# Patient Record
Sex: MALE | Race: WHITE | NOT HISPANIC OR LATINO | Employment: OTHER | URBAN - NONMETROPOLITAN AREA
[De-identification: names, ages, dates, MRNs, and addresses within clinical notes are randomized per-mention and may not be internally consistent; named-entity substitution may affect disease eponyms.]

---

## 2018-03-01 ENCOUNTER — TRANSCRIBE ORDERS (OUTPATIENT)
Dept: ADMINISTRATIVE | Facility: HOSPITAL | Age: 68
End: 2018-03-01

## 2018-03-01 DIAGNOSIS — M25.512 LEFT SHOULDER PAIN, UNSPECIFIED CHRONICITY: Primary | ICD-10-CM

## 2018-03-07 ENCOUNTER — HOSPITAL ENCOUNTER (OUTPATIENT)
Dept: MRI IMAGING | Facility: HOSPITAL | Age: 68
Discharge: HOME | End: 2018-03-07
Attending: ORTHOPAEDIC SURGERY | Admitting: ORTHOPAEDIC SURGERY

## 2018-03-07 DIAGNOSIS — M25.512 LEFT SHOULDER PAIN, UNSPECIFIED CHRONICITY: ICD-10-CM

## 2018-03-07 PROCEDURE — 73221 MRI JOINT UPR EXTREM W/O DYE: CPT

## 2018-03-21 ENCOUNTER — HOSPITAL ENCOUNTER (OUTPATIENT)
Dept: PREADMISSION TESTING | Age: 68
Discharge: HOME | End: 2018-03-25
Payer: MEDICARE

## 2018-03-21 VITALS — HEIGHT: 73 IN | WEIGHT: 236 LBS | BODY MASS INDEX: 31.28 KG/M2

## 2018-03-21 PROBLEM — S46.012A TRAUMATIC COMPLETE TEAR OF LEFT ROTATOR CUFF: Status: ACTIVE | Noted: 2018-03-21

## 2018-03-21 LAB
ANION GAP SERPL CALCULATED.3IONS-SCNC: 12 MMOL/L (ref 7–19)
BASOPHILS ABSOLUTE: 0 K/UL (ref 0–0.2)
BASOPHILS RELATIVE PERCENT: 0.4 % (ref 0–1)
BUN BLDV-MCNC: 19 MG/DL (ref 8–23)
CALCIUM SERPL-MCNC: 9.2 MG/DL (ref 8.8–10.2)
CHLORIDE BLD-SCNC: 100 MMOL/L (ref 98–111)
CO2: 24 MMOL/L (ref 22–29)
CREAT SERPL-MCNC: 1 MG/DL (ref 0.5–1.2)
EOSINOPHILS ABSOLUTE: 0.1 K/UL (ref 0–0.6)
EOSINOPHILS RELATIVE PERCENT: 1.5 % (ref 0–5)
GFR NON-AFRICAN AMERICAN: >60
GLUCOSE BLD-MCNC: 123 MG/DL (ref 74–109)
HCT VFR BLD CALC: 43.5 % (ref 42–52)
HEMOGLOBIN: 14.4 G/DL (ref 14–18)
LYMPHOCYTES ABSOLUTE: 0.8 K/UL (ref 1.1–4.5)
LYMPHOCYTES RELATIVE PERCENT: 10.7 % (ref 20–40)
MCH RBC QN AUTO: 29.8 PG (ref 27–31)
MCHC RBC AUTO-ENTMCNC: 33.1 G/DL (ref 33–37)
MCV RBC AUTO: 90.1 FL (ref 80–94)
MONOCYTES ABSOLUTE: 0.6 K/UL (ref 0–0.9)
MONOCYTES RELATIVE PERCENT: 8.2 % (ref 0–10)
NEUTROPHILS ABSOLUTE: 6.2 K/UL (ref 1.5–7.5)
NEUTROPHILS RELATIVE PERCENT: 78.8 % (ref 50–65)
PDW BLD-RTO: 13.3 % (ref 11.5–14.5)
PLATELET # BLD: 198 K/UL (ref 130–400)
PMV BLD AUTO: 11 FL (ref 9.4–12.4)
POTASSIUM SERPL-SCNC: 4.2 MMOL/L (ref 3.5–5)
RBC # BLD: 4.83 M/UL (ref 4.7–6.1)
SODIUM BLD-SCNC: 136 MMOL/L (ref 136–145)
WBC # BLD: 7.8 K/UL (ref 4.8–10.8)

## 2018-03-21 PROCEDURE — 85025 COMPLETE CBC W/AUTO DIFF WBC: CPT

## 2018-03-21 PROCEDURE — 80048 BASIC METABOLIC PNL TOTAL CA: CPT

## 2018-03-21 PROCEDURE — 93005 ELECTROCARDIOGRAM TRACING: CPT

## 2018-03-21 RX ORDER — COVID-19 ANTIGEN TEST
1 KIT MISCELLANEOUS 2 TIMES DAILY PRN
Status: ON HOLD | COMMUNITY
End: 2018-03-22 | Stop reason: HOSPADM

## 2018-03-21 RX ORDER — TAMSULOSIN HYDROCHLORIDE 0.4 MG/1
0.4 CAPSULE ORAL DAILY
COMMUNITY
End: 2020-07-30

## 2018-03-21 RX ORDER — ACETAMINOPHEN, ASPIRIN AND CAFFEINE 250; 250; 65 MG/1; MG/1; MG/1
1 TABLET, FILM COATED ORAL EVERY 6 HOURS PRN
COMMUNITY
End: 2018-03-21 | Stop reason: ALTCHOICE

## 2018-03-21 RX ORDER — M-VIT,TX,IRON,MINS/CALC/FOLIC 27MG-0.4MG
1 TABLET ORAL DAILY
COMMUNITY

## 2018-03-21 RX ORDER — ACETAMINOPHEN, ASPIRIN AND CAFFEINE 250; 250; 65 MG/1; MG/1; MG/1
1 TABLET, FILM COATED ORAL EVERY 6 HOURS PRN
COMMUNITY
End: 2019-11-18

## 2018-03-22 ENCOUNTER — ANESTHESIA (OUTPATIENT)
Dept: OPERATING ROOM | Age: 68
End: 2018-03-22
Payer: MEDICARE

## 2018-03-22 ENCOUNTER — ANESTHESIA EVENT (OUTPATIENT)
Dept: OPERATING ROOM | Age: 68
End: 2018-03-22
Payer: MEDICARE

## 2018-03-22 ENCOUNTER — HOSPITAL ENCOUNTER (OUTPATIENT)
Age: 68
Setting detail: OUTPATIENT SURGERY
Discharge: HOME | End: 2018-03-22
Attending: ORTHOPAEDIC SURGERY | Admitting: ORTHOPAEDIC SURGERY
Payer: MEDICARE

## 2018-03-22 VITALS
RESPIRATION RATE: 14 BRPM | TEMPERATURE: 97.1 F | SYSTOLIC BLOOD PRESSURE: 84 MMHG | DIASTOLIC BLOOD PRESSURE: 47 MMHG | OXYGEN SATURATION: 99 %

## 2018-03-22 VITALS
TEMPERATURE: 97.9 F | OXYGEN SATURATION: 92 % | SYSTOLIC BLOOD PRESSURE: 120 MMHG | DIASTOLIC BLOOD PRESSURE: 52 MMHG | BODY MASS INDEX: 31.28 KG/M2 | HEART RATE: 72 BPM | HEIGHT: 73 IN | WEIGHT: 236 LBS | RESPIRATION RATE: 16 BRPM

## 2018-03-22 LAB
EKG P AXIS: 74 DEGREES
EKG P-R INTERVAL: 142 MS
EKG Q-T INTERVAL: 368 MS
EKG QRS DURATION: 84 MS
EKG QTC CALCULATION (BAZETT): 399 MS
EKG T AXIS: 72 DEGREES

## 2018-03-22 PROCEDURE — 2720000010 HC SURG SUPPLY STERILE: Performed by: ORTHOPAEDIC SURGERY

## 2018-03-22 PROCEDURE — 6360000002 HC RX W HCPCS: Performed by: ORTHOPAEDIC SURGERY

## 2018-03-22 PROCEDURE — 6360000002 HC RX W HCPCS: Performed by: NURSE ANESTHETIST, CERTIFIED REGISTERED

## 2018-03-22 PROCEDURE — 2720000001 HC MISC SURG SUPPLY STERILE $51-500: Performed by: ORTHOPAEDIC SURGERY

## 2018-03-22 PROCEDURE — 3700000000 HC ANESTHESIA ATTENDED CARE: Performed by: ORTHOPAEDIC SURGERY

## 2018-03-22 PROCEDURE — 2500000003 HC RX 250 WO HCPCS: Performed by: ORTHOPAEDIC SURGERY

## 2018-03-22 PROCEDURE — 3600000004 HC SURGERY LEVEL 4 BASE: Performed by: ORTHOPAEDIC SURGERY

## 2018-03-22 PROCEDURE — 2580000003 HC RX 258: Performed by: ORTHOPAEDIC SURGERY

## 2018-03-22 PROCEDURE — C1713 ANCHOR/SCREW BN/BN,TIS/BN: HCPCS | Performed by: ORTHOPAEDIC SURGERY

## 2018-03-22 PROCEDURE — 2500000003 HC RX 250 WO HCPCS: Performed by: NURSE ANESTHETIST, CERTIFIED REGISTERED

## 2018-03-22 PROCEDURE — 7100000001 HC PACU RECOVERY - ADDTL 15 MIN: Performed by: ORTHOPAEDIC SURGERY

## 2018-03-22 PROCEDURE — 3600000014 HC SURGERY LEVEL 4 ADDTL 15MIN: Performed by: ORTHOPAEDIC SURGERY

## 2018-03-22 PROCEDURE — 2720000000 HC MISC SURG SUPPLY STERILE $0-50: Performed by: ORTHOPAEDIC SURGERY

## 2018-03-22 PROCEDURE — 2580000003 HC RX 258: Performed by: ANESTHESIOLOGY

## 2018-03-22 PROCEDURE — C1776 JOINT DEVICE (IMPLANTABLE): HCPCS | Performed by: ORTHOPAEDIC SURGERY

## 2018-03-22 PROCEDURE — 6360000002 HC RX W HCPCS: Performed by: ANESTHESIOLOGY

## 2018-03-22 PROCEDURE — 3700000001 HC ADD 15 MINUTES (ANESTHESIA): Performed by: ORTHOPAEDIC SURGERY

## 2018-03-22 PROCEDURE — 7100000000 HC PACU RECOVERY - FIRST 15 MIN: Performed by: ORTHOPAEDIC SURGERY

## 2018-03-22 PROCEDURE — 2720000003 HC MISC SUTURE/STAPLES/RELOADS/ETC: Performed by: ORTHOPAEDIC SURGERY

## 2018-03-22 PROCEDURE — 64415 NJX AA&/STRD BRCH PLXS IMG: CPT | Performed by: NURSE ANESTHETIST, CERTIFIED REGISTERED

## 2018-03-22 PROCEDURE — 7100000010 HC PHASE II RECOVERY - FIRST 15 MIN: Performed by: ORTHOPAEDIC SURGERY

## 2018-03-22 DEVICE — ANCHOR SUTURE BIOCOMP 4.75X19.1 MM SWIVELOCK C: Type: IMPLANTABLE DEVICE | Site: SHOULDER | Status: FUNCTIONAL

## 2018-03-22 RX ORDER — PROMETHAZINE HYDROCHLORIDE 25 MG/ML
6.25 INJECTION, SOLUTION INTRAMUSCULAR; INTRAVENOUS
Status: DISCONTINUED | OUTPATIENT
Start: 2018-03-22 | End: 2018-03-22 | Stop reason: HOSPADM

## 2018-03-22 RX ORDER — PROPOFOL 10 MG/ML
INJECTION, EMULSION INTRAVENOUS PRN
Status: DISCONTINUED | OUTPATIENT
Start: 2018-03-22 | End: 2018-03-22 | Stop reason: SDUPTHER

## 2018-03-22 RX ORDER — SODIUM CHLORIDE 0.9 % (FLUSH) 0.9 %
10 SYRINGE (ML) INJECTION PRN
Status: DISCONTINUED | OUTPATIENT
Start: 2018-03-22 | End: 2018-03-22 | Stop reason: HOSPADM

## 2018-03-22 RX ORDER — DEXAMETHASONE SODIUM PHOSPHATE 10 MG/ML
INJECTION INTRAMUSCULAR; INTRAVENOUS PRN
Status: DISCONTINUED | OUTPATIENT
Start: 2018-03-22 | End: 2018-03-22 | Stop reason: SDUPTHER

## 2018-03-22 RX ORDER — FENTANYL CITRATE 50 UG/ML
INJECTION, SOLUTION INTRAMUSCULAR; INTRAVENOUS PRN
Status: DISCONTINUED | OUTPATIENT
Start: 2018-03-22 | End: 2018-03-22 | Stop reason: SDUPTHER

## 2018-03-22 RX ORDER — OXYCODONE HYDROCHLORIDE AND ACETAMINOPHEN 5; 325 MG/1; MG/1
1 TABLET ORAL PRN
Status: DISCONTINUED | OUTPATIENT
Start: 2018-03-22 | End: 2018-03-22 | Stop reason: HOSPADM

## 2018-03-22 RX ORDER — SODIUM CHLORIDE, SODIUM LACTATE, POTASSIUM CHLORIDE, CALCIUM CHLORIDE 600; 310; 30; 20 MG/100ML; MG/100ML; MG/100ML; MG/100ML
INJECTION, SOLUTION INTRAVENOUS CONTINUOUS
Status: DISCONTINUED | OUTPATIENT
Start: 2018-03-22 | End: 2018-03-22 | Stop reason: HOSPADM

## 2018-03-22 RX ORDER — ONDANSETRON 4 MG/1
4 TABLET, FILM COATED ORAL EVERY 8 HOURS PRN
Qty: 10 TABLET | Refills: 0 | Status: SHIPPED | OUTPATIENT
Start: 2018-03-22 | End: 2019-11-18 | Stop reason: ALTCHOICE

## 2018-03-22 RX ORDER — LABETALOL HYDROCHLORIDE 5 MG/ML
5 INJECTION, SOLUTION INTRAVENOUS EVERY 10 MIN PRN
Status: DISCONTINUED | OUTPATIENT
Start: 2018-03-22 | End: 2018-03-22 | Stop reason: HOSPADM

## 2018-03-22 RX ORDER — MEPERIDINE HYDROCHLORIDE 50 MG/ML
12.5 INJECTION INTRAMUSCULAR; INTRAVENOUS; SUBCUTANEOUS EVERY 5 MIN PRN
Status: DISCONTINUED | OUTPATIENT
Start: 2018-03-22 | End: 2018-03-22 | Stop reason: HOSPADM

## 2018-03-22 RX ORDER — LIDOCAINE HYDROCHLORIDE 10 MG/ML
INJECTION, SOLUTION EPIDURAL; INFILTRATION; INTRACAUDAL; PERINEURAL PRN
Status: DISCONTINUED | OUTPATIENT
Start: 2018-03-22 | End: 2018-03-22 | Stop reason: SDUPTHER

## 2018-03-22 RX ORDER — FENTANYL CITRATE 50 UG/ML
50 INJECTION, SOLUTION INTRAMUSCULAR; INTRAVENOUS
Status: DISCONTINUED | OUTPATIENT
Start: 2018-03-22 | End: 2018-03-22 | Stop reason: HOSPADM

## 2018-03-22 RX ORDER — SODIUM CHLORIDE 0.9 % (FLUSH) 0.9 %
10 SYRINGE (ML) INJECTION EVERY 12 HOURS SCHEDULED
Status: DISCONTINUED | OUTPATIENT
Start: 2018-03-22 | End: 2018-03-22 | Stop reason: HOSPADM

## 2018-03-22 RX ORDER — LIDOCAINE HYDROCHLORIDE 10 MG/ML
1 INJECTION, SOLUTION EPIDURAL; INFILTRATION; INTRACAUDAL; PERINEURAL ONCE
Status: COMPLETED | OUTPATIENT
Start: 2018-03-22 | End: 2018-03-22

## 2018-03-22 RX ORDER — MORPHINE SULFATE 4 MG/ML
1 INJECTION, SOLUTION INTRAMUSCULAR; INTRAVENOUS EVERY 5 MIN PRN
Status: DISCONTINUED | OUTPATIENT
Start: 2018-03-22 | End: 2018-03-22 | Stop reason: HOSPADM

## 2018-03-22 RX ORDER — MIDAZOLAM HYDROCHLORIDE 1 MG/ML
2 INJECTION INTRAMUSCULAR; INTRAVENOUS
Status: DISCONTINUED | OUTPATIENT
Start: 2018-03-22 | End: 2018-03-22 | Stop reason: HOSPADM

## 2018-03-22 RX ORDER — OXYCODONE AND ACETAMINOPHEN 10; 325 MG/1; MG/1
1-2 TABLET ORAL
Qty: 60 TABLET | Refills: 0 | Status: SHIPPED | OUTPATIENT
Start: 2018-03-22 | End: 2018-03-26

## 2018-03-22 RX ORDER — HYDRALAZINE HYDROCHLORIDE 20 MG/ML
5 INJECTION INTRAMUSCULAR; INTRAVENOUS EVERY 10 MIN PRN
Status: DISCONTINUED | OUTPATIENT
Start: 2018-03-22 | End: 2018-03-22 | Stop reason: HOSPADM

## 2018-03-22 RX ORDER — LIDOCAINE HYDROCHLORIDE 10 MG/ML
1 INJECTION, SOLUTION EPIDURAL; INFILTRATION; INTRACAUDAL; PERINEURAL
Status: DISCONTINUED | OUTPATIENT
Start: 2018-03-22 | End: 2018-03-22 | Stop reason: HOSPADM

## 2018-03-22 RX ORDER — ROCURONIUM BROMIDE 10 MG/ML
INJECTION, SOLUTION INTRAVENOUS PRN
Status: DISCONTINUED | OUTPATIENT
Start: 2018-03-22 | End: 2018-03-22 | Stop reason: SDUPTHER

## 2018-03-22 RX ORDER — ONDANSETRON 2 MG/ML
4 INJECTION INTRAMUSCULAR; INTRAVENOUS
Status: DISCONTINUED | OUTPATIENT
Start: 2018-03-22 | End: 2018-03-22 | Stop reason: HOSPADM

## 2018-03-22 RX ORDER — EPHEDRINE SULFATE 50 MG/ML
INJECTION, SOLUTION INTRAVENOUS PRN
Status: DISCONTINUED | OUTPATIENT
Start: 2018-03-22 | End: 2018-03-22 | Stop reason: SDUPTHER

## 2018-03-22 RX ORDER — ENALAPRILAT 2.5 MG/2ML
1.25 INJECTION INTRAVENOUS
Status: DISCONTINUED | OUTPATIENT
Start: 2018-03-22 | End: 2018-03-22 | Stop reason: HOSPADM

## 2018-03-22 RX ORDER — DIPHENHYDRAMINE HYDROCHLORIDE 50 MG/ML
12.5 INJECTION INTRAMUSCULAR; INTRAVENOUS
Status: DISCONTINUED | OUTPATIENT
Start: 2018-03-22 | End: 2018-03-22 | Stop reason: HOSPADM

## 2018-03-22 RX ORDER — OXYCODONE HYDROCHLORIDE AND ACETAMINOPHEN 5; 325 MG/1; MG/1
2 TABLET ORAL PRN
Status: DISCONTINUED | OUTPATIENT
Start: 2018-03-22 | End: 2018-03-22 | Stop reason: HOSPADM

## 2018-03-22 RX ORDER — MORPHINE SULFATE 4 MG/ML
2 INJECTION, SOLUTION INTRAMUSCULAR; INTRAVENOUS EVERY 5 MIN PRN
Status: DISCONTINUED | OUTPATIENT
Start: 2018-03-22 | End: 2018-03-22 | Stop reason: HOSPADM

## 2018-03-22 RX ORDER — FENTANYL CITRATE 50 UG/ML
25 INJECTION, SOLUTION INTRAMUSCULAR; INTRAVENOUS
Status: DISCONTINUED | OUTPATIENT
Start: 2018-03-22 | End: 2018-03-22 | Stop reason: HOSPADM

## 2018-03-22 RX ORDER — ONDANSETRON 2 MG/ML
INJECTION INTRAMUSCULAR; INTRAVENOUS PRN
Status: DISCONTINUED | OUTPATIENT
Start: 2018-03-22 | End: 2018-03-22 | Stop reason: SDUPTHER

## 2018-03-22 RX ORDER — ROPIVACAINE HYDROCHLORIDE 5 MG/ML
INJECTION, SOLUTION EPIDURAL; INFILTRATION; PERINEURAL PRN
Status: DISCONTINUED | OUTPATIENT
Start: 2018-03-22 | End: 2018-03-22 | Stop reason: SDUPTHER

## 2018-03-22 RX ADMIN — FENTANYL CITRATE 75 MCG: 50 INJECTION, SOLUTION INTRAMUSCULAR; INTRAVENOUS at 12:25

## 2018-03-22 RX ADMIN — PHENYLEPHRINE HYDROCHLORIDE 100 MCG: 10 INJECTION INTRAVENOUS at 12:40

## 2018-03-22 RX ADMIN — PHENYLEPHRINE HYDROCHLORIDE 100 MCG: 10 INJECTION INTRAVENOUS at 12:35

## 2018-03-22 RX ADMIN — PHENYLEPHRINE HYDROCHLORIDE 100 MCG: 10 INJECTION INTRAVENOUS at 12:30

## 2018-03-22 RX ADMIN — ONDANSETRON HYDROCHLORIDE 4 MG: 2 SOLUTION INTRAMUSCULAR; INTRAVENOUS at 13:10

## 2018-03-22 RX ADMIN — FENTANYL CITRATE 50 MCG: 50 INJECTION, SOLUTION INTRAMUSCULAR; INTRAVENOUS at 12:20

## 2018-03-22 RX ADMIN — DEXAMETHASONE SODIUM PHOSPHATE 10 MG: 10 INJECTION INTRAMUSCULAR; INTRAVENOUS at 12:20

## 2018-03-22 RX ADMIN — SODIUM CHLORIDE, SODIUM LACTATE, POTASSIUM CHLORIDE, AND CALCIUM CHLORIDE: 600; 310; 30; 20 INJECTION, SOLUTION INTRAVENOUS at 12:15

## 2018-03-22 RX ADMIN — ROCURONIUM BROMIDE 50 MG: 10 INJECTION INTRAVENOUS at 12:20

## 2018-03-22 RX ADMIN — PHENYLEPHRINE HYDROCHLORIDE 100 MCG: 10 INJECTION INTRAVENOUS at 12:25

## 2018-03-22 RX ADMIN — SODIUM CHLORIDE, POTASSIUM CHLORIDE, SODIUM LACTATE AND CALCIUM CHLORIDE: 600; 310; 30; 20 INJECTION, SOLUTION INTRAVENOUS at 10:10

## 2018-03-22 RX ADMIN — EPHEDRINE SULFATE 10 MG: 50 INJECTION, SOLUTION INTRAMUSCULAR; INTRAVENOUS; SUBCUTANEOUS at 12:45

## 2018-03-22 RX ADMIN — SODIUM CHLORIDE, SODIUM LACTATE, POTASSIUM CHLORIDE, AND CALCIUM CHLORIDE: 600; 310; 30; 20 INJECTION, SOLUTION INTRAVENOUS at 13:20

## 2018-03-22 RX ADMIN — PROPOFOL 200 MG: 10 INJECTION, EMULSION INTRAVENOUS at 12:20

## 2018-03-22 RX ADMIN — Medication 2 MG: at 14:00

## 2018-03-22 RX ADMIN — LIDOCAINE HYDROCHLORIDE 1 ML: 10 INJECTION, SOLUTION EPIDURAL; INFILTRATION; INTRACAUDAL; PERINEURAL at 10:10

## 2018-03-22 RX ADMIN — Medication 2 MG: at 13:55

## 2018-03-22 RX ADMIN — LIDOCAINE HYDROCHLORIDE 5 ML: 10 INJECTION, SOLUTION EPIDURAL; INFILTRATION; INTRACAUDAL; PERINEURAL at 12:20

## 2018-03-22 RX ADMIN — SUGAMMADEX 250 MG: 100 INJECTION, SOLUTION INTRAVENOUS at 13:10

## 2018-03-22 RX ADMIN — ROPIVACAINE HYDROCHLORIDE 20 ML: 5 INJECTION, SOLUTION EPIDURAL; INFILTRATION; PERINEURAL at 11:56

## 2018-03-22 ASSESSMENT — PAIN DESCRIPTION - PAIN TYPE: TYPE: SURGICAL PAIN

## 2018-03-22 ASSESSMENT — PAIN DESCRIPTION - LOCATION: LOCATION: SHOULDER

## 2018-03-22 ASSESSMENT — PAIN - FUNCTIONAL ASSESSMENT: PAIN_FUNCTIONAL_ASSESSMENT: 0-10

## 2018-03-22 ASSESSMENT — PAIN SCALES - GENERAL
PAINLEVEL_OUTOF10: 4
PAINLEVEL_OUTOF10: 5
PAINLEVEL_OUTOF10: 7

## 2018-03-22 ASSESSMENT — PAIN DESCRIPTION - ORIENTATION: ORIENTATION: LEFT

## 2018-03-22 ASSESSMENT — PAIN DESCRIPTION - DESCRIPTORS: DESCRIPTORS: DISCOMFORT

## 2018-03-22 NOTE — H&P
Pt Name: Lamonte Lopez  MRN: [id number]  YOB: 1950  Date of evaluation: 3/22/2018    H&P including current review of systems was updated in the paper chart and/or the document previously scanned into the record. There have been no significant changes or new problems since the original evaluation. The patient's problems continue and indications for contemplated procedure have not changed.     Electronically signed by Annabel Gresham MD on 3/22/2018 at 1:19 PM

## 2019-11-09 ENCOUNTER — APPOINTMENT (OUTPATIENT)
Dept: CT IMAGING | Age: 69
End: 2019-11-09
Payer: MEDICARE

## 2019-11-09 ENCOUNTER — HOSPITAL ENCOUNTER (EMERGENCY)
Age: 69
Discharge: HOME | End: 2019-11-09
Attending: EMERGENCY MEDICINE
Payer: MEDICARE

## 2019-11-09 VITALS
SYSTOLIC BLOOD PRESSURE: 163 MMHG | HEIGHT: 72 IN | DIASTOLIC BLOOD PRESSURE: 67 MMHG | TEMPERATURE: 97.7 F | BODY MASS INDEX: 35.21 KG/M2 | RESPIRATION RATE: 16 BRPM | HEART RATE: 94 BPM | OXYGEN SATURATION: 94 % | WEIGHT: 260 LBS

## 2019-11-09 DIAGNOSIS — S09.90XA CLOSED HEAD INJURY, INITIAL ENCOUNTER: ICD-10-CM
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
## 2019-11-09 DIAGNOSIS — S06.0X1A CONCUSSION WITH LOSS OF CONSCIOUSNESS OF 30 MINUTES OR LESS, INITIAL ENCOUNTER: Primary | ICD-10-CM

## 2019-11-09 DIAGNOSIS — S09.90XA TRAUMATIC HEAD INJURY WITH MULTIPLE LACERATIONS, INITIAL ENCOUNTER: ICD-10-CM

## 2019-11-09 DIAGNOSIS — S01.91XA TRAUMATIC HEAD INJURY WITH MULTIPLE LACERATIONS, INITIAL ENCOUNTER: ICD-10-CM

## 2019-11-09 DIAGNOSIS — S32.89XA CLOSED FRACTURE OF OTHER PARTS OF PELVIS, INITIAL ENCOUNTER (HCC): ICD-10-CM

## 2019-11-09 LAB
ABO/RH: NORMAL
ALBUMIN SERPL-MCNC: 4.2 G/DL (ref 3.5–5.2)
ALP BLD-CCNC: 93 U/L (ref 40–130)
ALT SERPL-CCNC: 49 U/L (ref 5–41)
ANION GAP SERPL CALCULATED.3IONS-SCNC: 13 MMOL/L (ref 7–19)
ANTIBODY SCREEN: NORMAL
AST SERPL-CCNC: 36 U/L (ref 5–40)
BASOPHILS ABSOLUTE: 0 K/UL (ref 0–0.2)
BASOPHILS RELATIVE PERCENT: 0.3 % (ref 0–1)
BILIRUB SERPL-MCNC: 1.3 MG/DL (ref 0.2–1.2)
BILIRUBIN URINE: NEGATIVE
BLOOD, URINE: NEGATIVE
BUN BLDV-MCNC: 22 MG/DL (ref 8–23)
CALCIUM SERPL-MCNC: 8.8 MG/DL (ref 8.8–10.2)
CHLORIDE BLD-SCNC: 101 MMOL/L (ref 98–111)
CLARITY: CLEAR
CO2: 23 MMOL/L (ref 22–29)
COLOR: YELLOW
CREAT SERPL-MCNC: 1 MG/DL (ref 0.5–1.2)
EOSINOPHILS ABSOLUTE: 0 K/UL (ref 0–0.6)
EOSINOPHILS RELATIVE PERCENT: 0.4 % (ref 0–5)
GFR NON-AFRICAN AMERICAN: >60
GLUCOSE BLD-MCNC: 138 MG/DL (ref 74–109)
GLUCOSE URINE: NEGATIVE MG/DL
HCT VFR BLD CALC: 43.7 % (ref 42–52)
HEMOGLOBIN: 14.2 G/DL (ref 14–18)
IMMATURE GRANULOCYTES #: 0.1 K/UL
INR BLD: 1.19 (ref 0.88–1.18)
KETONES, URINE: NEGATIVE MG/DL
LEUKOCYTE ESTERASE, URINE: NEGATIVE
LIPASE: 20 U/L (ref 13–60)
LYMPHOCYTES ABSOLUTE: 0.7 K/UL (ref 1.1–4.5)
LYMPHOCYTES RELATIVE PERCENT: 7 % (ref 20–40)
MCH RBC QN AUTO: 29.5 PG (ref 27–31)
MCHC RBC AUTO-ENTMCNC: 32.5 G/DL (ref 33–37)
MCV RBC AUTO: 90.7 FL (ref 80–94)
MONOCYTES ABSOLUTE: 0.4 K/UL (ref 0–0.9)
MONOCYTES RELATIVE PERCENT: 4.4 % (ref 0–10)
NEUTROPHILS ABSOLUTE: 8.2 K/UL (ref 1.5–7.5)
NEUTROPHILS RELATIVE PERCENT: 86.9 % (ref 50–65)
NITRITE, URINE: NEGATIVE
PDW BLD-RTO: 13.1 % (ref 11.5–14.5)
PH UA: 6 (ref 5–8)
PLATELET # BLD: 161 K/UL (ref 130–400)
PMV BLD AUTO: 10.9 FL (ref 9.4–12.4)
POTASSIUM SERPL-SCNC: 4.4 MMOL/L (ref 3.5–5)
PROTEIN UA: NEGATIVE MG/DL
PROTHROMBIN TIME: 14.5 SEC (ref 12–14.6)
RBC # BLD: 4.82 M/UL (ref 4.7–6.1)
SODIUM BLD-SCNC: 137 MMOL/L (ref 136–145)
SPECIFIC GRAVITY UA: 1.03 (ref 1–1.03)
TOTAL PROTEIN: 6.8 G/DL (ref 6.6–8.7)
URINE REFLEX TO CULTURE: NORMAL
UROBILINOGEN, URINE: 1 E.U./DL
WBC # BLD: 9.4 K/UL (ref 4.8–10.8)

## 2019-11-09 PROCEDURE — 2580000003 HC RX 258: Performed by: EMERGENCY MEDICINE

## 2019-11-09 PROCEDURE — 12011 RPR F/E/E/N/L/M 2.5 CM/<: CPT

## 2019-11-09 PROCEDURE — 6360000002 HC RX W HCPCS

## 2019-11-09 PROCEDURE — 86900 BLOOD TYPING SEROLOGIC ABO: CPT

## 2019-11-09 PROCEDURE — 93005 ELECTROCARDIOGRAM TRACING: CPT | Performed by: EMERGENCY MEDICINE

## 2019-11-09 PROCEDURE — 96375 TX/PRO/DX INJ NEW DRUG ADDON: CPT

## 2019-11-09 PROCEDURE — 6360000004 HC RX CONTRAST MEDICATION: Performed by: EMERGENCY MEDICINE

## 2019-11-09 PROCEDURE — 80053 COMPREHEN METABOLIC PANEL: CPT

## 2019-11-09 PROCEDURE — 96374 THER/PROPH/DIAG INJ IV PUSH: CPT

## 2019-11-09 PROCEDURE — 72125 CT NECK SPINE W/O DYE: CPT

## 2019-11-09 PROCEDURE — 81003 URINALYSIS AUTO W/O SCOPE: CPT

## 2019-11-09 PROCEDURE — 6370000000 HC RX 637 (ALT 250 FOR IP): Performed by: EMERGENCY MEDICINE

## 2019-11-09 PROCEDURE — 85610 PROTHROMBIN TIME: CPT

## 2019-11-09 PROCEDURE — 71260 CT THORAX DX C+: CPT

## 2019-11-09 PROCEDURE — 6360000002 HC RX W HCPCS: Performed by: EMERGENCY MEDICINE

## 2019-11-09 PROCEDURE — 83690 ASSAY OF LIPASE: CPT

## 2019-11-09 PROCEDURE — 99284 EMERGENCY DEPT VISIT MOD MDM: CPT

## 2019-11-09 PROCEDURE — 86850 RBC ANTIBODY SCREEN: CPT

## 2019-11-09 PROCEDURE — 72131 CT LUMBAR SPINE W/O DYE: CPT

## 2019-11-09 PROCEDURE — 85025 COMPLETE CBC W/AUTO DIFF WBC: CPT

## 2019-11-09 PROCEDURE — 36415 COLL VENOUS BLD VENIPUNCTURE: CPT

## 2019-11-09 PROCEDURE — 86901 BLOOD TYPING SEROLOGIC RH(D): CPT

## 2019-11-09 PROCEDURE — 74177 CT ABD & PELVIS W/CONTRAST: CPT

## 2019-11-09 PROCEDURE — 70450 CT HEAD/BRAIN W/O DYE: CPT

## 2019-11-09 RX ORDER — MORPHINE SULFATE 4 MG/ML
4 INJECTION, SOLUTION INTRAMUSCULAR; INTRAVENOUS ONCE
Status: COMPLETED | OUTPATIENT
Start: 2019-11-09 | End: 2019-11-09

## 2019-11-09 RX ORDER — LIDOCAINE 4 G/G
1 PATCH TOPICAL ONCE
Status: DISCONTINUED | OUTPATIENT
Start: 2019-11-09 | End: 2019-11-10 | Stop reason: HOSPADM

## 2019-11-09 RX ORDER — NAPROXEN 500 MG/1
500 TABLET ORAL 2 TIMES DAILY WITH MEALS
Qty: 60 TABLET | Refills: 0 | Status: SHIPPED | OUTPATIENT
Start: 2019-11-09 | End: 2020-08-21 | Stop reason: ALTCHOICE

## 2019-11-09 RX ORDER — ONDANSETRON 2 MG/ML
4 INJECTION INTRAMUSCULAR; INTRAVENOUS ONCE
Status: COMPLETED | OUTPATIENT
Start: 2019-11-09 | End: 2019-11-09

## 2019-11-09 RX ORDER — 0.9 % SODIUM CHLORIDE 0.9 %
1000 INTRAVENOUS SOLUTION INTRAVENOUS ONCE
Status: COMPLETED | OUTPATIENT
Start: 2019-11-09 | End: 2019-11-09

## 2019-11-09 RX ORDER — KETOROLAC TROMETHAMINE 30 MG/ML
15 INJECTION, SOLUTION INTRAMUSCULAR; INTRAVENOUS ONCE
Status: COMPLETED | OUTPATIENT
Start: 2019-11-09 | End: 2019-11-09

## 2019-11-09 RX ORDER — ONDANSETRON 4 MG/1
4 TABLET, ORALLY DISINTEGRATING ORAL EVERY 8 HOURS PRN
Qty: 30 TABLET | Refills: 0 | Status: SHIPPED | OUTPATIENT
Start: 2019-11-09 | End: 2019-11-18 | Stop reason: ALTCHOICE

## 2019-11-09 RX ORDER — OXYCODONE HYDROCHLORIDE AND ACETAMINOPHEN 5; 325 MG/1; MG/1
1 TABLET ORAL EVERY 6 HOURS PRN
Qty: 12 TABLET | Refills: 0 | Status: SHIPPED | OUTPATIENT
Start: 2019-11-09 | End: 2019-11-12

## 2019-11-09 RX ORDER — ONDANSETRON 2 MG/ML
INJECTION INTRAMUSCULAR; INTRAVENOUS
Status: COMPLETED
Start: 2019-11-09 | End: 2019-11-09

## 2019-11-09 RX ADMIN — ONDANSETRON: 2 INJECTION INTRAMUSCULAR; INTRAVENOUS at 19:08

## 2019-11-09 RX ADMIN — SODIUM CHLORIDE 1000 ML: 9 INJECTION, SOLUTION INTRAVENOUS at 20:41

## 2019-11-09 RX ADMIN — ONDANSETRON HYDROCHLORIDE: 2 INJECTION, SOLUTION INTRAVENOUS at 19:08

## 2019-11-09 RX ADMIN — IOPAMIDOL 90 ML: 755 INJECTION, SOLUTION INTRAVENOUS at 17:27

## 2019-11-09 RX ADMIN — Medication 4 MG: at 19:05

## 2019-11-09 RX ADMIN — KETOROLAC TROMETHAMINE 15 MG: 30 INJECTION, SOLUTION INTRAMUSCULAR; INTRAVENOUS at 20:39

## 2019-11-09 ASSESSMENT — ENCOUNTER SYMPTOMS
SHORTNESS OF BREATH: 0
ABDOMINAL PAIN: 0
BACK PAIN: 1
COUGH: 0

## 2019-11-09 ASSESSMENT — PAIN SCALES - GENERAL: PAINLEVEL_OUTOF10: 6

## 2019-11-11 ENCOUNTER — TELEPHONE (OUTPATIENT)
Dept: NEUROLOGY | Age: 69
End: 2019-11-11

## 2019-11-12 ENCOUNTER — TELEPHONE (OUTPATIENT)
Dept: NEUROLOGY | Age: 69
End: 2019-11-12

## 2019-11-12 LAB
EKG P AXIS: 76 DEGREES
EKG P-R INTERVAL: 150 MS
EKG Q-T INTERVAL: 362 MS
EKG QRS DURATION: 82 MS
EKG QTC CALCULATION (BAZETT): 405 MS
EKG T AXIS: 67 DEGREES

## 2019-11-12 PROCEDURE — 93010 ELECTROCARDIOGRAM REPORT: CPT | Performed by: INTERNAL MEDICINE

## 2019-11-15 ENCOUNTER — TELEPHONE (OUTPATIENT)
Dept: NEUROLOGY | Age: 69
End: 2019-11-15

## 2019-11-18 ENCOUNTER — OFFICE VISIT (OUTPATIENT)
Dept: NEUROLOGY | Age: 69
End: 2019-11-18
Payer: MEDICARE

## 2019-11-18 VITALS
HEART RATE: 50 BPM | HEIGHT: 73 IN | BODY MASS INDEX: 33.27 KG/M2 | SYSTOLIC BLOOD PRESSURE: 195 MMHG | DIASTOLIC BLOOD PRESSURE: 68 MMHG | WEIGHT: 251 LBS

## 2019-11-18 DIAGNOSIS — R42 DIZZINESS: ICD-10-CM

## 2019-11-18 DIAGNOSIS — R41.3 AMNESIA: ICD-10-CM

## 2019-11-18 DIAGNOSIS — R51.9 HEADACHE DISORDER: ICD-10-CM

## 2019-11-18 DIAGNOSIS — S09.90XS HEAD INJURY, SEQUELA: Primary | ICD-10-CM

## 2019-11-18 DIAGNOSIS — M54.50 LOW BACK PAIN, UNSPECIFIED BACK PAIN LATERALITY, UNSPECIFIED CHRONICITY, UNSPECIFIED WHETHER SCIATICA PRESENT: ICD-10-CM

## 2019-11-18 PROCEDURE — 3017F COLORECTAL CA SCREEN DOC REV: CPT | Performed by: PSYCHIATRY & NEUROLOGY

## 2019-11-18 PROCEDURE — 4040F PNEUMOC VAC/ADMIN/RCVD: CPT | Performed by: PSYCHIATRY & NEUROLOGY

## 2019-11-18 PROCEDURE — 1123F ACP DISCUSS/DSCN MKR DOCD: CPT | Performed by: PSYCHIATRY & NEUROLOGY

## 2019-11-18 PROCEDURE — 99204 OFFICE O/P NEW MOD 45 MIN: CPT | Performed by: PSYCHIATRY & NEUROLOGY

## 2019-11-18 PROCEDURE — G8484 FLU IMMUNIZE NO ADMIN: HCPCS | Performed by: PSYCHIATRY & NEUROLOGY

## 2019-11-18 PROCEDURE — G8417 CALC BMI ABV UP PARAM F/U: HCPCS | Performed by: PSYCHIATRY & NEUROLOGY

## 2019-11-18 PROCEDURE — G8427 DOCREV CUR MEDS BY ELIG CLIN: HCPCS | Performed by: PSYCHIATRY & NEUROLOGY

## 2019-11-18 PROCEDURE — 1036F TOBACCO NON-USER: CPT | Performed by: PSYCHIATRY & NEUROLOGY

## 2019-11-21 ENCOUNTER — HOSPITAL ENCOUNTER (OUTPATIENT)
Dept: ULTRASOUND IMAGING | Age: 69
Discharge: HOME | End: 2019-11-21
Payer: MEDICARE

## 2019-11-21 DIAGNOSIS — E04.1 CYST OF THYROID: ICD-10-CM

## 2019-11-21 PROCEDURE — 76536 US EXAM OF HEAD AND NECK: CPT

## 2019-11-27 ENCOUNTER — TELEPHONE (OUTPATIENT)
Dept: NEUROLOGY | Age: 69
End: 2019-11-27

## 2019-11-27 ENCOUNTER — HOSPITAL ENCOUNTER (OUTPATIENT)
Dept: MRI IMAGING | Age: 69
Discharge: HOME | End: 2019-11-27
Payer: MEDICARE

## 2019-11-27 DIAGNOSIS — S09.90XS HEAD INJURY, SEQUELA: ICD-10-CM

## 2019-11-27 DIAGNOSIS — R42 DIZZINESS: ICD-10-CM

## 2019-11-27 DIAGNOSIS — R51.9 HEADACHE DISORDER: ICD-10-CM

## 2019-11-27 DIAGNOSIS — R41.3 AMNESIA: ICD-10-CM

## 2019-12-02 ENCOUNTER — TELEPHONE (OUTPATIENT)
Dept: NEUROLOGY | Age: 69
End: 2019-12-02

## 2019-12-02 DIAGNOSIS — F40.240 CLAUSTROPHOBIA: Primary | ICD-10-CM

## 2019-12-02 RX ORDER — LORAZEPAM 2 MG/1
2 TABLET ORAL ONCE
Qty: 2 TABLET | Refills: 0 | Status: SHIPPED | OUTPATIENT
Start: 2019-12-02 | End: 2019-12-02

## 2019-12-18 ENCOUNTER — HOSPITAL ENCOUNTER (OUTPATIENT)
Dept: MRI IMAGING | Age: 69
Discharge: HOME | End: 2019-12-18
Payer: MEDICARE

## 2019-12-18 DIAGNOSIS — S09.90XS HEADACHES DUE TO OLD HEAD INJURY: ICD-10-CM

## 2019-12-18 DIAGNOSIS — G44.309 HEADACHES DUE TO OLD HEAD INJURY: ICD-10-CM

## 2019-12-18 DIAGNOSIS — R41.3 AMNESIA: ICD-10-CM

## 2019-12-18 DIAGNOSIS — R42 DIZZINESS: ICD-10-CM

## 2019-12-18 PROCEDURE — 70551 MRI BRAIN STEM W/O DYE: CPT

## 2019-12-20 ENCOUNTER — TELEPHONE (OUTPATIENT)
Dept: NEUROLOGY | Age: 69
End: 2019-12-20

## 2020-06-10 ENCOUNTER — TRANSCRIBE ORDERS (OUTPATIENT)
Dept: ADMINISTRATIVE | Facility: HOSPITAL | Age: 70
End: 2020-06-10

## 2020-06-10 DIAGNOSIS — R55 SYNCOPE AND COLLAPSE: ICD-10-CM

## 2020-06-10 DIAGNOSIS — R42 DIZZINESS: Primary | ICD-10-CM

## 2020-06-25 ENCOUNTER — HOSPITAL ENCOUNTER (OUTPATIENT)
Dept: ULTRASOUND IMAGING | Facility: HOSPITAL | Age: 70
Discharge: HOME | End: 2020-06-25

## 2020-06-25 ENCOUNTER — HOSPITAL ENCOUNTER (OUTPATIENT)
Dept: CARDIOLOGY | Facility: HOSPITAL | Age: 70
Discharge: HOME | End: 2020-06-25

## 2020-06-25 ENCOUNTER — HOSPITAL ENCOUNTER (OUTPATIENT)
Dept: CARDIOLOGY | Facility: HOSPITAL | Age: 70
Discharge: HOME | End: 2020-06-25
Admitting: FAMILY MEDICINE

## 2020-06-25 VITALS
BODY MASS INDEX: 35.12 KG/M2 | WEIGHT: 265 LBS | HEIGHT: 73 IN | SYSTOLIC BLOOD PRESSURE: 128 MMHG | DIASTOLIC BLOOD PRESSURE: 54 MMHG

## 2020-06-25 DIAGNOSIS — R55 SYNCOPE AND COLLAPSE: ICD-10-CM

## 2020-06-25 DIAGNOSIS — R42 DIZZINESS: ICD-10-CM

## 2020-06-25 PROCEDURE — 93226 XTRNL ECG REC<48 HR SCAN A/R: CPT

## 2020-06-25 PROCEDURE — 93225 XTRNL ECG REC<48 HRS REC: CPT

## 2020-06-25 PROCEDURE — 93880 EXTRACRANIAL BILAT STUDY: CPT

## 2020-06-25 PROCEDURE — 93356 MYOCRD STRAIN IMG SPCKL TRCK: CPT | Performed by: INTERNAL MEDICINE

## 2020-06-25 PROCEDURE — 93880 EXTRACRANIAL BILAT STUDY: CPT | Performed by: SURGERY

## 2020-06-25 PROCEDURE — 25010000002 PERFLUTREN 6.52 MG/ML SUSPENSION: Performed by: FAMILY MEDICINE

## 2020-06-25 PROCEDURE — 93306 TTE W/DOPPLER COMPLETE: CPT

## 2020-06-25 PROCEDURE — 93356 MYOCRD STRAIN IMG SPCKL TRCK: CPT

## 2020-06-25 PROCEDURE — 93306 TTE W/DOPPLER COMPLETE: CPT | Performed by: INTERNAL MEDICINE

## 2020-06-25 RX ADMIN — PERFLUTREN 1.17 MG: 6.52 INJECTION, SUSPENSION INTRAVENOUS at 09:05

## 2020-06-26 LAB
BH CV ECHO MEAS - AO MAX PG (FULL): 2.6 MMHG
BH CV ECHO MEAS - AO MAX PG: 7.3 MMHG
BH CV ECHO MEAS - AO MEAN PG (FULL): 0.5 MMHG
BH CV ECHO MEAS - AO MEAN PG: 2.5 MMHG
BH CV ECHO MEAS - AO ROOT AREA (BSA CORRECTED): 1.5
BH CV ECHO MEAS - AO ROOT AREA: 11 CM^2
BH CV ECHO MEAS - AO ROOT DIAM: 3.8 CM
BH CV ECHO MEAS - AO V2 MAX: 135 CM/SEC
BH CV ECHO MEAS - AO V2 MEAN: 75 CM/SEC
BH CV ECHO MEAS - AO V2 VTI: 24.3 CM
BH CV ECHO MEAS - AVA(I,A): 2.9 CM^2
BH CV ECHO MEAS - AVA(I,D): 2.9 CM^2
BH CV ECHO MEAS - AVA(V,A): 3 CM^2
BH CV ECHO MEAS - AVA(V,D): 3 CM^2
BH CV ECHO MEAS - BSA(HAYCOCK): 2.5 M^2
BH CV ECHO MEAS - BSA: 2.4 M^2
BH CV ECHO MEAS - BZI_BMI: 35 KILOGRAMS/M^2
BH CV ECHO MEAS - BZI_METRIC_HEIGHT: 185.4 CM
BH CV ECHO MEAS - BZI_METRIC_WEIGHT: 120.2 KG
BH CV ECHO MEAS - EDV(CUBED): 85.2 ML
BH CV ECHO MEAS - EDV(MOD-SP4): 133 ML
BH CV ECHO MEAS - EDV(TEICH): 87.7 ML
BH CV ECHO MEAS - EF(CUBED): 76.6 %
BH CV ECHO MEAS - EF(MOD-SP4): 73.5 %
BH CV ECHO MEAS - EF(TEICH): 68.9 %
BH CV ECHO MEAS - ESV(CUBED): 19.9 ML
BH CV ECHO MEAS - ESV(MOD-SP4): 35.3 ML
BH CV ECHO MEAS - ESV(TEICH): 27.3 ML
BH CV ECHO MEAS - FS: 38.4 %
BH CV ECHO MEAS - IVS/LVPW: 0.95
BH CV ECHO MEAS - IVSD: 1.2 CM
BH CV ECHO MEAS - LA DIMENSION: 3.8 CM
BH CV ECHO MEAS - LA/AO: 1
BH CV ECHO MEAS - LAT PEAK E' VEL: 5.8 CM/SEC
BH CV ECHO MEAS - LV DIASTOLIC VOL/BSA (35-75): 54.8 ML/M^2
BH CV ECHO MEAS - LV MASS(C)D: 196 GRAMS
BH CV ECHO MEAS - LV MASS(C)DI: 80.8 GRAMS/M^2
BH CV ECHO MEAS - LV MAX PG: 4.7 MMHG
BH CV ECHO MEAS - LV MEAN PG: 2 MMHG
BH CV ECHO MEAS - LV SYSTOLIC VOL/BSA (12-30): 14.6 ML/M^2
BH CV ECHO MEAS - LV V1 MAX: 108 CM/SEC
BH CV ECHO MEAS - LV V1 MEAN: 55.2 CM/SEC
BH CV ECHO MEAS - LV V1 VTI: 18.4 CM
BH CV ECHO MEAS - LVIDD: 4.4 CM
BH CV ECHO MEAS - LVIDS: 2.7 CM
BH CV ECHO MEAS - LVLD AP4: 7 CM
BH CV ECHO MEAS - LVLS AP4: 5.4 CM
BH CV ECHO MEAS - LVOT AREA (M): 3.8 CM^2
BH CV ECHO MEAS - LVOT AREA: 3.8 CM^2
BH CV ECHO MEAS - LVOT DIAM: 2.2 CM
BH CV ECHO MEAS - LVPWD: 1.3 CM
BH CV ECHO MEAS - MED PEAK E' VEL: 6.97 CM/SEC
BH CV ECHO MEAS - MV A MAX VEL: 87.8 CM/SEC
BH CV ECHO MEAS - MV DEC TIME: 0.34 SEC
BH CV ECHO MEAS - MV E MAX VEL: 59.2 CM/SEC
BH CV ECHO MEAS - MV E/A: 0.67
BH CV ECHO MEAS - RAP SYSTOLE: 5 MMHG
BH CV ECHO MEAS - RVSP: 40 MMHG
BH CV ECHO MEAS - SI(AO): 110.7 ML/M^2
BH CV ECHO MEAS - SI(CUBED): 26.9 ML/M^2
BH CV ECHO MEAS - SI(LVOT): 28.8 ML/M^2
BH CV ECHO MEAS - SI(MOD-SP4): 40.3 ML/M^2
BH CV ECHO MEAS - SI(TEICH): 24.9 ML/M^2
BH CV ECHO MEAS - SV(AO): 268.4 ML
BH CV ECHO MEAS - SV(CUBED): 65.3 ML
BH CV ECHO MEAS - SV(LVOT): 69.9 ML
BH CV ECHO MEAS - SV(MOD-SP4): 97.7 ML
BH CV ECHO MEAS - SV(TEICH): 60.4 ML
BH CV ECHO MEAS - TR MAX VEL: 296 CM/SEC
BH CV ECHO MEASUREMENTS AVERAGE E/E' RATIO: 9.27
LEFT ATRIUM VOLUME INDEX: 27.7 ML/M2
LEFT ATRIUM VOLUME: 67.2 CM3
LV EF 2D ECHO EST: 70 %
MAXIMAL PREDICTED HEART RATE: 151 BPM
STRESS TARGET HR: 128 BPM

## 2020-06-30 PROCEDURE — 93227 XTRNL ECG REC<48 HR R&I: CPT | Performed by: INTERNAL MEDICINE

## 2020-07-15 ENCOUNTER — OFFICE VISIT (OUTPATIENT)
Dept: UROLOGY | Age: 70
End: 2020-07-15
Payer: MEDICARE

## 2020-07-15 VITALS — WEIGHT: 258 LBS | TEMPERATURE: 97.1 F | HEIGHT: 73 IN | BODY MASS INDEX: 34.19 KG/M2

## 2020-07-15 LAB
BILIRUBIN, POC: NORMAL
BLOOD URINE, POC: NORMAL
CLARITY, POC: CLEAR
COLOR, POC: YELLOW
GLUCOSE URINE, POC: NORMAL
KETONES, POC: NORMAL
LEUKOCYTE EST, POC: NORMAL
NITRITE, POC: NORMAL
PH, POC: 6.5
PROTEIN, POC: NORMAL
SPECIFIC GRAVITY, POC: 1.02
UROBILINOGEN, POC: 1

## 2020-07-15 PROCEDURE — 99203 OFFICE O/P NEW LOW 30 MIN: CPT | Performed by: NURSE PRACTITIONER

## 2020-07-15 PROCEDURE — 51798 US URINE CAPACITY MEASURE: CPT | Performed by: NURSE PRACTITIONER

## 2020-07-15 PROCEDURE — 81003 URINALYSIS AUTO W/O SCOPE: CPT | Performed by: NURSE PRACTITIONER

## 2020-07-15 RX ORDER — CIPROFLOXACIN 500 MG/1
500 TABLET, FILM COATED ORAL 2 TIMES DAILY
Qty: 8 TABLET | Refills: 0 | Status: SHIPPED | OUTPATIENT
Start: 2020-07-15 | End: 2020-07-19

## 2020-07-15 RX ORDER — ALFUZOSIN HYDROCHLORIDE 10 MG/1
TABLET, EXTENDED RELEASE ORAL
COMMUNITY
Start: 2020-05-01 | End: 2021-08-16 | Stop reason: SDUPTHER

## 2020-07-15 RX ORDER — SILODOSIN 8 MG/1
8 CAPSULE ORAL EVERY EVENING
Qty: 30 CAPSULE | Refills: 3 | Status: SHIPPED | OUTPATIENT
Start: 2020-07-15 | End: 2020-08-21 | Stop reason: ALTCHOICE

## 2020-07-15 RX ORDER — LISINOPRIL AND HYDROCHLOROTHIAZIDE 12.5; 1 MG/1; MG/1
TABLET ORAL
COMMUNITY
Start: 2020-05-01 | End: 2022-03-14

## 2020-07-15 NOTE — PROGRESS NOTES
Laura Levin is a 72 y.o. male who presents today   Chief Complaint   Patient presents with    New Patient     I am here through referral for elevated PSA     Elevated PSA           HPI:       Laura Levin presents for question of elevated PSA. Patient has been seen by his primary care doctor, Dr. David Irizarry, previously had a PSA of 18, his repeat PSA on 7/6/2020 was 21. He is currently taking Uroxatrol which has helped with symptoms of frequency and urgency, but he does not feel this has helped enough. No family history of prostate cancer. He denies any gross hematuria. He does still have some weak stream at times. Past Medical History:   Diagnosis Date    Benign prostatic disease     Elevated PSA     Osteoarthritis     Shoulder pain     fall off porch    Upper respiratory virus     c/o recent tx with otc meds. ; denies fever/chills (about a week ago)      Past Surgical History:   Procedure Laterality Date    HERNIA REPAIR      JOINT REPLACEMENT      KNEE ARTHROPLASTY  12--10    [facility] SHLDR ARTHROSCOP,SURG,W/ROTAT CUFF REPR Left 3/22/2018    SHOULDER ARTHROSCOPY ROTATOR CUFF REPAIR  SUBACROMIAL DECOMPRESSION performed by Kristopher Keyes MD at [place] [address] ARTHROSCOPY  2008       History reviewed. No pertinent family history. Social History     Tobacco Use    Smoking status: Former Smoker    Smokeless tobacco: Former User     Quit date: 2007   Substance Use Topics    Alcohol use:  Yes     Alcohol/week: 2.0 standard drinks     Types: 2 Shots of liquor per week     Comment: 2 per week      Current Outpatient Medications   Medication Sig Dispense Refill    alfuzosin (UROXATRAL) 10 MG extended release tablet       lisinopril-hydroCHLOROthiazide (PRINZIDE;ZESTORETIC) 10-12.5 MG per tablet       silodosin (RAPAFLO) 8 MG CAPS Take 1 capsule by mouth every evening 30 capsule 3    ciprofloxacin (CIPRO) 500 MG tablet Take 1 tablet by mouth 2 times daily for 4 days To begin 1 day prior to Value Ref Range    Color, UA yellow     Clarity, UA clear     Glucose, UA POC neg     Bilirubin, UA neg     Ketones, UA neg     Spec Grav, UA 1.020     Blood, UA POC neg     pH, UA 6.5     Protein, UA POC neg     Urobilinogen, UA 1.0     Leukocytes, UA neg     Nitrite, UA neg          Assessment/ Plan       Diagnosis Orders   1. Elevated PSA  POCT Urinalysis No Micro (Auto)   2. BPH with urinary obstruction  NV MEASUREMENT,POST-VOID RESIDUAL VOLUME BY US,NON-IMAGING   Patient is not satisfied with results with Uroxatrol, I will place him on Rapaflo. It has been explained to patient that with the extremely high PSA I would highly suggest prostate biopsy. Patient is agreeable to this. The risk of infection, bleeding, nausea, vomiting, or even syncope or presyncope was discussed with patient. He has been instructed to begin antibiotic today before procedure and instill enema the day of procedure. Ciprofloxacin has been sent to his pharmacy. Discussed use, benefit, and side effects of prescribed medications. All patient questions answered. Pt voiced understanding. Patient agreed with treatment plan. Electronically signed by ALIYA Gray on 7/15/2020 at 1:06 PM     EMR dragon/transcription disclaimer: Much of this encounter note is electronic transcription/translation of spoken language to printed tach. Electronic translation of spoken language may be erroneous, or at times, nonsensical words or phrases may be inadvertently transcribed.  Although, I have reviewed the note for such errors, some may still exist.

## 2020-07-24 ENCOUNTER — OFFICE VISIT (OUTPATIENT)
Age: 70
End: 2020-07-24

## 2020-07-24 VITALS — HEART RATE: 81 BPM | TEMPERATURE: 97.8 F | OXYGEN SATURATION: 95 %

## 2020-07-27 LAB
REPORT: NORMAL
SARS-COV-2: NOT DETECTED
THIS TEST SENT TO: NORMAL

## 2020-07-30 ENCOUNTER — PROCEDURE VISIT (OUTPATIENT)
Dept: UROLOGY | Age: 70
End: 2020-07-30
Payer: MEDICARE

## 2020-07-30 PROCEDURE — 96372 THER/PROPH/DIAG INJ SC/IM: CPT | Performed by: UROLOGY

## 2020-07-30 PROCEDURE — 76872 US TRANSRECTAL: CPT | Performed by: UROLOGY

## 2020-07-30 PROCEDURE — 55700 PR BIOPSY OF PROSTATE,NEEDLE/PUNCH: CPT | Performed by: UROLOGY

## 2020-07-30 RX ORDER — GENTAMICIN SULFATE 40 MG/ML
80 INJECTION, SOLUTION INTRAMUSCULAR; INTRAVENOUS ONCE
Status: COMPLETED | OUTPATIENT
Start: 2020-07-30 | End: 2020-07-30

## 2020-07-30 RX ADMIN — GENTAMICIN SULFATE 80 MG: 40 INJECTION, SOLUTION INTRAMUSCULAR; INTRAVENOUS at 08:06

## 2020-08-05 ENCOUNTER — TELEPHONE (OUTPATIENT)
Dept: UROLOGY | Age: 70
End: 2020-08-05

## 2020-08-05 NOTE — TELEPHONE ENCOUNTER
Patient returned my call regarding his prostate biopsy results. I explained to the patient that all the cores from A-F involving the right side of the prostate revealed prostatic adenocarcinoma Flushing 7 (4+3). Cores G-L revealed benign prostatic glands and stroma. I explained to the patient that we will get him scheduled for a prostate cancer consult with Dr. Naida Nobles. Prior to this consult he needs to get a CMP, bone scan, CT of the abdomen pelvis with and without contrast, and a chest x-ray. We will schedule her order this in approximately 1 week you will be contacted to schedule these and schedule the prostate cancer consult. Patient had no further questions regarding his biopsy results or any treatment questions. I did tell the patient it would be useful for him to look online to look at the various treatment options for prostate cancer and have some questions written down that he may ask during the prostate cancer consult.

## 2020-08-06 ENCOUNTER — TELEPHONE (OUTPATIENT)
Dept: UROLOGY | Age: 70
End: 2020-08-06

## 2020-08-14 ENCOUNTER — HOSPITAL ENCOUNTER (OUTPATIENT)
Dept: CT IMAGING | Age: 70
Discharge: HOME | End: 2020-08-14
Payer: MEDICARE

## 2020-08-14 ENCOUNTER — HOSPITAL ENCOUNTER (OUTPATIENT)
Dept: NUCLEAR MEDICINE | Age: 70
Discharge: HOME | End: 2020-08-16
Payer: MEDICARE

## 2020-08-14 ENCOUNTER — HOSPITAL ENCOUNTER (OUTPATIENT)
Dept: GENERAL RADIOLOGY | Age: 70
Discharge: HOME | End: 2020-08-14
Payer: MEDICARE

## 2020-08-14 DIAGNOSIS — C61 PROSTATE CANCER (HCC): ICD-10-CM

## 2020-08-14 LAB
ALBUMIN SERPL-MCNC: 4.1 G/DL (ref 3.5–5.2)
ALP BLD-CCNC: 94 U/L (ref 40–130)
ALT SERPL-CCNC: 39 U/L (ref 5–41)
ANION GAP SERPL CALCULATED.3IONS-SCNC: 12 MMOL/L (ref 7–19)
AST SERPL-CCNC: 27 U/L (ref 5–40)
BILIRUB SERPL-MCNC: 1.2 MG/DL (ref 0.2–1.2)
BUN BLDV-MCNC: 16 MG/DL (ref 8–23)
CALCIUM SERPL-MCNC: 9.2 MG/DL (ref 8.8–10.2)
CHLORIDE BLD-SCNC: 104 MMOL/L (ref 98–111)
CO2: 24 MMOL/L (ref 22–29)
CREAT SERPL-MCNC: 1 MG/DL (ref 0.5–1.2)
GFR AFRICAN AMERICAN: >59
GFR NON-AFRICAN AMERICAN: >60
GLUCOSE BLD-MCNC: 103 MG/DL (ref 74–109)
POTASSIUM SERPL-SCNC: 4.4 MMOL/L (ref 3.5–5)
SODIUM BLD-SCNC: 140 MMOL/L (ref 136–145)
TOTAL PROTEIN: 6.7 G/DL (ref 6.6–8.7)

## 2020-08-14 PROCEDURE — A9561 TC99M OXIDRONATE: HCPCS | Performed by: PHYSICIAN ASSISTANT

## 2020-08-14 PROCEDURE — 6360000004 HC RX CONTRAST MEDICATION: Performed by: PHYSICIAN ASSISTANT

## 2020-08-14 PROCEDURE — 74178 CT ABD&PLV WO CNTR FLWD CNTR: CPT

## 2020-08-14 PROCEDURE — 78306 BONE IMAGING WHOLE BODY: CPT

## 2020-08-14 PROCEDURE — 71046 X-RAY EXAM CHEST 2 VIEWS: CPT

## 2020-08-14 PROCEDURE — 3430000000 HC RX DIAGNOSTIC RADIOPHARMACEUTICAL: Performed by: PHYSICIAN ASSISTANT

## 2020-08-14 RX ADMIN — IOPAMIDOL 75 ML: 755 INJECTION, SOLUTION INTRAVENOUS at 12:50

## 2020-08-14 RX ADMIN — TECHNETIUM TC 99M OXIDRONATE 20 MILLICURIE: 3.15 INJECTION, POWDER, LYOPHILIZED, FOR SOLUTION INTRAVENOUS at 16:14

## 2020-08-21 ENCOUNTER — INITIAL CONSULT (OUTPATIENT)
Dept: UROLOGY | Age: 70
End: 2020-08-21
Payer: MEDICARE

## 2020-08-21 VITALS
WEIGHT: 252 LBS | BODY MASS INDEX: 33.4 KG/M2 | DIASTOLIC BLOOD PRESSURE: 60 MMHG | TEMPERATURE: 97.1 F | HEIGHT: 73 IN | SYSTOLIC BLOOD PRESSURE: 113 MMHG | HEART RATE: 81 BPM

## 2020-08-21 PROBLEM — C61 PROSTATE CANCER (HCC): Status: ACTIVE | Noted: 2020-08-21

## 2020-08-21 PROCEDURE — 99215 OFFICE O/P EST HI 40 MIN: CPT | Performed by: UROLOGY

## 2020-08-21 ASSESSMENT — ENCOUNTER SYMPTOMS
SORE THROAT: 0
EYE PAIN: 0
BACK PAIN: 0
ABDOMINAL DISTENTION: 0
SINUS PRESSURE: 0
COLOR CHANGE: 0
WHEEZING: 0
RHINORRHEA: 0
NAUSEA: 0
CONSTIPATION: 0
EYE DISCHARGE: 0
DIARRHEA: 0
COUGH: 0
ABDOMINAL PAIN: 0
VOMITING: 0
BLOOD IN STOOL: 0
EYE REDNESS: 0
FACIAL SWELLING: 0
SHORTNESS OF BREATH: 0

## 2020-08-21 NOTE — PROGRESS NOTES
this is primarily used in patients with raymundo or metastatic disease or in conjunction with radiation therapy. The side effects include hot flashes, fatigue, breast enlargement, diminished libido, ED and long term development of osteoporosis, and cardiovascular disease risk. The patient was told he will encouraged to take supplemental Calcium and Vitamin D to prevent bone loss and may need additional medications for his bones. 3.  Radiation Therapy in the form of external beam radiation (IMRT) or prostate brachytherapy. Patient told that IMRT is given 5 days a week for 7-8 weeks and the side effects include rectal and bladder injury (OAB), erectile dysfunction (ED) and incontinence, urethral stricture disease. Long term the patient may experience hematuria and radiation cystitis or proctitis. Brachytherapy is done as an outpatient procedure under general anesthesia and postop the patient may experience dysuria, urgency, frequency, urge incontinence, increasing obstructive voiding symptoms and to a lesser degree than IMRT, rectal radiation injury and ED. Patients may require concomitant hormonal therapy with IMRT depending on the grade and extent of cancer. The patient may require hormonal therapy to downsize the prostate gland prior to brachytherapy. 4.  Open or Robotic assisted laparoscopic radical prostatectomy. Patient told about the options of open vs. Robotic assisted laparoscopic prostatectomy with or without pelvic lymphadenectomy. I discussed the risks including infection, bleeding, the risks of anesthesia, DVT, PE, MI, stroke, death, rectal injury and urethral stricture/bladder neck contracture. Urine leak, and lymphocele. I discussed the side effect of stress urinary incontinence which is temporary for most patients. I discussed the side effect of ED and the fact that it may take 6-18 months to recover this function. 5.  Cryotherapy.   I discussed that I do not perform this as a primary therapy, and in most cases this is done after failure of radiation therapy and would require referral to an outside facility who does Cryrotherapy. Past Medical History:   Diagnosis Date    Benign prostatic disease     Elevated PSA     Osteoarthritis     Shoulder pain     fall off porch    Upper respiratory virus     c/o recent tx with otc meds. ; denies fever/chills (about a week ago)       Past Surgical History:   Procedure Laterality Date    HERNIA REPAIR      JOINT REPLACEMENT      KNEE ARTHROPLASTY  12--10    [facility] SHLDR ARTHROSCOP,SURG,W/ROTAT CUFF REPR Left 3/22/2018    SHOULDER ARTHROSCOPY ROTATOR CUFF REPAIR  SUBACROMIAL DECOMPRESSION performed by Andrew Mckinney MD at [place] [address] ARTHROSCOPY  2008       Current Outpatient Medications   Medication Sig Dispense Refill    alfuzosin (UROXATRAL) 10 MG extended release tablet       lisinopril-hydroCHLOROthiazide (PRINZIDE;ZESTORETIC) 10-12.5 MG per tablet       Multiple Vitamins-Minerals (THERAPEUTIC MULTIVITAMIN-MINERALS) tablet Take 1 tablet by mouth daily       No current facility-administered medications for this visit. No Known Allergies    Social History     Socioeconomic History    Marital status:      Spouse name: None    Number of children: None    Years of education: None    Highest education level: None   Occupational History    None   Social Needs    Financial resource strain: None    Food insecurity     Worry: None     Inability: None    Transportation needs     Medical: None     Non-medical: None   Tobacco Use    Smoking status: Former Smoker    Smokeless tobacco: Former User     Quit date: 2007   Substance and Sexual Activity    Alcohol use:  Yes     Alcohol/week: 2.0 standard drinks     Types: 2 Shots of liquor per week     Comment: 2 per week    Drug use: No    Sexual activity: None   Lifestyle    Physical activity     Days per week: None     Minutes per session: None    Stress: None 252 lb (114.3 kg)   BMI 33.25 kg/m²   Physical Exam  Constitutional:       General: He is not in acute distress. Appearance: Normal appearance. He is well-developed. HENT:      Head: Normocephalic and atraumatic. Nose: Nose normal.   Eyes:      General: No scleral icterus. Conjunctiva/sclera: Conjunctivae normal.      Pupils: Pupils are equal, round, and reactive to light. Neck:      Musculoskeletal: Normal range of motion and neck supple. Trachea: No tracheal deviation. Cardiovascular:      Rate and Rhythm: Normal rate and regular rhythm. Pulmonary:      Effort: Pulmonary effort is normal. No respiratory distress. Breath sounds: No stridor. Abdominal:      General: There is no distension. Palpations: Abdomen is soft. There is no mass. Tenderness: There is no abdominal tenderness. Musculoskeletal: Normal range of motion. General: No tenderness. Lymphadenopathy:      Cervical: No cervical adenopathy. Skin:     General: Skin is warm and dry. Findings: No erythema. Neurological:      Mental Status: He is alert and oriented to person, place, and time.    Psychiatric:         Behavior: Behavior normal.         Judgment: Judgment normal.             DATA:  CMP:    Lab Results   Component Value Date     08/14/2020    K 4.4 08/14/2020     08/14/2020    CO2 24 08/14/2020    BUN 16 08/14/2020    CREATININE 1.0 08/14/2020    GFRAA >59 08/14/2020    LABGLOM >60 08/14/2020    GLUCOSE 103 08/14/2020    PROT 6.7 08/14/2020    LABALBU 4.1 08/14/2020    CALCIUM 9.2 08/14/2020    BILITOT 1.2 08/14/2020    ALKPHOS 94 08/14/2020    AST 27 08/14/2020    ALT 39 08/14/2020         Performed by: [facility]                                        [address] [facility]                                   [facility], [facility]   Department of Pathology   FINAL SURGICAL PATHOLOGY REPORT   Patient Name: The  electronic translation of spoken language may be erroneous, or at times,  nonsensical words or phrases may be inadvertently transcribed.  Although I  have reviewed the document for such errors, some may still exist.

## 2020-08-21 NOTE — LETTER
[facility]  [place] [address]  [address] [facility] [address]  Phone: [phone]  Fax: [phone]    Josh Munoz MD        August 21, 2020     [facility] [id number]      Patient: Rafita Barber   MR Number: [id number]   YOB: 1950   Date of Visit: 8/21/2020       Dear Provider: Thank you for referring Sukhdev Paredes to me for evaluation. Below are the relevant portions of my assessment and plan of care. If you have questions, please do not hesitate to call me. I look forward to following Juan Walker along with you.     Sincerely,        Josh Munoz MD

## 2020-08-26 ENCOUNTER — TELEPHONE (OUTPATIENT)
Dept: RADIATION ONCOLOGY | Facility: HOSPITAL | Age: 70
End: 2020-08-26

## 2020-08-28 PROBLEM — C61 PROSTATE CANCER: Status: ACTIVE | Noted: 2020-08-21

## 2020-09-01 ENCOUNTER — HOSPITAL ENCOUNTER (OUTPATIENT)
Dept: RADIATION ONCOLOGY | Facility: HOSPITAL | Age: 70
Setting detail: RADIATION/ONCOLOGY SERIES
End: 2020-09-01

## 2020-09-01 NOTE — PROGRESS NOTES
[facility]  MD Lia Vale BSN, PA-C  ___________________________________________________________  [facility]  Department of Radiation Oncology  [address] [place] [id number]  Office:  [phone]  Fax: [phone]                DATE:  09/02/2020  PATIENT: Delgado Desir  1950                         MEDICAL RECORD #:  [id number]                                                       REASON FOR CONSULTATION: Adenocarcinoma of the Prostate, [place] 7    Delgado Desir is a very pleasant male that has been referred to our office by Jake Phan MD to discuss radiotherapy recommendations. Denies activity change, appetite change, unexpected weight change, fatigue, nausea/vomiting, diarrhea, dysuria, hematuria, frequency/urgency with urination, decreased urine volume, light-headedness, weakness, and headaches. He follows .     History of Present Illness:  Diagnosed in July 2020 with Prostatic Adenocarcinoma, Preston score 7 (4+3), Grade group 3.  5/12 cores positive.  5/6 cores on the right, 6th core was the right apex and has had high-grade PIN.  Pre-PSA 21. TNM stage was: IIIA ([id number]). Staging CT and bone scan showed a RLL lung nodule. Follows Dr Phan who suggested 2 years of neoadjuvant hormonal therapy with XRT vs surgery due to the enlarged prostate and obstructive symptoms. Pt will decided after radiation consultation.    07/06/2020 - PSA: 21    07/15/2020 - Appointment with :  • Patient is not satisfied with results with Uroxatrol, I will place him on Rapaflo.   • It has been explained to patient that with the extremely high PSA I would highly suggest prostate biopsy. Patient is agreeable to this.   • The risk of infection, bleeding, nausea, vomiting, or even syncope or presyncope was discussed with patient.   • He has been instructed to begin antibiotic today before procedure and instill enema the day  of procedure.   • Ciprofloxacin has been sent to his pharmacy.    07/30/2020 - Prostate Biopsy per :  • Prostate, right lateral base, needle biopsy:  o Prostatic adenocarcinoma, Preston score 7 (4+3)  o Tumor encompasses 20% of the needle core biopsy  o Grade group 3  • Prostate, right lateral mid, needle biopsy:  o Prostatic adenocarcinoma, [place]'s 7 (4+3)  o Tumor encompasses 20% of the needle core biopsy  o Grade group 3  • Prostate, right lateral apex, needle biopsy:  o Prostatic adenocarcinoma, Preston score 7 (3+4)  o Tumor encompasses 30% of the needle core biopsy  o Grade group 2  o Pattern 4 is approximately 20% of the tumor  • Prostate, right base, needle biopsy:  o Prostatic adenocarcinoma, [place]'s 7 (4+3)  o Tumor encompasses 10% of the needle core biopsy  o Grade group 3  • Prostate, right mid, needle biopsy:  o Prostatic adenocarcinoma, Preston's 7 (4+3)  o Tumor encompasses 55% of the needle core biopsy  o Grade group 3  • Prostate, right apex, needle biopsy:  o High-grade prostatic intraepithelial neoplasia  • Prostate, left lateral base, needle biopsy:  Benign prostatic glands and stroma  • Prostate, left lateral mid, needle biopsy:  Benign prostatic glands and stroma  • Prostate, left lateral apex, needle biopsy:  Benign prostatic glands and stroma  • Prostate, left base, needle biopsy:  Benign prostatic glands and stroma  • Prostate, left mid, needle biopsy:  Benign prostatic glands and stroma  • Prostate, left apex, needle biopsy:  Benign prostatic glands and stroma    08/14/2020 - CT Abdomen/Pelvis with and without contrast:  • No acute abnormality of the abdomen or pelvis.  • A moderate splenomegaly.  • An enlarged prostate.  • A stable bilateral renal cysts.  • The lobulated noncalcified nodule in the right lower lobe represent a neoplastic process. Further evaluation with positron emission tomography scan may be obtained.  • The previously seen fractures of the left sacral ala  is not visualized in this study. If clinically significant, further evaluation with radionuclide bone scan may be obtained    08/14/2020 - Bone Scan:  • No evidence of metastatic bone disease.  • Degenerative uptake in the spine, right knee and bilateral ankles/feet. Suspect prior left knee arthroplasty. Correlate clinically.    08/21/2020 - Appointment with :  PSA on 7/6/2020 was 21 ng/ML  His prostate volume was 107 grams. PSAD 0.20  He had a prostate biopsy consisting of a total of 12 cores with 5 positive cores.  TRUS Findings consisted of no abnormal ultrasound findings  He has right sided disease with a [place] score of 4+3 equal sign 7 grade group 2 intermediate unfavorable.  Location of the the Cancer: 5 out of 6 cores on the right the only one that was negative was the right apex and has had high-grade PIN. 0 out of 6 cores on the left   His clinical TNM stage was: [id number]  His pathological TNM stage was: [id number]  The patient has a staging CT and bone scan.  • All the time was spent face-to-face with the patient and his wife for a total time of 65 minutes. Time was spent discussing his pathology, risk ratification. Management options.   • He asked many questions and these were answered to the best my ability to his satisfaction.  • After a long discussion I told patient that I felt he was not a candidate active surveillance.   • That he should consider surgery as a most aggressive approach particular given the high PSA is Preston score the extent of disease and the fact that he has a really large prostate and some obstructive symptoms but this might be the best choice however his major concern is his quality of life and not cancer control and therefore he is leaning more toward external beam radiation therapy given the large size of his prostate I suggested that he consider 2 years of neoadjuvant hormonal therapy with XRT.   • He wants to see the radiation oncologist for consultation.   • He is  not completely made up his mind.   • We will wait to hear back from him after he sees radiation oncology    Patient reports at least moderate lower urinary tract symptoms of daytime and nighttime frequency, feeling of incomplete emptying, and intermittent slow stream.      History obtained from  PATIENT and CHART    PAST MEDICAL HISTORY  Past Medical History:   Diagnosis Date   • Hypertension    • Prostate cancer (CMS/HCC)       PAST SURGICAL HISTORY  Past Surgical History:   Procedure Laterality Date   • PROSTATE BIOPSY     • REPLACEMENT TOTAL KNEE     • SHOULDER SURGERY        ONCOLOGIC HISTORY: Patient denies previous history of chemotherapy, radiation therapy or malignancies.      FAMILY HISTORY  family history is not on file.     SOCIAL HISTORY  Social History     Tobacco Use   • Smoking status: Former Smoker     Packs/day: 1.00     Years: 33.00     Pack years: 33.00     Types: Cigarettes     Last attempt to quit: 2000     Years since quittin.6   • Smokeless tobacco: Never Used   Substance Use Topics   • Alcohol use: Yes   • Drug use: Never     ALLERGIES  Patient has no known allergies.     MEDICATIONS    Current Outpatient Medications:   •  lisinopril-hydrochlorothiazide (PRINZIDE,ZESTORETIC) 10-12.5 MG per tablet, Take 1 tablet by mouth Daily., Disp: , Rfl:   •  therapeutic multivitamin-minerals (THERAGRAN-M) tablet, Take 1 tablet by mouth Daily., Disp: , Rfl:     Current outpatient and discharge medications have been reconciled for the patient.  Reviewed by: Spencer Kaur MD    The following portions of the patient's history were reviewed and updated as appropriate: allergies, current medications, past family history, past medical history, past social history, past surgical history and problem list.    REVIEW OF SYSTEMS  Review of Systems   Constitutional: Negative.    HENT: Negative.         Hard of hearing   Eyes: Negative.    Respiratory: Negative.    Cardiovascular: Negative.   "  Gastrointestinal: Negative.    Endocrine: Negative.    Genitourinary: Positive for difficulty urinating (feeling of incomplete emtying and intermitent decreased flow) and frequency.   Musculoskeletal: Negative.    Skin: Negative.    Allergic/Immunologic: Negative.    Neurological: Negative.    Hematological: Negative.    Psychiatric/Behavioral: Negative for confusion.     PHYSICAL EXAM  VITAL SIGNS:   Vitals:    09/02/20 1330   BP: 152/53   Pulse: 70   Weight: 117 kg (257 lb)   Height: 185.4 cm (72.99\")   PainSc: 0-No pain      General:  Alert and oriented, no acute distress, well developed, Vitals reviewed.  Head:  Normocephalic, without obvious abnormality    Nose/Sinuses:  Nares normal externally, no sinus tenderness.  Mouth/Throat:  Mucosa moist, pharynx without erythema  Neck:  supple, No evidence of adenopathy in the cervical or supraclavicular areas.  Eyes: No gross abnormalities   Ears: Ears intact with no external abnormalities noted  Chest:  Respiratory efforts are normal and unlabored, chest is clear to auscultation.  Cardiovascular: Regular rate and rhythm without murmurs, rubs, or gallops.   Abdomen:  Soft, non-tender, normal bowel sounds; no CVA tenderness   Rectal exam: deferred  Extremities:  PEDRO well, warm to touch, no evidence of cyanosis or edema.  Skin: No suspicious lesions or rashes of concern  Neurologic:  Alert and oriented, non focal exam, strength and sensation grossly normal     Psych: Mood and affect are appropriate    Performance Status: ECOG (0) Fully active, able to carry on all predisease performance without restriction    Clinical Quality Measures  -Pain Documented by Standardized Tool, FPS Delgado Desir reports a pain score of 0.  Given his pain assessment as noted, treatment options were discussed and the following options were decided upon as a follow-up plan to address the patient's pain: No pain, no plan given .    -Advanced Care Planning Advance Care Planning   ACP " discussion was held with the patient during this visit. Patient does not have an advance directive, information provided.    -Body Mass Index Screening and Follow-Up Plan Patient's Body mass index is 33.92 kg/m². BMI is above normal parameters. Recommendations include: educational material.  -Tobacco Use: Screening and Cessation Intervention Social History    Tobacco Use      Smoking status: Former Smoker        Packs/day: 1.00        Years: 33.00        Pack years: 33        Types: Cigarettes        Quit date:         Years since quittin.6      Smokeless tobacco: Never Used    PROSTATE PQRS #102   Bone Scan Use: Bone Scane done Pre-treatment or Post Diagnosis  Risk of Recurrence: Intermediate risk PSA > 10-20/GL 7/T2b  Measure #104  Adjuvant Hormonal TX: Hormonal Therapy recommended   Recurrence Risk: Intermediate risk PSA 10-20/GL 7/O9w-[id number]    ASSESSMENT AND PLAN  1. Prostate cancer (CMS/HCC)    2. Lung nodule < 6cm on CT    3. Former smoker    4. Elevated PSA      RECOMMENDATIONS:  Delgado Desir is a very pleasant 70 y.o. male that has been referred to our clinic to discuss radiotherapy recommendations.  Diagnosed in 2020 with Prostatic adenocarcinoma, [place] score 7 (4+3), Grade group 3.  5/12 cores positive; 5/6 cores on the right, 6th core was the right apex and has had high-grade PIN.  Pre-PSA 21. This is high risk prostate cancer with at least IIIA ([id number]) disease but pending further w/u of enlarging pulmonary nodule. Follows Dr Phan who suggested 2 years of neoadjuvant hormonal therapy with XRT vs surgery due to the enlarged prostate and obstructive symptoms. Pt will decided after radiation consultation.    Indications and rationale as well as risks, benefits and alternatives of therapy were discussed. Risks of radiation therapy includes but is not limited to radiation induced proctitis, cystitis, diarrhea, dysuria, frequency and bleeding from the bladder or rectum as well as a  significant risk of permanent erectile dysfunction and progression of disease in spite of therapy with either local or systemic failure.  The option of definitive surgery has also been reviewed by the urologist as well as surveillance.    We discussed the goals and plans of care with him and his family, answered all questions and he verbalizes understanding.  Separately however, I discussed with him and his wife that after reviewing his imaging studies and discussing the pulmonary findings with the reading radiologist, Dr. Charlene Grider, it was determined that the right lower lobe pulmonary nodule has had an interval enlargement currently measures 2.8 x 1.7 cm on his CT scan of the abdomen and pelvis performed on 08/14/2020 in comparison to CT scan of the chest in November 2019 when it measured 1.7 x 1.2 cm (an addendum to the report has been made).  He understands that this finding requires further work-up for the possibility of metastatic prostate cancer versus a primary lung cancer (more likely).  He understands that work-up will likely lead to possibility of invasive procedures such as thoracic biopsies.  This new information will necessitate holding off on a definitive determination of prostate cancer treatment until work-up is completed.  Possible clinical scenarios include benign pulmonary etiologies, synchronous prostate and pulmonary malignancies, and less likely prostate cancer with pulmonary metastasis.  In the meantime, we agreed that starting androgen deprivation therapy would be a reasonable decision as we move forward with additional work-up and he will be seeing Dr. Phan for that.  Following this discussion and in consideration of the diagnostic data/evaluation of the patient, I am recommending that he undergo a PET scan with anticipated additional referrals for possible biopsying depending on PET scan results.    Patient will return for follow-up regarding PET scan results or additional  coordination of care will be arranged through telephone conversations as needed.    Thank you for allowing me to assist in his care.    Todays appointment time was spent in counseling and coordination of care as follows: diagnosis, intent of treatment discussing radiation therapy specifics: logistics, possible and probable side effects and after effects, staging of cancer, standard of care in for this stage of this cancer and treatment options.  I saw this patient in consultation with Lia Koenig PA-C while covering for Dr. Ephraim Lujan, radiation oncologist.  Spencer Kaur MD  09/02/2020

## 2020-09-02 ENCOUNTER — DOCUMENTATION (OUTPATIENT)
Dept: RADIATION ONCOLOGY | Facility: HOSPITAL | Age: 70
End: 2020-09-02

## 2020-09-02 ENCOUNTER — CONSULT (OUTPATIENT)
Dept: RADIATION ONCOLOGY | Facility: HOSPITAL | Age: 70
End: 2020-09-02

## 2020-09-02 VITALS
WEIGHT: 257 LBS | HEART RATE: 70 BPM | HEIGHT: 73 IN | SYSTOLIC BLOOD PRESSURE: 152 MMHG | DIASTOLIC BLOOD PRESSURE: 53 MMHG | BODY MASS INDEX: 34.06 KG/M2

## 2020-09-02 DIAGNOSIS — Z87.891 FORMER SMOKER: ICD-10-CM

## 2020-09-02 DIAGNOSIS — IMO0001 LUNG NODULE < 6CM ON CT: ICD-10-CM

## 2020-09-02 DIAGNOSIS — C61 PROSTATE CANCER (HCC): Primary | ICD-10-CM

## 2020-09-02 DIAGNOSIS — R97.20 ELEVATED PSA: ICD-10-CM

## 2020-09-02 PROCEDURE — G0463 HOSPITAL OUTPT CLINIC VISIT: HCPCS | Performed by: RADIOLOGY

## 2020-09-02 RX ORDER — LISINOPRIL AND HYDROCHLOROTHIAZIDE 12.5; 1 MG/1; MG/1
TABLET ORAL DAILY
COMMUNITY
Start: 2020-05-01 | End: 2020-10-27

## 2020-09-02 RX ORDER — M-VIT,TX,IRON,MINS/CALC/FOLIC 27MG-0.4MG
2 TABLET ORAL DAILY
COMMUNITY

## 2020-09-02 NOTE — PATIENT INSTRUCTIONS
Prostate Cancer    The prostate is a male gland that helps make semen. Prostate cancer is when abnormal cells grow in this gland.  Follow these instructions at home:  · Take over-the-counter and prescription medicines only as told by your doctor.  · Eat a healthy diet.  · Get plenty of sleep.  · Ask your doctor for help to find a support group for men with prostate cancer.  · Keep all follow-up visits as told by your doctor. This is important.  · If you have to go to the hospital, let your cancer doctor (oncologist) know.  · Touch, hold, hug, and caress your partner to continue to show sexual feelings.  Contact a doctor if:  · You have trouble peeing (urinating).  · You have blood in your pee (urine).  · You have pain in your hips, back, or chest.  Get help right away if:  · You have weakness in your legs.  · You lose feeling (have numbness) in your legs.  · You cannot control your pee or your poop (stool).  · You have trouble breathing.  · You have sudden pain in your chest.  · You have chills or a fever.  Summary  · The prostate is a male gland that helps make semen. Prostate cancer is when abnormal cells grow in this gland.  · Ask your doctor for help to find a support group for men with prostate cancer.  · Contact a doctor if you have problems peeing or have any new pain that you did not have before.  This information is not intended to replace advice given to you by your health care provider. Make sure you discuss any questions you have with your health care provider.  Document Released: 12/06/2010 Document Revised: 11/30/2018 Document Reviewed: 08/28/2017  [employer] Patient Education © 2020 [employer]    External Beam Radiation Therapy  External beam radiation therapy is a type of radiation treatment. This type of radiation therapy can deliver radiation to a fairly large area. This is the most common type of radiation therapy for cancer. This therapy may be done to:  · Treat cancer by:  ? Destroying cancer  cells. Radiation delivered during the treatment damages cancer cells. It may also damage normal cells, but normal cells have the DNA to repair themselves while cancer cells do not.  ? Helping with symptoms of your cancer.  ? Stopping the growth of any remaining cancer cells after surgery.  ? Preventing cancer cells from growing in areas that do not have cancer (prophylactic radiation therapy).  · Treat or shrink a tumor.  · Reduce pain (palliative therapy).  The amount of radiation you will receive and the length of therapy depend on your medical condition. You should not feel the radiation being delivered or any pain during your therapy.  Let your health care provider know about:  · Any allergies you have.  · All medicines you are taking, including vitamins, herbs, eye drops, creams, and over-the-counter medicines.  · Any problems you or family members have had with anesthetic medicines.  · Any blood disorders you have.  · Any surgeries you have had.  · Any medical conditions you have.  · Whether you are pregnant or may be pregnant.  What are the risks?  Generally, this is a safe procedure. However, radiation therapy can place a person at a higher risk for a second cancer later in life.  Most people experience side effects from the therapy. Side effects depend on the amount of radiation and the part of the body that was exposed to radiation. The most common side effects include:  · Skin changes.  · Hair loss.  · Fatigue.  · Nausea and vomiting.  What happens before the procedure?  · There will be a planning session (simulation). During the session:  ? Your health care provider will plan exactly where the radiation will be delivered (treatment field).  ? You will be positioned for your therapy. The goal is to have a position that can be reproduced for each therapy session.  ? Temporary marks may be drawn on your body. Permanent marks may also be drawn on your body in order for you to be positioned the same way for  each therapy session.  ? A tool that holds a body part in place (immobilization device) may be used to keep the area of treatment in the correct position.  · Follow instructions from your health care provider about eating or drinking restrictions.  · Ask your health care provider about changing or stopping your regular medicines. This is especially important if you are taking diabetes medicines or blood thinners.  What happens during the procedure?    · You will either lie on a table or sit in a chair in the position determined for your therapy.  · You may have a heavy shield placed on you to protect tissues and organs that are not being treated.  · The radiation machine (linear accelerator) will move around you to deliver the radiation in exact doses from different angles. The machine will not touch you.  The procedure may vary among health care providers and hospitals.  What happens after the procedure?  · You may return to your normal routine including diet, activities, and medicines as told by your health care provider.  · You may want to have someone take you home from the hospital or clinic.  Summary  · External beam radiation therapy is a type of radiation treatment for cancer.  · The amount of radiation you will receive and the length of therapy depend on your medical condition.  · Most people experience side effects from the therapy. Side effects depend on the amount of radiation and the part of the body that was exposed to radiation.  This information is not intended to replace advice given to you by your health care provider. Make sure you discuss any questions you have with your health care provider.  Document Released: 05/06/2010 Document Revised: 12/19/2018 Document Reviewed: 12/18/2017  [employer] Patient Education © 2020 [employer]

## 2020-09-02 NOTE — PROGRESS NOTES
MORELIA met with Mr. Desir on this day due to his distress score. He is a 70 year old male who has a radiation consultation today. He lives with his spouse. His support system includes his best friend, Nitza, and his daughter who lives in [place]. He said his main stressor is treatment decisions. He is looking forward to hearing what the doctor is recommending. He said he does not want to purcell into any big decisions without thinking about it. At this time he does not have any financial or transportation concerns. He said his friend would be driving him to treatment. He would like MORELIA to call him this Friday after he has been about to think about everything. MORELIA will follow up.

## 2020-09-04 ENCOUNTER — TELEPHONE (OUTPATIENT)
Dept: RADIATION ONCOLOGY | Facility: HOSPITAL | Age: 70
End: 2020-09-04

## 2020-09-04 NOTE — TELEPHONE ENCOUNTER
Left message for Alfonso Karlee to follow up with his distress score. SW will wait for return call.

## 2020-09-18 ENCOUNTER — HOSPITAL ENCOUNTER (OUTPATIENT)
Dept: CT IMAGING | Facility: HOSPITAL | Age: 70
Discharge: HOME | End: 2020-09-18

## 2020-09-18 DIAGNOSIS — Z87.891 FORMER SMOKER: ICD-10-CM

## 2020-09-18 DIAGNOSIS — C61 PROSTATE CANCER (HCC): ICD-10-CM

## 2020-09-18 DIAGNOSIS — IMO0001 LUNG NODULE < 6CM ON CT: ICD-10-CM

## 2020-09-18 DIAGNOSIS — R97.20 ELEVATED PSA: ICD-10-CM

## 2020-09-18 PROCEDURE — 78815 PET IMAGE W/CT SKULL-THIGH: CPT

## 2020-09-18 PROCEDURE — 0 FLUDEOXYGLUCOSE F18 SOLUTION: Performed by: SPECIALIST

## 2020-09-18 PROCEDURE — A9552 F18 FDG: HCPCS | Performed by: SPECIALIST

## 2020-09-18 RX ADMIN — FLUDEOXYGLUCOSE F18 1 DOSE: 300 INJECTION INTRAVENOUS at 11:24

## 2020-09-24 ENCOUNTER — DOCUMENTATION (OUTPATIENT)
Dept: RADIATION ONCOLOGY | Facility: HOSPITAL | Age: 70
End: 2020-09-24

## 2020-09-24 ENCOUNTER — TELEPHONE (OUTPATIENT)
Dept: RADIATION ONCOLOGY | Facility: HOSPITAL | Age: 70
End: 2020-09-24

## 2020-09-24 NOTE — TELEPHONE ENCOUNTER
I spoke with this patient this morning regarding the results of his PET scan.  The right lower lobe nodule is suspicious for malignancy.    I will discuss this case with Dr. Nick Flores and he may need this lesion removed for diagnosis and definitive treatment.

## 2020-09-24 NOTE — PROGRESS NOTES
I spoke with Dr. Nick Flores and he is reviewed the films.    At this time we will schedule a CT-guided biopsy as well as PFTs.  It would be very unusual to have a pulmonary metastases from prostate cancer in the absence of disseminated bone metastases but not outside the realm of possibility.    Therefore we will proceed with core needle biopsies of the lung for tissue diagnosis, and he may require definitive surgical resection.    We will also request PFTs to be completed.

## 2020-09-24 NOTE — PROGRESS NOTES
I spoke with Dr. Steven Nunez this morning as well as placed a call for Dr. Phan regarding the management of this patient.    At this point I believe he needs to start ADT for his prostate carcinoma and perhaps some intervention for his urinary obstructive symptoms.  Radiation will make these symptoms worse with the swelling from the radiation and he may need some proactive intervention to relieve his obstructive symptoms.    He also needs the right lower lobe lung nodule addressed and I have sent a message to Dr. Nick Flores.  We could consider a CT-guided biopsy, however if negative I think he would still need this removed that is clinically quite suspicious.

## 2020-09-30 ENCOUNTER — TELEPHONE (OUTPATIENT)
Dept: RADIATION ONCOLOGY | Facility: HOSPITAL | Age: 70
End: 2020-09-30

## 2020-09-30 DIAGNOSIS — C61 PROSTATE CANCER (HCC): ICD-10-CM

## 2020-09-30 DIAGNOSIS — C61 PROSTATE CANCER (HCC): Primary | ICD-10-CM

## 2020-09-30 DIAGNOSIS — IMO0001 LUNG NODULE < 6CM ON CT: ICD-10-CM

## 2020-09-30 DIAGNOSIS — R94.2 ABNORMAL PET OF RIGHT LUNG: Primary | ICD-10-CM

## 2020-09-30 NOTE — TELEPHONE ENCOUNTER
I spoke with this patient today to update him on plans for treatment.  I have discussed his case with Dr. Phan, Dr. Steven Nunez, and Dr. Nick Flores,    With Dr. Phan he will start him on ADT therapy and discuss surgery versus some type of intervention procedure to relieve his urinary outlet obstruction from enlarged prostate.  We are cart quite concerned that with radiation he will have total obstruction due to his prostate being over 100 g.    Also, with his lung nodule that is hypermetabolic on PET scan he needs a CT-guided biopsy and pulmonary function test.  He will also see Dr. Nick Flores and referral has been made.    Patient verbalized understanding and will await contact from the office of Dr. Flores, Dr. Phan, pulmonary function test lab and CT scanning guided biopsy of the lung nodule.

## 2020-09-30 NOTE — PROGRESS NOTES
This gentleman came to us for prostate cancer with an incidental lung nodule seen on CT. Tissue biopsy is of importance for treatment planning. I have placed an order for urgent CT guided biopsy, Dr Flores will be calling to set up an appt and he will need the PFTs to see if he is a surgical candidate if it is not a metastatic lesion. Dr. Lujan has spoke to Dr. Phan regarding the prostate cancer and near obstruction.   Further recommendations pending biopsy, PFTs and Dr. Flores evaluation.

## 2020-10-01 ENCOUNTER — HOSPITAL ENCOUNTER (OUTPATIENT)
Dept: RADIATION ONCOLOGY | Facility: HOSPITAL | Age: 70
Setting detail: RADIATION/ONCOLOGY SERIES
End: 2020-10-01

## 2020-10-07 ENCOUNTER — TRANSCRIBE ORDERS (OUTPATIENT)
Dept: ADMINISTRATIVE | Facility: HOSPITAL | Age: 70
End: 2020-10-07

## 2020-10-07 DIAGNOSIS — Z01.818 PREOPERATIVE TESTING: Primary | ICD-10-CM

## 2020-10-08 ENCOUNTER — OFFICE VISIT (OUTPATIENT)
Dept: CARDIAC SURGERY | Facility: CLINIC | Age: 70
End: 2020-10-08

## 2020-10-08 VITALS
DIASTOLIC BLOOD PRESSURE: 56 MMHG | BODY MASS INDEX: 33.93 KG/M2 | WEIGHT: 256 LBS | HEART RATE: 84 BPM | OXYGEN SATURATION: 99 % | SYSTOLIC BLOOD PRESSURE: 132 MMHG | HEIGHT: 73 IN

## 2020-10-08 DIAGNOSIS — IMO0001 LUNG NODULE < 6CM ON CT: Primary | ICD-10-CM

## 2020-10-08 PROCEDURE — 99205 OFFICE O/P NEW HI 60 MIN: CPT | Performed by: SURGERY

## 2020-10-08 RX ORDER — ALFUZOSIN HYDROCHLORIDE 10 MG/1
10 TABLET, EXTENDED RELEASE ORAL DAILY
COMMUNITY
Start: 2020-10-05

## 2020-10-08 RX ORDER — LOSARTAN POTASSIUM AND HYDROCHLOROTHIAZIDE 12.5; 5 MG/1; MG/1
TABLET ORAL
COMMUNITY
Start: 2020-09-09 | End: 2020-10-16

## 2020-10-08 NOTE — PROGRESS NOTES
Thoracic Surgery Consultation    Referring Physician: Dr Ephraim Lujan    Primary Care Physician: Dr. Michael Nunez    Chief Complaint   Patient presents with   • Lung Nodule     New patient from [facility].          Subjective     Mr. Desir is a 70-year-old male with a history of prostate cancer who presents with a lung nodule.  He was referred to me by Dr. Ephraim Lujan.  Patient was recently diagnosed with prostate cancer.  During work-up of this he had a CT scan of the chest which showed a right lower lobe lung nodule.  He is a previous smoker who quit 25 years ago with a 20-pack-year smoking history.  He denies any hemoptysis, productive cough, fevers chills, night sweats, unwanted weight loss.  He has no history of cancer personally other than the prostate cancer.  He has no known family history of cancer.  He does report he is more short of breath with activity recently but attributes this to weight gain and getting older.        Review of Systems   Constitutional: Positive for fatigue.   Respiratory: Positive for shortness of breath. Negative for cough and chest tightness.    Cardiovascular: Positive for leg swelling. Negative for chest pain.   Genitourinary: Positive for difficulty urinating.        A complete 10 system review of systems was performed, is negative except stated above.    Past Medical History:   Diagnosis Date   • Hypertension    • Prostate cancer (CMS/HCC)      Past Surgical History:   Procedure Laterality Date   • JOINT REPLACEMENT     • PROSTATE BIOPSY     • REPLACEMENT TOTAL KNEE     • SHOULDER SURGERY       History reviewed. No pertinent family history.  Social History     Tobacco Use   • Smoking status: Former Smoker     Packs/day: 1.00     Years: 33.00     Pack years: 33.00     Types: Cigarettes     Quit date:      Years since quittin.7   • Smokeless tobacco: Never Used   Substance Use Topics   • Alcohol use: Yes   • Drug use: Never     Current Outpatient Medications   Medication  "Sig Dispense Refill   • alfuzosin (UROXATRAL) 10 MG 24 hr tablet      • lisinopril-hydrochlorothiazide (PRINZIDE,ZESTORETIC) 10-12.5 MG per tablet Take 1 tablet by mouth Daily.     • losartan-hydrochlorothiazide (HYZAAR) 50-12.5 MG per tablet      • therapeutic multivitamin-minerals (THERAGRAN-M) tablet Take 1 tablet by mouth Daily.       No current facility-administered medications for this visit.      Allergies:  Patient has no known allergies.    Objective      Vital Signs  Visit Vitals  /56 (BP Location: Left arm, Patient Position: Sitting, Cuff Size: Large Adult)   Pulse 84   Ht 185.4 cm (72.99\")   Wt 116 kg (256 lb)   SpO2 99%   BMI 33.78 kg/m²         Physical Exam  Constitutional:       General: He is not in acute distress.     Appearance: He is well-developed. He is obese. He is not diaphoretic.   HENT:      Head: Normocephalic and atraumatic.      Right Ear: External ear normal.      Left Ear: External ear normal.   Eyes:      General:         Right eye: No discharge.         Left eye: No discharge.      Pupils: Pupils are equal, round, and reactive to light.   Neck:      Musculoskeletal: Normal range of motion and neck supple.      Vascular: No JVD.      Trachea: No tracheal deviation.   Cardiovascular:      Rate and Rhythm: Normal rate and regular rhythm.      Heart sounds: Normal heart sounds. No murmur.   Pulmonary:      Effort: Pulmonary effort is normal. No respiratory distress.      Breath sounds: Normal breath sounds. No stridor. No wheezing.   Abdominal:      General: There is no distension.      Palpations: Abdomen is soft.      Tenderness: There is no abdominal tenderness. There is no guarding.   Musculoskeletal: Normal range of motion.         General: No tenderness or deformity.      Right lower leg: Edema present.      Left lower leg: Edema present.      Comments: Mild bilateral lower extremity edema   Skin:     General: Skin is warm and dry.      Capillary Refill: Capillary refill " takes less than 2 seconds.      Coloration: Skin is not pale.      Findings: No erythema or rash.   Neurological:      Mental Status: He is alert and oriented to person, place, and time.      Motor: No abnormal muscle tone.      Coordination: Coordination normal.   Psychiatric:         Behavior: Behavior normal.         Thought Content: Thought content normal.         Judgment: Judgment normal.         Results Review:     WBC   Date Value Ref Range Status   11/09/2019 9.4 4.8 - 10.8 K/uL Final     RBC   Date Value Ref Range Status   11/09/2019 4.82 4.70 - 6.10 M/uL Final     Hemoglobin   Date Value Ref Range Status   11/09/2019 14.2 14.0 - 18.0 g/dL Final     Hematocrit   Date Value Ref Range Status   11/09/2019 43.7 42.0 - 52.0 % Final     MCV   Date Value Ref Range Status   11/09/2019 90.7 80.0 - 94.0 fL Final     MCH   Date Value Ref Range Status   11/09/2019 29.5 27.0 - 31.0 pg Final     MCHC   Date Value Ref Range Status   11/09/2019 32.5 (L) 33.0 - 37.0 g/dL Final     RDW   Date Value Ref Range Status   11/09/2019 13.1 11.5 - 14.5 % Final     MPV   Date Value Ref Range Status   11/09/2019 10.9 9.4 - 12.4 fL Final     Platelets   Date Value Ref Range Status   11/09/2019 161 130 - 400 K/uL Final     Neutrophil Rel %   Date Value Ref Range Status   11/09/2019 86.9 (H) 50.0 - 65.0 % Final     Lymphocyte Rel %   Date Value Ref Range Status   11/09/2019 7.0 (L) 20.0 - 40.0 % Final     Monocyte Rel %   Date Value Ref Range Status   11/09/2019 4.4 0.0 - 10.0 % Final     Eosinophil Rel %   Date Value Ref Range Status   11/09/2019 0.4 0.0 - 5.0 % Final     Basophil Rel %   Date Value Ref Range Status   11/09/2019 0.3 0.0 - 1.0 % Final     Neutrophils Absolute   Date Value Ref Range Status   11/09/2019 8.2 (H) 1.5 - 7.5 K/uL Final     Lymphocytes Absolute   Date Value Ref Range Status   11/09/2019 0.7 (L) 1.1 - 4.5 K/uL Final     Monocytes Absolute   Date Value Ref Range Status   11/09/2019 0.40 0.00 - 0.90 K/uL Final      Eosinophils Absolute   Date Value Ref Range Status   11/09/2019 0.00 0.00 - 0.60 K/uL Final     Basophils Absolute   Date Value Ref Range Status   11/09/2019 0.00 0.00 - 0.20 K/uL Final     Immature Grans, Absolute   Date Value Ref Range Status   11/09/2019 0.1 K/uL Final     Glucose   Date Value Ref Range Status   08/14/2020 103 74 - 109 mg/dL Final     Sodium   Date Value Ref Range Status   08/14/2020 140 136 - 145 mmol/L Final     Potassium   Date Value Ref Range Status   08/14/2020 4.4 3.5 - 5.0 mmol/L Final     CO2   Date Value Ref Range Status   08/14/2020 24 22 - 29 mmol/L Final     Chloride   Date Value Ref Range Status   08/14/2020 104 98 - 111 mmol/L Final     Anion Gap   Date Value Ref Range Status   08/14/2020 12 7 - 19 mmol/L Final     Creatinine   Date Value Ref Range Status   08/14/2020 1 0.5 - 1.2 mg/dL Final     BUN   Date Value Ref Range Status   08/14/2020 16 8 - 23 mg/dL Final     Calcium   Date Value Ref Range Status   08/14/2020 9.2 8.8 - 10.2 mg/dL Final     eGFR Non  Am   Date Value Ref Range Status   08/14/2020 >60 >60 Final     Comment:     This calculation may be inaccurate for patients under the age of 18 years.  For ages 18 and older, a GFR >60 mL/min/1.73m2 (not corrected for weight) is  valid for stable renal function.     Alkaline Phosphatase   Date Value Ref Range Status   08/14/2020 94 40 - 130 U/L Final     Total Protein   Date Value Ref Range Status   08/14/2020 6.7 6.6 - 8.7 g/dL Final     ALT (SGPT)   Date Value Ref Range Status   08/14/2020 39 5 - 41 U/L Final     AST (SGOT)   Date Value Ref Range Status   08/14/2020 27 5 - 40 U/L Final     Total Bilirubin   Date Value Ref Range Status   08/14/2020 1.2 0.2 - 1.2 mg/dL Final     Albumin   Date Value Ref Range Status   08/14/2020 4.1 3.5 - 5.2 g/dL Final        I reviewed the patient's clinical results and discussed with patient.    CT Chest::  Result Addenda   Addendum by Charlene Grider MD on 09/02/2020  2:51  PM  Addendum: Please make the following change in the body of the report  about the size of the right lower lobe nodule. It measures 2.8 x 1.7  cm. It previously measured 1.7 x 1.2 cm.  Signed by Dr Charlene Grider on 9/2/2020 2:49 PM   Result Impression   No acute abnormality of the abdomen or pelvis.  A moderate splenomegaly.  An enlarged prostate.  A stable bilateral renal cysts.  The lobulated noncalcified nodule in the right lower lobe represent a  neoplastic process. Further evaluation with positron emission  tomography scan may be obtained.  The previously seen fractures of the left sacral ala is not visualized  in this study. If clinically significant, further evaluation with  radionuclide bone scan may be obtained  Signed by Dr Charlene Grider on 8/14/2020 1:18 PM         PET Scan:  IMPRESSION:  1. Hypermetabolic right lower lobe pulmonary nodule compatible with  malignant neoplasm.  2. No scintigraphic evidence of distal metastases.  This report was finalized on 09/18/2020 13:27 by Dr. Raul Mitchell MD.    I personally reviewed images of following exams, the following is my interpretation:    CT Chest: Lung nodule at base of right lower lobe along costophrenic angle, multilobulated, near visceral pleura, no spiculation    PET Scan:  Avid activity at right lower lobe nodule no other areas of concern        Assessment/Plan     Mr. Ceballos is a 70-year-old male with prostate cancer, lung nodule concerning for malignancy.  I believe this right lower lobe lung nodule likely represents a primary lung malignancy.  There is still a possibility that this is a metastatic prostate cancer, probability is low though.  Given that the management of these 2 possibilities would be different, I agree with obtaining a preoperative tissue biopsy.  If that biopsy is consistent with primary lung malignancy then we will proceed with right lower lobectomy and lymph node sampling.  If biopsy is consistent with metastatic prostate  cancer, we will proceed with wedge resection of metastatic disease.  In order to ensure Mr. Ceballos has adequate lung reserve to tolerate an anatomic resection, we will proceed with getting pulmonary function test.    Clinical Stage if Primary Lung Malignancy: [id number]  [place] Nodule Risk of Primary Lung Malignancy: 94%    We discussed the risk and benefits of robotic wedge resection and lobectomy with lymph node sampling.  We discussed treatment options including SBRT, watchful waiting and continued surveillance.  After discussion with patient the best option was determined to be surgical resection.  The risk of surgery were discussed including bleeding, infection, injury to large vessels, need for transfusion, need to convert to an open procedure.  There is also a risk of pneumonia, prolonged airleak, anesthesia risk, chronic pain and/or death.    This patient is at particular risk of pneumonia due to his obesity and previous smoking risk.  These were all discussed with the patient and they agree to proceed.  I will call the patient after results of biopsy are finalized and plan for the next steps.    Thank you for trusting me with the care of Mr. Desir.  Please do not hesitate to call with any questions or concerns.    Nick Flores M.D.  Cardiothoracic Surgeon

## 2020-10-09 ENCOUNTER — OFFICE VISIT (OUTPATIENT)
Dept: UROLOGY | Age: 70
End: 2020-10-09
Payer: MEDICARE

## 2020-10-09 VITALS
BODY MASS INDEX: 33.53 KG/M2 | HEART RATE: 98 BPM | DIASTOLIC BLOOD PRESSURE: 70 MMHG | WEIGHT: 253 LBS | OXYGEN SATURATION: 97 % | TEMPERATURE: 97.2 F | SYSTOLIC BLOOD PRESSURE: 154 MMHG | HEIGHT: 73 IN

## 2020-10-09 LAB
APPEARANCE FLUID: CLEAR
BILIRUBIN, POC: NORMAL
BLOOD URINE, POC: NORMAL
CLARITY, POC: CLEAR
COLOR, POC: YELLOW
GLUCOSE URINE, POC: NORMAL
KETONES, POC: NORMAL
LEUKOCYTE EST, POC: NORMAL
NITRITE, POC: NORMAL
PH, POC: 6
PROTEIN, POC: NORMAL
SPECIFIC GRAVITY, POC: 1.02
UROBILINOGEN, POC: 1

## 2020-10-09 PROCEDURE — 51798 US URINE CAPACITY MEASURE: CPT | Performed by: UROLOGY

## 2020-10-09 PROCEDURE — 99214 OFFICE O/P EST MOD 30 MIN: CPT | Performed by: UROLOGY

## 2020-10-09 PROCEDURE — 81002 URINALYSIS NONAUTO W/O SCOPE: CPT | Performed by: UROLOGY

## 2020-10-09 ASSESSMENT — ENCOUNTER SYMPTOMS
WHEEZING: 0
EYE PAIN: 0
COUGH: 0
VOMITING: 0
SORE THROAT: 0
BACK PAIN: 0
NAUSEA: 0

## 2020-10-09 NOTE — PROGRESS NOTES
Naveen Morfin is a 72 y.o. male who presents today   Chief Complaint   Patient presents with    Follow-up     I am here today for voiding issues. I have seen Dr. Gauri Sen, he wanted me to see Dr. Comfort Mejias for this prior to seed placement. Prostate Cancer:  Patient is here today for prostate cancer which was first diagnosed on 7/30/2020. His last several PSA values are as follows:  PSA on 7/6/2020 was 21 ng/ML  His prostate volume was 107 grams. PSAD 0.20  He had a prostate biopsy consisting of a total of 12 cores with 5 positive cores. TRUS Findings consisted of no abnormal ultrasound findings  He has right sided disease with a Belmont score of 4+3 equal sign 7 grade group 2 intermediate unfavorable. Location of the the Cancer: 5 out of 6 cores on the right the only one that was negative was the right apex and has had high-grade PIN.  0 out of 6 cores on the left   His clinical TNM stage was: [id number]  His pathological TNM stage was: [id number]  The patient has a staging CT and bone scan. Previous treatment of prostate cancer: none  Patient has Normal erectile function yes    Patient history is documented as above. He was seen for cancer consultation on August 21, 2020. At that time we discussed options for management. He has a very large prostate and pretty significant obstructive voiding symptoms he is already on alpha-blocker with alfuzosin. I recommended that he strongly consider radical prostatectomy he was concerned primarily of erectile dysfunction and opted for radiation with hormonal therapy. He saw Dr. Sheron Dandy at radiation oncology on 9/2/2020. I then was called by Dr. Gauri Sen and he was very concerned about how he would tolerate radiation primarily given his large prostate and his obstructive voiding symptoms and risk for retention needing catheterization.   We had plan to give him neoadjuvant ADT which may be beneficial in terms of strength in the prostate but Dr. Gauri Sen wanted me to see him to discuss other alternatives. In his staging evaluation he also was found to have a lung nodule and he is seeing the cardiothoracic surgeon Dr. Sharon Dela Cruz and the plan is for him to get a CT-guided needle biopsy of this next week. It is unlikely that this represents metastatic prostate cancer and more likely primary lung cancer or a benign nodule. Further management will be dependent on the biopsy. If this is positive for primary lung cancer I think the plan is further surgery with a wedge resection which would delay his prostate cancer treatment. Patient is here to see me in follow-up today to put all this in consideration and to discuss management regarding his prostate cancer              Past Medical History:   Diagnosis Date    Benign prostatic disease     Elevated PSA     Osteoarthritis     Shoulder pain     fall off porch    Upper respiratory virus     c/o recent tx with otc meds. ; denies fever/chills (about a week ago)       Past Surgical History:   Procedure Laterality Date    HERNIA REPAIR      JOINT REPLACEMENT      KNEE ARTHROPLASTY  12--10    NE SHLDR ARTHROSCOP,SURG,W/ROTAT CUFF REPR Left 3/22/2018    SHOULDER ARTHROSCOPY ROTATOR CUFF REPAIR  SUBACROMIAL DECOMPRESSION performed by Alayna Castillo MD at [place] [address] ARTHROSCOPY  2008       Current Outpatient Medications   Medication Sig Dispense Refill    alfuzosin (UROXATRAL) 10 MG extended release tablet       lisinopril-hydroCHLOROthiazide (PRINZIDE;ZESTORETIC) 10-12.5 MG per tablet       Multiple Vitamins-Minerals (THERAPEUTIC MULTIVITAMIN-MINERALS) tablet Take 1 tablet by mouth daily       No current facility-administered medications for this visit.         No Known Allergies    Social History     Socioeconomic History    Marital status:      Spouse name: None    Number of children: None    Years of education: None    Highest education level: None   Occupational History    None   Social Needs    Financial resource strain: None    Food insecurity     Worry: None     Inability: None    Transportation needs     Medical: None     Non-medical: None   Tobacco Use    Smoking status: Former Smoker    Smokeless tobacco: Former User     Quit date: 2007   Substance and Sexual Activity    Alcohol use: Not Currently     Alcohol/week: 2.0 standard drinks     Types: 2 Shots of liquor per week     Comment: 2 per week    Drug use: No    Sexual activity: None   Lifestyle    Physical activity     Days per week: None     Minutes per session: None    Stress: None   Relationships    Social connections     Talks on phone: None     Gets together: None     Attends [employer] service: None     Active member of club or organization: None     Attends meetings of clubs or organizations: None     Relationship status: None    Intimate partner violence     Fear of current or ex partner: None     Emotionally abused: None     Physically abused: None     Forced sexual activity: None   Other Topics Concern    None   Social History Narrative    None       History reviewed. No pertinent family history. REVIEW OF SYSTEMS:  Review of Systems   Constitutional: Negative for chills and fever. HENT: Negative for congestion and sore throat. Eyes: Negative for pain and visual disturbance. Respiratory: Negative for cough and wheezing. Cardiovascular: Negative for chest pain and palpitations. Gastrointestinal: Negative for nausea and vomiting. Endocrine: Negative for polyphagia and polyuria. Genitourinary: Positive for difficulty urinating (weak stream; incomplete emptying), frequency and urgency. Negative for decreased urine volume, discharge, dysuria, enuresis, flank pain, genital sores, hematuria, penile pain, penile swelling, scrotal swelling and testicular pain. Musculoskeletal: Negative for back pain and neck pain. Skin: Negative for rash and wound.    Allergic/Immunologic: Negative for environmental allergies and food allergies. Neurological: Negative for dizziness and headaches. Hematological: Negative for adenopathy. Does not bruise/bleed easily. Psychiatric/Behavioral: Negative for confusion and hallucinations. All other systems reviewed and are negative. PHYSICAL EXAM:  BP (!) 154/70 (Site: Right Upper Arm, Position: Sitting, Cuff Size: Medium Adult)   Pulse 98   Temp 97.2 °F (36.2 °C)   Ht 6' 1\" (1.854 m)   Wt 253 lb (114.8 kg)   SpO2 97%   BMI 33.38 kg/m²   Physical Exam  Constitutional:       General: He is not in acute distress. Appearance: Normal appearance. He is well-developed. HENT:      Head: Normocephalic and atraumatic. Nose: Nose normal.   Eyes:      General: No scleral icterus. Conjunctiva/sclera: Conjunctivae normal.      Pupils: Pupils are equal, round, and reactive to light. Neck:      Musculoskeletal: Normal range of motion and neck supple. Trachea: No tracheal deviation. Cardiovascular:      Rate and Rhythm: Normal rate and regular rhythm. Pulmonary:      Effort: Pulmonary effort is normal. No respiratory distress. Breath sounds: No stridor. Abdominal:      General: There is no distension. Palpations: Abdomen is soft. There is no mass. Tenderness: There is no abdominal tenderness. Musculoskeletal: Normal range of motion. General: No tenderness. Lymphadenopathy:      Cervical: No cervical adenopathy. Skin:     General: Skin is warm and dry. Findings: No erythema. Neurological:      Mental Status: He is alert and oriented to person, place, and time.    Psychiatric:         Behavior: Behavior normal.         Judgment: Judgment normal.             DATA:  CMP:    Lab Results   Component Value Date     08/14/2020    K 4.4 08/14/2020     08/14/2020    CO2 24 08/14/2020    BUN 16 08/14/2020    CREATININE 1.0 08/14/2020    GFRAA >59 08/14/2020    LABGLOM >60 08/14/2020    GLUCOSE 103 08/14/2020    PROT 6.7 08/14/2020 LABALBU 4.1 08/14/2020    CALCIUM 9.2 08/14/2020    BILITOT 1.2 08/14/2020    ALKPHOS 94 08/14/2020    AST 27 08/14/2020    ALT 39 08/14/2020     Results for orders placed or performed in visit on 10/09/20   POCT Urinalysis no Micro   Result Value Ref Range    Color, UA yellow     Clarity, UA clear     Glucose, UA POC neg     Bilirubin, UA neg     Ketones, UA neg     Spec Grav, UA 1.025     Blood, UA POC neg     pH, UA 6.0     Protein, UA POC neg     Urobilinogen, UA 1.0     Leukocytes, UA neg     Nitrite, UA neg     Appearance, Fluid Clear Clear, Slightly Cloudy       1. Prostate cancer [facility])  Pending patient's biopsy of lung nodule we had a discussion regarding treatment options again. I reviewed my concerns and Dr. Terry Leon concern with the possibility of developing worsening obstructive voiding symptoms with radiation therapy even despite treating him with neoadjuvant ADT given his large prostate and the severity of his symptoms he rates as moderate to severe with AUA symptom score of 18. His residual today was 418 so certainly he is at risk for worsening obstructive voiding symptoms requiring catheter etc.  I told him I did not feel he was a good candidate for Rezum as this is indicated for patients who is prostates are less than 80 g but this could be a possibility but would not guarantee any improvement because of prostate being out of range. Also discussed doing a channel TURP prior to radiation therapy however this would significantly delay him getting primary treatment for his prostate cancer as he would have to have a recovery and healing time for at least 6 to 12 months to prevent radiation-induced fibrosis and scarring. Urethral stricture,  bladder neck contracture etc.  In addition his primary concerns regarding his quality of life issues incontinence and erectile dysfunction I feel are no better served by this approach than with XRT and neoadjuvant hormonal therapy alone.   I told the patient that I felt the best solution would be for him to have a radical prostatectomy nerve sparing in hopes that he does regain some of his erectile function postop and certainly this would  solve any issues regarding obstructive voiding symptoms etc.  He is very reluctant to have surgery he seems to have his mindset with radiation. Therefore the way we left it would be for him to follow-up to see me after he has the biopsy of the lung nodule. At that point we will formulate a final plan for treatment. Again I have strongly impressed upon him he should consider robot assisted laparoscopic prostatectomy. A total of 30 minutes of face-to-face time was spent with the patient discussing the above as well as coordination of care. He is given the opportunity to ask questions. I answered these to the best my ability and to his satisfaction.  - DE MEASUREMENT,POST-VOID RESIDUAL VOLUME BY US,NON-IMAGING  - POCT Urinalysis no Micro    2. BPH with urinary obstruction  Continue alfuzosin. Otherwise as above  - DE MEASUREMENT,POST-VOID RESIDUAL VOLUME BY US,NON-IMAGING  - POCT Urinalysis no Micro    3. Lung nodule  He is scheduled for biopsy. We will plan to see him back after the biopsy is completed      Orders Placed This Encounter   Procedures    POCT Urinalysis no Micro    DE MEASUREMENT,POST-VOID RESIDUAL VOLUME BY US,NON-IMAGING     Bladderscan: 125ml        Return in about 2 weeks (around 10/23/2020). All information inputted into the note by the MA to include chief complaint, past medical history, past surgical history, medications, allergies, social and family history and review of systems has been reviewed and updated as needed by me. EMR Dragon/transcription disclaimer: Much of this documentt is electronic  transcription/translation of spoken language to printed text. The  electronic translation of spoken language may be erroneous, or at times,  nonsensical words or phrases may be inadvertently transcribed. Although I  have reviewed the document for such errors, some may still exist.

## 2020-10-12 NOTE — NURSING NOTE
Pt for lung biopsy, Per Dr Mitchell Radiologist okay to use Dr Flores's H&P since involves lung lesion.

## 2020-10-13 ENCOUNTER — LAB (OUTPATIENT)
Dept: LAB | Facility: HOSPITAL | Age: 70
End: 2020-10-13

## 2020-10-13 DIAGNOSIS — Z01.818 PREOPERATIVE TESTING: ICD-10-CM

## 2020-10-13 PROCEDURE — U0003 INFECTIOUS AGENT DETECTION BY NUCLEIC ACID (DNA OR RNA); SEVERE ACUTE RESPIRATORY SYNDROME CORONAVIRUS 2 (SARS-COV-2) (CORONAVIRUS DISEASE [COVID-19]), AMPLIFIED PROBE TECHNIQUE, MAKING USE OF HIGH THROUGHPUT TECHNOLOGIES AS DESCRIBED BY CMS-2020-01-R: HCPCS

## 2020-10-13 PROCEDURE — C9803 HOPD COVID-19 SPEC COLLECT: HCPCS

## 2020-10-14 ENCOUNTER — PREP FOR SURGERY (OUTPATIENT)
Dept: OTHER | Facility: HOSPITAL | Age: 70
End: 2020-10-14

## 2020-10-14 DIAGNOSIS — R94.2 ABNORMAL PET OF RIGHT LUNG: Primary | ICD-10-CM

## 2020-10-14 LAB
COVID LABCORP PRIORITY: NORMAL
SARS-COV-2 RNA RESP QL NAA+PROBE: NOT DETECTED

## 2020-10-14 RX ORDER — SODIUM CHLORIDE 0.9 % (FLUSH) 0.9 %
3 SYRINGE (ML) INJECTION EVERY 12 HOURS SCHEDULED
Status: CANCELLED | OUTPATIENT
Start: 2020-10-14

## 2020-10-14 RX ORDER — SODIUM CHLORIDE 0.9 % (FLUSH) 0.9 %
10 SYRINGE (ML) INJECTION AS NEEDED
Status: CANCELLED | OUTPATIENT
Start: 2020-10-14

## 2020-10-14 RX ORDER — ALBUTEROL SULFATE 1.25 MG/3ML
1.25 SOLUTION RESPIRATORY (INHALATION)
Status: CANCELLED | OUTPATIENT
Start: 2020-10-14

## 2020-10-14 RX ORDER — CEFAZOLIN SODIUM 2 G/50ML
2 SOLUTION INTRAVENOUS ONCE
Status: CANCELLED | OUTPATIENT
Start: 2020-10-30

## 2020-10-14 RX ORDER — HEPARIN SODIUM 5000 [USP'U]/ML
5000 INJECTION, SOLUTION INTRAVENOUS; SUBCUTANEOUS ONCE
Status: CANCELLED | OUTPATIENT
Start: 2020-10-30

## 2020-10-16 ENCOUNTER — APPOINTMENT (OUTPATIENT)
Dept: GENERAL RADIOLOGY | Facility: HOSPITAL | Age: 70
End: 2020-10-16

## 2020-10-16 ENCOUNTER — HOSPITAL ENCOUNTER (OUTPATIENT)
Dept: CT IMAGING | Facility: HOSPITAL | Age: 70
Discharge: HOME | End: 2020-10-16

## 2020-10-16 ENCOUNTER — HOSPITAL ENCOUNTER (OUTPATIENT)
Dept: GENERAL RADIOLOGY | Facility: HOSPITAL | Age: 70
Discharge: HOME | End: 2020-10-16

## 2020-10-16 ENCOUNTER — HOSPITAL ENCOUNTER (OUTPATIENT)
Facility: HOSPITAL | Age: 70
Setting detail: OBSERVATION
Discharge: HOME | End: 2020-10-17
Attending: EMERGENCY MEDICINE | Admitting: INTERNAL MEDICINE

## 2020-10-16 ENCOUNTER — TRANSCRIBE ORDERS (OUTPATIENT)
Dept: ADMINISTRATIVE | Facility: HOSPITAL | Age: 70
End: 2020-10-16

## 2020-10-16 VITALS
HEART RATE: 80 BPM | DIASTOLIC BLOOD PRESSURE: 66 MMHG | TEMPERATURE: 98.6 F | RESPIRATION RATE: 30 BRPM | OXYGEN SATURATION: 97 % | HEIGHT: 73 IN | WEIGHT: 254.8 LBS | BODY MASS INDEX: 33.77 KG/M2 | SYSTOLIC BLOOD PRESSURE: 133 MMHG

## 2020-10-16 DIAGNOSIS — J95.811 PNEUMOTHORAX AFTER BIOPSY: Primary | ICD-10-CM

## 2020-10-16 DIAGNOSIS — IMO0001 LUNG NODULE < 6CM ON CT: ICD-10-CM

## 2020-10-16 DIAGNOSIS — C61 PROSTATE CANCER (HCC): ICD-10-CM

## 2020-10-16 DIAGNOSIS — Z01.818 PREOPERATIVE TESTING: Primary | ICD-10-CM

## 2020-10-16 DIAGNOSIS — R94.2 ABNORMAL PET OF RIGHT LUNG: ICD-10-CM

## 2020-10-16 LAB
ALBUMIN SERPL-MCNC: 4.6 G/DL (ref 3.5–5.2)
ALBUMIN/GLOB SERPL: 1.8 G/DL
ALP SERPL-CCNC: 106 U/L (ref 39–117)
ALT SERPL W P-5'-P-CCNC: 50 U/L (ref 1–41)
ANION GAP SERPL CALCULATED.3IONS-SCNC: 12 MMOL/L (ref 5–15)
APTT PPP: 24.7 SECONDS (ref 24.1–35)
APTT PPP: 29.4 SECONDS (ref 24.1–35)
AST SERPL-CCNC: 33 U/L (ref 1–40)
BASOPHILS # BLD AUTO: 0.03 10*3/MM3 (ref 0–0.2)
BASOPHILS NFR BLD AUTO: 0.2 % (ref 0–1.5)
BILIRUB SERPL-MCNC: 1.6 MG/DL (ref 0–1.2)
BUN SERPL-MCNC: 20 MG/DL (ref 8–23)
BUN/CREAT SERPL: 16.1 (ref 7–25)
CALCIUM SPEC-SCNC: 9.1 MG/DL (ref 8.6–10.5)
CHLORIDE SERPL-SCNC: 107 MMOL/L (ref 98–107)
CO2 SERPL-SCNC: 21 MMOL/L (ref 22–29)
CREAT SERPL-MCNC: 1.24 MG/DL (ref 0.76–1.27)
DEPRECATED RDW RBC AUTO: 42.5 FL (ref 37–54)
EOSINOPHIL # BLD AUTO: 0.01 10*3/MM3 (ref 0–0.4)
EOSINOPHIL NFR BLD AUTO: 0.1 % (ref 0.3–6.2)
ERYTHROCYTE [DISTWIDTH] IN BLOOD BY AUTOMATED COUNT: 13.3 % (ref 12.3–15.4)
GFR SERPL CREATININE-BSD FRML MDRD: 58 ML/MIN/1.73
GLOBULIN UR ELPH-MCNC: 2.5 GM/DL
GLUCOSE SERPL-MCNC: 138 MG/DL (ref 65–99)
HCT VFR BLD AUTO: 45.2 % (ref 37.5–51)
HGB BLD-MCNC: 15.5 G/DL (ref 13–17.7)
IMM GRANULOCYTES # BLD AUTO: 0.08 10*3/MM3 (ref 0–0.05)
IMM GRANULOCYTES NFR BLD AUTO: 0.5 % (ref 0–0.5)
INR PPP: 1.1 (ref 0.91–1.09)
INR PPP: 1.11 (ref 0.91–1.09)
LYMPHOCYTES # BLD AUTO: 0.41 10*3/MM3 (ref 0.7–3.1)
LYMPHOCYTES NFR BLD AUTO: 2.8 % (ref 19.6–45.3)
MCH RBC QN AUTO: 30 PG (ref 26.6–33)
MCHC RBC AUTO-ENTMCNC: 34.3 G/DL (ref 31.5–35.7)
MCV RBC AUTO: 87.4 FL (ref 79–97)
MONOCYTES # BLD AUTO: 0.56 10*3/MM3 (ref 0.1–0.9)
MONOCYTES NFR BLD AUTO: 3.8 % (ref 5–12)
NEUTROPHILS NFR BLD AUTO: 13.58 10*3/MM3 (ref 1.7–7)
NEUTROPHILS NFR BLD AUTO: 92.6 % (ref 42.7–76)
NRBC BLD AUTO-RTO: 0 /100 WBC (ref 0–0.2)
PLATELET # BLD AUTO: 174 10*3/MM3 (ref 140–450)
PLATELET # BLD AUTO: 184 10*3/MM3 (ref 140–450)
PMV BLD AUTO: 10.8 FL (ref 6–12)
POTASSIUM SERPL-SCNC: 4.4 MMOL/L (ref 3.5–5.2)
PROT SERPL-MCNC: 7.1 G/DL (ref 6–8.5)
PROTHROMBIN TIME: 13.8 SECONDS (ref 11.9–14.6)
PROTHROMBIN TIME: 13.9 SECONDS (ref 11.9–14.6)
RBC # BLD AUTO: 5.17 10*6/MM3 (ref 4.14–5.8)
SARS-COV-2 RDRP RESP QL NAA+PROBE: NOT DETECTED
SODIUM SERPL-SCNC: 140 MMOL/L (ref 136–145)
TROPONIN T SERPL-MCNC: <0.01 NG/ML (ref 0–0.03)
WBC # BLD AUTO: 14.67 10*3/MM3 (ref 3.4–10.8)

## 2020-10-16 PROCEDURE — 25010000003 LIDOCAINE 1 % SOLUTION: Performed by: RADIOLOGY

## 2020-10-16 PROCEDURE — 84484 ASSAY OF TROPONIN QUANT: CPT | Performed by: EMERGENCY MEDICINE

## 2020-10-16 PROCEDURE — 96372 THER/PROPH/DIAG INJ SC/IM: CPT

## 2020-10-16 PROCEDURE — 63710000001 HYDROCODONE-ACETAMINOPHEN 7.5-325 MG TABLET

## 2020-10-16 PROCEDURE — 96376 TX/PRO/DX INJ SAME DRUG ADON: CPT

## 2020-10-16 PROCEDURE — 25010000002 MORPHINE PER 10 MG: Performed by: EMERGENCY MEDICINE

## 2020-10-16 PROCEDURE — 85730 THROMBOPLASTIN TIME PARTIAL: CPT | Performed by: EMERGENCY MEDICINE

## 2020-10-16 PROCEDURE — 25010000002 ENOXAPARIN PER 10 MG: Performed by: INTERNAL MEDICINE

## 2020-10-16 PROCEDURE — 25010000002 FENTANYL CITRATE (PF) 100 MCG/2ML SOLUTION: Performed by: RADIOLOGY

## 2020-10-16 PROCEDURE — C9803 HOPD COVID-19 SPEC COLLECT: HCPCS

## 2020-10-16 PROCEDURE — 96374 THER/PROPH/DIAG INJ IV PUSH: CPT

## 2020-10-16 PROCEDURE — 85610 PROTHROMBIN TIME: CPT | Performed by: EMERGENCY MEDICINE

## 2020-10-16 PROCEDURE — 85730 THROMBOPLASTIN TIME PARTIAL: CPT | Performed by: RADIOLOGY

## 2020-10-16 PROCEDURE — 93005 ELECTROCARDIOGRAM TRACING: CPT | Performed by: EMERGENCY MEDICINE

## 2020-10-16 PROCEDURE — 85025 COMPLETE CBC W/AUTO DIFF WBC: CPT | Performed by: EMERGENCY MEDICINE

## 2020-10-16 PROCEDURE — 94799 UNLISTED PULMONARY SVC/PX: CPT

## 2020-10-16 PROCEDURE — G0378 HOSPITAL OBSERVATION PER HR: HCPCS

## 2020-10-16 PROCEDURE — 25010000002 ONDANSETRON PER 1 MG: Performed by: EMERGENCY MEDICINE

## 2020-10-16 PROCEDURE — 71045 X-RAY EXAM CHEST 1 VIEW: CPT

## 2020-10-16 PROCEDURE — 88333 PATH CONSLTJ SURG CYTO XM 1: CPT | Performed by: PHYSICIAN ASSISTANT

## 2020-10-16 PROCEDURE — 87635 SARS-COV-2 COVID-19 AMP PRB: CPT | Performed by: EMERGENCY MEDICINE

## 2020-10-16 PROCEDURE — 96375 TX/PRO/DX INJ NEW DRUG ADDON: CPT

## 2020-10-16 PROCEDURE — A9270 NON-COVERED ITEM OR SERVICE: HCPCS

## 2020-10-16 PROCEDURE — 80053 COMPREHEN METABOLIC PANEL: CPT | Performed by: EMERGENCY MEDICINE

## 2020-10-16 PROCEDURE — 85610 PROTHROMBIN TIME: CPT | Performed by: RADIOLOGY

## 2020-10-16 PROCEDURE — 25010000002 MORPHINE PER 10 MG: Performed by: INTERNAL MEDICINE

## 2020-10-16 PROCEDURE — 88305 TISSUE EXAM BY PATHOLOGIST: CPT | Performed by: PHYSICIAN ASSISTANT

## 2020-10-16 PROCEDURE — 99284 EMERGENCY DEPT VISIT MOD MDM: CPT

## 2020-10-16 PROCEDURE — 88172 CYTP DX EVAL FNA 1ST EA SITE: CPT | Performed by: PHYSICIAN ASSISTANT

## 2020-10-16 PROCEDURE — 93010 ELECTROCARDIOGRAM REPORT: CPT | Performed by: INTERNAL MEDICINE

## 2020-10-16 PROCEDURE — 85049 AUTOMATED PLATELET COUNT: CPT | Performed by: RADIOLOGY

## 2020-10-16 PROCEDURE — 77012 CT SCAN FOR NEEDLE BIOPSY: CPT

## 2020-10-16 RX ORDER — OXYCODONE HYDROCHLORIDE AND ACETAMINOPHEN 5; 325 MG/1; MG/1
1 TABLET ORAL EVERY 4 HOURS PRN
Status: DISCONTINUED | OUTPATIENT
Start: 2020-10-16 | End: 2020-10-17 | Stop reason: HOSPADM

## 2020-10-16 RX ORDER — SODIUM CHLORIDE 0.9 % (FLUSH) 0.9 %
10 SYRINGE (ML) INJECTION EVERY 12 HOURS SCHEDULED
Status: DISCONTINUED | OUTPATIENT
Start: 2020-10-16 | End: 2020-10-17 | Stop reason: HOSPADM

## 2020-10-16 RX ORDER — FENTANYL CITRATE 50 UG/ML
INJECTION, SOLUTION INTRAMUSCULAR; INTRAVENOUS
Status: COMPLETED | OUTPATIENT
Start: 2020-10-16 | End: 2020-10-16

## 2020-10-16 RX ORDER — LIDOCAINE HYDROCHLORIDE 10 MG/ML
INJECTION, SOLUTION INFILTRATION; PERINEURAL
Status: COMPLETED | OUTPATIENT
Start: 2020-10-16 | End: 2020-10-16

## 2020-10-16 RX ORDER — NALOXONE HCL 0.4 MG/ML
0.4 VIAL (ML) INJECTION
Status: DISCONTINUED | OUTPATIENT
Start: 2020-10-16 | End: 2020-10-17 | Stop reason: HOSPADM

## 2020-10-16 RX ORDER — ACETAMINOPHEN 160 MG/5ML
650 SOLUTION ORAL EVERY 4 HOURS PRN
Status: DISCONTINUED | OUTPATIENT
Start: 2020-10-16 | End: 2020-10-17 | Stop reason: HOSPADM

## 2020-10-16 RX ORDER — ONDANSETRON 2 MG/ML
4 INJECTION INTRAMUSCULAR; INTRAVENOUS ONCE
Status: COMPLETED | OUTPATIENT
Start: 2020-10-16 | End: 2020-10-16

## 2020-10-16 RX ORDER — SODIUM CHLORIDE 0.9 % (FLUSH) 0.9 %
10 SYRINGE (ML) INJECTION AS NEEDED
Status: DISCONTINUED | OUTPATIENT
Start: 2020-10-16 | End: 2020-10-17 | Stop reason: HOSPADM

## 2020-10-16 RX ORDER — ACETAMINOPHEN 325 MG/1
650 TABLET ORAL EVERY 4 HOURS PRN
Status: DISCONTINUED | OUTPATIENT
Start: 2020-10-16 | End: 2020-10-17 | Stop reason: HOSPADM

## 2020-10-16 RX ORDER — ACETAMINOPHEN 650 MG/1
650 SUPPOSITORY RECTAL EVERY 4 HOURS PRN
Status: DISCONTINUED | OUTPATIENT
Start: 2020-10-16 | End: 2020-10-17 | Stop reason: HOSPADM

## 2020-10-16 RX ORDER — SODIUM CHLORIDE 0.9 % (FLUSH) 0.9 %
3 SYRINGE (ML) INJECTION EVERY 12 HOURS SCHEDULED
Status: DISCONTINUED | OUTPATIENT
Start: 2020-10-16 | End: 2020-10-17 | Stop reason: HOSPADM

## 2020-10-16 RX ORDER — ONDANSETRON 2 MG/ML
4 INJECTION INTRAMUSCULAR; INTRAVENOUS EVERY 6 HOURS PRN
Status: DISCONTINUED | OUTPATIENT
Start: 2020-10-16 | End: 2020-10-17 | Stop reason: HOSPADM

## 2020-10-16 RX ORDER — NALOXONE HCL 0.4 MG/ML
0.4 VIAL (ML) INJECTION
Status: DISCONTINUED | OUTPATIENT
Start: 2020-10-16 | End: 2020-10-16

## 2020-10-16 RX ORDER — MORPHINE SULFATE 2 MG/ML
2 INJECTION, SOLUTION INTRAMUSCULAR; INTRAVENOUS
Status: DISCONTINUED | OUTPATIENT
Start: 2020-10-16 | End: 2020-10-16

## 2020-10-16 RX ORDER — HYDROCODONE BITARTRATE AND ACETAMINOPHEN 7.5; 325 MG/1; MG/1
1 TABLET ORAL ONCE
Status: DISCONTINUED | OUTPATIENT
Start: 2020-10-16 | End: 2020-10-17 | Stop reason: HOSPADM

## 2020-10-16 RX ORDER — ONDANSETRON 4 MG/1
4 TABLET, FILM COATED ORAL EVERY 6 HOURS PRN
Status: DISCONTINUED | OUTPATIENT
Start: 2020-10-16 | End: 2020-10-17 | Stop reason: HOSPADM

## 2020-10-16 RX ORDER — HYDROCODONE BITARTRATE AND ACETAMINOPHEN 7.5; 325 MG/1; MG/1
TABLET ORAL
Status: COMPLETED
Start: 2020-10-16 | End: 2020-10-16

## 2020-10-16 RX ADMIN — MORPHINE SULFATE 4 MG: 4 INJECTION, SOLUTION INTRAMUSCULAR; INTRAVENOUS at 18:54

## 2020-10-16 RX ADMIN — LIDOCAINE HYDROCHLORIDE 10 ML: 10 INJECTION, SOLUTION INFILTRATION; PERINEURAL at 14:29

## 2020-10-16 RX ADMIN — ONDANSETRON HYDROCHLORIDE 4 MG: 2 SOLUTION INTRAMUSCULAR; INTRAVENOUS at 18:54

## 2020-10-16 RX ADMIN — SODIUM CHLORIDE, PRESERVATIVE FREE 10 ML: 5 INJECTION INTRAVENOUS at 22:29

## 2020-10-16 RX ADMIN — MORPHINE SULFATE 4 MG: 4 INJECTION, SOLUTION INTRAMUSCULAR; INTRAVENOUS at 21:19

## 2020-10-16 RX ADMIN — LIDOCAINE HYDROCHLORIDE 10 ML: 10 INJECTION, SOLUTION INFILTRATION; PERINEURAL at 14:08

## 2020-10-16 RX ADMIN — OXYCODONE HYDROCHLORIDE AND ACETAMINOPHEN 1 TABLET: 5; 325 TABLET ORAL at 22:29

## 2020-10-16 RX ADMIN — ENOXAPARIN SODIUM 40 MG: 40 INJECTION SUBCUTANEOUS at 22:29

## 2020-10-16 RX ADMIN — HYDROCODONE BITARTRATE AND ACETAMINOPHEN 1 TABLET: 7.5; 325 TABLET ORAL at 15:17

## 2020-10-16 RX ADMIN — FENTANYL CITRATE 50 MCG: 50 INJECTION, SOLUTION INTRAMUSCULAR; INTRAVENOUS at 14:50

## 2020-10-16 RX ADMIN — LIDOCAINE HYDROCHLORIDE 10 ML: 10 INJECTION, SOLUTION INFILTRATION; PERINEURAL at 14:06

## 2020-10-16 RX ADMIN — SODIUM CHLORIDE 500 ML: 9 INJECTION, SOLUTION INTRAVENOUS at 18:53

## 2020-10-16 NOTE — NURSING NOTE
Dr Huangs here to see pt and talk to wife. Pt appears to be breathing better but still can not take deep breath without having discomfort. Sats 95 and above on 3 liters of o2 per nc.

## 2020-10-17 ENCOUNTER — APPOINTMENT (OUTPATIENT)
Dept: GENERAL RADIOLOGY | Facility: HOSPITAL | Age: 70
End: 2020-10-17

## 2020-10-17 VITALS
HEART RATE: 68 BPM | TEMPERATURE: 97.7 F | OXYGEN SATURATION: 94 % | WEIGHT: 250.6 LBS | DIASTOLIC BLOOD PRESSURE: 49 MMHG | SYSTOLIC BLOOD PRESSURE: 120 MMHG | BODY MASS INDEX: 33.21 KG/M2 | HEIGHT: 73 IN | RESPIRATION RATE: 20 BRPM

## 2020-10-17 LAB
ALBUMIN SERPL-MCNC: 4 G/DL (ref 3.5–5.2)
ALBUMIN/GLOB SERPL: 1.8 G/DL
ALP SERPL-CCNC: 89 U/L (ref 39–117)
ALT SERPL W P-5'-P-CCNC: 42 U/L (ref 1–41)
ANION GAP SERPL CALCULATED.3IONS-SCNC: 9 MMOL/L (ref 5–15)
AST SERPL-CCNC: 27 U/L (ref 1–40)
BASOPHILS # BLD AUTO: 0.02 10*3/MM3 (ref 0–0.2)
BASOPHILS NFR BLD AUTO: 0.2 % (ref 0–1.5)
BILIRUB SERPL-MCNC: 1.4 MG/DL (ref 0–1.2)
BUN SERPL-MCNC: 24 MG/DL (ref 8–23)
BUN/CREAT SERPL: 18.9 (ref 7–25)
CALCIUM SPEC-SCNC: 8.6 MG/DL (ref 8.6–10.5)
CHLORIDE SERPL-SCNC: 107 MMOL/L (ref 98–107)
CO2 SERPL-SCNC: 23 MMOL/L (ref 22–29)
CREAT SERPL-MCNC: 1.27 MG/DL (ref 0.76–1.27)
DEPRECATED RDW RBC AUTO: 44.5 FL (ref 37–54)
EOSINOPHIL # BLD AUTO: 0.04 10*3/MM3 (ref 0–0.4)
EOSINOPHIL NFR BLD AUTO: 0.4 % (ref 0.3–6.2)
ERYTHROCYTE [DISTWIDTH] IN BLOOD BY AUTOMATED COUNT: 13.7 % (ref 12.3–15.4)
GFR SERPL CREATININE-BSD FRML MDRD: 56 ML/MIN/1.73
GLOBULIN UR ELPH-MCNC: 2.2 GM/DL
GLUCOSE SERPL-MCNC: 107 MG/DL (ref 65–99)
HCT VFR BLD AUTO: 41.1 % (ref 37.5–51)
HGB BLD-MCNC: 13.7 G/DL (ref 13–17.7)
IMM GRANULOCYTES # BLD AUTO: 0.06 10*3/MM3 (ref 0–0.05)
IMM GRANULOCYTES NFR BLD AUTO: 0.5 % (ref 0–0.5)
LYMPHOCYTES # BLD AUTO: 1.12 10*3/MM3 (ref 0.7–3.1)
LYMPHOCYTES NFR BLD AUTO: 10.2 % (ref 19.6–45.3)
MCH RBC QN AUTO: 29.8 PG (ref 26.6–33)
MCHC RBC AUTO-ENTMCNC: 33.3 G/DL (ref 31.5–35.7)
MCV RBC AUTO: 89.5 FL (ref 79–97)
MONOCYTES # BLD AUTO: 0.72 10*3/MM3 (ref 0.1–0.9)
MONOCYTES NFR BLD AUTO: 6.6 % (ref 5–12)
NEUTROPHILS NFR BLD AUTO: 82.1 % (ref 42.7–76)
NEUTROPHILS NFR BLD AUTO: 9.01 10*3/MM3 (ref 1.7–7)
NRBC BLD AUTO-RTO: 0 /100 WBC (ref 0–0.2)
PLATELET # BLD AUTO: 178 10*3/MM3 (ref 140–450)
PMV BLD AUTO: 11 FL (ref 6–12)
POTASSIUM SERPL-SCNC: 3.9 MMOL/L (ref 3.5–5.2)
PROT SERPL-MCNC: 6.2 G/DL (ref 6–8.5)
RBC # BLD AUTO: 4.59 10*6/MM3 (ref 4.14–5.8)
SODIUM SERPL-SCNC: 139 MMOL/L (ref 136–145)
WBC # BLD AUTO: 10.97 10*3/MM3 (ref 3.4–10.8)

## 2020-10-17 PROCEDURE — 94799 UNLISTED PULMONARY SVC/PX: CPT

## 2020-10-17 PROCEDURE — 99214 OFFICE O/P EST MOD 30 MIN: CPT | Performed by: SURGERY

## 2020-10-17 PROCEDURE — 85025 COMPLETE CBC W/AUTO DIFF WBC: CPT | Performed by: INTERNAL MEDICINE

## 2020-10-17 PROCEDURE — 71046 X-RAY EXAM CHEST 2 VIEWS: CPT

## 2020-10-17 PROCEDURE — 80053 COMPREHEN METABOLIC PANEL: CPT | Performed by: INTERNAL MEDICINE

## 2020-10-17 PROCEDURE — G0378 HOSPITAL OBSERVATION PER HR: HCPCS

## 2020-10-17 RX ORDER — OXYCODONE HYDROCHLORIDE AND ACETAMINOPHEN 5; 325 MG/1; MG/1
1 TABLET ORAL EVERY 4 HOURS PRN
Qty: 10 TABLET | Refills: 0 | Status: SHIPPED | OUTPATIENT
Start: 2020-10-17 | End: 2020-10-27

## 2020-10-17 RX ADMIN — OXYCODONE HYDROCHLORIDE AND ACETAMINOPHEN 1 TABLET: 5; 325 TABLET ORAL at 09:05

## 2020-10-17 RX ADMIN — OXYCODONE HYDROCHLORIDE AND ACETAMINOPHEN 1 TABLET: 5; 325 TABLET ORAL at 04:11

## 2020-10-17 RX ADMIN — SODIUM CHLORIDE, PRESERVATIVE FREE 10 ML: 5 INJECTION INTRAVENOUS at 09:06

## 2020-10-19 ENCOUNTER — LAB (OUTPATIENT)
Dept: LAB | Facility: HOSPITAL | Age: 70
End: 2020-10-19

## 2020-10-19 DIAGNOSIS — Z01.818 PREOPERATIVE TESTING: ICD-10-CM

## 2020-10-19 PROCEDURE — C9803 HOPD COVID-19 SPEC COLLECT: HCPCS

## 2020-10-19 PROCEDURE — 87635 SARS-COV-2 COVID-19 AMP PRB: CPT | Performed by: PHYSICIAN ASSISTANT

## 2020-10-20 LAB — SARS-COV-2 N GENE RESP QL NAA+PROBE: NOT DETECTED

## 2020-10-21 ENCOUNTER — HOSPITAL ENCOUNTER (OUTPATIENT)
Dept: PULMONOLOGY | Facility: HOSPITAL | Age: 70
Discharge: HOME | End: 2020-10-21
Admitting: PHYSICIAN ASSISTANT

## 2020-10-21 ENCOUNTER — APPOINTMENT (OUTPATIENT)
Dept: PULMONOLOGY | Facility: HOSPITAL | Age: 70
End: 2020-10-21

## 2020-10-21 ENCOUNTER — TELEPHONE (OUTPATIENT)
Dept: CARDIAC SURGERY | Facility: CLINIC | Age: 70
End: 2020-10-21

## 2020-10-21 DIAGNOSIS — R94.2 ABNORMAL PET OF RIGHT LUNG: ICD-10-CM

## 2020-10-21 DIAGNOSIS — IMO0001 LUNG NODULE < 6CM ON CT: ICD-10-CM

## 2020-10-21 DIAGNOSIS — C61 PROSTATE CANCER (HCC): ICD-10-CM

## 2020-10-21 PROCEDURE — 94060 EVALUATION OF WHEEZING: CPT | Performed by: INTERNAL MEDICINE

## 2020-10-21 PROCEDURE — 94726 PLETHYSMOGRAPHY LUNG VOLUMES: CPT

## 2020-10-21 PROCEDURE — 94726 PLETHYSMOGRAPHY LUNG VOLUMES: CPT | Performed by: INTERNAL MEDICINE

## 2020-10-21 PROCEDURE — 94060 EVALUATION OF WHEEZING: CPT

## 2020-10-21 RX ORDER — ALBUTEROL SULFATE 2.5 MG/3ML
2.5 SOLUTION RESPIRATORY (INHALATION) ONCE
Status: COMPLETED | OUTPATIENT
Start: 2020-10-21 | End: 2020-10-21

## 2020-10-21 RX ADMIN — ALBUTEROL SULFATE 2.5 MG: 2.5 SOLUTION RESPIRATORY (INHALATION) at 17:17

## 2020-10-21 NOTE — TELEPHONE ENCOUNTER
Pt left vm message requesting to speak with Dr Flores.  Called pt back to inform him Dr Flores is in surgery today and to try to get more info.  Pt states he is very confused and does not remember anything Dr Flores told him in the hospital and he wants to talk to him.  Pt is aware of his PFT appt this afternoon.  Informed pt Dr Flores in surgery and that I could not promise he would be able to call pt prior to PFT appt and that I did not know how far out another PFT appt would be.  Pt stated he would be going to the PFT appt regardless but cont to state he wants to speak with Dr Flores stating he is very confused.  Can reach pt at #[phone]/corinne

## 2020-10-22 ENCOUNTER — TELEPHONE (OUTPATIENT)
Dept: UROLOGY | Age: 70
End: 2020-10-22

## 2020-10-22 ENCOUNTER — TELEPHONE (OUTPATIENT)
Dept: CARDIAC SURGERY | Facility: CLINIC | Age: 70
End: 2020-10-22

## 2020-10-22 DIAGNOSIS — E04.9 THYROID GOITER: Primary | ICD-10-CM

## 2020-10-22 NOTE — TELEPHONE ENCOUNTER
Pt calling.  States he rec'd a call from Dr Solis' office but he does not know why Dr Flores is referring him to them.  Pt requesting a call back re: this.  Can reach him at #[phone]/[email or web address]

## 2020-10-22 NOTE — TELEPHONE ENCOUNTER
Kevin Teresa called this morning to ask if he still needs to keep his appt for 10/26/2020 with Dr. Michael Hunter. He advises that he is still having the issue, but, is being scheduled for lung surgery. Please contact patient to discuss. Thank you.

## 2020-10-23 ENCOUNTER — TRANSCRIBE ORDERS (OUTPATIENT)
Dept: ADMINISTRATIVE | Facility: HOSPITAL | Age: 70
End: 2020-10-23

## 2020-10-23 DIAGNOSIS — Z11.59 SCREENING FOR VIRAL DISEASE: Primary | ICD-10-CM

## 2020-10-23 NOTE — TELEPHONE ENCOUNTER
I CALLED PT. HAD TO LEAVE A VM. THE 26TH APPT WILL BE CX'ED.  HE IS TO CALL WHEN THE BX IS DONE TO R/S.

## 2020-10-26 NOTE — TELEPHONE ENCOUNTER
I have spoke with Patient regarding referral and he is understanding now. Also let him know to arrive for surgery on 10/30 @ 5 am. NPO after midnight the night before. He verbalized understanding to all.

## 2020-10-27 ENCOUNTER — HOSPITAL ENCOUNTER (OUTPATIENT)
Dept: GENERAL RADIOLOGY | Facility: HOSPITAL | Age: 70
Discharge: HOME | End: 2020-10-27

## 2020-10-27 ENCOUNTER — LAB (OUTPATIENT)
Dept: LAB | Facility: HOSPITAL | Age: 70
End: 2020-10-27

## 2020-10-27 ENCOUNTER — APPOINTMENT (OUTPATIENT)
Dept: PREADMISSION TESTING | Facility: HOSPITAL | Age: 70
End: 2020-10-27

## 2020-10-27 VITALS
BODY MASS INDEX: 34.1 KG/M2 | HEART RATE: 76 BPM | RESPIRATION RATE: 18 BRPM | WEIGHT: 257.28 LBS | SYSTOLIC BLOOD PRESSURE: 153 MMHG | HEIGHT: 73 IN | OXYGEN SATURATION: 96 % | DIASTOLIC BLOOD PRESSURE: 63 MMHG

## 2020-10-27 DIAGNOSIS — R94.2 ABNORMAL PET OF RIGHT LUNG: ICD-10-CM

## 2020-10-27 LAB
ABO GROUP BLD: NORMAL
ALBUMIN SERPL-MCNC: 4.3 G/DL (ref 3.5–5.2)
ALBUMIN/GLOB SERPL: 1.8 G/DL
ALP SERPL-CCNC: 98 U/L (ref 39–117)
ALT SERPL W P-5'-P-CCNC: 39 U/L (ref 1–41)
ANION GAP SERPL CALCULATED.3IONS-SCNC: 8 MMOL/L (ref 5–15)
APTT PPP: 30.2 SECONDS (ref 24.1–35)
ARTERIAL PATENCY WRIST A: POSITIVE
AST SERPL-CCNC: 27 U/L (ref 1–40)
ATMOSPHERIC PRESS: 755 MMHG
BASE EXCESS BLDA CALC-SCNC: 1.2 MMOL/L (ref 0–2)
BASOPHILS # BLD AUTO: 0.03 10*3/MM3 (ref 0–0.2)
BASOPHILS NFR BLD AUTO: 0.4 % (ref 0–1.5)
BDY SITE: ABNORMAL
BILIRUB SERPL-MCNC: 1.3 MG/DL (ref 0–1.2)
BLD GP AB SCN SERPL QL: NEGATIVE
BODY TEMPERATURE: 37 C
BUN SERPL-MCNC: 15 MG/DL (ref 8–23)
BUN/CREAT SERPL: 14 (ref 7–25)
CALCIUM SPEC-SCNC: 9.4 MG/DL (ref 8.6–10.5)
CHLORIDE SERPL-SCNC: 104 MMOL/L (ref 98–107)
CO2 SERPL-SCNC: 25 MMOL/L (ref 22–29)
CREAT SERPL-MCNC: 1.07 MG/DL (ref 0.76–1.27)
DEPRECATED RDW RBC AUTO: 41.4 FL (ref 37–54)
EOSINOPHIL # BLD AUTO: 0.1 10*3/MM3 (ref 0–0.4)
EOSINOPHIL NFR BLD AUTO: 1.2 % (ref 0.3–6.2)
ERYTHROCYTE [DISTWIDTH] IN BLOOD BY AUTOMATED COUNT: 13.3 % (ref 12.3–15.4)
GFR SERPL CREATININE-BSD FRML MDRD: 69 ML/MIN/1.73
GLOBULIN UR ELPH-MCNC: 2.4 GM/DL
GLUCOSE SERPL-MCNC: 98 MG/DL (ref 65–99)
HCO3 BLDA-SCNC: 24.5 MMOL/L (ref 20–26)
HCT VFR BLD AUTO: 41.8 % (ref 37.5–51)
HGB BLD-MCNC: 14.6 G/DL (ref 13–17.7)
IMM GRANULOCYTES # BLD AUTO: 0.03 10*3/MM3 (ref 0–0.05)
IMM GRANULOCYTES NFR BLD AUTO: 0.4 % (ref 0–0.5)
INR PPP: 1.1 (ref 0.91–1.09)
LYMPHOCYTES # BLD AUTO: 0.94 10*3/MM3 (ref 0.7–3.1)
LYMPHOCYTES NFR BLD AUTO: 11.5 % (ref 19.6–45.3)
Lab: ABNORMAL
MCH RBC QN AUTO: 30.2 PG (ref 26.6–33)
MCHC RBC AUTO-ENTMCNC: 34.9 G/DL (ref 31.5–35.7)
MCV RBC AUTO: 86.5 FL (ref 79–97)
MODALITY: ABNORMAL
MONOCYTES # BLD AUTO: 0.61 10*3/MM3 (ref 0.1–0.9)
MONOCYTES NFR BLD AUTO: 7.5 % (ref 5–12)
NEUTROPHILS NFR BLD AUTO: 6.45 10*3/MM3 (ref 1.7–7)
NEUTROPHILS NFR BLD AUTO: 79 % (ref 42.7–76)
NRBC BLD AUTO-RTO: 0 /100 WBC (ref 0–0.2)
PCO2 BLDA: 34.3 MM HG (ref 35–45)
PCO2 TEMP ADJ BLD: 34.3 MM HG (ref 35–45)
PH BLDA: 7.46 PH UNITS (ref 7.35–7.45)
PH, TEMP CORRECTED: 7.46 PH UNITS (ref 7.35–7.45)
PLATELET # BLD AUTO: 199 10*3/MM3 (ref 140–450)
PMV BLD AUTO: 10.9 FL (ref 6–12)
PO2 BLDA: 88.8 MM HG (ref 83–108)
PO2 TEMP ADJ BLD: 88.8 MM HG (ref 83–108)
POTASSIUM SERPL-SCNC: 4.1 MMOL/L (ref 3.5–5.2)
PROT SERPL-MCNC: 6.7 G/DL (ref 6–8.5)
PROTHROMBIN TIME: 13.8 SECONDS (ref 11.9–14.6)
RBC # BLD AUTO: 4.83 10*6/MM3 (ref 4.14–5.8)
RH BLD: POSITIVE
SAO2 % BLDCOA: 98 % (ref 94–99)
SODIUM SERPL-SCNC: 137 MMOL/L (ref 136–145)
T&S EXPIRATION DATE: NORMAL
VENTILATOR MODE: ABNORMAL
WBC # BLD AUTO: 8.16 10*3/MM3 (ref 3.4–10.8)

## 2020-10-27 PROCEDURE — 71046 X-RAY EXAM CHEST 2 VIEWS: CPT

## 2020-10-27 PROCEDURE — 86900 BLOOD TYPING SEROLOGIC ABO: CPT | Performed by: NURSE PRACTITIONER

## 2020-10-27 PROCEDURE — C9803 HOPD COVID-19 SPEC COLLECT: HCPCS | Performed by: SURGERY

## 2020-10-27 PROCEDURE — U0003 INFECTIOUS AGENT DETECTION BY NUCLEIC ACID (DNA OR RNA); SEVERE ACUTE RESPIRATORY SYNDROME CORONAVIRUS 2 (SARS-COV-2) (CORONAVIRUS DISEASE [COVID-19]), AMPLIFIED PROBE TECHNIQUE, MAKING USE OF HIGH THROUGHPUT TECHNOLOGIES AS DESCRIBED BY CMS-2020-01-R: HCPCS | Performed by: SURGERY

## 2020-10-27 PROCEDURE — 86850 RBC ANTIBODY SCREEN: CPT | Performed by: NURSE PRACTITIONER

## 2020-10-27 PROCEDURE — 80053 COMPREHEN METABOLIC PANEL: CPT | Performed by: NURSE PRACTITIONER

## 2020-10-27 PROCEDURE — 36415 COLL VENOUS BLD VENIPUNCTURE: CPT

## 2020-10-27 PROCEDURE — 85610 PROTHROMBIN TIME: CPT | Performed by: NURSE PRACTITIONER

## 2020-10-27 PROCEDURE — 86901 BLOOD TYPING SEROLOGIC RH(D): CPT | Performed by: NURSE PRACTITIONER

## 2020-10-27 PROCEDURE — 82803 BLOOD GASES ANY COMBINATION: CPT | Performed by: SURGERY

## 2020-10-27 PROCEDURE — 36600 WITHDRAWAL OF ARTERIAL BLOOD: CPT | Performed by: SURGERY

## 2020-10-27 PROCEDURE — 85730 THROMBOPLASTIN TIME PARTIAL: CPT | Performed by: NURSE PRACTITIONER

## 2020-10-27 PROCEDURE — 85025 COMPLETE CBC W/AUTO DIFF WBC: CPT | Performed by: NURSE PRACTITIONER

## 2020-10-27 RX ORDER — LOSARTAN POTASSIUM AND HYDROCHLOROTHIAZIDE 25; 100 MG/1; MG/1
1 TABLET ORAL DAILY
COMMUNITY
End: 2020-10-27 | Stop reason: DRUGHIGH

## 2020-10-27 RX ORDER — LOSARTAN POTASSIUM AND HYDROCHLOROTHIAZIDE 12.5; 5 MG/1; MG/1
0.5 TABLET ORAL DAILY
COMMUNITY
End: 2021-03-15 | Stop reason: SDUPTHER

## 2020-10-28 LAB
COVID LABCORP PRIORITY: NORMAL
SARS-COV-2 RNA RESP QL NAA+PROBE: NOT DETECTED

## 2020-10-29 ENCOUNTER — ANESTHESIA EVENT (OUTPATIENT)
Dept: PERIOP | Facility: HOSPITAL | Age: 70
End: 2020-10-29

## 2020-10-29 LAB
CYTO UR: NORMAL
LAB AP CASE REPORT: NORMAL
LAB AP CLINICAL INFORMATION: NORMAL
Lab: NORMAL
PATH REPORT.FINAL DX SPEC: NORMAL
PATH REPORT.GROSS SPEC: NORMAL

## 2020-10-30 ENCOUNTER — APPOINTMENT (OUTPATIENT)
Dept: PULMONOLOGY | Facility: HOSPITAL | Age: 70
End: 2020-10-30

## 2020-10-30 ENCOUNTER — HOSPITAL ENCOUNTER (INPATIENT)
Facility: HOSPITAL | Age: 70
LOS: 3 days | Discharge: HOME | End: 2020-11-02
Attending: SURGERY | Admitting: SURGERY

## 2020-10-30 ENCOUNTER — ANESTHESIA (OUTPATIENT)
Dept: PERIOP | Facility: HOSPITAL | Age: 70
End: 2020-10-30

## 2020-10-30 ENCOUNTER — APPOINTMENT (OUTPATIENT)
Dept: GENERAL RADIOLOGY | Facility: HOSPITAL | Age: 70
End: 2020-10-30

## 2020-10-30 DIAGNOSIS — R91.8 LUNG MASS: Primary | ICD-10-CM

## 2020-10-30 DIAGNOSIS — R94.2 ABNORMAL PET OF RIGHT LUNG: ICD-10-CM

## 2020-10-30 LAB
ANION GAP SERPL CALCULATED.3IONS-SCNC: 8 MMOL/L (ref 5–15)
BUN SERPL-MCNC: 16 MG/DL (ref 8–23)
BUN/CREAT SERPL: 16 (ref 7–25)
CALCIUM SPEC-SCNC: 8.5 MG/DL (ref 8.6–10.5)
CHLORIDE SERPL-SCNC: 108 MMOL/L (ref 98–107)
CO2 SERPL-SCNC: 21 MMOL/L (ref 22–29)
CREAT SERPL-MCNC: 1 MG/DL (ref 0.76–1.27)
DEPRECATED RDW RBC AUTO: 44 FL (ref 37–54)
ERYTHROCYTE [DISTWIDTH] IN BLOOD BY AUTOMATED COUNT: 13.5 % (ref 12.3–15.4)
GFR SERPL CREATININE-BSD FRML MDRD: 74 ML/MIN/1.73
GLUCOSE SERPL-MCNC: 155 MG/DL (ref 65–99)
HCT VFR BLD AUTO: 40.2 % (ref 37.5–51)
HGB BLD-MCNC: 13.6 G/DL (ref 13–17.7)
MCH RBC QN AUTO: 29.8 PG (ref 26.6–33)
MCHC RBC AUTO-ENTMCNC: 33.8 G/DL (ref 31.5–35.7)
MCV RBC AUTO: 88 FL (ref 79–97)
PLATELET # BLD AUTO: 168 10*3/MM3 (ref 140–450)
PMV BLD AUTO: 11 FL (ref 6–12)
POTASSIUM SERPL-SCNC: 4.8 MMOL/L (ref 3.5–5.2)
RBC # BLD AUTO: 4.57 10*6/MM3 (ref 4.14–5.8)
SODIUM SERPL-SCNC: 137 MMOL/L (ref 136–145)
WBC # BLD AUTO: 7.66 10*3/MM3 (ref 3.4–10.8)

## 2020-10-30 PROCEDURE — 32608 THORACOSCOPY W/BX NODULE: CPT | Performed by: THORACIC SURGERY (CARDIOTHORACIC VASCULAR SURGERY)

## 2020-10-30 PROCEDURE — 25010000002 FENTANYL CITRATE (PF) 250 MCG/5ML SOLUTION: Performed by: NURSE ANESTHETIST, CERTIFIED REGISTERED

## 2020-10-30 PROCEDURE — 88307 TISSUE EXAM BY PATHOLOGIST: CPT | Performed by: SURGERY

## 2020-10-30 PROCEDURE — C1729 CATH, DRAINAGE: HCPCS | Performed by: SURGERY

## 2020-10-30 PROCEDURE — 85027 COMPLETE CBC AUTOMATED: CPT | Performed by: SURGERY

## 2020-10-30 PROCEDURE — C9290 INJ, BUPIVACAINE LIPOSOME: HCPCS | Performed by: SURGERY

## 2020-10-30 PROCEDURE — 88342 IMHCHEM/IMCYTCHM 1ST ANTB: CPT | Performed by: SURGERY

## 2020-10-30 PROCEDURE — 25010000002 CEFAZOLIN PER 500 MG: Performed by: SURGERY

## 2020-10-30 PROCEDURE — 88341 IMHCHEM/IMCYTCHM EA ADD ANTB: CPT | Performed by: SURGERY

## 2020-10-30 PROCEDURE — 81200 ASPA GENE: CPT

## 2020-10-30 PROCEDURE — 25010000002 DEXAMETHASONE PER 1 MG: Performed by: NURSE ANESTHETIST, CERTIFIED REGISTERED

## 2020-10-30 PROCEDURE — 81235 EGFR GENE COM VARIANTS: CPT

## 2020-10-30 PROCEDURE — 25010000002 PROPOFOL 10 MG/ML EMULSION: Performed by: NURSE ANESTHETIST, CERTIFIED REGISTERED

## 2020-10-30 PROCEDURE — 25010000002 KETOROLAC TROMETHAMINE PER 15 MG: Performed by: NURSE ANESTHETIST, CERTIFIED REGISTERED

## 2020-10-30 PROCEDURE — 88360 TUMOR IMMUNOHISTOCHEM/MANUAL: CPT

## 2020-10-30 PROCEDURE — 88331 PATH CONSLTJ SURG 1 BLK 1SPC: CPT | Performed by: PATHOLOGY

## 2020-10-30 PROCEDURE — 25010000002 DEXAMETHASONE PER 1 MG: Performed by: ANESTHESIOLOGY

## 2020-10-30 PROCEDURE — 94799 UNLISTED PULMONARY SVC/PX: CPT

## 2020-10-30 PROCEDURE — 88305 TISSUE EXAM BY PATHOLOGIST: CPT | Performed by: SURGERY

## 2020-10-30 PROCEDURE — 88387 MACROSCOPIC XM DSJ&PREP TISS: CPT

## 2020-10-30 PROCEDURE — 88342 IMHCHEM/IMCYTCHM 1ST ANTB: CPT

## 2020-10-30 PROCEDURE — 0BJ08ZZ INSPECTION OF TRACHEOBRONCHIAL TREE, VIA NATURAL OR ARTIFICIAL OPENING ENDOSCOPIC: ICD-10-PCS | Performed by: SURGERY

## 2020-10-30 PROCEDURE — 25010000002 HEPARIN (PORCINE) PER 1000 UNITS: Performed by: NURSE PRACTITIONER

## 2020-10-30 PROCEDURE — 31622 DX BRONCHOSCOPE/WASH: CPT | Performed by: THORACIC SURGERY (CARDIOTHORACIC VASCULAR SURGERY)

## 2020-10-30 PROCEDURE — 71045 X-RAY EXAM CHEST 1 VIEW: CPT

## 2020-10-30 PROCEDURE — 88377 M/PHMTRC ALYS ISHQUANT/SEMIQ: CPT

## 2020-10-30 PROCEDURE — 25010000002 ONDANSETRON PER 1 MG: Performed by: NURSE ANESTHETIST, CERTIFIED REGISTERED

## 2020-10-30 PROCEDURE — 8E0W4CZ ROBOTIC ASSISTED PROCEDURE OF TRUNK REGION, PERCUTANEOUS ENDOSCOPIC APPROACH: ICD-10-PCS | Performed by: SURGERY

## 2020-10-30 PROCEDURE — 0BBF4ZZ EXCISION OF RIGHT LOWER LUNG LOBE, PERCUTANEOUS ENDOSCOPIC APPROACH: ICD-10-PCS | Performed by: SURGERY

## 2020-10-30 PROCEDURE — 25010000003 BUPIVACAINE LIPOSOME 1.3 % SUSPENSION 20 ML VIAL: Performed by: SURGERY

## 2020-10-30 PROCEDURE — 94640 AIRWAY INHALATION TREATMENT: CPT

## 2020-10-30 PROCEDURE — 25010000002 MIDAZOLAM PER 1 MG: Performed by: ANESTHESIOLOGY

## 2020-10-30 PROCEDURE — 80048 BASIC METABOLIC PNL TOTAL CA: CPT | Performed by: SURGERY

## 2020-10-30 PROCEDURE — 0BBN4ZX EXCISION OF RIGHT PLEURA, PERCUTANEOUS ENDOSCOPIC APPROACH, DIAGNOSTIC: ICD-10-PCS | Performed by: SURGERY

## 2020-10-30 PROCEDURE — 0W9900Z DRAINAGE OF RIGHT PLEURAL CAVITY WITH DRAINAGE DEVICE, OPEN APPROACH: ICD-10-PCS | Performed by: SURGERY

## 2020-10-30 PROCEDURE — 25010000002 SUCCINYLCHOLINE PER 20 MG: Performed by: NURSE ANESTHETIST, CERTIFIED REGISTERED

## 2020-10-30 DEVICE — STAPLER 60 RELOAD BLUE
Type: IMPLANTABLE DEVICE | Status: FUNCTIONAL
Brand: SUREFORM

## 2020-10-30 DEVICE — CLIP LIGAT VASC HORIZON TI SM/WD RD 6CT: Type: IMPLANTABLE DEVICE | Status: FUNCTIONAL

## 2020-10-30 DEVICE — STAPLER 60 RELOAD BLACK
Type: IMPLANTABLE DEVICE | Status: FUNCTIONAL
Brand: SUREFORM

## 2020-10-30 RX ORDER — OXYCODONE AND ACETAMINOPHEN 10; 325 MG/1; MG/1
1 TABLET ORAL ONCE AS NEEDED
Status: DISCONTINUED | OUTPATIENT
Start: 2020-10-30 | End: 2020-10-30 | Stop reason: HOSPADM

## 2020-10-30 RX ORDER — SODIUM CHLORIDE, SODIUM LACTATE, POTASSIUM CHLORIDE, CALCIUM CHLORIDE 600; 310; 30; 20 MG/100ML; MG/100ML; MG/100ML; MG/100ML
100 INJECTION, SOLUTION INTRAVENOUS CONTINUOUS
Status: DISCONTINUED | OUTPATIENT
Start: 2020-10-30 | End: 2020-10-30 | Stop reason: HOSPADM

## 2020-10-30 RX ORDER — HEPARIN SODIUM 5000 [USP'U]/ML
5000 INJECTION, SOLUTION INTRAVENOUS; SUBCUTANEOUS ONCE
Status: COMPLETED | OUTPATIENT
Start: 2020-10-30 | End: 2020-10-30

## 2020-10-30 RX ORDER — ACETYLCYSTEINE 200 MG/ML
3 SOLUTION ORAL; RESPIRATORY (INHALATION)
Status: DISPENSED | OUTPATIENT
Start: 2020-10-30 | End: 2020-11-01

## 2020-10-30 RX ORDER — POLYETHYLENE GLYCOL 3350 17 G/17G
17 POWDER, FOR SOLUTION ORAL DAILY
Status: DISCONTINUED | OUTPATIENT
Start: 2020-10-30 | End: 2020-11-02 | Stop reason: HOSPADM

## 2020-10-30 RX ORDER — PROPOFOL 10 MG/ML
VIAL (ML) INTRAVENOUS AS NEEDED
Status: DISCONTINUED | OUTPATIENT
Start: 2020-10-30 | End: 2020-10-30 | Stop reason: SURG

## 2020-10-30 RX ORDER — SUCCINYLCHOLINE CHLORIDE 20 MG/ML
INJECTION INTRAMUSCULAR; INTRAVENOUS AS NEEDED
Status: DISCONTINUED | OUTPATIENT
Start: 2020-10-30 | End: 2020-10-30 | Stop reason: SURG

## 2020-10-30 RX ORDER — FENTANYL CITRATE 50 UG/ML
INJECTION, SOLUTION INTRAMUSCULAR; INTRAVENOUS AS NEEDED
Status: DISCONTINUED | OUTPATIENT
Start: 2020-10-30 | End: 2020-10-30 | Stop reason: SURG

## 2020-10-30 RX ORDER — BUPIVACAINE HCL/0.9 % NACL/PF 0.1 %
2 PLASTIC BAG, INJECTION (ML) EPIDURAL EVERY 8 HOURS
Status: COMPLETED | OUTPATIENT
Start: 2020-10-30 | End: 2020-10-31

## 2020-10-30 RX ORDER — SODIUM CHLORIDE 0.9 % (FLUSH) 0.9 %
3 SYRINGE (ML) INJECTION AS NEEDED
Status: DISCONTINUED | OUTPATIENT
Start: 2020-10-30 | End: 2020-10-30 | Stop reason: HOSPADM

## 2020-10-30 RX ORDER — SODIUM CHLORIDE 0.9 % (FLUSH) 0.9 %
10 SYRINGE (ML) INJECTION AS NEEDED
Status: DISCONTINUED | OUTPATIENT
Start: 2020-10-30 | End: 2020-10-30 | Stop reason: HOSPADM

## 2020-10-30 RX ORDER — ONDANSETRON 2 MG/ML
4 INJECTION INTRAMUSCULAR; INTRAVENOUS AS NEEDED
Status: DISCONTINUED | OUTPATIENT
Start: 2020-10-30 | End: 2020-10-30 | Stop reason: HOSPADM

## 2020-10-30 RX ORDER — SODIUM CHLORIDE 9 MG/ML
80 INJECTION, SOLUTION INTRAVENOUS CONTINUOUS
Status: DISCONTINUED | OUTPATIENT
Start: 2020-10-30 | End: 2020-11-02 | Stop reason: HOSPADM

## 2020-10-30 RX ORDER — SODIUM CHLORIDE 0.9 % (FLUSH) 0.9 %
3 SYRINGE (ML) INJECTION EVERY 12 HOURS SCHEDULED
Status: DISCONTINUED | OUTPATIENT
Start: 2020-10-30 | End: 2020-10-30 | Stop reason: HOSPADM

## 2020-10-30 RX ORDER — DOCUSATE SODIUM 100 MG/1
100 CAPSULE, LIQUID FILLED ORAL DAILY
Status: DISCONTINUED | OUTPATIENT
Start: 2020-10-30 | End: 2020-11-02 | Stop reason: HOSPADM

## 2020-10-30 RX ORDER — TAMSULOSIN HYDROCHLORIDE 0.4 MG/1
0.4 CAPSULE ORAL NIGHTLY
Status: DISCONTINUED | OUTPATIENT
Start: 2020-10-30 | End: 2020-11-02 | Stop reason: HOSPADM

## 2020-10-30 RX ORDER — KETOROLAC TROMETHAMINE 30 MG/ML
INJECTION, SOLUTION INTRAMUSCULAR; INTRAVENOUS AS NEEDED
Status: DISCONTINUED | OUTPATIENT
Start: 2020-10-30 | End: 2020-10-30 | Stop reason: SURG

## 2020-10-30 RX ORDER — BUPIVACAINE HCL/0.9 % NACL/PF 0.1 %
2 PLASTIC BAG, INJECTION (ML) EPIDURAL ONCE
Status: COMPLETED | OUTPATIENT
Start: 2020-10-30 | End: 2020-10-30

## 2020-10-30 RX ORDER — IPRATROPIUM BROMIDE AND ALBUTEROL SULFATE 2.5; .5 MG/3ML; MG/3ML
3 SOLUTION RESPIRATORY (INHALATION) EVERY 4 HOURS PRN
Status: DISPENSED | OUTPATIENT
Start: 2020-10-30 | End: 2020-11-01

## 2020-10-30 RX ORDER — OXYCODONE AND ACETAMINOPHEN 7.5; 325 MG/1; MG/1
2 TABLET ORAL ONCE AS NEEDED
Status: DISCONTINUED | OUTPATIENT
Start: 2020-10-30 | End: 2020-10-30 | Stop reason: HOSPADM

## 2020-10-30 RX ORDER — MORPHINE SULFATE 2 MG/ML
2 INJECTION, SOLUTION INTRAMUSCULAR; INTRAVENOUS
Status: DISCONTINUED | OUTPATIENT
Start: 2020-10-30 | End: 2020-10-30 | Stop reason: HOSPADM

## 2020-10-30 RX ORDER — MAGNESIUM HYDROXIDE 1200 MG/15ML
LIQUID ORAL AS NEEDED
Status: DISCONTINUED | OUTPATIENT
Start: 2020-10-30 | End: 2020-10-30 | Stop reason: HOSPADM

## 2020-10-30 RX ORDER — LABETALOL HYDROCHLORIDE 5 MG/ML
5 INJECTION, SOLUTION INTRAVENOUS
Status: DISCONTINUED | OUTPATIENT
Start: 2020-10-30 | End: 2020-10-30 | Stop reason: HOSPADM

## 2020-10-30 RX ORDER — LIDOCAINE HYDROCHLORIDE 10 MG/ML
0.5 INJECTION, SOLUTION EPIDURAL; INFILTRATION; INTRACAUDAL; PERINEURAL ONCE AS NEEDED
Status: DISCONTINUED | OUTPATIENT
Start: 2020-10-30 | End: 2020-10-30 | Stop reason: HOSPADM

## 2020-10-30 RX ORDER — DEXAMETHASONE SODIUM PHOSPHATE 4 MG/ML
4 INJECTION, SOLUTION INTRA-ARTICULAR; INTRALESIONAL; INTRAMUSCULAR; INTRAVENOUS; SOFT TISSUE ONCE AS NEEDED
Status: COMPLETED | OUTPATIENT
Start: 2020-10-30 | End: 2020-10-30

## 2020-10-30 RX ORDER — ALBUTEROL SULFATE 1.25 MG/3ML
1.25 SOLUTION RESPIRATORY (INHALATION)
Status: DISCONTINUED | OUTPATIENT
Start: 2020-10-30 | End: 2020-10-30 | Stop reason: HOSPADM

## 2020-10-30 RX ORDER — ROCURONIUM BROMIDE 10 MG/ML
INJECTION, SOLUTION INTRAVENOUS AS NEEDED
Status: DISCONTINUED | OUTPATIENT
Start: 2020-10-30 | End: 2020-10-30 | Stop reason: SURG

## 2020-10-30 RX ORDER — ACETAMINOPHEN 500 MG
1000 TABLET ORAL ONCE
Status: COMPLETED | OUTPATIENT
Start: 2020-10-30 | End: 2020-10-30

## 2020-10-30 RX ORDER — SODIUM CHLORIDE, SODIUM LACTATE, POTASSIUM CHLORIDE, CALCIUM CHLORIDE 600; 310; 30; 20 MG/100ML; MG/100ML; MG/100ML; MG/100ML
1000 INJECTION, SOLUTION INTRAVENOUS CONTINUOUS
Status: DISCONTINUED | OUTPATIENT
Start: 2020-10-30 | End: 2020-10-30 | Stop reason: HOSPADM

## 2020-10-30 RX ORDER — SODIUM CHLORIDE 0.9 % (FLUSH) 0.9 %
3-10 SYRINGE (ML) INJECTION AS NEEDED
Status: DISCONTINUED | OUTPATIENT
Start: 2020-10-30 | End: 2020-10-30 | Stop reason: HOSPADM

## 2020-10-30 RX ORDER — LIDOCAINE HYDROCHLORIDE 40 MG/ML
SOLUTION TOPICAL AS NEEDED
Status: DISCONTINUED | OUTPATIENT
Start: 2020-10-30 | End: 2020-10-30 | Stop reason: SURG

## 2020-10-30 RX ORDER — FENTANYL CITRATE 50 UG/ML
25 INJECTION, SOLUTION INTRAMUSCULAR; INTRAVENOUS
Status: DISCONTINUED | OUTPATIENT
Start: 2020-10-30 | End: 2020-10-30 | Stop reason: HOSPADM

## 2020-10-30 RX ORDER — BISACODYL 10 MG
10 SUPPOSITORY, RECTAL RECTAL DAILY PRN
Status: DISCONTINUED | OUTPATIENT
Start: 2020-10-30 | End: 2020-11-02 | Stop reason: HOSPADM

## 2020-10-30 RX ORDER — OXYCODONE HYDROCHLORIDE AND ACETAMINOPHEN 5; 325 MG/1; MG/1
1 TABLET ORAL
Status: DISCONTINUED | OUTPATIENT
Start: 2020-10-30 | End: 2020-11-02 | Stop reason: HOSPADM

## 2020-10-30 RX ORDER — DEXAMETHASONE SODIUM PHOSPHATE 4 MG/ML
INJECTION, SOLUTION INTRA-ARTICULAR; INTRALESIONAL; INTRAMUSCULAR; INTRAVENOUS; SOFT TISSUE AS NEEDED
Status: DISCONTINUED | OUTPATIENT
Start: 2020-10-30 | End: 2020-10-30 | Stop reason: SURG

## 2020-10-30 RX ORDER — MIDAZOLAM HYDROCHLORIDE 1 MG/ML
2 INJECTION INTRAMUSCULAR; INTRAVENOUS
Status: DISCONTINUED | OUTPATIENT
Start: 2020-10-30 | End: 2020-10-30 | Stop reason: HOSPADM

## 2020-10-30 RX ORDER — FLUMAZENIL 0.1 MG/ML
0.2 INJECTION INTRAVENOUS AS NEEDED
Status: DISCONTINUED | OUTPATIENT
Start: 2020-10-30 | End: 2020-10-30 | Stop reason: HOSPADM

## 2020-10-30 RX ORDER — ONDANSETRON 2 MG/ML
4 INJECTION INTRAMUSCULAR; INTRAVENOUS EVERY 6 HOURS PRN
Status: DISCONTINUED | OUTPATIENT
Start: 2020-10-30 | End: 2020-11-02 | Stop reason: HOSPADM

## 2020-10-30 RX ORDER — ONDANSETRON 2 MG/ML
INJECTION INTRAMUSCULAR; INTRAVENOUS AS NEEDED
Status: DISCONTINUED | OUTPATIENT
Start: 2020-10-30 | End: 2020-10-30 | Stop reason: SURG

## 2020-10-30 RX ORDER — ONDANSETRON 4 MG/1
4 TABLET, FILM COATED ORAL EVERY 6 HOURS PRN
Status: DISCONTINUED | OUTPATIENT
Start: 2020-10-30 | End: 2020-11-02 | Stop reason: HOSPADM

## 2020-10-30 RX ORDER — ACETAMINOPHEN 325 MG/1
650 TABLET ORAL EVERY 4 HOURS PRN
Status: DISCONTINUED | OUTPATIENT
Start: 2020-10-30 | End: 2020-11-02 | Stop reason: HOSPADM

## 2020-10-30 RX ORDER — NEOSTIGMINE METHYLSULFATE 5 MG/5 ML
SYRINGE (ML) INTRAVENOUS AS NEEDED
Status: DISCONTINUED | OUTPATIENT
Start: 2020-10-30 | End: 2020-10-30 | Stop reason: SURG

## 2020-10-30 RX ORDER — NALOXONE HCL 0.4 MG/ML
0.04 VIAL (ML) INJECTION AS NEEDED
Status: DISCONTINUED | OUTPATIENT
Start: 2020-10-30 | End: 2020-10-30 | Stop reason: HOSPADM

## 2020-10-30 RX ADMIN — ROCURONIUM BROMIDE 10 MG: 10 INJECTION INTRAVENOUS at 08:19

## 2020-10-30 RX ADMIN — KETOROLAC TROMETHAMINE 15 MG: 30 INJECTION, SOLUTION INTRAMUSCULAR at 10:44

## 2020-10-30 RX ADMIN — OXYCODONE HYDROCHLORIDE AND ACETAMINOPHEN 1 TABLET: 5; 325 TABLET ORAL at 16:46

## 2020-10-30 RX ADMIN — ACETAMINOPHEN 1000 MG: 500 TABLET, FILM COATED ORAL at 07:46

## 2020-10-30 RX ADMIN — ROCURONIUM BROMIDE 40 MG: 10 INJECTION INTRAVENOUS at 08:45

## 2020-10-30 RX ADMIN — SUCCINYLCHOLINE CHLORIDE 180 MG: 20 INJECTION, SOLUTION INTRAMUSCULAR; INTRAVENOUS at 08:19

## 2020-10-30 RX ADMIN — ROCURONIUM BROMIDE 20 MG: 10 INJECTION INTRAVENOUS at 09:50

## 2020-10-30 RX ADMIN — SODIUM CHLORIDE, POTASSIUM CHLORIDE, SODIUM LACTATE AND CALCIUM CHLORIDE: 600; 310; 30; 20 INJECTION, SOLUTION INTRAVENOUS at 08:53

## 2020-10-30 RX ADMIN — DEXAMETHASONE SODIUM PHOSPHATE 12 MG: 4 INJECTION, SOLUTION INTRAMUSCULAR; INTRAVENOUS at 10:42

## 2020-10-30 RX ADMIN — DOCUSATE SODIUM 100 MG: 100 CAPSULE ORAL at 13:10

## 2020-10-30 RX ADMIN — PROPOFOL 150 MG: 10 INJECTION, EMULSION INTRAVENOUS at 08:19

## 2020-10-30 RX ADMIN — FENTANYL CITRATE 100 MCG: 50 INJECTION INTRAMUSCULAR; INTRAVENOUS at 08:19

## 2020-10-30 RX ADMIN — CEFAZOLIN SODIUM 2 G: 10 INJECTION, POWDER, FOR SOLUTION INTRAVENOUS at 23:56

## 2020-10-30 RX ADMIN — OXYCODONE HYDROCHLORIDE AND ACETAMINOPHEN 1 TABLET: 5; 325 TABLET ORAL at 20:01

## 2020-10-30 RX ADMIN — Medication 4 MG: at 10:56

## 2020-10-30 RX ADMIN — MIDAZOLAM HYDROCHLORIDE 2 MG: 2 INJECTION, SOLUTION INTRAMUSCULAR; INTRAVENOUS at 08:06

## 2020-10-30 RX ADMIN — SODIUM CHLORIDE, POTASSIUM CHLORIDE, SODIUM LACTATE AND CALCIUM CHLORIDE 1000 ML: 600; 310; 30; 20 INJECTION, SOLUTION INTRAVENOUS at 06:40

## 2020-10-30 RX ADMIN — TAMSULOSIN HYDROCHLORIDE 0.4 MG: 0.4 CAPSULE ORAL at 20:01

## 2020-10-30 RX ADMIN — FENTANYL CITRATE 150 MCG: 50 INJECTION INTRAMUSCULAR; INTRAVENOUS at 09:30

## 2020-10-30 RX ADMIN — DEXAMETHASONE SODIUM PHOSPHATE 4 MG: 4 INJECTION, SOLUTION INTRAMUSCULAR; INTRAVENOUS at 07:46

## 2020-10-30 RX ADMIN — OXYCODONE HYDROCHLORIDE AND ACETAMINOPHEN 1 TABLET: 5; 325 TABLET ORAL at 13:10

## 2020-10-30 RX ADMIN — GLYCOPYRROLATE 0.3 MG: 0.2 INJECTION, SOLUTION INTRAMUSCULAR; INTRAVENOUS at 10:56

## 2020-10-30 RX ADMIN — HEPARIN SODIUM 5000 UNITS: 5000 INJECTION, SOLUTION INTRAVENOUS; SUBCUTANEOUS at 07:46

## 2020-10-30 RX ADMIN — LIDOCAINE HYDROCHLORIDE 100 MG: 20 INJECTION, SOLUTION INTRAVENOUS at 08:19

## 2020-10-30 RX ADMIN — LIDOCAINE HYDROCHLORIDE 1 EACH: 40 SOLUTION TOPICAL at 08:19

## 2020-10-30 RX ADMIN — Medication 2 G: at 08:45

## 2020-10-30 RX ADMIN — IPRATROPIUM BROMIDE AND ALBUTEROL SULFATE 3 ML: 2.5; .5 SOLUTION RESPIRATORY (INHALATION) at 15:35

## 2020-10-30 RX ADMIN — ONDANSETRON HYDROCHLORIDE 4 MG: 2 SOLUTION INTRAMUSCULAR; INTRAVENOUS at 10:42

## 2020-10-30 RX ADMIN — CEFAZOLIN SODIUM 2 G: 10 INJECTION, POWDER, FOR SOLUTION INTRAVENOUS at 15:50

## 2020-10-30 RX ADMIN — ACETYLCYSTEINE: 200 SOLUTION ORAL; RESPIRATORY (INHALATION) at 15:35

## 2020-10-30 RX ADMIN — LOSARTAN POTASSIUM: 50 TABLET, FILM COATED ORAL at 13:10

## 2020-10-30 RX ADMIN — SODIUM CHLORIDE, POTASSIUM CHLORIDE, SODIUM LACTATE AND CALCIUM CHLORIDE: 600; 310; 30; 20 INJECTION, SOLUTION INTRAVENOUS at 10:47

## 2020-10-30 NOTE — ANESTHESIA PROCEDURE NOTES
Airway  Urgency: elective    Date/Time: 10/30/2020 8:30 AM  Airway not difficult    General Information and Staff    Patient location during procedure: OR  CRNA: Fredis Phillips CRNA    Indications and Patient Condition  Indications for airway management: airway protection    Preoxygenated: yes  MILS maintained throughout  Mask difficulty assessment: 1 - vent by mask    Final Airway Details  Final airway type: endotracheal airway      Successful airway: EBT - double lumen left    Successful intubation technique: video laryngoscopy  Facilitating devices/methods: Bougie  Blade: Glidescope  Blade size: 3  EBT DL size (fr): 41  Cormack-Lehane Classification: grade IIa - partial view of glottis  Measured from: lips  ETT/EBT  to lips (cm): 31  Number of attempts at approach: 1  Assessment: lips, teeth, and gum same as pre-op and atraumatic intubation    Additional Comments  With history of diff intubation at different facility a Glydscope intubation was elected. The Cords were easily viewed and a LTA was inserted under Glydscope Guidance . Problem fititng Double lumen tube in with glydscope. A blue stick was used and threaded the double lumen tube over it without difficulty.

## 2020-10-30 NOTE — ANESTHESIA PREPROCEDURE EVALUATION
Anesthesia Evaluation     Patient summary reviewed   history of anesthetic complications (Glidescope IIb at [facility] after DL attempt unsuccessful): difficult airway  NPO Solid Status: > 8 hours  NPO Liquid Status: > 8 hours           Airway   Mallampati: III  TM distance: >3 FB  Neck ROM: full  Dental - normal exam     Pulmonary    (+) a smoker (quit 1996) Former, lung cancer (concern for metastatic vs. primary lesion),   (-) COPD, asthma, sleep apnea    ROS comment: Right lung nodule  Cardiovascular   Exercise tolerance: good (4-7 METS)    ECG reviewed    (+) hypertension,   (-) past MI, CAD, dysrhythmias, angina, cardiac stents, hyperlipidemia      Neuro/Psych  (-) seizures, TIA, CVA  GI/Hepatic/Renal/Endo    (+) obesity,     (-) liver disease, no renal disease, diabetes    Musculoskeletal     Abdominal    Substance History      OB/GYN          Other      history of cancer (prostate) active        Phys Exam Other: Recessed chin              Anesthesia Plan    ASA 3     general   (Arterial line  SLT first, then KELLI)  intravenous induction     Anesthetic plan, all risks, benefits, and alternatives have been provided, discussed and informed consent has been obtained with: patient and child.

## 2020-10-30 NOTE — ANESTHESIA POSTPROCEDURE EVALUATION
Patient: Delgado Desir    Procedure Summary     Date: 10/30/20 Room / Location: [facility] OR  /  [facility] OR    Anesthesia Start: 0813 Anesthesia Stop: 1111    Procedure: MEDIASTINOSCOPY, RIGHT THORACOSCOPY POSSIBLE WEDGE RESECTION WITH DAVINCI ROBOT (Right Chest) Diagnosis:       Abnormal PET of right lung      (Abnormal PET of right lung [R94.2])    Surgeon: Nick Flores MD Provider: Fredis Phillips CRNA    Anesthesia Type: general ASA Status: 3          Anesthesia Type: general    Vitals  Vitals Value Taken Time   /82 10/30/20 1150   Temp 97.7 °F (36.5 °C) 10/30/20 1150   Pulse 73 10/30/20 1150   Resp 14 10/30/20 1150   SpO2 94 % 10/30/20 1150           Post Anesthesia Care and Evaluation    Patient location during evaluation: PACU  Patient participation: complete - patient participated  Level of consciousness: awake and alert  Pain management: adequate  Airway patency: patent  Anesthetic complications: No anesthetic complications    Cardiovascular status: acceptable  Respiratory status: acceptable  Hydration status: acceptable    Comments: Blood pressure 154/58, pulse 74, temperature 98 °F (36.7 °C), temperature source Oral, resp. rate 14, SpO2 96 %.    Pt discharged from PACU based on rodolfo score >8

## 2020-10-31 ENCOUNTER — APPOINTMENT (OUTPATIENT)
Dept: GENERAL RADIOLOGY | Facility: HOSPITAL | Age: 70
End: 2020-10-31

## 2020-10-31 LAB
ANION GAP SERPL CALCULATED.3IONS-SCNC: 10 MMOL/L (ref 5–15)
BUN SERPL-MCNC: 15 MG/DL (ref 8–23)
BUN/CREAT SERPL: 14.6 (ref 7–25)
CALCIUM SPEC-SCNC: 9.2 MG/DL (ref 8.6–10.5)
CHLORIDE SERPL-SCNC: 106 MMOL/L (ref 98–107)
CO2 SERPL-SCNC: 23 MMOL/L (ref 22–29)
CREAT SERPL-MCNC: 1.03 MG/DL (ref 0.76–1.27)
DEPRECATED RDW RBC AUTO: 41.1 FL (ref 37–54)
ERYTHROCYTE [DISTWIDTH] IN BLOOD BY AUTOMATED COUNT: 13.2 % (ref 12.3–15.4)
GFR SERPL CREATININE-BSD FRML MDRD: 72 ML/MIN/1.73
GLUCOSE SERPL-MCNC: 135 MG/DL (ref 65–99)
HCT VFR BLD AUTO: 38.7 % (ref 37.5–51)
HGB BLD-MCNC: 13.4 G/DL (ref 13–17.7)
MCH RBC QN AUTO: 29.9 PG (ref 26.6–33)
MCHC RBC AUTO-ENTMCNC: 34.6 G/DL (ref 31.5–35.7)
MCV RBC AUTO: 86.4 FL (ref 79–97)
PLATELET # BLD AUTO: 174 10*3/MM3 (ref 140–450)
PMV BLD AUTO: 11 FL (ref 6–12)
POTASSIUM SERPL-SCNC: 4.1 MMOL/L (ref 3.5–5.2)
RBC # BLD AUTO: 4.48 10*6/MM3 (ref 4.14–5.8)
SODIUM SERPL-SCNC: 139 MMOL/L (ref 136–145)
WBC # BLD AUTO: 16.12 10*3/MM3 (ref 3.4–10.8)

## 2020-10-31 PROCEDURE — 71046 X-RAY EXAM CHEST 2 VIEWS: CPT

## 2020-10-31 PROCEDURE — 94799 UNLISTED PULMONARY SVC/PX: CPT

## 2020-10-31 PROCEDURE — 85027 COMPLETE CBC AUTOMATED: CPT | Performed by: SURGERY

## 2020-10-31 PROCEDURE — 99231 SBSQ HOSP IP/OBS SF/LOW 25: CPT | Performed by: THORACIC SURGERY (CARDIOTHORACIC VASCULAR SURGERY)

## 2020-10-31 PROCEDURE — 80048 BASIC METABOLIC PNL TOTAL CA: CPT | Performed by: SURGERY

## 2020-10-31 PROCEDURE — 25010000002 ENOXAPARIN PER 10 MG: Performed by: SURGERY

## 2020-10-31 PROCEDURE — 71045 X-RAY EXAM CHEST 1 VIEW: CPT

## 2020-10-31 RX ADMIN — DOCUSATE SODIUM 100 MG: 100 CAPSULE ORAL at 09:25

## 2020-10-31 RX ADMIN — LOSARTAN POTASSIUM: 50 TABLET, FILM COATED ORAL at 09:25

## 2020-10-31 RX ADMIN — ENOXAPARIN SODIUM 40 MG: 40 INJECTION SUBCUTANEOUS at 09:28

## 2020-10-31 RX ADMIN — OXYCODONE HYDROCHLORIDE AND ACETAMINOPHEN 1 TABLET: 5; 325 TABLET ORAL at 09:28

## 2020-10-31 RX ADMIN — IPRATROPIUM BROMIDE AND ALBUTEROL SULFATE 3 ML: 2.5; .5 SOLUTION RESPIRATORY (INHALATION) at 14:23

## 2020-10-31 RX ADMIN — ACETYLCYSTEINE 4 ML: 200 SOLUTION ORAL; RESPIRATORY (INHALATION) at 14:23

## 2020-10-31 RX ADMIN — TAMSULOSIN HYDROCHLORIDE 0.4 MG: 0.4 CAPSULE ORAL at 20:26

## 2020-10-31 RX ADMIN — ACETYLCYSTEINE 4 ML: 200 SOLUTION ORAL; RESPIRATORY (INHALATION) at 07:11

## 2020-10-31 RX ADMIN — OXYCODONE HYDROCHLORIDE AND ACETAMINOPHEN 1 TABLET: 5; 325 TABLET ORAL at 20:28

## 2020-10-31 RX ADMIN — IPRATROPIUM BROMIDE AND ALBUTEROL SULFATE 3 ML: 2.5; .5 SOLUTION RESPIRATORY (INHALATION) at 07:11

## 2020-11-01 ENCOUNTER — APPOINTMENT (OUTPATIENT)
Dept: GENERAL RADIOLOGY | Facility: HOSPITAL | Age: 70
End: 2020-11-01

## 2020-11-01 PROCEDURE — 71045 X-RAY EXAM CHEST 1 VIEW: CPT

## 2020-11-01 PROCEDURE — 99231 SBSQ HOSP IP/OBS SF/LOW 25: CPT | Performed by: THORACIC SURGERY (CARDIOTHORACIC VASCULAR SURGERY)

## 2020-11-01 PROCEDURE — 94799 UNLISTED PULMONARY SVC/PX: CPT

## 2020-11-01 PROCEDURE — 25010000002 ENOXAPARIN PER 10 MG: Performed by: SURGERY

## 2020-11-01 PROCEDURE — 71046 X-RAY EXAM CHEST 2 VIEWS: CPT

## 2020-11-01 RX ADMIN — LOSARTAN POTASSIUM: 50 TABLET, FILM COATED ORAL at 09:21

## 2020-11-01 RX ADMIN — DOCUSATE SODIUM 100 MG: 100 CAPSULE ORAL at 09:21

## 2020-11-01 RX ADMIN — POLYETHYLENE GLYCOL 3350 17 G: 17 POWDER, FOR SOLUTION ORAL at 09:21

## 2020-11-01 RX ADMIN — IPRATROPIUM BROMIDE AND ALBUTEROL SULFATE 3 ML: 2.5; .5 SOLUTION RESPIRATORY (INHALATION) at 06:51

## 2020-11-01 RX ADMIN — ACETYLCYSTEINE 1.5 ML: 200 SOLUTION ORAL; RESPIRATORY (INHALATION) at 06:51

## 2020-11-01 RX ADMIN — TAMSULOSIN HYDROCHLORIDE 0.4 MG: 0.4 CAPSULE ORAL at 20:52

## 2020-11-01 RX ADMIN — OXYCODONE HYDROCHLORIDE AND ACETAMINOPHEN 1 TABLET: 5; 325 TABLET ORAL at 19:33

## 2020-11-01 RX ADMIN — ENOXAPARIN SODIUM 40 MG: 40 INJECTION SUBCUTANEOUS at 09:22

## 2020-11-02 ENCOUNTER — PREP FOR SURGERY (OUTPATIENT)
Dept: OTHER | Facility: HOSPITAL | Age: 70
End: 2020-11-02

## 2020-11-02 ENCOUNTER — APPOINTMENT (OUTPATIENT)
Dept: GENERAL RADIOLOGY | Facility: HOSPITAL | Age: 70
End: 2020-11-02

## 2020-11-02 VITALS
RESPIRATION RATE: 18 BRPM | BODY MASS INDEX: 34.1 KG/M2 | OXYGEN SATURATION: 95 % | WEIGHT: 257.28 LBS | SYSTOLIC BLOOD PRESSURE: 106 MMHG | HEIGHT: 73 IN | TEMPERATURE: 98.1 F | HEART RATE: 82 BPM | DIASTOLIC BLOOD PRESSURE: 48 MMHG

## 2020-11-02 DIAGNOSIS — R91.8 LUNG MASS: Primary | ICD-10-CM

## 2020-11-02 LAB
DEPRECATED RDW RBC AUTO: 44.4 FL (ref 37–54)
ERYTHROCYTE [DISTWIDTH] IN BLOOD BY AUTOMATED COUNT: 13.8 % (ref 12.3–15.4)
HCT VFR BLD AUTO: 38.4 % (ref 37.5–51)
HGB BLD-MCNC: 13.2 G/DL (ref 13–17.7)
MCH RBC QN AUTO: 29.9 PG (ref 26.6–33)
MCHC RBC AUTO-ENTMCNC: 34.4 G/DL (ref 31.5–35.7)
MCV RBC AUTO: 86.9 FL (ref 79–97)
PLATELET # BLD AUTO: 167 10*3/MM3 (ref 140–450)
PMV BLD AUTO: 10.7 FL (ref 6–12)
RBC # BLD AUTO: 4.42 10*6/MM3 (ref 4.14–5.8)
WBC # BLD AUTO: 10.49 10*3/MM3 (ref 3.4–10.8)

## 2020-11-02 PROCEDURE — 85027 COMPLETE CBC AUTOMATED: CPT | Performed by: NURSE PRACTITIONER

## 2020-11-02 PROCEDURE — 25010000002 ENOXAPARIN PER 10 MG: Performed by: SURGERY

## 2020-11-02 PROCEDURE — 99238 HOSP IP/OBS DSCHRG MGMT 30/<: CPT | Performed by: NURSE PRACTITIONER

## 2020-11-02 PROCEDURE — 71046 X-RAY EXAM CHEST 2 VIEWS: CPT

## 2020-11-02 RX ORDER — HYDROCODONE BITARTRATE AND ACETAMINOPHEN 5; 325 MG/1; MG/1
1 TABLET ORAL EVERY 6 HOURS PRN
Qty: 15 TABLET | Refills: 0 | Status: SHIPPED | OUTPATIENT
Start: 2020-11-02 | End: 2020-11-05

## 2020-11-02 RX ORDER — ALBUTEROL SULFATE 1.25 MG/3ML
1.25 SOLUTION RESPIRATORY (INHALATION)
Status: CANCELLED | OUTPATIENT
Start: 2020-11-02

## 2020-11-02 RX ORDER — HEPARIN SODIUM 5000 [USP'U]/ML
5000 INJECTION, SOLUTION INTRAVENOUS; SUBCUTANEOUS ONCE
Status: CANCELLED | OUTPATIENT
Start: 2020-11-10 | End: 2020-11-10

## 2020-11-02 RX ORDER — CEFAZOLIN SODIUM 2 G/50ML
2 SOLUTION INTRAVENOUS ONCE
Status: CANCELLED | OUTPATIENT
Start: 2020-11-10

## 2020-11-02 RX ORDER — SODIUM CHLORIDE 0.9 % (FLUSH) 0.9 %
10 SYRINGE (ML) INJECTION AS NEEDED
Status: CANCELLED | OUTPATIENT
Start: 2020-11-02

## 2020-11-02 RX ORDER — SODIUM CHLORIDE 0.9 % (FLUSH) 0.9 %
3 SYRINGE (ML) INJECTION EVERY 12 HOURS SCHEDULED
Status: CANCELLED | OUTPATIENT
Start: 2020-11-02

## 2020-11-02 RX ADMIN — ENOXAPARIN SODIUM 40 MG: 40 INJECTION SUBCUTANEOUS at 08:27

## 2020-11-02 RX ADMIN — LOSARTAN POTASSIUM: 50 TABLET, FILM COATED ORAL at 08:27

## 2020-11-02 RX ADMIN — POLYETHYLENE GLYCOL 3350 17 G: 17 POWDER, FOR SOLUTION ORAL at 08:27

## 2020-11-02 RX ADMIN — DOCUSATE SODIUM 100 MG: 100 CAPSULE ORAL at 08:27

## 2020-11-03 ENCOUNTER — READMISSION MANAGEMENT (OUTPATIENT)
Dept: CALL CENTER | Facility: HOSPITAL | Age: 70
End: 2020-11-03

## 2020-11-03 ENCOUNTER — NURSE TRIAGE (OUTPATIENT)
Dept: CALL CENTER | Facility: HOSPITAL | Age: 70
End: 2020-11-03

## 2020-11-03 DIAGNOSIS — IMO0001 LUNG NODULE < 6CM ON CT: Primary | ICD-10-CM

## 2020-11-03 NOTE — TELEPHONE ENCOUNTER
Reason for Disposition  • Skin tape (e.g., Steri-strips) removal, questions about    Additional Information  • Negative: [1] Major abdominal surgical incision AND [2] wound gaping open AND [3] visible internal organs  • Negative: Sounds like a life-threatening emergency to the triager  • Negative: [1] Bleeding from incision AND [2] won't stop after 10 minutes of direct pressure  • Negative: [1] Widespread rash AND [2] bright red, sunburn-like  • Negative: Severe pain in the incision  • Negative: [1] Incision gaping open AND [2] < 48 hours since wound re-opened  • Negative: [1] Incision gaping open AND [2] length of opening > 2 inches (5 cm)  • Negative: Patient sounds very sick or weak to the triager  • Negative: Sounds like a serious complication to the triager  • Negative: Fever > 100.4 F (38.0 C)  • Negative: [1] Incision looks infected (spreading redness, pain) AND [2] fever > 99.5 F (37.5 C)  • Negative: [1] Incision looks infected (spreading redness, pain) AND [2] large red area (> 2 in. or 5 cm)  • Negative: [1] Incision looks infected (spreading redness, pain) AND [2] face wound  • Negative: [1] Red streak runs from the incision AND [2] longer than 1 inch (2.5 cm)  • Negative: [1] Pus or bad-smelling fluid draining from incision AND [2] no fever  • Negative: [1] Post-op pain AND [2] not controlled with pain medications  • Negative: Dressing soaked with blood or body fluid (e.g., drainage)  • Negative: [1] Raised bruise and [2] size > 2 inches (5 cm) and expanding  • Negative: [1] Caller has URGENT question AND [2] triager unable to answer question  • Negative: [1] INCREASING pain in incision AND [2] > 2 days (48 hours) since surgery  • Negative: [1] Small red area or streak AND [2] no fever  • Negative: [1] Clear or blood-tinged fluid draining from wound AND [2] no fever  • Negative: [1] Incision gaping open AND [2] > 48 hours since wound re-opened AND [3] length of opening > 1/2 inch (12 mm)  •  "Negative: [1] Incision on face gaping open after skin glue AND [2] > 48 hours since wound re-opened AND [3] length of opening > 1/4 inch (6 mm)  • Negative: Suture or staple removal is overdue  • Negative: [1] Suture or staple came out early AND [2] caller wants wound checked  • Negative: [1] Caller has NON-URGENT question AND [2] triager unable to answer question  • Negative: Pimple where a stitch comes through the skin  • Negative: Suture (or staple) came out early  • Negative: Mild bruising near incision site  • Negative: Suture removal date, questions about    Answer Assessment - Initial Assessment Questions  1. SYMPTOM: \"What's the main symptom you're concerned about?\" (e.g., redness, pain, drainage)   Removed dressing for shower and removed steri strips also. Incision is closed.  2. ONSET: \"When did *No Answer*  start?\"      *No Answer*  3. SURGERY: \"What surgery was performed?\"     lobectomy  4. DATE of SURGERY: \"When was surgery performed?\"       *No Answer*  5. INCISION SITE: \"Where is the incision located?\"      Back/chest  6. REDNESS: \"Is there any redness at the incision site?\" If yes, ask: \"How wide across is the redness?\" (Inches, centimeters)       *No Answer*  7. PAIN: \"Is there any pain?\" If so, ask: \"How bad is it?\"  (Scale 1-10; or mild, moderate, severe)      *No Answer*  8. BLEEDING: \"Is there any bleeding?\" If so, ask: \"How much?\" and \"Where?\"      *No Answer*  9. DRAINAGE: \"Is there any drainage from the incision site?\" If yes, ask: \"What color and how much?\" (e.g., red, cloudy, pus; drops, teaspoon)      *No Answer*  10. FEVER: \"Do you have a fever?\" If so, ask: \"What is your temperature, how was it measured, and when did it start?\"        *No Answer*  11. OTHER SYMPTOMS: \"Do you have any other symptoms?\" (e.g., shaking chills, weakness, rash elsewhere on body)        *No Answer*    Protocols used: POST-OP INCISION SYMPTOMS AND QUESTIONS-[facility]      "

## 2020-11-04 ENCOUNTER — TRANSCRIBE ORDERS (OUTPATIENT)
Dept: ADMINISTRATIVE | Facility: HOSPITAL | Age: 70
End: 2020-11-04

## 2020-11-04 DIAGNOSIS — Z11.59 SCREENING FOR VIRAL DISEASE: Primary | ICD-10-CM

## 2020-11-04 NOTE — PROGRESS NOTES
YOB: 1950  Location: [facility]  Location Address: [address] [place] [address],  [address], [address] [place] [address]  Location Phone: [phone]    Chief Complaint   Patient presents with   • Thyroid Problem       History of Present Illness  Delgado Desir is a 70 y.o. male.  Delgado Desir is here for evaluation of ENT complaints. The patient has had problems with thyroid enlargement  The symptoms are not localized to a particular location. The patient has had moderate symptoms. The symptoms have been present for the last year There have been no identified factors that aggravate the symptoms. There have been no factors that have improved the symptoms. Patient has dysphagia and weight gain. He denies fatigue, insomnia, swelling and tenderness of the neck, intolerance to heat and cold, heart palpitation and anxiety. Patient has had recent lung surgery and was found to have adenocarcinoma.     I have personally reviewed the information imported into the chart during this visit.      I have personally reviewed the review of systems.      Result Impression   Marked thyromegaly without a discrete nodule identified.  Diffusely heterogeneous thyroid gland.  Signed by Dr Maulik Garrison on 2019 1:33 PM   Result Narrative   EXAMINATION: US THYROID 2019 1:32 PM  HISTORY: Thyroid cyst  COMPARISON: None  FINDINGS:  Sonographic evaluation of the thyroid gland was performed in the  transverse and longitudinal projections.  The thyroid gland is heterogeneous in appearance and enlarged,  measuring 11.4 x 4.5 x 4.2 cm in size. No definite, discrete nodule is  identified.  The isthmus measures 10 mm in AP diameter and is grossly normal in  appearance.  The left thyroid lobe measures 3.0 x 6.4 x 2.6 cm in size. No discrete  nodule is demonstrated.   Other Result Information   [facility] Incoming Radiology Results From [employer] - 2019 12:35 PM CST  EXAMINATION: US THYROID 2019 1:32 PM  HISTORY: Thyroid  cyst  COMPARISON: None  FINDINGS:  Sonographic evaluation of the thyroid gland was performed in the  transverse and longitudinal projections.  The thyroid gland is heterogeneous in appearance and enlarged,  measuring 11.4 x 4.5 x 4.2 cm in size. No definite, discrete nodule is  identified.  The isthmus measures 10 mm in AP diameter and is grossly normal in  appearance.  The left thyroid lobe measures 3.0 x 6.4 x 2.6 cm in size. No discrete  nodule is demonstrated.  IMPRESSION:  Marked thyromegaly without a discrete nodule identified.  Diffusely heterogeneous thyroid gland.  Signed by Dr Maulik Garrison on 11/21/2019 1:33 PM          Past Medical History:   Diagnosis Date   • Hypertension    • Prostate cancer (CMS/HCC)        Past Surgical History:   Procedure Laterality Date   • CARPAL TUNNEL RELEASE Right    • LUNG BIOPSY     • PROSTATE BIOPSY     • REPLACEMENT TOTAL KNEE Left    • SHOULDER SURGERY Bilateral    • THORACOSCOPY Right 10/30/2020    Procedure: MEDIASTINOSCOPY, RIGHT THORACOSCOPY POSSIBLE WEDGE RESECTION WITH DAVINCI ROBOT;  Surgeon: Nick Florse MD;  Location: [facility];  Service: [facility];  Laterality: Right;       Outpatient Medications Marked as Taking for the 11/5/20 encounter (Office Visit) with Jean Marie Solis MD   Medication Sig Dispense Refill   • acetaminophen (TYLENOL) 500 MG tablet Take 500 mg by mouth Every 6 (Six) Hours As Needed for Mild Pain .     • alfuzosin (UROXATRAL) 10 MG 24 hr tablet Take 10 mg by mouth Daily.     • losartan-hydrochlorothiazide (HYZAAR) 50-12.5 MG per tablet Take 1 tablet by mouth Daily.     • therapeutic multivitamin-minerals (THERAGRAN-M) tablet Take 1 tablet by mouth Daily.         Patient has no known allergies.    Family History   Problem Relation Age of Onset   • Hypertension Mother    • COPD Father        Social History     Socioeconomic History   • Marital status:      Spouse name: Not on file   • Number of children: 2   • Years of education:  Not on file   • Highest education level: Not on file   Occupational History   • Occupation:    Tobacco Use   • Smoking status: Former Smoker     Packs/day: 1.00     Years: 33.00     Pack years: 33.00     Types: Cigarettes     Start date:      Quit date:      Years since quittin.9   • Smokeless tobacco: Never Used   Substance and Sexual Activity   • Alcohol use: Yes     Alcohol/week: 2.0 standard drinks     Types: 2 Shots of liquor per week   • Drug use: Never   • Sexual activity: Defer       Review of Systems   Constitutional: Positive for unexpected weight change.   HENT: Positive for trouble swallowing.    Eyes: Negative.    Respiratory: Negative.    Cardiovascular: Negative.    Gastrointestinal: Negative.    Endocrine: Negative.    Genitourinary: Negative.    Skin: Negative.    Allergic/Immunologic: Negative.    Neurological: Negative.    Hematological: Negative.    Psychiatric/Behavioral: Negative.        Vitals:    20 1440   BP: 123/61   Pulse: 80   Temp: 98.7 °F (37.1 °C)       Body mass index is 34.22 kg/m².    Objective     Physical Exam  Physical Exam  CONSTITUTIONAL: well nourished, alert, oriented, in no acute distress      COMMUNICATION AND VOICE: able to communicate normally, normal voice quality     HEAD: normocephalic, no lesions, atraumatic, no tenderness, no masses      FACE: appearance normal, no lesions, no tenderness, no deformities, facial motion symmetric     SALIVARY GLANDS: parotid glands with no tenderness, no swelling, no masses, submandibular glands with normal size, nontender     EYES: ocular motility normal, eyelids normal, orbits normal, no proptosis, conjunctiva normal , pupils equal, round      EARS:  Hearing: response to conversational voice normal bilaterally   External Ears: auricles without lesions  Otoscopic: tympanic membrane appearance normal, no lesions, no perforation, normal mobility, no fluid     NOSE:  External Nose: structure normal, no  tenderness on palpation, no nasal discharge, no lesions, no evidence of trauma, nostrils patent   Intranasal Exam: nasal mucosa normal, vestibule within normal limits, inferior turbinate normal, nasal septum midline   Nasopharynx:      ORAL:  Lips: upper and lower lips without lesion   Teeth:    Gums: gingivae healthy   Oral Mucosa: oral mucosa normal, no mucosal lesions   Floor of Mouth: Warthin’s duct patent, mucosa normal  Tongue: lingual mucosa normal without lesions, normal tongue mobility   Palate: soft and hard palates with normal mucosa and structure  Oropharynx: oropharyngeal mucosa normal     HYPOPHARYNX:   LARYNX: Indirect laryngoscopy demonstrates the true vocal cords adduct and abduct normally     NECK: neck appearance normal. Possible slight mild left tracheal deviation inferiorly     THYROID: no overt thyromegaly, no tenderness, nodules or mass present on palpation, mild fullness noted in the right thyroid but no obvious mass, no erythema or tenderness.     LYMPH NODES: no lymphadenopathy     CHEST/RESPIRATORY: respiratory effort normaL     CARDIOVASCULAR: extremities without cyanosis or edema       NEUROLOGIC/PSYCHIATRIC: oriented to time, place and person, mood normal, affect appropriate, CN II-XII intact grossly    Assessment/Plan   Diagnoses and all orders for this visit:    1. Thyromegaly (Primary)  -     Cancel: CT Soft Tissue Neck With & Without Contrast    2. Pharyngoesophageal dysphagia  -     Cancel: CT Soft Tissue Neck With & Without Contrast      * Surgery not found *  No orders of the defined types were placed in this encounter.    Return in about 6 weeks (around 12/17/2020).       Patient Instructions   CT scan of the neck with contrast to evaluate thyroid enlargement and possible relationship to compressive symptoms    Risk benefits and options of possible surgical intervention were discussed.  This would obviously be secondary to other surgery related to his lung or prostate, even if  indicated.    Call or return for problems   Follow-up in 4 to 6 weeks    Patient was to undergo lobectomy or lung biopsy prior to thyroidectomy however the following difficulties arose during intubation and consideration in proceeding with thyroidectomy first was undertaken.  The risk benefits and options were discussed with he and his daughter while in the hospital.        The patient has a right lung nodule and had a CT scan guided needle biopsy that was complicated by pneumothorax.  The presented today to have a lobectomy.  However, during the intubation of the double-lumen endotracheal tube, there was a unsuccessful intubation.  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  Otherwise, the patient reports only mild dysphagia and does have some compression type symptoms with raising his right shoulder.  He has not had vocal problems.  He has not had stridor.    TOTAL THYROIDECTOMY: A thyroidectomy was recommended. The risks and benefits were explained including but not limited to bleeding, infection, persistent and/or recurrent disease, risks of the general anesthesia, pain, recurrent laryngeal nerve injury with hoarseness and airway loss, parathyroid injury and hypocalcemia. Operative possibilities including hemithyroidectomy, total thyroidectomy and damien dissections were discussed. Possibilities for delayed need for total thyroidectomy pending pathologic diagnosis were also discussed. Alternatives were discussed. No guarantees were made or implied. Questions were asked appropriately answered.

## 2020-11-05 ENCOUNTER — OFFICE VISIT (OUTPATIENT)
Dept: OTOLARYNGOLOGY | Facility: CLINIC | Age: 70
End: 2020-11-05

## 2020-11-05 ENCOUNTER — OFFICE VISIT (OUTPATIENT)
Dept: CARDIAC SURGERY | Facility: CLINIC | Age: 70
End: 2020-11-05

## 2020-11-05 VITALS
HEIGHT: 73 IN | WEIGHT: 256.6 LBS | OXYGEN SATURATION: 98 % | BODY MASS INDEX: 34.01 KG/M2 | SYSTOLIC BLOOD PRESSURE: 112 MMHG | HEART RATE: 79 BPM | DIASTOLIC BLOOD PRESSURE: 56 MMHG

## 2020-11-05 VITALS
DIASTOLIC BLOOD PRESSURE: 61 MMHG | HEART RATE: 80 BPM | BODY MASS INDEX: 34.38 KG/M2 | HEIGHT: 73 IN | WEIGHT: 259.4 LBS | TEMPERATURE: 98.7 F | SYSTOLIC BLOOD PRESSURE: 123 MMHG

## 2020-11-05 DIAGNOSIS — R91.8 LUNG MASS: Primary | ICD-10-CM

## 2020-11-05 DIAGNOSIS — E01.0 THYROMEGALY: Primary | ICD-10-CM

## 2020-11-05 DIAGNOSIS — Z87.891 FORMER SMOKER: ICD-10-CM

## 2020-11-05 DIAGNOSIS — R94.2 ABNORMAL PET OF RIGHT LUNG: ICD-10-CM

## 2020-11-05 DIAGNOSIS — R13.14 PHARYNGOESOPHAGEAL DYSPHAGIA: ICD-10-CM

## 2020-11-05 PROCEDURE — 99024 POSTOP FOLLOW-UP VISIT: CPT | Performed by: NURSE PRACTITIONER

## 2020-11-05 PROCEDURE — 99203 OFFICE O/P NEW LOW 30 MIN: CPT | Performed by: OTOLARYNGOLOGY

## 2020-11-05 RX ORDER — ACETAMINOPHEN 500 MG
500 TABLET ORAL EVERY 6 HOURS PRN
COMMUNITY

## 2020-11-05 NOTE — PATIENT INSTRUCTIONS
CT scan of the neck with contrast to evaluate thyroid enlargement and possible relationship to compressive symptoms    Risk benefits and options of possible surgical intervention were discussed.  This would obviously be secondary to other surgery related to his lung or prostate, even if indicated.    Call or return for problems   Follow-up in 4 to 6 weeks    Patient was to undergo lobectomy or lung biopsy prior to thyroidectomy however the following difficulties arose during intubation and consideration in proceeding with thyroidectomy first was undertaken.  The risk benefits and options were discussed with he and his daughter while in the hospital.        The patient has a right lung nodule and had a CT scan guided needle biopsy that was complicated by pneumothorax.  The presented today to have a lobectomy.  However, during the intubation of the double-lumen endotracheal tube, there was a unsuccessful intubation.  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  Otherwise, the patient reports only mild dysphagia and does have some compression type symptoms with raising his right shoulder.  He has not had vocal problems.  He has not had stridor.    TOTAL THYROIDECTOMY: A thyroidectomy was recommended. The risks and benefits were explained including but not limited to bleeding, infection, persistent and/or recurrent disease, risks of the general anesthesia, pain, recurrent laryngeal nerve injury with hoarseness and airway loss, parathyroid injury and hypocalcemia. Operative possibilities including hemithyroidectomy, total thyroidectomy and damien dissections were discussed. Possibilities for delayed need for total thyroidectomy pending pathologic diagnosis were also discussed. Alternatives were discussed. No guarantees were made or implied. Questions were asked appropriately answered.

## 2020-11-05 NOTE — PROGRESS NOTES
"Subjective   Chief Complaint   Patient presents with   • Post-op Follow-up     Thoracoscopy on 11/10       Patient ID: Delgado Desir is a 70 y.o. male who is here for follow-up having had MEDIASTINOSCOPY, RIGHT THORACOSCOPY POSSIBLE WEDGE RESECTION WITH DAVINCI ROBOT performed on 10/30/2020.      History of Present Illness  Post operative recovery was uneventful without any major complications. Sleep habits are good. Pain control has been good. No fevers/sweats/chills. No drainage from incisions.  No shortness of breath. Appetite is good.  Ambulating without difficulty. He is concerned about chest tube removal site that is it not healing.    The following portions of the patient's history were reviewed and updated as appropriate: allergies, current medications, past family history, past medical history, past social history, past surgical history and problem list.    Review of Systems   Constitutional: Positive for fatigue. Negative for activity change, appetite change, diaphoresis and fever.   HENT: Negative for trouble swallowing and voice change.    Eyes: Negative for visual disturbance.   Respiratory: Negative for cough, chest tightness, shortness of breath and wheezing.    Cardiovascular: Negative for chest pain, palpitations and leg swelling.   Gastrointestinal: Negative for abdominal pain, constipation, diarrhea, nausea and vomiting.   Musculoskeletal: Negative for arthralgias and myalgias.   Skin: Negative for color change, pallor, rash and wound.   Neurological: Negative for dizziness, syncope and numbness.   Psychiatric/Behavioral: Negative for agitation, confusion and sleep disturbance.       Objective   Visit Vitals  /56 (BP Location: Left arm, Patient Position: Sitting, Cuff Size: Large Adult)   Pulse 79   Ht 185 cm (72.84\")   Wt 116 kg (256 lb 9.6 oz)   SpO2 98%   BMI 34.00 kg/m²       Physical Exam  Vitals signs reviewed.   Constitutional:       Appearance: Normal appearance.   HENT:      " Head: Normocephalic.   Eyes:      Pupils: Pupils are equal, round, and reactive to light.   Cardiovascular:      Rate and Rhythm: Normal rate and regular rhythm.      Heart sounds: Normal heart sounds. No murmur.   Pulmonary:      Effort: Pulmonary effort is normal.      Breath sounds: Normal breath sounds. No wheezing or rales.   Abdominal:      General: There is no distension.      Palpations: Abdomen is soft.      Tenderness: There is no abdominal tenderness.   Musculoskeletal:         General: No swelling or tenderness.   Skin:     General: Skin is warm and dry.      Comments: Thoracic incisions are C/D/I and healing nicely.  Chest tube site is C/D/I and healing nicely   Neurological:      General: No focal deficit present.      Mental Status: He is alert and oriented to person, place, and time.   Psychiatric:         Mood and Affect: Mood normal.         Behavior: Behavior normal.         Thought Content: Thought content normal.         Judgment: Judgment normal.             Assessment/Plan     Diagnoses and all orders for this visit:    1. Lung mass (Primary)    2. Former smoker    3. Abnormal PET of right lung         Overall, Delgado Desir is doing well.  Thoracic incisions and chest tube site is healing well.  No concern for infection or delayed healing.  Following post op thoracic surgery home instructions. Provided support and encouragement. All questions have been answered to the best of my ability.  Patient has been instructed to contact our office with any questions or concerns should they arise.  He is scheduled for right lower lobectomy and mediastinal lymph node dissection on 11/10/2020 with Dr. Flores.    Patient's Body mass index is 34 kg/m². BMI is above normal parameters. Recommendations include: educational material, nutrition counseling and referral to primary care.    Delgado Desir  reports that he quit smoking about 20 years ago. His smoking use included cigarettes. He has a 33.00  pack-year smoking history. He has never used smokeless tobacco.. I have educated him on the risk of diseases from using tobacco products such as cancer, COPD and heart disease.     I spent 3  minutes counseling the patient.    Advance Care Planning   ACP discussion was held with the patient during this visit. Patient does not have an advance directive, declines further assistance.     OR next week

## 2020-11-06 ENCOUNTER — APPOINTMENT (OUTPATIENT)
Dept: PREADMISSION TESTING | Facility: HOSPITAL | Age: 70
End: 2020-11-06

## 2020-11-06 ENCOUNTER — HOSPITAL ENCOUNTER (OUTPATIENT)
Dept: GENERAL RADIOLOGY | Facility: HOSPITAL | Age: 70
Discharge: HOME | End: 2020-11-06

## 2020-11-06 VITALS
HEART RATE: 78 BPM | WEIGHT: 257.94 LBS | BODY MASS INDEX: 34.94 KG/M2 | DIASTOLIC BLOOD PRESSURE: 49 MMHG | SYSTOLIC BLOOD PRESSURE: 158 MMHG | HEIGHT: 72 IN | RESPIRATION RATE: 20 BRPM | OXYGEN SATURATION: 96 %

## 2020-11-06 DIAGNOSIS — E01.0 THYROMEGALY: ICD-10-CM

## 2020-11-06 DIAGNOSIS — IMO0001 LUNG NODULE < 6CM ON CT: ICD-10-CM

## 2020-11-06 LAB
T3FREE SERPL-MCNC: 3.25 PG/ML (ref 2–4.4)
T4 FREE SERPL-MCNC: 1.22 NG/DL (ref 0.93–1.7)
TSH SERPL DL<=0.05 MIU/L-ACNC: 0.83 UIU/ML (ref 0.27–4.2)

## 2020-11-06 PROCEDURE — 71046 X-RAY EXAM CHEST 2 VIEWS: CPT

## 2020-11-06 PROCEDURE — 84481 FREE ASSAY (FT-3): CPT | Performed by: OTOLARYNGOLOGY

## 2020-11-06 PROCEDURE — 84439 ASSAY OF FREE THYROXINE: CPT | Performed by: OTOLARYNGOLOGY

## 2020-11-06 PROCEDURE — 84443 ASSAY THYROID STIM HORMONE: CPT | Performed by: OTOLARYNGOLOGY

## 2020-11-06 PROCEDURE — 36415 COLL VENOUS BLD VENIPUNCTURE: CPT

## 2020-11-06 NOTE — DISCHARGE INSTRUCTIONS
DAY OF SURGERY INSTRUCTIONS        YOUR SURGEON: ***mcbride    PROCEDURE: ***thoracoscopy with davinci robot, right lower lobectomy, mediastinal lymph node dissection right     DATE OF SURGERY: ***11/10/2020    ARRIVAL TIME: AS DIRECTED BY OFFICE    ALL OTHER HOME MEDICATION CHECK WITH YOUR PHYSICIAN      DO NOT TAKE ANY ERECTILE DYSFUNCTION MEDICATIONS (EX: CIALIS, VIAGRA) 24 HOURS PRIOR TO SURGERY              MANAGING PAIN AFTER SURGERY    We know you are probably wondering what your pain will be like after surgery.  Following surgery it is unrealistic to expect you will not have pain.   Pain is how our bodies let us know that something is wrong or cautions us to be careful.  That said, our goal is to make your pain tolerable.    Methods we may use to treat your pain include (oral or IV medications, PCAs, epidurals, nerve blocks, etc.)   While some procedures require IV pain medications for a short time after surgery, transitioning to pain medications by mouth allows for better management of pain.   Your nurse will encourage you to take oral pain medications whenever possible.  IV medications work almost immediately, but only last a short while.  Taking medications by mouth allows for a more constant level of medication in your blood stream for a longer period of time.      Once your pain is out of control it is harder to get back under control.  It is important you are aware when your next dose of pain medication is due.  If you are admitted, your nurse may write the time of your next dose on the white board in your room to help you remember.      We are interested in your pain and encourage you to inform us about aggravating factors during your visit.   Many times a simple repositioning every few hours can make a big difference.    If your physician says it is okay, do not let your pain prevent you from getting out of bed. Be sure to call your nurse for assistance prior to getting up so you do not fall.       Before surgery, please decide your tolerable pain goal.  These faces help describe the pain ratings we use on a 0-10 scale.   Be prepared to tell us your goal and whether or not you take pain or anxiety medications at home.          BEFORE YOU COME TO THE HOSPITAL  (Pre-op instructions)  • Do not eat, drink, smoke or chew gum after midnight the night before surgery.  This also includes no mints.  • Morning of surgery take only the medicines you have been instructed with a sip of water unless otherwise instructed  by your physician.  • Do not shave, wear makeup or dark nail polish.  • Remove all jewelry including rings.  • Leave anything you consider valuable at home.  • Leave your suitcase in the car until after your surgery.  • Bring the following with you if applicable:  o Picture ID and insurance, Medicare or Medicaid cards  o Co-pay/deductible required by insurance (cash, check, credit card)  o Copy of advance directive, living will or power-of- documents if not brought to PAT  o CPAP or BIPAP mask and tubing  o Relaxation aids ( book, magazine), etc.  o Hearing aids                        ON THE DAY OF SURGERY  · On the day of surgery check in at registration located at the main entrance of the hospital.   ? You will be registered and given a beeper with instructions where to wait in the main lobby.  ? When your beeper lights up and vibrates a member of the Outpatient Surgery staff will meet you at the double doors under the stair steps and escort you to your preoperative room.   · You may have cloth compression devices placed on your legs. These help to prevent blood clots and reduce swelling in your legs.  · An IV may be inserted into one of your veins.  · In the operating room, you may be given one or more of the following:  ? A medicine to help you relax (sedative).  ? A medicine to numb the area (local anesthetic).  ? A medicine to make you fall asleep (general anesthetic).  ? A medicine that  "is injected into an area of your body to numb everything below the injection site (regional anesthetic).  · Your surgical site will be marked or identified.  · You may be given an antibiotic through your IV to help prevent infection.  Contact a health care provider if you:  · Develop a fever of more than 100.4°F (38°C) or other feelings of illness during the 48 hours before your surgery.  · Have symptoms that get worse.  Have questions or concerns about your surgery    General Anesthesia/Surgery, Adult  General anesthesia is the use of medicines to make a person \"go to sleep\" (unconscious) for a medical procedure. General anesthesia must be used for certain procedures, and is often recommended for procedures that:  · Last a long time.  · Require you to be still or in an unusual position.  · Are major and can cause blood loss.  The medicines used for general anesthesia are called general anesthetics. As well as making you unconscious for a certain amount of time, these medicines:  · Prevent pain.  · Control your blood pressure.  · Relax your muscles.  Tell a health care provider about:  · Any allergies you have.  · All medicines you are taking, including vitamins, herbs, eye drops, creams, and over-the-counter medicines.  · Any problems you or family members have had with anesthetic medicines.  · Types of anesthetics you have had in the past.  · Any blood disorders you have.  · Any surgeries you have had.  · Any medical conditions you have.  · Any recent upper respiratory, chest, or ear infections.  · Any history of:  ? Heart or lung conditions, such as heart failure, sleep apnea, asthma, or chronic obstructive pulmonary disease (COPD).  ?  service.  ? Depression or anxiety.  · Any tobacco or drug use, including marijuana or alcohol use.  · Whether you are pregnant or may be pregnant.  What are the risks?  Generally, this is a safe procedure. However, problems may occur, including:  · Allergic " reaction.  · Lung and heart problems.  · Inhaling food or liquid from the stomach into the lungs (aspiration).  · Nerve injury.  · Air in the bloodstream, which can lead to stroke.  · Extreme agitation or confusion (delirium) when you wake up from the anesthetic.  · Waking up during your procedure and being unable to move. This is rare.  These problems are more likely to develop if you are having a major surgery or if you have an advanced or serious medical condition. You can prevent some of these complications by answering all of your health care provider's questions thoroughly and by following all instructions before your procedure.  General anesthesia can cause side effects, including:  · Nausea or vomiting.  · A sore throat from the breathing tube.  · Hoarseness.  · Wheezing or coughing.  · Shaking chills.  · Tiredness.  · Body aches.  · Anxiety.  · Sleepiness or drowsiness.  · Confusion or agitation.  RISKS AND COMPLICATIONS OF SURGERY  Your health care provider will discuss possible risks and complications with you before surgery. Common risks and complications include:    · Problems due to the use of anesthetics.  · Blood loss and replacement (does not apply to minor surgical procedures).  · Temporary increase in pain due to surgery.  · Uncorrected pain or problems that the surgery was meant to correct.  · Infection.  · New damage.    What happens before the procedure?    Medicines  Ask your health care provider about:  · Changing or stopping your regular medicines. This is especially important if you are taking diabetes medicines or blood thinners.  · Taking medicines such as aspirin and ibuprofen. These medicines can thin your blood. Do not take these medicines unless your health care provider tells you to take them.  · Taking over-the-counter medicines, vitamins, herbs, and supplements. Do not take these during the week before your procedure unless your health care provider approves them.  General  instructions  · Starting 3-6 weeks before the procedure, do not use any products that contain nicotine or tobacco, such as cigarettes and e-cigarettes. If you need help quitting, ask your health care provider.  · If you brush your teeth on the morning of the procedure, make sure to spit out all of the toothpaste.  · Tell your health care provider if you become ill or develop a cold, cough, or fever.  · If instructed by your health care provider, bring your sleep apnea device with you on the day of your surgery (if applicable).  · Ask your health care provider if you will be going home the same day, the following day, or after a longer hospital stay.  ? Plan to have someone take you home from the hospital or clinic.  ? Plan to have a responsible adult care for you for at least 24 hours after you leave the hospital or clinic. This is important.  What happens during the procedure?  · You will be given anesthetics through both of the following:  ? A mask placed over your nose and mouth.  ? An IV in one of your veins.  · You may receive a medicine to help you relax (sedative).  · After you are unconscious, a breathing tube may be inserted down your throat to help you breathe. This will be removed before you wake up.  · An anesthesia specialist will stay with you throughout your procedure. He or she will:  ? Keep you comfortable and safe by continuing to give you medicines and adjusting the amount of medicine that you get.  ? Monitor your blood pressure, pulse, and oxygen levels to make sure that the anesthetics do not cause any problems.  The procedure may vary among health care providers and hospitals.  What happens after the procedure?  · Your blood pressure, temperature, heart rate, breathing rate, and blood oxygen level will be monitored until the medicines you were given have worn off.  · You will wake up in a recovery area. You may wake up slowly.  · If you feel anxious or agitated, you may be given medicine to  help you calm down.  · If you will be going home the same day, your health care provider may check to make sure you can walk, drink, and urinate.  · Your health care provider will treat any pain or side effects you have before you go home.  · Do not drive for 24 hours if you were given a sedative.  Summary  · General anesthesia is used to keep you still and prevent pain during a procedure.  · It is important to tell your healthcare provider about your medical history and any surgeries you have had, and previous experience with anesthesia.  · Follow your healthcare provider’s instructions about when to stop eating, drinking, or taking certain medicines before your procedure.  · Plan to have someone take you home from the hospital or clinic.  This information is not intended to replace advice given to you by your health care provider. Make sure you discuss any questions you have with your health care provider.  Document Released: 03/26/2009 Document Revised: 08/03/2018 Document Reviewed: 08/03/2018  [employer] Interactive Patient Education © 2019 [employer]       Fall Prevention in Hospitals, Adult  As a hospital patient, your condition and the treatments you receive can increase your risk for falls. Some additional risk factors for falls in a hospital include:  · Being in an unfamiliar environment.  · Being on bed rest.  · Your surgery.  · Taking certain medicines.  · Your tubing requirements, such as intravenous (IV) therapy or catheters.  It is important that you learn how to decrease fall risks while at the hospital. Below are important tips that can help prevent falls.  SAFETY TIPS FOR PREVENTING FALLS  Talk about your risk of falling.  · Ask your health care provider why you are at risk for falling. Is it your medicine, illness, tubing placement, or something else?  · Make a plan with your health care provider to keep you safe from falls.  · Ask your health care provider or pharmacist about side effects of your  medicines. Some medicines can make you dizzy or affect your coordination.  Ask for help.  · Ask for help before getting out of bed. You may need to press your call button.  · Ask for assistance in getting safely to the toilet.  · Ask for a walker or cane to be put at your bedside. Ask that most of the side rails on your bed be placed up before your health care provider leaves the room.  · Ask family or friends to sit with you.  · Ask for things that are out of your reach, such as your glasses, hearing aids, telephone, bedside table, or call button.  Follow these tips to avoid falling:  · Stay lying or seated, rather than standing, while waiting for help.  · Wear rubber-soled slippers or shoes whenever you walk in the hospital.  · Avoid quick, sudden movements.  ¨ Change positions slowly.  ¨ Sit on the side of your bed before standing.  ¨ Stand up slowly and wait before you start to walk.  · Let your health care provider know if there is a spill on the floor.  · Pay careful attention to the medical equipment, electrical cords, and tubes around you.  · When you need help, use your call button by your bed or in the bathroom. Wait for one of your health care providers to help you.  · If you feel dizzy or unsure of your footing, return to bed and wait for assistance.  · Avoid being distracted by the TV, telephone, or another person in your room.  · Do not lean or support yourself on rolling objects, such as IV poles or bedside tables.     This information is not intended to replace advice given to you by your health care provider. Make sure you discuss any questions you have with your health care provider.     Document Released: 12/15/2001 Document Revised: 01/08/2016 Document Reviewed: 08/25/2013  [employer] Interactive Patient Education ©2016 [employer]       [facility]  CHG 4% Patient Instruction Sheet    Chlorhexidine Before Surgery  Chlorhexidine gluconate (CHG) is a germ-killing (antiseptic) solution  that is used to clean the skin. It gets rid of the bacteria that normally live on the skin. Cleaning your skin with CHG before surgery helps lower the risk for infection after surgery.    How to use CHG solution  · You will take 2 showers, one shower the night before surgery, the second shower the morning of surgery before coming to the hospital.  · Use CHG only as told by your health care provider, and follow the instructions on the label.  · Use CHG solution while taking a shower. Follow these steps when using CHG solution (unless your health care provider gives you different instructions):  1. Start the shower.  2. Use your normal soap and shampoo to wash your face and hair.  3. Turn off the shower or move out of the shower stream.  4. Pour the CHG onto a clean washcloth. Do not use any type of brush or rough-edged sponge.  5. Starting at your neck, lather your body down to your toes. Make sure you:  6. Pay special attention to the part of your body where you will be having surgery. Scrub this area for at least 1 minute.  7. Use the full amount of CHG as directed. Usually, this is one half bottle for each shower.  8. Do not use CHG on your head or face. If the solution gets into your ears or eyes, rinse them well with water.  9. Avoid your genital area.  10. Avoid any areas of skin that have broken skin, cuts, or scrapes.  11. Scrub your back and under your arms. Make sure to wash skin folds.  12. Let the lather sit on your skin for 1-2 minutes or as long as told by your health care  provider.  13. Thoroughly rinse your entire body in the shower. Make sure that all body creases and crevices are rinsed well.  14. Dry off with a clean towel. Do not put any substances on your body afterward, such as powder, lotion, or perfume.  15. Put on clean clothes or pajamas.  16. If it is the night before your surgery, sleep in clean sheets.    What are the risks?  Risks of using CHG include:  · A skin reaction.  · Hearing  loss, if CHG gets in your ears.  · Eye injury, if CHG gets in your eyes and is not rinsed out.  · The CHG product catching fire.  Make sure that you avoid smoking and flames after applying CHG to your skin.  Do not use CHG:  · If you have a chlorhexidine allergy or have previously reacted to chlorhexidine.  · On babies younger than 2 months of age.      On the day of surgery, when you are taken to your room in Outpatient Surgery you will be given a CHG prepackaged cloth to wipe the site for your surgery.  How to use CHG prepackaged cloths  · Follow the instructions on the label.  · Use the CHG cloth on clean, dry skin. Follow these steps when using a CHG cloth (unless your health care provider gives you different instructions):  1. Using the CHG cloth, vigorously scrub the part of your body where you will be having surgery. Scrub using a back-and-forth motion for 3 minutes. The area on your body should be completely wet with CHG when you are finished scrubbing.  2. Do not rinse. Discard the cloth and let the area air-dry for 1 minute. Do not put any substances on your body afterward, such as powder, lotion, or perfume.  Contact a health care provider if:  · Your skin gets irritated after scrubbing.  · You have questions about using your solution or cloth.  Get help right away if:  · Your eyes become very red or swollen.  · Your eyes itch badly.  · Your skin itches badly and is red or swollen.  · Your hearing changes.  · You have trouble seeing.  · You have swelling or tingling in your mouth or throat.  · You have trouble breathing.  · You swallow any chlorhexidine.  Summary  · Chlorhexidine gluconate (CHG) is a germ-killing (antiseptic) solution that is used to clean the skin. Cleaning your skin with CHG before surgery helps lower the risk for infection after surgery.  · You may be given CHG to use at home. It may be in a bottle or in a prepackaged cloth to use on your skin. Carefully follow your health care  provider's instructions and the instructions on the product label.  · Do not use CHG if you have a chlorhexidine allergy.  · Contact your health care provider if your skin gets irritated after scrubbing.  This information is not intended to replace advice given to you by your health care provider. Make sure you discuss any questions you have with your health care provider.  Document Released: 09/11/2013 Document Revised: 11/15/2018 Document Reviewed: 11/15/2018  [employer] Interactive Patient Education © 2019 [employer]          PATIENT/FAMILY/RESPONSIBLE PARTY VERBALIZES UNDERSTANDING OF ABOVE EDUCATION.  COPY OF PAIN SCALE GIVEN AND REVIEWED WITH VERBALIZED UNDERSTANDING.

## 2020-11-07 ENCOUNTER — LAB (OUTPATIENT)
Dept: LAB | Facility: HOSPITAL | Age: 70
End: 2020-11-07

## 2020-11-07 ENCOUNTER — NURSE TRIAGE (OUTPATIENT)
Dept: CALL CENTER | Facility: HOSPITAL | Age: 70
End: 2020-11-07

## 2020-11-07 PROCEDURE — U0003 INFECTIOUS AGENT DETECTION BY NUCLEIC ACID (DNA OR RNA); SEVERE ACUTE RESPIRATORY SYNDROME CORONAVIRUS 2 (SARS-COV-2) (CORONAVIRUS DISEASE [COVID-19]), AMPLIFIED PROBE TECHNIQUE, MAKING USE OF HIGH THROUGHPUT TECHNOLOGIES AS DESCRIBED BY CMS-2020-01-R: HCPCS | Performed by: SURGERY

## 2020-11-07 PROCEDURE — C9803 HOPD COVID-19 SPEC COLLECT: HCPCS | Performed by: SURGERY

## 2020-11-07 NOTE — TELEPHONE ENCOUNTER
"    Reason for Disposition  • Health Information question, no triage required and triager able to answer question    Additional Information  • Negative: [1] Caller is not with the adult (patient) AND [2] reporting urgent symptoms  • Negative: Lab result questions  • Negative: Medication questions  • Negative: Caller can't be reached by phone  • Negative: Caller has already spoken to PCP or another triager  • Negative: RN needs further essential information from caller in order to complete triage  • Negative: Requesting regular office appointment  • Negative: [1] Caller requesting NON-URGENT health information AND [2] PCP's office is the best resource    Answer Assessment - Initial Assessment Questions  1. REASON FOR CALL or QUESTION: \"What is your reason for calling today?\" or \"How can I best help you?\" or \"What question do you have that I can help answer?\"      Bruising to abdomen at blood thinner injection sites, while hospitalized. No increasing in size, no increase in pain.Advised bruising woll fade and with any bruise. Advised if increases in size becomes painful to have are evaluated.    Protocols used: INFORMATION ONLY CALL - NO TRIAGE-ADULT-      "

## 2020-11-08 LAB
COVID LABCORP PRIORITY: NORMAL
SARS-COV-2 RNA RESP QL NAA+PROBE: NOT DETECTED

## 2020-11-10 ENCOUNTER — APPOINTMENT (OUTPATIENT)
Dept: CT IMAGING | Facility: HOSPITAL | Age: 70
End: 2020-11-10

## 2020-11-10 ENCOUNTER — ANESTHESIA (OUTPATIENT)
Dept: PERIOP | Facility: HOSPITAL | Age: 70
End: 2020-11-10

## 2020-11-10 ENCOUNTER — READMISSION MANAGEMENT (OUTPATIENT)
Dept: CALL CENTER | Facility: HOSPITAL | Age: 70
End: 2020-11-10

## 2020-11-10 ENCOUNTER — HOSPITAL ENCOUNTER (OUTPATIENT)
Facility: HOSPITAL | Age: 70
Discharge: HOME | End: 2020-11-11
Attending: SURGERY | Admitting: SURGERY

## 2020-11-10 ENCOUNTER — APPOINTMENT (OUTPATIENT)
Dept: GENERAL RADIOLOGY | Facility: HOSPITAL | Age: 70
End: 2020-11-10

## 2020-11-10 ENCOUNTER — DOCUMENTATION (OUTPATIENT)
Dept: CARDIAC SURGERY | Facility: CLINIC | Age: 70
End: 2020-11-10

## 2020-11-10 ENCOUNTER — ANESTHESIA EVENT (OUTPATIENT)
Dept: PERIOP | Facility: HOSPITAL | Age: 70
End: 2020-11-10

## 2020-11-10 DIAGNOSIS — R91.8 LUNG MASS: ICD-10-CM

## 2020-11-10 DIAGNOSIS — R91.8 LUNG MASS: Primary | ICD-10-CM

## 2020-11-10 PROBLEM — T88.4XXA FAILED INTUBATION OF AIRWAY: Status: ACTIVE | Noted: 2020-11-10

## 2020-11-10 PROBLEM — E04.8 MEDIASTINAL GOITER: Status: ACTIVE | Noted: 2020-11-05

## 2020-11-10 LAB
ABO GROUP BLD: NORMAL
ALBUMIN SERPL-MCNC: 3.7 G/DL (ref 3.5–5.2)
ALBUMIN/GLOB SERPL: 1.3 G/DL
ALP SERPL-CCNC: 129 U/L (ref 39–117)
ALT SERPL W P-5'-P-CCNC: 32 U/L (ref 1–41)
ANION GAP SERPL CALCULATED.3IONS-SCNC: 10 MMOL/L (ref 5–15)
AST SERPL-CCNC: 22 U/L (ref 1–40)
BASOPHILS # BLD AUTO: 0.02 10*3/MM3 (ref 0–0.2)
BASOPHILS NFR BLD AUTO: 0.2 % (ref 0–1.5)
BILIRUB SERPL-MCNC: 1.2 MG/DL (ref 0–1.2)
BLD GP AB SCN SERPL QL: NEGATIVE
BUN SERPL-MCNC: 18 MG/DL (ref 8–23)
BUN/CREAT SERPL: 18.2 (ref 7–25)
CALCIUM SPEC-SCNC: 9 MG/DL (ref 8.6–10.5)
CHLORIDE SERPL-SCNC: 102 MMOL/L (ref 98–107)
CO2 SERPL-SCNC: 24 MMOL/L (ref 22–29)
CREAT SERPL-MCNC: 0.99 MG/DL (ref 0.76–1.27)
DEPRECATED RDW RBC AUTO: 40.8 FL (ref 37–54)
EOSINOPHIL # BLD AUTO: 0.01 10*3/MM3 (ref 0–0.4)
EOSINOPHIL NFR BLD AUTO: 0.1 % (ref 0.3–6.2)
ERYTHROCYTE [DISTWIDTH] IN BLOOD BY AUTOMATED COUNT: 13.1 % (ref 12.3–15.4)
GFR SERPL CREATININE-BSD FRML MDRD: 75 ML/MIN/1.73
GLOBULIN UR ELPH-MCNC: 2.9 GM/DL
GLUCOSE SERPL-MCNC: 174 MG/DL (ref 65–99)
HCT VFR BLD AUTO: 37.8 % (ref 37.5–51)
HGB BLD-MCNC: 13.4 G/DL (ref 13–17.7)
IMM GRANULOCYTES # BLD AUTO: 0.07 10*3/MM3 (ref 0–0.05)
IMM GRANULOCYTES NFR BLD AUTO: 0.6 % (ref 0–0.5)
LYMPHOCYTES # BLD AUTO: 0.35 10*3/MM3 (ref 0.7–3.1)
LYMPHOCYTES NFR BLD AUTO: 3.2 % (ref 19.6–45.3)
MCH RBC QN AUTO: 30.4 PG (ref 26.6–33)
MCHC RBC AUTO-ENTMCNC: 35.4 G/DL (ref 31.5–35.7)
MCV RBC AUTO: 85.7 FL (ref 79–97)
MONOCYTES # BLD AUTO: 0.05 10*3/MM3 (ref 0.1–0.9)
MONOCYTES NFR BLD AUTO: 0.5 % (ref 5–12)
NEUTROPHILS NFR BLD AUTO: 10.31 10*3/MM3 (ref 1.7–7)
NEUTROPHILS NFR BLD AUTO: 95.4 % (ref 42.7–76)
NRBC BLD AUTO-RTO: 0 /100 WBC (ref 0–0.2)
PLATELET # BLD AUTO: 220 10*3/MM3 (ref 140–450)
PMV BLD AUTO: 10.2 FL (ref 6–12)
POTASSIUM SERPL-SCNC: 4.5 MMOL/L (ref 3.5–5.2)
PROT SERPL-MCNC: 6.6 G/DL (ref 6–8.5)
RBC # BLD AUTO: 4.41 10*6/MM3 (ref 4.14–5.8)
RH BLD: POSITIVE
SODIUM SERPL-SCNC: 136 MMOL/L (ref 136–145)
T&S EXPIRATION DATE: NORMAL
WBC # BLD AUTO: 10.81 10*3/MM3 (ref 3.4–10.8)

## 2020-11-10 PROCEDURE — 86900 BLOOD TYPING SEROLOGIC ABO: CPT | Performed by: NURSE PRACTITIONER

## 2020-11-10 PROCEDURE — 85025 COMPLETE CBC W/AUTO DIFF WBC: CPT | Performed by: SURGERY

## 2020-11-10 PROCEDURE — 86901 BLOOD TYPING SEROLOGIC RH(D): CPT | Performed by: NURSE PRACTITIONER

## 2020-11-10 PROCEDURE — 25010000002 FENTANYL CITRATE (PF) 250 MCG/5ML SOLUTION: Performed by: NURSE ANESTHETIST, CERTIFIED REGISTERED

## 2020-11-10 PROCEDURE — 94799 UNLISTED PULMONARY SVC/PX: CPT

## 2020-11-10 PROCEDURE — 0 IOPAMIDOL PER 1 ML: Performed by: SURGERY

## 2020-11-10 PROCEDURE — 99024 POSTOP FOLLOW-UP VISIT: CPT | Performed by: SURGERY

## 2020-11-10 PROCEDURE — 25010000002 ONDANSETRON PER 1 MG: Performed by: NURSE ANESTHETIST, CERTIFIED REGISTERED

## 2020-11-10 PROCEDURE — A9270 NON-COVERED ITEM OR SERVICE: HCPCS | Performed by: SURGERY

## 2020-11-10 PROCEDURE — 80053 COMPREHEN METABOLIC PANEL: CPT | Performed by: SURGERY

## 2020-11-10 PROCEDURE — C1729 CATH, DRAINAGE: HCPCS | Performed by: SURGERY

## 2020-11-10 PROCEDURE — 71275 CT ANGIOGRAPHY CHEST: CPT

## 2020-11-10 PROCEDURE — 25010000002 DEXAMETHASONE PER 1 MG: Performed by: NURSE ANESTHETIST, CERTIFIED REGISTERED

## 2020-11-10 PROCEDURE — 70491 CT SOFT TISSUE NECK W/DYE: CPT

## 2020-11-10 PROCEDURE — 25010000002 IOPAMIDOL 61 % SOLUTION: Performed by: SURGERY

## 2020-11-10 PROCEDURE — 94002 VENT MGMT INPAT INIT DAY: CPT

## 2020-11-10 PROCEDURE — 86850 RBC ANTIBODY SCREEN: CPT | Performed by: NURSE PRACTITIONER

## 2020-11-10 PROCEDURE — 25010000002 SUCCINYLCHOLINE PER 20 MG: Performed by: NURSE ANESTHETIST, CERTIFIED REGISTERED

## 2020-11-10 PROCEDURE — 63710000001 LOSARTAN 50 MG TABLET 90 EACH BOTTLE: Performed by: SURGERY

## 2020-11-10 PROCEDURE — 25010000002 MIDAZOLAM PER 1 MG: Performed by: ANESTHESIOLOGY

## 2020-11-10 PROCEDURE — 63710000001 TAMSULOSIN 0.4 MG CAPSULE: Performed by: SURGERY

## 2020-11-10 PROCEDURE — 63710000001 ACETAMINOPHEN 325 MG TABLET: Performed by: SURGERY

## 2020-11-10 PROCEDURE — 63710000001 HYDROCHLOROTHIAZIDE 25 MG TABLET 100 EACH BOTTLE: Performed by: SURGERY

## 2020-11-10 PROCEDURE — 25010000002 DEXAMETHASONE PER 1 MG: Performed by: ANESTHESIOLOGY

## 2020-11-10 PROCEDURE — 71045 X-RAY EXAM CHEST 1 VIEW: CPT

## 2020-11-10 PROCEDURE — 25010000002 HEPARIN (PORCINE) PER 1000 UNITS: Performed by: NURSE PRACTITIONER

## 2020-11-10 PROCEDURE — 25010000003 BUPIVACAINE LIPOSOME 1.3 % SUSPENSION: Performed by: SURGERY

## 2020-11-10 PROCEDURE — 25010000002 PROPOFOL 10 MG/ML EMULSION: Performed by: NURSE ANESTHETIST, CERTIFIED REGISTERED

## 2020-11-10 PROCEDURE — 99213 OFFICE O/P EST LOW 20 MIN: CPT | Performed by: OTOLARYNGOLOGY

## 2020-11-10 PROCEDURE — C9290 INJ, BUPIVACAINE LIPOSOME: HCPCS | Performed by: SURGERY

## 2020-11-10 PROCEDURE — 25010000002 HYDROCORTISONE SODIUM SUCCINATE 100 MG RECONSTITUTED SOLUTION: Performed by: NURSE ANESTHETIST, CERTIFIED REGISTERED

## 2020-11-10 RX ORDER — HEPARIN SODIUM 5000 [USP'U]/ML
5000 INJECTION, SOLUTION INTRAVENOUS; SUBCUTANEOUS ONCE
Status: COMPLETED | OUTPATIENT
Start: 2020-11-10 | End: 2020-11-10

## 2020-11-10 RX ORDER — SODIUM CHLORIDE 0.9 % (FLUSH) 0.9 %
3 SYRINGE (ML) INJECTION AS NEEDED
Status: DISCONTINUED | OUTPATIENT
Start: 2020-11-10 | End: 2020-11-10

## 2020-11-10 RX ORDER — PROPOFOL 10 MG/ML
VIAL (ML) INTRAVENOUS AS NEEDED
Status: DISCONTINUED | OUTPATIENT
Start: 2020-11-10 | End: 2020-11-10 | Stop reason: SURG

## 2020-11-10 RX ORDER — SODIUM CHLORIDE 0.9 % (FLUSH) 0.9 %
10 SYRINGE (ML) INJECTION AS NEEDED
Status: DISCONTINUED | OUTPATIENT
Start: 2020-11-10 | End: 2020-11-10 | Stop reason: HOSPADM

## 2020-11-10 RX ORDER — ONDANSETRON 2 MG/ML
INJECTION INTRAMUSCULAR; INTRAVENOUS AS NEEDED
Status: DISCONTINUED | OUTPATIENT
Start: 2020-11-10 | End: 2020-11-10 | Stop reason: SURG

## 2020-11-10 RX ORDER — NALOXONE HCL 0.4 MG/ML
0.04 VIAL (ML) INJECTION AS NEEDED
Status: DISCONTINUED | OUTPATIENT
Start: 2020-11-10 | End: 2020-11-10 | Stop reason: HOSPADM

## 2020-11-10 RX ORDER — ONDANSETRON 2 MG/ML
4 INJECTION INTRAMUSCULAR; INTRAVENOUS AS NEEDED
Status: DISCONTINUED | OUTPATIENT
Start: 2020-11-10 | End: 2020-11-10 | Stop reason: HOSPADM

## 2020-11-10 RX ORDER — LABETALOL HYDROCHLORIDE 5 MG/ML
5 INJECTION, SOLUTION INTRAVENOUS
Status: DISCONTINUED | OUTPATIENT
Start: 2020-11-10 | End: 2020-11-10 | Stop reason: HOSPADM

## 2020-11-10 RX ORDER — SODIUM CHLORIDE 0.9 % (FLUSH) 0.9 %
3 SYRINGE (ML) INJECTION EVERY 12 HOURS SCHEDULED
Status: DISCONTINUED | OUTPATIENT
Start: 2020-11-10 | End: 2020-11-10

## 2020-11-10 RX ORDER — BUPIVACAINE HYDROCHLORIDE AND EPINEPHRINE 5; 5 MG/ML; UG/ML
INJECTION, SOLUTION PERINEURAL AS NEEDED
Status: DISCONTINUED | OUTPATIENT
Start: 2020-11-10 | End: 2020-11-10 | Stop reason: HOSPADM

## 2020-11-10 RX ORDER — ACETAMINOPHEN 500 MG
1000 TABLET ORAL ONCE
Status: COMPLETED | OUTPATIENT
Start: 2020-11-10 | End: 2020-11-10

## 2020-11-10 RX ORDER — ALBUTEROL SULFATE 1.25 MG/3ML
1.25 SOLUTION RESPIRATORY (INHALATION)
Status: DISCONTINUED | OUTPATIENT
Start: 2020-11-10 | End: 2020-11-10 | Stop reason: HOSPADM

## 2020-11-10 RX ORDER — FENTANYL CITRATE 50 UG/ML
25 INJECTION, SOLUTION INTRAMUSCULAR; INTRAVENOUS
Status: DISCONTINUED | OUTPATIENT
Start: 2020-11-10 | End: 2020-11-10 | Stop reason: HOSPADM

## 2020-11-10 RX ORDER — ONDANSETRON 2 MG/ML
4 INJECTION INTRAMUSCULAR; INTRAVENOUS EVERY 6 HOURS PRN
Status: DISCONTINUED | OUTPATIENT
Start: 2020-11-10 | End: 2020-11-11 | Stop reason: HOSPADM

## 2020-11-10 RX ORDER — SODIUM CHLORIDE, SODIUM LACTATE, POTASSIUM CHLORIDE, CALCIUM CHLORIDE 600; 310; 30; 20 MG/100ML; MG/100ML; MG/100ML; MG/100ML
20 INJECTION, SOLUTION INTRAVENOUS CONTINUOUS
Status: DISCONTINUED | OUTPATIENT
Start: 2020-11-10 | End: 2020-11-10

## 2020-11-10 RX ORDER — HYDROMORPHONE HYDROCHLORIDE 1 MG/ML
0.5 INJECTION, SOLUTION INTRAMUSCULAR; INTRAVENOUS; SUBCUTANEOUS
Status: DISCONTINUED | OUTPATIENT
Start: 2020-11-10 | End: 2020-11-10 | Stop reason: HOSPADM

## 2020-11-10 RX ORDER — FENTANYL CITRATE 50 UG/ML
INJECTION, SOLUTION INTRAMUSCULAR; INTRAVENOUS AS NEEDED
Status: DISCONTINUED | OUTPATIENT
Start: 2020-11-10 | End: 2020-11-10 | Stop reason: SURG

## 2020-11-10 RX ORDER — ACETAMINOPHEN 325 MG/1
650 TABLET ORAL EVERY 4 HOURS PRN
Status: DISCONTINUED | OUTPATIENT
Start: 2020-11-10 | End: 2020-11-11 | Stop reason: HOSPADM

## 2020-11-10 RX ORDER — SODIUM CHLORIDE, SODIUM LACTATE, POTASSIUM CHLORIDE, CALCIUM CHLORIDE 600; 310; 30; 20 MG/100ML; MG/100ML; MG/100ML; MG/100ML
30 INJECTION, SOLUTION INTRAVENOUS CONTINUOUS
Status: DISCONTINUED | OUTPATIENT
Start: 2020-11-10 | End: 2020-11-11 | Stop reason: HOSPADM

## 2020-11-10 RX ORDER — SODIUM CHLORIDE 9 MG/ML
80 INJECTION, SOLUTION INTRAVENOUS CONTINUOUS
Status: DISCONTINUED | OUTPATIENT
Start: 2020-11-10 | End: 2020-11-11 | Stop reason: HOSPADM

## 2020-11-10 RX ORDER — OXYCODONE AND ACETAMINOPHEN 10; 325 MG/1; MG/1
1 TABLET ORAL ONCE AS NEEDED
Status: DISCONTINUED | OUTPATIENT
Start: 2020-11-10 | End: 2020-11-10 | Stop reason: HOSPADM

## 2020-11-10 RX ORDER — SODIUM CHLORIDE, SODIUM LACTATE, POTASSIUM CHLORIDE, CALCIUM CHLORIDE 600; 310; 30; 20 MG/100ML; MG/100ML; MG/100ML; MG/100ML
9 INJECTION, SOLUTION INTRAVENOUS CONTINUOUS
Status: DISCONTINUED | OUTPATIENT
Start: 2020-11-10 | End: 2020-11-11 | Stop reason: HOSPADM

## 2020-11-10 RX ORDER — SODIUM CHLORIDE 9 MG/ML
50 INJECTION, SOLUTION INTRAVENOUS CONTINUOUS
Status: DISCONTINUED | OUTPATIENT
Start: 2020-11-10 | End: 2020-11-11 | Stop reason: HOSPADM

## 2020-11-10 RX ORDER — LIDOCAINE HYDROCHLORIDE 10 MG/ML
0.5 INJECTION, SOLUTION EPIDURAL; INFILTRATION; INTRACAUDAL; PERINEURAL ONCE AS NEEDED
Status: DISCONTINUED | OUTPATIENT
Start: 2020-11-10 | End: 2020-11-10 | Stop reason: HOSPADM

## 2020-11-10 RX ORDER — SODIUM CHLORIDE, SODIUM LACTATE, POTASSIUM CHLORIDE, CALCIUM CHLORIDE 600; 310; 30; 20 MG/100ML; MG/100ML; MG/100ML; MG/100ML
1000 INJECTION, SOLUTION INTRAVENOUS CONTINUOUS
Status: DISCONTINUED | OUTPATIENT
Start: 2020-11-10 | End: 2020-11-11 | Stop reason: HOSPADM

## 2020-11-10 RX ORDER — SODIUM CHLORIDE 0.9 % (FLUSH) 0.9 %
10 SYRINGE (ML) INJECTION EVERY 12 HOURS SCHEDULED
Status: DISCONTINUED | OUTPATIENT
Start: 2020-11-10 | End: 2020-11-10 | Stop reason: HOSPADM

## 2020-11-10 RX ORDER — SUCCINYLCHOLINE CHLORIDE 20 MG/ML
INJECTION INTRAMUSCULAR; INTRAVENOUS AS NEEDED
Status: DISCONTINUED | OUTPATIENT
Start: 2020-11-10 | End: 2020-11-10 | Stop reason: SURG

## 2020-11-10 RX ORDER — IBUPROFEN 600 MG/1
600 TABLET ORAL ONCE AS NEEDED
Status: DISCONTINUED | OUTPATIENT
Start: 2020-11-10 | End: 2020-11-10 | Stop reason: HOSPADM

## 2020-11-10 RX ORDER — FLUMAZENIL 0.1 MG/ML
0.2 INJECTION INTRAVENOUS AS NEEDED
Status: DISCONTINUED | OUTPATIENT
Start: 2020-11-10 | End: 2020-11-10 | Stop reason: HOSPADM

## 2020-11-10 RX ORDER — ONDANSETRON 4 MG/1
4 TABLET, FILM COATED ORAL EVERY 6 HOURS PRN
Status: DISCONTINUED | OUTPATIENT
Start: 2020-11-10 | End: 2020-11-11 | Stop reason: HOSPADM

## 2020-11-10 RX ORDER — DEXAMETHASONE SODIUM PHOSPHATE 4 MG/ML
INJECTION, SOLUTION INTRA-ARTICULAR; INTRALESIONAL; INTRAMUSCULAR; INTRAVENOUS; SOFT TISSUE AS NEEDED
Status: DISCONTINUED | OUTPATIENT
Start: 2020-11-10 | End: 2020-11-10 | Stop reason: SURG

## 2020-11-10 RX ORDER — DEXAMETHASONE SODIUM PHOSPHATE 10 MG/ML
8 INJECTION INTRAMUSCULAR; INTRAVENOUS ONCE AS NEEDED
Status: COMPLETED | OUTPATIENT
Start: 2020-11-10 | End: 2020-11-10

## 2020-11-10 RX ORDER — DEXTROSE MONOHYDRATE 25 G/50ML
12.5 INJECTION, SOLUTION INTRAVENOUS AS NEEDED
Status: DISCONTINUED | OUTPATIENT
Start: 2020-11-10 | End: 2020-11-10 | Stop reason: HOSPADM

## 2020-11-10 RX ORDER — BUPIVACAINE HCL/0.9 % NACL/PF 0.1 %
2 PLASTIC BAG, INJECTION (ML) EPIDURAL ONCE
Status: COMPLETED | OUTPATIENT
Start: 2020-11-10 | End: 2020-11-10

## 2020-11-10 RX ORDER — TAMSULOSIN HYDROCHLORIDE 0.4 MG/1
0.4 CAPSULE ORAL NIGHTLY
Status: DISCONTINUED | OUTPATIENT
Start: 2020-11-10 | End: 2020-11-11 | Stop reason: HOSPADM

## 2020-11-10 RX ORDER — MIDAZOLAM HYDROCHLORIDE 1 MG/ML
1 INJECTION INTRAMUSCULAR; INTRAVENOUS
Status: DISCONTINUED | OUTPATIENT
Start: 2020-11-10 | End: 2020-11-10 | Stop reason: HOSPADM

## 2020-11-10 RX ORDER — VECURONIUM BROMIDE 1 MG/ML
INJECTION, POWDER, LYOPHILIZED, FOR SOLUTION INTRAVENOUS AS NEEDED
Status: DISCONTINUED | OUTPATIENT
Start: 2020-11-10 | End: 2020-11-10 | Stop reason: SURG

## 2020-11-10 RX ADMIN — VECURONIUM BROMIDE 8 MG: 1 INJECTION, POWDER, LYOPHILIZED, FOR SOLUTION INTRAVENOUS at 14:00

## 2020-11-10 RX ADMIN — PROPOFOL 200 MG: 10 INJECTION, EMULSION INTRAVENOUS at 13:52

## 2020-11-10 RX ADMIN — IOPAMIDOL 100 ML: 612 INJECTION, SOLUTION INTRAVENOUS at 20:02

## 2020-11-10 RX ADMIN — IOPAMIDOL 100 ML: 755 INJECTION, SOLUTION INTRAVENOUS at 21:54

## 2020-11-10 RX ADMIN — SODIUM CHLORIDE, POTASSIUM CHLORIDE, SODIUM LACTATE AND CALCIUM CHLORIDE 20 ML/HR: 600; 310; 30; 20 INJECTION, SOLUTION INTRAVENOUS at 10:37

## 2020-11-10 RX ADMIN — PROPOFOL 100 MG: 10 INJECTION, EMULSION INTRAVENOUS at 14:57

## 2020-11-10 RX ADMIN — LOSARTAN POTASSIUM: 50 TABLET, FILM COATED ORAL at 20:44

## 2020-11-10 RX ADMIN — VECURONIUM BROMIDE 2 MG: 1 INJECTION, POWDER, LYOPHILIZED, FOR SOLUTION INTRAVENOUS at 13:53

## 2020-11-10 RX ADMIN — PROPOFOL 50 MG: 10 INJECTION, EMULSION INTRAVENOUS at 13:55

## 2020-11-10 RX ADMIN — PROPOFOL 50 MG: 10 INJECTION, EMULSION INTRAVENOUS at 13:56

## 2020-11-10 RX ADMIN — DEXAMETHASONE SODIUM PHOSPHATE 8 MG: 10 INJECTION INTRAMUSCULAR; INTRAVENOUS at 11:42

## 2020-11-10 RX ADMIN — GLYCOPYRROLATE 0.2 MG: 0.2 INJECTION, SOLUTION INTRAMUSCULAR; INTRAVENOUS at 14:32

## 2020-11-10 RX ADMIN — FENTANYL CITRATE 50 MCG: 50 INJECTION INTRAMUSCULAR; INTRAVENOUS at 14:25

## 2020-11-10 RX ADMIN — SUCCINYLCHOLINE CHLORIDE 120 MG: 20 INJECTION, SOLUTION INTRAMUSCULAR; INTRAVENOUS at 13:52

## 2020-11-10 RX ADMIN — Medication 2 G: at 13:55

## 2020-11-10 RX ADMIN — TAMSULOSIN HYDROCHLORIDE 0.4 MG: 0.4 CAPSULE ORAL at 20:44

## 2020-11-10 RX ADMIN — SODIUM CHLORIDE, POTASSIUM CHLORIDE, SODIUM LACTATE AND CALCIUM CHLORIDE 1000 ML: 600; 310; 30; 20 INJECTION, SOLUTION INTRAVENOUS at 10:35

## 2020-11-10 RX ADMIN — FENTANYL CITRATE 50 MCG: 50 INJECTION INTRAMUSCULAR; INTRAVENOUS at 13:56

## 2020-11-10 RX ADMIN — SUGAMMADEX 300 MG: 100 INJECTION, SOLUTION INTRAVENOUS at 15:22

## 2020-11-10 RX ADMIN — HYDROCORTISONE SODIUM SUCCINATE 125 MG: 100 INJECTION, POWDER, FOR SOLUTION INTRAMUSCULAR; INTRAVENOUS at 14:56

## 2020-11-10 RX ADMIN — ACETAMINOPHEN 1000 MG: 500 TABLET, FILM COATED ORAL at 11:42

## 2020-11-10 RX ADMIN — ACETAMINOPHEN 650 MG: 325 TABLET, FILM COATED ORAL at 18:34

## 2020-11-10 RX ADMIN — PROPOFOL 200 MG: 10 INJECTION, EMULSION INTRAVENOUS at 14:37

## 2020-11-10 RX ADMIN — MIDAZOLAM 1 MG: 1 INJECTION INTRAMUSCULAR; INTRAVENOUS at 11:42

## 2020-11-10 RX ADMIN — DEXAMETHASONE SODIUM PHOSPHATE 12 MG: 4 INJECTION, SOLUTION INTRAMUSCULAR; INTRAVENOUS at 14:34

## 2020-11-10 RX ADMIN — SODIUM CHLORIDE, POTASSIUM CHLORIDE, SODIUM LACTATE AND CALCIUM CHLORIDE 30 ML/HR: 600; 310; 30; 20 INJECTION, SOLUTION INTRAVENOUS at 10:42

## 2020-11-10 RX ADMIN — FENTANYL CITRATE 100 MCG: 50 INJECTION INTRAMUSCULAR; INTRAVENOUS at 13:52

## 2020-11-10 RX ADMIN — SODIUM CHLORIDE 50 ML/HR: 9 INJECTION, SOLUTION INTRAVENOUS at 18:38

## 2020-11-10 RX ADMIN — LIDOCAINE HYDROCHLORIDE 100 MG: 20 INJECTION, SOLUTION INTRAVENOUS at 13:52

## 2020-11-10 RX ADMIN — HEPARIN SODIUM 5000 UNITS: 5000 INJECTION, SOLUTION INTRAVENOUS; SUBCUTANEOUS at 11:42

## 2020-11-10 RX ADMIN — FENTANYL CITRATE 50 MCG: 50 INJECTION INTRAMUSCULAR; INTRAVENOUS at 14:40

## 2020-11-10 RX ADMIN — ONDANSETRON HYDROCHLORIDE 4 MG: 2 SOLUTION INTRAMUSCULAR; INTRAVENOUS at 14:44

## 2020-11-10 NOTE — OUTREACH NOTE
CT Surgery Week 1 Survey      Responses   [facility] patient discharged from?  [place]   Does the patient have one of the following disease processes/diagnoses(primary or secondary)?  Cardiothoracic surgery   Week 1 attempt successful?  No   Revoke  Readmitted            Albina Nash RN

## 2020-11-10 NOTE — ANESTHESIA PROCEDURE NOTES
Airway  Urgency: elective    Date/Time: 11/10/2020 2:37 PM  Airway not difficult    General Information and Staff    Patient location during procedure: OR  Anesthesiologist: Ranjit Lund MD  CRNA: Alexei Knox CRNA    Indications and Patient Condition  Indications for airway management: airway protection    Preoxygenated: yes  Mask difficulty assessment: 1 - vent by mask    Final Airway Details  Final airway type: endotracheal airway      Successful airway: ETT  Cuffed: yes   Successful intubation technique: video laryngoscopy  Intubation device: Brown stylet.  Endotracheal tube insertion site: oral  Blade: Navneet  Blade size: 4  ETT size (mm): 7.5  Cormack-Lehane Classification: grade IIa - partial view of glottis  Placement verified by: chest auscultation and capnometry   Cuff volume (mL): 8  Measured from: teeth  ETT/EBT  to teeth (cm): 23  Number of attempts at approach: 1  Assessment: lips, teeth, and gum same as pre-op and atraumatic intubation    Additional Comments  Easy intubation with single lumen ETT using Glidescope (did look with MAC 4 to check -- saw nothing past epiglottis)   Unable to intubate KELLI 41-35Fr successfully after multiple attempts; including using cook catheter and bronchoscope (Performed by Dr. Flores), both able to pass VC, but no KELLI was able to make curve through VC over cook cath or bronchoscope. Dr. Flores at bedside entire time. Patient began to significantly swell.  Aborted KELLI attempt, and secured single lumen ETT.  Plan to attempt extubation in PACU.

## 2020-11-10 NOTE — ANESTHESIA PREPROCEDURE EVALUATION
Anesthesia Evaluation     Patient summary reviewed   history of anesthetic complications (Glidescope IIb at [facility] after DL attempt unsuccessful. Used bougie with Gllidescope with thoracotomy here ): Difficult airway  NPO Solid Status: > 8 hours  NPO Liquid Status: > 8 hours           Airway   Mallampati: III  TM distance: >3 FB  Neck ROM: full  Dental - normal exam     Pulmonary    (+) a smoker (quit 1996) Former, lung cancer (concern for metastatic vs. primary lesion),   (-) COPD, asthma, sleep apnea    ROS comment: Right lung nodule  Cardiovascular   Exercise tolerance: good (4-7 METS)    ECG reviewed    (+) hypertension,   (-) past MI, CAD, dysrhythmias, angina, CHF, cardiac stents, hyperlipidemia      Neuro/Psych  (-) seizures, TIA, CVA  GI/Hepatic/Renal/Endo    (+) obesity,     (-) liver disease, no renal disease, diabetes    Musculoskeletal     Abdominal    Substance History      OB/GYN          Other      history of cancer (prostate) active        Phys Exam Other: Recessed chin                  Anesthesia Plan    ASA 3     general     intravenous induction     Anesthetic plan, all risks, benefits, and alternatives have been provided, discussed and informed consent has been obtained with: patient.  Use of blood products discussed with patient  Consented to blood products.

## 2020-11-10 NOTE — ANESTHESIA PROCEDURE NOTES
Arterial Line      Patient location during procedure: holding area  Start time: 11/10/2020 11:43 AM  Stop Time:11/10/2020 11:46 AM       Line placed for hemodynamic monitoring.  Performed By   Anesthesiologist: Ranjit Lund MD  Preanesthetic Checklist  Completed: patient identified, site marked, surgical consent, pre-op evaluation, timeout performed, IV checked, risks and benefits discussed and monitors and equipment checked  Arterial Line Prep   Sterile Tech: mask, cap and gloves  Prep: ChloraPrep  Patient monitoring: continuous pulse oximetry  Arterial Line Procedure   Laterality:left  Location:  radial artery  Catheter size: 20 G   Guidance: palpation technique  Number of attempts: 1  Successful placement: yes  Post Assessment   Dressing Type: occlusive dressing applied and secured with tape.   Complications no  Circ/Move/Sens Assessment: normal.   Patient Tolerance: patient tolerated the procedure well with no apparent complications

## 2020-11-11 ENCOUNTER — APPOINTMENT (OUTPATIENT)
Dept: GENERAL RADIOLOGY | Facility: HOSPITAL | Age: 70
End: 2020-11-11

## 2020-11-11 VITALS
BODY MASS INDEX: 34.94 KG/M2 | SYSTOLIC BLOOD PRESSURE: 152 MMHG | OXYGEN SATURATION: 92 % | DIASTOLIC BLOOD PRESSURE: 56 MMHG | WEIGHT: 257.94 LBS | HEART RATE: 83 BPM | HEIGHT: 72 IN | TEMPERATURE: 97.8 F | RESPIRATION RATE: 16 BRPM

## 2020-11-11 LAB
ANION GAP SERPL CALCULATED.3IONS-SCNC: 9 MMOL/L (ref 5–15)
BUN SERPL-MCNC: 17 MG/DL (ref 8–23)
BUN/CREAT SERPL: 18.3 (ref 7–25)
CALCIUM SPEC-SCNC: 9.9 MG/DL (ref 8.6–10.5)
CHLORIDE SERPL-SCNC: 102 MMOL/L (ref 98–107)
CO2 SERPL-SCNC: 26 MMOL/L (ref 22–29)
CREAT SERPL-MCNC: 0.93 MG/DL (ref 0.76–1.27)
DEPRECATED RDW RBC AUTO: 41.7 FL (ref 37–54)
ERYTHROCYTE [DISTWIDTH] IN BLOOD BY AUTOMATED COUNT: 12.9 % (ref 12.3–15.4)
GFR SERPL CREATININE-BSD FRML MDRD: 81 ML/MIN/1.73
GLUCOSE SERPL-MCNC: 150 MG/DL (ref 65–99)
HCT VFR BLD AUTO: 39 % (ref 37.5–51)
HGB BLD-MCNC: 12.8 G/DL (ref 13–17.7)
MCH RBC QN AUTO: 29.1 PG (ref 26.6–33)
MCHC RBC AUTO-ENTMCNC: 32.8 G/DL (ref 31.5–35.7)
MCV RBC AUTO: 88.6 FL (ref 79–97)
PLATELET # BLD AUTO: 238 10*3/MM3 (ref 140–450)
PMV BLD AUTO: 10.4 FL (ref 6–12)
POTASSIUM SERPL-SCNC: 4.1 MMOL/L (ref 3.5–5.2)
RBC # BLD AUTO: 4.4 10*6/MM3 (ref 4.14–5.8)
SODIUM SERPL-SCNC: 137 MMOL/L (ref 136–145)
WBC # BLD AUTO: 12.41 10*3/MM3 (ref 3.4–10.8)

## 2020-11-11 PROCEDURE — 85027 COMPLETE CBC AUTOMATED: CPT | Performed by: SURGERY

## 2020-11-11 PROCEDURE — A9270 NON-COVERED ITEM OR SERVICE: HCPCS | Performed by: SURGERY

## 2020-11-11 PROCEDURE — 63710000001 HYDROCHLOROTHIAZIDE 25 MG TABLET 100 EACH BOTTLE: Performed by: SURGERY

## 2020-11-11 PROCEDURE — 99213 OFFICE O/P EST LOW 20 MIN: CPT | Performed by: OTOLARYNGOLOGY

## 2020-11-11 PROCEDURE — 71045 X-RAY EXAM CHEST 1 VIEW: CPT

## 2020-11-11 PROCEDURE — 80048 BASIC METABOLIC PNL TOTAL CA: CPT | Performed by: SURGERY

## 2020-11-11 PROCEDURE — 99024 POSTOP FOLLOW-UP VISIT: CPT | Performed by: NURSE PRACTITIONER

## 2020-11-11 PROCEDURE — 63710000001 LOSARTAN 50 MG TABLET 90 EACH BOTTLE: Performed by: SURGERY

## 2020-11-11 RX ADMIN — LOSARTAN POTASSIUM: 50 TABLET, FILM COATED ORAL at 09:38

## 2020-11-11 NOTE — ANESTHESIA POSTPROCEDURE EVALUATION
Patient: Delgado Desir    Procedure Summary     Date: 11/10/20 Room / Location: [facility] OR  /  [facility] OR    Anesthesia Start: 1346 Anesthesia Stop: 1514    Procedure: THORACOSCOPY WITH DAVINCI ROBOT, RIGHT LOWER LOBECTOMY, MEDIASTINAL LYMPH NODE DISSECTION (Right Chest) Diagnosis:       Lung mass      (Lung mass [R91.8])    Surgeon: Nick Flores MD Provider: Alexei Knox CRNA    Anesthesia Type: general ASA Status: 3          Anesthesia Type: general    Vitals  Vitals Value Taken Time   /91 11/10/20 1631   Temp 97.2 °F (36.2 °C) 11/10/20 1640   Pulse 67 11/10/20 1640   Resp 14 11/10/20 1640   SpO2 94 % 11/10/20 1640           Post Anesthesia Care and Evaluation    PONV Status: none  Comments: Patient d/c from PACU prior to anes eval based on Nolan score.  Please see RN notes for details of d/c criteria.    Blood pressure 134/95, pulse 70, temperature 97.2 °F (36.2 °C), temperature source Temporal, resp. rate 16, SpO2 95 %.

## 2020-11-12 ENCOUNTER — TELEPHONE (OUTPATIENT)
Dept: OTOLARYNGOLOGY | Facility: CLINIC | Age: 70
End: 2020-11-12

## 2020-11-16 ENCOUNTER — LAB (OUTPATIENT)
Dept: LAB | Facility: HOSPITAL | Age: 70
End: 2020-11-16

## 2020-11-16 ENCOUNTER — HOSPITAL ENCOUNTER (OUTPATIENT)
Dept: GENERAL RADIOLOGY | Facility: HOSPITAL | Age: 70
Discharge: HOME | End: 2020-11-16

## 2020-11-16 DIAGNOSIS — R91.8 LUNG MASS: ICD-10-CM

## 2020-11-16 LAB
ANION GAP SERPL CALCULATED.3IONS-SCNC: 9 MMOL/L (ref 5–15)
BASOPHILS # BLD AUTO: 0.06 10*3/MM3 (ref 0–0.2)
BASOPHILS NFR BLD AUTO: 0.7 % (ref 0–1.5)
BUN SERPL-MCNC: 17 MG/DL (ref 8–23)
BUN/CREAT SERPL: 17.5 (ref 7–25)
CALCIUM SPEC-SCNC: 9 MG/DL (ref 8.6–10.5)
CHLORIDE SERPL-SCNC: 106 MMOL/L (ref 98–107)
CO2 SERPL-SCNC: 25 MMOL/L (ref 22–29)
CREAT SERPL-MCNC: 0.97 MG/DL (ref 0.76–1.27)
DEPRECATED RDW RBC AUTO: 42.7 FL (ref 37–54)
EOSINOPHIL # BLD AUTO: 0.12 10*3/MM3 (ref 0–0.4)
EOSINOPHIL NFR BLD AUTO: 1.3 % (ref 0.3–6.2)
ERYTHROCYTE [DISTWIDTH] IN BLOOD BY AUTOMATED COUNT: 13.3 % (ref 12.3–15.4)
GFR SERPL CREATININE-BSD FRML MDRD: 77 ML/MIN/1.73
GLUCOSE SERPL-MCNC: 109 MG/DL (ref 65–99)
HCT VFR BLD AUTO: 40.4 % (ref 37.5–51)
HGB BLD-MCNC: 13.4 G/DL (ref 13–17.7)
IMM GRANULOCYTES # BLD AUTO: 0.08 10*3/MM3 (ref 0–0.05)
IMM GRANULOCYTES NFR BLD AUTO: 0.9 % (ref 0–0.5)
LYMPHOCYTES # BLD AUTO: 0.76 10*3/MM3 (ref 0.7–3.1)
LYMPHOCYTES NFR BLD AUTO: 8.5 % (ref 19.6–45.3)
MCH RBC QN AUTO: 29.3 PG (ref 26.6–33)
MCHC RBC AUTO-ENTMCNC: 33.2 G/DL (ref 31.5–35.7)
MCV RBC AUTO: 88.4 FL (ref 79–97)
MONOCYTES # BLD AUTO: 0.59 10*3/MM3 (ref 0.1–0.9)
MONOCYTES NFR BLD AUTO: 6.6 % (ref 5–12)
NEUTROPHILS NFR BLD AUTO: 7.33 10*3/MM3 (ref 1.7–7)
NEUTROPHILS NFR BLD AUTO: 82 % (ref 42.7–76)
NRBC BLD AUTO-RTO: 0 /100 WBC (ref 0–0.2)
PLATELET # BLD AUTO: 239 10*3/MM3 (ref 140–450)
PMV BLD AUTO: 10.6 FL (ref 6–12)
POTASSIUM SERPL-SCNC: 4.3 MMOL/L (ref 3.5–5.2)
RBC # BLD AUTO: 4.57 10*6/MM3 (ref 4.14–5.8)
SODIUM SERPL-SCNC: 140 MMOL/L (ref 136–145)
WBC # BLD AUTO: 8.94 10*3/MM3 (ref 3.4–10.8)

## 2020-11-16 PROCEDURE — 71046 X-RAY EXAM CHEST 2 VIEWS: CPT

## 2020-11-16 PROCEDURE — 85025 COMPLETE CBC W/AUTO DIFF WBC: CPT

## 2020-11-16 PROCEDURE — 80048 BASIC METABOLIC PNL TOTAL CA: CPT | Performed by: NURSE PRACTITIONER

## 2020-11-16 PROCEDURE — 36415 COLL VENOUS BLD VENIPUNCTURE: CPT

## 2020-11-17 ENCOUNTER — APPOINTMENT (OUTPATIENT)
Dept: PULMONOLOGY | Facility: HOSPITAL | Age: 70
End: 2020-11-17

## 2020-11-17 LAB
CYTO UR: NORMAL
LAB AP CASE REPORT: NORMAL
LAB AP SYNOPTIC CHECKLIST: NORMAL
Lab: NORMAL
PATH REPORT.FINAL DX SPEC: NORMAL
PATH REPORT.GROSS SPEC: NORMAL

## 2020-11-18 NOTE — PROGRESS NOTES
Subjective   Chief Complaint   Patient presents with   • Post-op Follow-up     Mediastinoscopy/ thoracoscopy on 10/30       Patient ID: Delgado Desir is a 70 y.o. male who is here for follow-up after recent admission for right lower lobectomy and mediastinaly lymph node dissection with DaVinci Robot that was cancelled due to failed intubation with double lumen ET tube on 11/11/2020.    History of Present Illness  Post operative recovery was uneventful without any major complications. Sleep habits are good. Pain control has been good. No fevers/sweats/chills. No shortness of breath. Appetite is good.  Ambulating without difficulty. Incision sites from prior right thoracoscopy are healing nicely.  CXR and labs were done prior to appointment today and demonstrate stability.  He is scheduled for surgery with Dr. Solis on 12/11/2020.     The following portions of the patient's history were reviewed and updated as appropriate: allergies, current medications, past family history, past medical history, past social history, past surgical history and problem list.    Review of Systems   Constitutional: Negative for activity change, appetite change, diaphoresis, fatigue and fever.   HENT: Negative for trouble swallowing and voice change.    Eyes: Negative for visual disturbance.   Respiratory: Negative for cough, chest tightness, shortness of breath and wheezing.    Cardiovascular: Negative for chest pain, palpitations and leg swelling.   Gastrointestinal: Negative for abdominal pain, constipation, diarrhea, nausea and vomiting.   Musculoskeletal: Negative for arthralgias and myalgias.   Skin: Negative for color change, pallor, rash and wound.   Neurological: Negative for dizziness, syncope and numbness.   Psychiatric/Behavioral: Negative for agitation, confusion and sleep disturbance.       Objective   Visit Vitals  /64 (BP Location: Left arm, Patient Position: Sitting, Cuff Size: Adult)   Pulse 83   Ht 184 cm  "(72.44\")   Wt 116 kg (255 lb)   SpO2 98%   BMI 34.17 kg/m²       Physical Exam  Vitals signs reviewed.   Constitutional:       Appearance: Normal appearance.   HENT:      Head: Normocephalic.   Eyes:      Pupils: Pupils are equal, round, and reactive to light.   Cardiovascular:      Rate and Rhythm: Normal rate and regular rhythm.      Heart sounds: Normal heart sounds. No murmur.   Pulmonary:      Effort: Pulmonary effort is normal.      Breath sounds: Normal breath sounds. No wheezing or rales.   Abdominal:      General: There is no distension.      Palpations: Abdomen is soft.      Tenderness: There is no abdominal tenderness.   Musculoskeletal:         General: No swelling or tenderness.   Skin:     General: Skin is warm and dry.      Comments: Thoracic incisions are C/D/I and healing nicely.  Chest tube site is C/D/I and healing nicely   Neurological:      General: No focal deficit present.      Mental Status: He is alert and oriented to person, place, and time.   Psychiatric:         Mood and Affect: Mood normal.         Behavior: Behavior normal.         Thought Content: Thought content normal.         Judgment: Judgment normal.         CXR: Small amount of residual pleural fluid in right lung base, otherwise stable exam     Assessment/Plan     Diagnoses and all orders for this visit:    1. Lung mass (Primary)    2. Failed intubation of airway    3. Mediastinal goiter    Other orders  -     Cancel: XR Chest 2 View; Future         Overall, Delgado Desir is doing well.  Thoracic incisions and chest tube site is healing well.  Following post op thoracic surgery home instructions.  CXR today is much improved with mild residual pleural fluid in the right lung base.  Labs are stable.  Provided support and encouragement. He is schedule for surgery with Dr. Solis on 12/11/2020.  He has follow up appointment with Dr. Flores on 12/14/2020 to discuss plan moving forward.  All questions have been answered to the best " of my ability.  Patient has been instructed to contact our office with any questions or concerns should they arise.      Patient's Body mass index is 34.17 kg/m². BMI is above normal parameters. Recommendations include: educational material, nutrition counseling and referral to primary care.    Delgado Desir  reports that he quit smoking about 24 years ago. His smoking use included cigarettes. He started smoking about 51 years ago. He has a 33.00 pack-year smoking history. He has never used smokeless tobacco.. I have educated him on the risk of diseases from using tobacco products such as cancer, COPD and heart disease.     I spent 3  minutes counseling the patient.    Advance Care Planning   ACP discussion was held with the patient during this visit. Patient does not have an advance directive, declines further assistance.     Keep follow up with Dr. Flores on 12/14.  Contact our office sooner if any issues arise.

## 2020-11-19 ENCOUNTER — OFFICE VISIT (OUTPATIENT)
Dept: CARDIAC SURGERY | Facility: CLINIC | Age: 70
End: 2020-11-19

## 2020-11-19 VITALS
HEIGHT: 72 IN | BODY MASS INDEX: 34.54 KG/M2 | HEART RATE: 83 BPM | DIASTOLIC BLOOD PRESSURE: 64 MMHG | OXYGEN SATURATION: 98 % | SYSTOLIC BLOOD PRESSURE: 138 MMHG | WEIGHT: 255 LBS

## 2020-11-19 DIAGNOSIS — T88.4XXA: ICD-10-CM

## 2020-11-19 DIAGNOSIS — E04.8 MEDIASTINAL GOITER: ICD-10-CM

## 2020-11-19 DIAGNOSIS — R91.8 LUNG MASS: Primary | ICD-10-CM

## 2020-11-19 PROCEDURE — 99024 POSTOP FOLLOW-UP VISIT: CPT | Performed by: NURSE PRACTITIONER

## 2020-12-01 PROBLEM — E01.0 THYROMEGALY: Status: ACTIVE | Noted: 2020-12-01

## 2020-12-02 ENCOUNTER — TRANSCRIBE ORDERS (OUTPATIENT)
Dept: ADMINISTRATIVE | Facility: HOSPITAL | Age: 70
End: 2020-12-02

## 2020-12-02 DIAGNOSIS — Z11.59 SCREENING FOR VIRAL DISEASE: Primary | ICD-10-CM

## 2020-12-03 ENCOUNTER — TELEPHONE (OUTPATIENT)
Dept: CARDIAC SURGERY | Facility: CLINIC | Age: 70
End: 2020-12-03

## 2020-12-03 NOTE — TELEPHONE ENCOUNTER
"Pt left vm message requesting to speak with Dr Flores.  Called pt back to report Dr Florse not in office today.  Pt voiced understanding and said it would be fine to speak with Dr Flores tomorrow.  Pt states he just wants to speak with him re: surgery and to \"get a feel for whether he thinks this is still going to happen\".  Can reach pt at #[phone]/corinne  "

## 2020-12-04 NOTE — TELEPHONE ENCOUNTER
Spoke with patient.  I have advised him that we will follow up with patient after his surgery with Dr. Solis to discuss scheduling surgery with Dr. Flores.  Patient verbalized understanding

## 2020-12-09 ENCOUNTER — TELEPHONE (OUTPATIENT)
Dept: CARDIAC SURGERY | Facility: CLINIC | Age: 70
End: 2020-12-09

## 2020-12-09 NOTE — TELEPHONE ENCOUNTER
Pt left vm message stating he got a reminder call about appt on 12-14-20 w/ Dr Flores but pt thought that all his appts with us had been cx'd.  Pt calling to check on this.  Can reach pt at #[phone]/corinne

## 2020-12-10 ENCOUNTER — TRANSCRIBE ORDERS (OUTPATIENT)
Dept: ADMINISTRATIVE | Facility: HOSPITAL | Age: 70
End: 2020-12-10

## 2020-12-10 ENCOUNTER — TELEPHONE (OUTPATIENT)
Dept: CARDIAC SURGERY | Facility: CLINIC | Age: 70
End: 2020-12-10

## 2020-12-10 DIAGNOSIS — Z01.818 PREOP TESTING: Primary | ICD-10-CM

## 2020-12-11 ENCOUNTER — ANESTHESIA EVENT (OUTPATIENT)
Dept: PERIOP | Facility: HOSPITAL | Age: 70
End: 2020-12-11

## 2020-12-11 ENCOUNTER — HOSPITAL ENCOUNTER (OUTPATIENT)
Facility: HOSPITAL | Age: 70
Discharge: HOME | End: 2020-12-12
Attending: OTOLARYNGOLOGY | Admitting: OTOLARYNGOLOGY

## 2020-12-11 ENCOUNTER — ANESTHESIA (OUTPATIENT)
Dept: PERIOP | Facility: HOSPITAL | Age: 70
End: 2020-12-11

## 2020-12-11 DIAGNOSIS — R94.2 ABNORMAL PET OF RIGHT LUNG: ICD-10-CM

## 2020-12-11 DIAGNOSIS — C61 PROSTATE CANCER (HCC): ICD-10-CM

## 2020-12-11 DIAGNOSIS — E01.0 THYROMEGALY: Primary | ICD-10-CM

## 2020-12-11 DIAGNOSIS — IMO0001 LUNG NODULE < 6CM ON CT: ICD-10-CM

## 2020-12-11 DIAGNOSIS — E07.9 THYROID MASS: ICD-10-CM

## 2020-12-11 DIAGNOSIS — R13.14 PHARYNGOESOPHAGEAL DYSPHAGIA: ICD-10-CM

## 2020-12-11 LAB
ANION GAP SERPL CALCULATED.3IONS-SCNC: 10 MMOL/L (ref 5–15)
BUN SERPL-MCNC: 21 MG/DL (ref 8–23)
BUN/CREAT SERPL: 24.1 (ref 7–25)
CALCIUM SPEC-SCNC: 8.7 MG/DL (ref 8.6–10.5)
CALCIUM SPEC-SCNC: 9 MG/DL (ref 8.6–10.5)
CALCIUM SPEC-SCNC: 9 MG/DL (ref 8.6–10.5)
CALCIUM SPEC-SCNC: 9.3 MG/DL (ref 8.6–10.5)
CHLORIDE SERPL-SCNC: 107 MMOL/L (ref 98–107)
CO2 SERPL-SCNC: 24 MMOL/L (ref 22–29)
CREAT SERPL-MCNC: 0.87 MG/DL (ref 0.76–1.27)
DEPRECATED RDW RBC AUTO: 42.7 FL (ref 37–54)
ERYTHROCYTE [DISTWIDTH] IN BLOOD BY AUTOMATED COUNT: 13.5 % (ref 12.3–15.4)
GFR SERPL CREATININE-BSD FRML MDRD: 87 ML/MIN/1.73
GLUCOSE SERPL-MCNC: 115 MG/DL (ref 65–99)
HCT VFR BLD AUTO: 40.8 % (ref 37.5–51)
HGB BLD-MCNC: 13.9 G/DL (ref 13–17.7)
MCH RBC QN AUTO: 29.8 PG (ref 26.6–33)
MCHC RBC AUTO-ENTMCNC: 34.1 G/DL (ref 31.5–35.7)
MCV RBC AUTO: 87.4 FL (ref 79–97)
PLATELET # BLD AUTO: 181 10*3/MM3 (ref 140–450)
PMV BLD AUTO: 11 FL (ref 6–12)
POTASSIUM SERPL-SCNC: 4.5 MMOL/L (ref 3.5–5.2)
PTH-INTACT SERPL-MCNC: 38.6 PG/ML (ref 15–65)
RBC # BLD AUTO: 4.67 10*6/MM3 (ref 4.14–5.8)
SARS-COV-2 RNA RESP QL NAA+PROBE: NOT DETECTED
SODIUM SERPL-SCNC: 141 MMOL/L (ref 136–145)
WBC # BLD AUTO: 7.17 10*3/MM3 (ref 3.4–10.8)

## 2020-12-11 PROCEDURE — 25010000002 DEXAMETHASONE PER 1 MG: Performed by: NURSE ANESTHETIST, CERTIFIED REGISTERED

## 2020-12-11 PROCEDURE — A9270 NON-COVERED ITEM OR SERVICE: HCPCS | Performed by: OTOLARYNGOLOGY

## 2020-12-11 PROCEDURE — 85027 COMPLETE CBC AUTOMATED: CPT | Performed by: OTOLARYNGOLOGY

## 2020-12-11 PROCEDURE — 83970 ASSAY OF PARATHORMONE: CPT | Performed by: OTOLARYNGOLOGY

## 2020-12-11 PROCEDURE — 25010000002 ONDANSETRON PER 1 MG: Performed by: NURSE ANESTHETIST, CERTIFIED REGISTERED

## 2020-12-11 PROCEDURE — U0003 INFECTIOUS AGENT DETECTION BY NUCLEIC ACID (DNA OR RNA); SEVERE ACUTE RESPIRATORY SYNDROME CORONAVIRUS 2 (SARS-COV-2) (CORONAVIRUS DISEASE [COVID-19]), AMPLIFIED PROBE TECHNIQUE, MAKING USE OF HIGH THROUGHPUT TECHNOLOGIES AS DESCRIBED BY CMS-2020-01-R: HCPCS | Performed by: OTOLARYNGOLOGY

## 2020-12-11 PROCEDURE — 80048 BASIC METABOLIC PNL TOTAL CA: CPT | Performed by: OTOLARYNGOLOGY

## 2020-12-11 PROCEDURE — 25010000002 SUCCINYLCHOLINE PER 20 MG: Performed by: NURSE ANESTHETIST, CERTIFIED REGISTERED

## 2020-12-11 PROCEDURE — 63710000001 HYDROCHLOROTHIAZIDE 25 MG TABLET 100 EACH BOTTLE: Performed by: OTOLARYNGOLOGY

## 2020-12-11 PROCEDURE — 88311 DECALCIFY TISSUE: CPT | Performed by: OTOLARYNGOLOGY

## 2020-12-11 PROCEDURE — 63710000001 CALCIUM CARBONATE 500 MG CHEWABLE TABLET: Performed by: OTOLARYNGOLOGY

## 2020-12-11 PROCEDURE — 25010000002 FENTANYL CITRATE (PF) 100 MCG/2ML SOLUTION: Performed by: NURSE ANESTHETIST, CERTIFIED REGISTERED

## 2020-12-11 PROCEDURE — 63710000001 MUPIROCIN 2 % OINTMENT 22 G TUBE: Performed by: OTOLARYNGOLOGY

## 2020-12-11 PROCEDURE — C9803 HOPD COVID-19 SPEC COLLECT: HCPCS

## 2020-12-11 PROCEDURE — 25010000002 PROPOFOL 10 MG/ML EMULSION: Performed by: NURSE ANESTHETIST, CERTIFIED REGISTERED

## 2020-12-11 PROCEDURE — 60240 REMOVAL OF THYROID: CPT | Performed by: OTOLARYNGOLOGY

## 2020-12-11 PROCEDURE — 25010000002 DEXAMETHASONE PER 1 MG: Performed by: ANESTHESIOLOGY

## 2020-12-11 PROCEDURE — 63710000001 LOSARTAN 50 MG TABLET 90 EACH BOTTLE: Performed by: OTOLARYNGOLOGY

## 2020-12-11 PROCEDURE — 63710000001 HYDROCODONE-ACETAMINOPHEN 5-325 MG TABLET: Performed by: OTOLARYNGOLOGY

## 2020-12-11 PROCEDURE — 88307 TISSUE EXAM BY PATHOLOGIST: CPT | Performed by: OTOLARYNGOLOGY

## 2020-12-11 PROCEDURE — 82310 ASSAY OF CALCIUM: CPT | Performed by: OTOLARYNGOLOGY

## 2020-12-11 PROCEDURE — 25010000003 POTASSIUM CHLORIDE PER 2 MEQ: Performed by: OTOLARYNGOLOGY

## 2020-12-11 DEVICE — SEAL HEMO SURG ARISTA/AH ABS/PWDR 3GM: Type: IMPLANTABLE DEVICE | Status: FUNCTIONAL

## 2020-12-11 RX ORDER — IBUPROFEN 200 MG
400 TABLET ORAL EVERY 6 HOURS PRN
Status: DISCONTINUED | OUTPATIENT
Start: 2020-12-11 | End: 2020-12-12 | Stop reason: HOSPADM

## 2020-12-11 RX ORDER — LABETALOL HYDROCHLORIDE 5 MG/ML
5 INJECTION, SOLUTION INTRAVENOUS
Status: DISCONTINUED | OUTPATIENT
Start: 2020-12-11 | End: 2020-12-11 | Stop reason: HOSPADM

## 2020-12-11 RX ORDER — SODIUM CHLORIDE, SODIUM LACTATE, POTASSIUM CHLORIDE, CALCIUM CHLORIDE 600; 310; 30; 20 MG/100ML; MG/100ML; MG/100ML; MG/100ML
1000 INJECTION, SOLUTION INTRAVENOUS CONTINUOUS
Status: DISCONTINUED | OUTPATIENT
Start: 2020-12-11 | End: 2020-12-11 | Stop reason: HOSPADM

## 2020-12-11 RX ORDER — SUFENTANIL CITRATE 50 UG/ML
INJECTION EPIDURAL; INTRAVENOUS AS NEEDED
Status: DISCONTINUED | OUTPATIENT
Start: 2020-12-11 | End: 2020-12-11 | Stop reason: SURG

## 2020-12-11 RX ORDER — SODIUM CHLORIDE 0.9 % (FLUSH) 0.9 %
3-10 SYRINGE (ML) INJECTION AS NEEDED
Status: DISCONTINUED | OUTPATIENT
Start: 2020-12-11 | End: 2020-12-11 | Stop reason: HOSPADM

## 2020-12-11 RX ORDER — HYDROCODONE BITARTRATE AND ACETAMINOPHEN 7.5; 325 MG/1; MG/1
1 TABLET ORAL EVERY 4 HOURS PRN
Qty: 18 TABLET | Refills: 0 | Status: SHIPPED | OUTPATIENT
Start: 2020-12-11 | End: 2020-12-14

## 2020-12-11 RX ORDER — LIDOCAINE HYDROCHLORIDE AND EPINEPHRINE 10; 10 MG/ML; UG/ML
INJECTION, SOLUTION INFILTRATION; PERINEURAL AS NEEDED
Status: DISCONTINUED | OUTPATIENT
Start: 2020-12-11 | End: 2020-12-11 | Stop reason: HOSPADM

## 2020-12-11 RX ORDER — FENTANYL CITRATE 50 UG/ML
INJECTION, SOLUTION INTRAMUSCULAR; INTRAVENOUS AS NEEDED
Status: DISCONTINUED | OUTPATIENT
Start: 2020-12-11 | End: 2020-12-11 | Stop reason: SURG

## 2020-12-11 RX ORDER — EPHEDRINE SULFATE 50 MG/ML
INJECTION, SOLUTION INTRAVENOUS AS NEEDED
Status: DISCONTINUED | OUTPATIENT
Start: 2020-12-11 | End: 2020-12-11 | Stop reason: SURG

## 2020-12-11 RX ORDER — NALOXONE HCL 0.4 MG/ML
0.4 VIAL (ML) INJECTION
Status: DISCONTINUED | OUTPATIENT
Start: 2020-12-11 | End: 2020-12-12 | Stop reason: HOSPADM

## 2020-12-11 RX ORDER — LIDOCAINE HYDROCHLORIDE 10 MG/ML
0.5 INJECTION, SOLUTION EPIDURAL; INFILTRATION; INTRACAUDAL; PERINEURAL ONCE AS NEEDED
Status: DISCONTINUED | OUTPATIENT
Start: 2020-12-11 | End: 2020-12-11 | Stop reason: HOSPADM

## 2020-12-11 RX ORDER — ONDANSETRON 2 MG/ML
4 INJECTION INTRAMUSCULAR; INTRAVENOUS ONCE AS NEEDED
Status: DISCONTINUED | OUTPATIENT
Start: 2020-12-11 | End: 2020-12-12 | Stop reason: HOSPADM

## 2020-12-11 RX ORDER — NALOXONE HCL 0.4 MG/ML
0.04 VIAL (ML) INJECTION AS NEEDED
Status: DISCONTINUED | OUTPATIENT
Start: 2020-12-11 | End: 2020-12-11 | Stop reason: HOSPADM

## 2020-12-11 RX ORDER — LEVOTHYROXINE SODIUM 0.1 MG/1
100 TABLET ORAL
Status: DISCONTINUED | OUTPATIENT
Start: 2020-12-12 | End: 2020-12-12 | Stop reason: HOSPADM

## 2020-12-11 RX ORDER — HYDROMORPHONE HYDROCHLORIDE 1 MG/ML
0.5 INJECTION, SOLUTION INTRAMUSCULAR; INTRAVENOUS; SUBCUTANEOUS
Status: DISCONTINUED | OUTPATIENT
Start: 2020-12-11 | End: 2020-12-11 | Stop reason: HOSPADM

## 2020-12-11 RX ORDER — MORPHINE SULFATE 10 MG/ML
4 INJECTION INTRAMUSCULAR; INTRAVENOUS; SUBCUTANEOUS
Status: DISCONTINUED | OUTPATIENT
Start: 2020-12-11 | End: 2020-12-12 | Stop reason: HOSPADM

## 2020-12-11 RX ORDER — DEXAMETHASONE SODIUM PHOSPHATE 4 MG/ML
4 INJECTION, SOLUTION INTRA-ARTICULAR; INTRALESIONAL; INTRAMUSCULAR; INTRAVENOUS; SOFT TISSUE ONCE AS NEEDED
Status: COMPLETED | OUTPATIENT
Start: 2020-12-11 | End: 2020-12-11

## 2020-12-11 RX ORDER — ONDANSETRON 2 MG/ML
INJECTION INTRAMUSCULAR; INTRAVENOUS AS NEEDED
Status: DISCONTINUED | OUTPATIENT
Start: 2020-12-11 | End: 2020-12-11 | Stop reason: SURG

## 2020-12-11 RX ORDER — MIDAZOLAM HYDROCHLORIDE 1 MG/ML
1 INJECTION INTRAMUSCULAR; INTRAVENOUS
Status: DISCONTINUED | OUTPATIENT
Start: 2020-12-11 | End: 2020-12-11 | Stop reason: HOSPADM

## 2020-12-11 RX ORDER — SODIUM CHLORIDE AND POTASSIUM CHLORIDE 150; 450 MG/100ML; MG/100ML
100 INJECTION, SOLUTION INTRAVENOUS CONTINUOUS
Status: DISCONTINUED | OUTPATIENT
Start: 2020-12-11 | End: 2020-12-12 | Stop reason: HOSPADM

## 2020-12-11 RX ORDER — ROCURONIUM BROMIDE 10 MG/ML
INJECTION, SOLUTION INTRAVENOUS AS NEEDED
Status: DISCONTINUED | OUTPATIENT
Start: 2020-12-11 | End: 2020-12-11 | Stop reason: SURG

## 2020-12-11 RX ORDER — SODIUM CHLORIDE, SODIUM LACTATE, POTASSIUM CHLORIDE, CALCIUM CHLORIDE 600; 310; 30; 20 MG/100ML; MG/100ML; MG/100ML; MG/100ML
100 INJECTION, SOLUTION INTRAVENOUS CONTINUOUS
Status: DISCONTINUED | OUTPATIENT
Start: 2020-12-11 | End: 2020-12-11 | Stop reason: HOSPADM

## 2020-12-11 RX ORDER — FLUMAZENIL 0.1 MG/ML
0.2 INJECTION INTRAVENOUS AS NEEDED
Status: DISCONTINUED | OUTPATIENT
Start: 2020-12-11 | End: 2020-12-11 | Stop reason: HOSPADM

## 2020-12-11 RX ORDER — ONDANSETRON 2 MG/ML
4 INJECTION INTRAMUSCULAR; INTRAVENOUS AS NEEDED
Status: DISCONTINUED | OUTPATIENT
Start: 2020-12-11 | End: 2020-12-11 | Stop reason: HOSPADM

## 2020-12-11 RX ORDER — SODIUM CHLORIDE 0.9 % (FLUSH) 0.9 %
3 SYRINGE (ML) INJECTION EVERY 12 HOURS SCHEDULED
Status: DISCONTINUED | OUTPATIENT
Start: 2020-12-11 | End: 2020-12-11 | Stop reason: HOSPADM

## 2020-12-11 RX ORDER — LEVOTHYROXINE SODIUM 100 MCG
100 TABLET ORAL DAILY
Qty: 30 TABLET | Refills: 3 | Status: SHIPPED | OUTPATIENT
Start: 2020-12-11 | End: 2021-01-18 | Stop reason: DRUGHIGH

## 2020-12-11 RX ORDER — FENTANYL CITRATE 50 UG/ML
25 INJECTION, SOLUTION INTRAMUSCULAR; INTRAVENOUS
Status: DISCONTINUED | OUTPATIENT
Start: 2020-12-11 | End: 2020-12-11 | Stop reason: HOSPADM

## 2020-12-11 RX ORDER — ACETAMINOPHEN 500 MG
1000 TABLET ORAL ONCE
Status: COMPLETED | OUTPATIENT
Start: 2020-12-11 | End: 2020-12-11

## 2020-12-11 RX ORDER — PROPOFOL 10 MG/ML
VIAL (ML) INTRAVENOUS AS NEEDED
Status: DISCONTINUED | OUTPATIENT
Start: 2020-12-11 | End: 2020-12-11 | Stop reason: SURG

## 2020-12-11 RX ORDER — DEXAMETHASONE SODIUM PHOSPHATE 4 MG/ML
INJECTION, SOLUTION INTRA-ARTICULAR; INTRALESIONAL; INTRAMUSCULAR; INTRAVENOUS; SOFT TISSUE AS NEEDED
Status: DISCONTINUED | OUTPATIENT
Start: 2020-12-11 | End: 2020-12-11 | Stop reason: SURG

## 2020-12-11 RX ORDER — SODIUM CHLORIDE 0.9 % (FLUSH) 0.9 %
3 SYRINGE (ML) INJECTION AS NEEDED
Status: DISCONTINUED | OUTPATIENT
Start: 2020-12-11 | End: 2020-12-11 | Stop reason: HOSPADM

## 2020-12-11 RX ORDER — HYDROCODONE BITARTRATE AND ACETAMINOPHEN 5; 325 MG/1; MG/1
1 TABLET ORAL EVERY 4 HOURS PRN
Status: DISCONTINUED | OUTPATIENT
Start: 2020-12-11 | End: 2020-12-12 | Stop reason: HOSPADM

## 2020-12-11 RX ORDER — SUCCINYLCHOLINE CHLORIDE 20 MG/ML
INJECTION INTRAMUSCULAR; INTRAVENOUS AS NEEDED
Status: DISCONTINUED | OUTPATIENT
Start: 2020-12-11 | End: 2020-12-11 | Stop reason: SURG

## 2020-12-11 RX ORDER — CALCIUM CARBONATE 200(500)MG
2 TABLET,CHEWABLE ORAL 3 TIMES DAILY
Status: DISCONTINUED | OUTPATIENT
Start: 2020-12-11 | End: 2020-12-12 | Stop reason: HOSPADM

## 2020-12-11 RX ORDER — OXYCODONE AND ACETAMINOPHEN 10; 325 MG/1; MG/1
1 TABLET ORAL ONCE AS NEEDED
Status: DISCONTINUED | OUTPATIENT
Start: 2020-12-11 | End: 2020-12-11 | Stop reason: HOSPADM

## 2020-12-11 RX ADMIN — SODIUM CHLORIDE, POTASSIUM CHLORIDE, SODIUM LACTATE AND CALCIUM CHLORIDE: 600; 310; 30; 20 INJECTION, SOLUTION INTRAVENOUS at 13:02

## 2020-12-11 RX ADMIN — PROPOFOL 140 MG: 10 INJECTION, EMULSION INTRAVENOUS at 12:03

## 2020-12-11 RX ADMIN — LOSARTAN POTASSIUM: 50 TABLET, FILM COATED ORAL at 18:21

## 2020-12-11 RX ADMIN — DEXAMETHASONE SODIUM PHOSPHATE 4 MG: 4 INJECTION, SOLUTION INTRAMUSCULAR; INTRAVENOUS at 11:08

## 2020-12-11 RX ADMIN — SUFENTANIL CITRATE 20 MCG: 50 INJECTION EPIDURAL; INTRAVENOUS at 12:48

## 2020-12-11 RX ADMIN — ACETAMINOPHEN 1000 MG: 500 TABLET, FILM COATED ORAL at 11:08

## 2020-12-11 RX ADMIN — EPHEDRINE SULFATE 20 MG: 50 INJECTION INTRAVENOUS at 12:12

## 2020-12-11 RX ADMIN — DEXAMETHASONE SODIUM PHOSPHATE 8 MG: 4 INJECTION, SOLUTION INTRAMUSCULAR; INTRAVENOUS at 12:44

## 2020-12-11 RX ADMIN — SODIUM CHLORIDE, POTASSIUM CHLORIDE, SODIUM LACTATE AND CALCIUM CHLORIDE 1000 ML: 600; 310; 30; 20 INJECTION, SOLUTION INTRAVENOUS at 09:10

## 2020-12-11 RX ADMIN — ANTACID TABLETS 2 TABLET: 500 TABLET, CHEWABLE ORAL at 21:08

## 2020-12-11 RX ADMIN — ROCURONIUM BROMIDE 10 MG: 10 INJECTION INTRAVENOUS at 12:03

## 2020-12-11 RX ADMIN — ANTACID TABLETS 2 TABLET: 500 TABLET, CHEWABLE ORAL at 18:21

## 2020-12-11 RX ADMIN — POTASSIUM CHLORIDE AND SODIUM CHLORIDE 100 ML/HR: 450; 150 INJECTION, SOLUTION INTRAVENOUS at 18:19

## 2020-12-11 RX ADMIN — FENTANYL CITRATE 100 MCG: 50 INJECTION, SOLUTION INTRAMUSCULAR; INTRAVENOUS at 14:00

## 2020-12-11 RX ADMIN — SUFENTANIL CITRATE 10 MCG: 50 INJECTION EPIDURAL; INTRAVENOUS at 12:00

## 2020-12-11 RX ADMIN — SUCCINYLCHOLINE CHLORIDE 160 MG: 20 INJECTION, SOLUTION INTRAMUSCULAR; INTRAVENOUS at 12:03

## 2020-12-11 RX ADMIN — HYDROCODONE BITARTRATE AND ACETAMINOPHEN 1 TABLET: 5; 325 TABLET ORAL at 20:23

## 2020-12-11 RX ADMIN — EPHEDRINE SULFATE 20 MG: 50 INJECTION INTRAVENOUS at 12:23

## 2020-12-11 RX ADMIN — LIDOCAINE HYDROCHLORIDE 100 MG: 20 INJECTION, SOLUTION INTRAVENOUS at 12:03

## 2020-12-11 RX ADMIN — SUFENTANIL CITRATE 20 MCG: 50 INJECTION EPIDURAL; INTRAVENOUS at 12:22

## 2020-12-11 RX ADMIN — ONDANSETRON HYDROCHLORIDE 4 MG: 2 SOLUTION INTRAMUSCULAR; INTRAVENOUS at 14:40

## 2020-12-11 NOTE — ANESTHESIA PREPROCEDURE EVALUATION
Anesthesia Evaluation     Patient summary reviewed   history of anesthetic complications (Glidescope IIb at [facility] after DL attempt unsuccessful, recent attempt to place double lumen tube unsuccessful after multiple attempts): difficult airway  NPO Solid Status: > 8 hours  NPO Liquid Status: > 8 hours           Airway   Mallampati: III  TM distance: >3 FB  Neck ROM: full  Difficult intubation highly probable  Dental - normal exam     Pulmonary    (+) a smoker (quit 1996) Former, lung cancer, sleep apnea,   (-) COPD, asthma    ROS comment: Right lung nodule  Cardiovascular   Exercise tolerance: good (4-7 METS)    ECG reviewed    (+) hypertension,   (-) past MI, CAD, dysrhythmias, angina, cardiac stents, hyperlipidemia      Neuro/Psych  (-) seizures, TIA, CVA  GI/Hepatic/Renal/Endo    (+) obesity,   thyroid problem   (-) liver disease, no renal disease, diabetes    ROS Comment: Substernal goiter    Musculoskeletal     Abdominal    Substance History      OB/GYN          Other      history of cancer (prostate, lung invasive mucinous adenocarcinoma.) active        Phys Exam Other: Recessed chin                  Anesthesia Plan    ASA 3     general   (History of difficult airway at OSH and recent inability to pass KELLI. During first lung procedure Glidscope 3 used with Grade IIb view. WIll need glidescope vs. CMAC for fiberoptic visualization)  intravenous induction     Anesthetic plan, all risks, benefits, and alternatives have been provided, discussed and informed consent has been obtained with: patient and child.

## 2020-12-11 NOTE — ANESTHESIA PROCEDURE NOTES
Airway  Urgency: elective    Date/Time: 12/11/2020 12:07 PM  Airway not difficult    General Information and Staff    Patient location during procedure: OR  CRNA: Rajan Mason CRNA    Indications and Patient Condition  Indications for airway management: airway protection    Preoxygenated: yes  Mask difficulty assessment: 1 - vent by mask    Final Airway Details  Final airway type: endotracheal airway      Successful airway: ETT and NIM tube  Cuffed: yes   Successful intubation technique: video laryngoscopy  Endotracheal tube insertion site: oral  Blade: Glidescope  Blade size: 4  ETT size (mm): 7.5  Cormack-Lehane Classification: grade IIa - partial view of glottis  Placement verified by: capnometry   Measured from: lips  Number of attempts at approach: 1  Assessment: lips, teeth, and gum same as pre-op and atraumatic intubation    Additional Comments  Patient is easy mask ventilate .   Intubated w/ glidescope 4. Grade 2b view. Able to pass ETT w/ glidescope stylet with ease.

## 2020-12-11 NOTE — OP NOTE
OPERATIVE NOTE  12/11/2020    NAME: Delgado Desir    YOB: 1950  MRN: [id number]    PRE-OPERATIVE DIAGNOSIS:    Thyromegaly [E01.0]  Pharyngoesophageal dysphagia [R13.14]    POST-OPERATIVE DIAGNOSIS:   Post-Op Diagnosis Codes:     * Thyromegaly [E01.0]     * Pharyngoesophageal dysphagia [R13.14]    PROCEDURE PERFORMED:   Total thyroidectomy    SURGEON:   Jean Marie Solis MD    ASSISTANT(S):   None    ANESTHESIA:   General Anesthesia via Endotracheal Tube    INDICATIONS: The patient is a 70 y.o. male with Thyromegaly [E01.0]  Pharyngoesophageal dysphagia [R13.14]    PROCEDURE:  The patient was brought to the operating room, given General Anesthesia via Endotracheal Tube, and prepped and draped in the usual manner.     The recurrent laryngeal nerve was monitored throughout the case utilizing technologist assisted nerve monitoring via Nuvasive.     Approximately 8 mL of 1% Xylocaine with epinephrine was injected subcutaneously and an incision made 2 fingerbreadths above the manubrium for approximately 7 cm utilizing a #15 blade.  The incision was carried down through the superficial layer of deep cervical fascia and the strap musculature identified in the midline. The strap muscles on the left were transected horizontally utilizing the Ethicon Harmonic scalpel. Minimal bleeding was encountered throughout the case which was controlled with a combination of electrocautery, as well as 2-0 and 3-0 silk ties and the Ethicon Harmonic scalpel.  The inferior aspect of the right thyroid lobe was approached first and dissected free from its surrounding fascial attachments with a Kitner dissector.  The inferior thyroid vasculature was identified and the artery and vein separately clamped cut and ligated with 3-0 silk near the capsule of the gland. The recurrent laryngeal nerve was subsequently identified in Siemens triangle.  Confirmation of identification was obtained was stimulation of the recurrent  laryngeal nerve at 0.5 mA utilizing the Xomed probe.      The left inferior parathyroid gland was identified and it's blood supply preserved during the dissection.  The recurrent nerve was then traced superiorly to its entrance into the larynx via the cricoarytenoid joint as the thyroid lobe was mobilized medially.  The middle thyroid vein was identified and ligated near the capsule of the gland utilizing 3-0 silk.    The superior thyroid vascular pedicle was subsequently identified and the superior thyroid artery and vein separately identified, clamped cut and ligated with 3-0 silk near the capsule of the gland.  The left superior parathyroid gland was then identified and it's blood supply preserved as well.   The left thyroid lobe was then completely mobilized and attention turned to the right thyroid lobe.    The strap muscles on the right then were transected horizontally utilizing the Ethicon Harmonic scalpel. Minimal bleeding was also encountered on this side and this bleeding was controlled in a similar fashion as previously dictated on the opposite side.  The inferior aspect of the right thyroid lobe was approached first and dissected free from its surrounding fascial attachments with a Kitner dissector.  The inferior thyroid vasculature was identified and the artery and vein separately clamped cut and ligated with 3-0 silk near the capsule of the gland. The recurrent laryngeal nerve was subsequently identified in Siemens triangle.  Confirmation of identification was obtained was stimulation of the recurrent laryngeal nerve at 0.3mA utilizing the Xomed probe.      The right inferior parathyroid gland was identified and it's blood supply preserved during the dissection.  The recurrent nerve was then traced superiorly to its entrance into the larynx via the cricoarytenoid joint as the thyroid lobe was mobilized medially.  The middle thyroid vein was identified and ligated near the capsule of the gland  utilizing 3-0 silk.  The right inferior portion of the lobe was massive extending out substernally and was mobilized digitally to obtain exposure.    The superior thyroid vascular pedicle was subsequently identified and the superior thyroid artery and vein separately identified, clamped cut and ligated with 3-0 silk near the capsule of the gland.  The right superior parathyroid gland was then identified and it's blood supply preserved as well.  The thyroid was then removed and submitted to pathology for permanent pathologic examination.  Both recurrent laryngeal nerves stimulated at the conclusion of the case at 0.3 mA.    The wound was irrigated with copious amounts of normal saline solution.  A #15 Ruiz drain was placed through a separate stab wound inferiorly in the neck.  It was secured to the skin of the anterior neck utilizing a single stitch of 2-0 silk.  Reapproximation of the incision was accomplished with interrupted 4-0 Vicryl to reapproximate the strap musculature, the platysma as well as subcutaneous tissues.  The epidermis was reapproximated utilizing a running subcuticular stitch of 5-0 Vicryl.  Steri-Strips, Mastisol, Bactroban ointment and a Mepilex were applied to the incision.    The patient tolerated the procedure well without complications and was transported upon extubation to the postanesthesia care unit in stable condition    The patient was transported upon extubation to the postanesthesia care unit in stable condition.    SPECIMENS:  A: Thyroid    COMPLICATIONS: NONE    ESTIMATED BLOOD LOSS:  < 100 cc    Jean Marie Solis MD  12/11/2020

## 2020-12-12 VITALS
WEIGHT: 253.53 LBS | RESPIRATION RATE: 16 BRPM | DIASTOLIC BLOOD PRESSURE: 63 MMHG | BODY MASS INDEX: 34.34 KG/M2 | HEIGHT: 72 IN | HEART RATE: 74 BPM | OXYGEN SATURATION: 95 % | SYSTOLIC BLOOD PRESSURE: 147 MMHG | TEMPERATURE: 97.6 F

## 2020-12-12 LAB
ALBUMIN SERPL-MCNC: 4 G/DL (ref 3.5–5.2)
ALBUMIN/GLOB SERPL: 1.7 G/DL
ALP SERPL-CCNC: 104 U/L (ref 39–117)
ALT SERPL W P-5'-P-CCNC: 29 U/L (ref 1–41)
ANION GAP SERPL CALCULATED.3IONS-SCNC: 12 MMOL/L (ref 5–15)
AST SERPL-CCNC: 24 U/L (ref 1–40)
BASOPHILS # BLD AUTO: 0.02 10*3/MM3 (ref 0–0.2)
BASOPHILS NFR BLD AUTO: 0.1 % (ref 0–1.5)
BILIRUB SERPL-MCNC: 1.5 MG/DL (ref 0–1.2)
BUN SERPL-MCNC: 20 MG/DL (ref 8–23)
BUN/CREAT SERPL: 22.2 (ref 7–25)
CALCIUM SPEC-SCNC: 9 MG/DL (ref 8.6–10.5)
CALCIUM SPEC-SCNC: 9.1 MG/DL (ref 8.6–10.5)
CHLORIDE SERPL-SCNC: 104 MMOL/L (ref 98–107)
CO2 SERPL-SCNC: 23 MMOL/L (ref 22–29)
CREAT SERPL-MCNC: 0.9 MG/DL (ref 0.76–1.27)
DEPRECATED RDW RBC AUTO: 43.4 FL (ref 37–54)
EOSINOPHIL # BLD AUTO: 0 10*3/MM3 (ref 0–0.4)
EOSINOPHIL NFR BLD AUTO: 0 % (ref 0.3–6.2)
ERYTHROCYTE [DISTWIDTH] IN BLOOD BY AUTOMATED COUNT: 13.6 % (ref 12.3–15.4)
GFR SERPL CREATININE-BSD FRML MDRD: 84 ML/MIN/1.73
GLOBULIN UR ELPH-MCNC: 2.3 GM/DL
GLUCOSE SERPL-MCNC: 121 MG/DL (ref 65–99)
HCT VFR BLD AUTO: 37.6 % (ref 37.5–51)
HGB BLD-MCNC: 13 G/DL (ref 13–17.7)
IMM GRANULOCYTES # BLD AUTO: 0.06 10*3/MM3 (ref 0–0.05)
IMM GRANULOCYTES NFR BLD AUTO: 0.4 % (ref 0–0.5)
LYMPHOCYTES # BLD AUTO: 0.6 10*3/MM3 (ref 0.7–3.1)
LYMPHOCYTES NFR BLD AUTO: 4.3 % (ref 19.6–45.3)
MCH RBC QN AUTO: 30.2 PG (ref 26.6–33)
MCHC RBC AUTO-ENTMCNC: 34.6 G/DL (ref 31.5–35.7)
MCV RBC AUTO: 87.4 FL (ref 79–97)
MONOCYTES # BLD AUTO: 0.61 10*3/MM3 (ref 0.1–0.9)
MONOCYTES NFR BLD AUTO: 4.4 % (ref 5–12)
NEUTROPHILS NFR BLD AUTO: 12.58 10*3/MM3 (ref 1.7–7)
NEUTROPHILS NFR BLD AUTO: 90.8 % (ref 42.7–76)
NRBC BLD AUTO-RTO: 0 /100 WBC (ref 0–0.2)
PLATELET # BLD AUTO: 193 10*3/MM3 (ref 140–450)
PMV BLD AUTO: 10.9 FL (ref 6–12)
POTASSIUM SERPL-SCNC: 4.3 MMOL/L (ref 3.5–5.2)
PROT SERPL-MCNC: 6.3 G/DL (ref 6–8.5)
RBC # BLD AUTO: 4.3 10*6/MM3 (ref 4.14–5.8)
SODIUM SERPL-SCNC: 139 MMOL/L (ref 136–145)
WBC # BLD AUTO: 13.87 10*3/MM3 (ref 3.4–10.8)

## 2020-12-12 PROCEDURE — 80053 COMPREHEN METABOLIC PANEL: CPT | Performed by: OTOLARYNGOLOGY

## 2020-12-12 PROCEDURE — 63710000001 CALCIUM CARBONATE 500 MG CHEWABLE TABLET: Performed by: OTOLARYNGOLOGY

## 2020-12-12 PROCEDURE — 25010000003 POTASSIUM CHLORIDE PER 2 MEQ: Performed by: OTOLARYNGOLOGY

## 2020-12-12 PROCEDURE — 99024 POSTOP FOLLOW-UP VISIT: CPT | Performed by: NURSE PRACTITIONER

## 2020-12-12 PROCEDURE — 82310 ASSAY OF CALCIUM: CPT | Performed by: OTOLARYNGOLOGY

## 2020-12-12 PROCEDURE — 85025 COMPLETE CBC W/AUTO DIFF WBC: CPT | Performed by: OTOLARYNGOLOGY

## 2020-12-12 PROCEDURE — A9270 NON-COVERED ITEM OR SERVICE: HCPCS | Performed by: OTOLARYNGOLOGY

## 2020-12-12 PROCEDURE — 63710000001 HYDROCODONE-ACETAMINOPHEN 5-325 MG TABLET: Performed by: OTOLARYNGOLOGY

## 2020-12-12 PROCEDURE — 63710000001 LEVOTHYROXINE 100 MCG TABLET: Performed by: OTOLARYNGOLOGY

## 2020-12-12 RX ORDER — LEVOTHYROXINE SODIUM 100 MCG
100 TABLET ORAL DAILY
Qty: 30 TABLET | Refills: 1 | Status: SHIPPED | OUTPATIENT
Start: 2020-12-12 | End: 2021-01-14

## 2020-12-12 RX ORDER — HYDROCHLOROTHIAZIDE 25 MG/1
6.25 TABLET ORAL EVERY OTHER DAY
Status: DISCONTINUED | OUTPATIENT
Start: 2020-12-13 | End: 2020-12-12 | Stop reason: HOSPADM

## 2020-12-12 RX ORDER — LOSARTAN POTASSIUM 50 MG/1
25 TABLET ORAL EVERY OTHER DAY
Status: DISCONTINUED | OUTPATIENT
Start: 2020-12-13 | End: 2020-12-12 | Stop reason: HOSPADM

## 2020-12-12 RX ADMIN — ANTACID TABLETS 2 TABLET: 500 TABLET, CHEWABLE ORAL at 10:00

## 2020-12-12 RX ADMIN — POTASSIUM CHLORIDE AND SODIUM CHLORIDE 100 ML/HR: 450; 150 INJECTION, SOLUTION INTRAVENOUS at 04:30

## 2020-12-12 RX ADMIN — LEVOTHYROXINE SODIUM 100 MCG: 100 TABLET ORAL at 04:29

## 2020-12-12 RX ADMIN — HYDROCODONE BITARTRATE AND ACETAMINOPHEN 1 TABLET: 5; 325 TABLET ORAL at 04:29

## 2020-12-12 NOTE — PLAN OF CARE
Goal Outcome Evaluation:  Plan of Care Reviewed With: patient  Progress: no change  Outcome Summary: Admit to 3C following total thyroidectomy. Neck incision d/i with mepilex. JPx1 with moderate bloody drainge. Up to chair. Tolerating regular diet. IVF. Voiding. VSS. Cont to monitor.

## 2020-12-12 NOTE — DISCHARGE SUMMARY
Kavitha Carrasco, APRN   DISCHARGE SUMMARY:     PATIENT NAME: Delgado Desir  ACCOUNT NUMBER: [id number]  ADMISSION DATE:  12/11/2020  DISCHARGE DATE:  12/12/2020  ATTENDING PHYSICIAN: Jean Marie Solis MD  CONDITION ON DISCHARGE: stable    ADMITTING DIAGNOSIS:   1. Thyromegaly    2. Thyroid mass    3. Pharyngoesophageal dysphagia    4. Lung nodule < 6cm on CT    5. Abnormal PET of right lung    6. Prostate cancer (CMS/HCC)        PROCEDURES:   Total thyroidectomy (N/A)    REASON FOR ADMISSION:   Delgado Desir is a 70 y.o. male who was admitted for observation after surgery.    HOSPITAL COURSE:   He underwent the procedure and was recovered and then sent to the floor for observation. He was observed overnight on IV fluids, The next morning, he was tolerating oral intake well with adequate pain control and no overt nausea or vomiting. The calcium levels were stable without symptoms of hypocalcemia. The patient was educated on drain care and the drain was left to be pulled on follow up in the clinic. It was felt he could be discharged to home.    LABS:  Lab Results   Component Value Date    PTH 38.6 12/11/2020    CALCIUM 9.1 12/12/2020       PHYSICAL EXAM:  Vitals:    12/12/20 0800   BP: 147/63   Pulse: 74   Resp: 16   Temp: 97.6 °F (36.4 °C)   SpO2: 95%     CONSTITUTIONAL: well nourished, well-developed, alert, oriented, in no acute distress   COMMUNICATION AND VOICE: able to communicate normally for age, normal voice quality  HEAD: normocephalic, no lesions, atraumatic, no tenderness, no masses   FACE: appearance normal, no lesions, no tenderness, no deformities, facial motion symmetric  EYES: ocular motility normal, eyelids normal, orbits normal, no proptosis, conjunctiva normal , pupils equal, round   EARS:  Hearing: hearing to conversational voice intact bilaterally   External Ears: normal bilaterally, no lesions  NOSE:  External Nose: external nasal structure normal, no tenderness on palpation, no nasal  discharge, no lesions, no evidence of trauma, nostrils patent   ORAL:  Lips: upper and lower lips without lesion   NECK:  Inspection and Palpation: neck appearance normal, no masses or tenderness  THYROID: JACQUE drain in place with serosanguinous drainage- total of 30 cc output from 0400 to 1030; dressing intact with scant amount of drainage, steristrips intact  CHEST/RESPIRATORY: normal respiratory effort   CARDIOVASCULAR: no cyanosis or edema   NEUROLOGICAL/PSYCHIATRIC: oriented, mood normal, affect appropriate, CN II-XII intact grossly      DISCHARGE MEDICATIONS:     Discharge Medications      New Medications      Instructions Start Date   calcium-vitamin D 500-200 MG-UNIT per tablet  Commonly known as: Oscal 500/200 D-3   2 tablets, Oral, 3 Times Daily      HYDROcodone-acetaminophen 7.5-325 MG per tablet  Commonly known as: NORCO   1 tablet, Oral, Every 4 Hours PRN      Synthroid 100 MCG tablet  Generic drug: levothyroxine   100 mcg, Oral, Daily         Continue These Medications      Instructions Start Date   acetaminophen 500 MG tablet  Commonly known as: TYLENOL   500 mg, Oral, Every 6 Hours PRN      alfuzosin 10 MG 24 hr tablet  Commonly known as: UROXATRAL   10 mg, Oral, Daily      losartan-hydrochlorothiazide 50-12.5 MG per tablet  Commonly known as: HYZAAR   0.5 tablets, Oral, Every Other Day      therapeutic multivitamin-minerals tablet tablet  Generic drug: multivitamin with minerals   1 tablet, Oral, Daily             DISCHARGE INSTRUCTIONS:  Activity Instructions     Discharge Activity      1) No driving for 1 week and no longer taking narcotics.   2) Return to school / work in 2 week.  3) May shower / sponge bathe for 48 hours.  4) Do not lift / push / pull more then 5 lbs.        Diet Instructions     Advance Diet as Tolerated             Your medication list      START taking these medications      Instructions Last Dose Given Next Dose Due   calcium-vitamin D 500-200 MG-UNIT per tablet  Commonly  known as: Oscal 500/200 D-3      Take 2 tablets by mouth 3 (Three) Times a Day.       HYDROcodone-acetaminophen 7.5-325 MG per tablet  Commonly known as: NORCO      Take 1 tablet by mouth Every 4 (Four) Hours As Needed for Moderate Pain  or Severe Pain  for up to 3 days.       Synthroid 100 MCG tablet  Generic drug: levothyroxine      Take 1 tablet by mouth Daily for 30 days.          CONTINUE taking these medications      Instructions Last Dose Given Next Dose Due   acetaminophen 500 MG tablet  Commonly known as: TYLENOL      Take 500 mg by mouth Every 6 (Six) Hours As Needed for Mild Pain .       alfuzosin 10 MG 24 hr tablet  Commonly known as: UROXATRAL      Take 10 mg by mouth Daily.       losartan-hydrochlorothiazide 50-12.5 MG per tablet  Commonly known as: HYZAAR      Take 0.5 tablets by mouth Every Other Day.       therapeutic multivitamin-minerals tablet tablet  Generic drug: multivitamin with minerals      Take 1 tablet by mouth Daily.             Where to Get Your Medications      These medications were sent to [facility] - [address] [facility] [place] - [phone]  - [phone] [address], [facility] [address]    Phone: [phone]   · calcium-vitamin D 500-200 MG-UNIT per tablet  · Synthroid 100 MCG tablet     You can get these medications from any pharmacy    Bring a paper prescription for each of these medications  · HYDROcodone-acetaminophen 7.5-325 MG per tablet         FOLLOW UP:  Follow up in 4 weeks with Dr. Jean Marie Solis MD  TSH in 3-4 weeks  Calcium level in 3-4 weeks      ANDRES Del Valle  12/12/2020  11:32 CST

## 2020-12-12 NOTE — PLAN OF CARE
Goal Outcome Evaluation:  Plan of Care Reviewed With: patient  Progress: improving  Outcome Summary: PO pain x 1 with relief. JACQUE x 1 with bloody output. Mepilex to neck incision with small drainage noted. Up ad ciera. VSS. Plan to dc home today.

## 2020-12-14 ENCOUNTER — OFFICE VISIT (OUTPATIENT)
Dept: OTOLARYNGOLOGY | Facility: CLINIC | Age: 70
End: 2020-12-14

## 2020-12-14 DIAGNOSIS — Z09 POSTOP CHECK: Primary | ICD-10-CM

## 2020-12-14 DIAGNOSIS — E89.0 POSTSURGICAL HYPOTHYROIDISM: Primary | ICD-10-CM

## 2020-12-14 PROCEDURE — 99024 POSTOP FOLLOW-UP VISIT: CPT | Performed by: OTOLARYNGOLOGY

## 2020-12-14 NOTE — PROGRESS NOTES
Procedure   Suture Removal    Date/Time: 12/14/2020 2:12 PM  Performed by: Beatriz Hendrix RN  Authorized by: Jean Marie Solis MD   Consent: Verbal consent obtained.  Consent given by: patient  Patient identity confirmed: verbally with patient  Body area: head/neck  Location details: neck  Comments: Patient presents postop thyroidectomy to have drain removed. Patient had less than 20 cc drainage. The drain was removed without difficulty and area was bandaged.

## 2020-12-14 NOTE — ANESTHESIA POSTPROCEDURE EVALUATION
"Patient: Delgado Desir    Procedure Summary     Date: 12/11/20 Room / Location:  [facility] OR 02 /  [facility]    Anesthesia Start: 1159 Anesthesia Stop: 1512    Procedure: Total thyroidectomy (N/A Neck) Diagnosis:       Thyromegaly      Pharyngoesophageal dysphagia      (Thyromegaly [E01.0])      (Pharyngoesophageal dysphagia [R13.14])    Surgeon: Jean Marie Solis MD Provider: Rajan Mason CRNA    Anesthesia Type: general ASA Status: 3          Anesthesia Type: general    Vitals  Vitals Value Taken Time   /70 12/11/20 1612   Temp 97.6 °F (36.4 °C) 12/11/20 1612   Pulse 72 12/11/20 1612   Resp 16 12/11/20 1612   SpO2 97 % 12/11/20 1612           Post Anesthesia Care and Evaluation    Patient location during evaluation: PACU  Patient participation: complete - patient participated  Level of consciousness: awake and alert  Pain management: adequate  Airway patency: patent  Anesthetic complications: No anesthetic complications    Cardiovascular status: acceptable  Respiratory status: acceptable  Hydration status: acceptable    Comments: Blood pressure 147/63, pulse 74, temperature 97.6 °F (36.4 °C), resp. rate 16, height 184 cm (72.44\"), weight 115 kg (253 lb 8.5 oz), SpO2 95 %.    Pt discharged from PACU based on rodolfo score >8      "

## 2020-12-15 LAB
CYTO UR: NORMAL
LAB AP CASE REPORT: NORMAL
PATH REPORT.FINAL DX SPEC: NORMAL
PATH REPORT.GROSS SPEC: NORMAL

## 2020-12-17 ENCOUNTER — OFFICE VISIT (OUTPATIENT)
Dept: CARDIAC SURGERY | Facility: CLINIC | Age: 70
End: 2020-12-17

## 2020-12-17 ENCOUNTER — TELEPHONE (OUTPATIENT)
Dept: OTOLARYNGOLOGY | Facility: CLINIC | Age: 70
End: 2020-12-17

## 2020-12-17 VITALS
HEART RATE: 83 BPM | WEIGHT: 253.8 LBS | DIASTOLIC BLOOD PRESSURE: 58 MMHG | HEIGHT: 72 IN | SYSTOLIC BLOOD PRESSURE: 116 MMHG | BODY MASS INDEX: 34.38 KG/M2 | OXYGEN SATURATION: 96 %

## 2020-12-17 DIAGNOSIS — R91.8 LUNG MASS: Primary | ICD-10-CM

## 2020-12-17 PROCEDURE — 99213 OFFICE O/P EST LOW 20 MIN: CPT | Performed by: SURGERY

## 2020-12-17 NOTE — TELEPHONE ENCOUNTER
----- Message from Jean Marie Solis MD sent at 12/17/2020  1:51 PM CST -----  Please call patient with result:above

## 2020-12-21 NOTE — PROGRESS NOTES
"    [facility] Cardiothoracic Surgery  PROGRESS NOTE   CC: Lung cancer    Subjective:  Mr. Ceballos is a 70-year-old male who originally presented to me with an incidental finding of a right lower lobe lung nodule.  He has a history of prostate cancer and therefore underwent wedge resection for diagnostic purposes.  This confirmed a primary lung malignancy.  We reattempted robotic completion lobectomy but were unable to pass a double-lumen endotracheal tube.  He was found to have a large goiter and has subsequently undergone thyroidectomy with Dr. Jean Marie Solis went well without any problems.    He returns today for discussions and planning for completion lobectomy and lymph node sampling.  He denies shortness of breath, denies chest pain or pain at previous robotic incisions.  He has not had any unwanted weight loss.  His voice is strong and he is recovered well from his thyroidectomy.    ROS: Denies fevers chills weight loss    Objective:      /58 (BP Location: Left arm, Patient Position: Sitting, Cuff Size: Adult)   Pulse 83   Ht 184 cm (72.44\")   Wt 115 kg (253 lb 12.8 oz)   SpO2 96%   BMI 34.00 kg/m²       PE:  Vitals:    12/17/20 1042   BP: 116/58   Pulse: 83   SpO2: 96%     GENERAL: NAD, resting comfortably, normal color  CARDIOVASCULAR: regular, regular rate, sinus  PULMONARY: Normal bilateral breath sounds, no labored breathing  ABDOMEN: soft, nt/nd  EXTREMITIES: mild peripheral edema, normal pulses, normal ROM        Lab Results (last 72 hours)     ** No results found for the last 72 hours. **              CXR 11/16/2020: Small right pleural effusion, good expansion of bilateral lungs      Assessment/Plan     Mr. Ceballos is a 70-year-old male with a right lower lobe adenocarcinoma, lung primary.  He underwent wedge resection to further delineate metastatic prostate versus primary lung.  This is consistent with primary lung cancer.  He underwent attempted right lower lobectomy " but could not complete due to inability to pass double-lumen ET tube.  He subsequently undergone an uncomplicated thyroidectomy with Dr. Jean Marie Soils which he tolerated well this was causing a lot of tracheal deviation.  We should have a better time getting double-lumen endotracheal intubation on next attempt.  I have discussed the risk and benefits of proceeding with this with Mr. Ceballos, he agrees to proceed with completion right lower lobectomy and lymph node sampling in order to decrease the risk of local regional recurrence and to complete his mediastinal staging.  I am going to order repeat PFTs as his previous ones did not have diffusion capacity on them.  We will plan on doing the surgery approximately 6 weeks after his total thyroidectomy to allow full recovery and all swelling to go down.    Thank you for trusting me with the care of Mr. Ceballos please do not hesitate to call with any questions or concerns.    Nick Flores M.D.  Cardiothoracic Surgeon

## 2020-12-22 ENCOUNTER — TELEPHONE (OUTPATIENT)
Dept: CARDIAC SURGERY | Facility: CLINIC | Age: 70
End: 2020-12-22

## 2020-12-22 DIAGNOSIS — R91.8 LUNG MASS: Primary | ICD-10-CM

## 2020-12-23 ENCOUNTER — APPOINTMENT (OUTPATIENT)
Dept: PREADMISSION TESTING | Facility: HOSPITAL | Age: 70
End: 2020-12-23

## 2020-12-28 ENCOUNTER — TRANSCRIBE ORDERS (OUTPATIENT)
Dept: LAB | Facility: HOSPITAL | Age: 70
End: 2020-12-28

## 2020-12-28 DIAGNOSIS — Z01.818 PRE-OP TESTING: ICD-10-CM

## 2020-12-28 DIAGNOSIS — Z11.59 SCREENING FOR VIRAL DISEASE: Primary | ICD-10-CM

## 2020-12-29 ENCOUNTER — TELEPHONE (OUTPATIENT)
Dept: CARDIAC SURGERY | Facility: CLINIC | Age: 70
End: 2020-12-29

## 2020-12-29 NOTE — TELEPHONE ENCOUNTER
Pt got a letter from a company called [employer] and states the letter says that their services were requested by his surgeon.  Pt does not understand what this is or what it's about.  Calling us to get more info.  Can reach pt at #[phone]/corinne

## 2020-12-29 NOTE — TELEPHONE ENCOUNTER
Please notify patient that this is not something that we have sent him.  I am not sure who sent this but from our standpoint this is not something we do.

## 2021-01-02 ENCOUNTER — LAB (OUTPATIENT)
Dept: LAB | Facility: HOSPITAL | Age: 71
End: 2021-01-02

## 2021-01-02 LAB — SARS-COV-2 ORF1AB RESP QL NAA+PROBE: NOT DETECTED

## 2021-01-02 PROCEDURE — U0004 COV-19 TEST NON-CDC HGH THRU: HCPCS | Performed by: NURSE PRACTITIONER

## 2021-01-02 PROCEDURE — C9803 HOPD COVID-19 SPEC COLLECT: HCPCS | Performed by: NURSE PRACTITIONER

## 2021-01-04 ENCOUNTER — HOSPITAL ENCOUNTER (OUTPATIENT)
Dept: PULMONOLOGY | Facility: HOSPITAL | Age: 71
Discharge: HOME | End: 2021-01-04
Admitting: NURSE PRACTITIONER

## 2021-01-04 DIAGNOSIS — R91.8 LUNG MASS: ICD-10-CM

## 2021-01-04 LAB
ARTERIAL PATENCY WRIST A: POSITIVE
ATMOSPHERIC PRESS: 750 MMHG
BASE EXCESS BLDA CALC-SCNC: 2.6 MMOL/L (ref 0–2)
BDY SITE: ABNORMAL
BODY TEMPERATURE: 37 C
HCO3 BLDA-SCNC: 25.9 MMOL/L (ref 20–26)
Lab: ABNORMAL
MODALITY: ABNORMAL
PCO2 BLDA: 35.2 MM HG (ref 35–45)
PCO2 TEMP ADJ BLD: 35.2 MM HG (ref 35–45)
PH BLDA: 7.48 PH UNITS (ref 7.35–7.45)
PH, TEMP CORRECTED: 7.48 PH UNITS (ref 7.35–7.45)
PO2 BLDA: 79.5 MM HG (ref 83–108)
PO2 TEMP ADJ BLD: 79.5 MM HG (ref 83–108)
SAO2 % BLDCOA: 97.1 % (ref 94–99)
VENTILATOR MODE: ABNORMAL

## 2021-01-04 PROCEDURE — 94729 DIFFUSING CAPACITY: CPT | Performed by: INTERNAL MEDICINE

## 2021-01-04 PROCEDURE — 36600 WITHDRAWAL OF ARTERIAL BLOOD: CPT

## 2021-01-04 PROCEDURE — 82803 BLOOD GASES ANY COMBINATION: CPT

## 2021-01-04 PROCEDURE — 94726 PLETHYSMOGRAPHY LUNG VOLUMES: CPT | Performed by: INTERNAL MEDICINE

## 2021-01-04 PROCEDURE — 94729 DIFFUSING CAPACITY: CPT

## 2021-01-04 PROCEDURE — 94060 EVALUATION OF WHEEZING: CPT

## 2021-01-04 PROCEDURE — 94726 PLETHYSMOGRAPHY LUNG VOLUMES: CPT

## 2021-01-04 PROCEDURE — 94060 EVALUATION OF WHEEZING: CPT | Performed by: INTERNAL MEDICINE

## 2021-01-04 RX ORDER — ALBUTEROL SULFATE 2.5 MG/3ML
2.5 SOLUTION RESPIRATORY (INHALATION) ONCE
Status: COMPLETED | OUTPATIENT
Start: 2021-01-04 | End: 2021-01-04

## 2021-01-04 RX ADMIN — ALBUTEROL SULFATE 2.5 MG: 2.5 SOLUTION RESPIRATORY (INHALATION) at 16:35

## 2021-01-13 NOTE — PROGRESS NOTES
YOB: 1950  Location: [facility]  Location Address: [address] [place] [address],  [address], [address] [place] [address]  Location Phone: [phone]    Chief Complaint   Patient presents with   • Follow-up       History of Present Illness  Delgado Desir is a 70 y.o. male.  Delgado Desir is here for follow up of ENT complaints. The patient is status post total thyroidectomy on 20. The patient has done well postoperatively and has no complaints at this time. He finished taking the extra calcium on Monday and takes 100 mcg of synthroid in the morning.     PATIENT: Delgado Desir  : 1950, 70 yrs, Male  MRN: [id number], CSN: [id number]  [address]  Surgical Pathology Report (Final result) [id number]  Authorizing Provider: Jean Marie Solis MD Ordering Provider: Jean Marie Solis MD  Ordering Location: [facility] OR Collected: 2020 1232  Pathologist: Iwona Brooks MD Received: 2020 1238  .  Specimens  1 Thyroid, thyroid  .  .  Final Diagnosis  Thyroid, total thyroidectomy:  Multinodular goiter with focal calcifications.  Electronically signed by Iwona Brooks MD on 12/15/2020 at 1516          Past Medical History:   Diagnosis Date   • Hypertension    • Prostate cancer (CMS/HCC)        Past Surgical History:   Procedure Laterality Date   • CARPAL TUNNEL RELEASE Right    • LUNG BIOPSY     • PROSTATE BIOPSY     • REPLACEMENT TOTAL KNEE Left    • SHOULDER SURGERY Bilateral    • THORACOSCOPY Right 10/30/2020    Procedure: MEDIASTINOSCOPY, RIGHT THORACOSCOPY POSSIBLE WEDGE RESECTION WITH DAVINCI ROBOT;  Surgeon: Nick Flores MD;  Location: [facility] OR;  Service: DaVinci;  Laterality: Right;   • THYROIDECTOMY N/A 2020    Procedure: Total thyroidectomy;  Surgeon: Jean Marie Solis MD;  Location: [facility] OR;  Service: ENT;  Laterality: N/A;       Outpatient Medications Marked as Taking for the 21 encounter (Office Visit) with Jean Marie Solis  MD Rico   Medication Sig Dispense Refill   • acetaminophen (TYLENOL) 500 MG tablet Take 500 mg by mouth Every 6 (Six) Hours As Needed for Mild Pain .     • alfuzosin (UROXATRAL) 10 MG 24 hr tablet Take 10 mg by mouth Daily.     • losartan-hydrochlorothiazide (HYZAAR) 50-12.5 MG per tablet Take 0.5 tablets by mouth Every Other Day.     • Synthroid 100 MCG tablet Take 1 tablet by mouth Daily for 30 days. 30 tablet 3   • therapeutic multivitamin-minerals (THERAGRAN-M) tablet Take 1 tablet by mouth Daily.         Patient has no known allergies.    Family History   Problem Relation Age of Onset   • Hypertension Mother    • COPD Father        Social History     Socioeconomic History   • Marital status:      Spouse name: Not on file   • Number of children: 2   • Years of education: Not on file   • Highest education level: Not on file   Occupational History   • Occupation:    Tobacco Use   • Smoking status: Former Smoker     Packs/day: 1.00     Years: 33.00     Pack years: 33.00     Types: Cigarettes     Start date:      Quit date:      Years since quittin.0   • Smokeless tobacco: Never Used   Substance and Sexual Activity   • Alcohol use: Yes     Alcohol/week: 2.0 standard drinks     Types: 2 Shots of liquor per week   • Drug use: Never   • Sexual activity: Defer       Review of Systems   Constitutional: Negative for activity change, appetite change, chills, diaphoresis, fatigue, fever and unexpected weight change.   HENT: Negative for congestion, ear discharge, ear pain, facial swelling, hearing loss, mouth sores, nosebleeds, postnasal drip, rhinorrhea, sinus pressure, sneezing, sore throat, tinnitus, trouble swallowing and voice change.         S/P total thyroidectomy  hypothyroid   Eyes: Negative for pain, discharge, redness, itching and visual disturbance.   Respiratory: Negative for apnea, cough, choking, chest tightness, shortness of breath, wheezing and stridor.     Gastrointestinal: Negative for nausea and vomiting.   Endocrine: Negative for cold intolerance and heat intolerance.   Musculoskeletal: Negative for arthralgias, back pain, gait problem, neck pain and neck stiffness.   Skin: Negative for rash.   Allergic/Immunologic: Negative for environmental allergies and food allergies.   Neurological: Negative for dizziness, tremors, seizures, syncope, facial asymmetry, speech difficulty, weakness, light-headedness, numbness and headaches.   Hematological: Negative for adenopathy. Does not bruise/bleed easily.   Psychiatric/Behavioral: Negative for behavioral problems, sleep disturbance and suicidal ideas. The patient is not nervous/anxious and is not hyperactive.        Vitals:    01/14/21 1143   BP: 106/62   Pulse: 85   Temp: 98.2 °F (36.8 °C)       Body mass index is 34.92 kg/m².    Objective     Physical Exam  Vitals signs reviewed.   Constitutional:       General: He is not in acute distress.     Appearance: He is well-developed. He is not diaphoretic.   HENT:      Head: Normocephalic and atraumatic.      Right Ear: External ear normal.      Left Ear: External ear normal.      Nose: Nose normal.   Eyes:      General: No scleral icterus.        Right eye: No discharge.         Left eye: No discharge.      Conjunctiva/sclera: Conjunctivae normal.      Pupils: Pupils are equal, round, and reactive to light.   Neck:     Pulmonary:      Effort: Pulmonary effort is normal.   Skin:     General: Skin is warm and dry.   Neurological:      Mental Status: He is alert and oriented to person, place, and time.   Psychiatric:         Behavior: Behavior normal.         Thought Content: Thought content normal.         Judgment: Judgment normal.         Assessment/Plan   Diagnoses and all orders for this visit:    1. S/P total thyroidectomy (Primary)    2. Postoperative hypothyroidism      * Surgery not found *  No orders of the defined types were placed in this encounter.    Return for  Will call patient with follow-up appointment.       Patient Instructions   Will get TSH and Calcium levels today and call patient with results and schedule follow-up appointment.

## 2021-01-14 ENCOUNTER — LAB (OUTPATIENT)
Dept: LAB | Facility: HOSPITAL | Age: 71
End: 2021-01-14

## 2021-01-14 ENCOUNTER — OFFICE VISIT (OUTPATIENT)
Dept: OTOLARYNGOLOGY | Facility: CLINIC | Age: 71
End: 2021-01-14

## 2021-01-14 VITALS
WEIGHT: 260.6 LBS | SYSTOLIC BLOOD PRESSURE: 106 MMHG | TEMPERATURE: 98.2 F | DIASTOLIC BLOOD PRESSURE: 62 MMHG | HEART RATE: 85 BPM | HEIGHT: 72 IN | BODY MASS INDEX: 35.3 KG/M2

## 2021-01-14 DIAGNOSIS — E89.0 POSTOPERATIVE HYPOTHYROIDISM: ICD-10-CM

## 2021-01-14 DIAGNOSIS — E01.0 THYROMEGALY: ICD-10-CM

## 2021-01-14 DIAGNOSIS — E89.0 POSTSURGICAL HYPOTHYROIDISM: ICD-10-CM

## 2021-01-14 DIAGNOSIS — R13.14 PHARYNGOESOPHAGEAL DYSPHAGIA: ICD-10-CM

## 2021-01-14 DIAGNOSIS — E89.0 S/P TOTAL THYROIDECTOMY: Primary | ICD-10-CM

## 2021-01-14 PROBLEM — E07.9 THYROID MASS: Status: RESOLVED | Noted: 2020-12-11 | Resolved: 2021-01-14

## 2021-01-14 PROBLEM — E04.8 MEDIASTINAL GOITER: Status: RESOLVED | Noted: 2020-11-05 | Resolved: 2021-01-14

## 2021-01-14 LAB
ANION GAP SERPL CALCULATED.3IONS-SCNC: 11 MMOL/L (ref 5–15)
BASOPHILS # BLD AUTO: 0.03 10*3/MM3 (ref 0–0.2)
BASOPHILS NFR BLD AUTO: 0.4 % (ref 0–1.5)
BUN SERPL-MCNC: 17 MG/DL (ref 8–23)
BUN/CREAT SERPL: 17.7 (ref 7–25)
CALCIUM SPEC-SCNC: 8.8 MG/DL (ref 8.6–10.5)
CHLORIDE SERPL-SCNC: 102 MMOL/L (ref 98–107)
CO2 SERPL-SCNC: 23 MMOL/L (ref 22–29)
CREAT SERPL-MCNC: 0.96 MG/DL (ref 0.76–1.27)
DEPRECATED RDW RBC AUTO: 43.8 FL (ref 37–54)
EOSINOPHIL # BLD AUTO: 0.02 10*3/MM3 (ref 0–0.4)
EOSINOPHIL NFR BLD AUTO: 0.3 % (ref 0.3–6.2)
ERYTHROCYTE [DISTWIDTH] IN BLOOD BY AUTOMATED COUNT: 14.1 % (ref 12.3–15.4)
GFR SERPL CREATININE-BSD FRML MDRD: 78 ML/MIN/1.73
GLUCOSE SERPL-MCNC: 112 MG/DL (ref 65–99)
HCT VFR BLD AUTO: 38.6 % (ref 37.5–51)
HGB BLD-MCNC: 13.4 G/DL (ref 13–17.7)
IMM GRANULOCYTES # BLD AUTO: 0.05 10*3/MM3 (ref 0–0.05)
IMM GRANULOCYTES NFR BLD AUTO: 0.6 % (ref 0–0.5)
LYMPHOCYTES # BLD AUTO: 0.53 10*3/MM3 (ref 0.7–3.1)
LYMPHOCYTES NFR BLD AUTO: 6.6 % (ref 19.6–45.3)
MCH RBC QN AUTO: 29.7 PG (ref 26.6–33)
MCHC RBC AUTO-ENTMCNC: 34.7 G/DL (ref 31.5–35.7)
MCV RBC AUTO: 85.6 FL (ref 79–97)
MONOCYTES # BLD AUTO: 0.38 10*3/MM3 (ref 0.1–0.9)
MONOCYTES NFR BLD AUTO: 4.8 % (ref 5–12)
NEUTROPHILS NFR BLD AUTO: 6.97 10*3/MM3 (ref 1.7–7)
NEUTROPHILS NFR BLD AUTO: 87.3 % (ref 42.7–76)
NRBC BLD AUTO-RTO: 0 /100 WBC (ref 0–0.2)
PLATELET # BLD AUTO: 166 10*3/MM3 (ref 140–450)
PMV BLD AUTO: 11.4 FL (ref 6–12)
POTASSIUM SERPL-SCNC: 4.2 MMOL/L (ref 3.5–5.2)
RBC # BLD AUTO: 4.51 10*6/MM3 (ref 4.14–5.8)
SODIUM SERPL-SCNC: 136 MMOL/L (ref 136–145)
TSH SERPL DL<=0.05 MIU/L-ACNC: 5.94 UIU/ML (ref 0.27–4.2)
WBC # BLD AUTO: 7.98 10*3/MM3 (ref 3.4–10.8)

## 2021-01-14 PROCEDURE — 80048 BASIC METABOLIC PNL TOTAL CA: CPT

## 2021-01-14 PROCEDURE — 84443 ASSAY THYROID STIM HORMONE: CPT

## 2021-01-14 PROCEDURE — 85025 COMPLETE CBC W/AUTO DIFF WBC: CPT

## 2021-01-14 PROCEDURE — 99024 POSTOP FOLLOW-UP VISIT: CPT | Performed by: PHYSICIAN ASSISTANT

## 2021-01-14 PROCEDURE — 36415 COLL VENOUS BLD VENIPUNCTURE: CPT

## 2021-01-14 NOTE — PATIENT INSTRUCTIONS
Will get TSH and Calcium levels today and call patient with results and schedule follow-up appointment.

## 2021-01-18 ENCOUNTER — TELEPHONE (OUTPATIENT)
Dept: OTOLARYNGOLOGY | Facility: CLINIC | Age: 71
End: 2021-01-18

## 2021-01-18 DIAGNOSIS — E89.0 POSTOPERATIVE HYPOTHYROIDISM: Primary | ICD-10-CM

## 2021-01-18 RX ORDER — LEVOTHYROXINE SODIUM 112 MCG
112 TABLET ORAL DAILY
Qty: 30 TABLET | Refills: 0 | Status: ON HOLD | OUTPATIENT
Start: 2021-01-18 | End: 2021-02-17 | Stop reason: DRUGHIGH

## 2021-01-18 NOTE — TELEPHONE ENCOUNTER
----- Message from Jean Marie Solis MD sent at 1/15/2021  3:10 PM CST -----  Please call the patient regarding his abnormal result.  He is mildly hypothyroid if he is having some symptoms we increase him to 112 mcg/day and recheck a TSH in a month

## 2021-02-02 ENCOUNTER — PREP FOR SURGERY (OUTPATIENT)
Dept: OTHER | Facility: HOSPITAL | Age: 71
End: 2021-02-02

## 2021-02-02 ENCOUNTER — TELEPHONE (OUTPATIENT)
Dept: CARDIAC SURGERY | Facility: CLINIC | Age: 71
End: 2021-02-02

## 2021-02-02 DIAGNOSIS — R91.8 LUNG MASS: Primary | ICD-10-CM

## 2021-02-02 RX ORDER — SODIUM CHLORIDE 0.9 % (FLUSH) 0.9 %
3 SYRINGE (ML) INJECTION EVERY 12 HOURS SCHEDULED
Status: CANCELLED | OUTPATIENT
Start: 2021-02-02

## 2021-02-02 RX ORDER — SODIUM CHLORIDE 0.9 % (FLUSH) 0.9 %
10 SYRINGE (ML) INJECTION AS NEEDED
Status: CANCELLED | OUTPATIENT
Start: 2021-02-02

## 2021-02-02 RX ORDER — ALBUTEROL SULFATE 1.25 MG/3ML
1.25 SOLUTION RESPIRATORY (INHALATION)
Status: CANCELLED | OUTPATIENT
Start: 2021-02-02

## 2021-02-02 RX ORDER — CEFAZOLIN SODIUM 2 G/50ML
2 SOLUTION INTRAVENOUS ONCE
Status: CANCELLED | OUTPATIENT
Start: 2021-02-15

## 2021-02-02 RX ORDER — HEPARIN SODIUM 5000 [USP'U]/ML
5000 INJECTION, SOLUTION INTRAVENOUS; SUBCUTANEOUS ONCE
Status: CANCELLED | OUTPATIENT
Start: 2021-02-15

## 2021-02-02 NOTE — TELEPHONE ENCOUNTER
Pt called back on this.  Informed him of this tentative date and asked if this date would work for him.  Pt asked how soon we needed to know.  Explained to him that the sooner we knew the better but that we would have to know by the end of the week in order to have time to schedule everything.  Pt states he will get back to us in a day or two re: this.  Pt also requesting to speak with Dr Flores re: his surgery.  Pt did not give more info than that.  Informed pt that Dr Flores has 2 surgeries today and would likely not be in office today but that I would put the message through.  Can reach pt at #[phone]/corinne

## 2021-02-08 ENCOUNTER — TELEPHONE (OUTPATIENT)
Dept: CARDIAC SURGERY | Facility: CLINIC | Age: 71
End: 2021-02-08

## 2021-02-08 NOTE — TELEPHONE ENCOUNTER
Pt left  message requesting a call back re: questions he has re: prev surgeries.  Can reach him at #[phone]/corinne

## 2021-02-09 ENCOUNTER — TRANSCRIBE ORDERS (OUTPATIENT)
Dept: ADMINISTRATIVE | Facility: HOSPITAL | Age: 71
End: 2021-02-09

## 2021-02-09 DIAGNOSIS — Z11.59 SCREENING FOR VIRAL DISEASE: Primary | ICD-10-CM

## 2021-02-12 ENCOUNTER — LAB (OUTPATIENT)
Dept: LAB | Facility: HOSPITAL | Age: 71
End: 2021-02-12

## 2021-02-12 ENCOUNTER — HOSPITAL ENCOUNTER (OUTPATIENT)
Dept: GENERAL RADIOLOGY | Facility: HOSPITAL | Age: 71
Discharge: HOME | End: 2021-02-12

## 2021-02-12 ENCOUNTER — APPOINTMENT (OUTPATIENT)
Dept: PREADMISSION TESTING | Facility: HOSPITAL | Age: 71
End: 2021-02-12

## 2021-02-12 ENCOUNTER — ANESTHESIA EVENT (OUTPATIENT)
Dept: PERIOP | Facility: HOSPITAL | Age: 71
End: 2021-02-12

## 2021-02-12 VITALS
DIASTOLIC BLOOD PRESSURE: 67 MMHG | WEIGHT: 266.1 LBS | BODY MASS INDEX: 36.04 KG/M2 | RESPIRATION RATE: 16 BRPM | HEART RATE: 82 BPM | SYSTOLIC BLOOD PRESSURE: 143 MMHG | HEIGHT: 72 IN | OXYGEN SATURATION: 95 %

## 2021-02-12 DIAGNOSIS — R91.8 LUNG MASS: ICD-10-CM

## 2021-02-12 LAB
ALBUMIN SERPL-MCNC: 4.1 G/DL (ref 3.5–5.2)
ALBUMIN/GLOB SERPL: 1.4 G/DL
ALP SERPL-CCNC: 108 U/L (ref 39–117)
ALT SERPL W P-5'-P-CCNC: 34 U/L (ref 1–41)
ANION GAP SERPL CALCULATED.3IONS-SCNC: 10 MMOL/L (ref 5–15)
APTT PPP: 29.7 SECONDS (ref 24.1–35)
AST SERPL-CCNC: 31 U/L (ref 1–40)
BASOPHILS # BLD AUTO: 0.04 10*3/MM3 (ref 0–0.2)
BASOPHILS NFR BLD AUTO: 0.6 % (ref 0–1.5)
BILIRUB SERPL-MCNC: 1.1 MG/DL (ref 0–1.2)
BUN SERPL-MCNC: 16 MG/DL (ref 8–23)
BUN/CREAT SERPL: 17 (ref 7–25)
CALCIUM SPEC-SCNC: 9.4 MG/DL (ref 8.6–10.5)
CHLORIDE SERPL-SCNC: 100 MMOL/L (ref 98–107)
CO2 SERPL-SCNC: 25 MMOL/L (ref 22–29)
CREAT SERPL-MCNC: 0.94 MG/DL (ref 0.76–1.27)
DEPRECATED RDW RBC AUTO: 44.6 FL (ref 37–54)
EOSINOPHIL # BLD AUTO: 0.06 10*3/MM3 (ref 0–0.4)
EOSINOPHIL NFR BLD AUTO: 0.8 % (ref 0.3–6.2)
ERYTHROCYTE [DISTWIDTH] IN BLOOD BY AUTOMATED COUNT: 14 % (ref 12.3–15.4)
GFR SERPL CREATININE-BSD FRML MDRD: 80 ML/MIN/1.73
GLOBULIN UR ELPH-MCNC: 2.9 GM/DL
GLUCOSE SERPL-MCNC: 120 MG/DL (ref 65–99)
HCT VFR BLD AUTO: 40.8 % (ref 37.5–51)
HGB BLD-MCNC: 14 G/DL (ref 13–17.7)
IMM GRANULOCYTES # BLD AUTO: 0.05 10*3/MM3 (ref 0–0.05)
IMM GRANULOCYTES NFR BLD AUTO: 0.7 % (ref 0–0.5)
INR PPP: 1.11 (ref 0.91–1.09)
LYMPHOCYTES # BLD AUTO: 0.78 10*3/MM3 (ref 0.7–3.1)
LYMPHOCYTES NFR BLD AUTO: 10.8 % (ref 19.6–45.3)
MCH RBC QN AUTO: 29.5 PG (ref 26.6–33)
MCHC RBC AUTO-ENTMCNC: 34.3 G/DL (ref 31.5–35.7)
MCV RBC AUTO: 86.1 FL (ref 79–97)
MONOCYTES # BLD AUTO: 0.57 10*3/MM3 (ref 0.1–0.9)
MONOCYTES NFR BLD AUTO: 7.9 % (ref 5–12)
NEUTROPHILS NFR BLD AUTO: 5.73 10*3/MM3 (ref 1.7–7)
NEUTROPHILS NFR BLD AUTO: 79.2 % (ref 42.7–76)
NRBC BLD AUTO-RTO: 0 /100 WBC (ref 0–0.2)
PLATELET # BLD AUTO: 148 10*3/MM3 (ref 140–450)
PMV BLD AUTO: 10.8 FL (ref 6–12)
POTASSIUM SERPL-SCNC: 4.4 MMOL/L (ref 3.5–5.2)
PROT SERPL-MCNC: 7 G/DL (ref 6–8.5)
PROTHROMBIN TIME: 13.9 SECONDS (ref 11.9–14.6)
RBC # BLD AUTO: 4.74 10*6/MM3 (ref 4.14–5.8)
SARS-COV-2 ORF1AB RESP QL NAA+PROBE: NOT DETECTED
SODIUM SERPL-SCNC: 135 MMOL/L (ref 136–145)
WBC # BLD AUTO: 7.23 10*3/MM3 (ref 3.4–10.8)

## 2021-02-12 PROCEDURE — 93010 ELECTROCARDIOGRAM REPORT: CPT | Performed by: INTERNAL MEDICINE

## 2021-02-12 PROCEDURE — 36415 COLL VENOUS BLD VENIPUNCTURE: CPT

## 2021-02-12 PROCEDURE — 93005 ELECTROCARDIOGRAM TRACING: CPT

## 2021-02-12 PROCEDURE — U0004 COV-19 TEST NON-CDC HGH THRU: HCPCS | Performed by: SURGERY

## 2021-02-12 PROCEDURE — 85730 THROMBOPLASTIN TIME PARTIAL: CPT

## 2021-02-12 PROCEDURE — 85610 PROTHROMBIN TIME: CPT

## 2021-02-12 PROCEDURE — 80053 COMPREHEN METABOLIC PANEL: CPT

## 2021-02-12 PROCEDURE — C9803 HOPD COVID-19 SPEC COLLECT: HCPCS | Performed by: SURGERY

## 2021-02-12 PROCEDURE — 85025 COMPLETE CBC W/AUTO DIFF WBC: CPT

## 2021-02-12 PROCEDURE — 71046 X-RAY EXAM CHEST 2 VIEWS: CPT

## 2021-02-12 NOTE — DISCHARGE INSTRUCTIONS
DAY OF SURGERY INSTRUCTIONS        YOUR SURGEON: ***AMANDA CAMPBELL    PROCEDURE: ***WEDGE RESECTION    DATE OF SURGERY: ***February 15,2021    ARRIVAL TIME: AS DIRECTED BY OFFICE    YOU MAY TAKE THE FOLLOWING MEDICATION(S) THE MORNING OF SURGERY WITH A SIP OF WATER: ***NONE      ALL OTHER HOME MEDICATION CHECK WITH YOUR PHYSICIAN      DO NOT TAKE ANY ERECTILE DYSFUNCTION MEDICATIONS (EX: CIALIS, VIAGRA) 24 HOURS PRIOR TO SURGERY                      MANAGING PAIN AFTER SURGERY    We know you are probably wondering what your pain will be like after surgery.  Following surgery it is unrealistic to expect you will not have pain.   Pain is how our bodies let us know that something is wrong or cautions us to be careful.  That said, our goal is to make your pain tolerable.    Methods we may use to treat your pain include (oral or IV medications, PCAs, epidurals, nerve blocks, etc.)   While some procedures require IV pain medications for a short time after surgery, transitioning to pain medications by mouth allows for better management of pain.   Your nurse will encourage you to take oral pain medications whenever possible.  IV medications work almost immediately, but only last a short while.  Taking medications by mouth allows for a more constant level of medication in your blood stream for a longer period of time.      Once your pain is out of control it is harder to get back under control.  It is important you are aware when your next dose of pain medication is due.  If you are admitted, your nurse may write the time of your next dose on the white board in your room to help you remember.      We are interested in your pain and encourage you to inform us about aggravating factors during your visit.   Many times a simple repositioning every few hours can make a big difference.    If your physician says it is okay, do not let your pain prevent you from getting out of bed. Be sure to call your nurse for assistance prior to  getting up so you do not fall.      Before surgery, please decide your tolerable pain goal.  These faces help describe the pain ratings we use on a 0-10 scale.   Be prepared to tell us your goal and whether or not you take pain or anxiety medications at home.          BEFORE YOU COME TO THE HOSPITAL  (Pre-op instructions)  • Do not eat, drink, smoke or chew gum after midnight the night before surgery.  This also includes no mints.  • Morning of surgery take only the medicines you have been instructed with a sip of water unless otherwise instructed  by your physician.  • Do not shave, wear makeup or dark nail polish.  • Remove all jewelry including rings.  • Leave anything you consider valuable at home.  • Leave your suitcase in the car until after your surgery.  • Bring the following with you if applicable:  o Picture ID and insurance, Medicare or Medicaid cards  o Co-pay/deductible required by insurance (cash, check, credit card)  o Copy of advance directive, living will or power-of- documents if not brought to PAT  o CPAP or BIPAP mask and tubing  o Relaxation aids ( book, magazine), etc.  o Hearing aids                        ON THE DAY OF SURGERY  · On the day of surgery check in at registration located at the main entrance of the hospital.   ? You will be registered and given a beeper with instructions where to wait in the main lobby.  ? When your beeper lights up and vibrates a member of the Outpatient Surgery staff will meet you at the double doors under the stair steps and escort you to your preoperative room.   · You may have cloth compression devices placed on your legs. These help to prevent blood clots and reduce swelling in your legs.  · An IV may be inserted into one of your veins.  · In the operating room, you may be given one or more of the following:  ? A medicine to help you relax (sedative).  ? A medicine to numb the area (local anesthetic).  ? A medicine to make you fall asleep (general  "anesthetic).  ? A medicine that is injected into an area of your body to numb everything below the injection site (regional anesthetic).  · Your surgical site will be marked or identified.  · You may be given an antibiotic through your IV to help prevent infection.  Contact a health care provider if you:  · Develop a fever of more than 100.4°F (38°C) or other feelings of illness during the 48 hours before your surgery.  · Have symptoms that get worse.  Have questions or concerns about your surgery    General Anesthesia/Surgery, Adult  General anesthesia is the use of medicines to make a person \"go to sleep\" (unconscious) for a medical procedure. General anesthesia must be used for certain procedures, and is often recommended for procedures that:  · Last a long time.  · Require you to be still or in an unusual position.  · Are major and can cause blood loss.  The medicines used for general anesthesia are called general anesthetics. As well as making you unconscious for a certain amount of time, these medicines:  · Prevent pain.  · Control your blood pressure.  · Relax your muscles.  Tell a health care provider about:  · Any allergies you have.  · All medicines you are taking, including vitamins, herbs, eye drops, creams, and over-the-counter medicines.  · Any problems you or family members have had with anesthetic medicines.  · Types of anesthetics you have had in the past.  · Any blood disorders you have.  · Any surgeries you have had.  · Any medical conditions you have.  · Any recent upper respiratory, chest, or ear infections.  · Any history of:  ? Heart or lung conditions, such as heart failure, sleep apnea, asthma, or chronic obstructive pulmonary disease (COPD).  ?  service.  ? Depression or anxiety.  · Any tobacco or drug use, including marijuana or alcohol use.  · Whether you are pregnant or may be pregnant.  What are the risks?  Generally, this is a safe procedure. However, problems may occur, " including:  · Allergic reaction.  · Lung and heart problems.  · Inhaling food or liquid from the stomach into the lungs (aspiration).  · Nerve injury.  · Air in the bloodstream, which can lead to stroke.  · Extreme agitation or confusion (delirium) when you wake up from the anesthetic.  · Waking up during your procedure and being unable to move. This is rare.  These problems are more likely to develop if you are having a major surgery or if you have an advanced or serious medical condition. You can prevent some of these complications by answering all of your health care provider's questions thoroughly and by following all instructions before your procedure.  General anesthesia can cause side effects, including:  · Nausea or vomiting.  · A sore throat from the breathing tube.  · Hoarseness.  · Wheezing or coughing.  · Shaking chills.  · Tiredness.  · Body aches.  · Anxiety.  · Sleepiness or drowsiness.  · Confusion or agitation.  RISKS AND COMPLICATIONS OF SURGERY  Your health care provider will discuss possible risks and complications with you before surgery. Common risks and complications include:    · Problems due to the use of anesthetics.  · Blood loss and replacement (does not apply to minor surgical procedures).  · Temporary increase in pain due to surgery.  · Uncorrected pain or problems that the surgery was meant to correct.  · Infection.  · New damage.    What happens before the procedure?    Medicines  Ask your health care provider about:  · Changing or stopping your regular medicines. This is especially important if you are taking diabetes medicines or blood thinners.  · Taking medicines such as aspirin and ibuprofen. These medicines can thin your blood. Do not take these medicines unless your health care provider tells you to take them.  · Taking over-the-counter medicines, vitamins, herbs, and supplements. Do not take these during the week before your procedure unless your health care provider approves  them.  General instructions  · Starting 3-6 weeks before the procedure, do not use any products that contain nicotine or tobacco, such as cigarettes and e-cigarettes. If you need help quitting, ask your health care provider.  · If you brush your teeth on the morning of the procedure, make sure to spit out all of the toothpaste.  · Tell your health care provider if you become ill or develop a cold, cough, or fever.  · If instructed by your health care provider, bring your sleep apnea device with you on the day of your surgery (if applicable).  · Ask your health care provider if you will be going home the same day, the following day, or after a longer hospital stay.  ? Plan to have someone take you home from the hospital or clinic.  ? Plan to have a responsible adult care for you for at least 24 hours after you leave the hospital or clinic. This is important.  What happens during the procedure?  · You will be given anesthetics through both of the following:  ? A mask placed over your nose and mouth.  ? An IV in one of your veins.  · You may receive a medicine to help you relax (sedative).  · After you are unconscious, a breathing tube may be inserted down your throat to help you breathe. This will be removed before you wake up.  · An anesthesia specialist will stay with you throughout your procedure. He or she will:  ? Keep you comfortable and safe by continuing to give you medicines and adjusting the amount of medicine that you get.  ? Monitor your blood pressure, pulse, and oxygen levels to make sure that the anesthetics do not cause any problems.  The procedure may vary among health care providers and hospitals.  What happens after the procedure?  · Your blood pressure, temperature, heart rate, breathing rate, and blood oxygen level will be monitored until the medicines you were given have worn off.  · You will wake up in a recovery area. You may wake up slowly.  · If you feel anxious or agitated, you may be given  medicine to help you calm down.  · If you will be going home the same day, your health care provider may check to make sure you can walk, drink, and urinate.  · Your health care provider will treat any pain or side effects you have before you go home.  · Do not drive for 24 hours if you were given a sedative.  Summary  · General anesthesia is used to keep you still and prevent pain during a procedure.  · It is important to tell your healthcare provider about your medical history and any surgeries you have had, and previous experience with anesthesia.  · Follow your healthcare provider’s instructions about when to stop eating, drinking, or taking certain medicines before your procedure.  · Plan to have someone take you home from the hospital or clinic.  This information is not intended to replace advice given to you by your health care provider. Make sure you discuss any questions you have with your health care provider.  Document Released: 03/26/2009 Document Revised: 08/03/2018 Document Reviewed: 08/03/2018  [employer] Interactive Patient Education © 2019 [employer]       Fall Prevention in Hospitals, Adult  As a hospital patient, your condition and the treatments you receive can increase your risk for falls. Some additional risk factors for falls in a hospital include:  · Being in an unfamiliar environment.  · Being on bed rest.  · Your surgery.  · Taking certain medicines.  · Your tubing requirements, such as intravenous (IV) therapy or catheters.  It is important that you learn how to decrease fall risks while at the hospital. Below are important tips that can help prevent falls.  SAFETY TIPS FOR PREVENTING FALLS  Talk about your risk of falling.  · Ask your health care provider why you are at risk for falling. Is it your medicine, illness, tubing placement, or something else?  · Make a plan with your health care provider to keep you safe from falls.  · Ask your health care provider or pharmacist about side  effects of your medicines. Some medicines can make you dizzy or affect your coordination.  Ask for help.  · Ask for help before getting out of bed. You may need to press your call button.  · Ask for assistance in getting safely to the toilet.  · Ask for a walker or cane to be put at your bedside. Ask that most of the side rails on your bed be placed up before your health care provider leaves the room.  · Ask family or friends to sit with you.  · Ask for things that are out of your reach, such as your glasses, hearing aids, telephone, bedside table, or call button.  Follow these tips to avoid falling:  · Stay lying or seated, rather than standing, while waiting for help.  · Wear rubber-soled slippers or shoes whenever you walk in the hospital.  · Avoid quick, sudden movements.  ¨ Change positions slowly.  ¨ Sit on the side of your bed before standing.  ¨ Stand up slowly and wait before you start to walk.  · Let your health care provider know if there is a spill on the floor.  · Pay careful attention to the medical equipment, electrical cords, and tubes around you.  · When you need help, use your call button by your bed or in the bathroom. Wait for one of your health care providers to help you.  · If you feel dizzy or unsure of your footing, return to bed and wait for assistance.  · Avoid being distracted by the TV, telephone, or another person in your room.  · Do not lean or support yourself on rolling objects, such as IV poles or bedside tables.     This information is not intended to replace advice given to you by your health care provider. Make sure you discuss any questions you have with your health care provider.     Document Released: 12/15/2001 Document Revised: 01/08/2016 Document Reviewed: 08/25/2013  [employer] Interactive Patient Education ©2016 [employer]       [facility]  CHG 4% Patient Instruction Sheet    Chlorhexidine Before Surgery  Chlorhexidine gluconate (CHG) is a germ-killing  (antiseptic) solution that is used to clean the skin. It gets rid of the bacteria that normally live on the skin. Cleaning your skin with CHG before surgery helps lower the risk for infection after surgery.    How to use CHG solution  · You will take 2 showers, one shower the night before surgery, the second shower the morning of surgery before coming to the hospital.  · Use CHG only as told by your health care provider, and follow the instructions on the label.  · Use CHG solution while taking a shower. Follow these steps when using CHG solution (unless your health care provider gives you different instructions):  1. Start the shower.  2. Use your normal soap and shampoo to wash your face and hair.  3. Turn off the shower or move out of the shower stream.  4. Pour the CHG onto a clean washcloth. Do not use any type of brush or rough-edged sponge.  5. Starting at your neck, lather your body down to your toes. Make sure you:  6. Pay special attention to the part of your body where you will be having surgery. Scrub this area for at least 1 minute.  7. Use the full amount of CHG as directed. Usually, this is one half bottle for each shower.  8. Do not use CHG on your head or face. If the solution gets into your ears or eyes, rinse them well with water.  9. Avoid your genital area.  10. Avoid any areas of skin that have broken skin, cuts, or scrapes.  11. Scrub your back and under your arms. Make sure to wash skin folds.  12. Let the lather sit on your skin for 1-2 minutes or as long as told by your health care  provider.  13. Thoroughly rinse your entire body in the shower. Make sure that all body creases and crevices are rinsed well.  14. Dry off with a clean towel. Do not put any substances on your body afterward, such as powder, lotion, or perfume.  15. Put on clean clothes or pajamas.  16. If it is the night before your surgery, sleep in clean sheets.    What are the risks?  Risks of using CHG include:  · A skin  reaction.  · Hearing loss, if CHG gets in your ears.  · Eye injury, if CHG gets in your eyes and is not rinsed out.  · The CHG product catching fire.  Make sure that you avoid smoking and flames after applying CHG to your skin.  Do not use CHG:  · If you have a chlorhexidine allergy or have previously reacted to chlorhexidine.  · On babies younger than 2 months of age.      On the day of surgery, when you are taken to your room in Outpatient Surgery you will be given a CHG prepackaged cloth to wipe the site for your surgery.  How to use CHG prepackaged cloths  · Follow the instructions on the label.  · Use the CHG cloth on clean, dry skin. Follow these steps when using a CHG cloth (unless your health care provider gives you different instructions):  1. Using the CHG cloth, vigorously scrub the part of your body where you will be having surgery. Scrub using a back-and-forth motion for 3 minutes. The area on your body should be completely wet with CHG when you are finished scrubbing.  2. Do not rinse. Discard the cloth and let the area air-dry for 1 minute. Do not put any substances on your body afterward, such as powder, lotion, or perfume.  Contact a health care provider if:  · Your skin gets irritated after scrubbing.  · You have questions about using your solution or cloth.  Get help right away if:  · Your eyes become very red or swollen.  · Your eyes itch badly.  · Your skin itches badly and is red or swollen.  · Your hearing changes.  · You have trouble seeing.  · You have swelling or tingling in your mouth or throat.  · You have trouble breathing.  · You swallow any chlorhexidine.  Summary  · Chlorhexidine gluconate (CHG) is a germ-killing (antiseptic) solution that is used to clean the skin. Cleaning your skin with CHG before surgery helps lower the risk for infection after surgery.  · You may be given CHG to use at home. It may be in a bottle or in a prepackaged cloth to use on your skin. Carefully follow  your health care provider's instructions and the instructions on the product label.  · Do not use CHG if you have a chlorhexidine allergy.  · Contact your health care provider if your skin gets irritated after scrubbing.  This information is not intended to replace advice given to you by your health care provider. Make sure you discuss any questions you have with your health care provider.  Document Released: 09/11/2013 Document Revised: 11/15/2018 Document Reviewed: 11/15/2018  [employer] Interactive Patient Education © 2019 [employer]          PATIENT/FAMILY/RESPONSIBLE PARTY VERBALIZES UNDERSTANDING OF ABOVE EDUCATION.  COPY OF PAIN SCALE GIVEN AND REVIEWED WITH VERBALIZED UNDERSTANDING.

## 2021-02-13 LAB
QT INTERVAL: 398 MS
QTC INTERVAL: 426 MS

## 2021-02-15 ENCOUNTER — ANESTHESIA (OUTPATIENT)
Dept: PERIOP | Facility: HOSPITAL | Age: 71
End: 2021-02-15

## 2021-02-15 ENCOUNTER — APPOINTMENT (OUTPATIENT)
Dept: GENERAL RADIOLOGY | Facility: HOSPITAL | Age: 71
End: 2021-02-15

## 2021-02-15 ENCOUNTER — HOSPITAL ENCOUNTER (INPATIENT)
Facility: HOSPITAL | Age: 71
LOS: 4 days | Discharge: HOME | End: 2021-02-19
Attending: SURGERY | Admitting: SURGERY

## 2021-02-15 DIAGNOSIS — E89.0 POSTOPERATIVE HYPOTHYROIDISM: Primary | ICD-10-CM

## 2021-02-15 DIAGNOSIS — R91.8 LUNG MASS: ICD-10-CM

## 2021-02-15 LAB
ABO GROUP BLD: NORMAL
ANION GAP SERPL CALCULATED.3IONS-SCNC: 9 MMOL/L (ref 5–15)
BLD GP AB SCN SERPL QL: NEGATIVE
BUN SERPL-MCNC: 17 MG/DL (ref 8–23)
BUN/CREAT SERPL: 15.9 (ref 7–25)
CALCIUM SPEC-SCNC: 8.5 MG/DL (ref 8.6–10.5)
CHLORIDE SERPL-SCNC: 106 MMOL/L (ref 98–107)
CO2 SERPL-SCNC: 23 MMOL/L (ref 22–29)
CREAT SERPL-MCNC: 1.07 MG/DL (ref 0.76–1.27)
DEPRECATED RDW RBC AUTO: 45.5 FL (ref 37–54)
ERYTHROCYTE [DISTWIDTH] IN BLOOD BY AUTOMATED COUNT: 14 % (ref 12.3–15.4)
GFR SERPL CREATININE-BSD FRML MDRD: 69 ML/MIN/1.73
GLUCOSE SERPL-MCNC: 164 MG/DL (ref 65–99)
HCT VFR BLD AUTO: 40.1 % (ref 37.5–51)
HGB BLD-MCNC: 13.2 G/DL (ref 13–17.7)
MCH RBC QN AUTO: 29.2 PG (ref 26.6–33)
MCHC RBC AUTO-ENTMCNC: 32.9 G/DL (ref 31.5–35.7)
MCV RBC AUTO: 88.7 FL (ref 79–97)
PLATELET # BLD AUTO: 153 10*3/MM3 (ref 140–450)
PMV BLD AUTO: 11 FL (ref 6–12)
POTASSIUM SERPL-SCNC: 4.4 MMOL/L (ref 3.5–5.2)
RBC # BLD AUTO: 4.52 10*6/MM3 (ref 4.14–5.8)
RH BLD: POSITIVE
SODIUM SERPL-SCNC: 138 MMOL/L (ref 136–145)
T&S EXPIRATION DATE: NORMAL
WBC # BLD AUTO: 9.5 10*3/MM3 (ref 3.4–10.8)

## 2021-02-15 PROCEDURE — 85027 COMPLETE CBC AUTOMATED: CPT | Performed by: SURGERY

## 2021-02-15 PROCEDURE — 0BBF4ZZ EXCISION OF RIGHT LOWER LUNG LOBE, PERCUTANEOUS ENDOSCOPIC APPROACH: ICD-10-PCS | Performed by: SURGERY

## 2021-02-15 PROCEDURE — 25010000002 PROPOFOL 10 MG/ML EMULSION: Performed by: NURSE ANESTHETIST, CERTIFIED REGISTERED

## 2021-02-15 PROCEDURE — 94799 UNLISTED PULMONARY SVC/PX: CPT

## 2021-02-15 PROCEDURE — 07B74ZX EXCISION OF THORAX LYMPHATIC, PERCUTANEOUS ENDOSCOPIC APPROACH, DIAGNOSTIC: ICD-10-PCS | Performed by: SURGERY

## 2021-02-15 PROCEDURE — 0BJ08ZZ INSPECTION OF TRACHEOBRONCHIAL TREE, VIA NATURAL OR ARTIFICIAL OPENING ENDOSCOPIC: ICD-10-PCS | Performed by: SURGERY

## 2021-02-15 PROCEDURE — 25010000002 ONDANSETRON PER 1 MG: Performed by: NURSE ANESTHETIST, CERTIFIED REGISTERED

## 2021-02-15 PROCEDURE — C1729 CATH, DRAINAGE: HCPCS | Performed by: SURGERY

## 2021-02-15 PROCEDURE — C2615 SEALANT, PULMONARY, LIQUID: HCPCS | Performed by: SURGERY

## 2021-02-15 PROCEDURE — 32666 THORACOSCOPY W/WEDGE RESECT: CPT | Performed by: SURGERY

## 2021-02-15 PROCEDURE — 25010000002 HEPARIN (PORCINE) PER 1000 UNITS: Performed by: NURSE PRACTITIONER

## 2021-02-15 PROCEDURE — 80048 BASIC METABOLIC PNL TOTAL CA: CPT | Performed by: SURGERY

## 2021-02-15 PROCEDURE — 86901 BLOOD TYPING SEROLOGIC RH(D): CPT | Performed by: NURSE PRACTITIONER

## 2021-02-15 PROCEDURE — 25010000002 CEFAZOLIN PER 500 MG: Performed by: SURGERY

## 2021-02-15 PROCEDURE — 88331 PATH CONSLTJ SURG 1 BLK 1SPC: CPT | Performed by: SURGERY

## 2021-02-15 PROCEDURE — 25010000002 PHENYLEPHRINE 10 MG/ML SOLUTION 1 ML VIAL: Performed by: NURSE ANESTHETIST, CERTIFIED REGISTERED

## 2021-02-15 PROCEDURE — C9290 INJ, BUPIVACAINE LIPOSOME: HCPCS | Performed by: SURGERY

## 2021-02-15 PROCEDURE — 25010000002 PHENYLEPHRINE HCL 0.8 MG/10ML SOLUTION PREFILLED SYRINGE: Performed by: NURSE ANESTHETIST, CERTIFIED REGISTERED

## 2021-02-15 PROCEDURE — 25010000002 MIDAZOLAM PER 1 MG: Performed by: ANESTHESIOLOGY

## 2021-02-15 PROCEDURE — 88307 TISSUE EXAM BY PATHOLOGIST: CPT | Performed by: SURGERY

## 2021-02-15 PROCEDURE — 0BNT0ZZ RELEASE DIAPHRAGM, OPEN APPROACH: ICD-10-PCS | Performed by: SURGERY

## 2021-02-15 PROCEDURE — 25010000002 DEXAMETHASONE PER 1 MG: Performed by: ANESTHESIOLOGY

## 2021-02-15 PROCEDURE — 86900 BLOOD TYPING SEROLOGIC ABO: CPT | Performed by: NURSE PRACTITIONER

## 2021-02-15 PROCEDURE — 88305 TISSUE EXAM BY PATHOLOGIST: CPT | Performed by: SURGERY

## 2021-02-15 PROCEDURE — 25010000002 CEFAZOLIN PER 500 MG: Performed by: NURSE PRACTITIONER

## 2021-02-15 PROCEDURE — 25010000002 FENTANYL CITRATE (PF) 250 MCG/5ML SOLUTION: Performed by: NURSE ANESTHETIST, CERTIFIED REGISTERED

## 2021-02-15 PROCEDURE — 86850 RBC ANTIBODY SCREEN: CPT | Performed by: NURSE PRACTITIONER

## 2021-02-15 PROCEDURE — 25010000003 BUPIVACAINE LIPOSOME 1.3 % SUSPENSION 20 ML VIAL: Performed by: SURGERY

## 2021-02-15 PROCEDURE — S0260 H&P FOR SURGERY: HCPCS | Performed by: SURGERY

## 2021-02-15 PROCEDURE — 32674 THORACOSCOPY LYMPH NODE EXC: CPT | Performed by: SURGERY

## 2021-02-15 PROCEDURE — 25010000002 FENTANYL CITRATE (PF) 100 MCG/2ML SOLUTION: Performed by: ANESTHESIOLOGY

## 2021-02-15 PROCEDURE — 25010000002 HYDROMORPHONE PER 4 MG: Performed by: ANESTHESIOLOGY

## 2021-02-15 PROCEDURE — 94640 AIRWAY INHALATION TREATMENT: CPT

## 2021-02-15 PROCEDURE — 71045 X-RAY EXAM CHEST 1 VIEW: CPT

## 2021-02-15 PROCEDURE — 31622 DX BRONCHOSCOPE/WASH: CPT | Performed by: SURGERY

## 2021-02-15 DEVICE — STAPLER 60 RELOAD BLACK
Type: IMPLANTABLE DEVICE | Site: CHEST | Status: FUNCTIONAL
Brand: SUREFORM

## 2021-02-15 DEVICE — SEALANT PLURAL AIRLEAK PROGEL W/APPL 4ML: Type: IMPLANTABLE DEVICE | Site: CHEST | Status: FUNCTIONAL

## 2021-02-15 DEVICE — ABSORBABLE HEMOSTAT (OXIDIZED REGENERATED CELLULOSE, U.S.P.)
Type: IMPLANTABLE DEVICE | Site: CHEST | Status: FUNCTIONAL
Brand: SURGICEL

## 2021-02-15 DEVICE — SUREFORM 45 RELOAD BLUE
Type: IMPLANTABLE DEVICE | Site: CHEST | Status: FUNCTIONAL
Brand: SUREFORM

## 2021-02-15 DEVICE — CLIP LIG HEMOLOK PA LG 6CT PRP: Type: IMPLANTABLE DEVICE | Site: CHEST | Status: FUNCTIONAL

## 2021-02-15 RX ORDER — FLUMAZENIL 0.1 MG/ML
0.2 INJECTION INTRAVENOUS AS NEEDED
Status: DISCONTINUED | OUTPATIENT
Start: 2021-02-15 | End: 2021-02-15 | Stop reason: HOSPADM

## 2021-02-15 RX ORDER — BUPIVACAINE HYDROCHLORIDE AND EPINEPHRINE 5; 5 MG/ML; UG/ML
INJECTION, SOLUTION PERINEURAL AS NEEDED
Status: DISCONTINUED | OUTPATIENT
Start: 2021-02-15 | End: 2021-02-15 | Stop reason: HOSPADM

## 2021-02-15 RX ORDER — LEVOTHYROXINE SODIUM 112 UG/1
112 TABLET ORAL
Status: DISCONTINUED | OUTPATIENT
Start: 2021-02-15 | End: 2021-02-19 | Stop reason: HOSPADM

## 2021-02-15 RX ORDER — SODIUM CHLORIDE, SODIUM LACTATE, POTASSIUM CHLORIDE, CALCIUM CHLORIDE 600; 310; 30; 20 MG/100ML; MG/100ML; MG/100ML; MG/100ML
9 INJECTION, SOLUTION INTRAVENOUS CONTINUOUS
Status: DISCONTINUED | OUTPATIENT
Start: 2021-02-15 | End: 2021-02-15 | Stop reason: HOSPADM

## 2021-02-15 RX ORDER — ONDANSETRON 2 MG/ML
INJECTION INTRAMUSCULAR; INTRAVENOUS AS NEEDED
Status: DISCONTINUED | OUTPATIENT
Start: 2021-02-15 | End: 2021-02-15 | Stop reason: SURG

## 2021-02-15 RX ORDER — SODIUM CHLORIDE 0.9 % (FLUSH) 0.9 %
10 SYRINGE (ML) INJECTION AS NEEDED
Status: DISCONTINUED | OUTPATIENT
Start: 2021-02-15 | End: 2021-02-15 | Stop reason: HOSPADM

## 2021-02-15 RX ORDER — FENTANYL CITRATE 50 UG/ML
INJECTION, SOLUTION INTRAMUSCULAR; INTRAVENOUS AS NEEDED
Status: DISCONTINUED | OUTPATIENT
Start: 2021-02-15 | End: 2021-02-15 | Stop reason: SURG

## 2021-02-15 RX ORDER — LIDOCAINE HYDROCHLORIDE 10 MG/ML
0.5 INJECTION, SOLUTION EPIDURAL; INFILTRATION; INTRACAUDAL; PERINEURAL ONCE AS NEEDED
Status: DISCONTINUED | OUTPATIENT
Start: 2021-02-15 | End: 2021-02-15 | Stop reason: HOSPADM

## 2021-02-15 RX ORDER — MAGNESIUM HYDROXIDE 1200 MG/15ML
LIQUID ORAL AS NEEDED
Status: DISCONTINUED | OUTPATIENT
Start: 2021-02-15 | End: 2021-02-15 | Stop reason: HOSPADM

## 2021-02-15 RX ORDER — GLYCOPYRROLATE 0.2 MG/ML
0.2 INJECTION INTRAMUSCULAR; INTRAVENOUS
Status: COMPLETED | OUTPATIENT
Start: 2021-02-15 | End: 2021-02-15

## 2021-02-15 RX ORDER — POLYETHYLENE GLYCOL 3350 17 G/17G
17 POWDER, FOR SOLUTION ORAL DAILY
Status: DISCONTINUED | OUTPATIENT
Start: 2021-02-15 | End: 2021-02-19 | Stop reason: HOSPADM

## 2021-02-15 RX ORDER — OXYCODONE AND ACETAMINOPHEN 10; 325 MG/1; MG/1
1 TABLET ORAL ONCE AS NEEDED
Status: DISCONTINUED | OUTPATIENT
Start: 2021-02-15 | End: 2021-02-15 | Stop reason: HOSPADM

## 2021-02-15 RX ORDER — ALBUTEROL SULFATE 1.25 MG/3ML
1.25 SOLUTION RESPIRATORY (INHALATION)
Status: DISCONTINUED | OUTPATIENT
Start: 2021-02-15 | End: 2021-02-15 | Stop reason: HOSPADM

## 2021-02-15 RX ORDER — MIDAZOLAM HYDROCHLORIDE 1 MG/ML
1 INJECTION INTRAMUSCULAR; INTRAVENOUS
Status: COMPLETED | OUTPATIENT
Start: 2021-02-15 | End: 2021-02-15

## 2021-02-15 RX ORDER — MIDAZOLAM HYDROCHLORIDE 1 MG/ML
0.5 INJECTION INTRAMUSCULAR; INTRAVENOUS
Status: COMPLETED | OUTPATIENT
Start: 2021-02-15 | End: 2021-02-15

## 2021-02-15 RX ORDER — CEFAZOLIN SODIUM IN 0.9 % NACL 3 G/100 ML
3 INTRAVENOUS SOLUTION, PIGGYBACK (ML) INTRAVENOUS EVERY 8 HOURS
Status: COMPLETED | OUTPATIENT
Start: 2021-02-15 | End: 2021-02-16

## 2021-02-15 RX ORDER — BISACODYL 10 MG
10 SUPPOSITORY, RECTAL RECTAL DAILY PRN
Status: DISCONTINUED | OUTPATIENT
Start: 2021-02-15 | End: 2021-02-19 | Stop reason: HOSPADM

## 2021-02-15 RX ORDER — IBUPROFEN 600 MG/1
600 TABLET ORAL ONCE AS NEEDED
Status: DISCONTINUED | OUTPATIENT
Start: 2021-02-15 | End: 2021-02-15 | Stop reason: HOSPADM

## 2021-02-15 RX ORDER — SODIUM CHLORIDE 9 MG/ML
80 INJECTION, SOLUTION INTRAVENOUS CONTINUOUS
Status: DISCONTINUED | OUTPATIENT
Start: 2021-02-15 | End: 2021-02-17

## 2021-02-15 RX ORDER — FENTANYL CITRATE 50 UG/ML
25 INJECTION, SOLUTION INTRAMUSCULAR; INTRAVENOUS
Status: DISCONTINUED | OUTPATIENT
Start: 2021-02-15 | End: 2021-02-15 | Stop reason: HOSPADM

## 2021-02-15 RX ORDER — OXYCODONE HYDROCHLORIDE AND ACETAMINOPHEN 5; 325 MG/1; MG/1
1 TABLET ORAL
Status: DISCONTINUED | OUTPATIENT
Start: 2021-02-15 | End: 2021-02-19 | Stop reason: HOSPADM

## 2021-02-15 RX ORDER — IPRATROPIUM BROMIDE AND ALBUTEROL SULFATE 2.5; .5 MG/3ML; MG/3ML
3 SOLUTION RESPIRATORY (INHALATION) EVERY 6 HOURS PRN
Status: DISPENSED | OUTPATIENT
Start: 2021-02-15 | End: 2021-02-17

## 2021-02-15 RX ORDER — HEPARIN SODIUM 5000 [USP'U]/ML
5000 INJECTION, SOLUTION INTRAVENOUS; SUBCUTANEOUS ONCE
Status: COMPLETED | OUTPATIENT
Start: 2021-02-15 | End: 2021-02-15

## 2021-02-15 RX ORDER — PROPOFOL 10 MG/ML
VIAL (ML) INTRAVENOUS AS NEEDED
Status: DISCONTINUED | OUTPATIENT
Start: 2021-02-15 | End: 2021-02-15 | Stop reason: SURG

## 2021-02-15 RX ORDER — BUPIVACAINE HCL/0.9 % NACL/PF 0.1 %
2 PLASTIC BAG, INJECTION (ML) EPIDURAL ONCE
Status: COMPLETED | OUTPATIENT
Start: 2021-02-15 | End: 2021-02-15

## 2021-02-15 RX ORDER — SODIUM CHLORIDE 9 MG/ML
INJECTION, SOLUTION INTRAVENOUS AS NEEDED
Status: DISCONTINUED | OUTPATIENT
Start: 2021-02-15 | End: 2021-02-15 | Stop reason: HOSPADM

## 2021-02-15 RX ORDER — DEXTROSE MONOHYDRATE 25 G/50ML
12.5 INJECTION, SOLUTION INTRAVENOUS AS NEEDED
Status: DISCONTINUED | OUTPATIENT
Start: 2021-02-15 | End: 2021-02-15 | Stop reason: HOSPADM

## 2021-02-15 RX ORDER — ONDANSETRON 2 MG/ML
4 INJECTION INTRAMUSCULAR; INTRAVENOUS AS NEEDED
Status: DISCONTINUED | OUTPATIENT
Start: 2021-02-15 | End: 2021-02-15 | Stop reason: HOSPADM

## 2021-02-15 RX ORDER — DOCUSATE SODIUM 100 MG/1
100 CAPSULE, LIQUID FILLED ORAL DAILY
Status: DISCONTINUED | OUTPATIENT
Start: 2021-02-15 | End: 2021-02-19 | Stop reason: HOSPADM

## 2021-02-15 RX ORDER — HYDROMORPHONE HYDROCHLORIDE 1 MG/ML
0.5 INJECTION, SOLUTION INTRAMUSCULAR; INTRAVENOUS; SUBCUTANEOUS
Status: DISCONTINUED | OUTPATIENT
Start: 2021-02-15 | End: 2021-02-15 | Stop reason: HOSPADM

## 2021-02-15 RX ORDER — ROCURONIUM BROMIDE 10 MG/ML
INJECTION, SOLUTION INTRAVENOUS AS NEEDED
Status: DISCONTINUED | OUTPATIENT
Start: 2021-02-15 | End: 2021-02-15 | Stop reason: SURG

## 2021-02-15 RX ORDER — LABETALOL HYDROCHLORIDE 5 MG/ML
5 INJECTION, SOLUTION INTRAVENOUS
Status: DISCONTINUED | OUTPATIENT
Start: 2021-02-15 | End: 2021-02-15 | Stop reason: HOSPADM

## 2021-02-15 RX ORDER — EPHEDRINE SULFATE 50 MG/ML
INJECTION, SOLUTION INTRAVENOUS AS NEEDED
Status: DISCONTINUED | OUTPATIENT
Start: 2021-02-15 | End: 2021-02-15 | Stop reason: SURG

## 2021-02-15 RX ORDER — ACETYLCYSTEINE 200 MG/ML
3 SOLUTION ORAL; RESPIRATORY (INHALATION)
Status: COMPLETED | OUTPATIENT
Start: 2021-02-15 | End: 2021-02-17

## 2021-02-15 RX ORDER — LIDOCAINE HYDROCHLORIDE 20 MG/ML
INJECTION, SOLUTION EPIDURAL; INFILTRATION; INTRACAUDAL; PERINEURAL AS NEEDED
Status: DISCONTINUED | OUTPATIENT
Start: 2021-02-15 | End: 2021-02-15 | Stop reason: SURG

## 2021-02-15 RX ORDER — SODIUM CHLORIDE, SODIUM LACTATE, POTASSIUM CHLORIDE, CALCIUM CHLORIDE 600; 310; 30; 20 MG/100ML; MG/100ML; MG/100ML; MG/100ML
1000 INJECTION, SOLUTION INTRAVENOUS CONTINUOUS
Status: DISCONTINUED | OUTPATIENT
Start: 2021-02-15 | End: 2021-02-15 | Stop reason: HOSPADM

## 2021-02-15 RX ORDER — PHENYLEPHRINE HCL IN 0.9% NACL 0.8MG/10ML
SYRINGE (ML) INTRAVENOUS AS NEEDED
Status: DISCONTINUED | OUTPATIENT
Start: 2021-02-15 | End: 2021-02-15 | Stop reason: SURG

## 2021-02-15 RX ORDER — SODIUM CHLORIDE 0.9 % (FLUSH) 0.9 %
3 SYRINGE (ML) INJECTION AS NEEDED
Status: DISCONTINUED | OUTPATIENT
Start: 2021-02-15 | End: 2021-02-15 | Stop reason: HOSPADM

## 2021-02-15 RX ORDER — TAMSULOSIN HYDROCHLORIDE 0.4 MG/1
0.4 CAPSULE ORAL NIGHTLY
Status: DISCONTINUED | OUTPATIENT
Start: 2021-02-15 | End: 2021-02-19 | Stop reason: HOSPADM

## 2021-02-15 RX ORDER — ONDANSETRON 4 MG/1
4 TABLET, FILM COATED ORAL EVERY 6 HOURS PRN
Status: DISCONTINUED | OUTPATIENT
Start: 2021-02-15 | End: 2021-02-19 | Stop reason: HOSPADM

## 2021-02-15 RX ORDER — ACETAMINOPHEN 325 MG/1
650 TABLET ORAL EVERY 4 HOURS PRN
Status: DISCONTINUED | OUTPATIENT
Start: 2021-02-15 | End: 2021-02-19 | Stop reason: HOSPADM

## 2021-02-15 RX ORDER — DEXAMETHASONE SODIUM PHOSPHATE 10 MG/ML
10 INJECTION INTRAMUSCULAR; INTRAVENOUS ONCE AS NEEDED
Status: COMPLETED | OUTPATIENT
Start: 2021-02-15 | End: 2021-02-15

## 2021-02-15 RX ORDER — NALOXONE HCL 0.4 MG/ML
0.04 VIAL (ML) INJECTION AS NEEDED
Status: DISCONTINUED | OUTPATIENT
Start: 2021-02-15 | End: 2021-02-15 | Stop reason: HOSPADM

## 2021-02-15 RX ORDER — SODIUM CHLORIDE 0.9 % (FLUSH) 0.9 %
3 SYRINGE (ML) INJECTION EVERY 12 HOURS SCHEDULED
Status: DISCONTINUED | OUTPATIENT
Start: 2021-02-15 | End: 2021-02-15 | Stop reason: HOSPADM

## 2021-02-15 RX ORDER — NEOSTIGMINE METHYLSULFATE 5 MG/5 ML
SYRINGE (ML) INTRAVENOUS AS NEEDED
Status: DISCONTINUED | OUTPATIENT
Start: 2021-02-15 | End: 2021-02-15 | Stop reason: SURG

## 2021-02-15 RX ORDER — ONDANSETRON 2 MG/ML
4 INJECTION INTRAMUSCULAR; INTRAVENOUS EVERY 6 HOURS PRN
Status: DISCONTINUED | OUTPATIENT
Start: 2021-02-15 | End: 2021-02-19 | Stop reason: HOSPADM

## 2021-02-15 RX ORDER — SODIUM CHLORIDE 0.9 % (FLUSH) 0.9 %
10 SYRINGE (ML) INJECTION EVERY 12 HOURS SCHEDULED
Status: DISCONTINUED | OUTPATIENT
Start: 2021-02-15 | End: 2021-02-15 | Stop reason: HOSPADM

## 2021-02-15 RX ADMIN — CEFAZOLIN SODIUM 3 G: 10 INJECTION, POWDER, FOR SOLUTION INTRAVENOUS at 18:10

## 2021-02-15 RX ADMIN — Medication 160 MCG: at 10:35

## 2021-02-15 RX ADMIN — MIDAZOLAM 1 MG: 1 INJECTION INTRAMUSCULAR; INTRAVENOUS at 09:27

## 2021-02-15 RX ADMIN — IPRATROPIUM BROMIDE AND ALBUTEROL SULFATE 3 ML: 2.5; .5 SOLUTION RESPIRATORY (INHALATION) at 22:22

## 2021-02-15 RX ADMIN — Medication 160 MCG: at 10:45

## 2021-02-15 RX ADMIN — DOCUSATE SODIUM 100 MG: 100 CAPSULE ORAL at 18:10

## 2021-02-15 RX ADMIN — FENTANYL CITRATE 100 MCG: 50 INJECTION, SOLUTION INTRAMUSCULAR; INTRAVENOUS at 10:30

## 2021-02-15 RX ADMIN — HYDROMORPHONE HYDROCHLORIDE 0.5 MG: 1 INJECTION, SOLUTION INTRAMUSCULAR; INTRAVENOUS; SUBCUTANEOUS at 14:25

## 2021-02-15 RX ADMIN — ROCURONIUM BROMIDE 20 MG: 10 INJECTION INTRAVENOUS at 12:05

## 2021-02-15 RX ADMIN — Medication 160 MCG: at 10:51

## 2021-02-15 RX ADMIN — GLYCOPYRROLATE 0.4 MG: 0.2 INJECTION, SOLUTION INTRAMUSCULAR; INTRAVENOUS at 13:16

## 2021-02-15 RX ADMIN — IPRATROPIUM BROMIDE AND ALBUTEROL SULFATE 3 ML: 2.5; .5 SOLUTION RESPIRATORY (INHALATION) at 17:10

## 2021-02-15 RX ADMIN — Medication 160 MCG: at 10:40

## 2021-02-15 RX ADMIN — FENTANYL CITRATE 25 MCG: 50 INJECTION, SOLUTION INTRAMUSCULAR; INTRAVENOUS at 14:52

## 2021-02-15 RX ADMIN — DEXAMETHASONE SODIUM PHOSPHATE 10 MG: 10 INJECTION INTRAMUSCULAR; INTRAVENOUS at 09:16

## 2021-02-15 RX ADMIN — MIDAZOLAM 1 MG: 1 INJECTION INTRAMUSCULAR; INTRAVENOUS at 09:16

## 2021-02-15 RX ADMIN — LEVOTHYROXINE SODIUM 112 MCG: 112 TABLET ORAL at 18:10

## 2021-02-15 RX ADMIN — EPHEDRINE SULFATE 12.5 MG: 50 INJECTION INTRAVENOUS at 11:46

## 2021-02-15 RX ADMIN — PROPOFOL 200 MG: 10 INJECTION, EMULSION INTRAVENOUS at 10:30

## 2021-02-15 RX ADMIN — Medication 160 MCG: at 11:35

## 2021-02-15 RX ADMIN — HYDROMORPHONE HYDROCHLORIDE 0.5 MG: 1 INJECTION, SOLUTION INTRAMUSCULAR; INTRAVENOUS; SUBCUTANEOUS at 14:55

## 2021-02-15 RX ADMIN — HEPARIN SODIUM 5000 UNITS: 5000 INJECTION, SOLUTION INTRAVENOUS; SUBCUTANEOUS at 09:15

## 2021-02-15 RX ADMIN — LIDOCAINE HYDROCHLORIDE 80 MG: 20 INJECTION, SOLUTION EPIDURAL; INFILTRATION; INTRACAUDAL; PERINEURAL at 10:30

## 2021-02-15 RX ADMIN — SODIUM CHLORIDE, POTASSIUM CHLORIDE, SODIUM LACTATE AND CALCIUM CHLORIDE 1000 ML: 600; 310; 30; 20 INJECTION, SOLUTION INTRAVENOUS at 07:35

## 2021-02-15 RX ADMIN — ROCURONIUM BROMIDE 60 MG: 10 INJECTION INTRAVENOUS at 10:30

## 2021-02-15 RX ADMIN — SODIUM CHLORIDE, POTASSIUM CHLORIDE, SODIUM LACTATE AND CALCIUM CHLORIDE 1000 ML: 600; 310; 30; 20 INJECTION, SOLUTION INTRAVENOUS at 07:44

## 2021-02-15 RX ADMIN — ACETYLCYSTEINE 1 ML: 200 SOLUTION ORAL; RESPIRATORY (INHALATION) at 22:24

## 2021-02-15 RX ADMIN — Medication 3 MG: at 13:16

## 2021-02-15 RX ADMIN — EPHEDRINE SULFATE 25 MG: 50 INJECTION INTRAVENOUS at 10:57

## 2021-02-15 RX ADMIN — TAMSULOSIN HYDROCHLORIDE 0.4 MG: 0.4 CAPSULE ORAL at 21:20

## 2021-02-15 RX ADMIN — ACETYLCYSTEINE 3 ML: 200 SOLUTION ORAL; RESPIRATORY (INHALATION) at 17:10

## 2021-02-15 RX ADMIN — OXYCODONE HYDROCHLORIDE AND ACETAMINOPHEN 1 TABLET: 5; 325 TABLET ORAL at 23:28

## 2021-02-15 RX ADMIN — ONDANSETRON HYDROCHLORIDE 4 MG: 2 SOLUTION INTRAMUSCULAR; INTRAVENOUS at 13:16

## 2021-02-15 RX ADMIN — Medication 2 G: at 10:30

## 2021-02-15 RX ADMIN — FENTANYL CITRATE 150 MCG: 50 INJECTION, SOLUTION INTRAMUSCULAR; INTRAVENOUS at 10:35

## 2021-02-15 RX ADMIN — PHENYLEPHRINE HYDROCHLORIDE 1 MCG/KG/MIN: 10 INJECTION INTRAVENOUS at 12:13

## 2021-02-15 RX ADMIN — FENTANYL CITRATE 25 MCG: 50 INJECTION, SOLUTION INTRAMUSCULAR; INTRAVENOUS at 14:57

## 2021-02-15 RX ADMIN — GLYCOPYRROLATE 0.2 MG: 0.2 INJECTION, SOLUTION INTRAMUSCULAR; INTRAVENOUS at 09:16

## 2021-02-15 RX ADMIN — SODIUM CHLORIDE 80 ML/HR: 9 INJECTION, SOLUTION INTRAVENOUS at 18:10

## 2021-02-15 RX ADMIN — LOSARTAN POTASSIUM: 50 TABLET, FILM COATED ORAL at 18:10

## 2021-02-15 RX ADMIN — EPHEDRINE SULFATE 12.5 MG: 50 INJECTION INTRAVENOUS at 11:17

## 2021-02-15 RX ADMIN — PROPOFOL 100 MG: 10 INJECTION, EMULSION INTRAVENOUS at 11:29

## 2021-02-15 NOTE — ANESTHESIA PREPROCEDURE EVALUATION
Anesthesia Evaluation     Patient summary reviewed   history of anesthetic complications (Glidescope IIb at [place] after DL attempt unsuccessful, recent attempt to place double lumen tube unsuccessful after multiple attempts): difficult airway  NPO Solid Status: > 8 hours  NPO Liquid Status: > 8 hours           Airway   Mallampati: III  TM distance: >3 FB  Neck ROM: full  Difficult intubation highly probable  Dental - normal exam     Pulmonary    (+) a smoker (quit 1996) Former, lung cancer, sleep apnea,   (-) COPD, asthma    ROS comment: Right lung nodule  Cardiovascular   Exercise tolerance: good (4-7 METS)    ECG reviewed    (+) hypertension,   (-) past MI, CAD, dysrhythmias, angina, cardiac stents, hyperlipidemia      Neuro/Psych  (-) seizures, TIA, CVA  GI/Hepatic/Renal/Endo    (+) obesity,   thyroid problem   (-) liver disease, no renal disease, diabetes    ROS Comment: Substernal goiter    Musculoskeletal     Abdominal    Substance History      OB/GYN          Other      history of cancer (prostate, lung invasive mucinous adenocarcinoma.) active        Phys Exam Other: Recessed chin                  Anesthesia Plan    ASA 3     general     intravenous induction     Anesthetic plan, all risks, benefits, and alternatives have been provided, discussed and informed consent has been obtained with: patient.  Use of blood products discussed with patient  Consented to blood products.

## 2021-02-15 NOTE — ANESTHESIA PROCEDURE NOTES
"Albina Waddell (74 y.o. Female)     Date of Birth Social Security Number Address Home Phone MRN    1943  [place] [phone] [id number]    Alevism Marital Status          Caodaism        Admission Date Admission Type Admitting Provider Attending Provider Department, Room/Bed    2/3/18 Emergency Jim Hernandez MD Perry, Truman, MD [address], [address]335/    Discharge Date Discharge Disposition Discharge Destination         Home or Self Care             Attending Provider: Jim Hernandez MD     Allergies:  No Known Allergies    Isolation:  Droplet   Infection:  Influenza (18)   Code Status:  FULL    Ht:  160 cm (63\")   Wt:  71.8 kg (158 lb 6.4 oz)    Admission Cmt:  None   Principal Problem:  None                Active Insurance as of 2/3/2018     Primary Coverage     Payor Plan Insurance Group Employer/Plan Group    MEDICARE RAILRO[employer] MEDICARE      Payor Plan Address Payor Plan Phone Number Effective From Effective To    [address] [phone] [address]     [place] [address]       Subscriber Name Subscriber Birth Date Member ID       ALBINA WADDELL 1943 [id number]           Secondary Coverage     Payor Plan Insurance Group Employer/Plan Group    [facility] [id number]     Payor Plan Address Payor Plan Phone Number Effective From Effective To    [address]  [address]     [place] [address]       Subscriber Name Subscriber Birth Date Member ID       MRS CURTIS WADDELL 10/3/1939 [id number]                 Emergency Contacts      (Rel.) Home Phone Work Phone Mobile Phone    Antonina Noriega (Daughter) [phone] -- --        [address] [address]  Phone:  [phone]  Fax:          Patient:     Albina Waddell MRN:  [id number]   [place] [address] :  1943  SSN:    Phone: [phone] Sex:  F      INSURANCE PA[employer]AN [employer] # SUBSCRIBER ID " Arterial Line      Patient location during procedure: holding area  Start time: 2/15/2021 9:26 AM  Stop Time:2/15/2021 9:28 AM       Line placed for hemodynamic monitoring.  Performed By   Anesthesiologist: Ranjit Lund MD  Preanesthetic Checklist  Completed: patient identified, site marked, surgical consent, pre-op evaluation, timeout performed, IV checked, risks and benefits discussed and monitors and equipment checked  Arterial Line Prep   Sterile Tech: mask, cap and gloves  Prep: ChloraPrep  Patient monitoring: continuous pulse oximetry  Arterial Line Procedure   Laterality:left  Location:  radial artery  Catheter size: 20 G   Guidance: palpation technique  Number of attempts: 1  Successful placement: yes  Post Assessment   Dressing Type: occlusive dressing applied and secured with tape.   Complications no  Circ/Move/Sens Assessment: normal.   Patient Tolerance: patient tolerated the procedure well with no apparent complications               Primary:  Secondary: MEDICARE [employer] [id number]  [id number]    [id number]  [id number]   Admitting Diagnosis: Acute exacerbation of chronic obstructive pulmonary disease (COPD) [J44.1]  Order Date:  Feb 7, 2018               Inpatient Consult to Case Management        (Order ID: [id number])     Diagnosis:         Priority:  Routine Expected Date:   Expiration Date:        Interval:   Count:    Reason for Consult? arrange home oxygen     Specimen Type:   Specimen Source:   Specimen Taken Date:   Specimen Taken Time:                         Authorizing Provider:Jarek Hernandez MD  Authorizing Provider's NPI: [id number]  Order Entered By: Jarek Hernandez MD 2/7/2018  6:12 AM     Electronically signed by: Jarek Hernandez MD 2/7/2018  6:12 AM                  History & Physical      Jim Hernandez MD at 2/4/2018  7:18 AM          Tita Finley    Patient Care Team:  Jim Hernandez MD as PCP - General  Jim Hernandez MD as PCP - Family Medicine  Rosales Quinn MD as PCP - Claims Attributed    Chief complaint cough and shortness of breath    Subjective     Patient is a 74 y.o. female presents with 2-3 day history of increasing cough and wheezing and shortness of breath.  Patient has had frequent episodes of COPD exacerbation/upper respiratory infection and has been treated with multiple antibiotics in the past 2 months.  She improves and then worsens again.  Patient denied any fevers or chills.  She denies any nausea vomiting or diarrhea.  She denies any chest pain.  The patient was evaluated in [facility] room where she was noted to have influenza type B as well as a right lower lobe pneumonia and hypoxia and was felt to need admission for further treatment.  She denied any melena or bright red blood per rectum.  She on any hematuria frequency or dysuria.  She denies any hemoptysis or hematemesis.  Review of Systems   Pertinent items are noted in HPI, all other systems reviewed and  negative    History  Past Medical History:   Diagnosis Date   • Anemia    • Chronic kidney disease    • COPD (chronic obstructive pulmonary disease)    • Diabetes mellitus    • Osteoporosis      Past Surgical History:   Procedure Laterality Date   •  SECTION     • HIP BIPOLAR REPLACEMENT     • HYSTERECTOMY       History reviewed. No pertinent family history.  Social History   Substance Use Topics   • Smoking status: Former Smoker     Years: 15.00   • Smokeless tobacco: None   • Alcohol use No     Prescriptions Prior to Admission   Medication Sig Dispense Refill Last Dose   • calcium carbonate (OS-TIANNA) 600 MG tablet Take 600 mg by mouth Daily.   2/3/2018 at AM   • cetirizine (zyrTEC) 10 MG tablet Take 10 mg by mouth Daily.   2/3/2018 at AM   • chlorthalidone (HYGROTON) 25 MG tablet Take 25 mg by mouth Daily.   2018 at Unknown time   • cyanocobalamin 1000 MCG/ML injection Inject 1,000 mcg into the shoulder, thigh, or buttocks Every 28 (Twenty-Eight) Days.   Past Month at 18   • diltiaZEM (TIAZAC) 360 MG 24 hr capsule Take 360 mg by mouth Daily.   2/3/2018 at AM   • fenofibrate 160 MG tablet Take 160 mg by mouth Daily.   2/3/2018 at AM   • ferrous sulfate 325 (65 FE) MG tablet Take 325 mg by mouth 2 (Two) Times a Day.   2/3/2018 at AM   • insulin detemir (LEVEMIR) 100 UNIT/ML injection Inject 100 Units under the skin Daily. 100 units QAM.   40 units QPM   2/3/2018 at AM   • insulin detemir (LEVEMIR) 100 UNIT/ML injection Inject 40 Units under the skin Every Night.   2018 at Unknown time   • lisinopril (PRINIVIL,ZESTRIL) 40 MG tablet Take 40 mg by mouth Daily.   2/3/2018 at AM   • metFORMIN (GLUCOPHAGE) 1000 MG tablet Take 1,000 mg by mouth 2 (Two) Times a Day With Meals.   2/3/2018 at AM   • Omega-3 Fatty Acids (FISH OIL) 1000 MG capsule capsule Take 1,000 mg by mouth Daily With Breakfast.   2/3/2018 at AM   • raNITIdine (ZANTAC) 150 MG tablet Take 150 mg by mouth 2 (Two) Times a Day.   2/3/2018  at AM     Allergies:  Review of patient's allergies indicates no known allergies.    Objective     Vital Signs  Temp:  [98 °F (36.7 °C)-98.4 °F (36.9 °C)] 98.4 °F (36.9 °C)  Heart Rate:  [72-88] 75  Resp:  [17-24] 20  BP: (130-157)/(50-69) 148/62    Physical Exam:      General Appearance:    Alert, cooperative, in no acute distress   Head:    Normocephalic, without obvious abnormality, atraumatic   Eyes:            Lids and lashes normal, conjunctivae and sclerae normal, no   icterus, no pallor, corneas clear, PERRLA   Ears:    Ears appear intact with no abnormalities noted   Throat:   No oral lesions, no thrush, oral mucosa moist   Neck:   No adenopathy, supple, trachea midline, no thyromegaly, no     carotid bruit, no JVD   Back:     No kyphosis present, no scoliosis present, no skin lesions,       erythema or scars, no tenderness to percussion or                   palpation,   range of motion normal   Lungs:    Bilateral rhonchi with decreased breath sounds in the bases, right lower lobe with possible rales.  Bilateral expiratory wheezes and fair air movement     Heart:    Regular rhythm and normal rate, normal S1 and S2, no            murmur, no gallop, no rub, no click   Breast Exam:    Deferred   Abdomen:     Normal bowel sounds, no masses, no organomegaly, soft        non-tender, non-distended, no guarding, no rebound                 tenderness   Genitalia:    Deferred   Extremities:   Moves all extremities well, no edema, no cyanosis, no              redness   Pulses:   Pulses palpable and equal bilaterally   Skin:   No bleeding, bruising or rash   Lymph nodes:   No palpable adenopathy   Neurologic:   Cranial nerves 2 - 12 grossly intact, sensation intact, DTR        present and equal bilaterally     Lab Results (last 24 hours)     Procedure Component Value Units Date/Time    Blood Gas, Arterial [[id number]]  (Abnormal) Collected:  02/03/18 1020    Specimen:  Arterial Blood Updated:  02/03/18 1024     Site  Arterial: left radial     Sky's Test Positive     pH, Arterial 7.312 (L) pH units      pCO2, Arterial 40.6 mm Hg      pO2, Arterial 63.1 (L) mm Hg      HCO3, Arterial 20.1 (L) mmol/L      Base Excess, Arterial -5.7 mmol/L      O2 Saturation, Arterial 89.9 (L) %      Hemoglobin, Blood Gas 11.3 (L) g/dL      Hematocrit, Blood Gas 33.0 (L) %      Oxyhemoglobin 88.6 %      Methemoglobin 0.40 %      Carboxyhemoglobin 1.1 %      A-a Gradiant 80.8 mmHg      Temperature 98.6 C      Barometric Pressure for Blood Gas 732 mmHg      Modality Nasal Cannula     FIO2 28 %     CBC & Differential [[id number]] Collected:  02/03/18 1024    Specimen:  Blood Updated:  02/03/18 1054    Narrative:       The following orders were created for panel order CBC & Differential.  Procedure                               Abnormality         Status                     ---------                               -----------         ------                     CBC Auto Differential[[id number]]        Abnormal            Final result                 Please view results for these tests on the individual orders.    CBC Auto Differential [[id number]]  (Abnormal) Collected:  02/03/18 1024    Specimen:  Blood Updated:  02/03/18 1054     WBC 9.41 10*3/mm3      RBC 4.02 (L) 10*6/mm3      Hemoglobin 10.8 (L) g/dL      Hematocrit 34.6 (L) %      MCV 86.1 fL      MCH 26.9 (L) pg      MCHC 31.2 (L) g/dL      RDW 16.7 (H) %      RDW-SD 52.7 fl      MPV 10.9 (H) fL      Platelets 373 10*3/mm3      Neutrophil % 71.8 %      Lymphocyte % 21.1 %      Monocyte % 6.4 %      Eosinophil % 0.2 %      Basophil % 0.2 %      Immature Grans % 0.3 %      Neutrophils, Absolute 6.75 (H) 10*3/mm3      Lymphocytes, Absolute 1.99 10*3/mm3      Monocytes, Absolute 0.60 10*3/mm3      Eosinophils, Absolute 0.02 10*3/mm3      Basophils, Absolute 0.02 10*3/mm3      Immature Grans, Absolute 0.03 10*3/mm3     Lactic Acid, Plasma [[id number]]  (Normal) Collected:  02/03/18 1024    Specimen:  Blood  Updated:  02/03/18 1055     Lactate 1.1 mmol/L     Comprehensive Metabolic Panel [[id number]]  (Abnormal) Collected:  02/03/18 1024    Specimen:  Blood Updated:  02/03/18 1102     Glucose 192 (H) mg/dL      BUN 30 (H) mg/dL      Creatinine 1.61 (H) mg/dL      Sodium 134 (L) mmol/L      Potassium 4.3 mmol/L      Chloride 102 mmol/L      CO2 23.9 (L) mmol/L      Calcium 9.3 mg/dL      Total Protein 7.1 g/dL      Albumin 4.30 g/dL      ALT (SGPT) 17 U/L      AST (SGOT) 22 U/L      Alkaline Phosphatase 39 U/L       Note New Reference Ranges        Total Bilirubin 0.2 mg/dL      eGFR Non African Amer 31 (L) mL/min/1.73      Globulin 2.8 gm/dL      A/G Ratio 1.5 g/dL      BUN/Creatinine Ratio 18.6     Anion Gap 8.1 mmol/L     Narrative:       The MDRD GFR formula is only valid for adults with stable renal function between ages 18 and 70.    Osmolality, Calculated [[id number]]  (Normal) Collected:  02/03/18 1024    Specimen:  Blood Updated:  02/03/18 1102     Osmolality Calc 279.6 mOsm/kg     Influenza Antigen, Rapid - Swab, Nasopharynx [[id number]]  (Abnormal) Collected:  02/03/18 1130    Specimen:  Swab from Nasopharynx Updated:  02/03/18 1148     Influenza A Ag, EIA Negative     Influenza B Ag, EIA Positive (A)    POC Glucose Once [[id number]]  (Abnormal) Collected:  02/03/18 1732    Specimen:  Blood Updated:  02/03/18 1739     Glucose 282 (H) mg/dL     Narrative:       Meter: [id number] : [id number] Tushar Burnett    POC Glucose Once [[id number]]  (Abnormal) Collected:  02/03/18 1951    Specimen:  Blood Updated:  02/03/18 1957     Glucose 396 (H) mg/dL     Narrative:       Meter: [id number] : [id number] [employer] griselda    POC Glucose Once [[id number]]  (Abnormal) Collected:  02/03/18 2308    Specimen:  Blood Updated:  02/03/18 2320     Glucose 264 (H) mg/dL     Narrative:       Meter: [id number] : [id number] [facility]    Blood Culture - Blood, [[id number]]  (Normal) Collected:  02/03/18 1127    Specimen:  Blood from  Arm, Left Updated:  02/03/18 2346     Blood Culture No growth at less than 24 hours    Blood Culture - Blood, [[id number]]  (Normal) Collected:  02/03/18 1109    Specimen:  Blood from Arm, Right Updated:  02/04/18 0016     Blood Culture No growth at less than 24 hours    CBC & Differential [[id number]] Collected:  02/04/18 0049    Specimen:  Blood Updated:  02/04/18 0227    Narrative:       The following orders were created for panel order CBC & Differential.  Procedure                               Abnormality         Status                     ---------                               -----------         ------                     CBC Auto Differential[[id number]]        Abnormal            Final result                 Please view results for these tests on the individual orders.    CBC Auto Differential [[id number]]  (Abnormal) Collected:  02/04/18 0049    Specimen:  Blood Updated:  02/04/18 0227     WBC 10.13 10*3/mm3      RBC 3.64 (L) 10*6/mm3      Hemoglobin 9.9 (L) g/dL      Hematocrit 31.8 (L) %      MCV 87.4 fL      MCH 27.2 pg      MCHC 31.1 (L) g/dL      RDW 16.8 (H) %      RDW-SD 52.4 fl      MPV 11.1 (H) fL      Platelets 350 10*3/mm3      Neutrophil % 82.3 (H) %      Lymphocyte % 16.3 %      Monocyte % 0.9 %      Eosinophil % 0.2 %      Basophil % 0.1 %      Immature Grans % 0.2 %      Neutrophils, Absolute 8.34 (H) 10*3/mm3      Lymphocytes, Absolute 1.65 10*3/mm3      Monocytes, Absolute 0.09 (L) 10*3/mm3      Eosinophils, Absolute 0.02 10*3/mm3      Basophils, Absolute 0.01 10*3/mm3      Immature Grans, Absolute 0.02 10*3/mm3     Basic Metabolic Panel [[id number]]  (Abnormal) Collected:  02/04/18 0049    Specimen:  Blood Updated:  02/04/18 0245     Glucose 258 (H) mg/dL      BUN 34 (H) mg/dL      Creatinine 1.49 (H) mg/dL      Sodium 133 (L) mmol/L      Potassium 5.0 mmol/L      Chloride 104 mmol/L      CO2 20.6 (L) mmol/L      Calcium 8.5 mg/dL      eGFR Non African Amer 34 (L) mL/min/1.73       BUN/Creatinine Ratio 22.8     Anion Gap 8.4 mmol/L     Narrative:       The MDRD GFR formula is only valid for adults with stable renal function between ages 18 and 70.    Osmolality, Calculated [[id number]]  (Normal) Collected:  02/04/18 0049    Specimen:  Blood Updated:  02/04/18 0245     Osmolality Calc 282.9 mOsm/kg     POC Glucose Once [[id number]]  (Abnormal) Collected:  02/04/18 0635    Specimen:  Blood Updated:  02/04/18 0701     Glucose 258 (H) mg/dL     Narrative:       Meter: [id number] : [id number] david alfonso        Imaging Results (last 24 hours)     Procedure Component Value Units Date/Time    XR Chest 1 View [[id number]] Collected:  02/03/18 2005     Updated:  02/03/18 2007    Narrative:       EXAMINATION: XR CHEST 1 VW-      CLINICAL INDICATION:     sob     TECHNIQUE:  XR CHEST 1 VW-      COMPARISON: 09/09/2015      FINDINGS:   Right lower lobe airspace disease. Large nodule left midlung may  represent calcified granuloma.   Heart and mediastinal contours are unremarkable.   No pneumothorax.   No pleural effusion.   No acute osseous findings.            Impression:       1. Right lower lobe pneumonia.  2. Stable calcified nodule left mid lung likely granuloma.     This report was finalized on 2/3/2018 8:05 PM by Dr. Ceasar Woodard MD.           Results Review:    I reviewed the patient's new clinical results.    Assessment/Plan   1.  Influenza type B   Tamiflu 75 g twice a day  2.  Right lower lobe pneumonia   Rocephin 1 g every 24 hours and Zithromax 500 IV Q 24 hours   Continue duo nebs  3.  COPD exacerbation   Sodium Medrol 60 mg IV every 6 hours   Aggressive pulmonary treatments  4.  Chronic kidney disease stable  5.  Hypertension  6.  Diabetes Notes type II   Continue Accu-Cheks and sliding scale as needed  7.  Hypoxia   Continue supplemental O2    Active Problems:    Acute exacerbation of chronic obstructive pulmonary disease (COPD)            Jim Hernandez MD  02/04/18  7:18 AM          Electronically signed by Jim Hernandez MD at 2/4/2018  7:21 AM        Vital Signs (last 24 hours)       02/06 0700  -  02/07 0659 02/07 0700  -  02/07 0925   Most Recent    Temp (°F) 97.4 -  97.7       97.4 (36.3)    Heart Rate 60 -  88      81     81    Resp 18 -  20      18     18    /60 -  160/84       160/80    SpO2 (%) 95 -  99    (!)88 -  93     93

## 2021-02-15 NOTE — ANESTHESIA PROCEDURE NOTES
Airway  Urgency: elective    Date/Time: 2/15/2021 10:30 AM  Airway not difficult    General Information and Staff    Patient location during procedure: OR  CRNA: Rodolfo Sanchez CRNA    Indications and Patient Condition  Indications for airway management: airway protection    Preoxygenated: yes  Mask difficulty assessment: 1 - vent by mask    Final Airway Details  Final airway type: endotracheal airway      Successful airway: ETT and EBT - double lumen left    Successful intubation technique: direct laryngoscopy  Endotracheal tube insertion site: oral  Blade: Navneet  Blade size: 3.5.  ETT size (mm): 7.5  Cormack-Lehane Classification: grade IIa - partial view of glottis  Placement verified by: chest auscultation, bronchoscopy and capnometry   Measured from: gums  Number of attempts at approach: 1

## 2021-02-15 NOTE — H&P
Thoracic Surgery Consultation     Referring Physician: Dr Ephraim Lujan     Primary Care Physician: Dr. Michael Nunez          Chief Complaint   Patient presents with   • Lung Nodule       New patient from [facility].                Subjective []Expand by Default        Mr. Desir is a 70-year-old male with a history of prostate cancer who presents with a lung nodule.  He was referred to me by Dr. Ephraim Lujan.  Patient was recently diagnosed with prostate cancer.  During work-up of this he had a CT scan of the chest which showed a right lower lobe lung nodule.  He is a previous smoker who quit 25 years ago with a 20-pack-year smoking history.    He underwent wedge resection which final path was consistent with lung cancer not prostate primary.  His lung reserve is not adequate for an anatomic resection.  He denies any hemoptysis, productive cough, fevers chills, night sweats, unwanted weight loss.  He has no history of cancer personally other than the prostate cancer.  He has no known family history of cancer.  He does report he is more short of breath with activity recently but attributes this to weight gain and getting older.           Review of Systems   Constitutional: Positive for fatigue.   Respiratory: Positive for shortness of breath. Negative for cough and chest tightness.    Cardiovascular: Positive for leg swelling. Negative for chest pain.   Genitourinary: Positive for difficulty urinating.         A complete 10 system review of systems was performed, is negative except stated above.     Medical History        Past Medical History:   Diagnosis Date   • Hypertension     • Prostate cancer (CMS/HCC)          Surgical History         Past Surgical History:   Procedure Laterality Date   • JOINT REPLACEMENT       • PROSTATE BIOPSY       • REPLACEMENT TOTAL KNEE       • SHOULDER SURGERY            History reviewed. No pertinent family history.  Social History            Tobacco Use   • Smoking status: Former  "Smoker       Packs/day: 1.00       Years: 33.00       Pack years: 33.00       Types: Cigarettes       Quit date:        Years since quittin.7   • Smokeless tobacco: Never Used   Substance Use Topics   • Alcohol use: Yes   • Drug use: Never      Current Medications          Current Outpatient Medications   Medication Sig Dispense Refill   • alfuzosin (UROXATRAL) 10 MG 24 hr tablet         • lisinopril-hydrochlorothiazide (PRINZIDE,ZESTORETIC) 10-12.5 MG per tablet Take 1 tablet by mouth Daily.       • losartan-hydrochlorothiazide (HYZAAR) 50-12.5 MG per tablet         • therapeutic multivitamin-minerals (THERAGRAN-M) tablet Take 1 tablet by mouth Daily.          No current facility-administered medications for this visit.         Allergies:  Patient has no known allergies.        Objective          Vital Signs  Visit Vitals  /56 (BP Location: Left arm, Patient Position: Sitting, Cuff Size: Large Adult)   Pulse 84   Ht 185.4 cm (72.99\")   Wt 116 kg (256 lb)   SpO2 99%   BMI 33.78 kg/m²            Physical Exam  Constitutional:       General: He is not in acute distress.     Appearance: He is well-developed. He is obese. He is not diaphoretic.   HENT:      Head: Normocephalic and atraumatic.      Right Ear: External ear normal.      Left Ear: External ear normal.   Eyes:      General:         Right eye: No discharge.         Left eye: No discharge.      Pupils: Pupils are equal, round, and reactive to light.   Neck:      Musculoskeletal: Normal range of motion and neck supple.      Vascular: No JVD.      Trachea: No tracheal deviation.   Cardiovascular:      Rate and Rhythm: Normal rate and regular rhythm.      Heart sounds: Normal heart sounds. No murmur.   Pulmonary:      Effort: Pulmonary effort is normal. No respiratory distress.      Breath sounds: Normal breath sounds. No stridor. No wheezing.   Abdominal:      General: There is no distension.      Palpations: Abdomen is soft.      Tenderness: " There is no abdominal tenderness. There is no guarding.   Musculoskeletal: Normal range of motion.         General: No tenderness or deformity.      Right lower leg: Edema present.      Left lower leg: Edema present.      Comments: Mild bilateral lower extremity edema   Skin:     General: Skin is warm and dry.      Capillary Refill: Capillary refill takes less than 2 seconds.      Coloration: Skin is not pale.      Findings: No erythema or rash.   Neurological:      Mental Status: He is alert and oriented to person, place, and time.      Motor: No abnormal muscle tone.      Coordination: Coordination normal.   Psychiatric:         Behavior: Behavior normal.         Thought Content: Thought content normal.         Judgment: Judgment normal.            Results Review:            WBC   Date Value Ref Range Status   11/09/2019 9.4 4.8 - 10.8 K/uL Final            RBC   Date Value Ref Range Status   11/09/2019 4.82 4.70 - 6.10 M/uL Final            Hemoglobin   Date Value Ref Range Status   11/09/2019 14.2 14.0 - 18.0 g/dL Final            Hematocrit   Date Value Ref Range Status   11/09/2019 43.7 42.0 - 52.0 % Final            MCV   Date Value Ref Range Status   11/09/2019 90.7 80.0 - 94.0 fL Final            MCH   Date Value Ref Range Status   11/09/2019 29.5 27.0 - 31.0 pg Final            MCHC   Date Value Ref Range Status   11/09/2019 32.5 (L) 33.0 - 37.0 g/dL Final            RDW   Date Value Ref Range Status   11/09/2019 13.1 11.5 - 14.5 % Final            MPV   Date Value Ref Range Status   11/09/2019 10.9 9.4 - 12.4 fL Final            Platelets   Date Value Ref Range Status   11/09/2019 161 130 - 400 K/uL Final            Neutrophil Rel %   Date Value Ref Range Status   11/09/2019 86.9 (H) 50.0 - 65.0 % Final            Lymphocyte Rel %   Date Value Ref Range Status   11/09/2019 7.0 (L) 20.0 - 40.0 % Final            Monocyte Rel %   Date Value Ref Range Status   11/09/2019 4.4 0.0 - 10.0 % Final             Eosinophil Rel %   Date Value Ref Range Status   11/09/2019 0.4 0.0 - 5.0 % Final            Basophil Rel %   Date Value Ref Range Status   11/09/2019 0.3 0.0 - 1.0 % Final            Neutrophils Absolute   Date Value Ref Range Status   11/09/2019 8.2 (H) 1.5 - 7.5 K/uL Final      Lymphocytes Absolute   Date Value Ref Range Status   11/09/2019 0.7 (L) 1.1 - 4.5 K/uL Final            Monocytes Absolute   Date Value Ref Range Status   11/09/2019 0.40 0.00 - 0.90 K/uL Final            Eosinophils Absolute   Date Value Ref Range Status   11/09/2019 0.00 0.00 - 0.60 K/uL Final            Basophils Absolute   Date Value Ref Range Status   11/09/2019 0.00 0.00 - 0.20 K/uL Final            Immature Grans, Absolute   Date Value Ref Range Status   11/09/2019 0.1 K/uL Final            Glucose   Date Value Ref Range Status   08/14/2020 103 74 - 109 mg/dL Final            Sodium   Date Value Ref Range Status   08/14/2020 140 136 - 145 mmol/L Final            Potassium   Date Value Ref Range Status   08/14/2020 4.4 3.5 - 5.0 mmol/L Final            CO2   Date Value Ref Range Status   08/14/2020 24 22 - 29 mmol/L Final            Chloride   Date Value Ref Range Status   08/14/2020 104 98 - 111 mmol/L Final            Anion Gap   Date Value Ref Range Status   08/14/2020 12 7 - 19 mmol/L Final            Creatinine   Date Value Ref Range Status   08/14/2020 1 0.5 - 1.2 mg/dL Final            BUN   Date Value Ref Range Status   08/14/2020 16 8 - 23 mg/dL Final            Calcium   Date Value Ref Range Status   08/14/2020 9.2 8.8 - 10.2 mg/dL Final              eGFR Non  Am   Date Value Ref Range Status   08/14/2020 >60 >60 Final       Comment:       This calculation may be inaccurate for patients under the age of 18 years.  For ages 18 and older, a GFR >60 mL/min/1.73m2 (not corrected for weight) is  valid for stable renal function.            Alkaline Phosphatase   Date Value Ref Range Status   08/14/2020 94 40 - 130 U/L  Final      Total Protein   Date Value Ref Range Status   08/14/2020 6.7 6.6 - 8.7 g/dL Final            ALT (SGPT)   Date Value Ref Range Status   08/14/2020 39 5 - 41 U/L Final            AST (SGOT)   Date Value Ref Range Status   08/14/2020 27 5 - 40 U/L Final            Total Bilirubin   Date Value Ref Range Status   08/14/2020 1.2 0.2 - 1.2 mg/dL Final            Albumin   Date Value Ref Range Status   08/14/2020 4.1 3.5 - 5.2 g/dL Final                     I reviewed the patient's clinical results and discussed with patient.     CT Chest::  Result Addenda   Addendum by Charlene Grider MD on 09/02/2020  2:51 PM  Addendum: Please make the following change in the body of the report  about the size of the right lower lobe nodule. It measures 2.8 x 1.7  cm. It previously measured 1.7 x 1.2 cm.  Signed by Dr Charlene Grider on 9/2/2020 2:49 PM   Result Impression   No acute abnormality of the abdomen or pelvis.  A moderate splenomegaly.  An enlarged prostate.  A stable bilateral renal cysts.  The lobulated noncalcified nodule in the right lower lobe represent a  neoplastic process. Further evaluation with positron emission  tomography scan may be obtained.  The previously seen fractures of the left sacral ala is not visualized  in this study. If clinically significant, further evaluation with  radionuclide bone scan may be obtained  Signed by Dr Charlene Grider on 8/14/2020 1:18 PM            PET Scan:  IMPRESSION:  1. Hypermetabolic right lower lobe pulmonary nodule compatible with  malignant neoplasm.  2. No scintigraphic evidence of distal metastases.  This report was finalized on 09/18/2020 13:27 by Dr. Raul Mitchell MD.     I personally reviewed images of following exams, the following is my interpretation:     CT Chest: Lung nodule at base of right lower lobe along costophrenic angle, multilobulated, near visceral pleura, no spiculation     PET Scan:  Avid activity at right lower lobe nodule no other areas of  concern              Assessment/Plan         Mr. Ceballos is a 70-year-old male with prostate cancer, lung cancer right lower lobe.  He is undergone wedge resection of this but needs an additional margin.  He also needs lymph node sampling.  He does not have adequate lung reserve for an anatomic lobectomy.  Clinical Stage if Primary Lung Malignancy: [id number]       We discussed the risk and benefits of robotic wedge resection with lymph node sampling.  We discussed treatment options including SBRT, watchful waiting and continued surveillance.  After discussion with patient the best option was determined to be surgical resection.  The risk of surgery were discussed including bleeding, infection, injury to large vessels, need for transfusion, need to convert to an open procedure.  There is also a risk of pneumonia, prolonged airleak, anesthesia risk, chronic pain and/or death.    This patient is at particular risk of pneumonia due to his obesity and previous smoking risk.  These were all discussed with the patient and they agree to proceed.  I will call the patient after results of biopsy are finalized and plan for the next steps.     Thank you for trusting me with the care of Mr. Desir.  Please do not hesitate to call with any questions or concerns.     Nick Flores M.D.  Cardiothoracic Surgeon

## 2021-02-15 NOTE — ANESTHESIA POSTPROCEDURE EVALUATION
Patient: Delgado Desir    Procedure Summary     Date: 02/15/21 Room / Location: [facility] OR  /  [facility] OR    Anesthesia Start: 1018 Anesthesia Stop: 1355    Procedure: THORACOSCOPY WITH DAVINCI ROBOT WITH RIGHT WEDGE RESECTION, MEDIASTINAL LYMPH NODE DISSECTION-RIGHT (Right Chest) Diagnosis:       Lung mass      (Lung mass [R91.8])    Surgeon: Nick Flores MD Provider: Rodolfo Sanchez CRNA    Anesthesia Type: general ASA Status: 3          Anesthesia Type: general    Vitals  Vitals Value Taken Time   /68 02/15/21 1520   Temp 98.1 °F (36.7 °C) 02/15/21 1520   Pulse 84 02/15/21 1524   Resp 16 02/15/21 1520   SpO2 99 % 02/15/21 1524   Vitals shown include unvalidated device data.        Post Anesthesia Care and Evaluation    Patient location during evaluation: PACU  Level of consciousness: awake  Pain management: adequate  Airway patency: patent  Anesthetic complications: No anesthetic complications  PONV Status: none  Cardiovascular status: acceptable  Respiratory status: acceptable  Hydration status: acceptable

## 2021-02-15 NOTE — OP NOTE
Cardiothoracic Surgery Operative Note     Pre-op diagnosis: Lung cancer, right lower lobe  Post-op diagnosis: Lung Cancer, right lower lobe  Procedure:  1. Diagnostic Bronchoscopy , CPT 12524  2. Right Robotic Assisted VATS Diagnostic Wedge Resection, CPT 59743  3. Right Robot Assisted VATS mediastinal lymphadenectomy, CPT +79176     Surgeon: Nick Flores M.D.  Assistant: Jessica Romano  Anesthesia: GETA- double lumen  EBL: 50 ml  Drains: 24 Lao straight right pleural tube, 28 Lao right angle right pleural tube     Specimens:  1. Level 7 lymph nodes  2. Right lower lobe wedge resection, stitch marks new stapled margin  3. Level 8 lymph nodes     Operative indications:    Mr. Ceballos is a 70-year-old male who is well-known to me.  He originally presented with a right lower lobe lung nodule that had prostate cancer recently diagnosed.  He had a wedge resection performed of this which on final pathology was consistent with a lung carcinoma.  His PFTs are not adequate for a anatomic lobectomy, he is therefore brought to the operating room after being described the risk and benefits of a further wedge resection for further margin as well as lymph node sampling.    Operative findings  Bronchoscopy:  Normal endobronchial anatomy, some minor amount of mucus in the right lower lobe.     Intrathoracic Findings: Anthracitic appearing lungs, emphysematous appearing lungs.  Dense adhesions from previous wedge resection requiring extensive lysis of adhesions.  Staple line along the diaphragmatic leading edge of right lower lobe found an additional 2 cm margin obtain with an additional wedge resection.  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  Level 8 lymph nodes taken.  Level 7 lymph nodes taken.     Operative description in detail:  The patient was taken to the operative suite where he was placed in a supine  position. General anesthesia was induced without complication and a double lumen ET tube was placed by anesthesia. A timeout was performed. With that bronchoscopy was performed demonstrating no endobronchial lesion, normal appearing right bronchi, and standard bronchial anatomy.     He was positioned in lateral decubitus position with right side up. All pressure point are protected. Single lung ventilation was initiated and double lumen endotracheal tube position confirmed.  He was then prepped and draped in the usual and sterile fashion.       An incision was made sharply in line with the middle axillary line at approximately the 8th intercostal space.  The pleural cavity was accessed and surveyed.  Additional robotic ports was placed in the eighth and ninth interspace.    Assistant port was placed laterally along the diaphragmatic margin or direct vision.      There were dense intrapleural adhesions especially along the diaphragmatic surface.  These were carefully divided with bipolar electrocautery.  The previous staple line along the leading edge of the right lower lobe was found and densely adherent to the diaphragm.  This was carefully dissected off.  I took an additional wedge resection of about 2 cm of the staple line and marked the new margin.  This was done with a combination of blue and black load robotic staples.      Posterior adhesions were taken down.  The subcarinal space was identified and level 7 lymph nodes dissected and sent for permanent section.  The inferior pulmonary ligament was divided about FPC up in level 8 lymph nodes were identified and sent for frozen section as well.  I examined the level 4R area but there were dense adhesions likely from previous thyroid surgery and I think the yield was not worth the risk of reoperative dissection in this field.    Hemostasis was ensured in the operative site and pro-gel was applied over staple lines.  A 24 Polish straight chest tube and 28  Nepali right angle chest tube were placed through the port sites after tunneling.  I performed intercostal blocks using a one-to-one Marcaine/Exparel mixture.  All ports were removed under direct vision ensuring adequate hemostasis.  The lung was allowed to expand under direct vision in both lower and upper lobes expanded well and filled the thorax.  All port sites and assistant site were closed in anatomical layers using absorbable suture.  Skin affix was used as dressing.  The chest tubes were connected to 20 cm of suction.    The patient tolerated the procedure well was extubated in the operating room and transferred the PACU in stable condition.  All needle sponge and instrument counts were correct at the end of the case.     Complications: None  Disposition: Transfer to PACU extubated and in stable condition.      Nick Flores M.D.  Cardiothoracic Surgeon

## 2021-02-16 ENCOUNTER — APPOINTMENT (OUTPATIENT)
Dept: GENERAL RADIOLOGY | Facility: HOSPITAL | Age: 71
End: 2021-02-16

## 2021-02-16 LAB
ANION GAP SERPL CALCULATED.3IONS-SCNC: 9 MMOL/L (ref 5–15)
BUN SERPL-MCNC: 19 MG/DL (ref 8–23)
BUN/CREAT SERPL: 19.8 (ref 7–25)
CALCIUM SPEC-SCNC: 8.4 MG/DL (ref 8.6–10.5)
CHLORIDE SERPL-SCNC: 102 MMOL/L (ref 98–107)
CO2 SERPL-SCNC: 24 MMOL/L (ref 22–29)
CREAT SERPL-MCNC: 0.96 MG/DL (ref 0.76–1.27)
CYTO UR: NORMAL
DEPRECATED RDW RBC AUTO: 45.6 FL (ref 37–54)
ERYTHROCYTE [DISTWIDTH] IN BLOOD BY AUTOMATED COUNT: 14.5 % (ref 12.3–15.4)
GFR SERPL CREATININE-BSD FRML MDRD: 78 ML/MIN/1.73
GLUCOSE SERPL-MCNC: 132 MG/DL (ref 65–99)
HCT VFR BLD AUTO: 37.1 % (ref 37.5–51)
HGB BLD-MCNC: 12.7 G/DL (ref 13–17.7)
HOLD SPECIMEN: NORMAL
LAB AP CASE REPORT: NORMAL
Lab: NORMAL
MCH RBC QN AUTO: 29.7 PG (ref 26.6–33)
MCHC RBC AUTO-ENTMCNC: 34.2 G/DL (ref 31.5–35.7)
MCV RBC AUTO: 86.9 FL (ref 79–97)
PATH REPORT.FINAL DX SPEC: NORMAL
PATH REPORT.GROSS SPEC: NORMAL
PLATELET # BLD AUTO: 140 10*3/MM3 (ref 140–450)
PMV BLD AUTO: 11.3 FL (ref 6–12)
POTASSIUM SERPL-SCNC: 3.8 MMOL/L (ref 3.5–5.2)
RBC # BLD AUTO: 4.27 10*6/MM3 (ref 4.14–5.8)
SODIUM SERPL-SCNC: 135 MMOL/L (ref 136–145)
WBC # BLD AUTO: 8.77 10*3/MM3 (ref 3.4–10.8)

## 2021-02-16 PROCEDURE — 80048 BASIC METABOLIC PNL TOTAL CA: CPT | Performed by: SURGERY

## 2021-02-16 PROCEDURE — 99024 POSTOP FOLLOW-UP VISIT: CPT | Performed by: SURGERY

## 2021-02-16 PROCEDURE — 25010000002 ENOXAPARIN PER 10 MG: Performed by: SURGERY

## 2021-02-16 PROCEDURE — 94799 UNLISTED PULMONARY SVC/PX: CPT

## 2021-02-16 PROCEDURE — 71045 X-RAY EXAM CHEST 1 VIEW: CPT

## 2021-02-16 PROCEDURE — 25010000002 CEFAZOLIN PER 500 MG: Performed by: SURGERY

## 2021-02-16 PROCEDURE — 85027 COMPLETE CBC AUTOMATED: CPT | Performed by: SURGERY

## 2021-02-16 RX ORDER — SENNA PLUS 8.6 MG/1
2 TABLET ORAL 2 TIMES DAILY PRN
Status: DISCONTINUED | OUTPATIENT
Start: 2021-02-16 | End: 2021-02-19 | Stop reason: HOSPADM

## 2021-02-16 RX ADMIN — SENNOSIDES 2 TABLET: 8.6 TABLET, FILM COATED ORAL at 11:01

## 2021-02-16 RX ADMIN — CEFAZOLIN SODIUM 3 G: 10 INJECTION, POWDER, FOR SOLUTION INTRAVENOUS at 01:52

## 2021-02-16 RX ADMIN — SODIUM CHLORIDE 80 ML/HR: 9 INJECTION, SOLUTION INTRAVENOUS at 20:37

## 2021-02-16 RX ADMIN — ACETYLCYSTEINE 3 ML: 200 SOLUTION ORAL; RESPIRATORY (INHALATION) at 06:45

## 2021-02-16 RX ADMIN — OXYCODONE HYDROCHLORIDE AND ACETAMINOPHEN 1 TABLET: 5; 325 TABLET ORAL at 10:52

## 2021-02-16 RX ADMIN — LEVOTHYROXINE SODIUM 112 MCG: 112 TABLET ORAL at 05:07

## 2021-02-16 RX ADMIN — ACETYLCYSTEINE 3 ML: 200 SOLUTION ORAL; RESPIRATORY (INHALATION) at 22:42

## 2021-02-16 RX ADMIN — TAMSULOSIN HYDROCHLORIDE 0.4 MG: 0.4 CAPSULE ORAL at 20:37

## 2021-02-16 RX ADMIN — IPRATROPIUM BROMIDE AND ALBUTEROL SULFATE 3 ML: 2.5; .5 SOLUTION RESPIRATORY (INHALATION) at 15:29

## 2021-02-16 RX ADMIN — IPRATROPIUM BROMIDE AND ALBUTEROL SULFATE 3 ML: 2.5; .5 SOLUTION RESPIRATORY (INHALATION) at 22:42

## 2021-02-16 RX ADMIN — IPRATROPIUM BROMIDE AND ALBUTEROL SULFATE 3 ML: 2.5; .5 SOLUTION RESPIRATORY (INHALATION) at 06:45

## 2021-02-16 RX ADMIN — ACETYLCYSTEINE 3 ML: 200 SOLUTION ORAL; RESPIRATORY (INHALATION) at 15:29

## 2021-02-16 RX ADMIN — SODIUM CHLORIDE 80 ML/HR: 9 INJECTION, SOLUTION INTRAVENOUS at 10:57

## 2021-02-16 RX ADMIN — DOCUSATE SODIUM 100 MG: 100 CAPSULE ORAL at 10:53

## 2021-02-16 RX ADMIN — ENOXAPARIN SODIUM 40 MG: 40 INJECTION SUBCUTANEOUS at 11:01

## 2021-02-16 RX ADMIN — LOSARTAN POTASSIUM: 50 TABLET, FILM COATED ORAL at 10:52

## 2021-02-16 NOTE — PROGRESS NOTES
"    [facility] Cardiothoracic Surgery  PROGRESS NOTE   CC: Cancer, status post wedge    Subjective:  Mr. Ceballos is doing well today.  His pain is well controlled.  He is on room air.  His Ibarra will be removed this morning.  I spoke to his daughter this morning about operation and how things went.  Has been hemodynamically stable.    ROS: No fevers or chills, hemodynamically stable    Objective:      /49 (BP Location: Right arm, Patient Position: Lying)   Pulse 87   Temp 98.6 °F (37 °C) (Oral)   Resp 16   Ht 183 cm (72.05\")   Wt 121 kg (266 lb 1.5 oz)   SpO2 93%   BMI 36.04 kg/m²       Intake/Output Summary (Last 24 hours) at 2/16/2021 1113  Last data filed at 2/16/2021 1046  Gross per 24 hour   Intake 240 ml   Output 2353 ml   Net -2113 ml       CT Outpt: 600/24hrs, no air leak, serosanguineous output    PE:  Vitals:    02/16/21 1046   BP: 124/49   Pulse: 87   Resp: 16   Temp: 98.6 °F (37 °C)   SpO2: 93%     GENERAL: NAD, resting comfortably, normal color  CARDIOVASCULAR: regular, regular rate, sinus  PULMONARY: Normal bilateral breath sounds, no labored breathing dressings clean dry and intact serosanguineous output from right chest tube  ABDOMEN: soft, nt/nd  EXTREMITIES: mild peripheral edema, normal pulses, normal ROM        Lab Results (last 72 hours)     Procedure Component Value Units Date/Time    Extra Tubes [[id number]] Collected: 02/16/21 0734    Specimen: Blood, Venous Line Updated: 02/16/21 1031    Narrative:      The following orders were created for panel order Extra Tubes.  Procedure                               Abnormality         Status                     ---------                               -----------         ------                     Red Top[[id number]]                                          Final result                 Please view results for these tests on the individual orders.    Red Top [[id number]] Collected: 02/16/21 0734    Specimen: Blood Updated: " 02/16/21 1031     Extra Tube Hold for add-ons.     Comment: Auto resulted.       Basic Metabolic Panel [[id number]]  (Abnormal) Collected: 02/16/21 0733    Specimen: Blood Updated: 02/16/21 0857     Glucose 132 mg/dL      BUN 19 mg/dL      Creatinine 0.96 mg/dL      Sodium 135 mmol/L      Potassium 3.8 mmol/L      Chloride 102 mmol/L      CO2 24.0 mmol/L      Calcium 8.4 mg/dL      eGFR Non African Amer 78 mL/min/1.73      BUN/Creatinine Ratio 19.8     Anion Gap 9.0 mmol/L     Narrative:      GFR Normal >60  Chronic Kidney Disease <60  Kidney Failure <15      CBC (No Diff) [[id number]]  (Abnormal) Collected: 02/16/21 0733    Specimen: Blood Updated: 02/16/21 0837     WBC 8.77 10*3/mm3      RBC 4.27 10*6/mm3      Hemoglobin 12.7 g/dL      Hematocrit 37.1 %      MCV 86.9 fL      MCH 29.7 pg      MCHC 34.2 g/dL      RDW 14.5 %      RDW-SD 45.6 fl      MPV 11.3 fL      Platelets 140 10*3/mm3     Basic Metabolic Panel [[id number]]  (Abnormal) Collected: 02/15/21 1401    Specimen: Blood, Arterial Line Updated: 02/15/21 1436     Glucose 164 mg/dL      BUN 17 mg/dL      Creatinine 1.07 mg/dL      Sodium 138 mmol/L      Potassium 4.4 mmol/L      Chloride 106 mmol/L      CO2 23.0 mmol/L      Calcium 8.5 mg/dL      eGFR Non African Amer 69 mL/min/1.73      BUN/Creatinine Ratio 15.9     Anion Gap 9.0 mmol/L     Narrative:      GFR Normal >60  Chronic Kidney Disease <60  Kidney Failure <15      CBC (No Diff) [[id number]]  (Normal) Collected: 02/15/21 1401    Specimen: Blood, Arterial Line Updated: 02/15/21 1418     WBC 9.50 10*3/mm3      RBC 4.52 10*6/mm3      Hemoglobin 13.2 g/dL      Hematocrit 40.1 %      MCV 88.7 fL      MCH 29.2 pg      MCHC 32.9 g/dL      RDW 14.0 %      RDW-SD 45.5 fl      MPV 11.0 fL      Platelets 153 10*3/mm3     Tissue Pathology Exam [[id number]] Collected: 02/15/21 1140    Specimen: Tissue from Lung, Right Lower Lobe; Tissue from Lymph Node; Tissue from Lymph Node Updated: 02/15/21 1324              CXR:  Some right-sided atelectasis, good placement of chest tubes in good expansion bilateral lungs    Assessment/Plan     Mr. Ceballos is a 70-year-old male with a right lower lobe lung cancer.  He status post wedge resection and returns for additional wedge for additional margin as well as lymph node sampling.  He is postop day 1 from this.  He is doing well and progressing appropriately.    Keep chest tubes today as output to high  Remove Ibarra today  Out of bed walking in halls as much as possible  Pain is well controlled  Possibly home in next 1 to 2 days.    Nick Flores M.D.  Cardiothoracic Surgeon

## 2021-02-16 NOTE — PLAN OF CARE
Goal Outcome Evaluation:  Plan of Care Reviewed With: patient  Progress: no change  Outcome Summary: 2 chest tubes, one anterior one posterior on the right side with 20 cm dry suction;  oxygen at 1 litre per NC;  right side breath sounds are positive for expiratory wheezes;  F/C to BSD;  lovenox for VTE;  telemetry on with occas. PVC and/or PAC;  will continue to monitor

## 2021-02-17 ENCOUNTER — TELEPHONE (OUTPATIENT)
Dept: OTOLARYNGOLOGY | Facility: CLINIC | Age: 71
End: 2021-02-17

## 2021-02-17 ENCOUNTER — APPOINTMENT (OUTPATIENT)
Dept: GENERAL RADIOLOGY | Facility: HOSPITAL | Age: 71
End: 2021-02-17

## 2021-02-17 DIAGNOSIS — E89.0 POSTOPERATIVE HYPOTHYROIDISM: Primary | ICD-10-CM

## 2021-02-17 LAB
ANION GAP SERPL CALCULATED.3IONS-SCNC: 7 MMOL/L (ref 5–15)
BUN SERPL-MCNC: 16 MG/DL (ref 8–23)
BUN/CREAT SERPL: 16 (ref 7–25)
CALCIUM SPEC-SCNC: 8.6 MG/DL (ref 8.6–10.5)
CHLORIDE SERPL-SCNC: 101 MMOL/L (ref 98–107)
CO2 SERPL-SCNC: 26 MMOL/L (ref 22–29)
CREAT SERPL-MCNC: 1 MG/DL (ref 0.76–1.27)
DEPRECATED RDW RBC AUTO: 48 FL (ref 37–54)
ERYTHROCYTE [DISTWIDTH] IN BLOOD BY AUTOMATED COUNT: 14.7 % (ref 12.3–15.4)
GFR SERPL CREATININE-BSD FRML MDRD: 74 ML/MIN/1.73
GLUCOSE SERPL-MCNC: 141 MG/DL (ref 65–99)
HCT VFR BLD AUTO: 35.4 % (ref 37.5–51)
HGB BLD-MCNC: 12.1 G/DL (ref 13–17.7)
MCH RBC QN AUTO: 30.1 PG (ref 26.6–33)
MCHC RBC AUTO-ENTMCNC: 34.2 G/DL (ref 31.5–35.7)
MCV RBC AUTO: 88.1 FL (ref 79–97)
PLATELET # BLD AUTO: 143 10*3/MM3 (ref 140–450)
PMV BLD AUTO: 10.6 FL (ref 6–12)
POTASSIUM SERPL-SCNC: 4.1 MMOL/L (ref 3.5–5.2)
RBC # BLD AUTO: 4.02 10*6/MM3 (ref 4.14–5.8)
SODIUM SERPL-SCNC: 134 MMOL/L (ref 136–145)
TSH SERPL DL<=0.05 MIU/L-ACNC: 7.4 UIU/ML (ref 0.27–4.2)
WBC # BLD AUTO: 7.9 10*3/MM3 (ref 3.4–10.8)

## 2021-02-17 PROCEDURE — 94799 UNLISTED PULMONARY SVC/PX: CPT

## 2021-02-17 PROCEDURE — 25010000002 FUROSEMIDE PER 20 MG: Performed by: NURSE PRACTITIONER

## 2021-02-17 PROCEDURE — 25010000002 ENOXAPARIN PER 10 MG: Performed by: SURGERY

## 2021-02-17 PROCEDURE — 80048 BASIC METABOLIC PNL TOTAL CA: CPT | Performed by: NURSE PRACTITIONER

## 2021-02-17 PROCEDURE — 71045 X-RAY EXAM CHEST 1 VIEW: CPT

## 2021-02-17 PROCEDURE — 84443 ASSAY THYROID STIM HORMONE: CPT | Performed by: SURGERY

## 2021-02-17 PROCEDURE — 85027 COMPLETE CBC AUTOMATED: CPT | Performed by: NURSE PRACTITIONER

## 2021-02-17 PROCEDURE — 99024 POSTOP FOLLOW-UP VISIT: CPT | Performed by: NURSE PRACTITIONER

## 2021-02-17 RX ORDER — FUROSEMIDE 10 MG/ML
20 INJECTION INTRAMUSCULAR; INTRAVENOUS ONCE
Status: COMPLETED | OUTPATIENT
Start: 2021-02-17 | End: 2021-02-17

## 2021-02-17 RX ORDER — METOPROLOL TARTRATE 5 MG/5ML
5 INJECTION INTRAVENOUS ONCE
Status: COMPLETED | OUTPATIENT
Start: 2021-02-17 | End: 2021-02-17

## 2021-02-17 RX ADMIN — METOPROLOL TARTRATE 12.5 MG: 25 TABLET, FILM COATED ORAL at 21:35

## 2021-02-17 RX ADMIN — POLYETHYLENE GLYCOL (3350) 17 G: 17 POWDER, FOR SOLUTION ORAL at 09:14

## 2021-02-17 RX ADMIN — METOPROLOL TARTRATE 5 MG: 5 INJECTION INTRAVENOUS at 18:04

## 2021-02-17 RX ADMIN — IPRATROPIUM BROMIDE AND ALBUTEROL SULFATE 3 ML: 2.5; .5 SOLUTION RESPIRATORY (INHALATION) at 06:07

## 2021-02-17 RX ADMIN — FUROSEMIDE 20 MG: 10 INJECTION, SOLUTION INTRAVENOUS at 10:01

## 2021-02-17 RX ADMIN — LOSARTAN POTASSIUM: 50 TABLET, FILM COATED ORAL at 09:15

## 2021-02-17 RX ADMIN — METOPROLOL TARTRATE 5 MG: 5 INJECTION INTRAVENOUS at 16:49

## 2021-02-17 RX ADMIN — LEVOTHYROXINE SODIUM 112 MCG: 112 TABLET ORAL at 05:06

## 2021-02-17 RX ADMIN — OXYCODONE HYDROCHLORIDE AND ACETAMINOPHEN 1 TABLET: 5; 325 TABLET ORAL at 18:11

## 2021-02-17 RX ADMIN — TAMSULOSIN HYDROCHLORIDE 0.4 MG: 0.4 CAPSULE ORAL at 20:45

## 2021-02-17 RX ADMIN — DOCUSATE SODIUM 100 MG: 100 CAPSULE ORAL at 09:15

## 2021-02-17 RX ADMIN — ENOXAPARIN SODIUM 40 MG: 40 INJECTION SUBCUTANEOUS at 09:14

## 2021-02-17 RX ADMIN — ACETYLCYSTEINE 3 ML: 200 SOLUTION ORAL; RESPIRATORY (INHALATION) at 06:08

## 2021-02-17 NOTE — PROGRESS NOTES
"Patient name: Delgado Desir  Patient : 1950  VISIT # [id number]  MR #[id number]    Procedure:Procedure(s):  THORACOSCOPY WITH DAVINCI ROBOT WITH RIGHT WEDGE RESECTION, MEDIASTINAL LYMPH NODE DISSECTION-RIGHT  Procedure Date:2/15/2021  POD:2 Days Post-Op    Subjective     Patient resting in chair. States he feels well.  Tolerating room air.  Pain is well controlled.  Labs have not been done this morning.        Objective     Visit Vitals  /49 (BP Location: Left arm, Patient Position: Sitting)   Pulse 77   Temp 97.5 °F (36.4 °C) (Oral)   Resp 18   Ht 183 cm (72.05\")   Wt 121 kg (266 lb 1.5 oz)   SpO2 96%   BMI 36.04 kg/m²       Intake/Output Summary (Last 24 hours) at 2021 0909  Last data filed at 2021 0500  Gross per 24 hour   Intake 1545 ml   Output 2320 ml   Net -775 ml     RCT 1: 390 ml/24 hours, serosanguineous, no air leak  RCT 2: 30 ml/24 hours, serosanguineous, no air leak      Lab:     CBC:  Results from last 7 days   Lab Units 21  0733 02/15/21  1401 21  1012   WBC 10*3/mm3 8.77 9.50 7.23   HEMATOCRIT % 37.1* 40.1 40.8   PLATELETS 10*3/mm3 140 153 148          BMP:  Results from last 7 days   Lab Units 21  0733 02/15/21  1401 21  1011   SODIUM mmol/L 135* 138 135*   POTASSIUM mmol/L 3.8 4.4 4.4   CHLORIDE mmol/L 102 106 100   CO2 mmol/L 24.0 23.0 25.0   GLUCOSE mg/dL 132* 164* 120*   BUN mg/dL 19 17 16   CREATININE mg/dL 0.96 1.07 0.94          COAG:  Results from last 7 days   Lab Units 21  1011   INR  1.11*   APTT seconds 29.7       IMAGES:       Imaging Results (Last 24 Hours)     Procedure Component Value Units Date/Time    XR Chest 1 View [[id number]] Collected: 21     Updated: 21    Narrative:      EXAMINATION: XR CHEST 1 VW-  2021 7:25 AM CST 1 view     HISTORY: right lung nodule, s/p wedge     COMPARISON: 2021.     FINDINGS:   Chest tubes in place on the RIGHT. Consolidative change, possibly  atelectasis at the " RIGHT lung base, similar to the previous study. There  is also atelectasis of the lateral LEFT lung base, unchanged. No  definite pneumothorax.      The osseous structures and surrounding soft tissues demonstrate no acute  abnormality.          Impression:         1.  No definite interval change.        This report was finalized on 02/17/2021 07:26 by Dr. Peyman Sweeney MD.        CXR: Chest tubes in stable position, no pneumothorax, atelectasis right lung base.      Physical Exam:  General: Alert, oriented. No apparent distress.   Cardiovascular: Regular rate and rhythm without murmur, rubs, or gallops.    Pulmonary: Clear to auscultation bilaterally without wheezing, rubs, or rales.  Chest: Thoracic incisions clean, dry, and intact. Chest tube to 20 cm H2O suction. No air leak. Fluid is serosanguineous.   Abdomen: Soft, non distended, and non tender.  Extremities: Warm, moves all extremities. No edema.   Neurologic:  Grossly intact with no focal deficits.            Impression:  Lung mass  Former smoker  Prostate cancer        Plan:  DC IVF  Stat labs now  Lasix 20mg IV x 1 dose this morning  Encourage pulmonary toilet and ambulation  Routine post thoracic surgery care  Chest tube output still too high, keep tubes today  Anticipate dc hom2 1-2 days  DW patient and nursing    Nilda Faustin, APRN  02/17/21  09:09 CST

## 2021-02-17 NOTE — PAYOR COMM NOTE
"Delgado Chappell (70 y.o. Male) [id number]    ?approved  As  DRG   We are drg facility    [facility]  Cont  Stay  Pt remains in hospital     Admit 2/15        james phone     Fax         Date of Birth Social Security Number Address Home Phone MRN    1950  [address] [facility] [address] [phone] [id number]    [employer] Mar[employer] Status          Other        Admission Date Admission Type Admitting Provider Attending Provider Department, Room/Bed    2/15/21 Elective Flores, MD Mark Segura Austin N, MD [facility] 3C, 363/1    Discharge Date Discharge Disposition Discharge Destination                       Attending Provider: Nick Flores MD    Allergies: No Known Allergies    Isolation: None   Infection: None   Code Status: CPR    Ht: 183 cm (72.05\")   Wt: 121 kg (266 lb 1.5 oz)    Admission Cmt: None   Principal Problem: Lung mass [R91.8]                 Active Insurance as of 2/15/2021     Primary Coverage     Payor Plan Insurance Group Employer/Plan Group    ANTHEM MEDICARE REPLACEMENT ANTHEM MEDICARE ADVANTAGE [id number]     Payor Plan Address Payor Plan Phone Number Payor Plan Fax Number Effective Dates    [address] [phone]  1/1/2020 - None Entered    [facility] [address]       Subscriber Name Subscriber Birth Date Member ID       DELGADO CHAPPELL P 1950 [id number]           Secondary Coverage     Payor Plan Insurance Group Employer/Plan Group    MISC MC SUP   MISC MC SUP              PLAN G     Coverage Address Coverage Phone Number Coverage Fax Number Effective Dates    [address] [address] [phone]  10/1/2019 - None Entered    [facility] [address]       Subscriber Name Subscriber Birth Date Member ID       DELGADO CHAPPELL P 1950 [id number]                 Emergency Contacts      (Rel.) Home Phone Work Phone Mobile Phone    Nava Chappell (Spouse) [phone] -- [phone]    DAVE THOMPSON " (Friend) -- -- [phone]    Mini Valdez (Daughter) -- -- [phone]              Current Facility-Administered Medications   Medication Dose Route Frequency Provider Last Rate Last Admin   • acetaminophen (TYLENOL) tablet 650 mg  650 mg Oral Q4H PRN Flores, Nick MILES MD       • bisacodyl (DULCOLAX) suppository 10 mg  10 mg Rectal Daily PRN Flores, Nick MILES MD       • docusate sodium (COLACE) capsule 100 mg  100 mg Oral Daily Flores, Nick MILES MD   100 mg at 02/17/21 0915   • enoxaparin (LOVENOX) syringe 40 mg  40 mg Subcutaneous Daily Flores, Nick MILES MD   40 mg at 02/17/21 0914   • furosemide (LASIX) injection 20 mg  20 mg Intravenous Once Nilda Faustin APRN       • ipratropium-albuterol (DUO-NEB) nebulizer solution 3 mL  3 mL Nebulization Q6H PRN Flores, Nick MILES MD   3 mL at 02/17/21 0607   • levothyroxine (SYNTHROID, LEVOTHROID) tablet 112 mcg  112 mcg Oral Q AM Flores, Nick MILES MD   112 mcg at 02/17/21 0506   • losartan (COZAAR) 50 mg, hydroCHLOROthiazide (HYDRODIURIL) 12.5 mg for HYZAAR 50-12.5   Oral Daily Flores, Nick MILES MD   Given at 02/17/21 0915   • ondansetron (ZOFRAN) tablet 4 mg  4 mg Oral Q6H PRN Flores, Nick MILES MD        Or   • ondansetron (ZOFRAN) injection 4 mg  4 mg Intravenous Q6H PRN Flores, Nick MILES MD       • oxyCODONE-acetaminophen (PERCOCET) 5-325 MG per tablet 1 tablet  1 tablet Oral Q3H PRN Flores, Ncik MILES MD   1 tablet at 02/16/21 1052   • polyethylene glycol (MIRALAX) packet 17 g  17 g Oral Daily Flores, Nick MILES MD   17 g at 02/17/21 0914   • senna (SENOKOT) tablet 2 tablet  2 tablet Oral BID PRN Flores, Nick MILES MD   2 tablet at 02/16/21 1101   • tamsulosin (FLOMAX) 24 hr capsule 0.4 mg  0.4 mg Oral Nightly Flores, Nick MILES MD   0.4 mg at 02/16/21 2037        Operative/Procedure Notes (last 72 hours) (Notes from 02/14/21 1002 through 02/17/21 1002)      Nick Flores MD at 02/15/21 0954        Cardiothoracic Surgery Operative Note     Pre-op diagnosis: Lung cancer, right lower  lobe  Post-op diagnosis: Lung Cancer, right lower lobe  Procedure:  1. Diagnostic Bronchoscopy , CPT 85847  2. Right Robotic Assisted VATS Diagnostic Wedge Resection, CPT 91014  3. Right Robot Assisted VATS mediastinal lymphadenectomy, CPT +54882     Surgeon: Nick Flores M.D.  Assistant: Jessica Romano  Anesthesia: GETA- double lumen  EBL: 50 ml  Drains: 24 Jordanian straight right pleural tube, 28 Jordanian right angle right pleural tube     Specimens:  1. Level 7 lymph nodes  2. Right lower lobe wedge resection, stitch marks new stapled margin  3. Level 8 lymph nodes     Operative indications:    Mr. Ceballos is a 70-year-old male who is well-known to me.  He originally presented with a right lower lobe lung nodule that had prostate cancer recently diagnosed.  He had a wedge resection performed of this which on final pathology was consistent with a lung carcinoma.  His PFTs are not adequate for a anatomic lobectomy, he is therefore brought to the operating room after being described the risk and benefits of a further wedge resection for further margin as well as lymph node sampling.    Operative findings  Bronchoscopy:  Normal endobronchial anatomy, some minor amount of mucus in the right lower lobe.     Intrathoracic Findings: Anthracitic appearing lungs, emphysematous appearing lungs.  Dense adhesions from previous wedge resection requiring extensive lysis of adhesions.  Staple line along the diaphragmatic leading edge of right lower lobe found an additional 2 cm margin obtain with an additional wedge resection.  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  Level 8 lymph nodes taken.  Level 7 lymph nodes taken.     Operative description in detail:  The patient was taken to the operative suite where he was placed in a supine position. General anesthesia was induced without complication and a double lumen ET tube  was placed by anesthesia. A timeout was performed. With that bronchoscopy was performed demonstrating no endobronchial lesion, normal appearing right bronchi, and standard bronchial anatomy.     He was positioned in lateral decubitus position with right side up. All pressure point are protected. Single lung ventilation was initiated and double lumen endotracheal tube position confirmed.  He was then prepped and draped in the usual and sterile fashion.       An incision was made sharply in line with the middle axillary line at approximately the 8th intercostal space.  The pleural cavity was accessed and surveyed.  Additional robotic ports was placed in the eighth and ninth interspace.    Assistant port was placed laterally along the diaphragmatic margin or direct vision.      There were dense intrapleural adhesions especially along the diaphragmatic surface.  These were carefully divided with bipolar electrocautery.  The previous staple line along the leading edge of the right lower lobe was found and densely adherent to the diaphragm.  This was carefully dissected off.  I took an additional wedge resection of about 2 cm of the staple line and marked the new margin.  This was done with a combination of blue and black load robotic staples.      Posterior adhesions were taken down.  The subcarinal space was identified and level 7 lymph nodes dissected and sent for permanent section.  The inferior pulmonary ligament was divided about longterm up in level 8 lymph nodes were identified and sent for frozen section as well.  I examined the level 4R area but there were dense adhesions likely from previous thyroid surgery and I think the yield was not worth the risk of reoperative dissection in this field.    Hemostasis was ensured in the operative site and pro-gel was applied over staple lines.  A 24 Cymraes straight chest tube and 28 Cymraes right angle chest tube were placed through the port sites after tunneling.  I  "performed intercostal blocks using a one-to-one Marcaine/Exparel mixture.  All ports were removed under direct vision ensuring adequate hemostasis.  The lung was allowed to expand under direct vision in both lower and upper lobes expanded well and filled the thorax.  All port sites and assistant site were closed in anatomical layers using absorbable suture.  Skin affix was used as dressing.  The chest tubes were connected to 20 cm of suction.    The patient tolerated the procedure well was extubated in the operating room and transferred the PACU in stable condition.  All needle sponge and instrument counts were correct at the end of the case.     Complications: None  Disposition: Transfer to PACU extubated and in stable condition.      Nick Flores M.D.  Cardiothoracic Surgeon      Electronically signed by Nick Flores MD at 02/15/21 1350          Physician Progress Notes (last 24 hours) (Notes from 21 1002 through 21 1002)      Nilda Faustin APRN at 21 0909          Patient name: Delgado Desir  Patient : 1950  VISIT # [id number]  MR #[id number]    Procedure:Procedure(s):  THORACOSCOPY WITH DAVINCI ROBOT WITH RIGHT WEDGE RESECTION, MEDIASTINAL LYMPH NODE DISSECTION-RIGHT  Procedure Date:2/15/2021  POD:2 Days Post-Op    Subjective     Patient resting in chair. States he feels well.  Tolerating room air.  Pain is well controlled.  Labs have not been done this morning.       Objective     Visit Vitals  /49 (BP Location: Left arm, Patient Position: Sitting)   Pulse 77   Temp 97.5 °F (36.4 °C) (Oral)   Resp 18   Ht 183 cm (72.05\")   Wt 121 kg (266 lb 1.5 oz)   SpO2 96%   BMI 36.04 kg/m²       Intake/Output Summary (Last 24 hours) at 2021 0909  Last data filed at 2021 0500  Gross per 24 hour   Intake 1545 ml   Output 2320 ml   Net -775 ml     RCT 1: 390 ml/24 hours, serosanguineous, no air leak  RCT 2: 30 ml/24 hours, serosanguineous, no air leak      Lab:     " CBC:  Results from last 7 days   Lab Units 02/16/21  0733 02/15/21  1401 02/12/21  1012   WBC 10*3/mm3 8.77 9.50 7.23   HEMATOCRIT % 37.1* 40.1 40.8   PLATELETS 10*3/mm3 140 153 148          BMP:  Results from last 7 days   Lab Units 02/16/21  0733 02/15/21  1401 02/12/21  1011   SODIUM mmol/L 135* 138 135*   POTASSIUM mmol/L 3.8 4.4 4.4   CHLORIDE mmol/L 102 106 100   CO2 mmol/L 24.0 23.0 25.0   GLUCOSE mg/dL 132* 164* 120*   BUN mg/dL 19 17 16   CREATININE mg/dL 0.96 1.07 0.94          COAG:  Results from last 7 days   Lab Units 02/12/21  1011   INR  1.11*   APTT seconds 29.7       IMAGES:       Imaging Results (Last 24 Hours)     Procedure Component Value Units Date/Time    XR Chest 1 View [[id number]] Collected: 02/17/21 0725     Updated: 02/17/21 0729    Narrative:      EXAMINATION: XR CHEST 1 VW-  2/17/2021 7:25 AM CST 1 view     HISTORY: right lung nodule, s/p wedge     COMPARISON: 02/16/2021.     FINDINGS:   Chest tubes in place on the RIGHT. Consolidative change, possibly  atelectasis at the RIGHT lung base, similar to the previous study. There  is also atelectasis of the lateral LEFT lung base, unchanged. No  definite pneumothorax.      The osseous structures and surrounding soft tissues demonstrate no acute  abnormality.          Impression:         1.  No definite interval change.        This report was finalized on 02/17/2021 07:26 by Dr. Peyman Sweeney MD.        CXR: Chest tubes in stable position, no pneumothorax, atelectasis right lung base.      Physical Exam:  General: Alert, oriented. No apparent distress.   Cardiovascular: Regular rate and rhythm without murmur, rubs, or gallops.    Pulmonary: Clear to auscultation bilaterally without wheezing, rubs, or rales.  Chest: Thoracic incisions clean, dry, and intact. Chest tube to 20 cm H2O suction. No air leak. Fluid is serosanguineous.   Abdomen: Soft, non distended, and non tender.  Extremities: Warm, moves all extremities. No edema.   Neurologic:   "Grossly intact with no focal deficits.           Impression:  Lung mass  Former smoker  Prostate cancer        Plan:  DC IVF  Stat labs now  Lasix 20mg IV x 1 dose this morning  Encourage pulmonary toilet and ambulation  Routine post thoracic surgery care  Chest tube output still too high, keep tubes today  Anticipate dc hom2 1-2 days  DW patient and nursing    ANDRES Jenkins  02/17/21  09:09 CST      Electronically signed by Nilda Faustin APRN at 02/17/21 0917     Flores, Nick MILES MD at 02/16/21 1113              [facility] Cardiothoracic Surgery  PROGRESS NOTE   CC: Cancer, status post wedge    Subjective:  Mr. Ceballos is doing well today.  His pain is well controlled.  He is on room air.  His Ibarra will be removed this morning.  I spoke to his daughter this morning about operation and how things went.  Has been hemodynamically stable.    ROS: No fevers or chills, hemodynamically stable    Objective:      /49 (BP Location: Right arm, Patient Position: Lying)   Pulse 87   Temp 98.6 °F (37 °C) (Oral)   Resp 16   Ht 183 cm (72.05\")   Wt 121 kg (266 lb 1.5 oz)   SpO2 93%   BMI 36.04 kg/m²       Intake/Output Summary (Last 24 hours) at 2/16/2021 1113  Last data filed at 2/16/2021 1046  Gross per 24 hour   Intake 240 ml   Output 2353 ml   Net -2113 ml       CT Outpt: 600/24hrs, no air leak, serosanguineous output    PE:  Vitals:    02/16/21 1046   BP: 124/49   Pulse: 87   Resp: 16   Temp: 98.6 °F (37 °C)   SpO2: 93%     GENERAL: NAD, resting comfortably, normal color  CARDIOVASCULAR: regular, regular rate, sinus  PULMONARY: Normal bilateral breath sounds, no labored breathing dressings clean dry and intact serosanguineous output from right chest tube  ABDOMEN: soft, nt/nd  EXTREMITIES: mild peripheral edema, normal pulses, normal ROM        Lab Results (last 72 hours)     Procedure Component Value Units Date/Time    Extra Tubes [[id number]] Collected: 02/16/21 0734    Specimen: " Blood, Venous Line Updated: 02/16/21 1031    Narrative:      The following orders were created for panel order Extra Tubes.  Procedure                               Abnormality         Status                     ---------                               -----------         ------                     Red Top[[id number]]                                          Final result                 Please view results for these tests on the individual orders.    Red Top [[id number]] Collected: 02/16/21 0734    Specimen: Blood Updated: 02/16/21 1031     Extra Tube Hold for add-ons.     Comment: Auto resulted.       Basic Metabolic Panel [[id number]]  (Abnormal) Collected: 02/16/21 0733    Specimen: Blood Updated: 02/16/21 0857     Glucose 132 mg/dL      BUN 19 mg/dL      Creatinine 0.96 mg/dL      Sodium 135 mmol/L      Potassium 3.8 mmol/L      Chloride 102 mmol/L      CO2 24.0 mmol/L      Calcium 8.4 mg/dL      eGFR Non African Amer 78 mL/min/1.73      BUN/Creatinine Ratio 19.8     Anion Gap 9.0 mmol/L     Narrative:      GFR Normal >60  Chronic Kidney Disease <60  Kidney Failure <15      CBC (No Diff) [[id number]]  (Abnormal) Collected: 02/16/21 0733    Specimen: Blood Updated: 02/16/21 0837     WBC 8.77 10*3/mm3      RBC 4.27 10*6/mm3      Hemoglobin 12.7 g/dL      Hematocrit 37.1 %      MCV 86.9 fL      MCH 29.7 pg      MCHC 34.2 g/dL      RDW 14.5 %      RDW-SD 45.6 fl      MPV 11.3 fL      Platelets 140 10*3/mm3     Basic Metabolic Panel [[id number]]  (Abnormal) Collected: 02/15/21 1401    Specimen: Blood, Arterial Line Updated: 02/15/21 1436     Glucose 164 mg/dL      BUN 17 mg/dL      Creatinine 1.07 mg/dL      Sodium 138 mmol/L      Potassium 4.4 mmol/L      Chloride 106 mmol/L      CO2 23.0 mmol/L      Calcium 8.5 mg/dL      eGFR Non African Amer 69 mL/min/1.73      BUN/Creatinine Ratio 15.9     Anion Gap 9.0 mmol/L     Narrative:      GFR Normal >60  Chronic Kidney Disease <60  Kidney Failure <15      CBC (No Diff)  [[id number]]  (Normal) Collected: 02/15/21 1401    Specimen: Blood, Arterial Line Updated: 02/15/21 1418     WBC 9.50 10*3/mm3      RBC 4.52 10*6/mm3      Hemoglobin 13.2 g/dL      Hematocrit 40.1 %      MCV 88.7 fL      MCH 29.2 pg      MCHC 32.9 g/dL      RDW 14.0 %      RDW-SD 45.5 fl      MPV 11.0 fL      Platelets 153 10*3/mm3     Tissue Pathology Exam [[id number]] Collected: 02/15/21 1140    Specimen: Tissue from Lung, Right Lower Lobe; Tissue from Lymph Node; Tissue from Lymph Node Updated: 02/15/21 1324              CXR: Some right-sided atelectasis, good placement of chest tubes in good expansion bilateral lungs    Assessment/Plan     Mr. Ceballos is a 70-year-old male with a right lower lobe lung cancer.  He status post wedge resection and returns for additional wedge for additional margin as well as lymph node sampling.  He is postop day 1 from this.  He is doing well and progressing appropriately.    Keep chest tubes today as output to high  Remove Ibarra today  Out of bed walking in halls as much as possible  Pain is well controlled  Possibly home in next 1 to 2 days.    Nick Flores M.D.  Cardiothoracic Surgeon      Electronically signed by Nick Flores MD at 02/16/21 1115       2/17 nurse note  :  Pt voids per urinal;  no BM yet;  no c/o pain;    Chest tube has serosanguineous drainage from both anterior and posterior chambers;  ;  will continue to monitor      Still has  Chest tubes

## 2021-02-17 NOTE — PLAN OF CARE
Goal Outcome Evaluation:     Progress: no change  Outcome Summary: Pt received 20mg of IV lasix this am as ordered. Ambulated in robles X 2. Chest tubes continued to gravity with no air leak noted. Pt has had 2 BM's and voiding without difficulty. This afternoon pt went into A-fib at 140 and is now still in A-fib at 97. Dr Flores notified and received 5mg of metoprolol and to receive another 5mg if continues in A-fib and B/p is stable at 17:30. Will continue to monitor.

## 2021-02-17 NOTE — PLAN OF CARE
Goal Outcome Evaluation:  Plan of Care Reviewed With: patient  Progress: no change  Outcome Summary: Pt voids per urinal;  no BM yet;  no c/o pain;    Chest tube has serosanguineous drainage from both anterior and posterior chambers;  ;  will continue to monitor

## 2021-02-17 NOTE — DISCHARGE SUMMARY
[facility] Cardiothoracic Surgery  DISCHARGE SUMMARY        Date of Admission: 2/15/2021  Date of Discharge:  2/19/2021  Primary Care Physician: Michael Nunez MD    Discharge Diagnoses:  Active Hospital Problems    Diagnosis   • Paroxysmal atrial fibrillation (CMS/HCC)   • Lung mass   • Former smoker   • Prostate cancer (CMS/HCC)     Procedures Performed: Diagnostic Bronchoscopy , Right Robotic Assisted VATS Diagnostic Wedge Resection, Right Robot Assisted VATS mediastinal lymphadenectomy    HPI:  Mr. Delgado Desir is a 70 y.o. male with a history of prostate cancer who presents with a lung nodule.  He was referred to Dr. Flores by Dr. Ephraim Lujan.  Patient was recently diagnosed with prostate cancer.  During work-up of this he had a CT scan of the chest which showed a right lower lobe lung nodule.  He is a previous smoker who quit 25 years ago with a 20-pack-year smoking history.    He underwent wedge resection which final path was consistent with lung cancer not prostate primary.  His lung reserve is not adequate for an anatomic resection.  He denies any hemoptysis, productive cough, fevers chills, night sweats, unwanted weight loss.  He has no history of cancer personally other than the prostate cancer.  He has no known family history of cancer.  He does report he is more short of breath with activity recently but attributes this to weight gain and getting older.  After discussion with Dr. Flores, the decision was made to proceed with surgical resection.  He has undergone wedge resection but needs an additional margin as well as lymph node sampling.      Hospital Course: On 2/15/2021, Mr. Desir was taken to the operating room for diagnostic bronchoscopy, right robotic assisted VATS wedge resection, and mediastinal lymph node dissection.  See separate op note by Dr. Flores detailing the operation.  Following surgery he was transferred to the PACU in stable but guarded  condition.  After meeting PACU discharge criteria, he was transferred to  for ongoing recovery.  The remainder of his hospital stay was significant for encouraging pulmonary toilet and ambulation, diuresis, and pain control.  He is tolerating room air.  He converted to atrial fibrillation on post op day 2.  Amiodarone drip was started and he will be discharged home on oral Amiodarone as well as Eliquis.  Right chest tubes were removed without remark on post op day 3.  Follow up chest xray demonstrated stability with no pneumothorax.   He has good pain control and is eating well.  He meets criteria for discharge home on post op day 4 and the patient is agreeable.      Final pathology results reveal right lower lobe wedge resection margins are free of tumor.  Lymph nodes are benign.  He will be discharged home on Amiodarone and Eliquis due to paroxysmal atrial fibrillation.  We will refer him to cardiology as he will likely need further workup for this with a monitor in the next month or so.      Condition on Discharge:  Neurologically intact and has good pain control.  He is eating well and has demonstrable good bowel function.  All thoracic incisions are healing nicely without evidence of thoracic hernia.  The heart is in normal sinus rhythm.         Discharge Disposition:  Home or Self Care [1]    Discharge Medications:     Discharge Medications      New Medications      Instructions Start Date   amiodarone 400 MG tablet  Commonly known as: PACERONE   400 mg, Oral, Daily      apixaban 5 MG tablet tablet  Commonly known as: ELIQUIS   5 mg, Oral, Every 12 Hours Scheduled      HYDROcodone-acetaminophen 5-325 MG per tablet  Commonly known as: Norco   1 tablet, Oral, Every 6 Hours PRN      metoprolol tartrate 25 MG tablet  Commonly known as: LOPRESSOR   12.5 mg, Oral, Every 12 Hours Scheduled      Synthroid 125 MCG tablet  Generic drug: levothyroxine   125 mcg, Oral, Daily         Continue These Medications       Instructions Start Date   acetaminophen 500 MG tablet  Commonly known as: TYLENOL   500 mg, Oral, Every 6 Hours PRN      alfuzosin 10 MG 24 hr tablet  Commonly known as: UROXATRAL   10 mg, Oral, Daily      losartan-hydrochlorothiazide 50-12.5 MG per tablet  Commonly known as: HYZAAR   0.5 tablets, Oral, Daily      therapeutic multivitamin-minerals tablet tablet  Generic drug: multivitamin with minerals   1 tablet, Oral, Daily             Discharge Diet: Regular diet     Discharge Care Plan / Instructions:   Keep incisions clean and open to air.  May wash with soap and water.  Chest tube sites with dressings to keep on for 48 hours.  Ok to reapply dressing as needed after removal.      Activity at Discharge:   No heavy lifting greater than 5 pounds or a gallon of milk and refrain from driving until cleared.      Tobacco: The patient does not use tobacco products and therefore does not need tobacco cessation education.    BMI: Patient's Body mass index is 36.04 kg/m². BMI is above normal parameters. Recommendations include: educational material, nutrition counseling and referral to primary care.      Follow-up Appointments: Delgado Desir  is requested to see Michael Nunez MD within 1-2 weeks from time of discharge, to follow-up with ANDRES Jenkins in 2 weeks with chest xray before appointment, and He is to follow-up with Dr. Lujan in 4 weeks.

## 2021-02-17 NOTE — PLAN OF CARE
Goal Outcome Evaluation:     Progress: improving  Outcome Summary: Stacey ragland, [place] per patient request for constipation. Still awaiting results, but feels he will have BM soon. Pain meds x 1, CTs to waterseal. Ambulating in rob[place].

## 2021-02-18 ENCOUNTER — APPOINTMENT (OUTPATIENT)
Dept: GENERAL RADIOLOGY | Facility: HOSPITAL | Age: 71
End: 2021-02-18

## 2021-02-18 PROBLEM — I48.0 PAROXYSMAL ATRIAL FIBRILLATION: Status: ACTIVE | Noted: 2021-02-18

## 2021-02-18 LAB
ANION GAP SERPL CALCULATED.3IONS-SCNC: 9 MMOL/L (ref 5–15)
BUN SERPL-MCNC: 19 MG/DL (ref 8–23)
BUN/CREAT SERPL: 18.6 (ref 7–25)
CALCIUM SPEC-SCNC: 8.5 MG/DL (ref 8.6–10.5)
CHLORIDE SERPL-SCNC: 101 MMOL/L (ref 98–107)
CO2 SERPL-SCNC: 25 MMOL/L (ref 22–29)
CREAT SERPL-MCNC: 1.02 MG/DL (ref 0.76–1.27)
DEPRECATED RDW RBC AUTO: 45.2 FL (ref 37–54)
ERYTHROCYTE [DISTWIDTH] IN BLOOD BY AUTOMATED COUNT: 14.4 % (ref 12.3–15.4)
GFR SERPL CREATININE-BSD FRML MDRD: 72 ML/MIN/1.73
GLUCOSE SERPL-MCNC: 129 MG/DL (ref 65–99)
HCT VFR BLD AUTO: 37.3 % (ref 37.5–51)
HGB BLD-MCNC: 12.9 G/DL (ref 13–17.7)
MCH RBC QN AUTO: 29.7 PG (ref 26.6–33)
MCHC RBC AUTO-ENTMCNC: 34.6 G/DL (ref 31.5–35.7)
MCV RBC AUTO: 85.7 FL (ref 79–97)
PLATELET # BLD AUTO: 163 10*3/MM3 (ref 140–450)
PMV BLD AUTO: 11.1 FL (ref 6–12)
POTASSIUM SERPL-SCNC: 3.9 MMOL/L (ref 3.5–5.2)
RBC # BLD AUTO: 4.35 10*6/MM3 (ref 4.14–5.8)
SODIUM SERPL-SCNC: 135 MMOL/L (ref 136–145)
WBC # BLD AUTO: 8.49 10*3/MM3 (ref 3.4–10.8)

## 2021-02-18 PROCEDURE — 25010000002 FUROSEMIDE PER 20 MG: Performed by: NURSE PRACTITIONER

## 2021-02-18 PROCEDURE — 25010000002 AMIODARONE IN DEXTROSE 5% 360-4.14 MG/200ML-% SOLUTION: Performed by: NURSE PRACTITIONER

## 2021-02-18 PROCEDURE — 25010000002 ENOXAPARIN PER 10 MG: Performed by: SURGERY

## 2021-02-18 PROCEDURE — 80048 BASIC METABOLIC PNL TOTAL CA: CPT | Performed by: NURSE PRACTITIONER

## 2021-02-18 PROCEDURE — 71045 X-RAY EXAM CHEST 1 VIEW: CPT

## 2021-02-18 PROCEDURE — 71046 X-RAY EXAM CHEST 2 VIEWS: CPT

## 2021-02-18 PROCEDURE — 85027 COMPLETE CBC AUTOMATED: CPT | Performed by: NURSE PRACTITIONER

## 2021-02-18 PROCEDURE — 99024 POSTOP FOLLOW-UP VISIT: CPT | Performed by: NURSE PRACTITIONER

## 2021-02-18 RX ORDER — LEVOTHYROXINE SODIUM 125 MCG
125 TABLET ORAL DAILY
Qty: 30 TABLET | Refills: 3 | Status: SHIPPED | OUTPATIENT
Start: 2021-02-18 | End: 2021-06-17

## 2021-02-18 RX ORDER — POTASSIUM CHLORIDE 750 MG/1
20 CAPSULE, EXTENDED RELEASE ORAL 2 TIMES DAILY WITH MEALS
Status: COMPLETED | OUTPATIENT
Start: 2021-02-18 | End: 2021-02-18

## 2021-02-18 RX ORDER — AMIODARONE HYDROCHLORIDE 200 MG/1
400 TABLET ORAL EVERY 12 HOURS SCHEDULED
Status: DISCONTINUED | OUTPATIENT
Start: 2021-02-18 | End: 2021-02-19 | Stop reason: HOSPADM

## 2021-02-18 RX ORDER — FUROSEMIDE 10 MG/ML
20 INJECTION INTRAMUSCULAR; INTRAVENOUS
Status: COMPLETED | OUTPATIENT
Start: 2021-02-18 | End: 2021-02-18

## 2021-02-18 RX ADMIN — METOPROLOL TARTRATE 12.5 MG: 25 TABLET, FILM COATED ORAL at 08:44

## 2021-02-18 RX ADMIN — ENOXAPARIN SODIUM 40 MG: 40 INJECTION SUBCUTANEOUS at 08:43

## 2021-02-18 RX ADMIN — FUROSEMIDE 20 MG: 10 INJECTION, SOLUTION INTRAVENOUS at 09:59

## 2021-02-18 RX ADMIN — LOSARTAN POTASSIUM: 50 TABLET, FILM COATED ORAL at 08:44

## 2021-02-18 RX ADMIN — OXYCODONE HYDROCHLORIDE AND ACETAMINOPHEN 1 TABLET: 5; 325 TABLET ORAL at 16:09

## 2021-02-18 RX ADMIN — APIXABAN 5 MG: 5 TABLET, FILM COATED ORAL at 20:21

## 2021-02-18 RX ADMIN — OXYCODONE HYDROCHLORIDE AND ACETAMINOPHEN 1 TABLET: 5; 325 TABLET ORAL at 06:12

## 2021-02-18 RX ADMIN — POTASSIUM CHLORIDE 20 MEQ: 750 CAPSULE, EXTENDED RELEASE ORAL at 09:59

## 2021-02-18 RX ADMIN — FUROSEMIDE 20 MG: 10 INJECTION, SOLUTION INTRAVENOUS at 18:24

## 2021-02-18 RX ADMIN — TAMSULOSIN HYDROCHLORIDE 0.4 MG: 0.4 CAPSULE ORAL at 20:21

## 2021-02-18 RX ADMIN — AMIODARONE HYDROCHLORIDE 0.5 MG/MIN: 1.8 INJECTION, SOLUTION INTRAVENOUS at 15:48

## 2021-02-18 RX ADMIN — AMIODARONE HYDROCHLORIDE 400 MG: 200 TABLET ORAL at 09:59

## 2021-02-18 RX ADMIN — AMIODARONE HYDROCHLORIDE 1 MG/MIN: 1.8 INJECTION, SOLUTION INTRAVENOUS at 09:59

## 2021-02-18 RX ADMIN — LEVOTHYROXINE SODIUM 112 MCG: 112 TABLET ORAL at 06:12

## 2021-02-18 RX ADMIN — AMIODARONE HYDROCHLORIDE 400 MG: 200 TABLET ORAL at 20:21

## 2021-02-18 RX ADMIN — DOCUSATE SODIUM 100 MG: 100 CAPSULE ORAL at 08:44

## 2021-02-18 RX ADMIN — METOPROLOL TARTRATE 12.5 MG: 25 TABLET, FILM COATED ORAL at 20:21

## 2021-02-18 RX ADMIN — POTASSIUM CHLORIDE 20 MEQ: 750 CAPSULE, EXTENDED RELEASE ORAL at 18:24

## 2021-02-18 NOTE — NURSING NOTE
Dr. Flores notified of pt still being in afib.  Vitals told to him and he said he had already ordered metoprolol q 12 and just to monitor pt tonight

## 2021-02-18 NOTE — PLAN OF CARE
Goal Outcome Evaluation:     Progress: improving  Outcome Summary: Chest tubes removed per Dr. Flores this am and CXR completed at noon with no pneumothorax noted. Pt continues in A-fib. Pt started on amiodorone protocol, IV amiodorone and po started. Has c/o headache this shift and po pain med given x 1. Ambulated in robles X 2.

## 2021-02-18 NOTE — PROGRESS NOTES
"Patient name: Delgado Desir  Patient : 1950  VISIT # [id number]  MR #[id number]    Procedure:Procedure(s):  THORACOSCOPY WITH DAVINCI ROBOT WITH RIGHT WEDGE RESECTION, MEDIASTINAL LYMPH NODE DISSECTION-RIGHT  Procedure Date:2/15/2021  POD:3 Days Post-Op    Subjective     Patient resting in chair.  Went into a fib yesterday afternoon and was given Lopressor 5mg IV x 2 doses and started on metoprolol 12.5 mg BID.  He remains in a fib .  He is tolerating room air.  (+) BM.  Ambulating in the hallway without difficulty.         Objective     Visit Vitals  /71 (BP Location: Left arm, Patient Position: Sitting)   Pulse 75   Temp 98.4 °F (36.9 °C) (Oral)   Resp 18   Ht 183 cm (72.05\")   Wt 121 kg (266 lb 1.5 oz)   SpO2 96%   BMI 36.04 kg/m²       Intake/Output Summary (Last 24 hours) at 2021 0845  Last data filed at 2021 0612  Gross per 24 hour   Intake 500 ml   Output 370 ml   Net 130 ml     RCT 1: 230 ml/24 hours, serosanguineous, no air leak  RCT 2: 0 ml/24 hours, serosanguineous, no air leak      Lab:        IMAGES:       Imaging Results (Last 24 Hours)     Procedure Component Value Units Date/Time    XR Chest 1 View [[id number]] Collected: 21     Updated: 21    Narrative:      EXAMINATION: XR CHEST 1 VW-  2021 7:19 AM CST 1 view     HISTORY: right lung nodule, s/p wedge     COMPARISON: 2021.     FINDINGS:   Chest tube redemonstrated on the RIGHT. RIGHT basilar consolidation  again noted. No pneumothorax. The basilar chest tube is no longer  visible and may have been removed. There is some atelectasis at the LEFT  lung base.      The osseous structures and surrounding soft tissues demonstrate no acute  abnormality.          Impression:         1.  1 of the 2 chest tubes on the RIGHT is no longer visible and has  likely been removed. There is some basilar consolidation at the RIGHT  lung base, possibly atelectasis but nonspecific. This is unchanged. " Mild  atelectatic change at the LEFT lung base as well.        This report was finalized on 02/18/2021 07:20 by Dr. Peyman Sweeney MD.        CXR: One of the right chest tubes is no longer visible, no pneumothorax, atelectasis right and left lung base.      Physical Exam:  General: Alert, oriented. No apparent distress.   Cardiovascular: Regular rate and rhythm without murmur, rubs, or gallops.    Pulmonary: Clear to auscultation bilaterally without wheezing, rubs, or rales.  Chest: Thoracic incisions clean, dry, and intact. Chest tube to 20 cm H2O suction. No air leak. Fluid is serosanguineous.   Abdomen: Soft, non distended, and non tender.  Extremities: Warm, moves all extremities. No edema.   Neurologic:  Grossly intact with no focal deficits.            Impression:  Lung mass  Former smoker  Prostate cancer  Paroxysmal atrial fibrillation      Plan:  Right chest tube removed without remark.  One of the chest tubes was out when dressing was removed.  Repeat PA and lateral chest xray to be done at 1200 today.  Lasix 20mg IV x 2 doses today  Start amiodarone gtt today along with oral amiodarone  Will start Eliquis with first dose being given this evening   Encourage pulmonary toilet and ambulation  Routine post thoracic surgery care  Possible dc home tomorrow  AYANNA patient and nursing    ANDRES Jenkins  02/18/21  08:45 CST

## 2021-02-18 NOTE — PLAN OF CARE
Goal Outcome Evaluation:  Plan of Care Reviewed With: patient  Progress: no change  Outcome Summary: Pt sat up in chair first part of shift.  CT x2, no air leak noted.  IID.  Voiding.  Remains in a-fib.  Dr. Flores notified and boby metoprolol ordered q 12.  Minimal pain but no request for pain meds.

## 2021-02-19 ENCOUNTER — APPOINTMENT (OUTPATIENT)
Dept: GENERAL RADIOLOGY | Facility: HOSPITAL | Age: 71
End: 2021-02-19

## 2021-02-19 VITALS
DIASTOLIC BLOOD PRESSURE: 53 MMHG | RESPIRATION RATE: 16 BRPM | SYSTOLIC BLOOD PRESSURE: 132 MMHG | TEMPERATURE: 98.5 F | BODY MASS INDEX: 36.04 KG/M2 | HEART RATE: 79 BPM | OXYGEN SATURATION: 95 % | WEIGHT: 266.09 LBS | HEIGHT: 72 IN

## 2021-02-19 DIAGNOSIS — I48.91 ATRIAL FIBRILLATION, UNSPECIFIED TYPE (HCC): Primary | ICD-10-CM

## 2021-02-19 LAB
ANION GAP SERPL CALCULATED.3IONS-SCNC: 10 MMOL/L (ref 5–15)
ANION GAP SERPL CALCULATED.3IONS-SCNC: 8 MMOL/L (ref 5–15)
BUN SERPL-MCNC: 22 MG/DL (ref 8–23)
BUN SERPL-MCNC: 22 MG/DL (ref 8–23)
BUN/CREAT SERPL: 16.3 (ref 7–25)
BUN/CREAT SERPL: 16.9 (ref 7–25)
CALCIUM SPEC-SCNC: 8.7 MG/DL (ref 8.6–10.5)
CALCIUM SPEC-SCNC: 9.1 MG/DL (ref 8.6–10.5)
CHLORIDE SERPL-SCNC: 95 MMOL/L (ref 98–107)
CHLORIDE SERPL-SCNC: 97 MMOL/L (ref 98–107)
CO2 SERPL-SCNC: 28 MMOL/L (ref 22–29)
CO2 SERPL-SCNC: 29 MMOL/L (ref 22–29)
CREAT SERPL-MCNC: 1.3 MG/DL (ref 0.76–1.27)
CREAT SERPL-MCNC: 1.35 MG/DL (ref 0.76–1.27)
DEPRECATED RDW RBC AUTO: 46.5 FL (ref 37–54)
ERYTHROCYTE [DISTWIDTH] IN BLOOD BY AUTOMATED COUNT: 14.5 % (ref 12.3–15.4)
GFR SERPL CREATININE-BSD FRML MDRD: 52 ML/MIN/1.73
GFR SERPL CREATININE-BSD FRML MDRD: 55 ML/MIN/1.73
GLUCOSE SERPL-MCNC: 127 MG/DL (ref 65–99)
GLUCOSE SERPL-MCNC: 136 MG/DL (ref 65–99)
HCT VFR BLD AUTO: 37 % (ref 37.5–51)
HGB BLD-MCNC: 12.8 G/DL (ref 13–17.7)
MCH RBC QN AUTO: 30.2 PG (ref 26.6–33)
MCHC RBC AUTO-ENTMCNC: 34.6 G/DL (ref 31.5–35.7)
MCV RBC AUTO: 87.3 FL (ref 79–97)
PLATELET # BLD AUTO: 179 10*3/MM3 (ref 140–450)
PMV BLD AUTO: 10.7 FL (ref 6–12)
POTASSIUM SERPL-SCNC: 4 MMOL/L (ref 3.5–5.2)
POTASSIUM SERPL-SCNC: 4 MMOL/L (ref 3.5–5.2)
RBC # BLD AUTO: 4.24 10*6/MM3 (ref 4.14–5.8)
SODIUM SERPL-SCNC: 132 MMOL/L (ref 136–145)
SODIUM SERPL-SCNC: 135 MMOL/L (ref 136–145)
WBC # BLD AUTO: 12.93 10*3/MM3 (ref 3.4–10.8)

## 2021-02-19 PROCEDURE — 99024 POSTOP FOLLOW-UP VISIT: CPT | Performed by: NURSE PRACTITIONER

## 2021-02-19 PROCEDURE — 71046 X-RAY EXAM CHEST 2 VIEWS: CPT

## 2021-02-19 PROCEDURE — 85027 COMPLETE CBC AUTOMATED: CPT | Performed by: NURSE PRACTITIONER

## 2021-02-19 PROCEDURE — 25010000002 AMIODARONE IN DEXTROSE 5% 360-4.14 MG/200ML-% SOLUTION: Performed by: NURSE PRACTITIONER

## 2021-02-19 PROCEDURE — 80048 BASIC METABOLIC PNL TOTAL CA: CPT | Performed by: NURSE PRACTITIONER

## 2021-02-19 PROCEDURE — 71045 X-RAY EXAM CHEST 1 VIEW: CPT

## 2021-02-19 RX ORDER — HYDROCODONE BITARTRATE AND ACETAMINOPHEN 5; 325 MG/1; MG/1
1 TABLET ORAL EVERY 6 HOURS PRN
Qty: 25 TABLET | Refills: 0 | Status: SHIPPED | OUTPATIENT
Start: 2021-02-19 | End: 2021-03-15

## 2021-02-19 RX ORDER — AMIODARONE HYDROCHLORIDE 400 MG/1
400 TABLET ORAL DAILY
Qty: 30 TABLET | Refills: 0 | Status: SHIPPED | OUTPATIENT
Start: 2021-02-19 | End: 2021-03-15 | Stop reason: SDUPTHER

## 2021-02-19 RX ADMIN — AMIODARONE HYDROCHLORIDE 0.5 MG/MIN: 1.8 INJECTION, SOLUTION INTRAVENOUS at 04:32

## 2021-02-19 RX ADMIN — APIXABAN 5 MG: 5 TABLET, FILM COATED ORAL at 08:48

## 2021-02-19 RX ADMIN — DOCUSATE SODIUM 100 MG: 100 CAPSULE ORAL at 08:49

## 2021-02-19 RX ADMIN — AMIODARONE HYDROCHLORIDE 400 MG: 200 TABLET ORAL at 08:48

## 2021-02-19 RX ADMIN — LOSARTAN POTASSIUM: 50 TABLET, FILM COATED ORAL at 08:48

## 2021-02-19 RX ADMIN — LEVOTHYROXINE SODIUM 112 MCG: 112 TABLET ORAL at 04:38

## 2021-02-19 RX ADMIN — OXYCODONE HYDROCHLORIDE AND ACETAMINOPHEN 1 TABLET: 5; 325 TABLET ORAL at 10:05

## 2021-02-19 RX ADMIN — POLYETHYLENE GLYCOL (3350) 17 G: 17 POWDER, FOR SOLUTION ORAL at 08:48

## 2021-02-19 RX ADMIN — METOPROLOL TARTRATE 12.5 MG: 25 TABLET, FILM COATED ORAL at 08:49

## 2021-02-19 NOTE — PROGRESS NOTES
"Patient name: Delgado Desir  Patient : 1950  VISIT # [id number]  MR #[id number]    Procedure:Procedure(s):  THORACOSCOPY WITH DAVINCI ROBOT WITH RIGHT WEDGE RESECTION, MEDIASTINAL LYMPH NODE DISSECTION-RIGHT  Procedure Date:2/15/2021  POD:4 Days Post-Op    Subjective     Patient resting in chair.  He converted to sinus rhythm yesterday evening.  Creatinine 1.3 this morning.  He is hopeful to go home today.      Objective     Visit Vitals  /51 (BP Location: Left arm, Patient Position: Sitting)   Pulse 80   Temp 98.3 °F (36.8 °C) (Oral)   Resp 16   Ht 183 cm (72.05\")   Wt 121 kg (266 lb 1.5 oz)   SpO2 95%   BMI 36.04 kg/m²       Intake/Output Summary (Last 24 hours) at 2021 0903  Last data filed at 2021 0715  Gross per 24 hour   Intake 191 ml   Output 1250 ml   Net -1059 ml         Lab:        IMAGES:       Imaging Results (Last 24 Hours)     Procedure Component Value Units Date/Time    XR Chest 1 View [[id number]] Collected: 21 0735     Updated: 21 0739    Narrative:      EXAMINATION: XR CHEST 1 VW-  2021 7:35 AM CST 1 view     HISTORY: right lung nodule, s/p wedge     COMPARISON: 2021.     FINDINGS:   Small amount of extrathoracic air on the RIGHT is again seen and  decreased. No large pneumothorax. There is some consolidative change at  the RIGHT lung base, possibly atelectasis. Linear atelectasis noted at  the LEFT lung base as well. No change in the cardia mediastinal  silhouette.      The osseous structures and surrounding soft tissues demonstrate no acute  abnormality.          Impression:         1.  RIGHT basilar consolidation, likely atelectasis but nonspecific.  Otherwise, no interval change.        This report was finalized on 2021 07:36 by Dr. Peyman Sweeney MD.    XR Chest PA & Lateral [[id number]] Collected: 21 1216     Updated: 21 1221    Narrative:      Exam:   XR CHEST PA AND LATERAL-       Date:  2021      History:  Male, age  " 70 years;post chest tube removal; R91.8-Other  nonspecific abnormal finding of lung field     COMPARISON:  Chest x-ray dated 2/18/2021.     Findings :  Interval removal of right-sided chest tube. There is some right lateral  chest wall emphysema. No measurable pneumothorax. Bilateral lower lung  zone residual opacities.  The heart and mediastinum are unchanged in size.  The bones show no  acute pathology.         Impression:      Impression:     Interval removal of right-sided chest tube, without measurable  pneumothorax.     This report was finalized on 02/18/2021 12:18 by Dr. Rachel Matson MD.        CXR: Right basilar atelectasis, no pneumothorax.  Small amount of subcutaneous emphysema again noted on the right but decreased.      Physical Exam:  General: Alert, oriented. No apparent distress.   Cardiovascular: Regular rate and rhythm without murmur, rubs, or gallops.    Pulmonary: Clear to auscultation bilaterally without wheezing, rubs, or rales.  Chest: Thoracic incisions clean, dry, and intact.   Abdomen: Soft, non distended, and non tender.  Extremities: Warm, moves all extremities. No edema.   Neurologic:  Grossly intact with no focal deficits.            Impression:  Lung mass  Former smoker  Prostate cancer  Paroxysmal atrial fibrillation      Plan:  DC amiodarone gtt  PA and lateral CXR and repeat BMP at 1100 today  Refer to cardiology as an outpatient for atrial fibrillation.  Will likely need Zio Patch for further evaluation.    Encourage pulmonary toilet and ambulation  Routine post thoracic surgery care  Possible dc home this afternoon  AYANNA patient and nursing    Nilda Faustin, APRN  02/19/21  09:03 CST

## 2021-02-19 NOTE — PAYOR COMM NOTE
"Delgado Chappell (70 y.o. Male) [id number]      drg ?     Additional  Clinical  Pt remains in [facility] phone    Fax        Date of Birth Social Security Number Address Home Phone MRN    1950  [address] [facility] [address] [phone] [id number]    [facility] Mar[facility] Status          Other        Admission Date Admission Type Admitting Provider Attending Provider Department, Room/Bed    2/15/21 Elective Nick Flores MD Ward, Austin N, MD [facility] 3C, 363/1    Discharge Date Discharge Disposition Discharge Destination                       Attending Provider: Nick Flores MD    Allergies: No Known Allergies    Isolation: None   Infection: None   Code Status: CPR    Ht: 183 cm (72.05\")   Wt: 121 kg (266 lb 1.5 oz)    Admission Cmt: None   Principal Problem: Lung mass [R91.8]                 Active Insurance as of 2/15/2021     Primary Coverage     Payor Plan Insurance Group Employer/Plan Group    ANTHEM MEDICARE REPLACEMENT ANTHEM MEDICARE ADVANTAGE [id number]     Payor Plan Address Payor Plan Phone Number Payor Plan Fax Number Effective Dates    [address] [phone]  1/1/2020 - None Entered    [facility] [address]       Subscriber Name Subscriber Birth Date Member ID       DELGADO CHAPPELL P 1950 [id number]           Secondary Coverage     Payor Plan Insurance Group Employer/Plan Group    MISC MC SUP   MISC MC SUP              PLAN G     Coverage Address Coverage Phone Number Coverage Fax Number Effective Dates    [address] [address] [phone]  10/1/2019 - None Entered    [facility] [address]       Subscriber Name Subscriber Birth Date Member ID       DELGADO CHAPPELL P 1950 [id number]                 Emergency Contacts      (Rel.) Home Phone Work Phone Mobile Phone    Nava Chappell (Spouse) [phone] -- [phone]    DAVE THOMPSON (Friend) -- -- [phone]    Mini Valdez " (Daughter) -- -- [phone]              Current Facility-Administered Medications   Medication Dose Route Frequency Provider Last Rate Last Admin   • acetaminophen (TYLENOL) tablet 650 mg  650 mg Oral Q4H PRN Flores, Nick MILES MD       • amiodarone (PACERONE) tablet 400 mg  400 mg Oral Q12H Nilda Faustin APRN   400 mg at 02/18/21 2021   • amiodarone 360 mg in 200 mL D5W infusion  0.5 mg/min Intravenous Continuous Nilda Faustin APRN 16.67 mL/hr at 02/19/21 0432 0.5 mg/min at 02/19/21 0432   • apixaban (ELIQUIS) tablet 5 mg  5 mg Oral Q12H Nilda Faustin APRN   5 mg at 02/18/21 2021   • bisacodyl (DULCOLAX) suppository 10 mg  10 mg Rectal Daily PRN Flores, Nick MILES MD       • docusate sodium (COLACE) capsule 100 mg  100 mg Oral Daily Flores, Nick MILES MD   100 mg at 02/18/21 0844   • levothyroxine (SYNTHROID, LEVOTHROID) tablet 112 mcg  112 mcg Oral Q AM Flores, Nick MILES MD   112 mcg at 02/19/21 0438   • losartan (COZAAR) 50 mg, hydroCHLOROthiazide (HYDRODIURIL) 12.5 mg for HYZAAR 50-12.5   Oral Daily Flores, Nick MILES MD   Given at 02/18/21 0844   • metoprolol tartrate (LOPRESSOR) tablet 12.5 mg  12.5 mg Oral Q12H Flores, Nick MILES MD   12.5 mg at 02/18/21 2021   • ondansetron (ZOFRAN) tablet 4 mg  4 mg Oral Q6H PRN Flores, Nick MILES MD        Or   • ondansetron (ZOFRAN) injection 4 mg  4 mg Intravenous Q6H PRN Mark, Nick MILES MD       • oxyCODONE-acetaminophen (PERCOCET) 5-325 MG per tablet 1 tablet  1 tablet Oral Q3H PRN Flores, Nick MILES MD   1 tablet at 02/18/21 1609   • polyethylene glycol (MIRALAX) packet 17 g  17 g Oral Daily Flores, Nick MILES MD   17 g at 02/17/21 0914   • senna (SENOKOT) tablet 2 tablet  2 tablet Oral BID PRN Mark, Nick MILES MD   2 tablet at 02/16/21 1101   • tamsulosin (FLOMAX) 24 hr capsule 0.4 mg  0.4 mg Oral Nightly Nick Flores MD   0.4 mg at 02/18/21 2021        Physician Progress Notes (last 48 hours) (Notes from 02/17/21 0734 through 02/19/21 0734)      Nilda Faustin APRN at  "21 0845     Attestation signed by Nick Flores MD at 21 1037    I have personally seen and examined Delgado Desir and reviewed the record. Agree with the aforementioned plan rendered jointly with Nilda Faustin.    Overall doing very well.  Did have A. fib overnight with RVR.  He is now rate controlled but remains in A. fib.  We will start amnio today for attempted rhythm control.  Chest tubes removed this morning without difficulty.  Likely home in next 1 to 2 days.    Nick Flores M.D.  Cardiothoracic Surgeon                      Patient name: Delgado Desir  Patient : 1950  VISIT # [id number]  MR #[id number]    Procedure:Procedure(s):  THORACOSCOPY WITH DAVINCI ROBOT WITH RIGHT WEDGE RESECTION, MEDIASTINAL LYMPH NODE DISSECTION-RIGHT  Procedure Date:2/15/2021  POD:3 Days Post-Op    Subjective     Patient resting in chair.  Went into a fib yesterday afternoon and was given Lopressor 5mg IV x 2 doses and started on metoprolol 12.5 mg BID.  He remains in a fib .  He is tolerating room air.  (+) BM.  Ambulating in the hallway without difficulty.        Objective     Visit Vitals  /71 (BP Location: Left arm, Patient Position: Sitting)   Pulse 75   Temp 98.4 °F (36.9 °C) (Oral)   Resp 18   Ht 183 cm (72.05\")   Wt 121 kg (266 lb 1.5 oz)   SpO2 96%   BMI 36.04 kg/m²       Intake/Output Summary (Last 24 hours) at 2021 0845  Last data filed at 2021 0612  Gross per 24 hour   Intake 500 ml   Output 370 ml   Net 130 ml     RCT 1: 230 ml/24 hours, serosanguineous, no air leak  RCT 2: 0 ml/24 hours, serosanguineous, no air leak      Lab:        IMAGES:       Imaging Results (Last 24 Hours)     Procedure Component Value Units Date/Time    XR Chest 1 View [[id number]] Collected: 21     Updated: 21    Narrative:      EXAMINATION: XR CHEST 1 VW-  2021 7:19 AM CST 1 view     HISTORY: right lung nodule, s/p wedge     COMPARISON: 2021.     FINDINGS:   "   Chest tube redemonstrated on the RIGHT. RIGHT basilar consolidation  again noted. No pneumothorax. The basilar chest tube is no longer  visible and may have been removed. There is some atelectasis at the LEFT  lung base.      The osseous structures and surrounding soft tissues demonstrate no acute  abnormality.          Impression:         1.  1 of the 2 chest tubes on the RIGHT is no longer visible and has  likely been removed. There is some basilar consolidation at the RIGHT  lung base, possibly atelectasis but nonspecific. This is unchanged. Mild  atelectatic change at the LEFT lung base as well.        This report was finalized on 02/18/2021 07:20 by Dr. Peyman Sweeney MD.        CXR: One of the right chest tubes is no longer visible, no pneumothorax, atelectasis right and left lung base.      Physical Exam:  General: Alert, oriented. No apparent distress.   Cardiovascular: Regular rate and rhythm without murmur, rubs, or gallops.    Pulmonary: Clear to auscultation bilaterally without wheezing, rubs, or rales.  Chest: Thoracic incisions clean, dry, and intact. Chest tube to 20 cm H2O suction. No air leak. Fluid is serosanguineous.   Abdomen: Soft, non distended, and non tender.  Extremities: Warm, moves all extremities. No edema.   Neurologic:  Grossly intact with no focal deficits.           Impression:  Lung mass  Former smoker  Prostate cancer  Paroxysmal atrial fibrillation      Plan:  Right chest tube removed without remark.  One of the chest tubes was out when dressing was removed.  Repeat PA and lateral chest xray to be done at 1200 today.  Lasix 20mg IV x 2 doses today  Start amiodarone gtt today along with oral amiodarone  Will start Eliquis with first dose being given this evening   Encourage pulmonary toilet and ambulation  Routine post thoracic surgery care  Possible dc home tomorrow  AYANNA patient and nursing    ANDRES Jenkins  02/18/21  08:45 CST      Electronically signed by Nick Flores  "MD JD at 21 1037     Nilda Faustin APRN at 21 0909     Attestation signed by Nick Flores MD at 21 1306    I have personally seen and examined Delgado Desir and reviewed the record. Agree with the aforementioned plan rendered jointly with Nilda Faustin.    Mr. Ceballos is doing well today.  His chest tube drainage is too much for chest tube removal today.  He is on room air.  His pain is well controlled.  He is walking in the hallways.  DC fluids today.  Lasix today.  Likely home tomorrow.    Nick Flores M.D.  Cardiothoracic Surgeon                      Patient name: Delgado Desir  Patient : 1950  VISIT # [id number]  MR #[id number]    Procedure:Procedure(s):  THORACOSCOPY WITH DAVINCI ROBOT WITH RIGHT WEDGE RESECTION, MEDIASTINAL LYMPH NODE DISSECTION-RIGHT  Procedure Date:2/15/2021  POD:2 Days Post-Op    Subjective     Patient resting in chair. States he feels well.  Tolerating room air.  Pain is well controlled.  Labs have not been done this morning.       Objective     Visit Vitals  /49 (BP Location: Left arm, Patient Position: Sitting)   Pulse 77   Temp 97.5 °F (36.4 °C) (Oral)   Resp 18   Ht 183 cm (72.05\")   Wt 121 kg (266 lb 1.5 oz)   SpO2 96%   BMI 36.04 kg/m²       Intake/Output Summary (Last 24 hours) at 2021 0909  Last data filed at 2021 0500  Gross per 24 hour   Intake 1545 ml   Output 2320 ml   Net -775 ml     RCT 1: 390 ml/24 hours, serosanguineous, no air leak  RCT 2: 30 ml/24 hours, serosanguineous, no air leak      Lab:     CBC:  Results from last 7 days   Lab Units 21  0733 02/15/21  1401 21  1012   WBC 10*3/mm3 8.77 9.50 7.23   HEMATOCRIT % 37.1* 40.1 40.8   PLATELETS 10*3/mm3 140 153 148          BMP:  Results from last 7 days   Lab Units 21  0733 02/15/21  1401 21  1011   SODIUM mmol/L 135* 138 135*   POTASSIUM mmol/L 3.8 4.4 4.4   CHLORIDE mmol/L 102 106 100   CO2 mmol/L 24.0 23.0 25.0   GLUCOSE mg/dL 132* 164* " 120*   BUN mg/dL 19 17 16   CREATININE mg/dL 0.96 1.07 0.94          COAG:  Results from last 7 days   Lab Units 02/12/21  1011   INR  1.11*   APTT seconds 29.7       IMAGES:       Imaging Results (Last 24 Hours)     Procedure Component Value Units Date/Time    XR Chest 1 View [[id number]] Collected: 02/17/21 0725     Updated: 02/17/21 0729    Narrative:      EXAMINATION: XR CHEST 1 VW-  2/17/2021 7:25 AM CST 1 view     HISTORY: right lung nodule, s/p wedge     COMPARISON: 02/16/2021.     FINDINGS:   Chest tubes in place on the RIGHT. Consolidative change, possibly  atelectasis at the RIGHT lung base, similar to the previous study. There  is also atelectasis of the lateral LEFT lung base, unchanged. No  definite pneumothorax.      The osseous structures and surrounding soft tissues demonstrate no acute  abnormality.          Impression:         1.  No definite interval change.        This report was finalized on 02/17/2021 07:26 by Dr. Peyman Sweeney MD.        CXR: Chest tubes in stable position, no pneumothorax, atelectasis right lung base.      Physical Exam:  General: Alert, oriented. No apparent distress.   Cardiovascular: Regular rate and rhythm without murmur, rubs, or gallops.    Pulmonary: Clear to auscultation bilaterally without wheezing, rubs, or rales.  Chest: Thoracic incisions clean, dry, and intact. Chest tube to 20 cm H2O suction. No air leak. Fluid is serosanguineous.   Abdomen: Soft, non distended, and non tender.  Extremities: Warm, moves all extremities. No edema.   Neurologic:  Grossly intact with no focal deficits.           Impression:  Lung mass  Former smoker  Prostate cancer        Plan:  DC IVF  Stat labs now  Lasix 20mg IV x 1 dose this morning  Encourage pulmonary toilet and ambulation  Routine post thoracic surgery care  Chest tube output still too high, keep tubes today  Anticipate dc hom2 1-2 days  DW patient and nursing    Nilda Faustin, ANDRES  02/17/21  09:09  CST      Electronically signed by Nick Flores MD at 02/17/21 3492        2/17   Pt received 20mg of IV lasix this am as ordered. Ambulated in robles X 2. Chest tubes continued to gravity with no air leak noted. Pt has had 2 BM's and voiding without difficulty. This afternoon pt went into A-fib at 140 and is now still in A-fib at 97. Dr Flores notified and received 5mg of metoprolol and to receive another 5mg if continues in A-fib and B/p is stable at 17:30. Will continue to monitor.

## 2021-02-19 NOTE — PLAN OF CARE
Problem: Adult Inpatient Plan of Care  Goal: Plan of Care Review  Outcome: Ongoing, Progressing  Flowsheets (Taken 2/19/2021 0236)  Progress: improving  Plan of Care Reviewed With: patient  Outcome Summary: Pt only complains of a mild headache at times. No complaints of nausea. Cont IV amiodarone. Tele was running a fib but per tele he converted to normal sinus. Ambulating with no difficulties and spent time up in the chair. Room air. VSS.

## 2021-02-19 NOTE — PROGRESS NOTES
Discharge Planning Assessment  [facility]     Patient Name: Delgado Desir  MRN: [id number]  Today's Date: 2/19/2021    Admit Date: 2/15/2021    Discharge Needs Assessment     Row Name 02/19/21 0945       Living Environment    Lives With  spouse  (Pended)     Name(s) of Who Lives With Patient  Nava  (Pended)     Current Living Arrangements  home/apartment/condo  (Pended)     Primary Care Provided by  self  (Pended)     Provides Primary Care For  no one  (Pended)     Family Caregiver if Needed  spouse  (Pended)     Quality of Family Relationships  helpful;involved;supportive  (Pended)        Transition Planning    Patient/Family Anticipates Transition to  home with family  (Pended)        Discharge Needs Assessment    Readmission Within the Last 30 Days  no previous admission in last 30 days  (Pended)     Equipment Currently Used at Home  walker, standard  (Pended)         Discharge Plan     Row Name 02/19/21 0947       Plan    Plan  Home  (Pended)     Patient/Family in Agreement with Plan  yes  (Pended)     Plan Comments  Called and spoke with pt. Pt states he was independent prior to coming in. Pt states he is interested in HH if available to him. Pt has PCP/RX coverage. Will follow.  (Pended)         Continued Care and Services - Admitted Since 2/15/2021    Coordination has not been started for this encounter.         Demographic Summary    No documentation.       Functional Status    No documentation.       Psychosocial    No documentation.       Abuse/Neglect    No documentation.       Legal    No documentation.       Substance Abuse    No documentation.       Patient Forms    No documentation.           Lars Britt

## 2021-02-20 ENCOUNTER — READMISSION MANAGEMENT (OUTPATIENT)
Dept: CALL CENTER | Facility: HOSPITAL | Age: 71
End: 2021-02-20

## 2021-02-20 NOTE — OUTREACH NOTE
Prep Survey      Responses   [facility] facility patient discharged from?  [place]   Is LACE score < 7 ?  No   Emergency Room discharge w/ pulse ox?  No   Eligibility  Readm Mgmt   Discharge diagnosis  Diagnostic Bronchoscopy , Right Robotic Assisted VATS Diagnostic Wedge Resection, Right Robot Assisted VATS mediastinal lymphadenectomy   Does the patient have one of the following disease processes/diagnoses(primary or secondary)?  Cardiothoracic surgery   Does the patient have Home health ordered?  No   Is there a DME ordered?  No   Comments regarding appointments  see AVS   Medication alerts for this patient  eliquis, pacerone, norco, lopressor, synthroid   Prep survey completed?  Yes          Luisa Valdez RN

## 2021-02-22 ENCOUNTER — READMISSION MANAGEMENT (OUTPATIENT)
Dept: CALL CENTER | Facility: HOSPITAL | Age: 71
End: 2021-02-22

## 2021-02-23 NOTE — OUTREACH NOTE
CT Surgery Week 1 Survey      Responses   [facility] patient discharged from?  [place]   Does the patient have one of the following disease processes/diagnoses(primary or secondary)?  Cardiothoracic surgery   Week 1 attempt successful?  Yes   Call start time  2036   Call end time  2059   Discharge diagnosis  Diagnostic Bronchoscopy , Right Robotic Assisted VATS Diagnostic Wedge Resection, Right Robot Assisted VATS mediastinal lymphadenectomy   Meds reviewed with patient/caregiver?  Yes   Is the patient having any side effects they believe may be caused by any medication additions or changes?  No   Does the patient have all medications related to this admission filled (includes all antibiotics, pain medications, cardiac medications, etc.)  Yes   Is the patient taking all medications as directed (includes completed medication regime)?  Yes   Does the patient have a primary care provider?   Yes   Does the patient have an appointment scheduled with their C/T surgeon?  Yes   Has the patient kept scheduled appointments due by today?  Yes   Psychosocial issues?  No   Did the patient receive a copy of their discharge instructions?  Yes   Nursing interventions  Reviewed instructions with patient   What is the patient's perception of their health status since discharge?  Improving   Is the patient/caregiver able to teach back signs and symptoms of incisional infection?  Increased redness, swelling or pain at the incisonal site, Incisional warmth, Increased drainage or bleeding, Pus or odor from incision, Fever   Is the patient/caregiver able to teach back steps to recovery at home?  Set small, achievable goals for return to baseline health, Rest and rebuild strength, gradually increase activity, Eat a well-balance diet   If the patient is a current smoker, are they able to teach back resources for cessation?  Not a smoker   Is the patient/caregiver able to teach back the hierarchy of who to call/visit for  symptoms/problems? PCP, Specialist, Home health nurse, Urgent Care, ED, 911  Yes   Additional teach back comments  Patient reports incision look good.  Spouse is monitoring sites daily for s/sx of drainage.    Week 1 call completed?  Yes   Wrap up additional comments  Patient reports he is doing well at this time.             Love Griffiths RN

## 2021-03-03 ENCOUNTER — READMISSION MANAGEMENT (OUTPATIENT)
Dept: CALL CENTER | Facility: HOSPITAL | Age: 71
End: 2021-03-03

## 2021-03-03 ENCOUNTER — HOSPITAL ENCOUNTER (OUTPATIENT)
Dept: GENERAL RADIOLOGY | Facility: HOSPITAL | Age: 71
Discharge: HOME | End: 2021-03-03
Admitting: NURSE PRACTITIONER

## 2021-03-03 DIAGNOSIS — R91.8 LUNG MASS: ICD-10-CM

## 2021-03-03 PROCEDURE — 71046 X-RAY EXAM CHEST 2 VIEWS: CPT

## 2021-03-03 NOTE — OUTREACH NOTE
CT Surgery Week 2 Survey      Responses   [facility] patient discharged from?  [place]   Does the patient have one of the following disease processes/diagnoses(primary or secondary)?  Cardiothoracic surgery   Week 2 attempt successful?  Yes   Call start time  1736   Call end time  1745   Discharge diagnosis  Diagnostic Bronchoscopy , Right Robotic Assisted VATS Diagnostic Wedge Resection, Right Robot Assisted VATS mediastinal lymphadenectomy   Meds reviewed with patient/caregiver?  Yes   Is the patient having any side effects they believe may be caused by any medication additions or changes?  No   Does the patient have all medications related to this admission filled (includes all antibiotics, pain medications, cardiac medications, etc.)  Yes   Is the patient taking all medications as directed (includes completed medication regime)?  Yes   Does the patient have a primary care provider?   Yes   Comments regarding PCP  Dr. Munguia   Has the patient kept scheduled appointments due by today?  Yes   Psychosocial issues?  No   Did the patient receive a copy of their discharge instructions?  Yes   Nursing interventions  Reviewed instructions with patient   What is the patient's perception of their health status since discharge?  Improving   Is the patient /caregiver able to teach back the importance of cardiac rehab?  Yes   If the patient is a current smoker, are they able to teach back resources for cessation?  Not a smoker   Is the patient/caregiver able to teach back the hierarchy of who to call/visit for symptoms/problems? PCP, Specialist, Home health nurse, Urgent Care, ED, 911  Yes   Additional teach back comments  Pt states he has been active, walking, slowly getting back into his normal routine.    Week 2 call completed?  Yes   Wrap up additional comments  Patient reports he is doing well at this time.           Love Griffiths RN

## 2021-03-04 ENCOUNTER — OFFICE VISIT (OUTPATIENT)
Dept: CARDIAC SURGERY | Facility: CLINIC | Age: 71
End: 2021-03-04

## 2021-03-04 VITALS
DIASTOLIC BLOOD PRESSURE: 54 MMHG | SYSTOLIC BLOOD PRESSURE: 114 MMHG | BODY MASS INDEX: 35.13 KG/M2 | WEIGHT: 259.4 LBS | OXYGEN SATURATION: 99 % | HEART RATE: 60 BPM | HEIGHT: 72 IN

## 2021-03-04 DIAGNOSIS — I48.0 PAROXYSMAL ATRIAL FIBRILLATION (HCC): ICD-10-CM

## 2021-03-04 DIAGNOSIS — E66.9 CLASS 2 OBESITY WITH BODY MASS INDEX (BMI) OF 36.0 TO 36.9 IN ADULT, UNSPECIFIED OBESITY TYPE, UNSPECIFIED WHETHER SERIOUS COMORBIDITY PRESENT: ICD-10-CM

## 2021-03-04 DIAGNOSIS — R91.8 LUNG MASS: Primary | ICD-10-CM

## 2021-03-04 DIAGNOSIS — Z87.891 FORMER SMOKER: ICD-10-CM

## 2021-03-04 PROCEDURE — 99024 POSTOP FOLLOW-UP VISIT: CPT | Performed by: NURSE PRACTITIONER

## 2021-03-04 NOTE — PROGRESS NOTES
Subjective   Chief Complaint   Patient presents with   • Post-op Follow-up     Thoracoscopy on 2/15       Patient ID: Delgado Desir is a 70 y.o. male who is here for follow-up having had Diagnostic Bronchoscopy , Right Robotic Assisted VATS Diagnostic Wedge Resection, Right Robot Assisted VATS mediastinal lymphadenectomy performed on 2/15/2021 by Dr. Flores.      History of Present Illness  Post operative recovery was uneventful without any major complications. Sleep habits are good. Pain control has been good. No fevers/sweats/chills. No drainage from incisions.  No shortness of breath. Appetite is good.  Denies tobacco use.  Ambulating without difficulty.  Complains of blister on right lower abdomen.  Has discussed with his PCP and was told to monitor.  He continues to take amiodarone and eliquis as prescribed and has follow up with  Dr. Hoover on 3/15/2021    The following portions of the patient's history were reviewed and updated as appropriate: allergies, current medications, past family history, past medical history, past social history, past surgical history and problem list.    Review of Systems   Constitutional: Negative for chills, diaphoresis, fatigue and fever.   HENT: Negative for trouble swallowing and voice change.    Eyes: Negative for visual disturbance.   Respiratory: Negative for cough, chest tightness, shortness of breath and wheezing.    Cardiovascular: Negative for chest pain, palpitations and leg swelling.   Gastrointestinal: Negative for abdominal pain, constipation, diarrhea, nausea and vomiting.   Musculoskeletal: Negative for arthralgias and myalgias.   Skin: Negative for color change, pallor, rash and wound.   Neurological: Negative for dizziness, syncope and light-headedness.   Psychiatric/Behavioral: Negative for agitation, confusion and sleep disturbance.       Objective   Visit Vitals  /54 (BP Location: Left arm, Patient Position: Sitting, Cuff Size: Large Adult)   Pulse 60  "  Ht 183 cm (72.05\")   Wt 118 kg (259 lb 6.4 oz)   SpO2 99%   BMI 35.13 kg/m²       Physical Exam  Vitals signs reviewed.   Constitutional:       Appearance: Normal appearance.   HENT:      Head: Normocephalic.   Eyes:      Pupils: Pupils are equal, round, and reactive to light.   Cardiovascular:      Rate and Rhythm: Normal rate and regular rhythm.      Heart sounds: Normal heart sounds. No murmur.   Pulmonary:      Breath sounds: Normal breath sounds. No wheezing or rales.   Abdominal:      General: There is no distension.      Palpations: Abdomen is soft.      Tenderness: There is no abdominal tenderness.   Musculoskeletal:         General: No swelling or tenderness.   Skin:     General: Skin is warm and dry.      Comments: Thoracic incisions are C/D/I and healing nicely.  Blister with slight surrounding erythema right lower abdomen.    Neurological:      General: No focal deficit present.      Mental Status: He is alert and oriented to person, place, and time.   Psychiatric:         Mood and Affect: Mood normal.         Behavior: Behavior normal.         Thought Content: Thought content normal.         Judgment: Judgment normal.             Assessment/Plan         Independent Review of Radiographic Studies:    CXR: Right basilar atelectasis and small right pleural effusion.  No pneumothorax.      Diagnoses and all orders for this visit:    1. Lung mass (Primary)  -     XR Chest 2 View; Future    2. Former smoker    3. Class 2 obesity with body mass index (BMI) of 36.0 to 36.9 in adult, unspecified obesity type, unspecified whether serious comorbidity present    4. Paroxysmal atrial fibrillation (CMS/HCC)           Overall, Delgado BOWMAN [place] is doing well.  Keep upcoming appointment with Dr. Hoover.  Continue to monitor abdominal blister.  This appears to be healing.  Following post op thoracic surgery home instructions. Provided support and encouragement. All questions have been answered to the best of my " ability.  Patient has follow up with Dr. Flores in 1 month with chest xray before appointment.  I have ordered this today. Patient has been instructed to contact our office with any questions or concerns should they arise prior to the next office visit.     Patient's Body mass index is 35.13 kg/m². BMI is above normal parameters. Recommendations include: educational material, nutrition counseling and referral to a nutritionist.    I have congratulated Delgado Desir on successful smoking cessation.     Delgado Desir  reports that he quit smoking about 25 years ago. His smoking use included cigarettes. He started smoking about 52 years ago. He has a 33.00 pack-year smoking history. He has never used smokeless tobacco.. I have educated him on the risk of diseases from using tobacco products such as cancer, COPD and heart disease.     I spent 3  minutes counseling the patient.    Advance Care Planning   ACP discussion was held with the patient during this visit. Patient does not have an advance directive, declines further assistance.     Follow up with Dr. Flores in 1 month with chest xray before appointment.

## 2021-03-10 ENCOUNTER — READMISSION MANAGEMENT (OUTPATIENT)
Dept: CALL CENTER | Facility: HOSPITAL | Age: 71
End: 2021-03-10

## 2021-03-10 NOTE — OUTREACH NOTE
CT Surgery Week 3 Survey      Responses   [facility] patient discharged from?  [place]   Does the patient have one of the following disease processes/diagnoses(primary or secondary)?  Cardiothoracic surgery   Week 3 attempt successful?  No   Unsuccessful attempts  Attempt 1          Sadaf Whitley RN

## 2021-03-11 ENCOUNTER — READMISSION MANAGEMENT (OUTPATIENT)
Dept: CALL CENTER | Facility: HOSPITAL | Age: 71
End: 2021-03-11

## 2021-03-11 NOTE — OUTREACH NOTE
CT Surgery Week 3 Survey      Responses   [facility] patient discharged from?  [place]   Does the patient have one of the following disease processes/diagnoses(primary or secondary)?  Cardiothoracic surgery   Week 3 attempt successful?  Yes   Call start time  1145   Call end time  1159   Meds reviewed with patient/caregiver?  Yes   Is the patient having any side effects they believe may be caused by any medication additions or changes?  No   Does the patient have all medications related to this admission filled (includes all antibiotics, pain medications, cardiac medications, etc.)  Yes   Is the patient taking all medications as directed (includes completed medication regime)?  Yes   Does the patient have a primary care provider?   Yes   Does the patient have an appointment scheduled with their C/T surgeon?  Yes   Has the patient kept scheduled appointments due by today?  Yes   Psychosocial issues?  No   What is the patient's perception of their health status since discharge?  Improving   Nursing interventions  Nurse provided patient education   Is the patient/caregiver able to teach back normal signs of recovery?  Nausea and lack of appetite, Constipation, Depression or irritability, Pain or discomfort at incisional site   Nursing interventions  Reassured on normal signs of recovery   Is the patient /caregiver able to teach back basic post-op care?  Take showers only when approved by MD-sponge bathe until then   Is the patient/caregiver able to teach back signs and symptoms of incisional infection?  Increased redness, swelling or pain at the incisonal site, Increased drainage or bleeding, Incisional warmth, Pus or odor from incision, Fever   Is the patient/caregiver able to teach back steps to recovery at home?  Set small, achievable goals for return to baseline health, Rest and rebuild strength, gradually increase activity, Eat a well-balance diet   Is the patient /caregiver able to teach back the importance  of cardiac rehab?  Yes   Nursing interventions  Provided education on importance of cardiac rehab   If the patient is a current smoker, are they able to teach back resources for cessation?  Not a smoker   Is the patient/caregiver able to teach back the hierarchy of who to call/visit for symptoms/problems? PCP, Specialist, Home health nurse, Urgent Care, ED, 911  Yes   Week 3 call completed?  Yes   Wrap up additional comments  Pt states he is doing good. Pt states he has FU appt 3/15/21          Mary Nagy RN

## 2021-03-15 ENCOUNTER — OFFICE VISIT (OUTPATIENT)
Dept: CARDIOLOGY | Facility: CLINIC | Age: 71
End: 2021-03-15

## 2021-03-15 VITALS
SYSTOLIC BLOOD PRESSURE: 114 MMHG | BODY MASS INDEX: 34.46 KG/M2 | DIASTOLIC BLOOD PRESSURE: 58 MMHG | HEIGHT: 73 IN | HEART RATE: 58 BPM | WEIGHT: 260 LBS | OXYGEN SATURATION: 98 %

## 2021-03-15 DIAGNOSIS — I10 ESSENTIAL HYPERTENSION: ICD-10-CM

## 2021-03-15 DIAGNOSIS — I48.0 PAROXYSMAL ATRIAL FIBRILLATION (HCC): Primary | ICD-10-CM

## 2021-03-15 PROCEDURE — 99204 OFFICE O/P NEW MOD 45 MIN: CPT | Performed by: INTERNAL MEDICINE

## 2021-03-15 PROCEDURE — 93000 ELECTROCARDIOGRAM COMPLETE: CPT | Performed by: INTERNAL MEDICINE

## 2021-03-15 RX ORDER — AMIODARONE HYDROCHLORIDE 200 MG/1
200 TABLET ORAL DAILY
Qty: 30 TABLET | Refills: 11 | Status: SHIPPED | OUTPATIENT
Start: 2021-03-15 | End: 2021-09-29 | Stop reason: HOSPADM

## 2021-03-15 RX ORDER — LOSARTAN POTASSIUM AND HYDROCHLOROTHIAZIDE 12.5; 5 MG/1; MG/1
0.5 TABLET ORAL DAILY
Qty: 30 TABLET | Refills: 5 | Status: SHIPPED | OUTPATIENT
Start: 2021-03-15 | End: 2022-03-24

## 2021-03-15 NOTE — PROGRESS NOTES
Subjective:     Encounter Date:03/15/2021      Patient ID: Delgado Desir is a 70 y.o. male with a history of prostate cancer, lung nodule, recent bronchoscopy, VATS wedge resection, mediastinal lymph node dissection per Dr. Flores, also with paroxysmal atrial fibrillation, presenting here to establish care.    Referring Provider: Nick Flores MD  Reason for Referral: Atrial fibrillation    Chief Complaint: Here to establish care, atrial fibrillation    This is a 70-year-old male who is referred to establish care.  The patient was diagnosed with atrial fibrillation when he presented for a bronchoscopy, wedge resection and mediastinal lymph node dissection.  The patient says that he was relatively asymptomatic at that time.  Since that time, he has been on amiodarone and has also been anticoagulated with Eliquis.  He denies having any obvious recurrence of atrial fibrillation and denies palpitations.  No lightheadedness, dizziness, syncope.  At this time, no significant shortness of breath or dyspnea on exertion.  No chest pain.  No orthopnea, PND, edema.  No side effects many medications, including amiodarone or Eliquis, including no significant bleeding problems.  He does have a history of hypertension.  His blood pressure is well controlled.  Once again, no side effects from any medications.    Atrial Fibrillation  Presents for initial visit. Symptoms are negative for chest pain, dizziness, hemodynamic instability, palpitations, shortness of breath and syncope. The symptoms have been stable. Past treatments include antiarrhythmics and anticoagulant. Compliance with prior treatments has been good. Past medical history includes atrial fibrillation and HTN.   Hypertension  This is a chronic problem. The problem is unchanged. The problem is controlled. Pertinent negatives include no blurred vision, chest pain, headaches, neck pain, orthopnea, palpitations, peripheral edema, PND or shortness of breath. Agents  associated with hypertension include thyroid hormones. Risk factors for coronary artery disease include male gender. Current antihypertension treatment includes diuretics, angiotensin blockers and beta blockers. The current treatment provides significant improvement. There are no compliance problems.  There is no history of CAD/MI.     The following portions of the patient's history were reviewed and updated as appropriate: allergies, current medications, past family history, past medical history, past social history, past surgical history and problem list.     Past Medical History:   Diagnosis Date   • Hypertension    • PAF (paroxysmal atrial fibrillation) (CMS/HCC)    • Prostate cancer (CMS/HCC)    • Thyroid disorder      Past Surgical History:   Procedure Laterality Date   • CARPAL TUNNEL RELEASE Right    • LOBECTOMY Right 2/15/2021    Procedure: THORACOSCOPY WITH DAVINCI ROBOT WITH RIGHT WEDGE RESECTION, MEDIASTINAL LYMPH NODE DISSECTION-RIGHT;  Surgeon: Nick Flores MD;  Location: [facility] OR;  Service: [facility];  Laterality: Right;   • LUNG BIOPSY     • PROSTATE BIOPSY     • REPLACEMENT TOTAL KNEE Left    • SHOULDER SURGERY Bilateral    • THORACOSCOPY Right 10/30/2020    Procedure: MEDIASTINOSCOPY, RIGHT THORACOSCOPY POSSIBLE WEDGE RESECTION WITH InvieoINCI ROBOT;  Surgeon: Nick Flores MD;  Location: [facility] OR;  Service: [facility];  Laterality: Right;   • THYROIDECTOMY N/A 12/11/2020    Procedure: Total thyroidectomy;  Surgeon: Jean Marie Solis MD;  Location: [facility] OR;  Service: ENT;  Laterality: N/A;       Current Outpatient Medications:   •  acetaminophen (TYLENOL) 500 MG tablet, Take 500 mg by mouth Every 6 (Six) Hours As Needed for Mild Pain ., Disp: , Rfl:   •  alfuzosin (UROXATRAL) 10 MG 24 hr tablet, Take 10 mg by mouth Daily., Disp: , Rfl:   •  amiodarone (PACERONE) 200 MG tablet, Take 1 tablet by mouth Daily., Disp: 30 tablet, Rfl: 11  •  apixaban (ELIQUIS) 5 MG tablet tablet, Take 1 tablet by  mouth Every 12 (Twelve) Hours. Indications: Atrial Fibrillation, Disp: 60 tablet, Rfl: 5  •  losartan-hydrochlorothiazide (HYZAAR) 50-12.5 MG per tablet, Take 0.5 tablets by mouth Daily., Disp: 30 tablet, Rfl: 5  •  metoprolol tartrate (LOPRESSOR) 25 MG tablet, Take 0.5 tablets by mouth Every 12 (Twelve) Hours for 180 days., Disp: 30 tablet, Rfl: 5  •  Synthroid 125 MCG tablet, Take 1 tablet by mouth Daily for 30 days., Disp: 30 tablet, Rfl: 3  •  therapeutic multivitamin-minerals (THERAGRAN-M) tablet, Take 1 tablet by mouth Daily., Disp: , Rfl:     No Known Allergies    Social History     Tobacco Use   • Smoking status: Former Smoker     Packs/day: 1.00     Years: 33.00     Pack years: 33.00     Types: Cigarettes     Start date:      Quit date:      Years since quittin.2   • Smokeless tobacco: Never Used   Substance Use Topics   • Alcohol use: Yes     Alcohol/week: 2.0 standard drinks     Types: 2 Shots of liquor per week     Family History   Problem Relation Age of Onset   • Hypertension Mother    • COPD Father      Review of Systems   Constitutional: Negative for chills, fever, night sweats and weight loss.   HENT: Negative for congestion and hearing loss.    Eyes: Negative for blurred vision and pain.   Cardiovascular: Negative for chest pain, claudication, dyspnea on exertion, irregular heartbeat, leg swelling, orthopnea, palpitations, paroxysmal nocturnal dyspnea and syncope.   Respiratory: Negative for cough, hemoptysis, shortness of breath and wheezing.    Endocrine: Negative for cold intolerance, heat intolerance, polydipsia and polyuria.   Hematologic/Lymphatic: Negative for adenopathy and bleeding problem. Does not bruise/bleed easily.   Skin: Negative for color change, poor wound healing and rash.   Musculoskeletal: Negative for arthritis, back pain, joint pain, joint swelling, myalgias and neck pain.   Gastrointestinal: Negative for abdominal pain, change in bowel habit, constipation,  diarrhea, heartburn, hematochezia, melena, nausea and vomiting.   Genitourinary: Negative for dysuria, frequency, hematuria and nocturia.   Neurological: Negative for dizziness, focal weakness, headaches, light-headedness, loss of balance and numbness.   Psychiatric/Behavioral: Negative for altered mental status, memory loss and substance abuse.   Allergic/Immunologic: Negative for hives and persistent infections.       ECG 12 Lead    Date/Time: 3/15/2021 2:48 PM  Performed by: Landry Hoover MD  Authorized by: aLndry Hoover MD   Rhythm: sinus bradycardia  Rate: bradycardic  BPM: 58  Conduction: conduction normal  ST Segments: ST segments normal  T Waves: T waves normal  QRS axis: normal    Clinical impression: normal ECG             Objective:     Vitals reviewed.   Constitutional:       General: Not in acute distress.     Appearance: Normal and healthy appearance. Well-developed. Not toxic-appearing or diaphoretic.   Eyes:      General: Lids are normal.      Extraocular Movements: Extraocular movements intact.      Pupils: Pupils are equal, round, and reactive to light.   HENT:      Head: Normocephalic and atraumatic.      Right Ear: External ear normal.      Left Ear: External ear normal.      Nose: Nose normal.    Mouth/Throat:      Mouth: Mucous membranes are not pale, not dry and not cyanotic.      Pharynx: Uvula midline. No oropharyngeal exudate.   Neck:      Thyroid: No thyroid mass or thyromegaly.      Vascular: No carotid bruit, hepatojugular reflux or JVD.      Trachea: No tracheal deviation.      Lymphadenopathy: No cervical adenopathy.   Pulmonary:      Effort: Pulmonary effort is normal. No accessory muscle usage or respiratory distress.      Breath sounds: Normal breath sounds. No wheezing. No rhonchi. No rales.   Chest:      Chest wall: Not tender to palpatation.   Cardiovascular:      Normal rate. Regular rhythm.      Murmurs: There is no murmur.      No gallop.   Pulses:      "Intact distal pulses.   Edema:     Peripheral edema absent.   Abdominal:      General: Bowel sounds are normal. There is no distension or abdominal bruit.      Palpations: Abdomen is soft. There is no pulsatile midline mass.      Tenderness: There is no abdominal tenderness.   Musculoskeletal: Normal range of motion.         General: No tenderness or deformity.      Extremities: No clubbing present.     Cervical back: Normal range of motion and neck supple. No edema. Skin:     General: Skin is warm and dry. There is no cyanosis.      Findings: No erythema or rash.   Neurological:      General: No focal deficit present.      Mental Status: Oriented to person, place, and time and oriented to person, place and time.      Cranial Nerves: No cranial nerve deficit.   Psychiatric:         Attention and Perception: Attention normal.         Mood and Affect: Mood normal.         Speech: Speech normal.         Behavior: Behavior normal. Behavior is cooperative.         Thought Content: Thought content normal.       /58   Pulse 58   Ht 185.4 cm (73\")   Wt 118 kg (260 lb)   SpO2 98%   BMI 34.30 kg/m²     Data/Lab Review:     Lab Results   Component Value Date    TSH 7.400 (H) 02/17/2021     Lab Results   Component Value Date    WBC 12.93 (H) 02/19/2021    HGB 12.8 (L) 02/19/2021    HCT 37.0 (L) 02/19/2021    MCV 87.3 02/19/2021     02/19/2021     Lab Results   Component Value Date    GLUCOSE 136 (H) 02/19/2021    CALCIUM 9.1 02/19/2021     (L) 02/19/2021    K 4.0 02/19/2021    CO2 29.0 02/19/2021    CL 95 (L) 02/19/2021    BUN 22 02/19/2021    CREATININE 1.35 (H) 02/19/2021    EGFRIFAFRI >59 08/14/2020    EGFRIFNONA 52 (L) 02/19/2021    BCR 16.3 02/19/2021    ANIONGAP 8.0 02/19/2021     Echo 6/25/20:  · Left ventricular wall thickness is consistent with mild-to-moderate concentric hypertrophy.  · Estimated EF = 70%.  · Left ventricular systolic function is normal.  · Left ventricular diastolic " dysfunction (grade I) consistent with impaired relaxation.  · Left atrial cavity size is borderline dilated.  · Right ventricular cavity is borderline dilated.      24-hour Holter monitor on 6/25/2020: Sinus rhythm is the predominant rhythm with average heart rate of 73.  5 short SVT runs noted, the longest being 8 beats in duration.  Rare PVCs noted.  No prolonged arrhythmias.  No heart block noted.  No symptoms reported.        Assessment:          Diagnosis Plan   1. Paroxysmal atrial fibrillation (CMS/HCC)  amiodarone (PACERONE) 200 MG tablet    apixaban (ELIQUIS) 5 MG tablet tablet   2. Essential hypertension  metoprolol tartrate (LOPRESSOR) 25 MG tablet    losartan-hydrochlorothiazide (HYZAAR) 50-12.5 MG per tablet        Plan:       1.  Paroxysmal atrial fibrillation: The patient is clinically stable at this time.  He is in sinus rhythm today.  Given his elevated risk profile, I recommended for him to continue anticoagulation at this time.  He has not had any significant problems with Eliquis so far.  I will reduce his amiodarone to 200 mg daily.  Long-term, this can likely be discontinued.  We did discuss the stroke risk associated with atrial fibrillation and the rationale for anticoagulant use.  We also discussed the fact that he was asymptomatic and otherwise stable.  No other changes at this particular time.    CHADS-VASc Risk Assessment            2 Total Score    1 Hypertension    1 Age 65-74        Criteria that do not apply:    CHF    Age >/= 75    DM    PRIOR STROKE/TIA/THROMBO    Vascular Disease    Sex: Female        2.  Essential hypertension: The patient's blood pressure is currently well controlled.  Refill antihypertensives today.  Continue current medications.    Patient's Body mass index is 34.3 kg/m². BMI is above normal parameters. Recommendations include: exercise counseling and nutrition counseling.    Follow-up in 6 months unless otherwise needed sooner.

## 2021-03-19 ENCOUNTER — READMISSION MANAGEMENT (OUTPATIENT)
Dept: CALL CENTER | Facility: HOSPITAL | Age: 71
End: 2021-03-19

## 2021-03-19 RX ORDER — LEVOTHYROXINE SODIUM 112 MCG
TABLET ORAL
Qty: 30 TABLET | Refills: 0 | OUTPATIENT
Start: 2021-03-19

## 2021-03-19 NOTE — OUTREACH NOTE
CT Surgery Week 4 Survey      Responses   [facility] patient discharged from?  [place]   Does the patient have one of the following disease processes/diagnoses(primary or secondary)?  Cardiothoracic surgery   Week 4 attempt successful?  Yes   Call start time  1613   Call end time  1633   Discharge diagnosis  Diagnostic Bronchoscopy , Right Robotic Assisted VATS Diagnostic Wedge Resection, Right Robot Assisted VATS mediastinal lymphadenectomy   Is patient permission given to speak with other caregiver?  Yes   List who call center can speak with  wife   Meds reviewed with patient/caregiver?  Yes   Is the patient taking all medications as directed (includes completed medication regime)?  Yes   Psychosocial issues?  No   Comments  Having SOA when walking up steps. He denies other issues. He is upset that he can't walk more than 1/4mile daily. Attempted to discuss compensation about breathing.Pt c/o blister at beltline that is red, blister burst, pt reports line approx 3finger lengths, MD was made aware of it at appt. Pt is treating with abtibx ointment, he will get checked if not healed soon. Says pain is burning pain.    What is the patient's perception of their health status since discharge?  New symptoms unrelated to diagnosis   Week 4 Call Completed?  Yes   Would the patient like one additional call?  No   Graduated  Yes   Is the patient interested in additional calls from an ambulatory ?  NOTE:  applies to high risk patients requiring additional follow-up.  No   Did the patient feel the follow up calls were helpful during their recovery period?  Yes   Was the number of calls appropriate?  Yes          Helga Bradford RN

## 2021-04-02 ENCOUNTER — HOSPITAL ENCOUNTER (OUTPATIENT)
Dept: GENERAL RADIOLOGY | Facility: HOSPITAL | Age: 71
Discharge: HOME | End: 2021-04-02
Admitting: NURSE PRACTITIONER

## 2021-04-02 DIAGNOSIS — R91.8 LUNG MASS: ICD-10-CM

## 2021-04-02 PROCEDURE — 71046 X-RAY EXAM CHEST 2 VIEWS: CPT

## 2021-04-05 ENCOUNTER — OFFICE VISIT (OUTPATIENT)
Dept: CARDIAC SURGERY | Facility: CLINIC | Age: 71
End: 2021-04-05

## 2021-04-05 VITALS
SYSTOLIC BLOOD PRESSURE: 122 MMHG | OXYGEN SATURATION: 96 % | HEART RATE: 58 BPM | HEIGHT: 73 IN | WEIGHT: 258.8 LBS | DIASTOLIC BLOOD PRESSURE: 60 MMHG | BODY MASS INDEX: 34.3 KG/M2

## 2021-04-05 DIAGNOSIS — R91.8 LUNG MASS: Primary | ICD-10-CM

## 2021-04-05 DIAGNOSIS — C34.31 MALIGNANT NEOPLASM OF LOWER LOBE OF RIGHT LUNG (HCC): ICD-10-CM

## 2021-04-05 PROCEDURE — 99024 POSTOP FOLLOW-UP VISIT: CPT | Performed by: SURGERY

## 2021-04-05 NOTE — PROGRESS NOTES
"     [facility] Cardiothoracic Surgery  PROGRESS NOTE   CC: Follow-up after thoracic surgery     Subjective:  Mr. Desir is a 71 yo male who presents in follow-up after Right Robotic Lower Lobe Wedge and Lymph Node sampling on 2/15/2021.    He has had a complicated course overall.  He had a history of prostate cancer and given this with a new nodule he underwent wedge resection at the end of last year which was positive for lung cancer not prostate metastatic cancer.  This required final pathology to determine though.  Went back to the operating room for attempted reresection with greater margins and lymph node sampling but were unable to successfully get a double-lumen tube into his airway due to a goiter.  He therefore underwent thyroidectomy then subsequently returned to the operating room where he underwent robotic further wedge resection, his PFTs were not adequate for a right lower lobectomy, as well as lymph node sampling.  He returns today in follow-up.    Overall has done well, he has been taking care of his yard without problem.  Occasionally will get a little short of breath but this improves with rest.  Denies pain or drainage from his incisions.  Denies any recent fevers or chills.  He has received both Covid vaccinations.       ROS: No fevers or chills, breathing is improving, pain is tolerable     Objective:        /60 (BP Location: Left arm, Patient Position: Sitting, Cuff Size: Adult)   Pulse 58   Ht 185.4 cm (73\")   Wt 117 kg (258 lb 12.8 oz)   SpO2 96%   BMI 34.14 kg/m²     PE:  GENERAL: NAD, resting comfortably, normal color  CARDIOVASCULAR: regular, regular rate, sinus  PULMONARY: Normal bilateral breath sounds, no labored breathing, well-healed robotic incisions  ABDOMEN: soft, nt/nd  EXTREMITIES: mild peripheral edema, normal pulses, normal ROM        CXR: Good compensatory expansion of right lung, some residual atelectasis that is improving.      "   Assessment/Plan         Mr. Ceballos is a 70-year-old male who presents in follow-up after a resection of right lower lobe lung cancer with lymph node sampling.    His course has been complicated with wedge resection due to poor PFTs and inability to tolerate a right lower lobectomy, need for thyroidectomy in order to intubate with double-lumen tube to allow for operation for cancer.  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    He has recovered well from her operation.  Today we discussed his pathology and the need for continued surveillance which she will do with the oncology group.  I am overall very happy with his recovery and how he is doing.    He has no activity restrictions at this time.    He can follow-up with our office on a as needed basis.    He will follow-up with [facility] oncology office for continued postresection surveillance.     Thank you for trusting me with the care of Mr. Ceballos.  Please do not hesitate to call with any questions or concerns.     Nick Flores M.D.  Cardiothoracic Surgeon

## 2021-04-08 NOTE — PROGRESS NOTES
[facility] HEMATOLOGY AND ONCOLOGY  [address]  [place] [address] [phone]    Patient Name: Delgado Desir  Encounter Date: 04/15/2021  YOB: 1950  Patient Number: [id number]    Initial Note    REASON FOR CONSULTATION: Delgado Desir is a pleasant 70 y.o. male, former smoker, referred by Nick Flores MD for management recommendations for stage 1A2 adenocarcinoma of the right lower lobe, lung, post wedge resection, inadequate pulmonary function test for lobectomy and node sampling.  He is seen with her friend. History is obtained from patient. History is considered to be accurate.    HISTORY OF PRESENT ILLNESS: He was found to have a lung nodule with history of prostate cancer.    CT abdomen pelvis 08/10/2020.  Lobulated noncalcified nodule in the right lower lobe represent a neoplastic process.    PET scan 09/18/2020. The multilobular pulmonary nodule in the right lung base measuring 2.9 x 1.6 cm is hypermetabolic and suspicious for a malignant process with a maximum intensity of 3.62 SUV. Differential considerations include primary lung malignancy as well as early metastatic disease.  No evidence of distant metastasis.    Unsuccessful CT-guided right lower lobe nodule biopsy 10/16/2020 due to right pneumothorax.    Right lower lobe wedge resection 10/30/2020 by Dr. Flores.  His pulmonary function test is not adequate for left lower lobectomy as well as lymph node sampling.    Pathology report 11/03/2020.  Invasive mucinous adenocarcinoma, grade 1, 2 cm, right lower lobe.  No visceral pleural invasion.  No lymphovascular invasion. Margins of resection free of tumor.  Distance of invasive carcinoma from closest margin 0.1 cm.  Pleural tissue biopsies no evidence of malignancy.  AJCC stage pT1b.  PD-L1 less than 1%, negative ROS1, ALK, BRAF and EGFR mutation.    He had undergone right lower lobe wedge  02/15/2021.    Pathology report 2/16/2021, right lower lobe wedge excision, residual tumor is not present.  The surgical margins are free of tumor.  2 benign intraparenchymal nodes benign.  1 benign node, left level 7.  1 benign no level 8.    CBC 02/19/2021 remarkable for WBC 12.9, hemoglobin 12.8 hematocrit 37.    Had a follow-up with Dr. Nick Flores 04/05/2021 post right lower lobe wedge and lymph node sampling on 02/15/2021.  Patient referred to oncology for surveillance.    No family history of cancer.    With above background, he is seen.    LABS    Lab Results - Last 18 Months   Lab Units 02/19/21  0442 02/18/21  0548 02/17/21  0922 02/16/21  0733 02/15/21  1401 02/12/21  1012 01/14/21  1224 12/12/20  0421 11/16/20  1341 11/10/20  2111 10/27/20  1301   HEMOGLOBIN g/dL 12.8* 12.9* 12.1* 12.7* 13.2 14.0 13.4 13.0 13.4 13.4 14.6   HEMATOCRIT % 37.0* 37.3* 35.4* 37.1* 40.1 40.8 38.6 37.6 40.4 37.8 41.8   MCV fL 87.3 85.7 88.1 86.9 88.7 86.1 85.6 87.4 88.4 85.7 86.5   WBC 10*3/mm3 12.93* 8.49 7.90 8.77 9.50 7.23 7.98 13.87* 8.94 10.81* 8.16   RDW % 14.5 14.4 14.7 14.5 14.0 14.0 14.1 13.6 13.3 13.1 13.3   MPV fL 10.7 11.1 10.6 11.3 11.0 10.8 11.4 10.9 10.6 10.2 10.9   PLATELETS 10*3/mm3 179 163 143 140 153 148 166 193 239 220 199   IMM GRAN % %  --   --   --   --   --  0.7* 0.6* 0.4 0.9* 0.6* 0.4   NEUTROS ABS 10*3/mm3  --   --   --   --   --  5.73 6.97 12.58* 7.33* 10.31* 6.45   LYMPHS ABS 10*3/mm3  --   --   --   --   --  0.78 0.53* 0.60* 0.76 0.35* 0.94   MONOS ABS 10*3/mm3  --   --   --   --   --  0.57 0.38 0.61 0.59 0.05* 0.61   EOS ABS 10*3/mm3  --   --   --   --   --  0.06 0.02 0.00 0.12 0.01 0.10   BASOS ABS 10*3/mm3  --   --   --   --   --  0.04 0.03 0.02 0.06 0.02 0.03   IMMATURE GRANS (ABS) 10*3/mm3  --   --   --   --   --  0.05 0.05 0.06* 0.08* 0.07* 0.03   NRBC /100 WBC  --   --   --   --   --  0.0 0.0 0.0 0.0 0.0 0.0       Lab Results - Last 18 Months   Lab Units 02/19/21  1034 02/19/21  0442  02/18/21  0548 02/17/21  0922 02/16/21  0733 02/15/21  1401 02/12/21  1011 12/12/20  0819 12/12/20  0421 11/10/20  2111 10/27/20  1301 10/17/20  0448 10/16/20  1902   GLUCOSE mg/dL 136* 127* 129* 141* 132* 164* 120*   < > 121* 174* 98 107* 138*   SODIUM mmol/L 132* 135* 135* 134* 135* 138 135*   < > 139 136 137 139 140   POTASSIUM mmol/L 4.0 4.0 3.9 4.1 3.8 4.4 4.4   < > 4.3 4.5 4.1 3.9 4.4   CO2 mmol/L 29.0 28.0 25.0 26.0 24.0 23.0 25.0   < > 23.0 24.0 25.0 23.0 21.0*   CHLORIDE mmol/L 95* 97* 101 101 102 106 100   < > 104 102 104 107 107   ANION GAP mmol/L 8.0 10.0 9.0 7.0 9.0 9.0 10.0   < > 12.0 10.0 8.0 9.0 12.0   CREATININE mg/dL 1.35* 1.30* 1.02 1.00 0.96 1.07 0.94   < > 0.90 0.99 1.07 1.27 1.24   BUN mg/dL 22 22 19 16 19 17 16   < > 20 18 15 24* 20   BUN / CREAT RATIO  16.3 16.9 18.6 16.0 19.8 15.9 17.0   < > 22.2 18.2 14.0 18.9 16.1   CALCIUM mg/dL 9.1 8.7 8.5* 8.6 8.4* 8.5* 9.4  --  9.0 9.0 9.4 8.6 9.1   EGFR IF NONAFRICN AM mL/min/1.73 52* 55* 72 74 78 69 80   < > 84 75 69 56* 58*   ALK PHOS U/L  --   --   --   --   --   --  108  --  104 129* 98 89 106   TOTAL PROTEIN g/dL  --   --   --   --   --   --  7.0  --  6.3 6.6 6.7 6.2 7.1   ALT (SGPT) U/L  --   --   --   --   --   --  34  --  29 32 39 42* 50*   AST (SGOT) U/L  --   --   --   --   --   --  31  --  24 22 27 27 33   BILIRUBIN mg/dL  --   --   --   --   --   --  1.1  --  1.5* 1.2 1.3* 1.4* 1.6*   ALBUMIN g/dL  --   --   --   --   --   --  4.10  --  4.00 3.70 4.30 4.00 4.60   GLOBULIN gm/dL  --   --   --   --   --   --  2.9  --  2.3 2.9 2.4 2.2 2.5    < > = values in this interval not displayed.       No results for input(s): MSPIKE, KAPPALAMB, IGLFLC, URICACID, FREEKAPPAL, CEA, LDH, REFLABREPO in the last 82926 hours.    Lab Results - Last 18 Months   Lab Units 02/17/21  0922 01/14/21  1224 11/06/20  0859   TSH uIU/mL 7.400* 5.940* 0.835         PAST MEDICAL HISTORY:  ALLERGIES:  No Known Allergies  CURRENT MEDICATIONS:  Outpatient Encounter  Medications as of 4/15/2021   Medication Sig Dispense Refill   • acetaminophen (TYLENOL) 500 MG tablet Take 500 mg by mouth Every 6 (Six) Hours As Needed for Mild Pain .     • alfuzosin (UROXATRAL) 10 MG 24 hr tablet Take 10 mg by mouth Daily.     • amiodarone (PACERONE) 200 MG tablet Take 1 tablet by mouth Daily. 30 tablet 11   • apixaban (ELIQUIS) 5 MG tablet tablet Take 1 tablet by mouth Every 12 (Twelve) Hours. Indications: Atrial Fibrillation 60 tablet 5   • losartan-hydrochlorothiazide (HYZAAR) 50-12.5 MG per tablet Take 0.5 tablets by mouth Daily. 30 tablet 5   • metoprolol tartrate (LOPRESSOR) 25 MG tablet Take 0.5 tablets by mouth Every 12 (Twelve) Hours for 180 days. 30 tablet 5   • therapeutic multivitamin-minerals (THERAGRAN-M) tablet Take 1 tablet by mouth Daily.     • Synthroid 125 MCG tablet Take 1 tablet by mouth Daily for 30 days. 30 tablet 3     No facility-administered encounter medications on file as of 4/15/2021.     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.4XXA   • S/P total thyroidectomy E89.0   • Postoperative hypothyroidism E89.0   • Lung mass R91.8   • Paroxysmal atrial fibrillation (CMS/HCC) I48.0   • Malignant neoplasm of lower lobe of right lung (CMS/HCC) C34.31     SURGERIES:  Past Surgical History:   Procedure Laterality Date   • CARPAL TUNNEL RELEASE Right    • LOBECTOMY Right 2/15/2021    Procedure: THORACOSCOPY WITH Rapid VocabularyI ROBOT WITH RIGHT WEDGE RESECTION, MEDIASTINAL LYMPH NODE DISSECTION-RIGHT;  Surgeon: Nick Flores MD;  Location[facility];  Service: [facility];  Laterality: Right;   • LUNG BIOPSY     • PROSTATE BIOPSY     • REPLACEMENT TOTAL KNEE Left    • SHOULDER SURGERY Bilateral    • THORACOSCOPY Right 10/30/2020    Procedure:  MEDIASTINOSCOPY, RIGHT THORACOSCOPY POSSIBLE WEDGE RESECTION WITH DAVINCI ROBOT;  Surgeon: Nick Flores MD;  Location:  [facility];  Service: [facility]i;  Laterality: Right;   • THYROIDECTOMY N/A 2020    Procedure: Total thyroidectomy;  Surgeon: Jean Marie Solis MD;  Location:  [facility] OR;  Service: ENT;  Laterality: N/A;     HEALTH MAINTENANCE ITEMS:  Health Maintenance Due   Topic Date Due   • COLONOSCOPY  Never done   • TDAP/TD VACCINES (1 - Tdap) Never done   • ZOSTER VACCINE (1 of 2) Never done   • Pneumococcal Vaccine 65+ (1 of 1 - PPSV23) Never done   • HEPATITIS C SCREENING  Never done   • ANNUAL WELLNESS VISIT  Never done       <no information>  Last Completed Colonoscopy       Status Date      COLONOSCOPY No completions recorded          There is no immunization history on file for this patient.  Last Completed Mammogram    Patient has no health maintenance due at this time           FAMILY HISTORY:  Family History   Problem Relation Age of Onset   • Hypertension Mother    • COPD Father      SOCIAL HISTORY:  Social History     Socioeconomic History   • Marital status:      Spouse name: Not on file   • Number of children: 2   • Years of education: Not on file   • Highest education level: Not on file   Tobacco Use   • Smoking status: Former Smoker     Packs/day: 1.00     Years: 33.00     Pack years: 33.00     Types: Cigarettes     Start date:      Quit date:      Years since quittin.3   • Smokeless tobacco: Never Used   Vaping Use   • Vaping Use: Never used   Substance and Sexual Activity   • Alcohol use: Yes     Alcohol/week: 2.0 standard drinks     Types: 2 Shots of liquor per week   • Drug use: Never   • Sexual activity: Defer       REVIEW OF SYSTEMS:  Review of Systems   Constitutional: Positive for fatigue. Negative for chills and unexpected weight loss.   HENT: Negative for congestion, hearing loss and trouble swallowing.    Eyes: Negative for redness and visual disturbance.  "  Respiratory: Positive for shortness of breath. Negative for cough and wheezing.         \"I need to catch my breath with moderate exertion.\"   Cardiovascular: Negative for chest pain and palpitations.   Gastrointestinal: Negative for abdominal pain, blood in stool, constipation, nausea and vomiting.   Endocrine: Negative for cold intolerance and heat intolerance.   Genitourinary: Negative for dysuria, flank pain and hematuria.   Musculoskeletal: Negative for gait problem and joint swelling.   Skin: Negative for pallor.   Allergic/Immunologic: Negative for food allergies.   Neurological: Negative for speech difficulty, weakness and confusion.   Hematological: Negative for adenopathy. Does not bruise/bleed easily.   Psychiatric/Behavioral: Negative for agitation and hallucinations. The patient is not nervous/anxious.        /64   Pulse 56   Temp 97.5 °F (36.4 °C)   Resp 18   Ht 185.4 cm (73\")   Wt 117 kg (258 lb)   SpO2 98%   BMI 34.04 kg/m²  Body surface area is 2.4 meters squared.  Pain Score    04/15/21 1308   PainSc: 0-No pain       Physical Exam:  Physical Exam  Vitals reviewed.   Constitutional:       General: He is not in acute distress.     Appearance: He is obese.   HENT:      Head: Normocephalic and atraumatic.   Eyes:      General: No scleral icterus.  Cardiovascular:      Rate and Rhythm: Normal rate and regular rhythm.   Pulmonary:      Effort: No respiratory distress.      Breath sounds: No wheezing or rales.   Abdominal:      General: Bowel sounds are normal.      Palpations: Abdomen is soft.      Tenderness: There is no abdominal tenderness.   Musculoskeletal:         General: No swelling.      Cervical back: Neck supple.   Skin:     General: Skin is warm and dry.   Neurological:      Mental Status: He is alert and oriented to person, place, and time.   Psychiatric:         Mood and Affect: Mood normal.         Behavior: Behavior normal.         Thought Content: Thought content normal.    "      Judgment: Judgment normal.         Delgado Desir reports a pain score of 0.      Patient's Body mass index is 34.04 kg/m². BMI is above normal parameters. Recommendations include: referral to primary care.      ASSESSMENT:  1.  Adenocarcinoma the lung, right lower lobe  AJCC stage:1A2 (pT1b, cN0, cM0)  Treatment status: Post right lower lobe wedge resection.  Pulmonary function test in adequate for lobectomy and node sampling.  2.  Performance status of 1.   3.  Hypothyroidism.  On Synthroid.  4.  Adenocarcinoma of the prostate, [place] score 7, 4+3, grade group 3.  AJCC stage: IIIA (T2b, N0, M0)  Treatment status: Neoadjuvant hormone therapy with radiation.  Followed by Dr. Phan and Dr. Lujan.   5.  Obesity BMI 34.04.      PLAN:  1.   Re: The reason for follow-up.  2.   regarding role of surveillance.  3.  Blood for CBC with differential and CMP.  4.  He will be seen every 6 months with CT scan for the first 3 years then annually thereafter.  5.  Schedule CT brain for staging.  Patient declined brain MRI.   6.  Schedule CT chest, abdomen and pelvis 05/2021.   7.  Continue care per primary care physician and other specialists.  8.  Plan of care discussed with patient.  Understanding expressed.  Patient able to proceed.  9.  Return to office in 6 months with preoffice CBC with differential and CMP.   Next CT 11/2021.   10.  Refer to PCP for obesity.   11. Advance Care Planning   ACP discussion was declined by the patient. Patient does not have an advance directive, information provided.  12.  Further recommendations pending.      Thank you for the referral.    I have reviewed the assessment and plan and verified the accuracy of it. No changes to assessment and plan since the information was documented. Milan Bullard MD 04/15/21       I spent 52 total minutes, face-to-face, caring for Delgado dale.  Greater than 50% of this time involved counseling and/or coordination of care as documented  within this note regarding the patient's illness(es), pros and cons of various treatment options, instructions and/or risk reduction.      (Jean Marie Solis MD)  MD Ephraim Capps MD Patrick Ellison, MD Kenneth Cook, MD

## 2021-04-15 ENCOUNTER — TELEPHONE (OUTPATIENT)
Dept: ONCOLOGY | Facility: CLINIC | Age: 71
End: 2021-04-15

## 2021-04-15 ENCOUNTER — CONSULT (OUTPATIENT)
Dept: ONCOLOGY | Facility: CLINIC | Age: 71
End: 2021-04-15

## 2021-04-15 ENCOUNTER — LAB (OUTPATIENT)
Dept: LAB | Facility: HOSPITAL | Age: 71
End: 2021-04-15

## 2021-04-15 VITALS
BODY MASS INDEX: 34.19 KG/M2 | RESPIRATION RATE: 18 BRPM | TEMPERATURE: 97.5 F | HEART RATE: 56 BPM | OXYGEN SATURATION: 98 % | SYSTOLIC BLOOD PRESSURE: 128 MMHG | DIASTOLIC BLOOD PRESSURE: 64 MMHG | HEIGHT: 73 IN | WEIGHT: 258 LBS

## 2021-04-15 DIAGNOSIS — R74.8 ELEVATED ALKALINE PHOSPHATASE LEVEL: ICD-10-CM

## 2021-04-15 DIAGNOSIS — C34.31 MALIGNANT NEOPLASM OF LOWER LOBE OF RIGHT LUNG (HCC): ICD-10-CM

## 2021-04-15 DIAGNOSIS — D64.9 ANEMIA, UNSPECIFIED TYPE: Primary | ICD-10-CM

## 2021-04-15 DIAGNOSIS — D64.9 ANEMIA, UNSPECIFIED TYPE: ICD-10-CM

## 2021-04-15 DIAGNOSIS — D50.9 IRON DEFICIENCY ANEMIA, UNSPECIFIED IRON DEFICIENCY ANEMIA TYPE: Primary | ICD-10-CM

## 2021-04-15 DIAGNOSIS — C34.31 MALIGNANT NEOPLASM OF LOWER LOBE OF RIGHT LUNG (HCC): Primary | ICD-10-CM

## 2021-04-15 LAB
ALBUMIN SERPL-MCNC: 4.2 G/DL (ref 3.5–5.2)
ALBUMIN/GLOB SERPL: 1.2 G/DL
ALP SERPL-CCNC: 122 U/L (ref 39–117)
ALT SERPL W P-5'-P-CCNC: 27 U/L (ref 1–41)
ANION GAP SERPL CALCULATED.3IONS-SCNC: 10 MMOL/L (ref 5–15)
AST SERPL-CCNC: 25 U/L (ref 1–40)
BASOPHILS # BLD AUTO: 0.04 10*3/MM3 (ref 0–0.2)
BASOPHILS NFR BLD AUTO: 0.4 % (ref 0–1.5)
BILIRUB SERPL-MCNC: 1.2 MG/DL (ref 0–1.2)
BUN SERPL-MCNC: 17 MG/DL (ref 8–23)
BUN/CREAT SERPL: 14 (ref 7–25)
CALCIUM SPEC-SCNC: 9 MG/DL (ref 8.6–10.5)
CHLORIDE SERPL-SCNC: 98 MMOL/L (ref 98–107)
CO2 SERPL-SCNC: 27 MMOL/L (ref 22–29)
CREAT SERPL-MCNC: 1.21 MG/DL (ref 0.76–1.27)
DEPRECATED RDW RBC AUTO: 48.5 FL (ref 37–54)
EOSINOPHIL # BLD AUTO: 0.06 10*3/MM3 (ref 0–0.4)
EOSINOPHIL NFR BLD AUTO: 0.6 % (ref 0.3–6.2)
ERYTHROCYTE [DISTWIDTH] IN BLOOD BY AUTOMATED COUNT: 15.4 % (ref 12.3–15.4)
FERRITIN SERPL-MCNC: 125.2 NG/ML (ref 30–400)
GFR SERPL CREATININE-BSD FRML MDRD: 59 ML/MIN/1.73
GLOBULIN UR ELPH-MCNC: 3.4 GM/DL
GLUCOSE SERPL-MCNC: 104 MG/DL (ref 65–99)
HCT VFR BLD AUTO: 39.7 % (ref 37.5–51)
HGB BLD-MCNC: 12.9 G/DL (ref 13–17.7)
HOLD SPECIMEN: NORMAL
IMM GRANULOCYTES # BLD AUTO: 0.05 10*3/MM3 (ref 0–0.05)
IMM GRANULOCYTES NFR BLD AUTO: 0.5 % (ref 0–0.5)
IRON 24H UR-MRATE: 48 MCG/DL (ref 59–158)
IRON SATN MFR SERPL: 13 % (ref 20–50)
LYMPHOCYTES # BLD AUTO: 0.87 10*3/MM3 (ref 0.7–3.1)
LYMPHOCYTES NFR BLD AUTO: 9.4 % (ref 19.6–45.3)
MCH RBC QN AUTO: 28.2 PG (ref 26.6–33)
MCHC RBC AUTO-ENTMCNC: 32.5 G/DL (ref 31.5–35.7)
MCV RBC AUTO: 86.9 FL (ref 79–97)
MONOCYTES # BLD AUTO: 0.55 10*3/MM3 (ref 0.1–0.9)
MONOCYTES NFR BLD AUTO: 5.9 % (ref 5–12)
NEUTROPHILS NFR BLD AUTO: 7.72 10*3/MM3 (ref 1.7–7)
NEUTROPHILS NFR BLD AUTO: 83.2 % (ref 42.7–76)
NRBC BLD AUTO-RTO: 0 /100 WBC (ref 0–0.2)
PLATELET # BLD AUTO: 204 10*3/MM3 (ref 140–450)
PMV BLD AUTO: 10.5 FL (ref 6–12)
POTASSIUM SERPL-SCNC: 4.4 MMOL/L (ref 3.5–5.2)
PROT SERPL-MCNC: 7.6 G/DL (ref 6–8.5)
RBC # BLD AUTO: 4.57 10*6/MM3 (ref 4.14–5.8)
RETICS # AUTO: 0.09 10*6/MM3 (ref 0.02–0.13)
RETICS/RBC NFR AUTO: 1.95 % (ref 0.7–1.9)
SODIUM SERPL-SCNC: 135 MMOL/L (ref 136–145)
TIBC SERPL-MCNC: 378 MCG/DL (ref 298–536)
TRANSFERRIN SERPL-MCNC: 254 MG/DL (ref 200–360)
WBC # BLD AUTO: 9.29 10*3/MM3 (ref 3.4–10.8)

## 2021-04-15 PROCEDURE — 85045 AUTOMATED RETICULOCYTE COUNT: CPT

## 2021-04-15 PROCEDURE — 82728 ASSAY OF FERRITIN: CPT

## 2021-04-15 PROCEDURE — 84080 ASSAY ALKALINE PHOSPHATASES: CPT

## 2021-04-15 PROCEDURE — 36415 COLL VENOUS BLD VENIPUNCTURE: CPT

## 2021-04-15 PROCEDURE — 80053 COMPREHEN METABOLIC PANEL: CPT

## 2021-04-15 PROCEDURE — 84075 ASSAY ALKALINE PHOSPHATASE: CPT

## 2021-04-15 PROCEDURE — 84466 ASSAY OF TRANSFERRIN: CPT

## 2021-04-15 PROCEDURE — 99204 OFFICE O/P NEW MOD 45 MIN: CPT | Performed by: INTERNAL MEDICINE

## 2021-04-15 PROCEDURE — 83540 ASSAY OF IRON: CPT

## 2021-04-15 PROCEDURE — 82607 VITAMIN B-12: CPT

## 2021-04-15 PROCEDURE — 85025 COMPLETE CBC W/AUTO DIFF WBC: CPT

## 2021-04-15 PROCEDURE — 82746 ASSAY OF FOLIC ACID SERUM: CPT

## 2021-04-15 RX ORDER — FERROUS SULFATE 325(65) MG
325 TABLET ORAL
Qty: 60 TABLET | Refills: 2 | Status: SHIPPED | OUTPATIENT
Start: 2021-04-15 | End: 2021-10-15 | Stop reason: SDUPTHER

## 2021-04-16 LAB
FOLATE SERPL-MCNC: >20 NG/ML (ref 4.78–24.2)
VIT B12 BLD-MCNC: 938 PG/ML (ref 211–946)

## 2021-04-19 LAB
ALP BONE CFR SERPL: 36 % (ref 12–68)
ALP INTEST CFR SERPL: 0 % (ref 0–18)
ALP LIVER CFR SERPL: 64 % (ref 13–88)
ALP SERPL-CCNC: 120 IU/L (ref 39–117)

## 2021-04-29 ENCOUNTER — TELEPHONE (OUTPATIENT)
Dept: ONCOLOGY | Facility: CLINIC | Age: 71
End: 2021-04-29

## 2021-04-29 ENCOUNTER — HOSPITAL ENCOUNTER (OUTPATIENT)
Dept: CT IMAGING | Facility: HOSPITAL | Age: 71
Discharge: HOME | End: 2021-04-29
Admitting: INTERNAL MEDICINE

## 2021-04-29 DIAGNOSIS — C34.31 MALIGNANT NEOPLASM OF LOWER LOBE OF RIGHT LUNG (HCC): ICD-10-CM

## 2021-04-29 LAB — CREAT BLDA-MCNC: 1.4 MG/DL (ref 0.6–1.3)

## 2021-04-29 PROCEDURE — 70470 CT HEAD/BRAIN W/O & W/DYE: CPT

## 2021-04-29 PROCEDURE — 82565 ASSAY OF CREATININE: CPT

## 2021-04-29 PROCEDURE — 71260 CT THORAX DX C+: CPT

## 2021-04-29 PROCEDURE — 74177 CT ABD & PELVIS W/CONTRAST: CPT

## 2021-04-29 PROCEDURE — 25010000002 IOPAMIDOL 61 % SOLUTION: Performed by: INTERNAL MEDICINE

## 2021-04-29 RX ADMIN — IOPAMIDOL 50 ML: 612 INJECTION, SOLUTION INTRAVENOUS at 10:45

## 2021-04-29 RX ADMIN — IOPAMIDOL 100 ML: 612 INJECTION, SOLUTION INTRAVENOUS at 10:45

## 2021-04-29 NOTE — TELEPHONE ENCOUNTER
----- Message from Milan Bullard MD sent at 4/29/2021 12:08 PM CDT -----  Fax report to PCP, prostatomegaly.    No evidence of metastatic disease in the abdomen or pelvis.  Atherosclerotic disease.  Right renal cyst. Probable left renal cysts.

## 2021-04-30 ENCOUNTER — TELEPHONE (OUTPATIENT)
Dept: ONCOLOGY | Facility: CLINIC | Age: 71
End: 2021-04-30

## 2021-04-30 DIAGNOSIS — C34.31 MALIGNANT NEOPLASM OF LOWER LOBE OF RIGHT LUNG (HCC): Primary | ICD-10-CM

## 2021-04-30 NOTE — TELEPHONE ENCOUNTER
----- Message from Milan Bullrad MD sent at 4/29/2021  4:16 PM CDT -----  Notify patient increase oral hydration.

## 2021-04-30 NOTE — TELEPHONE ENCOUNTER
Called patient and reviewed elevated creat results-1.40 and that Dr Bullard wants him to drink some extra fluids over the next several days.  Patient v/u

## 2021-05-18 ENCOUNTER — LAB (OUTPATIENT)
Dept: LAB | Facility: HOSPITAL | Age: 71
End: 2021-05-18

## 2021-05-18 DIAGNOSIS — D50.9 IRON DEFICIENCY ANEMIA, UNSPECIFIED IRON DEFICIENCY ANEMIA TYPE: ICD-10-CM

## 2021-05-18 LAB
BASOPHILS # BLD AUTO: 0.03 10*3/MM3 (ref 0–0.2)
BASOPHILS NFR BLD AUTO: 0.4 % (ref 0–1.5)
DEPRECATED RDW RBC AUTO: 49.2 FL (ref 37–54)
EOSINOPHIL # BLD AUTO: 0.01 10*3/MM3 (ref 0–0.4)
EOSINOPHIL NFR BLD AUTO: 0.1 % (ref 0.3–6.2)
ERYTHROCYTE [DISTWIDTH] IN BLOOD BY AUTOMATED COUNT: 15.4 % (ref 12.3–15.4)
FERRITIN SERPL-MCNC: 214 NG/ML (ref 30–400)
HCT VFR BLD AUTO: 35.7 % (ref 37.5–51)
HGB BLD-MCNC: 11.6 G/DL (ref 13–17.7)
IMM GRANULOCYTES # BLD AUTO: 0.05 10*3/MM3 (ref 0–0.05)
IMM GRANULOCYTES NFR BLD AUTO: 0.6 % (ref 0–0.5)
IRON 24H UR-MRATE: 31 MCG/DL (ref 59–158)
IRON SATN MFR SERPL: 11 % (ref 20–50)
LYMPHOCYTES # BLD AUTO: 0.61 10*3/MM3 (ref 0.7–3.1)
LYMPHOCYTES NFR BLD AUTO: 7.3 % (ref 19.6–45.3)
MCH RBC QN AUTO: 28.4 PG (ref 26.6–33)
MCHC RBC AUTO-ENTMCNC: 32.5 G/DL (ref 31.5–35.7)
MCV RBC AUTO: 87.3 FL (ref 79–97)
MONOCYTES # BLD AUTO: 0.65 10*3/MM3 (ref 0.1–0.9)
MONOCYTES NFR BLD AUTO: 7.8 % (ref 5–12)
NEUTROPHILS NFR BLD AUTO: 7.03 10*3/MM3 (ref 1.7–7)
NEUTROPHILS NFR BLD AUTO: 83.8 % (ref 42.7–76)
NRBC BLD AUTO-RTO: 0 /100 WBC (ref 0–0.2)
PLATELET # BLD AUTO: 151 10*3/MM3 (ref 140–450)
PMV BLD AUTO: 10.4 FL (ref 6–12)
RBC # BLD AUTO: 4.09 10*6/MM3 (ref 4.14–5.8)
TIBC SERPL-MCNC: 288 MCG/DL (ref 298–536)
TRANSFERRIN SERPL-MCNC: 193 MG/DL (ref 200–360)
WBC # BLD AUTO: 8.38 10*3/MM3 (ref 3.4–10.8)

## 2021-05-18 PROCEDURE — 85025 COMPLETE CBC W/AUTO DIFF WBC: CPT

## 2021-05-18 PROCEDURE — 83540 ASSAY OF IRON: CPT

## 2021-05-18 PROCEDURE — 36415 COLL VENOUS BLD VENIPUNCTURE: CPT

## 2021-05-18 PROCEDURE — 82728 ASSAY OF FERRITIN: CPT

## 2021-05-18 PROCEDURE — 84466 ASSAY OF TRANSFERRIN: CPT

## 2021-05-19 ENCOUNTER — TELEPHONE (OUTPATIENT)
Dept: RADIATION ONCOLOGY | Facility: HOSPITAL | Age: 71
End: 2021-05-19

## 2021-05-19 NOTE — TELEPHONE ENCOUNTER
----- Message from Josefa Yip MA sent at 5/12/2021  3:52 PM CDT -----  Hey,     This man is not going to be getting chemo.  He will be seen in 6 months for follow up.     Dr Bullard said go ahead with whatever yall were planning to do prior to all the hold up.     Thanks    ----- Message -----  From: Ailin Max RN  Sent: 5/11/2021   2:37 PM CDT  To: Josefa Yip MA    Just a question.  We saw this patient in consultation in 2020 for prostate cancer.  In the midst of his workup for prostate cancer, his lung cancer was diagnosed.  XRT to prostate cancer was then placed on hold d/t lung cancer diagnosis.    Is patient going to receive chemo?  If not, should we see him again for his prostate cancer diagnosis?  Didn't know what Dr. Bullard's thoughts were.    Thank you!  Ailin

## 2021-05-19 NOTE — TELEPHONE ENCOUNTER
Called and spoke with patient in regards to treatment for his prostate cancer, now that he had seen medical oncology in regards to his lung cancer diagnosis.  He states that based on the conversation he had with Dr. Kaur in September 2020, hormone therapy would be needed.  I told him it would be best for him to contact Dr. Phan's office first thing tomorrow in order to get hormone therapy started.  I told him that we normally do not start radiation until 2 months after initiation of lupron therapy.  I gave patient Dr. Phan's phone number and he states that he will call their office tomorrow morning.

## 2021-05-20 ENCOUNTER — OFFICE VISIT (OUTPATIENT)
Dept: OTOLARYNGOLOGY | Facility: CLINIC | Age: 71
End: 2021-05-20

## 2021-05-20 ENCOUNTER — TELEPHONE (OUTPATIENT)
Dept: UROLOGY | Age: 71
End: 2021-05-20

## 2021-05-20 DIAGNOSIS — C61 PROSTATE CANCER (HCC): Primary | ICD-10-CM

## 2021-05-20 DIAGNOSIS — R22.1 NODULE OF NECK: Primary | ICD-10-CM

## 2021-05-20 NOTE — PROGRESS NOTES
Patient called and presents to the office for check of nodule of neck. Patient has a nodule in the center of the top of the sternum. Patient states the lesion appeared 4 days ago. He says it will occasionally be tender. Dr. Solis has evaluated lesion and feels that the area is a cyst. We will see patient back and potentially remove the nodule if the lesion has not resolved. Patient is to call if the lesion changes or enlarges. The lesion measures 1.5 cm.

## 2021-06-17 DIAGNOSIS — E89.0 POSTOPERATIVE HYPOTHYROIDISM: Primary | ICD-10-CM

## 2021-06-17 DIAGNOSIS — E89.0 S/P TOTAL THYROIDECTOMY: ICD-10-CM

## 2021-06-17 RX ORDER — LEVOTHYROXINE SODIUM 125 MCG
TABLET ORAL
Qty: 30 TABLET | Refills: 0 | Status: SHIPPED | OUTPATIENT
Start: 2021-06-17 | End: 2021-06-29 | Stop reason: DRUGHIGH

## 2021-06-18 ENCOUNTER — LAB (OUTPATIENT)
Dept: LAB | Facility: HOSPITAL | Age: 71
End: 2021-06-18

## 2021-06-18 DIAGNOSIS — E89.0 POSTOPERATIVE HYPOTHYROIDISM: ICD-10-CM

## 2021-06-18 DIAGNOSIS — C34.31 MALIGNANT NEOPLASM OF LOWER LOBE OF RIGHT LUNG (HCC): ICD-10-CM

## 2021-06-18 DIAGNOSIS — E89.0 S/P TOTAL THYROIDECTOMY: ICD-10-CM

## 2021-06-18 LAB
ALBUMIN SERPL-MCNC: 3.9 G/DL (ref 3.5–5.2)
ALBUMIN/GLOB SERPL: 1.3 G/DL
ALP SERPL-CCNC: 100 U/L (ref 39–117)
ALT SERPL W P-5'-P-CCNC: 29 U/L (ref 1–41)
ANION GAP SERPL CALCULATED.3IONS-SCNC: 9 MMOL/L (ref 5–15)
AST SERPL-CCNC: 26 U/L (ref 1–40)
BASOPHILS # BLD AUTO: 0.03 10*3/MM3 (ref 0–0.2)
BASOPHILS NFR BLD AUTO: 0.4 % (ref 0–1.5)
BILIRUB SERPL-MCNC: 1.1 MG/DL (ref 0–1.2)
BUN SERPL-MCNC: 18 MG/DL (ref 8–23)
BUN/CREAT SERPL: 16.2 (ref 7–25)
CALCIUM SPEC-SCNC: 8.7 MG/DL (ref 8.6–10.5)
CHLORIDE SERPL-SCNC: 104 MMOL/L (ref 98–107)
CO2 SERPL-SCNC: 25 MMOL/L (ref 22–29)
CREAT SERPL-MCNC: 1.11 MG/DL (ref 0.76–1.27)
DEPRECATED RDW RBC AUTO: 52.1 FL (ref 37–54)
EOSINOPHIL # BLD AUTO: 0.07 10*3/MM3 (ref 0–0.4)
EOSINOPHIL NFR BLD AUTO: 1 % (ref 0.3–6.2)
ERYTHROCYTE [DISTWIDTH] IN BLOOD BY AUTOMATED COUNT: 15.7 % (ref 12.3–15.4)
GFR SERPL CREATININE-BSD FRML MDRD: 65 ML/MIN/1.73
GLOBULIN UR ELPH-MCNC: 2.9 GM/DL
GLUCOSE SERPL-MCNC: 104 MG/DL (ref 65–99)
HCT VFR BLD AUTO: 39.5 % (ref 37.5–51)
HGB BLD-MCNC: 12.8 G/DL (ref 13–17.7)
IMM GRANULOCYTES # BLD AUTO: 0.03 10*3/MM3 (ref 0–0.05)
IMM GRANULOCYTES NFR BLD AUTO: 0.4 % (ref 0–0.5)
LYMPHOCYTES # BLD AUTO: 0.74 10*3/MM3 (ref 0.7–3.1)
LYMPHOCYTES NFR BLD AUTO: 10.8 % (ref 19.6–45.3)
MCH RBC QN AUTO: 29.4 PG (ref 26.6–33)
MCHC RBC AUTO-ENTMCNC: 32.4 G/DL (ref 31.5–35.7)
MCV RBC AUTO: 90.8 FL (ref 79–97)
MONOCYTES # BLD AUTO: 0.48 10*3/MM3 (ref 0.1–0.9)
MONOCYTES NFR BLD AUTO: 7 % (ref 5–12)
NEUTROPHILS NFR BLD AUTO: 5.5 10*3/MM3 (ref 1.7–7)
NEUTROPHILS NFR BLD AUTO: 80.4 % (ref 42.7–76)
NRBC BLD AUTO-RTO: 0 /100 WBC (ref 0–0.2)
PLATELET # BLD AUTO: 141 10*3/MM3 (ref 140–450)
PMV BLD AUTO: 11.4 FL (ref 6–12)
POTASSIUM SERPL-SCNC: 4.3 MMOL/L (ref 3.5–5.2)
PROT SERPL-MCNC: 6.8 G/DL (ref 6–8.5)
RBC # BLD AUTO: 4.35 10*6/MM3 (ref 4.14–5.8)
SODIUM SERPL-SCNC: 138 MMOL/L (ref 136–145)
TSH SERPL DL<=0.05 MIU/L-ACNC: 27.75 UIU/ML (ref 0.27–4.2)
WBC # BLD AUTO: 6.85 10*3/MM3 (ref 3.4–10.8)

## 2021-06-18 PROCEDURE — 84443 ASSAY THYROID STIM HORMONE: CPT

## 2021-06-18 PROCEDURE — 80053 COMPREHEN METABOLIC PANEL: CPT

## 2021-06-18 PROCEDURE — 85025 COMPLETE CBC W/AUTO DIFF WBC: CPT

## 2021-06-18 PROCEDURE — 36415 COLL VENOUS BLD VENIPUNCTURE: CPT

## 2021-06-21 ENCOUNTER — TELEPHONE (OUTPATIENT)
Dept: CARDIOLOGY | Facility: CLINIC | Age: 71
End: 2021-06-21

## 2021-06-21 NOTE — TELEPHONE ENCOUNTER
Pt called and stated he was having constipation for 4 days and was not sure if any of the medication he is currently taking could be causing this please advise

## 2021-06-22 NOTE — TELEPHONE ENCOUNTER
I called pt back but had to leave a msg for him to call me back.  When he calls he will need to be told he needs to check with his pcp about the constipation.  None of the medications we have him on would be the cause.  He is on Iron and that can cause constipation.  Dayday Frank, CMA

## 2021-06-23 NOTE — TELEPHONE ENCOUNTER
Pt called me back this morning stating that he wanted to talk to me about the medication he is on and go over the side effects.  He thinks one of them is causing his constipation.  I went over each one.  Side effects for amiodarone and metoprolol the constipation is listed but not number 1.  He wants to know what you think.  I told him I would ask and let him know.  I did tell him again he needs to see his pcp for the constipation to make sure there was not another reason for it.  He said its been going on for a while and he is uncomfortable.  Please advise.  Dayday Frank, CMA

## 2021-06-28 ENCOUNTER — TELEPHONE (OUTPATIENT)
Dept: OTOLARYNGOLOGY | Facility: CLINIC | Age: 71
End: 2021-06-28

## 2021-06-28 DIAGNOSIS — E89.0 POSTOPERATIVE HYPOTHYROIDISM: Primary | ICD-10-CM

## 2021-06-28 NOTE — TELEPHONE ENCOUNTER
----- Message from Jean Marie Solis MD sent at 6/28/2021  9:46 AM CDT -----  Please call the patient regarding his abnormal result.  Discuss his Synthroid with me

## 2021-06-29 RX ORDER — LEVOTHYROXINE SODIUM 150 MCG
150 TABLET ORAL DAILY
Qty: 30 TABLET | Refills: 0 | Status: SHIPPED | OUTPATIENT
Start: 2021-06-29 | End: 2021-08-02 | Stop reason: DRUGHIGH

## 2021-07-01 ENCOUNTER — PROCEDURE VISIT (OUTPATIENT)
Dept: OTOLARYNGOLOGY | Facility: CLINIC | Age: 71
End: 2021-07-01

## 2021-07-01 DIAGNOSIS — R22.1 NODULE OF NECK: Primary | ICD-10-CM

## 2021-07-30 ENCOUNTER — HOSPITAL ENCOUNTER (OUTPATIENT)
Dept: CT IMAGING | Facility: HOSPITAL | Age: 71
Discharge: HOME | End: 2021-07-30

## 2021-07-30 ENCOUNTER — LAB (OUTPATIENT)
Dept: LAB | Facility: HOSPITAL | Age: 71
End: 2021-07-30

## 2021-07-30 DIAGNOSIS — E89.0 POSTOPERATIVE HYPOTHYROIDISM: ICD-10-CM

## 2021-07-30 DIAGNOSIS — C34.31 MALIGNANT NEOPLASM OF LOWER LOBE OF RIGHT LUNG (HCC): ICD-10-CM

## 2021-07-30 LAB
CREAT BLDA-MCNC: 1.4 MG/DL (ref 0.6–1.3)
TSH SERPL DL<=0.05 MIU/L-ACNC: 19.1 UIU/ML (ref 0.27–4.2)

## 2021-07-30 PROCEDURE — 71260 CT THORAX DX C+: CPT

## 2021-07-30 PROCEDURE — 36415 COLL VENOUS BLD VENIPUNCTURE: CPT

## 2021-07-30 PROCEDURE — 84443 ASSAY THYROID STIM HORMONE: CPT

## 2021-07-30 PROCEDURE — 82565 ASSAY OF CREATININE: CPT

## 2021-07-30 PROCEDURE — 25010000002 IOPAMIDOL 61 % SOLUTION: Performed by: INTERNAL MEDICINE

## 2021-07-30 RX ADMIN — IOPAMIDOL 100 ML: 612 INJECTION, SOLUTION INTRAVENOUS at 10:10

## 2021-08-02 ENCOUNTER — TELEPHONE (OUTPATIENT)
Dept: OTOLARYNGOLOGY | Facility: CLINIC | Age: 71
End: 2021-08-02

## 2021-08-02 ENCOUNTER — TELEPHONE (OUTPATIENT)
Dept: CARDIOLOGY | Facility: CLINIC | Age: 71
End: 2021-08-02

## 2021-08-02 DIAGNOSIS — E89.0 POSTOPERATIVE HYPOTHYROIDISM: Primary | ICD-10-CM

## 2021-08-02 RX ORDER — LEVOTHYROXINE SODIUM 175 MCG
175 TABLET ORAL DAILY
Qty: 30 TABLET | Refills: 0 | Status: SHIPPED | OUTPATIENT
Start: 2021-08-02 | End: 2021-09-01 | Stop reason: DRUGHIGH

## 2021-08-03 ENCOUNTER — TELEPHONE (OUTPATIENT)
Dept: ONCOLOGY | Facility: CLINIC | Age: 71
End: 2021-08-03

## 2021-08-03 NOTE — TELEPHONE ENCOUNTER
----- Message from Milan Bullard MD sent at 8/1/2021  1:41 PM CDT -----  Creatinine 1.4, increase oral hydration.

## 2021-08-03 NOTE — TELEPHONE ENCOUNTER
It would be considered reasonably safe to discontinue the medication 48 hours prior to his procedure and resume the medication when feasible after the procedure.

## 2021-08-05 ENCOUNTER — TELEPHONE (OUTPATIENT)
Dept: ONCOLOGY | Facility: CLINIC | Age: 71
End: 2021-08-05

## 2021-08-05 NOTE — TELEPHONE ENCOUNTER
Caller: GRECIA CHAPPELL    Relationship: SELF    Best call back number: [phone]    What is the best time to reach you: ANYTIME      Who are you requesting to speak with (clinical staff, provider,  specific staff member): CLINICAL     Do you know the name of the person who called: ESTELLA    What was the call regarding:   HAD CAT SCAN LAST Friday WANTING TO KNOW RESULTS OF SCAN     Do you require a callback:     CAN LEAVE VOICEMAIL IF NO ANSWER    IN REGARDS TO RESULTS

## 2021-08-16 ENCOUNTER — OFFICE VISIT (OUTPATIENT)
Dept: UROLOGY | Age: 71
End: 2021-08-16
Payer: MEDICARE

## 2021-08-16 DIAGNOSIS — N13.8 BPH WITH URINARY OBSTRUCTION: ICD-10-CM

## 2021-08-16 DIAGNOSIS — N40.1 BPH WITH URINARY OBSTRUCTION: ICD-10-CM

## 2021-08-16 DIAGNOSIS — C61 PROSTATE CANCER (HCC): Primary | ICD-10-CM

## 2021-08-16 LAB
BILIRUBIN, POC: NORMAL
BLOOD URINE, POC: NORMAL
CLARITY, POC: CLEAR
COLOR, POC: YELLOW
GLUCOSE URINE, POC: NORMAL
KETONES, POC: NORMAL
LEUKOCYTE EST, POC: NORMAL
NITRITE, POC: NORMAL
PH, POC: 5.5
PROTEIN, POC: NORMAL
SPECIFIC GRAVITY, POC: 1.01
UROBILINOGEN, POC: 0.2

## 2021-08-16 PROCEDURE — 81003 URINALYSIS AUTO W/O SCOPE: CPT | Performed by: UROLOGY

## 2021-08-16 PROCEDURE — 99214 OFFICE O/P EST MOD 30 MIN: CPT | Performed by: UROLOGY

## 2021-08-16 RX ORDER — APIXABAN 5 MG/1
TABLET, FILM COATED ORAL
COMMUNITY
Start: 2021-07-19

## 2021-08-16 RX ORDER — ALFUZOSIN HYDROCHLORIDE 10 MG/1
10 TABLET, EXTENDED RELEASE ORAL DAILY
Qty: 30 TABLET | Refills: 11 | Status: SHIPPED | OUTPATIENT
Start: 2021-08-16

## 2021-08-16 ASSESSMENT — ENCOUNTER SYMPTOMS
WHEEZING: 0
VOMITING: 0
SORE THROAT: 0
COUGH: 0
NAUSEA: 0
EYE PAIN: 0
BACK PAIN: 0

## 2021-08-16 NOTE — PROGRESS NOTES
Cole Live is a 80 y.o. male who presents today   Chief Complaint   Patient presents with    Follow-up     I am here to discuss horomone injections for prostate cancer. i have not started radiation yet with Dr. Guanako Lagunas:  Patient is here today for prostate cancer which was first diagnosed on 7/30/2020. His last several PSA values are as follows:  PSA on 7/6/2020 was 21 ng/ML, patient had a PSA prior to this visit on 8/11/2021 was 21.02  His prostate volume was 107 grams. PSAD 0.20  He had a prostate biopsy consisting of a total of 12 cores with 5 positive cores. TRUS Findings consisted of no abnormal ultrasound findings  He has right sided disease with a Negra score of 4+3 equal sign 7 grade group 2 intermediate unfavorable. Location of the the Cancer: 5 out of 6 cores on the right the only one that was negative was the right apex and has had high-grade PIN.  0 out of 6 cores on the left   His clinical TNM stage was: [id number]  His pathological TNM stage was: [id number]  The patient has a staging CT and bone scan. Previous treatment of prostate cancer: none  Patient has Normal erectile function yes    Patient's history is documented as above. In his staging evaluation he was found to have a lung nodule and eventually underwent a robot-assisted right wedge resection of his lung. This was positive for lung cancer his recovery has been confounded to complicated by development of A. fib he is on Eliquis. He states he still has some dyspnea on exertion. He has not had any treatment for his prostate cancer he had seen radiation oncology. Because of lower urinary tract symptoms he was considering either radiation therapy with neoadjuvant hormonal therapy versus surgery. He wanted to do neoadjuvant hormonal therapy with XRT.   This has been delayed because of the diagnosis and treatment for lung cancer he is receiving no further treatment such as chemotherapy etc. for his lung cancer per preauthorization for Lupron will be contacted as soon as this is completed to come in for his Lupron injection. What he sees radiation oncology can come back to see me for Fiduciary marker placement. 2. BPH with urinary obstruction  Patient is to continue Uroxatrol  - alfuzosin (UROXATRAL) 10 MG extended release tablet; Take 1 tablet by mouth daily  Dispense: 30 tablet; Refill: 11      No orders of the defined types were placed in this encounter. Return for [facility] office f/u after seen by radiation oncology for Fiduciary marker placement. All information inputted into the note by the MA to include chief complaint, past medical history, past surgical history, medications, allergies, social and family history and review of systems has been reviewed and updated as needed by me. EMR Dragon/transcription disclaimer: Much of this documentt is electronic  transcription/translation of spoken language to printed text. The  electronic translation of spoken language may be erroneous, or at times,  nonsensical words or phrases may be inadvertently transcribed.  Although I  have reviewed the document for such errors, some may still exist.

## 2021-08-20 ENCOUNTER — HOSPITAL ENCOUNTER (OUTPATIENT)
Dept: RADIATION ONCOLOGY | Facility: HOSPITAL | Age: 71
Setting detail: RADIATION/ONCOLOGY SERIES
End: 2021-08-20

## 2021-08-22 PROBLEM — Z85.118 HISTORY OF LUNG CANCER: Status: ACTIVE | Noted: 2021-08-22

## 2021-08-22 PROBLEM — Z90.2 S/P PARTIAL LOBECTOMY OF LUNG: Status: ACTIVE | Noted: 2021-08-22

## 2021-08-22 NOTE — PROGRESS NOTES
[facility], .SAlfonsoMD Lia Bee, BSN, PA-C  ___________________________________________________________  [facility]  Department of Radiation Oncology  [address] [place] [id number]  Office:  [phone]  Fax: [phone]                DATE:  08/23/2021  PATIENT: Delgado Desir  1950                         MEDICAL RECORD #:  [id number]          Chief Complaint   Patient presents with   • Prostate Cancer                                        Delgado Desir is a 71 y.o. male that has been referred to our office to discuss radiotherapy considerations for Adenocarcinoma of the Prostate. Reports SOB, constipation, frequency/urgency with urination and occasional weak stream. Denies activity change, appetite change, unexpected weight change, nausea/vomiting, diarrhea, decreased urine volume, difficulty urinating, hematuria, dysuria, light-headedness, weakness, and headaches. He follows  and .     History of Present Illness:  Diagnosed in July 2020 with Adenocarcinoma of the prostate, pre-PSA 21.02, Preston (4+3) 7, 5/12 cores positive. PNI identified. Follows Dr. Phan, recommended ADT and XRT. In October 2020, Mr. Desir was diagnosed with Stage 1A2 of the RLL of the lung, being managed by Dr. Flores and Dr. Bullard. He now returns for XRT treatment recommendations to the prostate.     07/06/2020 - PSA: 21    07/15/2020 - Appointment with :  • Patient is not satisfied with results with Uroxatrol, I will place him on Rapaflo.   • It has been explained to patient that with the extremely high PSA I would highly suggest prostate biopsy. Patient is agreeable to this.   • The risk of infection, bleeding, nausea, vomiting, or even syncope or presyncope was discussed with patient.   • He has been instructed to begin antibiotic today before procedure and instill enema the day of procedure.   • Ciprofloxacin has been sent to his  pharmacy.    07/30/2020 - Prostate Biopsy per :  • Prostate, right lateral base, needle biopsy:  o Prostatic adenocarcinoma, Preston score 7 (4+3)  o Tumor encompasses 20% of the needle core biopsy  o Grade group 3  • Prostate, right lateral mid, needle biopsy:  o Prostatic adenocarcinoma, Lavelle's 7 (4+3)  o Tumor encompasses 20% of the needle core biopsy  o Grade group 3  • Prostate, right lateral apex, needle biopsy:  o Prostatic adenocarcinoma, Preston score 7 (3+4)  o Tumor encompasses 30% of the needle core biopsy  o Grade group 2  o Pattern 4 is approximately 20% of the tumor  • Prostate, right base, needle biopsy:  o Prostatic adenocarcinoma, Preston's 7 (4+3)  o Tumor encompasses 10% of the needle core biopsy  o Grade group 3  • Prostate, right mid, needle biopsy:  o Prostatic adenocarcinoma, Preston's 7 (4+3)  o Tumor encompasses 55% of the needle core biopsy  o Grade group 3  • Prostate, right apex, needle biopsy:  o High-grade prostatic intraepithelial neoplasia  • Prostate, left lateral base, needle biopsy:  Benign prostatic glands and stroma  • Prostate, left lateral mid, needle biopsy:  Benign prostatic glands and stroma  • Prostate, left lateral apex, needle biopsy:  Benign prostatic glands and stroma  • Prostate, left base, needle biopsy:  Benign prostatic glands and stroma  • Prostate, left mid, needle biopsy:  Benign prostatic glands and stroma  • Prostate, left apex, needle biopsy:  Benign prostatic glands and stroma    08/14/2020 - CT Abdomen/Pelvis with and without contrast:  • No acute abnormality of the abdomen or pelvis.  • A moderate splenomegaly.  • An enlarged prostate.  • A stable bilateral renal cysts.  • The lobulated noncalcified nodule in the right lower lobe represent a neoplastic process. Further evaluation with positron emission tomography scan may be obtained.  • The previously seen fractures of the left sacral ala is not visualized in this study. If clinically  significant, further evaluation with radionuclide bone scan may be obtained    08/14/2020 - Bone Scan:  • No evidence of metastatic bone disease.  • Degenerative uptake in the spine, right knee and bilateral ankles/feet. Suspect prior left knee arthroplasty. Correlate clinically.    08/21/2020 - Appointment with :  • PSA on 7/6/2020 was 21 ng/ML  • His prostate volume was 107 grams. PSAD 0.20  • He had a prostate biopsy consisting of a total of 12 cores with 5 positive cores.  • TRUS Findings consisted of no abnormal ultrasound findings  • He has right sided disease with a Preston score of 4+3 equal sign 7 grade group 2 intermediate unfavorable.  • Location of the the Cancer: 5 out of 6 cores on the right the only one that was negative was the right apex and has had high-grade PIN. 0 out of 6 cores on the left   • His clinical TNM stage was: [id number]  • His pathological TNM stage was: [id number]  • The patient has a staging CT and bone scan.  Recommendations:  • All the time was spent face-to-face with the patient and his wife for a total time of 65 minutes. Time was spent discussing his pathology, risk ratification. Management options.   • He asked many questions and these were answered to the best my ability to his satisfaction.  • After a long discussion I told patient that I felt he was not a candidate active surveillance.   • That he should consider surgery as a most aggressive approach particular given the high PSA is Preston score the extent of disease and the fact that he has a really large prostate and some obstructive symptoms but this might be the best choice however his major concern is his quality of life and not cancer control and therefore he is leaning more toward external beam radiation therapy given the large size of his prostate I suggested that he consider 2 years of neoadjuvant hormonal therapy with XRT.   • He wants to see the radiation oncologist for consultation.   • He is not  completely made up his mind.   • We will wait to hear back from him after he sees radiation oncology    09/02/2020 - Consult with  (Covering for ):  • We discussed the goals and plans of care with him and his family, answered all questions and he verbalizes understanding.  Separately however, I discussed with him and his wife that after reviewing his imaging studies and discussing the pulmonary findings with the reading radiologist, Dr. Charlene Grider, it was determined that the right lower lobe pulmonary nodule has had an interval enlargement currently measures 2.8 x 1.7 cm on his CT scan of the abdomen and pelvis performed on 08/14/2020 in comparison to CT scan of the chest in November 2019 when it measured 1.7 x 1.2 cm (an addendum to the report has been made).  He understands that this finding requires further work-up for the possibility of metastatic prostate cancer versus a primary lung cancer (more likely).  He understands that work-up will likely lead to possibility of invasive procedures such as thoracic biopsies.  This new information will necessitate holding off on a definitive determination of prostate cancer treatment until work-up is completed.  Possible clinical scenarios include benign pulmonary etiologies, synchronous prostate and pulmonary malignancies, and less likely prostate cancer with pulmonary metastasis.  In the meantime, we agreed that starting androgen deprivation therapy would be a reasonable decision as we move forward with additional work-up and he will be seeing Dr. Phan for that.  Following this discussion and in consideration of the diagnostic data/evaluation of the patient, I am recommending that he undergo a PET scan with anticipated additional referrals for possible biopsying depending on PET scan results.  • Patient will return for follow-up regarding PET scan results or additional coordination of care will be arranged through telephone conversations as  needed.    09/18/2020 - PET Scan:  • Hypermetabolic right lower lobe pulmonary nodule compatible with malignant neoplasm.  • No scintigraphic evidence of distal metastases.    09/24/2020 - Telephone encounter with :  • I spoke with this patient this morning regarding the results of his PET scan.  The right lower lobe nodule is suspicious for malignancy.  • I will discuss this case with Dr. Nick Flores and he may need this lesion removed for diagnosis and definitive treatment.    09/24/2020 - Documentation per :  • I spoke with Dr. Steven Nunez this morning as well as placed a call for Dr. Phan regarding the management of this patient.  • At this point I believe he needs to start ADT for his prostate carcinoma and perhaps some intervention for his urinary obstructive symptoms.  Radiation will make these symptoms worse with the swelling from the radiation and he may need some proactive intervention to relieve his obstructive symptoms.  • He also needs the right lower lobe lung nodule addressed and I have sent a message to Dr. Nick Flores.  We could consider a CT-guided biopsy, however if negative I think he would still need this removed that is clinically quite suspicious.    09/24/2020 - Documentation per :  • I spoke with Dr. Nick Flores and he is reviewed the films.  • At this time we will schedule a CT-guided biopsy as well as PFTs.  It would be very unusual to have a pulmonary metastases from prostate cancer in the absence of disseminated bone metastases but not outside the realm of possibility.  • Therefore we will proceed with core needle biopsies of the lung for tissue diagnosis, and he may require definitive surgical resection.  • We will also request PFTs to be completed.    09/30/2020 - Telephone encounter with :  • I spoke with this patient today to update him on plans for treatment.  I have discussed his case with Dr. Phan, Dr. Steven Nunez, and Dr. Nick Flores,  • With  Dr. Phan he will start him on ADT therapy and discuss surgery versus some type of intervention procedure to relieve his urinary outlet obstruction from enlarged prostate.  We are cart quite concerned that with radiation he will have total obstruction due to his prostate being over 100 g.  • Also, with his lung nodule that is hypermetabolic on PET scan he needs a CT-guided biopsy and pulmonary function test.  He will also see Dr. Nick Flores and referral has been made.  • Patient verbalized understanding and will await contact from the office of Dr. Flores, Dr. Phan, pulmonary function test lab and CT scanning guided biopsy of the lung nodule.    10/16/2020 - Lung, right lower lobe, fine-needle core biopsies and touch preparation:  • Fragments of skeletal muscle.  • No histologic or cytologic evidence of malignancy.    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  o No visceral pleural invasion.  o Margin of resection free of tumor.  • Pleural tissue, biopsies:  o Fibrosis and chronic inflammation.  o No evidence of malignancy.  • AJCC pathologic stage:  pT1b Nx    Synoptic Checklist   LUNG  8th Edition - Protocol posted: 2/26/2020  LUNG: RESECTION - All Specimens  SPECIMEN   Procedure  Wedge resection    Specimen Laterality  Right    TUMOR   Tumor Site  Lower lobe of lung    Histologic Type  Invasive mucinous adenocarcinoma    Histologic Grade  G1: Well differentiated    Spread Through Air Spaces (MYLENE)  Not identified    Tumor Size     Total Tumor Size (size of entire tumor)  Greatest Dimension (Centimeters): 2.0 cm   Tumor Focality  Single focus    Visceral Pleura Invasion  Not identified    Direct Invasion of Adjacent Structures  No adjacent structures present    Treatment Effect  No known presurgical therapy    Lymphovascular Invasion  Not identified     MARGINS   Margins  All margins are uninvolved by tumor    Margins Examined  Parenchymal    Distance of Invasive Carcinoma from Closest Margin (Centimeters)  0.1 cm   Closest Margin  Parenchymal    LYMPH NODES   Regional Lymph Nodes  No lymph nodes submitted or found    PATHOLOGIC STAGE CLASSIFICATION (pTNM, AJCC 8th Edition)   Primary Tumor (pT)  pT1b    Regional Lymph Nodes (pN)  pNX    ADDITIONAL FINDINGS   Additional Findings  Inflammation (type): Chronic    .        02/15/2021 - THORACOSCOPY WITH DAVINCI ROBOT WITH RIGHT WEDGE RESECTION, MEDIASTINAL LYMPH NODE DISSECTION-RIGHT per :  • Lung, right lower lobe, wedge excision:  o Residual tumor is not present.  o The surgical margins are free of tumor.  o 2 benign lymph nodes are present (intraparenchymal).  • Left node level 7, excision: 1 benign lymph node.  • Lymph node, level 8, excision: 1 benign lymph node.    04/05/2021 - Appointment with :  • Patient referred to oncology for surveillance.    04/15/2021 - Consult with :  ASSESSMENT:  • Adenocarcinoma the lung, right lower lobe  o AJCC stage:1A2 (pT1b, cN0, cM0)  o Treatment status: Post right lower lobe wedge resection.  Pulmonary function test in adequate for lobectomy and node sampling.  • Performance status of 1.   • Hypothyroidism.  On Synthroid.  • Adenocarcinoma of the prostate, Spencer score 7, 4+3, grade group 3.  o AJCC stage: IIIA (T2b, N0, M0)  o Treatment status: Neoadjuvant hormone therapy with radiation.  Followed by Dr. Phan and Dr. Lujan.   • Obesity BMI 34.04.  PLAN:  •  Re: The reason for follow-up.  •  regarding role of surveillance.  • Blood for CBC with differential and CMP.  • He will be seen every 6 months with CT scan for the first 3 years then annually thereafter.  • Schedule CT brain for staging.  Patient declined brain MRI.   • Schedule CT chest, abdomen and pelvis 05/2021.   • Continue care per primary care physician and other  specialists.  • Plan of care discussed with patient.  Understanding expressed.  Patient able to proceed.  • Return to office in 6 months with preoffice CBC with differential and CMP.   Next CT 11/2021.   • Refer to PCP for obesity.   • Advance Care Planning  o ACP discussion was declined by the patient. Patient does not have an advance directive, information provided.  • Further recommendations pending.    04/29/2021 - CT Chest with contrast:  • Postsurgical changes in the right lower lobe from prior wedge resection. There has been interval decrease in the size of a right pleural effusion. Adjacent consolidation has also decreased but persists.  • Stable mildly enlarged right hilar lymph node.  • Interval increase in the size of a right middle lobe nodule, possibly a reactive intrafissural lymph node or metastatic nodule. A new nodule in the left lower lobe may be an area of inflammation or atelectasis but should also be followed. Short interval CT is recommended in 3 months.  • Cardiomegaly. Findings suggestive of pulmonary arterial hypertension.    04/29/2021 - CT abdomen/Pelvis with contrast:  • No evidence of metastatic disease in the abdomen or pelvis.   • Atherosclerotic disease.  • Right renal cyst. Probable left renal cysts.  • Prostatomegaly.    04/29/2021 - CT Head with and without contrast:  • Mild cerebral and cerebellar volume loss with chronic microvascular disease but no evidence of acute intracranial process.  • There is no abnormal enhancement.    07/30/2021 - CT Chest with contrast:  • Postsurgical change in the right lung base is stable compared to April 29, 2021  • Previous noted left lower lobe nodule has resolved.    08/11/2021 - PSA: 21.02    08/16/2021 - Appointment with :  Prostate cancer:  • Patient has now been cleared to proceed with treatment for prostate cancer. He choses to do neoadjuvant hormonal therapy with radiation treatment.   • Will getting back into see radiation  oncology to get this established.   • We will work on his preauthorization for Lupron will be contacted as soon as this is completed to come in for his Lupron injection. What he sees radiation oncology can come back to see me for Fiduciary marker placement.  • Return for routine office f/u after seen by radiation oncology for Fiduciary marker placement.     History obtained from  PATIENT and CHART    PAST MEDICAL HISTORY  Past Medical History:   Diagnosis Date   • Hypertension    • PAF (paroxysmal atrial fibrillation) (CMS/HCC)    • Prostate cancer (CMS/HCC)    • Thyroid disorder       PAST SURGICAL HISTORY  Past Surgical History:   Procedure Laterality Date   • CARPAL TUNNEL RELEASE Right    • LOBECTOMY Right 2/15/2021    Procedure: THORACOSCOPY WITH DAVINCI ROBOT WITH RIGHT WEDGE RESECTION, MEDIASTINAL LYMPH NODE DISSECTION-RIGHT;  Surgeon: Nick Flores MD;  Location:  [facility] OR;  Service: [facility];  Laterality: Right;   • LUNG BIOPSY     • PROSTATE BIOPSY     • REPLACEMENT TOTAL KNEE Left    • SHOULDER SURGERY Bilateral    • THORACOSCOPY Right 10/30/2020    Procedure: MEDIASTINOSCOPY, RIGHT THORACOSCOPY POSSIBLE WEDGE RESECTION WITH DAVINCI ROBOT;  Surgeon: Nick Flores MD;  Location:  [facility] OR;  Service: [facility];  Laterality: Right;   • THYROIDECTOMY N/A 2020    Procedure: Total thyroidectomy;  Surgeon: Jean Marie Solis MD;  Location:  [facility] OR;  Service: ENT;  Laterality: N/A;      FAMILY HISTORY  family history includes COPD in his father; Hypertension in his mother.     SOCIAL HISTORY  Social History     Tobacco Use   • Smoking status: Former Smoker     Packs/day: 1.00     Years: 33.00     Pack years: 33.00     Types: Cigarettes     Start date:      Quit date:      Years since quittin.6   • Smokeless tobacco: Never Used   Vaping Use   • Vaping Use: Never used   Substance Use Topics   • Alcohol use: Yes     Alcohol/week: 2.0 standard drinks     Types: 2 Shots of liquor per week   •  Drug use: Never     ALLERGIES  Patient has no known allergies.     MEDICATIONS    Current Outpatient Medications:   •  acetaminophen (TYLENOL) 500 MG tablet, Take 500 mg by mouth Every 6 (Six) Hours As Needed for Mild Pain ., Disp: , Rfl:   •  alfuzosin (UROXATRAL) 10 MG 24 hr tablet, Take 10 mg by mouth Daily., Disp: , Rfl:   •  amiodarone (PACERONE) 200 MG tablet, Take 1 tablet by mouth Daily., Disp: 30 tablet, Rfl: 11  •  apixaban (ELIQUIS) 5 MG tablet tablet, Take 1 tablet by mouth Every 12 (Twelve) Hours. Indications: Atrial Fibrillation, Disp: 60 tablet, Rfl: 5  •  ferrous sulfate 325 (65 FE) MG tablet, Take 1 tablet by mouth Daily With Breakfast., Disp: 60 tablet, Rfl: 2  •  losartan-hydrochlorothiazide (HYZAAR) 50-12.5 MG per tablet, Take 0.5 tablets by mouth Daily., Disp: 30 tablet, Rfl: 5  •  metoprolol tartrate (LOPRESSOR) 25 MG tablet, Take 0.5 tablets by mouth Every 12 (Twelve) Hours for 180 days., Disp: 30 tablet, Rfl: 5  •  Synthroid 175 MCG tablet, Take 1 tablet by mouth Daily for 30 days., Disp: 30 tablet, Rfl: 0  •  therapeutic multivitamin-minerals (THERAGRAN-M) tablet, Take 1 tablet by mouth Daily., Disp: , Rfl:     The following portions of the patient's history were reviewed and updated as appropriate: allergies, current medications, past family history, past medical history, past social history, past surgical history and problem list.    REVIEW OF SYSTEMS     Review of Systems   Constitutional: Negative.    HENT: Negative.    Eyes: Negative.    Respiratory: Positive for shortness of breath (since lung surgery).    Cardiovascular: Negative.    Gastrointestinal: Positive for constipation (OTC stool softener). Negative for nausea and vomiting.   Endocrine: Negative.    Genitourinary: Positive for frequency and urgency. Negative for decreased urine volume, difficulty urinating, dysuria and hematuria.        Occasional weak stream   Skin: Negative.    Allergic/Immunologic: Negative.   "  Neurological: Negative.    Hematological: Negative.    Psychiatric/Behavioral: Negative.      I have reviewed and confirmed the accuracy of the ROS as documented by the MA/LPN/RN Spencer Kaur MD    PHYSICAL EXAM  VITAL SIGNS:   Vitals:    08/23/21 1327   BP: 140/52   Weight: 115 kg (254 lb)   Height: 185.4 cm (73\")   PainSc: 0-No pain        Physical Exam    General:  Alert and oriented, in no acute distress, well-developed, vitals reviewed.  Head:  Normocephalic, without obvious abnormality    Nose/Sinuses:  Nares normal externally, no sinus tenderness.  Mouth/Throat:  Mucosa moist, without erythema  Neck: No evidence of adenopathy in the cervical or supraclavicular areas.  Eyes: No gross abnormalities   Ears: Ears intact with no external abnormalities noted  Chest:  Respiratory efforts are normal and unlabored, clear to auscultation.  Rectal: Deferred  Cardiovascular: Regular rate and rhythm without murmurs, rubs, or gallops.   Abdomen:  Soft, non-tender, normal bowel sounds;   Extremities:  PEDRO well, warm to touch, no cyanosis or edema.  Skin: No suspicious lesions or rashes of concern  Neurologic: non focal exam, strength and sensation grossly normal  Psych: Mood and affect are appropriate    Performance Status: ECOG (0) Fully active, able to carry on all predisease performance without restriction    Clinical Quality Measures  -Pain Documented by Standardized Tool, FPS Delgado Desir reports a pain score of 0. Given his pain assessment as noted, treatment options were discussed and the following options were decided upon as a follow-up plan to address the patient's pain: No pain, no plan given.  Pain Medications             acetaminophen (TYLENOL) 500 MG tablet Take 500 mg by mouth Every 6 (Six) Hours As Needed for Mild Pain .        -Advanced Care Planning Advance Care Planning  ACP discussion was held with the patient during this visit. Patient does not have an advance directive, information " provided.     -Body Mass Index Screening and Follow-Up Plan Patient's Body mass index is 33.51 kg/m². indicating that he is obese (BMI >30). Obesity-related health conditions include the following: none.     -Tobacco Use: Screening and Cessation Intervention Social History    Tobacco Use      Smoking status: Former Smoker        Packs/day: 1.00        Years: 33.00        Pack years: 33        Types: Cigarettes        Start date:         Quit date:         Years since quittin.6      Smokeless tobacco: Never Used    PROSTATE PQRS #102   Bone Scan Use: Bone Scane done Pre-treatment or Post Diagnosis  Risk of Recurrence: High risk PSA > 20/GL 8-10/T3a  Measure #104  Adjuvant Hormonal TX: Hormonal Therapy prescribed/administered  Recurrence Risk: High risk PSA > 20/GL 8-10/T3a    ASSESSMENT AND PLAN  1. Prostate cancer (CMS/HCC)    2. History of lung cancer    3. S/P partial lobectomy of lung    4. Former smoker      Orders Placed This Encounter   Procedures   • Ambulatory Referral to Urology     Referral Priority:   Routine     Referral Type:   Consultation     Referral Reason:   Specialty Services Required     Requested Specialty:   Urology     Number of Visits Requested:   1     RECOMMENDATIONS:  Delgado Desir was diagnosed in 2020 with high risk adenocarcinoma of the prostate, pre-PSA 21.02, Preston (4+3) 7, 5/12 cores positive. PNI identified. Follows Dr. Phan, recommended ADT and XRT. In 2020, Mr. Desir was diagnosed with Stage 1A2 adenocarcinoma of the RLL of the lung that is post definitive resection without recommendations for adjuvant treatment, being followed by Dr. Flores and Dr. Bullard. He now returns for XRT treatment recommendations to the prostate, however, unfortunately he has not started ADT (androgen deprivation therapy).    Indications and rationale as well as risks, benefits and alternatives of therapy for carcinoma of the prostate were discussed today. Risks of  radiation therapy includes but is not limited to radiation induced proctitis, cystitis, diarrhea, dysuria, frequency and bleeding from the bladder or rectum as well as a significant risk of permanent erectile dysfunction and progression of disease in spite of therapy with either local or systemic failure.  The option of definitive surgery has also been reviewed by the urologist as well as surveillance. We discussed the goals and plans of care with him and his family, answered all questions and he verbalizes understanding. I have seen, examined and reviewed this patient's medication list, appropriate labs and imaging studies as well as other physician notes.    A definitive course of fractionated radiation therapy is recommended to the prostate,  anticipate a course of 7000 cGy over 28 treatment fractions; pelvic lymphatics will also be treated. The patient verbalizes understanding, voices no further questions and wishes to proceed with recommended therapy.     Will refer back to urologist for initiation of ADT; fiducial seed placement and Space OAR gel placement to take place approximately 10 weeks after initiation of ADT.  Patient will return for CT simulation approximately 1 week after placement of fiducial markers and spacer gel.  Continue ongoing management per primary care physician and other specialists. Thank you for allowing me to assist in his care.  I saw this patient for reevaluation while covering for Dr. Ephraim Lujan, radiation oncologist.    Patient Instructions   1) We will refer you to urology for seed and OAR gel placement, they will call you.  2) Start ADT asap with Dr. Phan.  3) Return to Dr. Lujan for CT simulation 1 week after seed/gel placement.     Time Spent: I spent 56 minutes caring for Delgado on this date of service. This time includes time spent by me in the following activities: preparing for the visit, reviewing tests, obtaining and/or reviewing a separately obtained history,  performing a medically appropriate examination and/or evaluation, counseling and educating the patient/family/caregiver, ordering medications, tests, or procedures, referring and communicating with other health care professionals, documenting information in the medical record and independently interpreting results and communicating that information with the patient/family/caregiver.   Spencer Kaur MD  08/23/2021

## 2021-08-23 ENCOUNTER — CONSULT (OUTPATIENT)
Dept: RADIATION ONCOLOGY | Facility: HOSPITAL | Age: 71
End: 2021-08-23

## 2021-08-23 VITALS
HEIGHT: 73 IN | WEIGHT: 254 LBS | DIASTOLIC BLOOD PRESSURE: 52 MMHG | BODY MASS INDEX: 33.66 KG/M2 | SYSTOLIC BLOOD PRESSURE: 140 MMHG

## 2021-08-23 DIAGNOSIS — C61 PROSTATE CANCER (HCC): Primary | ICD-10-CM

## 2021-08-23 DIAGNOSIS — Z90.2 S/P PARTIAL LOBECTOMY OF LUNG: ICD-10-CM

## 2021-08-23 DIAGNOSIS — Z87.891 FORMER SMOKER: ICD-10-CM

## 2021-08-23 DIAGNOSIS — Z85.118 HISTORY OF LUNG CANCER: ICD-10-CM

## 2021-08-23 PROCEDURE — G0463 HOSPITAL OUTPT CLINIC VISIT: HCPCS | Performed by: RADIOLOGY

## 2021-08-23 NOTE — PATIENT INSTRUCTIONS
1) We will refer you to urology for seed and OAR gel placement, they will call you.  2) Start ADT asap with Dr. Phan.  3) Return to Dr. Lujan for CT simulation 1 week after seed/gel placement.

## 2021-09-01 ENCOUNTER — LAB (OUTPATIENT)
Dept: LAB | Facility: HOSPITAL | Age: 71
End: 2021-09-01

## 2021-09-01 ENCOUNTER — HOSPITAL ENCOUNTER (OUTPATIENT)
Dept: RADIATION ONCOLOGY | Facility: HOSPITAL | Age: 71
Setting detail: RADIATION/ONCOLOGY SERIES
End: 2021-09-01

## 2021-09-01 ENCOUNTER — TELEPHONE (OUTPATIENT)
Dept: OTOLARYNGOLOGY | Facility: CLINIC | Age: 71
End: 2021-09-01

## 2021-09-01 DIAGNOSIS — E89.0 POSTOPERATIVE HYPOTHYROIDISM: ICD-10-CM

## 2021-09-01 DIAGNOSIS — E89.0 POSTOPERATIVE HYPOTHYROIDISM: Primary | ICD-10-CM

## 2021-09-01 LAB — TSH SERPL DL<=0.05 MIU/L-ACNC: 7.6 UIU/ML (ref 0.27–4.2)

## 2021-09-01 PROCEDURE — 84443 ASSAY THYROID STIM HORMONE: CPT

## 2021-09-01 PROCEDURE — 36415 COLL VENOUS BLD VENIPUNCTURE: CPT

## 2021-09-01 RX ORDER — LEVOTHYROXINE SODIUM 200 MCG
200 TABLET ORAL DAILY
Qty: 30 TABLET | Refills: 0 | Status: SHIPPED | OUTPATIENT
Start: 2021-09-01 | End: 2021-10-04 | Stop reason: SDUPTHER

## 2021-09-07 DIAGNOSIS — I10 ESSENTIAL HYPERTENSION: ICD-10-CM

## 2021-09-16 DIAGNOSIS — I48.0 PAROXYSMAL ATRIAL FIBRILLATION (HCC): ICD-10-CM

## 2021-09-16 RX ORDER — APIXABAN 5 MG/1
TABLET, FILM COATED ORAL
Qty: 60 TABLET | Refills: 11 | Status: SHIPPED | OUTPATIENT
Start: 2021-09-16 | End: 2022-07-20 | Stop reason: SDUPTHER

## 2021-09-20 NOTE — PROGRESS NOTES
Chief Complaint  Prostate cancer    Subjective          Delgado Desir presents to [facility] for:  History of Present Illness  Patient presents today with adenocarcinoma prostate.   is managing this gentleman with high risk prostate cancer.  He has [place] grade 4+3 equal 7 disease but has a PSA greater than 20.  Also important to note that his prostate gland is greater than 100 mL on transrectal ultrasound.  Patient denies any possible systemic symptoms of prostate cancer such as weight loss, lower extremity edema, or skeletal pain that could be worrisome for metastases.  Patient opted for EBRT with androgen deprivation therapy to be initiated.  Dr. Phan is managing the latter.  I am asked to see the patient for placement of perirectal space gel (SpaceOAR) and fiduciary markers.       Current Outpatient Medications:   •  acetaminophen (TYLENOL) 500 MG tablet, Take 500 mg by mouth Every 6 (Six) Hours As Needed for Mild Pain ., Disp: , Rfl:   •  alfuzosin (UROXATRAL) 10 MG 24 hr tablet, Take 10 mg by mouth Daily., Disp: , Rfl:   •  amiodarone (PACERONE) 200 MG tablet, Take 1 tablet by mouth Daily., Disp: 30 tablet, Rfl: 11  •  Eliquis 5 MG tablet tablet, TAKE 1 TABLET BY MOUTH EVERY 12 HOURS , Disp: 60 tablet, Rfl: 11  •  ferrous sulfate 325 (65 FE) MG tablet, Take 1 tablet by mouth Daily With Breakfast., Disp: 60 tablet, Rfl: 2  •  losartan-hydrochlorothiazide (HYZAAR) 50-12.5 MG per tablet, Take 0.5 tablets by mouth Daily., Disp: 30 tablet, Rfl: 5  •  metoprolol tartrate (LOPRESSOR) 25 MG tablet, TAKE 1/2 TABLET BY MOUTH EVERY 12 HOURS , Disp: 90 tablet, Rfl: 4  •  Synthroid 200 MCG tablet, Take 1 tablet by mouth Daily for 30 days., Disp: 30 tablet, Rfl: 0  •  therapeutic multivitamin-minerals (THERAGRAN-M) tablet, Take 1 tablet by mouth Daily., Disp: , Rfl:   Past Medical History:   Diagnosis Date   • Hypertension    • PAF (paroxysmal atrial fibrillation) (CMS/HCC)    •  "Prostate cancer (CMS/[facility]) 2020   • Thyroid disorder      Past Surgical History:   Procedure Laterality Date   • CARPAL TUNNEL RELEASE Right    • LOBECTOMY Right 2/15/2021    Procedure: THORACOSCOPY WITH DAVINCI ROBOT WITH RIGHT WEDGE RESECTION, MEDIASTINAL LYMPH NODE DISSECTION-RIGHT;  Surgeon: Nick Flores MD;  Location: [facility] OR;  Service: [facility];  Laterality: Right;   • LUNG BIOPSY     • PROSTATE BIOPSY     • REPLACEMENT TOTAL KNEE Left    • SHOULDER SURGERY Bilateral    • THORACOSCOPY Right 10/30/2020    Procedure: MEDIASTINOSCOPY, RIGHT THORACOSCOPY POSSIBLE WEDGE RESECTION WITH DAVINCI ROBOT;  Surgeon: Nick Flores MD;  Location:  [facility] OR;  Service: [facility];  Laterality: Right;   • THYROIDECTOMY N/A 12/11/2020    Procedure: Total thyroidectomy;  Surgeon: Jean Marie Solis MD;  Location:  [facility] OR;  Service: ENT;  Laterality: N/A;         Review  of systems  Constitutional: Negative for chills and fever.   Gastrointestinal: Negative for abdominal pain, anal bleeding and blood in stool.   Genitourinary: Negative for flank pain and hematuria.       Objective   PHYSICAL EXAM  Vital Signs:   Temp 97.3 °F (36.3 °C)   Ht 185.4 cm (73\")   Wt 116 kg (256 lb)   BMI 33.78 kg/m²     Constitutional: Patient is without distress or deformity.  Vital signs are reviewed as above.    Neuro: No confusion; No disorientation; Alert and oriented  Pulmonary: No respiratory distress.   Skin: No pallor or diaphoresis  GI: Soft. The patient exhibits no distension and no mass. There is no tenderness. There is no rebound and no guarding. No hernia.   : No CVA tenderness over kidneys.  Bladder not palpably distended.  Psychiatric:  normal mood and affect. Not agitated.           DATA  Result Review :         Results for orders placed or performed in visit on 09/21/21   POC Urinalysis Dipstick, Multipro    Specimen: Urine   Result Value Ref Range    Color Yellow Yellow, Straw, Dark Yellow, Valeria    Clarity, UA Clear Clear "    Glucose, UA Negative Negative, 1000 mg/dL (3+) mg/dL    Bilirubin Negative Negative    Ketones, UA Negative Negative    Specific Gravity  1.015 1.005 - 1.030    Blood, UA Trace (A) Negative    pH, Urine 6.0 5.0 - 8.0    Protein, POC Negative Negative mg/dL    Urobilinogen, UA Normal Normal    Nitrite, UA Negative Negative    Leukocytes Negative Negative                ASSESSMENT AND PLAN      Problem List Items Addressed This Visit        Hematology and Neoplasia    Prostate cancer (CMS/HCC) - Primary    Relevant Orders    POC Urinalysis Dipstick, Multipro (Completed)    Case Request      He is going to start leuprolide throught Dr. Phan's office. We will set up the procedure a couple weeks before EBRT to begin    I explained that perirectal hydrogel spacer before the scheduled prostate radiation therapy treatment regimen to treat the patients localized prostate cancer. The purpose of placing this perirectal spacer is to temporarily position the anterior rectal wall away from the prostate during the course of the patient’s radiotherapy, thus reducing the radiation dose delivered to the anterior rectum. Such patients are at risk for developing rectal toxicity due to radiotherapy especially those with risk factors such as: comorbidities, medication, age, heritage, smoking, and body habitus. These factors have been shown to increase the risk of rectal toxicity, in patients who are at risk of developing long term rectal side effects.      The benefits include a reduction in rectal pain throughout the course of radiotherapy, lower rates of long-term rectal toxicity, and fewer patients experiencing declines in bowel quality of life (QOL).  It is important to note that the spacer material remains intact during the course of radiation therapy (approximately 3 months), after which it liquefies and is naturally absorbed and cleared in the patient’s urine within 6 months. The Fitmo System hydrogel is composed of water  and polyethlyne glycol (PEG) a compound used widely in pharmaceuticals and cosmetics due to its high level of biocompatibility, lack of toxicity, and long term safety profile.    Several studies have been published on clinical outcomes of perirectal spacer applications.  The most robust of these studies was conducted to assess the SpaceOAR System safety and effectiveness. This study was a prospective, randomized, controlled, patient-blinded clinical study in 20 [place] centers on 222 men with low and intermediate risk prostate cancer.  In a landmark multi-institutional prospective randomized trial, prostate cancer patients being treated with Intensity Modulated Radiation Therapy (IMRT) with daily Image Guidance (IG) were randomized to either receive SpaceOAR or to be treated without SpaceOAR. The trial demonstrated that there was a 73% reduction in meaningful dose to the rectum in the SpaceOAR patients. On long-term follow-up, the perirectal hydrogel spacer patients had a 71% reduction in late rectal toxicity severity when compared to the control patients. The FDA cleared the use of the perirectal hydrogel spacer in patients being treated with radiation therapy for prostate cancer based on this data.      Most recently, 3-year results from the SpaceOAR System Prostate Cancer [place] Pivotal Clinical Trial entitled “Continued Benefit to Rectal Separation for Prostate RT: Final Results of a Phase III Trial”, the study results demonstrated significantly lower rectal toxicity and higher patient bowel quality of life (QOL) scores when the SpaceOAR System was applied prior to radiotherapy as compared to the trial Control patients.Following radiotherapy through 3 years, none of the SpaceOAR patients experienced grade 2 (complications that required treatment such as medication, enemas, surgery either endoscopic or open) late rectal toxicity compared to 5.7% in the group that did not receive it.  There is also some suggestion that  there was improved bowel function as measured by quality of life.  Unexpectedly, the patients that received the perirectal spacer showed benefits and urinary complications and quality of life is well.    I explained the importance of these studies including what they mean to the best of my ability to the patient.     ReportS submitted to the  and User Facility Device Experience (JOSE) has reported 25 cases between January 2015 to March 2019 .  Unique major complications including acute pulmonary embolism, severe anaphylaxis, prostatic abscess and sepsis, purulent perineal drainage, rectal wall erosion, and rectourethral fistula were reported.  Bleeding severe enough to cause hematoma which could require drainage is also discussed these were discussed with patient.He has consented to the procedure.     Lastly we discussed the placement of prostate fiduciary markers.  These are gold seeds that are put in just prior to the introduction of the SpaceOAR.  This does assist the process of image guided IMRT.  It is felt that this lessens the effect on surrounding tissue from the radiation without decreasing the success of EBRT.  The only unique complication to this would be migration of seed potentially to the lungs.        FOLLOW UP     No follow-ups on file.        (Please note that portions of this note were completed with a voice recognition program.)  Israel Delacruz MD  09/21/21  09:36 CDT

## 2021-09-21 ENCOUNTER — TELEPHONE (OUTPATIENT)
Dept: RADIATION ONCOLOGY | Facility: HOSPITAL | Age: 71
End: 2021-09-21

## 2021-09-21 ENCOUNTER — TELEPHONE (OUTPATIENT)
Dept: UROLOGY | Age: 71
End: 2021-09-21

## 2021-09-21 ENCOUNTER — OFFICE VISIT (OUTPATIENT)
Dept: UROLOGY | Facility: CLINIC | Age: 71
End: 2021-09-21

## 2021-09-21 VITALS — WEIGHT: 256 LBS | BODY MASS INDEX: 33.93 KG/M2 | HEIGHT: 73 IN | TEMPERATURE: 97.3 F

## 2021-09-21 DIAGNOSIS — C61 PROSTATE CANCER (HCC): Primary | ICD-10-CM

## 2021-09-21 LAB
BILIRUB BLD-MCNC: NEGATIVE MG/DL
CLARITY, POC: CLEAR
COLOR UR: YELLOW
GLUCOSE UR STRIP-MCNC: NEGATIVE MG/DL
KETONES UR QL: NEGATIVE
LEUKOCYTE EST, POC: NEGATIVE
NITRITE UR-MCNC: NEGATIVE MG/ML
PH UR: 6 [PH] (ref 5–8)
PROT UR STRIP-MCNC: NEGATIVE MG/DL
RBC # UR STRIP: ABNORMAL /UL
SP GR UR: 1.01 (ref 1–1.03)
UROBILINOGEN UR QL: NORMAL

## 2021-09-21 PROCEDURE — 99203 OFFICE O/P NEW LOW 30 MIN: CPT | Performed by: UROLOGY

## 2021-09-21 PROCEDURE — 81003 URINALYSIS AUTO W/O SCOPE: CPT | Performed by: UROLOGY

## 2021-09-21 NOTE — TELEPHONE ENCOUNTER
Alfred requests that nurse return their call. The best time to reach him is Anytime. Patient spoke with his insurance today and he was told that his Lupron injection was not sent to insurance for a PA,please call patient at [phone]    Thank you.

## 2021-09-21 NOTE — TELEPHONE ENCOUNTER
Contacted Dr. Phan's office and spoke with Catherine.  She states that they are awaiting insurance approval.  I instructed Catherine that we can not proceed with patient's radiation therapy treatment until lupron has been started.  She states that she will pass this information on to the nurses.  I asked that they contact me when this is approved/scheduled so that I can schedule patient for initiation of radiation treatment.

## 2021-09-21 NOTE — TELEPHONE ENCOUNTER
Returned patient's phone call.  He states that he met with Dr. Delacruz today in regards to placement of fiduciary seeds and Space Oar gel.    Patient states that he has yet to start lupron therapy.  He was told by Dr. Delacruz to have our office contact Dr. Phan's office in regards to getting this started.  I instructed patient that I would contact their office today for him.

## 2021-09-22 NOTE — TELEPHONE ENCOUNTER
APPROVED THROUGH 9/14/22 AUTH# [id number]. Tried to call patient to schedule first lupron. Had to leave  for patient to call back and get scheduled.

## 2021-09-23 ENCOUNTER — NURSE ONLY (OUTPATIENT)
Dept: UROLOGY | Age: 71
End: 2021-09-23
Payer: MEDICARE

## 2021-09-23 ENCOUNTER — TELEPHONE (OUTPATIENT)
Dept: RADIATION ONCOLOGY | Facility: HOSPITAL | Age: 71
End: 2021-09-23

## 2021-09-23 ENCOUNTER — TELEPHONE (OUTPATIENT)
Dept: UROLOGY | Age: 71
End: 2021-09-23

## 2021-09-23 DIAGNOSIS — C61 PROSTATE CANCER (HCC): ICD-10-CM

## 2021-09-23 PROCEDURE — 96402 CHEMO HORMON ANTINEOPL SQ/IM: CPT | Performed by: UROLOGY

## 2021-09-23 NOTE — TELEPHONE ENCOUNTER
Contacted Romelia.  She states that patient is confused as to process.  She states that she has figured out that patient will be seeing Dr. Delacruz for seed/space oar gel placement, and that he would be receiving his lupron through Dr. Phan.  I ask if lupron has been authorized and she stated that it had.  She stated that she contacted patient and offered him an appointment for today, but patient decided to wait until October 6th in order to receive this.  I told Romelia that I was going to contact patient and have him come in today for lupron since we have been waiting for over a month to get this started.  I instructed Romelia that I would have patient contact her to get this set up.

## 2021-09-23 NOTE — TELEPHONE ENCOUNTER
Patient is calling to schedule his lupron injection appointment. Please return call to update Patient on status. The best time to call is  Anytime @[phone]    Thank you!

## 2021-09-23 NOTE — TELEPHONE ENCOUNTER
Alfred requests that Isrrael Berg return their call regarding his injection appt. The best time to reach him is Anytime. Thank you.

## 2021-09-23 NOTE — TELEPHONE ENCOUNTER
Patient called to let me know that he had an appointment today at 1:00 to get first lupron injection at Dr. Phan's office.

## 2021-09-23 NOTE — TELEPHONE ENCOUNTER
Spoke with patient.  Explained to him that it would be better for him to go ahead and get lupron started today as opposed until waiting until 10-6-2021.  I explained to him that we have already been waiting over a month to get this approved through insurance, and that it would delay the process even further for him to wait until October for lupron injection.  He stated that he needed to get the okay from Dr. Delacruz.  I instructed patient that Dr. Delacruz could not perform the seed/gel placement until he starts the lupron.   I encouraged patient to call Dr. Phan's office to see about coming in today to get lupron and that I would contact Dr. Delacruz's office to let them know he was starting it today.  He verbalized understanding.

## 2021-09-24 ENCOUNTER — TELEPHONE (OUTPATIENT)
Dept: UROLOGY | Facility: CLINIC | Age: 71
End: 2021-09-24

## 2021-09-24 ENCOUNTER — TELEPHONE (OUTPATIENT)
Dept: CARDIOLOGY | Facility: CLINIC | Age: 71
End: 2021-09-24

## 2021-09-24 NOTE — TELEPHONE ENCOUNTER
Called pt and went over surgery information date time and instruction. I have sent a message to dr brianna REYES for cardiac clearance.

## 2021-09-29 ENCOUNTER — OFFICE VISIT (OUTPATIENT)
Dept: CARDIOLOGY | Facility: CLINIC | Age: 71
End: 2021-09-29

## 2021-09-29 VITALS
BODY MASS INDEX: 33.93 KG/M2 | WEIGHT: 256 LBS | SYSTOLIC BLOOD PRESSURE: 138 MMHG | OXYGEN SATURATION: 98 % | HEIGHT: 73 IN | HEART RATE: 61 BPM | RESPIRATION RATE: 18 BRPM | DIASTOLIC BLOOD PRESSURE: 75 MMHG

## 2021-09-29 DIAGNOSIS — I48.0 PAROXYSMAL ATRIAL FIBRILLATION (HCC): Primary | Chronic | ICD-10-CM

## 2021-09-29 DIAGNOSIS — E66.09 CLASS 1 OBESITY DUE TO EXCESS CALORIES WITH SERIOUS COMORBIDITY AND BODY MASS INDEX (BMI) OF 33.0 TO 33.9 IN ADULT: Chronic | ICD-10-CM

## 2021-09-29 DIAGNOSIS — I10 ESSENTIAL HYPERTENSION: ICD-10-CM

## 2021-09-29 PROBLEM — E66.811 CLASS 1 OBESITY DUE TO EXCESS CALORIES WITH SERIOUS COMORBIDITY AND BODY MASS INDEX (BMI) OF 33.0 TO 33.9 IN ADULT: Status: ACTIVE | Noted: 2021-09-29

## 2021-09-29 PROBLEM — E66.811 CLASS 1 OBESITY DUE TO EXCESS CALORIES WITH SERIOUS COMORBIDITY AND BODY MASS INDEX (BMI) OF 33.0 TO 33.9 IN ADULT: Chronic | Status: ACTIVE | Noted: 2021-09-29

## 2021-09-29 PROCEDURE — 99214 OFFICE O/P EST MOD 30 MIN: CPT | Performed by: NURSE PRACTITIONER

## 2021-09-29 PROCEDURE — 93000 ELECTROCARDIOGRAM COMPLETE: CPT | Performed by: NURSE PRACTITIONER

## 2021-09-29 NOTE — PATIENT INSTRUCTIONS
Atrial Fibrillation    Atrial fibrillation is a type of irregular or rapid heartbeat (arrhythmia). In atrial fibrillation, the top part of the heart (atria) beats in an irregular pattern. This makes the heart unable to pump blood normally and effectively.  The goal of treatment is to prevent blood clots from forming, control your heart rate, or restore your heartbeat to a normal rhythm. If this condition is not treated, it can cause serious problems, such as a weakened heart muscle (cardiomyopathy) or a stroke.  What are the causes?  This condition is often caused by medical conditions that damage the heart's electrical system. These include:  · High blood pressure (hypertension). This is the most common cause.  · Certain heart problems or conditions, such as heart failure, coronary artery disease, heart valve problems, or heart surgery.  · Diabetes.  · Overactive thyroid (hyperthyroidism).  · Obesity.  · Chronic kidney disease.  In some cases, the cause of this condition is not known.  What increases the risk?  This condition is more likely to develop in:  · Older people.  · People who smoke.  · Athletes who do endurance exercise.  · People who have a family history of atrial fibrillation.  · Men.  · People who use drugs.  · People who drink a lot of alcohol.  · People who have lung conditions, such as emphysema, pneumonia, or COPD.  · People who have obstructive sleep apnea.  What are the signs or symptoms?  Symptoms of this condition include:  · A feeling that your heart is racing or beating irregularly.  · Discomfort or pain in your chest.  · Shortness of breath.  · Sudden light-headedness or weakness.  · Tiring easily during exercise or activity.  · Fatigue.  · Syncope (fainting).  · Sweating.  In some cases, there are no symptoms.  How is this diagnosed?  Your health care provider may detect atrial fibrillation when taking your pulse. If detected, this condition may be diagnosed with:  · An electrocardiogram  (ECG) to check electrical signals of the heart.  · An ambulatory cardiac monitor to record your heart's activity for a few days.  · A transthoracic echocardiogram (TTE) to create pictures of your heart.  · A transesophageal echocardiogram (ZIYAD) to create even closer pictures of your heart.  · A stress test to check your blood supply while you exercise.  · Imaging tests, such as a CT scan or chest X-ray.  · Blood tests.  How is this treated?  Treatment depends on underlying conditions and how you feel when you experience atrial fibrillation. This condition may be treated with:  · Medicines to prevent blood clots or to treat heart rate or heart rhythm problems.  · Electrical cardioversion to reset the heart's rhythm.  · A pacemaker to correct abnormal heart rhythm.  · Ablation to remove the heart tissue that sends abnormal signals.  · Left atrial appendage closure to seal the area where blood clots can form.  In some cases, underlying conditions will be treated.  Follow these instructions at home:  Medicines  · Take over-the counter and prescription medicines only as told by your health care provider.  · Do not take any new medicines without talking to your health care provider.  · If you are taking blood thinners:  ? Talk with your health care provider before you take any medicines that contain aspirin or NSAIDs, such as ibuprofen. These medicines increase your risk for dangerous bleeding.  ? Take your medicine exactly as told, at the same time every day.  ? Avoid activities that could cause injury or bruising, and follow instructions about how to prevent falls.  ? Wear a medical alert bracelet or carry a card that lists what medicines you take.  Lifestyle         · Do not use any products that contain nicotine or tobacco, such as cigarettes, e-cigarettes, and chewing tobacco. If you need help quitting, ask your health care provider.  · Eat heart-healthy foods. Talk with a dietitian to make an eating plan that is  "right for you.  · Exercise regularly as told by your health care provider.  · Do not drink alcohol.  · Lose weight if you are overweight.  · Do not use drugs, including cannabis.  General instructions  · If you have obstructive sleep apnea, manage your condition as told by your health care provider.  · Do not use diet pills unless your health care provider approves. Diet pills can make heart problems worse.  · Keep all follow-up visits as told by your health care provider. This is important.  Contact a health care provider if you:  · Notice a change in the rate, rhythm, or strength of your heartbeat.  · Are taking a blood thinner and you notice more bruising.  · Tire more easily when you exercise or do heavy work.  · Have a sudden change in weight.  Get help right away if you have:    · Chest pain, abdominal pain, sweating, or weakness.  · Trouble breathing.  · Side effects of blood thinners, such as blood in your vomit, stool, or urine, or bleeding that cannot stop.  · Any symptoms of a stroke. \"BE FAST\" is an easy way to remember the main warning signs of a stroke:  ? B - Balance. Signs are dizziness, sudden trouble walking, or loss of balance.  ? E - Eyes. Signs are trouble seeing or a sudden change in vision.  ? F - Face. Signs are sudden weakness or numbness of the face, or the face or eyelid drooping on one side.  ? A - Arms. Signs are weakness or numbness in an arm. This happens suddenly and usually on one side of the body.  ? S - Speech. Signs are sudden trouble speaking, slurred speech, or trouble understanding what people say.  ? T - Time. Time to call emergency services. Write down what time symptoms started.  · Other signs of a stroke, such as:  ? A sudden, severe headache with no known cause.  ? Nausea or vomiting.  ? Seizure.  These symptoms may represent a serious problem that is an emergency. Do not wait to see if the symptoms will go away. Get medical help right away. Call your local emergency " services (911 in the [place] Do not drive yourself to the hospital.  Summary  · Atrial fibrillation is a type of irregular or rapid heartbeat (arrhythmia).  · Symptoms include a feeling that your heart is beating fast or irregularly.  · You may be given medicines to prevent blood clots or to treat heart rate or heart rhythm problems.  · Get help right away if you have signs or symptoms of a stroke.  · Get help right away if you cannot catch your breath or have chest pain or pressure.  This information is not intended to replace advice given to you by your health care provider. Make sure you discuss any questions you have with your health care provider.  Document Revised: 06/10/2020 Document Reviewed: 06/10/2020  [employer]vier Patient Education © 2021 [employer]

## 2021-09-29 NOTE — PROGRESS NOTES
Subjective:     Encounter Date:09/29/2021      Patient ID: Delgado Desir is a 71 y.o. male with a previous history of paroxysmal atrial fibrillation, long-term anticoagulation, prostate cancer, lung nodule with wedge resection and confirmation of malignancy, hypertension, previous thyroidectomy, and obesity.  He presents to the office today for routine follow-up.    Chief Complaint: Follow Up  Atrial Fibrillation  Presents for follow-up visit. Symptoms are negative for chest pain, dizziness, hypertension, hypotension, palpitations, shortness of breath, syncope and weakness. The symptoms have been stable. Past medical history includes atrial fibrillation. There are no medication compliance problems.     Mr. Desir presents to the office today for routine follow-up.  He reports to be doing well.  He has had no significant changes in any symptoms since his previous visit with Dr. Hoover 6 months ago.  He denies any palpitations, increased fatigue, shortness of breath.  He does report that he was asymptomatic to his occurrence of atrial fibrillation while hospitalized.  He remains anticoagulated and denies any bleeding issues.  He has been compliant with his medications including amiodarone use.      The following portions of the patient's history were reviewed and updated as appropriate: allergies, current medications, past family history, past medical history, past social history and past surgical history.     No Known Allergies      Current Outpatient Medications:   •  acetaminophen (TYLENOL) 500 MG tablet, Take 500 mg by mouth Every 6 (Six) Hours As Needed for Mild Pain ., Disp: , Rfl:   •  alfuzosin (UROXATRAL) 10 MG 24 hr tablet, Take 10 mg by mouth Daily., Disp: , Rfl:   •  Eliquis 5 MG tablet tablet, TAKE 1 TABLET BY MOUTH EVERY 12 HOURS , Disp: 60 tablet, Rfl: 11  •  ferrous sulfate 325 (65 FE) MG tablet, Take 1 tablet by mouth Daily With Breakfast., Disp: 60 tablet, Rfl: 2  •   "losartan-hydrochlorothiazide (HYZAAR) 50-12.5 MG per tablet, Take 0.5 tablets by mouth Daily., Disp: 30 tablet, Rfl: 5  •  metoprolol tartrate (LOPRESSOR) 25 MG tablet, TAKE 1/2 TABLET BY MOUTH EVERY 12 HOURS , Disp: 90 tablet, Rfl: 4  •  Synthroid 200 MCG tablet, Take 1 tablet by mouth Daily for 30 days., Disp: 30 tablet, Rfl: 0  •  therapeutic multivitamin-minerals (THERAGRAN-M) tablet, Take 1 tablet by mouth Daily., Disp: , Rfl:       Review of Systems   Constitutional: Negative for malaise/fatigue.   Cardiovascular: Negative for chest pain, leg swelling, near-syncope, orthopnea, palpitations, paroxysmal nocturnal dyspnea and syncope.   Respiratory: Negative for cough, shortness of breath and wheezing.    Hematologic/Lymphatic: Negative for bleeding problem.   Gastrointestinal: Negative for nausea and vomiting.   Neurological: Negative for dizziness, headaches, light-headedness and weakness.   Psychiatric/Behavioral: Negative for altered mental status.         ECG 12 Lead    Date/Time: 9/29/2021 4:06 PM  Performed by: Sabrina Jackson APRN  Authorized by: Sabrina Jackson APRN   Comparison: compared with previous ECG from 3/15/2021  Similar to previous ECG  Rhythm: sinus rhythm  Ectopy: infrequent PVCs  Rate: normal  BPM: 61  Conduction: conduction normal  ST Segments: ST segments normal  T Waves: T waves normal  QRS axis: normal    Clinical impression: abnormal EKG          /75 (BP Location: Right arm, Patient Position: Sitting, Cuff Size: Adult)   Pulse 61   Resp 18   Ht 185.4 cm (73\")   Wt 116 kg (256 lb)   SpO2 98%   BMI 33.78 kg/m²        Objective:     Vitals reviewed.   Constitutional:       General: Not in acute distress.     Appearance: Well-developed, well-groomed and not in distress. Obese. Chronically ill-appearing. Not diaphoretic.   Eyes:      General:         Right eye: No discharge.         Left eye: No discharge.   HENT:      Head: Normocephalic and atraumatic.    Mouth/Throat:      " Pharynx: No oropharyngeal exudate.   Pulmonary:      Effort: Pulmonary effort is normal. No respiratory distress.      Breath sounds: Normal breath sounds. No wheezing. No rhonchi. No rales.   Chest:      Chest wall: Not tender to palpatation.   Cardiovascular:      Normal rate. Regular rhythm.      Murmurs: There is no murmur.      No gallop. No rub.   Edema:     Peripheral edema absent.   Abdominal:      General: There is no distension.      Palpations: Abdomen is soft.      Tenderness: There is no abdominal tenderness.   Musculoskeletal: Normal range of motion.      Cervical back: Normal range of motion and neck supple. Skin:     General: Skin is warm and dry.      Coloration: Skin is not pale.      Findings: No erythema or rash.   Neurological:      Mental Status: Alert, oriented to person, place, and time and oriented to person, place and time.   Psychiatric:         Mood and Affect: Mood normal.         Speech: Speech normal.         Behavior: Behavior normal. Behavior is cooperative.         Thought Content: Thought content normal.         Cognition and Memory: Cognition normal.         Judgment: Judgment normal.       Lab Review:   Results for orders placed during the hospital encounter of 06/25/20    Adult Transthoracic Echo Complete W/ Cont if Necessary Per Protocol    Interpretation Summary  · Left ventricular wall thickness is consistent with mild-to-moderate concentric hypertrophy.  · Estimated EF = 70%.  · Left ventricular systolic function is normal.  · Left ventricular diastolic dysfunction (grade I) consistent with impaired relaxation.  · Left atrial cavity size is borderline dilated.  · Right ventricular cavity is borderline dilated.        Assessment:          Diagnosis Plan   1. Paroxysmal atrial fibrillation (CMS/HCC)     2. Essential hypertension     3. Class 1 obesity due to excess calories with serious comorbidity and body mass index (BMI) of 33.0 to 33.9 in adult            Plan:        -Paroxysmal atrial fibrillation: The patient had an occurrence of atrial fibrillation while hospitalized after his wedge resection.  He was started on anticoagulation and amiodarone by cardiothoracic surgery and discharged home.  He followed up as an outpatient with cardiology.  His EKG at that time was set sinus rhythm.  The patient was asymptomatic to his atrial fibrillation during his hospitalization.  He has been monitoring his heart rates routinely since being home and reports heart rates daily 55 to 60 bpm.  He has been compliant with amiodarone which was reduced at his most recent follow-up visit to 200 mg daily.  In addition to this he is compliant with oral anticoagulation taking Eliquis 5 mg twice daily.  Denies any significant bleeding issues at this time.  Given his history of wedge resection, diagnosis of lung cancer, previous abnormal pulmonary function studies, thyroid issues with previous thyroidectomy not an ideal candidate for long-term amiodarone therapy.  We will discontinue this medication and have him to continue to monitor his heart rates at home.  He is to follow-up with our office sooner if he develops rapid heart rates or symptoms concerning for recurrence of atrial fibrillation.  The patient will remain anticoagulated with Eliquis 5 mg twice daily.  These instructions have been relayed to the patient who reports understanding,    -Hypertension: The patient's blood pressure is well controlled and monitored through his PCP office.  No changes recommended at this time.    -Obesity: BMI 33.78.  Diet and exercise recommended      Discontinue amiodarone.  Continue oral anticoagulation with Eliquis 5 mg twice daily.  Follow-up in our office in 6 months, call sooner if needed for worsening symptoms.

## 2021-10-04 ENCOUNTER — LAB (OUTPATIENT)
Dept: LAB | Facility: HOSPITAL | Age: 71
End: 2021-10-04

## 2021-10-04 ENCOUNTER — TELEPHONE (OUTPATIENT)
Dept: ONCOLOGY | Facility: CLINIC | Age: 71
End: 2021-10-04

## 2021-10-04 ENCOUNTER — TELEPHONE (OUTPATIENT)
Dept: OTOLARYNGOLOGY | Facility: CLINIC | Age: 71
End: 2021-10-04

## 2021-10-04 DIAGNOSIS — E89.0 POSTOPERATIVE HYPOTHYROIDISM: ICD-10-CM

## 2021-10-04 DIAGNOSIS — E89.0 S/P TOTAL THYROIDECTOMY: Primary | ICD-10-CM

## 2021-10-04 LAB — TSH SERPL DL<=0.05 MIU/L-ACNC: 4.85 UIU/ML (ref 0.27–4.2)

## 2021-10-04 PROCEDURE — 84443 ASSAY THYROID STIM HORMONE: CPT

## 2021-10-04 PROCEDURE — 36415 COLL VENOUS BLD VENIPUNCTURE: CPT

## 2021-10-04 RX ORDER — LEVOTHYROXINE SODIUM 200 MCG
200 TABLET ORAL DAILY
Qty: 30 TABLET | Refills: 2 | Status: SHIPPED | OUTPATIENT
Start: 2021-10-04 | End: 2021-12-30 | Stop reason: SDUPTHER

## 2021-10-04 NOTE — TELEPHONE ENCOUNTER
Patient notified of labs. Per Dr. Solis we will stay on 200mcg of synthroid and recheck labs in 2 months

## 2021-10-04 NOTE — PROGRESS NOTES
[facility] HEMATOLOGY AND ONCOLOGY  [address]  [facility] [address] [phone]    Patient Name: Delgado Desir  Encounter Date: 10/15/2021  YOB: 1950  Patient Number: [id number]      REASON FOR FOLLOW-UP: Delgado Desir is a pleasant 71 y.o. 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  He is also seen for anemia from iron deficiency and on oral iron for the past 6 months.  He is seen alone. History is obtained from patient. History is considered to be accurate.           Oncology/Hematology History Overview Note   DIAGNOSTIC ABNORMALITIES:  He was found to have a lung nodule with history of prostate cancer.  CT abdomen pelvis 08/10/2020.  Lobulated noncalcified nodule in the right lower lobe represent a neoplastic process.  PET scan 09/18/2020. The multilobular pulmonary nodule in the right lung base measuring 2.9 x 1.6 cm is hypermetabolic and suspicious for a malignant process with a maximum intensity of 3.62 SUV. Differential considerations include primary lung malignancy as well as early metastatic disease.  No evidence of distant metastasis.  Unsuccessful CT-guided right lower lobe nodule biopsy 10/16/2020 due to right pneumothorax.  Pathology report 11/03/2020.  Invasive mucinous adenocarcinoma, grade 1, 2 cm, right lower lobe.  No visceral pleural invasion.  No lymphovascular invasion. Margins of resection free of tumor.  Distance of invasive carcinoma from closest margin 0.1 cm.  Pleural tissue biopsies no evidence of malignancy.  AJCC stage pT1b.  PD-L1 less than 1%, negative ROS1, ALK, BRAF and EGFR mutation.  Pathology report 2/16/2021, right lower lobe wedge excision, residual tumor is not present.  The surgical margins are free of tumor.  2 benign intraparenchymal  nodes benign.  1 benign node, left level 7.  1 benign no level 8.  CBC 02/19/2021 remarkable for WBC 12.9, hemoglobin 12.8 hematocrit 37.  Had a follow-up with Dr. Nick Flores 04/05/2021 post right lower lobe wedge and lymph node sampling on 02/15/2021.  Patient referred to oncology for surveillance.        PREVIOUS INTERVENTIONS:  Right lower lobe wedge resection 10/30/2020 by Dr. Flores.  His pulmonary function test is not adequate for left lower lobectomy as well as lymph node sampling.  He had undergone right lower lobe wedge 02/15/2021.      PREVIOUS INTERVENTIONS:Anemia.  Oral iron 04/15/2021 through present.     Prostate cancer (HCC)    Initial Diagnosis    Prostate cancer (CMS/HCC)     7/6/2020 Procedure    PSA  21     7/30/2020 Biopsy    [facility]  Prostate Biopsy  A: Prostate, right lateral base, needle biopsy:  Prostatic adenocarcinoma, [place] score 7 (4+3)  Tumor encompasses 20% of the needle core biopsy  Grade group 3    B: Prostate, right lateral mid, needle biopsy:  Prostatic adenocarcinoma, [place]'s 7 (4+3)  Tumor encompasses 20% of the needle core biopsy  Grade group 3    C: Prostate, right lateral apex, needle biopsy:  Prostatic adenocarcinoma, [place] score 7 (3+4)  Tumor encompasses 30% of the needle core biopsy  Grade group 2  Pattern 4 is approximately 20% of the tumor    D: Prostate, right base, needle biopsy:  Prostatic adenocarcinoma, [place]'s 7 (4+3)  Tumor encompasses 10% of the needle core biopsy  Grade group 3    E: Prostate, right mid, needle biopsy:  Prostatic adenocarcinoma, [place]'s 7 (4+3)  Tumor encompasses 55% of the needle core biopsy  Grade group 3    F: Prostate, right apex, needle biopsy:  High-grade prostatic intraepithelial neoplasia    G: Prostate, left lateral base, needle biopsy:  Benign prostatic glands and stroma    H: Prostate, left lateral mid, needle biopsy:  Benign prostatic glands and stroma    I: Prostate, left lateral apex, needle biopsy:  Benign prostatic glands  and stroma    J: Prostate, left base, needle biopsy:  Benign prostatic glands and stroma    K: Prostate, left mid, needle biopsy:  Benign prostatic glands and stroma    L: Prostate, left apex, needle biopsy:  Benign prostatic glands and stroma     8/14/2020 Imaging    [facility]  CT AP  No acute abnormality of the abdomen or pelvis.  A moderate splenomegaly.  An enlarged prostate.  A stable bilateral renal cysts.  The lobulated noncalcified nodule in the right lower lobe represent a neoplastic process. Further evaluation with positron emission  tomography scan may be obtained.  The previously seen fractures of the left sacral ala is not visualized  in this study. If clinically significant, further evaluation with  radionuclide bone scan may be obtained    Bone Scan  1. No evidence of metastatic bone disease.  2. Degenerative uptake in the spine, right knee and bilateral  ankles/feet. Suspect prior left knee arthroplasty. Correlate clinically.     10/16/2020 Biopsy    Final Diagnosis   Lung, right lower lobe, fine-needle core biopsies and touch preparation:  A.  Fragments of skeletal muscle.  B.  No histologic or cytologic evidence of malignancy.        8/11/2021 Procedure    PSA 21.02     10/4/2021 Cancer Staged    Staging form: Prostate, AJCC 8th Edition  - Pathologic stage from 10/4/2021: Stage IIIA (pT2, pN0, cM0, PSA: 21, Grade Group: 3) - Signed by Milan Bullard MD on 10/4/2021     Malignant neoplasm of lower lobe of right lung (HCC)   10/16/2020 Biopsy    Final Diagnosis   Lung, right lower lobe, fine-needle core biopsies and touch preparation:  A.  Fragments of skeletal muscle.  B.  No histologic or cytologic evidence of malignancy.        4/5/2021 Initial Diagnosis    Malignant neoplasm of lower lobe of right lung (CMS/HCC)     4/15/2021 Cancer Staged    Staging form: Lung, AJCC 8th Edition  - Pathologic stage from 4/15/2021: Stage IA2 (pT1b, pN0, cM0) - Signed by Milan Bullard MD on 4/15/2021         PAST  MEDICAL HISTORY:  ALLERGIES:  No Known Allergies  CURRENT MEDICATIONS:  Outpatient Encounter Medications as of 10/15/2021   Medication Sig Dispense Refill   • acetaminophen (TYLENOL) 500 MG tablet Take 500 mg by mouth Every 6 (Six) Hours As Needed for Mild Pain .     • alfuzosin (UROXATRAL) 10 MG 24 hr tablet Take 10 mg by mouth Daily.     • clindamycin (CLEOCIN) 300 MG capsule FOR DENTAL PROCEDURE     • Eliquis 5 MG tablet tablet TAKE 1 TABLET BY MOUTH EVERY 12 HOURS  60 tablet 11   • losartan-hydrochlorothiazide (HYZAAR) 50-12.5 MG per tablet Take 0.5 tablets by mouth Daily. 30 tablet 5   • metoprolol tartrate (LOPRESSOR) 25 MG tablet TAKE 1/2 TABLET BY MOUTH EVERY 12 HOURS  90 tablet 4   • Synthroid 200 MCG tablet Take 1 tablet by mouth Daily for 30 days. 30 tablet 2   • therapeutic multivitamin-minerals (THERAGRAN-M) tablet Take 1 tablet by mouth Daily.     • [DISCONTINUED] ferrous sulfate 325 (65 FE) MG tablet Take 1 tablet by mouth Daily With Breakfast. 60 tablet 2   • ferrous sulfate 325 (65 FE) MG tablet Take 1 tablet by mouth Daily With Breakfast. 60 tablet 2   • [DISCONTINUED] Synthroid 200 MCG tablet Take 1 tablet by mouth Daily for 30 days. 30 tablet 0     No facility-administered encounter medications on file as of 10/15/2021.     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.4XXA   • S/P total thyroidectomy E89.0   • Postoperative hypothyroidism E89.0   • Lung mass R91.8   • Paroxysmal atrial fibrillation (HCC) I48.0   • Malignant neoplasm of lower lobe of right lung (HCC) C34.31   • Nodule of neck R22.1   • S/P partial lobectomy of lung Z90.2   • History of lung cancer Z85.118   • Class 1 obesity due to excess calories with serious comorbidity and body mass index  (BMI) of 33.0 to 33.9 in adult E66.09, Z68.33     SURGERIES:  Past Surgical History:   Procedure Laterality Date   • CARPAL TUNNEL RELEASE Right    • LOBECTOMY Right 2/15/2021    Procedure: THORACOSCOPY WITH DAVINCI ROBOT WITH RIGHT WEDGE RESECTION, MEDIASTINAL LYMPH NODE DISSECTION-RIGHT;  Surgeon: Nick Flores MD;  Location: [facility] OR;  Service: [facility];  Laterality: Right;   • LUNG BIOPSY     • PROSTATE BIOPSY     • REPLACEMENT TOTAL KNEE Left    • SHOULDER SURGERY Bilateral    • THORACOSCOPY Right 10/30/2020    Procedure: MEDIASTINOSCOPY, RIGHT THORACOSCOPY POSSIBLE WEDGE RESECTION WITH DAVINCI ROBOT;  Surgeon: Nick Flores MD;  Location:  [facility] OR;  Service: [facility];  Laterality: Right;   • THYROIDECTOMY N/A 12/11/2020    Procedure: Total thyroidectomy;  Surgeon: Jean Marie Solis MD;  Location:  [facility] OR;  Service: ENT;  Laterality: N/A;     HEALTH MAINTENANCE ITEMS:  Health Maintenance Due   Topic Date Due   • COLORECTAL CANCER SCREENING  Never done   • TDAP/TD VACCINES (1 - Tdap) Never done   • ZOSTER VACCINE (1 of 2) Never done   • Pneumococcal Vaccine 65+ (1 of 1 - PPSV23) Never done   • HEPATITIS C SCREENING  Never done   • ANNUAL WELLNESS VISIT  Never done   • INFLUENZA VACCINE  Never done       <no information>  Last Completed Colonoscopy     This patient has no relevant Health Maintenance data.        Immunization History   Administered Date(s) Administered   • COVID-19 (MODERNA) 02/25/2021, 03/25/2021     Last Completed Mammogram     This patient has no relevant Health Maintenance data.            FAMILY HISTORY:  Family History   Problem Relation Age of Onset   • Hypertension Mother    • COPD Father      SOCIAL HISTORY:  Social History     Socioeconomic History   • Marital status:    • Number of children: 2   Tobacco Use   • Smoking status: Former Smoker     Packs/day: 1.00     Years: 33.00     Pack years: 33.00     Types: Cigarettes     Start date: 1969     Quit date: 1996     Years  "since quittin.8   • Smokeless tobacco: Never Used   Vaping Use   • Vaping Use: Never used   Substance and Sexual Activity   • Alcohol use: Yes     Alcohol/week: 2.0 standard drinks     Types: 2 Shots of liquor per week   • Drug use: Never   • Sexual activity: Defer       REVIEW OF SYSTEMS:    Review of Systems   Constitutional: Positive for fatigue. Negative for chills and fever.   HENT: Negative for congestion, mouth sores and trouble swallowing.    Eyes: Negative for redness and visual disturbance.   Respiratory: Negative for cough and wheezing.         Shortness of air with moderate exertion.    Cardiovascular: Negative for chest pain and palpitations.   Endocrine: Negative for cold intolerance and heat intolerance.   Genitourinary: Negative for difficulty urinating, dysuria and flank pain.   Musculoskeletal: Negative for gait problem and joint swelling.   Skin: Negative for pallor.   Allergic/Immunologic: Negative for food allergies.   Neurological: Negative for dizziness and weakness.   Hematological: Negative for adenopathy. Does not bruise/bleed easily.   Psychiatric/Behavioral: Negative for agitation, confusion and hallucinations.       VITAL SIGNS: /66   Pulse 60   Temp 97.3 °F (36.3 °C)   Resp 18   Ht 185.4 cm (73\")   Wt 116 kg (255 lb 11.2 oz)   SpO2 98%   BMI 33.74 kg/m²   Pain Score    10/15/21 1028   PainSc: 0-No pain       PHYSICAL EXAMINATION:     Physical Exam  Vitals reviewed.   Constitutional:       General: He is not in acute distress.  HENT:      Head: Normocephalic and atraumatic.   Eyes:      General: No scleral icterus.  Cardiovascular:      Rate and Rhythm: Normal rate and regular rhythm.   Pulmonary:      Effort: No respiratory distress.      Breath sounds: No wheezing or rales.   Abdominal:      General: Bowel sounds are normal.      Palpations: Abdomen is soft.      Tenderness: There is no abdominal tenderness.   Musculoskeletal:         General: No swelling.      " Cervical back: Neck supple.   Skin:     General: Skin is warm and dry.      Coloration: Skin is not pale.   Neurological:      Mental Status: He is alert and oriented to person, place, and time.   Psychiatric:         Mood and Affect: Mood normal.         Behavior: Behavior normal.         Thought Content: Thought content normal.         Judgment: Judgment normal.         LABS    Lab Results - Last 18 Months   Lab Units 06/18/21  1155 05/18/21  1056 04/15/21  1355 02/19/21  0442 02/18/21  0548 02/17/21  0922 02/15/21  1401 02/12/21  1012 01/14/21  1224 01/14/21  1224 12/12/20  0421 12/12/20  0421   HEMOGLOBIN g/dL 12.8* 11.6* 12.9* 12.8* 12.9* 12.1*   < > 14.0   < > 13.4   < > 13.0   HEMATOCRIT % 39.5 35.7* 39.7 37.0* 37.3* 35.4*   < > 40.8   < > 38.6   < > 37.6   MCV fL 90.8 87.3 86.9 87.3 85.7 88.1   < > 86.1   < > 85.6   < > 87.4   WBC 10*3/mm3 6.85 8.38 9.29 12.93* 8.49 7.90   < > 7.23   < > 7.98   < > 13.87*   RDW % 15.7* 15.4 15.4 14.5 14.4 14.7   < > 14.0   < > 14.1   < > 13.6   MPV fL 11.4 10.4 10.5 10.7 11.1 10.6   < > 10.8   < > 11.4   < > 10.9   PLATELETS 10*3/mm3 141 151 204 179 163 143   < > 148   < > 166   < > 193   IMM GRAN % % 0.4 0.6* 0.5  --   --   --   --  0.7*  --  0.6*  --  0.4   NEUTROS ABS 10*3/mm3 5.50 7.03* 7.72*  --   --   --   --  5.73  --  6.97  --  12.58*   LYMPHS ABS 10*3/mm3 0.74 0.61* 0.87  --   --   --   --  0.78  --  0.53*  --  0.60*   MONOS ABS 10*3/mm3 0.48 0.65 0.55  --   --   --   --  0.57  --  0.38  --  0.61   EOS ABS 10*3/mm3 0.07 0.01 0.06  --   --   --   --  0.06  --  0.02  --  0.00   BASOS ABS 10*3/mm3 0.03 0.03 0.04  --   --   --   --  0.04  --  0.03  --  0.02   IMMATURE GRANS (ABS) 10*3/mm3 0.03 0.05 0.05  --   --   --   --  0.05  --  0.05  --  0.06*   NRBC /100 WBC 0.0 0.0 0.0  --   --   --   --  0.0  --  0.0  --  0.0    < > = values in this interval not displayed.       Lab Results - Last 18 Months   Lab Units 07/30/21  0954 06/18/21  1155 04/29/21  1008 04/15/21  1219  04/15/21  1355 02/19/21  1034 02/19/21  0442 02/18/21  0548 02/18/21  0548 02/17/21  0922 02/17/21  0922 02/15/21  1401 02/12/21  1011 12/12/20  0819 12/12/20  0421 11/11/20  0442 11/10/20  2111 10/30/20  1135 10/27/20  1301   GLUCOSE mg/dL  --  104*  --   --  104* 136* 127*  --  129*  --  141*   < > 120*   < > 121*   < > 174*   < > 98   SODIUM mmol/L  --  138  --   --  135* 132* 135*  --  135*  --  134*   < > 135*   < > 139   < > 136   < > 137   POTASSIUM mmol/L  --  4.3  --   --  4.4 4.0 4.0  --  3.9  --  4.1   < > 4.4   < > 4.3   < > 4.5   < > 4.1   CO2 mmol/L  --  25.0  --   --  27.0 29.0 28.0  --  25.0  --  26.0   < > 25.0   < > 23.0   < > 24.0   < > 25.0   CHLORIDE mmol/L  --  104  --   --  98 95* 97*  --  101  --  101   < > 100   < > 104   < > 102   < > 104   ANION GAP mmol/L  --  9.0  --   --  10.0 8.0 10.0  --  9.0  --  7.0   < > 10.0   < > 12.0   < > 10.0   < > 8.0   CREATININE mg/dL 1.40* 1.11 1.40*  --  1.21 1.35* 1.30*   < > 1.02   < > 1.00   < > 0.94   < > 0.90   < > 0.99   < > 1.07   BUN mg/dL  --  18  --   --  17 22 22  --  19  --  16   < > 16   < > 20   < > 18   < > 15   BUN / CREAT RATIO   --  16.2  --   --  14.0 16.3 16.9  --  18.6  --  16.0   < > 17.0   < > 22.2   < > 18.2   < > 14.0   CALCIUM mg/dL  --  8.7  --   --  9.0 9.1 8.7  --  8.5*  --  8.6   < > 9.4   < > 9.0   < > 9.0   < > 9.4   EGFR IF NONAFRICN AM mL/min/1.73  --  65  --   --  59* 52* 55*  --  72  --  74   < > 80   < > 84   < > 75   < > 69   ALK PHOS U/L  --  100  --  120* 122*  --   --   --   --   --   --   --  108  --  104  --  129*  --  98   TOTAL PROTEIN g/dL  --  6.8  --   --  7.6  --   --   --   --   --   --   --  7.0  --  6.3  --  6.6  --  6.7   ALT (SGPT) U/L  --  29  --   --  27  --   --   --   --   --   --   --  34  --  29  --  32  --  39   AST (SGOT) U/L  --  26  --   --  25  --   --   --   --   --   --   --  31  --  24  --  22  --  27   BILIRUBIN mg/dL  --  1.1  --   --  1.2  --   --   --   --   --   --   --  1.1  --   1.5*  --  1.2  --  1.3*   ALBUMIN g/dL  --  3.90  --   --  4.20  --   --   --   --   --   --   --  4.10  --  4.00  --  3.70  --  4.30   GLOBULIN gm/dL  --  2.9  --   --  3.4  --   --   --   --   --   --   --  2.9  --  2.3  --  2.9  --  2.4    < > = values in this interval not displayed.       No results for input(s): MSPIKE, KAPPALAMB, IGLFLC, URICACID, FREEKAPPAL, CEA, LDH, REFLABREPO in the last 02302 hours.    Lab Results - Last 18 Months   Lab Units 10/04/21  1119 09/01/21  1106 07/30/21  1000 06/18/21  1155 05/18/21  1056 04/15/21  1356 04/15/21  1355 02/17/21  0922 01/14/21  1224   IRON mcg/dL  --   --   --   --  31*  --  48*  --   --    TIBC mcg/dL  --   --   --   --  288*  --  378  --   --    IRON SATURATION %  --   --   --   --  11*  --  13*  --   --    FERRITIN ng/mL  --   --   --   --  214.00  --  125.20  --   --    TSH uIU/mL 4.850* 7.600* 19.100* 27.750*  --   --   --  7.400* 5.940*   FOLATE ng/mL  --   --   --   --   --  >20.00  --   --   --        Delgado Desir reports a pain score of 0.         ASSESSMENT:  1.  Adenocarcinoma the lung, right lower lobe  AJCC stage:1A2 (pT1b, cN0, cM0)  Treatment status: Post right lower lobe wedge resection.  Pulmonary function test in adequate for lobectomy and node sampling.  2.  Performance status of 1.   3.  Hypothyroidism.  On Synthroid.  4.  Adenocarcinoma of the prostate, [place] score 7, 4+3, grade group 3.  AJCC stage: IIIA (T2b, N0, M0)  Treatment status: Neoadjuvant hormone therapy with radiation.  Followed by Dr. Phan and Dr. Lujan.   5.  Obesity BMI 33.74.        PLAN:  1.   Re: CT head report 04/29/2021. Mild cerebral and cerebellar volume loss with chronic microvascular  disease but no evidence of acute intracranial process. There is no abnormal enhancement.  2.   Re: CT chest 04/29/2021. Postsurgical changes in the right lower lobe from prior wedge resection. There has been interval decrease in the size of a right pleural  effusion. Adjacent consolidation has also decreased but persists. Stable mildly enlarged right hilar lymph node.  Interval increase in the size of a right middle lobe nodule, possibly  a reactive intrafissural lymph node or metastatic nodule. A new nodule in the left lower lobe may be an area of inflammation or atelectasis but should also be followed. Short interval CT is recommended in 3 months. Cardiomegaly. Findings suggestive of pulmonary arterial hypertension.    3.   Re: CT abdomen pelvis 04/29/2021.No evidence of metastatic disease in the abdomen or pelvis. Atherosclerotic disease.  Right renal cyst.  Probable left renal cysts. Prostatomegaly.  4.   Re: Follow-up CT chest 07/30/2021. Postsurgical change in the right lung base is stable compared to  April 29, 2021.  Previous noted left lower lobe nodule has resolved.  5.   Re: Heme status.  Hemoglobin 12.8.   6.   Re: CMP.  GFR 65 ml/min.   7.  He will be seen every 6 months with CT scan for the first 3 years then annually thereafter.  8.  Continue care per primary care physician and other specialists.  9.  Plan of care discussed with patient.  Understanding expressed.  Patient able to proceed.  10.  CBC with differential, ferritin, iron panel, and CMP today.   11.  Advance Care Planning   ACP discussion was declined by the patient. Patient does not have an advance directive, information provided.  12.  eRx ferrous sulfate 325 mg p.o. daily #60 with 2 refills.  Stop and call if intolerant. Observe for nausea or vomiting.  13.  Return to office in 3 months with preoffice CT chest, abdomen, pelvis, CBC with differential and CMP.       I have reviewed the assessment and plan and verified the accuracy of it. No changes to assessment and plan since the information was documented. Milan Bullard MD 10/15/21       I spent 35 total minutes, face-to-face, caring for Delgado today.  Greater than 50% of this time involved counseling and/or  coordination of care as documented within this note regarding the patient's illness(es), pros and cons of various treatment options, instructions and/or risk reduction.          (Jean Marie Solis MD)  MD Ephraim Capps MD Patrick Ellison, MD Kenneth Cook, MD

## 2021-10-04 NOTE — TELEPHONE ENCOUNTER
Caller: DILLON    Relationship: PT    Best call back number: [phone]     What is the best time to reach you: ANYTIME    What was the call regarding: HE CALLED TO CANCEL 10/08 LABS. HE HAD THOSE LABS DRAWN TODAY INSTEAD.    Do you require a callback: NO

## 2021-10-08 ENCOUNTER — APPOINTMENT (OUTPATIENT)
Dept: LAB | Facility: HOSPITAL | Age: 71
End: 2021-10-08

## 2021-10-15 ENCOUNTER — TELEPHONE (OUTPATIENT)
Dept: ONCOLOGY | Facility: CLINIC | Age: 71
End: 2021-10-15

## 2021-10-15 ENCOUNTER — LAB (OUTPATIENT)
Dept: LAB | Facility: HOSPITAL | Age: 71
End: 2021-10-15

## 2021-10-15 ENCOUNTER — OFFICE VISIT (OUTPATIENT)
Dept: ONCOLOGY | Facility: CLINIC | Age: 71
End: 2021-10-15

## 2021-10-15 VITALS
HEIGHT: 73 IN | RESPIRATION RATE: 18 BRPM | SYSTOLIC BLOOD PRESSURE: 148 MMHG | OXYGEN SATURATION: 98 % | TEMPERATURE: 97.3 F | WEIGHT: 255.7 LBS | BODY MASS INDEX: 33.89 KG/M2 | DIASTOLIC BLOOD PRESSURE: 66 MMHG | HEART RATE: 60 BPM

## 2021-10-15 DIAGNOSIS — C34.31 MALIGNANT NEOPLASM OF LOWER LOBE OF RIGHT LUNG (HCC): Primary | ICD-10-CM

## 2021-10-15 DIAGNOSIS — D50.8 IRON DEFICIENCY ANEMIA SECONDARY TO INADEQUATE DIETARY IRON INTAKE: ICD-10-CM

## 2021-10-15 DIAGNOSIS — C34.31 MALIGNANT NEOPLASM OF LOWER LOBE OF RIGHT LUNG (HCC): ICD-10-CM

## 2021-10-15 LAB
ALBUMIN SERPL-MCNC: 4.1 G/DL (ref 3.5–5.2)
ALBUMIN/GLOB SERPL: 1.6 G/DL
ALP SERPL-CCNC: 98 U/L (ref 39–117)
ALT SERPL W P-5'-P-CCNC: 29 U/L (ref 1–41)
ANION GAP SERPL CALCULATED.3IONS-SCNC: 9 MMOL/L (ref 5–15)
AST SERPL-CCNC: 23 U/L (ref 1–40)
BASOPHILS # BLD AUTO: 0.04 10*3/MM3 (ref 0–0.2)
BASOPHILS NFR BLD AUTO: 0.6 % (ref 0–1.5)
BILIRUB SERPL-MCNC: 1.7 MG/DL (ref 0–1.2)
BUN SERPL-MCNC: 18 MG/DL (ref 8–23)
BUN/CREAT SERPL: 15.9 (ref 7–25)
CALCIUM SPEC-SCNC: 8.9 MG/DL (ref 8.6–10.5)
CHLORIDE SERPL-SCNC: 103 MMOL/L (ref 98–107)
CO2 SERPL-SCNC: 27 MMOL/L (ref 22–29)
CREAT SERPL-MCNC: 1.13 MG/DL (ref 0.76–1.27)
DEPRECATED RDW RBC AUTO: 44.4 FL (ref 37–54)
EOSINOPHIL # BLD AUTO: 0.07 10*3/MM3 (ref 0–0.4)
EOSINOPHIL NFR BLD AUTO: 1.1 % (ref 0.3–6.2)
ERYTHROCYTE [DISTWIDTH] IN BLOOD BY AUTOMATED COUNT: 13.3 % (ref 12.3–15.4)
FERRITIN SERPL-MCNC: 196.1 NG/ML (ref 30–400)
GFR SERPL CREATININE-BSD FRML MDRD: 64 ML/MIN/1.73
GLOBULIN UR ELPH-MCNC: 2.6 GM/DL
GLUCOSE SERPL-MCNC: 101 MG/DL (ref 65–99)
HCT VFR BLD AUTO: 39 % (ref 37.5–51)
HGB BLD-MCNC: 13.2 G/DL (ref 13–17.7)
IMM GRANULOCYTES # BLD AUTO: 0.03 10*3/MM3 (ref 0–0.05)
IMM GRANULOCYTES NFR BLD AUTO: 0.5 % (ref 0–0.5)
IRON 24H UR-MRATE: 39 MCG/DL (ref 59–158)
IRON SATN MFR SERPL: 13 % (ref 20–50)
LYMPHOCYTES # BLD AUTO: 0.8 10*3/MM3 (ref 0.7–3.1)
LYMPHOCYTES NFR BLD AUTO: 12.6 % (ref 19.6–45.3)
MCH RBC QN AUTO: 30.6 PG (ref 26.6–33)
MCHC RBC AUTO-ENTMCNC: 33.8 G/DL (ref 31.5–35.7)
MCV RBC AUTO: 90.5 FL (ref 79–97)
MONOCYTES # BLD AUTO: 0.51 10*3/MM3 (ref 0.1–0.9)
MONOCYTES NFR BLD AUTO: 8 % (ref 5–12)
NEUTROPHILS NFR BLD AUTO: 4.89 10*3/MM3 (ref 1.7–7)
NEUTROPHILS NFR BLD AUTO: 77.2 % (ref 42.7–76)
NRBC BLD AUTO-RTO: 0 /100 WBC (ref 0–0.2)
PLATELET # BLD AUTO: 177 10*3/MM3 (ref 140–450)
PMV BLD AUTO: 10.5 FL (ref 6–12)
POTASSIUM SERPL-SCNC: 4 MMOL/L (ref 3.5–5.2)
PROT SERPL-MCNC: 6.7 G/DL (ref 6–8.5)
RBC # BLD AUTO: 4.31 10*6/MM3 (ref 4.14–5.8)
SODIUM SERPL-SCNC: 139 MMOL/L (ref 136–145)
TIBC SERPL-MCNC: 304 MCG/DL (ref 298–536)
TRANSFERRIN SERPL-MCNC: 204 MG/DL (ref 200–360)
WBC # BLD AUTO: 6.34 10*3/MM3 (ref 3.4–10.8)

## 2021-10-15 PROCEDURE — 82728 ASSAY OF FERRITIN: CPT

## 2021-10-15 PROCEDURE — 36415 COLL VENOUS BLD VENIPUNCTURE: CPT

## 2021-10-15 PROCEDURE — 84466 ASSAY OF TRANSFERRIN: CPT

## 2021-10-15 PROCEDURE — 83540 ASSAY OF IRON: CPT

## 2021-10-15 PROCEDURE — 80053 COMPREHEN METABOLIC PANEL: CPT

## 2021-10-15 PROCEDURE — 99214 OFFICE O/P EST MOD 30 MIN: CPT | Performed by: INTERNAL MEDICINE

## 2021-10-15 PROCEDURE — 85025 COMPLETE CBC W/AUTO DIFF WBC: CPT

## 2021-10-15 RX ORDER — FERROUS SULFATE 325(65) MG
325 TABLET ORAL
Qty: 60 TABLET | Refills: 2 | Status: SHIPPED | OUTPATIENT
Start: 2021-10-15 | End: 2022-01-24

## 2021-10-15 RX ORDER — CLINDAMYCIN HYDROCHLORIDE 300 MG/1
CAPSULE ORAL
COMMUNITY
Start: 2021-10-07 | End: 2021-11-01

## 2021-10-15 NOTE — TELEPHONE ENCOUNTER
Called patient per Dr Bullard instructions, he was informed that he can d/c use of his oral iron tablets at this time his HGB is acceptable. He v/u of information.

## 2021-10-15 NOTE — TELEPHONE ENCOUNTER
----- Message from Milan Bullard MD sent at 10/15/2021 12:11 PM CDT -----  Bilirubin 1.7.  Order CT of the abdomen and pelvis.

## 2021-10-15 NOTE — TELEPHONE ENCOUNTER
----- Message from Milan Bullard MD sent at 10/15/2021 12:52 PM CDT -----  Hemoglobin 13.2 with low saturation, stop oral iron.

## 2021-10-15 NOTE — TELEPHONE ENCOUNTER
Notified Delgado of bilirubin level being elevated at 1.7 and Dr. Bullard wants him to have CT of the abdomen and pelvis.  Verbalized understanding.

## 2021-10-19 ENCOUNTER — TELEPHONE (OUTPATIENT)
Dept: ONCOLOGY | Facility: CLINIC | Age: 71
End: 2021-10-19

## 2021-10-19 NOTE — TELEPHONE ENCOUNTER
Returned patients phone call to let him know that he doesn't have to continue with the iron pills since his hemoglobin was normal on his labs. Patient verbalized understanding.    SHASHI

## 2021-10-19 NOTE — TELEPHONE ENCOUNTER
Caller: GRECIA CHAPPELL    Relationship: PATIENT    Best call back number: [phone]    Which medication are you concerned about: IRON PILLS    What are your concerns: PATIENT STATED THAT HE THOUGH DR MULLEN TOLD HIM AT HIS APPT ON 10/15 TO STOP TAKING THE IRON PILLS. BUT WHEN HE WENT TO  HIS REGULAR MEDICATION THE IRON PILLS WERE INCLUDED. WANTS TO CONFIRM THAT HE SHOULD NOT TAKE THEM    PLEASE CALL TO DISCUSS

## 2021-10-20 ENCOUNTER — TELEPHONE (OUTPATIENT)
Dept: ONCOLOGY | Facility: CLINIC | Age: 71
End: 2021-10-20

## 2021-10-20 NOTE — TELEPHONE ENCOUNTER
Caller: GRECIA CHAPPELL    Relationship: PATIENT    Best call back number: [phone]    What is the best time to reach you: AFTER 3 - WILL BE AT [facility]ENTIST APPT TILL THEN    Who are you requesting to speak with (clinical staff, provider,  specific staff member): SEBASTIAN    Do you require a callback:  PATIENT WAS CONFUSED AS WHERE THE [facility] LOCATION WAS FOR HIS CT SCAN. PLEASE CALL TO DISCUSS.

## 2021-10-21 ENCOUNTER — TELEPHONE (OUTPATIENT)
Dept: ONCOLOGY | Facility: CLINIC | Age: 71
End: 2021-10-21

## 2021-10-26 ENCOUNTER — TELEPHONE (OUTPATIENT)
Dept: ONCOLOGY | Facility: CLINIC | Age: 71
End: 2021-10-26

## 2021-10-26 ENCOUNTER — HOSPITAL ENCOUNTER (OUTPATIENT)
Dept: CT IMAGING | Facility: HOSPITAL | Age: 71
Discharge: HOME | End: 2021-10-26
Admitting: INTERNAL MEDICINE

## 2021-10-26 DIAGNOSIS — C34.31 MALIGNANT NEOPLASM OF LOWER LOBE OF RIGHT LUNG (HCC): ICD-10-CM

## 2021-10-26 LAB — CREAT BLDA-MCNC: 1.1 MG/DL (ref 0.6–1.3)

## 2021-10-26 PROCEDURE — 82565 ASSAY OF CREATININE: CPT

## 2021-10-26 PROCEDURE — 74178 CT ABD&PLV WO CNTR FLWD CNTR: CPT

## 2021-10-26 PROCEDURE — 25010000002 IOPAMIDOL 61 % SOLUTION: Performed by: INTERNAL MEDICINE

## 2021-10-26 RX ADMIN — IOPAMIDOL 100 ML: 612 INJECTION, SOLUTION INTRAVENOUS at 09:48

## 2021-10-26 RX ADMIN — IOPAMIDOL 50 ML: 612 INJECTION, SOLUTION INTRAVENOUS at 09:48

## 2021-10-26 NOTE — TELEPHONE ENCOUNTER
PATIENT DILLON  RETURNING CALL MISSED CALL FROM THE OFFICE.       I CHECKED THE CHART NO NOTES WHERE ANYONE HAD CALLED. OFFERED TO CHECK UP FRONT, DILLON DECLINED AND SAID WILL WAIT FOR CALL BACK IF WAS IMPORTANT.

## 2021-10-29 DIAGNOSIS — C61 PROSTATE CANCER (HCC): Primary | ICD-10-CM

## 2021-11-01 ENCOUNTER — LAB (OUTPATIENT)
Dept: LAB | Facility: HOSPITAL | Age: 71
End: 2021-11-01

## 2021-11-01 ENCOUNTER — PRE-ADMISSION TESTING (OUTPATIENT)
Dept: PREADMISSION TESTING | Facility: HOSPITAL | Age: 71
End: 2021-11-01

## 2021-11-01 VITALS
SYSTOLIC BLOOD PRESSURE: 152 MMHG | RESPIRATION RATE: 20 BRPM | HEART RATE: 63 BPM | WEIGHT: 259.04 LBS | HEIGHT: 73 IN | OXYGEN SATURATION: 96 % | DIASTOLIC BLOOD PRESSURE: 59 MMHG | BODY MASS INDEX: 34.33 KG/M2

## 2021-11-01 DIAGNOSIS — C61 PROSTATE CANCER (HCC): ICD-10-CM

## 2021-11-01 LAB — SARS-COV-2 ORF1AB RESP QL NAA+PROBE: NOT DETECTED

## 2021-11-01 PROCEDURE — C9803 HOPD COVID-19 SPEC COLLECT: HCPCS

## 2021-11-01 PROCEDURE — U0004 COV-19 TEST NON-CDC HGH THRU: HCPCS

## 2021-11-01 NOTE — DISCHARGE INSTRUCTIONS
DAY OF SURGERY INSTRUCTIONS          ARRIVAL TIME: AS DIRECTED BY OFFICE    YOU MAY TAKE THE FOLLOWING MEDICATION(S) THE MORNING OF SURGERY WITH A SIP OF WATER: ***    ALL OTHER HOME MEDICATIONS CHECK WITH YOUR DOCTOR    DO NOT TAKE ANY ERECTILE DYSFUNCTION MEDICATIONS (EX:  CIALIS, VIAGRA) 24 HOURS PRIOR TO SURGERY              MANAGING PAIN AFTER SURGERY    We know you are probably wondering what your pain will be like after surgery.  Following surgery it is unrealistic to expect you will not have pain.   Pain is how our bodies let us know that something is wrong or cautions us to be careful.  That said, our goal is to make your pain tolerable.    Methods we may use to treat your pain include (oral or IV medications, PCAs, epidurals, nerve blocks, etc.)   While some procedures require IV pain medications for a short time after surgery, transitioning to pain medications by mouth allows for better management of pain.   Your nurse will encourage you to take oral pain medications whenever possible.  IV medications work almost immediately, but only last a short while.  Taking medications by mouth allows for a more constant level of medication in your blood stream for a longer period of time.      Once your pain is out of control it is harder to get back under control.  It is important you are aware when your next dose of pain medication is due.  If you are admitted, your nurse may write the time of your next dose on the white board in your room to help you remember.      We are interested in your pain and encourage you to inform us about aggravating factors during your visit.   Many times a simple repositioning every few hours can make a big difference.    If your physician says it is okay, do not let your pain prevent you from getting out of bed. Be sure to call your nurse for assistance prior to getting up so you do not fall.      Before surgery, please decide your tolerable pain goal.  These faces help describe the  pain ratings we use on a 0-10 scale.   Be prepared to tell us your goal and whether or not you take pain or anxiety medications at home.      BEFORE YOU COME TO THE HOSPITAL  (Pre-op instructions)  • Do not eat, drink, smoke or chew gum after midnight the night before surgery.  This also includes no mints.  • Morning of surgery take only the medicines you have been instructed with a sip of water unless otherwise instructed  by your physician.  • Do not shave, wear makeup or dark nail polish.  • Remove all jewelry including rings.  • Leave anything you consider valuable at home.  • Leave your suitcase in the car until after your surgery.  • Bring the following with you if applicable:  o Picture ID and insurance, Medicare or Medicaid cards  o Co-pay/deductible required by insurance (cash, check, credit card)  o Copy of advance directive, living will or power-of- documents if not brought to PAT  o CPAP or BIPAP mask and tubing  o Relaxation aids ( book, magazine), etc.  o Hearing aids                                 ON THE DAY OF SURGERY  · On the day of surgery check in at registration located at the main entrance of the hospital.   ? You will be registered and given a beeper with instructions where to wait in the main lobby.  ? When your beeper lights up and vibrates a member of the Outpatient Surgery staff will meet you at the double doors under the stair steps and escort you to your preoperative room.   · You may have cloth compression devices placed on your legs. These help to prevent blood clots and reduce swelling in your legs.  · An IV may be inserted into one of your veins.  · In the operating room, you may be given one or more of the following:  ? A medicine to help you relax (sedative).  ? A medicine to numb the area (local anesthetic).  ? A medicine to make you fall asleep (general anesthetic).  ? A medicine that is injected into an area of your body to numb everything below the injection site  "(regional anesthetic).  · Your surgical site will be marked or identified.  · You may be given an antibiotic through your IV to help prevent infection.  Contact a health care provider if you:  · Develop a fever of more than 100.4°F (38°C) or other feelings of illness during the 48 hours before your surgery.  · Have symptoms that get worse.  Have questions or concerns about your surgery    General Anesthesia/Surgery, Adult  General anesthesia is the use of medicines to make a person \"go to sleep\" (unconscious) for a medical procedure. General anesthesia must be used for certain procedures, and is often recommended for procedures that:  · Last a long time.  · Require you to be still or in an unusual position.  · Are major and can cause blood loss.  The medicines used for general anesthesia are called general anesthetics. As well as making you unconscious for a certain amount of time, these medicines:  · Prevent pain.  · Control your blood pressure.  · Relax your muscles.  Tell a health care provider about:  · Any allergies you have.  · All medicines you are taking, including vitamins, herbs, eye drops, creams, and over-the-counter medicines.  · Any problems you or family members have had with anesthetic medicines.  · Types of anesthetics you have had in the past.  · Any blood disorders you have.  · Any surgeries you have had.  · Any medical conditions you have.  · Any recent upper respiratory, chest, or ear infections.  · Any history of:  ? Heart or lung conditions, such as heart failure, sleep apnea, asthma, or chronic obstructive pulmonary disease (COPD).  ?  service.  ? Depression or anxiety.  · Any tobacco or drug use, including marijuana or alcohol use.  · Whether you are pregnant or may be pregnant.  What are the risks?  Generally, this is a safe procedure. However, problems may occur, including:  · Allergic reaction.  · Lung and heart problems.  · Inhaling food or liquid from the stomach into the lungs " (aspiration).  · Nerve injury.  · Air in the bloodstream, which can lead to stroke.  · Extreme agitation or confusion (delirium) when you wake up from the anesthetic.  · Waking up during your procedure and being unable to move. This is rare.  These problems are more likely to develop if you are having a major surgery or if you have an advanced or serious medical condition. You can prevent some of these complications by answering all of your health care provider's questions thoroughly and by following all instructions before your procedure.  General anesthesia can cause side effects, including:  · Nausea or vomiting.  · A sore throat from the breathing tube.  · Hoarseness.  · Wheezing or coughing.  · Shaking chills.  · Tiredness.  · Body aches.  · Anxiety.  · Sleepiness or drowsiness.  · Confusion or agitation.  RISKS AND COMPLICATIONS OF SURGERY  Your health care provider will discuss possible risks and complications with you before surgery. Common risks and complications include:    · Problems due to the use of anesthetics.  · Blood loss and replacement (does not apply to minor surgical procedures).  · Temporary increase in pain due to surgery.  · Uncorrected pain or problems that the surgery was meant to correct.  · Infection.  · New damage.    What happens before the procedure?    Medicines  Ask your health care provider about:  · Changing or stopping your regular medicines. This is especially important if you are taking diabetes medicines or blood thinners.  · Taking medicines such as aspirin and ibuprofen. These medicines can thin your blood. Do not take these medicines unless your health care provider tells you to take them.  · Taking over-the-counter medicines, vitamins, herbs, and supplements. Do not take these during the week before your procedure unless your health care provider approves them.  General instructions  · Starting 3-6 weeks before the procedure, do not use any products that contain nicotine or  tobacco, such as cigarettes and e-cigarettes. If you need help quitting, ask your health care provider.  · If you brush your teeth on the morning of the procedure, make sure to spit out all of the toothpaste.  · Tell your health care provider if you become ill or develop a cold, cough, or fever.  · If instructed by your health care provider, bring your sleep apnea device with you on the day of your surgery (if applicable).  · Ask your health care provider if you will be going home the same day, the following day, or after a longer hospital stay.  ? Plan to have someone take you home from the hospital or clinic.  ? Plan to have a responsible adult care for you for at least 24 hours after you leave the hospital or clinic. This is important.  What happens during the procedure?  · You will be given anesthetics through both of the following:  ? A mask placed over your nose and mouth.  ? An IV in one of your veins.  · You may receive a medicine to help you relax (sedative).  · After you are unconscious, a breathing tube may be inserted down your throat to help you breathe. This will be removed before you wake up.  · An anesthesia specialist will stay with you throughout your procedure. He or she will:  ? Keep you comfortable and safe by continuing to give you medicines and adjusting the amount of medicine that you get.  ? Monitor your blood pressure, pulse, and oxygen levels to make sure that the anesthetics do not cause any problems.  The procedure may vary among health care providers and hospitals.  What happens after the procedure?  · Your blood pressure, temperature, heart rate, breathing rate, and blood oxygen level will be monitored until the medicines you were given have worn off.  · You will wake up in a recovery area. You may wake up slowly.  · If you feel anxious or agitated, you may be given medicine to help you calm down.  · If you will be going home the same day, your health care provider may check to make  sure you can walk, drink, and urinate.  · Your health care provider will treat any pain or side effects you have before you go home.  · Do not drive for 24 hours if you were given a sedative.  Summary  · General anesthesia is used to keep you still and prevent pain during a procedure.  · It is important to tell your healthcare provider about your medical history and any surgeries you have had, and previous experience with anesthesia.  · Follow your healthcare provider’s instructions about when to stop eating, drinking, or taking certain medicines before your procedure.  · Plan to have someone take you home from the hospital or clinic.  This information is not intended to replace advice given to you by your health care provider. Make sure you discuss any questions you have with your health care provider.  Document Released: 03/26/2009 Document Revised: 08/03/2018 Document Reviewed: 08/03/2018  [employer] Interactive Patient Education © 2019 [employer]      Fall Prevention in Hospitals, Adult  As a hospital patient, your condition and the treatments you receive can increase your risk for falls. Some additional risk factors for falls in a hospital include:  · Being in an unfamiliar environment.  · Being on bed rest.  · Your surgery.  · Taking certain medicines.  · Your tubing requirements, such as intravenous (IV) therapy or catheters.  It is important that you learn how to decrease fall risks while at the hospital. Below are important tips that can help prevent falls.  SAFETY TIPS FOR PREVENTING FALLS  Talk about your risk of falling.  · Ask your health care provider why you are at risk for falling. Is it your medicine, illness, tubing placement, or something else?  · Make a plan with your health care provider to keep you safe from falls.  · Ask your health care provider or pharmacist about side effects of your medicines. Some medicines can make you dizzy or affect your coordination.  Ask for help.  · Ask for help  before getting out of bed. You may need to press your call button.  · Ask for assistance in getting safely to the toilet.  · Ask for a walker or cane to be put at your bedside. Ask that most of the side rails on your bed be placed up before your health care provider leaves the room.  · Ask family or friends to sit with you.  · Ask for things that are out of your reach, such as your glasses, hearing aids, telephone, bedside table, or call button.  Follow these tips to avoid falling:  · Stay lying or seated, rather than standing, while waiting for help.  · Wear rubber-soled slippers or shoes whenever you walk in the hospital.  · Avoid quick, sudden movements.  ¨ Change positions slowly.  ¨ Sit on the side of your bed before standing.  ¨ Stand up slowly and wait before you start to walk.  · Let your health care provider know if there is a spill on the floor.  · Pay careful attention to the medical equipment, electrical cords, and tubes around you.  · When you need help, use your call button by your bed or in the bathroom. Wait for one of your health care providers to help you.  · If you feel dizzy or unsure of your footing, return to bed and wait for assistance.  · Avoid being distracted by the TV, telephone, or another person in your room.  · Do not lean or support yourself on rolling objects, such as IV poles or bedside tables.     This information is not intended to replace advice given to you by your health care provider. Make sure you discuss any questions you have with your health care provider.     Document Released: 12/15/2001 Document Revised: 01/08/2016 Document Reviewed: 08/25/2013  [employer] Interactive Patient Education ©2016 [employer]            PATIENT/FAMILY/RESPONSIBLE PARTY VERBALIZES UNDERSTANDING OF ABOVE EDUCATION.  COPY OF PAIN SCALE GIVEN AND REVIEWED WITH VERBALIZED UNDERSTANDING.

## 2021-11-03 ENCOUNTER — HOSPITAL ENCOUNTER (OUTPATIENT)
Facility: HOSPITAL | Age: 71
Setting detail: HOSPITAL OUTPATIENT SURGERY
Discharge: HOME | End: 2021-11-03
Attending: UROLOGY | Admitting: UROLOGY

## 2021-11-03 ENCOUNTER — TELEPHONE (OUTPATIENT)
Dept: ONCOLOGY | Facility: CLINIC | Age: 71
End: 2021-11-03

## 2021-11-03 ENCOUNTER — ANESTHESIA (OUTPATIENT)
Dept: PERIOP | Facility: HOSPITAL | Age: 71
End: 2021-11-03

## 2021-11-03 ENCOUNTER — ANESTHESIA EVENT (OUTPATIENT)
Dept: PERIOP | Facility: HOSPITAL | Age: 71
End: 2021-11-03

## 2021-11-03 VITALS
OXYGEN SATURATION: 98 % | TEMPERATURE: 96.5 F | DIASTOLIC BLOOD PRESSURE: 76 MMHG | SYSTOLIC BLOOD PRESSURE: 152 MMHG | HEART RATE: 54 BPM | RESPIRATION RATE: 16 BRPM

## 2021-11-03 DIAGNOSIS — C61 PROSTATE CANCER (HCC): Primary | ICD-10-CM

## 2021-11-03 PROCEDURE — A4648 IMPLANTABLE TISSUE MARKER: HCPCS | Performed by: UROLOGY

## 2021-11-03 PROCEDURE — 76872 US TRANSRECTAL: CPT | Performed by: UROLOGY

## 2021-11-03 PROCEDURE — 55874 TPRNL PLMT BIODEGRDABL MATRL: CPT | Performed by: UROLOGY

## 2021-11-03 PROCEDURE — 0 LIDOCAINE 1 % SOLUTION: Performed by: UROLOGY

## 2021-11-03 PROCEDURE — C1889 IMPLANT/INSERT DEVICE, NOC: HCPCS | Performed by: UROLOGY

## 2021-11-03 PROCEDURE — 25010000002 PROPOFOL 10 MG/ML EMULSION: Performed by: NURSE ANESTHETIST, CERTIFIED REGISTERED

## 2021-11-03 PROCEDURE — 55876 PLACE RT DEVICE/MARKER PROS: CPT | Performed by: UROLOGY

## 2021-11-03 PROCEDURE — 25010000002 FENTANYL CITRATE (PF) 100 MCG/2ML SOLUTION: Performed by: NURSE ANESTHETIST, CERTIFIED REGISTERED

## 2021-11-03 PROCEDURE — 0 CEFAZOLIN PER 500 MG: Performed by: NURSE ANESTHETIST, CERTIFIED REGISTERED

## 2021-11-03 PROCEDURE — S0260 H&P FOR SURGERY: HCPCS | Performed by: UROLOGY

## 2021-11-03 DEVICE — SYS HYDROGEL SPACEOAR VUE 10ML: Type: IMPLANTABLE DEVICE | Site: PROSTATE | Status: FUNCTIONAL

## 2021-11-03 DEVICE — IMPLANTABLE DEVICE
Type: IMPLANTABLE DEVICE | Site: PROSTATE | Status: FUNCTIONAL
Brand: GOLD FIDUCIAL

## 2021-11-03 RX ORDER — FENTANYL CITRATE 50 UG/ML
25 INJECTION, SOLUTION INTRAMUSCULAR; INTRAVENOUS
Status: DISCONTINUED | OUTPATIENT
Start: 2021-11-03 | End: 2021-11-03 | Stop reason: HOSPADM

## 2021-11-03 RX ORDER — LIDOCAINE HYDROCHLORIDE 10 MG/ML
0.5 INJECTION, SOLUTION EPIDURAL; INFILTRATION; INTRACAUDAL; PERINEURAL ONCE AS NEEDED
Status: DISCONTINUED | OUTPATIENT
Start: 2021-11-03 | End: 2021-11-03 | Stop reason: HOSPADM

## 2021-11-03 RX ORDER — FLUMAZENIL 0.1 MG/ML
0.2 INJECTION INTRAVENOUS AS NEEDED
Status: DISCONTINUED | OUTPATIENT
Start: 2021-11-03 | End: 2021-11-03 | Stop reason: HOSPADM

## 2021-11-03 RX ORDER — SODIUM CHLORIDE 0.9 % (FLUSH) 0.9 %
3 SYRINGE (ML) INJECTION AS NEEDED
Status: DISCONTINUED | OUTPATIENT
Start: 2021-11-03 | End: 2021-11-03 | Stop reason: HOSPADM

## 2021-11-03 RX ORDER — ONDANSETRON 2 MG/ML
4 INJECTION INTRAMUSCULAR; INTRAVENOUS ONCE AS NEEDED
Status: DISCONTINUED | OUTPATIENT
Start: 2021-11-03 | End: 2021-11-03 | Stop reason: HOSPADM

## 2021-11-03 RX ORDER — SODIUM CHLORIDE, SODIUM LACTATE, POTASSIUM CHLORIDE, CALCIUM CHLORIDE 600; 310; 30; 20 MG/100ML; MG/100ML; MG/100ML; MG/100ML
9 INJECTION, SOLUTION INTRAVENOUS CONTINUOUS
Status: DISCONTINUED | OUTPATIENT
Start: 2021-11-03 | End: 2021-11-03 | Stop reason: HOSPADM

## 2021-11-03 RX ORDER — FENTANYL CITRATE 50 UG/ML
INJECTION, SOLUTION INTRAMUSCULAR; INTRAVENOUS AS NEEDED
Status: DISCONTINUED | OUTPATIENT
Start: 2021-11-03 | End: 2021-11-03 | Stop reason: SURG

## 2021-11-03 RX ORDER — SODIUM CHLORIDE 0.9 % (FLUSH) 0.9 %
10 SYRINGE (ML) INJECTION EVERY 12 HOURS SCHEDULED
Status: DISCONTINUED | OUTPATIENT
Start: 2021-11-03 | End: 2021-11-03 | Stop reason: HOSPADM

## 2021-11-03 RX ORDER — OXYCODONE AND ACETAMINOPHEN 10; 325 MG/1; MG/1
1 TABLET ORAL ONCE AS NEEDED
Status: DISCONTINUED | OUTPATIENT
Start: 2021-11-03 | End: 2021-11-03 | Stop reason: HOSPADM

## 2021-11-03 RX ORDER — NALOXONE HCL 0.4 MG/ML
0.4 VIAL (ML) INJECTION AS NEEDED
Status: DISCONTINUED | OUTPATIENT
Start: 2021-11-03 | End: 2021-11-03 | Stop reason: HOSPADM

## 2021-11-03 RX ORDER — 0.9 % SODIUM CHLORIDE 0.9 %
VIAL (ML) INJECTION AS NEEDED
Status: DISCONTINUED | OUTPATIENT
Start: 2021-11-03 | End: 2021-11-03 | Stop reason: HOSPADM

## 2021-11-03 RX ORDER — IBUPROFEN 600 MG/1
600 TABLET ORAL ONCE AS NEEDED
Status: DISCONTINUED | OUTPATIENT
Start: 2021-11-03 | End: 2021-11-03 | Stop reason: HOSPADM

## 2021-11-03 RX ORDER — HYDROCODONE BITARTRATE AND ACETAMINOPHEN 5; 325 MG/1; MG/1
1 TABLET ORAL EVERY 8 HOURS PRN
Qty: 6 TABLET | Refills: 0 | Status: SHIPPED | OUTPATIENT
Start: 2021-11-03 | End: 2022-01-24

## 2021-11-03 RX ORDER — SODIUM CHLORIDE 0.9 % (FLUSH) 0.9 %
10 SYRINGE (ML) INJECTION AS NEEDED
Status: DISCONTINUED | OUTPATIENT
Start: 2021-11-03 | End: 2021-11-03 | Stop reason: HOSPADM

## 2021-11-03 RX ORDER — OXYCODONE AND ACETAMINOPHEN 7.5; 325 MG/1; MG/1
2 TABLET ORAL EVERY 4 HOURS PRN
Status: DISCONTINUED | OUTPATIENT
Start: 2021-11-03 | End: 2021-11-03 | Stop reason: HOSPADM

## 2021-11-03 RX ORDER — HYDROCODONE BITARTRATE AND ACETAMINOPHEN 5; 325 MG/1; MG/1
1 TABLET ORAL ONCE AS NEEDED
Status: DISCONTINUED | OUTPATIENT
Start: 2021-11-03 | End: 2021-11-03 | Stop reason: HOSPADM

## 2021-11-03 RX ORDER — LIDOCAINE HYDROCHLORIDE 10 MG/ML
INJECTION, SOLUTION INFILTRATION; PERINEURAL AS NEEDED
Status: DISCONTINUED | OUTPATIENT
Start: 2021-11-03 | End: 2021-11-03 | Stop reason: HOSPADM

## 2021-11-03 RX ORDER — LABETALOL HYDROCHLORIDE 5 MG/ML
5 INJECTION, SOLUTION INTRAVENOUS
Status: DISCONTINUED | OUTPATIENT
Start: 2021-11-03 | End: 2021-11-03 | Stop reason: HOSPADM

## 2021-11-03 RX ORDER — SODIUM CHLORIDE, SODIUM LACTATE, POTASSIUM CHLORIDE, CALCIUM CHLORIDE 600; 310; 30; 20 MG/100ML; MG/100ML; MG/100ML; MG/100ML
1000 INJECTION, SOLUTION INTRAVENOUS CONTINUOUS
Status: DISCONTINUED | OUTPATIENT
Start: 2021-11-03 | End: 2021-11-03 | Stop reason: HOSPADM

## 2021-11-03 RX ORDER — PROPOFOL 10 MG/ML
VIAL (ML) INTRAVENOUS AS NEEDED
Status: DISCONTINUED | OUTPATIENT
Start: 2021-11-03 | End: 2021-11-03 | Stop reason: SURG

## 2021-11-03 RX ORDER — CEFAZOLIN SODIUM 1 G/3ML
INJECTION, POWDER, FOR SOLUTION INTRAMUSCULAR; INTRAVENOUS AS NEEDED
Status: DISCONTINUED | OUTPATIENT
Start: 2021-11-03 | End: 2021-11-03 | Stop reason: SURG

## 2021-11-03 RX ORDER — DEXTROSE MONOHYDRATE 25 G/50ML
12.5 INJECTION, SOLUTION INTRAVENOUS AS NEEDED
Status: DISCONTINUED | OUTPATIENT
Start: 2021-11-03 | End: 2021-11-03 | Stop reason: HOSPADM

## 2021-11-03 RX ADMIN — PROPOFOL 300 MG: 10 INJECTION, EMULSION INTRAVENOUS at 07:09

## 2021-11-03 RX ADMIN — FENTANYL CITRATE 100 MCG: 50 INJECTION, SOLUTION INTRAMUSCULAR; INTRAVENOUS at 07:06

## 2021-11-03 RX ADMIN — SODIUM CHLORIDE, POTASSIUM CHLORIDE, SODIUM LACTATE AND CALCIUM CHLORIDE 1000 ML: 600; 310; 30; 20 INJECTION, SOLUTION INTRAVENOUS at 06:20

## 2021-11-03 RX ADMIN — CEFAZOLIN 2 G: 330 INJECTION, POWDER, FOR SOLUTION INTRAMUSCULAR; INTRAVENOUS at 07:16

## 2021-11-03 NOTE — TELEPHONE ENCOUNTER
Returned patients phone call about the CT scans he has scheduled in January. Left Nava Desir a message for him to call us back when he is available. She verbalized understanding.     rp

## 2021-11-03 NOTE — TELEPHONE ENCOUNTER
Caller: Delgado Desir    Relationship: Self    Best call back number: [phone]    What is the best time to reach you: ASAP    Who are you requesting to speak with (clinical staff, provider,  specific staff member): NURSE    Do you know the name of the person who called:     What was the call regarding: PT WANTS TO TALK ABOUT THE CT SCHEDULED IN JAN    Do you require a callback: YES

## 2021-11-03 NOTE — ANESTHESIA POSTPROCEDURE EVALUATION
Patient: Delgado Desir    Procedure Summary     Date: 11/03/21 Room / Location:  [facility]  [facility]    Anesthesia Start: 0706 Anesthesia Stop: 0742    Procedure: TRANSRECTAL ULTRASOUND GUIDED PLACEMENT PERIRECTAL SPACER APPLICATION FOR RADIATION TREATMENT AND PROSTATE FIDUCIARY MARKERS (N/A Rectum) Diagnosis:       Prostate cancer (HCC)      (Prostate cancer (CMS/HCC) [C61])    Surgeons: Israel Delacruz MD Provider: Rodolfo Sanchez CRNA    Anesthesia Type: general ASA Status: 3          Anesthesia Type: general    Vitals  Vitals Value Taken Time   /72 11/03/21 0811   Temp 96.5 °F (35.8 °C) 11/03/21 0739   Pulse 53 11/03/21 0811   Resp 16 11/03/21 0800   SpO2 98 % 11/03/21 0811   Vitals shown include unvalidated device data.        Post Anesthesia Care and Evaluation    Patient location during evaluation: PACU  Patient participation: complete - patient participated  Level of consciousness: awake and alert  Pain management: adequate  Airway patency: patent  Anesthetic complications: No anesthetic complications    Cardiovascular status: acceptable  Respiratory status: acceptable  Hydration status: acceptable    Comments: Blood pressure 152/76, pulse 54, temperature 96.5 °F (35.8 °C), temperature source Temporal, resp. rate 16, SpO2 98 %.    Pt discharged from PACU based on rodolfo score >8

## 2021-11-03 NOTE — H&P
Urology H&P    Mr. Desir is 71 y.o. male    CHIEF COMPLAINT: Patient is here for SpaceOAR and fiduciary markers  HPI  Patient with adenocarcinoma of the prostate to undergo EBRT.  He is here today for SpaceOAR and fiduciary markers.    The following portions of the patient's history were reviewed and updated as appropriate: allergies, current medications, past family history, past medical history, past social history, past surgical history and problem list.    Constitutional: Negative for chills and fever.   Gastrointestinal: Negative for abdominal pain, anal bleeding and blood in stool.   Genitourinary: Negative for flank pain and hematuria.      Medications Prior to Admission   Medication Sig Dispense Refill Last Dose   • acetaminophen (TYLENOL) 500 MG tablet Take 500 mg by mouth Every 6 (Six) Hours As Needed for Mild Pain .   Past Week at Unknown time   • alfuzosin (UROXATRAL) 10 MG 24 hr tablet Take 10 mg by mouth Daily.   11/2/2021 at 1900   • ferrous sulfate 325 (65 FE) MG tablet Take 1 tablet by mouth Daily With Breakfast. 60 tablet 2 11/2/2021 at 0800   • losartan-hydrochlorothiazide (HYZAAR) 50-12.5 MG per tablet Take 0.5 tablets by mouth Daily. 30 tablet 5 11/2/2021 at 0800   • metoprolol tartrate (LOPRESSOR) 25 MG tablet TAKE 1/2 TABLET BY MOUTH EVERY 12 HOURS  90 tablet 4 11/3/2021 at 0415   • Synthroid 200 MCG tablet Take 1 tablet by mouth Daily for 30 days. 30 tablet 2 11/2/2021 at 0800   • therapeutic multivitamin-minerals (THERAGRAN-M) tablet Take 1 tablet by mouth Daily.   11/2/2021 at 0800   • Eliquis 5 MG tablet tablet TAKE 1 TABLET BY MOUTH EVERY 12 HOURS  60 tablet 11 10/27/2021         Current Facility-Administered Medications:   •  lactated ringers infusion 1,000 mL, 1,000 mL, Intravenous, Continuous, Israel Delacruz MD  •  lidocaine PF 1% (XYLOCAINE) injection 0.5 mL, 0.5 mL, Intradermal, Once PRN, Israel Delacruz MD  •  sodium chloride 0.9 % flush 3 mL, 3 mL, Intravenous, PRN, Jayme  Israel CARVALHO MD    Past Medical History:   Diagnosis Date   • Arthritis    • Dizzy spells     states on rare occsaion has   • Hypertension    • PAF (paroxysmal atrial fibrillation) (HCC)    • Prostate cancer (HCC)    • Thyroid disorder        Past Surgical History:   Procedure Laterality Date   • CARPAL TUNNEL RELEASE Right    • LOBECTOMY Right 2/15/2021    Procedure: THORACOSCOPY WITH DAVINCI ROBOT WITH RIGHT WEDGE RESECTION, MEDIASTINAL LYMPH NODE DISSECTION-RIGHT;  Surgeon: Nick Flores MD;  Location: [facility] OR;  Service: [facility];  Laterality: Right;   • LUNG BIOPSY     • PROSTATE BIOPSY     • REPLACEMENT TOTAL KNEE Left    • SHOULDER SURGERY Bilateral    • THORACOSCOPY Right 10/30/2020    Procedure: MEDIASTINOSCOPY, RIGHT THORACOSCOPY POSSIBLE WEDGE RESECTION WITH DAVINCI ROBOT;  Surgeon: Nick Flores MD;  Location: [facility] OR;  Service: [facility];  Laterality: Right;   • THYROIDECTOMY N/A 2020    Procedure: Total thyroidectomy;  Surgeon: Jean Marie Solis MD;  Location:  [facility] OR;  Service: ENT;  Laterality: N/A;       Social History     Socioeconomic History   • Marital status:    • Number of children: 2   Tobacco Use   • Smoking status: Former Smoker     Packs/day: 1.00     Years: 33.00     Pack years: 33.00     Types: Cigarettes     Start date:      Quit date:      Years since quittin.8   • Smokeless tobacco: Never Used   Vaping Use   • Vaping Use: Never used   Substance and Sexual Activity   • Alcohol use: Yes     Alcohol/week: 2.0 standard drinks     Types: 2 Shots of liquor per week     Comment: x2 weekly   • Drug use: Never   • Sexual activity: Defer       Family History   Problem Relation Age of Onset   • Hypertension Mother    • COPD Father        /61 (BP Location: Left arm, Patient Position: Sitting)   Pulse 59   Temp 97.1 °F (36.2 °C) (Temporal)   Resp 16   SpO2 95%     Constitutional:  Patient appears well-developed and well-nourished. There are no obvious  deformities. No distress. The vital signs are reviewed  Pulmonary/Chest: Effort normal.   GI: Soft. The patient exhibits no distension and no mass. There is no tenderness. There is no rebound and no guarding. No hernia.   Neurological: Patient is alert and oriented to person, place, and time.   Skin: Skin is warm and dry. Not diaphoretic.   Psychiatric:  normal mood and affect. Not agitated.       Lab Results   Component Value Date    GLUCOSE 101 (H) 10/15/2021    BUN 18 10/15/2021    CREATININE 1.10 10/26/2021    EGFRIFNONA 64 10/15/2021    EGFRIFAFRI >59 08/14/2020    BCR 15.9 10/15/2021    CO2 27.0 10/15/2021    CALCIUM 8.9 10/15/2021    ALBUMIN 4.10 10/15/2021    AST 23 10/15/2021    ALT 29 10/15/2021     Lab Results   Component Value Date    GLUCOSE 101 (H) 10/15/2021    CALCIUM 8.9 10/15/2021     10/15/2021    K 4.0 10/15/2021    CO2 27.0 10/15/2021     10/15/2021    BUN 18 10/15/2021    CREATININE 1.10 10/26/2021    EGFRIFAFRI >59 08/14/2020    EGFRIFNONA 64 10/15/2021    BCR 15.9 10/15/2021    ANIONGAP 9.0 10/15/2021     Lab Results   Component Value Date    WBC 6.34 10/15/2021    HGB 13.2 10/15/2021    HCT 39.0 10/15/2021    MCV 90.5 10/15/2021     10/15/2021     Lab Results   Component Value Date    PSA 21.02 (H) 08/11/2021     No results found for: URINECX  Brief Urine Lab Results  (Last result in the past 365 days)      Color   Clarity   Blood   Leuk Est   Nitrite   Protein   CREAT   Urine HCG        09/21/21 0926 Yellow   Clear   Trace   Negative   Negative   Negative                   Imaging Results (Last 7 Days)     ** No results found for the last 168 hours. **          Assessment and Plan  Prostate cancer-EBRT plan  -Plan SpaceOAR gel application and fiduciary markers. The risks, alternatives, and benefits of this treatment recommendation are discussed.  I did answer all questions of the patient.        (Please note that portions of this note were completed with a voice  recognition program.)  Israel Delacruz MD  11/03/21  06:20 CDT

## 2021-11-03 NOTE — OP NOTE
Operative Summary    Delgado Desir  Date of Procedure: 11/3/2021    Pre-op Diagnosis:   Prostate cancer (CMS/HCC) [C61]    Post-op Diagnosis:     Post-Op Diagnosis Codes:     * Prostate cancer (HCC) [C61]    Procedure/CPT® Codes:      Procedure(s):  TRANSRECTAL ULTRASOUND GUIDED PLACEMENT PERIRECTAL SPACER APPLICATION FOR RADIATION TREATMENT AND PROSTATE FIDUCIARY MARKERS    Surgeon(s):  Israel Delacruz MD    Anesthesia: General    Staff:   Circulator: Lilo Valenzuela RN  Scrub Person: Jocelyne Flaherty; Yamila Youssef    Indications for procedure:   Patient with prostate cancer that is opted for external beam radiotherapy as primary curative treatment.    Procedure details:  After appropriate anesthesia, positioning, prep and drape, timeout protocol was observed.     Patient is positioned in the dorsal lithotomy position and the perineal skin is prepped with povidone-iodine per the standard practice. Sterile drapes are then placed on the patient’s legs, genitals, ultrasound probe and the stepper.  At this point I placed the fiduciary seeds.  Local anesthetic with lidocaine is injected to anesthetize the tracts.  Using both transverse sagittal imaging the needles that contained the seeds were inserted in the extreme left of the gland, just to the right of the urethra at the base and just to the right of the urethra at the apex.  Once the patient is anesthetized, implantation of the SpaceOAR System begins. A transrectal ultrasound probe (TRUS) is inserted into the rectum and the space between the prostate (mid gland) and rectum is measured. Under TRUS guidance, a saline syringe needle (15 cm 18G needle) is inserted through the perineal skin, rectourethralis muscle and past the prostate apex to the perirectal fat between Denonvilliers’ fascia at prostate mid-gland and the rectal wall. The needle position is confirmed in both sagittal and axial ultrasound fields, and saline is injected to dissect the space  between the Denonvilliers’ fascia and anterior rectal wall (“hydrodissection”).  Hydrodissection under ultrasound guidance confirms proper needle location and creates space for hydrogel injection.With the needle tip at mid gland, the axial field is viewed to confirm the needle is not in the rectal wall and is centered. Confirmation of proper placement in the perirectal fat is noted.      While maintaining the desired position, aspiration is performed to ensure that the needle is not in an intravascular space.  The saline syringe is then removed and the SpaceOAR System syringe assembly is then attached to the same 18G needle. Under ultrasound guidance (sagittal plane), a smooth, continuous injection technique is used to dispense the hydrogel implant into the space between the prostate and rectum.  The entire syringe contents (10 mL total) are injected without stopping, resulting in expansion of the richard-prostatic space with the liquid precursors that solidify within 10 seconds.  Optimal visualization of the needle during hydrogel administration is maintained at all times.      Following injection, the needle is removed, and the spent applicator and needle are discarded. An axial measurement of the space between the prostate (mid-gland) and rectum immediately post- injection is noted. The TRUS probe is removed from the patient, the stirrups are lowered, and patient is wheeled from the room for recovery.    Estimated Blood Loss: Less than 30 mL    Specimens:                None      Drains: none    Complications: none    Plan: Patient will begin radiation therapy per radiation oncologist plan.      (Please note that portions of this note were completed with a voice recognition program.)  Israel Delacruz MD     Date: 11/3/2021  Time: 07:36 CDT  00

## 2021-11-04 ENCOUNTER — TELEPHONE (OUTPATIENT)
Dept: ONCOLOGY | Facility: CLINIC | Age: 71
End: 2021-11-04

## 2021-11-04 NOTE — TELEPHONE ENCOUNTER
Patient returned my voicemail from yesterday regarding his CT scheduled in January for chest, abdomen and pelvis. Dr. Bullard's last office note states he will do CT's every 6 months for the first 3 years and then yearly after that. Patient just had a CT of the abdomen and pelvis late October due to a high rilirubin level. Patient states he will keep the appointment in January but would like to ensure insurance will pay for it. I let him know that we will get this approved through his insurance and if we have any problems we will let him know before he is scheduled. Patient also states he would like to be scheduled at [facility] like the last CT he had. He is scheduled at [facility] but I told him I will send a message to Alexandra to see about getting the location changed. Patient verbalized understanding and says he will wait for our call about changing the location.    molly

## 2021-11-10 ENCOUNTER — HOSPITAL ENCOUNTER (OUTPATIENT)
Dept: RADIATION ONCOLOGY | Facility: HOSPITAL | Age: 71
Setting detail: RADIATION/ONCOLOGY SERIES
End: 2021-11-10

## 2021-11-10 ENCOUNTER — HOSPITAL ENCOUNTER (OUTPATIENT)
Dept: RADIATION ONCOLOGY | Facility: HOSPITAL | Age: 71
Setting detail: RADIATION/ONCOLOGY SERIES
Discharge: HOME | End: 2021-11-10

## 2021-11-10 PROCEDURE — 77334 RADIATION TREATMENT AID(S): CPT | Performed by: RADIOLOGY

## 2021-11-22 PROCEDURE — 77300 RADIATION THERAPY DOSE PLAN: CPT | Performed by: RADIOLOGY

## 2021-11-22 PROCEDURE — 77338 DESIGN MLC DEVICE FOR IMRT: CPT | Performed by: RADIOLOGY

## 2021-11-22 PROCEDURE — 77301 RADIOTHERAPY DOSE PLAN IMRT: CPT | Performed by: RADIOLOGY

## 2021-11-29 ENCOUNTER — HOSPITAL ENCOUNTER (OUTPATIENT)
Dept: RADIATION ONCOLOGY | Facility: HOSPITAL | Age: 71
Setting detail: RADIATION/ONCOLOGY SERIES
Discharge: HOME | End: 2021-11-29

## 2021-11-29 PROCEDURE — 77385: CPT | Performed by: RADIOLOGY

## 2021-11-30 ENCOUNTER — HOSPITAL ENCOUNTER (OUTPATIENT)
Dept: RADIATION ONCOLOGY | Facility: HOSPITAL | Age: 71
Setting detail: RADIATION/ONCOLOGY SERIES
Discharge: HOME | End: 2021-11-30

## 2021-11-30 PROCEDURE — 77385: CPT | Performed by: RADIOLOGY

## 2021-12-01 ENCOUNTER — HOSPITAL ENCOUNTER (OUTPATIENT)
Dept: RADIATION ONCOLOGY | Facility: HOSPITAL | Age: 71
Setting detail: RADIATION/ONCOLOGY SERIES
End: 2021-12-01

## 2021-12-01 ENCOUNTER — HOSPITAL ENCOUNTER (OUTPATIENT)
Dept: RADIATION ONCOLOGY | Facility: HOSPITAL | Age: 71
Setting detail: RADIATION/ONCOLOGY SERIES
Discharge: HOME | End: 2021-12-01

## 2021-12-01 PROCEDURE — 77385: CPT | Performed by: RADIOLOGY

## 2021-12-01 PROCEDURE — 77336 RADIATION PHYSICS CONSULT: CPT | Performed by: RADIOLOGY

## 2021-12-02 ENCOUNTER — HOSPITAL ENCOUNTER (OUTPATIENT)
Dept: RADIATION ONCOLOGY | Facility: HOSPITAL | Age: 71
Setting detail: RADIATION/ONCOLOGY SERIES
Discharge: HOME | End: 2021-12-02

## 2021-12-02 PROCEDURE — 77385: CPT | Performed by: RADIOLOGY

## 2021-12-03 ENCOUNTER — HOSPITAL ENCOUNTER (OUTPATIENT)
Dept: RADIATION ONCOLOGY | Facility: HOSPITAL | Age: 71
Setting detail: RADIATION/ONCOLOGY SERIES
Discharge: HOME | End: 2021-12-03

## 2021-12-03 PROCEDURE — 77385: CPT | Performed by: RADIOLOGY

## 2021-12-06 ENCOUNTER — HOSPITAL ENCOUNTER (OUTPATIENT)
Dept: RADIATION ONCOLOGY | Facility: HOSPITAL | Age: 71
Setting detail: RADIATION/ONCOLOGY SERIES
Discharge: HOME | End: 2021-12-06

## 2021-12-06 ENCOUNTER — LAB (OUTPATIENT)
Dept: LAB | Facility: HOSPITAL | Age: 71
End: 2021-12-06

## 2021-12-06 DIAGNOSIS — E89.0 S/P TOTAL THYROIDECTOMY: ICD-10-CM

## 2021-12-06 DIAGNOSIS — E89.0 POSTOPERATIVE HYPOTHYROIDISM: ICD-10-CM

## 2021-12-06 LAB — TSH SERPL DL<=0.05 MIU/L-ACNC: 0.56 UIU/ML (ref 0.27–4.2)

## 2021-12-06 PROCEDURE — 84443 ASSAY THYROID STIM HORMONE: CPT

## 2021-12-06 PROCEDURE — 36415 COLL VENOUS BLD VENIPUNCTURE: CPT

## 2021-12-06 PROCEDURE — 77385: CPT | Performed by: RADIOLOGY

## 2021-12-07 ENCOUNTER — HOSPITAL ENCOUNTER (OUTPATIENT)
Dept: RADIATION ONCOLOGY | Facility: HOSPITAL | Age: 71
Setting detail: RADIATION/ONCOLOGY SERIES
Discharge: HOME | End: 2021-12-07

## 2021-12-07 ENCOUNTER — TELEPHONE (OUTPATIENT)
Dept: OTOLARYNGOLOGY | Facility: CLINIC | Age: 71
End: 2021-12-07

## 2021-12-07 DIAGNOSIS — E89.0 POSTOPERATIVE HYPOTHYROIDISM: Primary | ICD-10-CM

## 2021-12-07 PROCEDURE — 77385: CPT | Performed by: RADIOLOGY

## 2021-12-08 ENCOUNTER — HOSPITAL ENCOUNTER (OUTPATIENT)
Dept: RADIATION ONCOLOGY | Facility: HOSPITAL | Age: 71
Setting detail: RADIATION/ONCOLOGY SERIES
Discharge: HOME | End: 2021-12-08

## 2021-12-08 PROCEDURE — 77385: CPT | Performed by: RADIOLOGY

## 2021-12-08 PROCEDURE — 77336 RADIATION PHYSICS CONSULT: CPT | Performed by: RADIOLOGY

## 2021-12-09 ENCOUNTER — HOSPITAL ENCOUNTER (OUTPATIENT)
Dept: RADIATION ONCOLOGY | Facility: HOSPITAL | Age: 71
Setting detail: RADIATION/ONCOLOGY SERIES
Discharge: HOME | End: 2021-12-09

## 2021-12-09 PROCEDURE — 77385: CPT | Performed by: RADIOLOGY

## 2021-12-10 ENCOUNTER — HOSPITAL ENCOUNTER (OUTPATIENT)
Dept: RADIATION ONCOLOGY | Facility: HOSPITAL | Age: 71
Setting detail: RADIATION/ONCOLOGY SERIES
Discharge: HOME | End: 2021-12-10

## 2021-12-10 PROCEDURE — 77385: CPT | Performed by: RADIOLOGY

## 2021-12-13 ENCOUNTER — HOSPITAL ENCOUNTER (OUTPATIENT)
Dept: RADIATION ONCOLOGY | Facility: HOSPITAL | Age: 71
Setting detail: RADIATION/ONCOLOGY SERIES
Discharge: HOME | End: 2021-12-13

## 2021-12-13 PROCEDURE — 77385: CPT | Performed by: RADIOLOGY

## 2021-12-14 ENCOUNTER — HOSPITAL ENCOUNTER (OUTPATIENT)
Dept: RADIATION ONCOLOGY | Facility: HOSPITAL | Age: 71
Setting detail: RADIATION/ONCOLOGY SERIES
Discharge: HOME | End: 2021-12-14

## 2021-12-14 PROCEDURE — 77300 RADIATION THERAPY DOSE PLAN: CPT | Performed by: RADIOLOGY

## 2021-12-14 PROCEDURE — 77385: CPT | Performed by: RADIOLOGY

## 2021-12-15 ENCOUNTER — HOSPITAL ENCOUNTER (OUTPATIENT)
Dept: RADIATION ONCOLOGY | Facility: HOSPITAL | Age: 71
Setting detail: RADIATION/ONCOLOGY SERIES
Discharge: HOME | End: 2021-12-15

## 2021-12-15 PROCEDURE — 77385: CPT | Performed by: RADIOLOGY

## 2021-12-15 PROCEDURE — 77336 RADIATION PHYSICS CONSULT: CPT | Performed by: RADIOLOGY

## 2021-12-16 ENCOUNTER — HOSPITAL ENCOUNTER (OUTPATIENT)
Dept: RADIATION ONCOLOGY | Facility: HOSPITAL | Age: 71
Setting detail: RADIATION/ONCOLOGY SERIES
Discharge: HOME | End: 2021-12-16

## 2021-12-16 PROCEDURE — 77385: CPT | Performed by: RADIOLOGY

## 2021-12-17 ENCOUNTER — HOSPITAL ENCOUNTER (OUTPATIENT)
Dept: RADIATION ONCOLOGY | Facility: HOSPITAL | Age: 71
Setting detail: RADIATION/ONCOLOGY SERIES
Discharge: HOME | End: 2021-12-17

## 2021-12-17 PROCEDURE — 77385: CPT | Performed by: RADIOLOGY

## 2021-12-20 ENCOUNTER — HOSPITAL ENCOUNTER (OUTPATIENT)
Dept: RADIATION ONCOLOGY | Facility: HOSPITAL | Age: 71
Setting detail: RADIATION/ONCOLOGY SERIES
Discharge: HOME | End: 2021-12-20

## 2021-12-20 PROCEDURE — 77385: CPT | Performed by: RADIOLOGY

## 2021-12-21 ENCOUNTER — HOSPITAL ENCOUNTER (OUTPATIENT)
Dept: RADIATION ONCOLOGY | Facility: HOSPITAL | Age: 71
Setting detail: RADIATION/ONCOLOGY SERIES
Discharge: HOME | End: 2021-12-21

## 2021-12-21 PROCEDURE — 77385: CPT | Performed by: RADIOLOGY

## 2021-12-22 ENCOUNTER — HOSPITAL ENCOUNTER (OUTPATIENT)
Dept: RADIATION ONCOLOGY | Facility: HOSPITAL | Age: 71
Setting detail: RADIATION/ONCOLOGY SERIES
Discharge: HOME | End: 2021-12-22

## 2021-12-22 PROCEDURE — 77385: CPT | Performed by: RADIOLOGY

## 2021-12-22 PROCEDURE — 77336 RADIATION PHYSICS CONSULT: CPT | Performed by: RADIOLOGY

## 2021-12-23 ENCOUNTER — HOSPITAL ENCOUNTER (OUTPATIENT)
Dept: RADIATION ONCOLOGY | Facility: HOSPITAL | Age: 71
Setting detail: RADIATION/ONCOLOGY SERIES
Discharge: HOME | End: 2021-12-23

## 2021-12-23 PROCEDURE — 77385: CPT | Performed by: RADIOLOGY

## 2021-12-27 ENCOUNTER — HOSPITAL ENCOUNTER (OUTPATIENT)
Dept: RADIATION ONCOLOGY | Facility: HOSPITAL | Age: 71
Setting detail: RADIATION/ONCOLOGY SERIES
Discharge: HOME | End: 2021-12-27

## 2021-12-27 PROCEDURE — 77385: CPT | Performed by: RADIOLOGY

## 2021-12-28 ENCOUNTER — HOSPITAL ENCOUNTER (OUTPATIENT)
Dept: RADIATION ONCOLOGY | Facility: HOSPITAL | Age: 71
Setting detail: RADIATION/ONCOLOGY SERIES
Discharge: HOME | End: 2021-12-28

## 2021-12-28 PROCEDURE — 77385: CPT | Performed by: RADIOLOGY

## 2021-12-29 ENCOUNTER — HOSPITAL ENCOUNTER (OUTPATIENT)
Dept: RADIATION ONCOLOGY | Facility: HOSPITAL | Age: 71
Setting detail: RADIATION/ONCOLOGY SERIES
Discharge: HOME | End: 2021-12-29

## 2021-12-29 PROCEDURE — 77385: CPT | Performed by: RADIOLOGY

## 2021-12-30 ENCOUNTER — HOSPITAL ENCOUNTER (OUTPATIENT)
Dept: RADIATION ONCOLOGY | Facility: HOSPITAL | Age: 71
Setting detail: RADIATION/ONCOLOGY SERIES
Discharge: HOME | End: 2021-12-30

## 2021-12-30 PROCEDURE — 77385: CPT | Performed by: RADIOLOGY

## 2021-12-30 RX ORDER — LEVOTHYROXINE SODIUM 200 MCG
200 TABLET ORAL DAILY
Qty: 30 TABLET | Refills: 2 | Status: SHIPPED | OUTPATIENT
Start: 2021-12-30 | End: 2022-03-31 | Stop reason: DRUGHIGH

## 2022-01-03 ENCOUNTER — HOSPITAL ENCOUNTER (OUTPATIENT)
Dept: RADIATION ONCOLOGY | Facility: HOSPITAL | Age: 72
Setting detail: RADIATION/ONCOLOGY SERIES
End: 2022-01-03

## 2022-01-03 ENCOUNTER — HOSPITAL ENCOUNTER (OUTPATIENT)
Dept: RADIATION ONCOLOGY | Facility: HOSPITAL | Age: 72
Discharge: HOME | End: 2022-01-03

## 2022-01-03 PROCEDURE — 77385: CPT | Performed by: RADIOLOGY

## 2022-01-04 ENCOUNTER — OFFICE VISIT (OUTPATIENT)
Dept: OTOLARYNGOLOGY | Facility: CLINIC | Age: 72
End: 2022-01-04

## 2022-01-04 ENCOUNTER — HOSPITAL ENCOUNTER (OUTPATIENT)
Dept: RADIATION ONCOLOGY | Facility: HOSPITAL | Age: 72
Setting detail: RADIATION/ONCOLOGY SERIES
Discharge: HOME | End: 2022-01-04

## 2022-01-04 VITALS
WEIGHT: 254.8 LBS | BODY MASS INDEX: 33.77 KG/M2 | HEART RATE: 67 BPM | HEIGHT: 73 IN | SYSTOLIC BLOOD PRESSURE: 142 MMHG | DIASTOLIC BLOOD PRESSURE: 67 MMHG | TEMPERATURE: 97.5 F

## 2022-01-04 DIAGNOSIS — E89.0 S/P TOTAL THYROIDECTOMY: Primary | ICD-10-CM

## 2022-01-04 DIAGNOSIS — E89.0 POSTSURGICAL HYPOTHYROIDISM: ICD-10-CM

## 2022-01-04 DIAGNOSIS — E89.0 POSTOPERATIVE HYPOTHYROIDISM: ICD-10-CM

## 2022-01-04 PROCEDURE — 77385: CPT | Performed by: RADIOLOGY

## 2022-01-04 PROCEDURE — 99213 OFFICE O/P EST LOW 20 MIN: CPT | Performed by: NURSE PRACTITIONER

## 2022-01-04 NOTE — PATIENT INSTRUCTIONS
CONTACT INFORMATION:  The main office phone number is [phone]. For emergencies after hours and on weekends, this number will convert over to our answering service and the on call provider will answer. Please try to keep non emergent phone calls/ questions to office hours 9am-5pm Monday through Friday.      [employer]  As an alternative, you can sign up and use the Epic MyChart system for more direct and quicker access for non emergent questions/ problems.  [employer] allows you to send messages to your doctor, view your test results, renew your prescriptions, schedule appointments, and more. To sign up, go to [employer] and click on the Sign Up Now link in the New User? box. Enter your [employer] Activation Code exactly as it appears below along with the last four digits of your Social Security Number and your Date of Birth () to complete the sign-up process. If you do not sign up before the expiration date, you must request a new code.     [employer] Activation Code: Activation code not generated  Current [employer] Status: Active     If you have questions, you can email [email or web address] or call [phone] to talk to our [employer] staff. Remember, [employer] is NOT to be used for urgent needs. For medical emergencies, dial 911.     IF YOU SMOKE OR USE TOBACCO PLEASE READ THE FOLLOWING:  Why is smoking bad for me?  Smoking increases the risk of heart disease, lung disease, vascular disease, stroke, and cancer. If you smoke, STOP!        IF YOU SMOKE OR USE TOBACCO PLEASE READ THE FOLLOWING:  Why is smoking bad for me?  Smoking increases the risk of heart disease, lung disease, vascular disease, stroke, and cancer. If you smoke, STOP!     For more information:  Quit Now [place]  -QUIT-NOW  [email or web address]

## 2022-01-04 NOTE — PROGRESS NOTES
YOB: 1950  Location: [facility]  Location Address: [address] [place] [address], [facility], [address] [place] [address]  Location Phone: [phone]    Chief Complaint   Patient presents with   • Follow-up       History of Present Illness  Delgado Desir is a 71 y.o. male.  Delgado Desir is here for follow up of ENT complaints. The patient has had problems with s/p total thyroidectomy 2020. He has had a relatively normal postoperative course.  He has no complaints at this time       He is currently taking synthroid 200mcg      TSH (2021 15:05)    Past Medical History:   Diagnosis Date   • Arthritis    • Dizzy spells     states on rare occsaion has   • Hypertension    • PAF (paroxysmal atrial fibrillation) (HCC)    • Prostate cancer (HCC)    • Thyroid disorder        Past Surgical History:   Procedure Laterality Date   • CARPAL TUNNEL RELEASE Right    • LOBECTOMY Right 2/15/2021    Procedure: THORACOSCOPY WITH DAVINCI ROBOT WITH RIGHT WEDGE RESECTION, MEDIASTINAL LYMPH NODE DISSECTION-RIGHT;  Surgeon: Nick Flores MD;  Location: [facility];  Service: [facility];  Laterality: Right;   • LUNG BIOPSY     • PROSTATE BIOPSY     • REPLACEMENT TOTAL KNEE Left    • SHOULDER SURGERY Bilateral    • THORACOSCOPY Right 10/30/2020    Procedure: MEDIASTINOSCOPY, RIGHT THORACOSCOPY POSSIBLE WEDGE RESECTION WITH DAVINCI ROBOT;  Surgeon: Nick Flores MD;  Location: [facility];  Service: [facility];  Laterality: Right;   • THYROIDECTOMY N/A 2020    Procedure: Total thyroidectomy;  Surgeon: Jean Marie Solis MD;  Location: [facility] OR;  Service: ENT;  Laterality: N/A;       Outpatient Medications Marked as Taking for the 22 encounter (Office Visit) with Jean Marie Solis MD   Medication Sig Dispense Refill   • acetaminophen (TYLENOL) 500 MG tablet Take 500 mg by mouth Every 6 (Six) Hours As Needed for Mild Pain .     • alfuzosin (UROXATRAL) 10 MG 24 hr tablet Take 10 mg by mouth Daily.     • Eliquis 5 MG  tablet tablet TAKE 1 TABLET BY MOUTH EVERY 12 HOURS  60 tablet 11   • ferrous sulfate 325 (65 FE) MG tablet Take 1 tablet by mouth Daily With Breakfast. 60 tablet 2   • HYDROcodone-acetaminophen (NORCO) 5-325 MG per tablet Take 1 tablet by mouth Every 8 (Eight) Hours As Needed for Moderate Pain  (Pain). 6 tablet 0   • losartan-hydrochlorothiazide (HYZAAR) 50-12.5 MG per tablet Take 0.5 tablets by mouth Daily. 30 tablet 5   • metoprolol tartrate (LOPRESSOR) 25 MG tablet TAKE 1/2 TABLET BY MOUTH EVERY 12 HOURS  90 tablet 4   • Synthroid 200 MCG tablet Take 1 tablet by mouth Daily for 30 days. 30 tablet 2   • therapeutic multivitamin-minerals (THERAGRAN-M) tablet Take 1 tablet by mouth Daily.         Patient has no known allergies.    Family History   Problem Relation Age of Onset   • Hypertension Mother    • COPD Father        Social History     Socioeconomic History   • Marital status:    • Number of children: 2   Tobacco Use   • Smoking status: Former Smoker     Packs/day: 1.00     Years: 33.00     Pack years: 33.00     Types: Cigarettes     Start date:      Quit date:      Years since quittin.0   • Smokeless tobacco: Never Used   Vaping Use   • Vaping Use: Never used   Substance and Sexual Activity   • Alcohol use: Yes     Alcohol/week: 2.0 standard drinks     Types: 2 Shots of liquor per week     Comment: x2 weekly   • Drug use: Never   • Sexual activity: Defer       Review of Systems   Constitutional: Negative.  Negative for fatigue.   HENT: Negative for trouble swallowing and voice change.    Eyes: Negative.    Respiratory: Negative.    Cardiovascular: Negative.    Gastrointestinal: Negative.    Endocrine: Negative.    Genitourinary: Negative.    Musculoskeletal: Negative.    Allergic/Immunologic: Negative.    Neurological: Negative.        Vitals:    22 1433   BP: 142/67   Pulse: 67   Temp: 97.5 °F (36.4 °C)       Body mass index is 33.62 kg/m².    Objective     Physical Exam  Vitals  reviewed.   Constitutional:       Appearance: He is obese.   HENT:      Head: Normocephalic.      Right Ear: Hearing, tympanic membrane, ear canal and external ear normal.      Left Ear: Hearing, tympanic membrane, ear canal and external ear normal.      Nose: Nose normal.      Mouth/Throat:      Lips: Pink.      Mouth: Mucous membranes are moist.      Pharynx: Uvula midline.   Neck:     Musculoskeletal:      Cervical back: Full passive range of motion without pain and normal range of motion.   Neurological:      Mental Status: He is alert.         Assessment/Plan   Diagnoses and all orders for this visit:    1. S/P total thyroidectomy (Primary)  -     TSH; Future    2. Postoperative hypothyroidism  -     TSH; Future    3. Postsurgical hypothyroidism  -     TSH; Future      * Surgery not found *  Orders Placed This Encounter   Procedures   • TSH     Standing Status:   Future     Standing Expiration Date:   1/4/2023     Order Specific Question:   Release to patient     Answer:   Immediate     Return in about 6 months (around 7/4/2022).     Will recheck tsh in 3 months   Call for any concerns       Patient Instructions   CONTACT INFORMATION:  The main office phone number is [phone]. For emergencies after hours and on weekends, this number will convert over to our answering service and the on call provider will answer. Please try to keep non emergent phone calls/ questions to office hours 9am-5pm Monday through Friday.      [employer]  As an alternative, you can sign up and use the Epic MyChart system for more direct and quicker access for non emergent questions/ problems.  [facility] [employer] allows you to send messages to your doctor, view your test results, renew your prescriptions, schedule appointments, and more. To sign up, go to Dpivision and click on the Sign Up Now link in the New User? box. Enter your [employer] Activation Code exactly as it appears below along with the last four digits of  your Social Security Number and your Date of Birth () to complete the sign-up process. If you do not sign up before the expiration date, you must request a new code.     Ku Activation Code: Activation code not generated  Current Ku Status: Active     If you have questions, you can email [email or web address] or call [phone] to talk to our [employer] staff. Remember, [employer] is NOT to be used for urgent needs. For medical emergencies, dial 911.     IF YOU SMOKE OR USE TOBACCO PLEASE READ THE FOLLOWING:  Why is smoking bad for me?  Smoking increases the risk of heart disease, lung disease, vascular disease, stroke, and cancer. If you smoke, STOP!        IF YOU SMOKE OR USE TOBACCO PLEASE READ THE FOLLOWING:  Why is smoking bad for me?  Smoking increases the risk of heart disease, lung disease, vascular disease, stroke, and cancer. If you smoke, STOP!     For more information:  Quit Now [place]  -QUIT-NOW  [email or web address]

## 2022-01-05 ENCOUNTER — TELEPHONE (OUTPATIENT)
Dept: ONCOLOGY | Facility: CLINIC | Age: 72
End: 2022-01-05

## 2022-01-05 ENCOUNTER — HOSPITAL ENCOUNTER (OUTPATIENT)
Dept: RADIATION ONCOLOGY | Facility: HOSPITAL | Age: 72
Setting detail: RADIATION/ONCOLOGY SERIES
Discharge: HOME | End: 2022-01-05

## 2022-01-05 PROCEDURE — 77385: CPT | Performed by: RADIOLOGY

## 2022-01-05 PROCEDURE — 77336 RADIATION PHYSICS CONSULT: CPT | Performed by: RADIOLOGY

## 2022-01-05 NOTE — TELEPHONE ENCOUNTER
Caller: Delgado Desir    Relationship: Self    Best call back number: [phone]    What is the best time to reach you: ASAP    Who are you requesting to speak with (clinical staff, provider,  specific staff member): NURSE    Do you know the name of the person who called:     What was the call regarding:  PT SAYS HIS INSURANCE FORM SAYS W/O CONTRAST AND THE CT SCAN SCHEDULED IS WITH CONTRAST    Do you require a callback: YES

## 2022-01-06 ENCOUNTER — HOSPITAL ENCOUNTER (OUTPATIENT)
Dept: RADIATION ONCOLOGY | Facility: HOSPITAL | Age: 72
Setting detail: RADIATION/ONCOLOGY SERIES
Discharge: HOME | End: 2022-01-06

## 2022-01-06 PROCEDURE — 77385: CPT | Performed by: RADIOLOGY

## 2022-01-07 ENCOUNTER — HOSPITAL ENCOUNTER (OUTPATIENT)
Dept: RADIATION ONCOLOGY | Facility: HOSPITAL | Age: 72
Setting detail: RADIATION/ONCOLOGY SERIES
End: 2022-01-07

## 2022-01-07 ENCOUNTER — APPOINTMENT (OUTPATIENT)
Dept: CT IMAGING | Facility: HOSPITAL | Age: 72
End: 2022-01-07

## 2022-01-07 ENCOUNTER — LAB (OUTPATIENT)
Dept: LAB | Facility: HOSPITAL | Age: 72
End: 2022-01-07

## 2022-01-07 DIAGNOSIS — D50.8 IRON DEFICIENCY ANEMIA SECONDARY TO INADEQUATE DIETARY IRON INTAKE: ICD-10-CM

## 2022-01-07 DIAGNOSIS — D50.8 IRON DEFICIENCY ANEMIA SECONDARY TO INADEQUATE DIETARY IRON INTAKE: Primary | ICD-10-CM

## 2022-01-07 DIAGNOSIS — C34.31 MALIGNANT NEOPLASM OF LOWER LOBE OF RIGHT LUNG: ICD-10-CM

## 2022-01-07 LAB
ALBUMIN SERPL-MCNC: 3.7 G/DL (ref 3.5–5.2)
ALBUMIN/GLOB SERPL: 1.3 G/DL
ALP SERPL-CCNC: 98 U/L (ref 39–117)
ALT SERPL W P-5'-P-CCNC: 26 U/L (ref 1–41)
ANION GAP SERPL CALCULATED.3IONS-SCNC: 10 MMOL/L (ref 5–15)
AST SERPL-CCNC: 22 U/L (ref 1–40)
BASOPHILS # BLD AUTO: 0.03 10*3/MM3 (ref 0–0.2)
BASOPHILS NFR BLD AUTO: 0.5 % (ref 0–1.5)
BILIRUB SERPL-MCNC: 1.4 MG/DL (ref 0–1.2)
BUN SERPL-MCNC: 19 MG/DL (ref 8–23)
BUN/CREAT SERPL: 16 (ref 7–25)
CALCIUM SPEC-SCNC: 8.8 MG/DL (ref 8.6–10.5)
CHLORIDE SERPL-SCNC: 104 MMOL/L (ref 98–107)
CO2 SERPL-SCNC: 24 MMOL/L (ref 22–29)
CREAT SERPL-MCNC: 1.19 MG/DL (ref 0.76–1.27)
DEPRECATED RDW RBC AUTO: 49.9 FL (ref 37–54)
EOSINOPHIL # BLD AUTO: 0.1 10*3/MM3 (ref 0–0.4)
EOSINOPHIL NFR BLD AUTO: 1.6 % (ref 0.3–6.2)
ERYTHROCYTE [DISTWIDTH] IN BLOOD BY AUTOMATED COUNT: 15 % (ref 12.3–15.4)
FERRITIN SERPL-MCNC: 269 NG/ML (ref 30–400)
GFR SERPL CREATININE-BSD FRML MDRD: 60 ML/MIN/1.73
GLOBULIN UR ELPH-MCNC: 2.9 GM/DL
GLUCOSE SERPL-MCNC: 116 MG/DL (ref 65–99)
HCT VFR BLD AUTO: 36.3 % (ref 37.5–51)
HGB BLD-MCNC: 12.4 G/DL (ref 13–17.7)
IMM GRANULOCYTES # BLD AUTO: 0.04 10*3/MM3 (ref 0–0.05)
IMM GRANULOCYTES NFR BLD AUTO: 0.7 % (ref 0–0.5)
IRON 24H UR-MRATE: 69 MCG/DL (ref 59–158)
IRON SATN MFR SERPL: 24 % (ref 20–50)
LYMPHOCYTES # BLD AUTO: 0.38 10*3/MM3 (ref 0.7–3.1)
LYMPHOCYTES NFR BLD AUTO: 6.2 % (ref 19.6–45.3)
MCH RBC QN AUTO: 31.2 PG (ref 26.6–33)
MCHC RBC AUTO-ENTMCNC: 34.2 G/DL (ref 31.5–35.7)
MCV RBC AUTO: 91.4 FL (ref 79–97)
MONOCYTES # BLD AUTO: 0.46 10*3/MM3 (ref 0.1–0.9)
MONOCYTES NFR BLD AUTO: 7.5 % (ref 5–12)
NEUTROPHILS NFR BLD AUTO: 5.12 10*3/MM3 (ref 1.7–7)
NEUTROPHILS NFR BLD AUTO: 83.5 % (ref 42.7–76)
NRBC BLD AUTO-RTO: 0 /100 WBC (ref 0–0.2)
PLATELET # BLD AUTO: 167 10*3/MM3 (ref 140–450)
PMV BLD AUTO: 10.3 FL (ref 6–12)
POTASSIUM SERPL-SCNC: 4 MMOL/L (ref 3.5–5.2)
PROT SERPL-MCNC: 6.6 G/DL (ref 6–8.5)
RBC # BLD AUTO: 3.97 10*6/MM3 (ref 4.14–5.8)
RETICS # AUTO: 0.1 10*6/MM3 (ref 0.02–0.13)
RETICS/RBC NFR AUTO: 2.41 % (ref 0.7–1.9)
SODIUM SERPL-SCNC: 138 MMOL/L (ref 136–145)
TIBC SERPL-MCNC: 288 MCG/DL (ref 298–536)
TRANSFERRIN SERPL-MCNC: 193 MG/DL (ref 200–360)
WBC NRBC COR # BLD: 6.13 10*3/MM3 (ref 3.4–10.8)

## 2022-01-07 PROCEDURE — 85045 AUTOMATED RETICULOCYTE COUNT: CPT

## 2022-01-07 PROCEDURE — 36415 COLL VENOUS BLD VENIPUNCTURE: CPT

## 2022-01-07 PROCEDURE — 82728 ASSAY OF FERRITIN: CPT

## 2022-01-07 PROCEDURE — 85025 COMPLETE CBC W/AUTO DIFF WBC: CPT

## 2022-01-07 PROCEDURE — 80053 COMPREHEN METABOLIC PANEL: CPT

## 2022-01-07 PROCEDURE — 83540 ASSAY OF IRON: CPT

## 2022-01-07 PROCEDURE — 84466 ASSAY OF TRANSFERRIN: CPT

## 2022-01-07 NOTE — TELEPHONE ENCOUNTER
CALLED PT GRECIA CHAPPELL ABOUT HIS CT SCANS AND I CHECKED WITH THE [employer] TEAM AND THEY STATED THAT EVERYTHING WAS FINE. PT VERBALIZED UNDERSTANDING. 01/07/2022 BS

## 2022-01-10 ENCOUNTER — HOSPITAL ENCOUNTER (OUTPATIENT)
Dept: RADIATION ONCOLOGY | Facility: HOSPITAL | Age: 72
Discharge: HOME | End: 2022-01-10

## 2022-01-10 ENCOUNTER — HOSPITAL ENCOUNTER (OUTPATIENT)
Dept: CT IMAGING | Facility: HOSPITAL | Age: 72
Discharge: HOME | End: 2022-01-10
Admitting: INTERNAL MEDICINE

## 2022-01-10 DIAGNOSIS — C34.31 MALIGNANT NEOPLASM OF LOWER LOBE OF RIGHT LUNG: ICD-10-CM

## 2022-01-10 DIAGNOSIS — D50.8 IRON DEFICIENCY ANEMIA SECONDARY TO INADEQUATE DIETARY IRON INTAKE: ICD-10-CM

## 2022-01-10 PROCEDURE — 71260 CT THORAX DX C+: CPT

## 2022-01-10 PROCEDURE — 74177 CT ABD & PELVIS W/CONTRAST: CPT

## 2022-01-10 PROCEDURE — 77385: CPT | Performed by: RADIOLOGY

## 2022-01-10 PROCEDURE — 25010000002 IOPAMIDOL 61 % SOLUTION: Performed by: INTERNAL MEDICINE

## 2022-01-10 PROCEDURE — 82565 ASSAY OF CREATININE: CPT

## 2022-01-10 RX ADMIN — IOPAMIDOL 100 ML: 612 INJECTION, SOLUTION INTRAVENOUS at 10:20

## 2022-01-10 RX ADMIN — IOPAMIDOL 50 ML: 612 INJECTION, SOLUTION INTRAVENOUS at 10:20

## 2022-01-14 ENCOUNTER — OFFICE VISIT (OUTPATIENT)
Dept: ONCOLOGY | Facility: CLINIC | Age: 72
End: 2022-01-14

## 2022-01-14 VITALS
HEIGHT: 73 IN | DIASTOLIC BLOOD PRESSURE: 60 MMHG | SYSTOLIC BLOOD PRESSURE: 126 MMHG | TEMPERATURE: 96.2 F | BODY MASS INDEX: 33.85 KG/M2 | HEART RATE: 70 BPM | WEIGHT: 255.4 LBS | RESPIRATION RATE: 18 BRPM | OXYGEN SATURATION: 97 %

## 2022-01-14 DIAGNOSIS — D50.8 IRON DEFICIENCY ANEMIA SECONDARY TO INADEQUATE DIETARY IRON INTAKE: ICD-10-CM

## 2022-01-14 DIAGNOSIS — C61 PROSTATE CANCER: Primary | ICD-10-CM

## 2022-01-14 DIAGNOSIS — D64.81 ANEMIA DUE TO ANTINEOPLASTIC CHEMOTHERAPY: ICD-10-CM

## 2022-01-14 DIAGNOSIS — C34.31 MALIGNANT NEOPLASM OF LOWER LOBE OF RIGHT LUNG: ICD-10-CM

## 2022-01-14 DIAGNOSIS — T45.1X5A ANEMIA DUE TO ANTINEOPLASTIC CHEMOTHERAPY: ICD-10-CM

## 2022-01-14 PROCEDURE — 99214 OFFICE O/P EST MOD 30 MIN: CPT | Performed by: INTERNAL MEDICINE

## 2022-01-17 LAB — CREAT BLDA-MCNC: 1.2 MG/DL (ref 0.6–1.3)

## 2022-01-24 ENCOUNTER — OFFICE VISIT (OUTPATIENT)
Dept: CARDIOLOGY | Facility: CLINIC | Age: 72
End: 2022-01-24

## 2022-01-24 VITALS
OXYGEN SATURATION: 98 % | DIASTOLIC BLOOD PRESSURE: 62 MMHG | HEIGHT: 73 IN | SYSTOLIC BLOOD PRESSURE: 124 MMHG | BODY MASS INDEX: 34.06 KG/M2 | WEIGHT: 257 LBS | HEART RATE: 68 BPM

## 2022-01-24 DIAGNOSIS — R42 DIZZINESS: ICD-10-CM

## 2022-01-24 DIAGNOSIS — I48.0 PAROXYSMAL ATRIAL FIBRILLATION: Primary | Chronic | ICD-10-CM

## 2022-01-24 DIAGNOSIS — I10 PRIMARY HYPERTENSION: ICD-10-CM

## 2022-01-24 PROCEDURE — 93000 ELECTROCARDIOGRAM COMPLETE: CPT | Performed by: INTERNAL MEDICINE

## 2022-01-24 PROCEDURE — 99214 OFFICE O/P EST MOD 30 MIN: CPT | Performed by: INTERNAL MEDICINE

## 2022-01-24 NOTE — PROGRESS NOTES
Subjective:     Encounter Date:01/24/2022      Patient ID: Delgado Desir is a 71 y.o. male with prostate cancer, lung adenocarcinoma, paroxysmal atrial fibrillation, hypertension, presenting today for follow-up.    Chief Complaint: Intermittent dizziness    This patient presents today for follow-up.  He has been intermittently dizzy for a number of months.  He says that he takes his medications early the morning and about 2 hours later, he will feel a little unsteady on his feet and dizzy.  No syncopal episodes but he does feel like he may pass out at times.  He has been checking his blood pressure regularly.  A few times, when he has felt symptomatic, his blood pressure has been somewhat low but other times, he his blood pressure has been normal and he is still symptomatic.  This seems to be more or less symptomatic when standing up from a seated position.  He denies having any palpitations.  No chest discomfort.  No shortness of breath or dyspnea on exertion.  He says that he has recently completed therapy for prostate cancer but remains on Uroxatrol.  He has been on metoprolol and Hyzaar for a number of years for his hypertension.  Of note, his heart rate has been variable between the 50s and 70s on blood pressure readings as well.  No significant bleeding issues on Eliquis.    Dizziness  This is a recurrent problem. The problem occurs intermittently. The problem has been waxing and waning. Pertinent negatives include no abdominal pain, chest pain, coughing, fever, headaches, nausea, vertigo, visual change or vomiting. The symptoms are aggravated by standing. He has tried nothing for the symptoms.   Atrial Fibrillation  Presents for follow-up visit. Symptoms include dizziness. Symptoms are negative for chest pain, hemodynamic instability, palpitations, shortness of breath and syncope. The symptoms have been stable. Past medical history includes atrial fibrillation.       Current Outpatient Medications:    •  acetaminophen (TYLENOL) 500 MG tablet, Take 500 mg by mouth Every 6 (Six) Hours As Needed for Mild Pain ., Disp: , Rfl:   •  alfuzosin (UROXATRAL) 10 MG 24 hr tablet, Take 10 mg by mouth Daily., Disp: , Rfl:   •  Eliquis 5 MG tablet tablet, TAKE 1 TABLET BY MOUTH EVERY 12 HOURS , Disp: 60 tablet, Rfl: 11  •  losartan-hydrochlorothiazide (HYZAAR) 50-12.5 MG per tablet, Take 0.5 tablets by mouth Daily., Disp: 30 tablet, Rfl: 5  •  metoprolol tartrate (LOPRESSOR) 25 MG tablet, TAKE 1/2 TABLET BY MOUTH EVERY 12 HOURS , Disp: 90 tablet, Rfl: 4  •  Synthroid 200 MCG tablet, Take 1 tablet by mouth Daily for 30 days., Disp: 30 tablet, Rfl: 2  •  therapeutic multivitamin-minerals (THERAGRAN-M) tablet, Take 1 tablet by mouth Daily., Disp: , Rfl:     No Known Allergies    Social History     Tobacco Use   • Smoking status: Former Smoker     Packs/day: 1.00     Years: 33.00     Pack years: 33.00     Types: Cigarettes     Start date:      Quit date:      Years since quittin.0   • Smokeless tobacco: Never Used   Substance Use Topics   • Alcohol use: Yes     Alcohol/week: 2.0 standard drinks     Types: 2 Shots of liquor per week     Comment: x2 weekly      Review of Systems   Constitutional: Negative for fever and weight loss.   Cardiovascular: Negative for chest pain, dyspnea on exertion, leg swelling, orthopnea, palpitations, paroxysmal nocturnal dyspnea and syncope.   Respiratory: Negative for cough, shortness of breath and wheezing.    Hematologic/Lymphatic: Negative for adenopathy and bleeding problem. Does not bruise/bleed easily.   Gastrointestinal: Negative for abdominal pain, hematemesis, hematochezia, melena, nausea and vomiting.   Neurological: Positive for dizziness and light-headedness. Negative for headaches, loss of balance and vertigo.       ECG 12 Lead    Date/Time: 2022 3:06 PM  Performed by: Landry Hoover MD  Authorized by: Landry Hoover MD   Comparison: compared with  "previous ECG from 9/29/2021  Similar to previous ECG  Rhythm: sinus rhythm  Rate: normal  BPM: 61  Conduction: conduction normal  ST Segments: ST segments normal  T Waves: T waves normal  QRS axis: normal    Clinical impression: normal ECG             Objective:     Vitals reviewed.   Constitutional:       General: Not in acute distress.     Appearance: Well-developed and not in distress.   Eyes:      Extraocular Movements: Extraocular movements intact.      Pupils: Pupils are equal, round, and reactive to light.   HENT:      Head: Normocephalic and atraumatic.   Neck:      Thyroid: No thyroid mass.      Vascular: No JVD.   Pulmonary:      Effort: Pulmonary effort is normal.      Breath sounds: Normal breath sounds. No wheezing. No rhonchi. No rales.   Cardiovascular:      Normal rate. Regular rhythm.      Murmurs: There is no murmur.      No gallop.   Pulses:     Intact distal pulses.   Edema:     Peripheral edema absent.   Abdominal:      General: Bowel sounds are normal. There is no distension.      Palpations: Abdomen is soft.      Tenderness: There is no abdominal tenderness.   Musculoskeletal: Normal range of motion.      Extremities: No clubbing present.Skin:     General: Skin is warm and dry. There is no cyanosis.      Findings: No erythema or rash.   Neurological:      Mental Status: Alert and oriented to person, place, and time.      Cranial Nerves: No cranial nerve deficit.       /62   Pulse 68   Ht 185.4 cm (73\")   Wt 117 kg (257 lb)   SpO2 98%   BMI 33.91 kg/m²     Data/Lab Review:     Lab Results   Component Value Date    WBC 6.13 01/07/2022    HGB 12.4 (L) 01/07/2022    HCT 36.3 (L) 01/07/2022    MCV 91.4 01/07/2022     01/07/2022     Lab Results   Component Value Date    GLUCOSE 116 (H) 01/07/2022    CALCIUM 8.8 01/07/2022     01/07/2022    K 4.0 01/07/2022    CO2 24.0 01/07/2022     01/07/2022    BUN 19 01/07/2022    CREATININE 1.20 01/10/2022    EGFRIFAFRI >59 " 08/14/2020    EGFRIFNONA 60 (L) 01/07/2022    BCR 16.0 01/07/2022    ANIONGAP 10.0 01/07/2022     Lab Results   Component Value Date    TSH 0.559 12/06/2021     ====================================================    Results for orders placed during the hospital encounter of 06/25/20    Adult Transthoracic Echo Complete W/ Cont if Necessary Per Protocol    Interpretation Summary  · Left ventricular wall thickness is consistent with mild-to-moderate concentric hypertrophy.  · Estimated EF = 70%.  · Left ventricular systolic function is normal.  · Left ventricular diastolic dysfunction (grade I) consistent with impaired relaxation.  · Left atrial cavity size is borderline dilated.  · Right ventricular cavity is borderline dilated.        Assessment:          Diagnosis Plan   1. Paroxysmal atrial fibrillation (HCC)  ECG 12 Lead   2. Primary hypertension     3. Dizziness          Plan:       1.  Dizziness: The patient describes intermittent dizziness which does not seem to necessarily correlate with hypotension or bradycardia although on the patient's blood pressure readings and pulse recordings, he does have some intermittent hypotension and bradycardia.  I think that it would be reasonable at this time to hold his metoprolol and see how his symptoms respond.  In addition, we did discuss that Uroxatrol can certainly be a contributor to orthostatic blood pressure changes and dizziness as well.  He may be able to get off this medication in the near future, according to his report, however.  The patient will hold his metoprolol for the next week and then call us to update us with progress.  If blood pressure remains stable and heart rate remained stable, he may be able to permanently discontinue metoprolol, however blood pressure once again elevates, he may need this medication even at a lower dose.  Certainly, if his heart rate remains low, then a Holter monitor may be reasonable as well, although at this time most of  his heart rate readings are within normal limits.    2.  Paroxysmal atrial fibrillation: No obvious recurrence.  The patient is on anticoagulant therapy and he is tolerating this well.  He is also currently on beta-blocker which is being held for the reasons listed above.    3.  Essential hypertension: As stated, blood pressure waxes and wanes to some degree.  Some low readings have been noted although no significantly elevated readings have been noted.  We will hold metoprolol at this time and continue to monitor blood pressure.    The patient has follow-up already scheduled in our office and he is encouraged to keep this appointment but he will also call us within a week to update us with progress in terms of his blood pressure, heart rate and symptoms of dizziness.

## 2022-02-25 ENCOUNTER — HOSPITAL ENCOUNTER (OUTPATIENT)
Dept: RADIATION ONCOLOGY | Facility: HOSPITAL | Age: 72
Setting detail: RADIATION/ONCOLOGY SERIES
End: 2022-02-25

## 2022-02-25 ENCOUNTER — OFFICE VISIT (OUTPATIENT)
Dept: RADIATION ONCOLOGY | Facility: HOSPITAL | Age: 72
End: 2022-02-25

## 2022-02-25 ENCOUNTER — LAB (OUTPATIENT)
Dept: LAB | Facility: HOSPITAL | Age: 72
End: 2022-02-25

## 2022-02-25 VITALS
SYSTOLIC BLOOD PRESSURE: 161 MMHG | BODY MASS INDEX: 35.25 KG/M2 | DIASTOLIC BLOOD PRESSURE: 63 MMHG | HEIGHT: 73 IN | WEIGHT: 266 LBS

## 2022-02-25 DIAGNOSIS — Z87.891 FORMER SMOKER: ICD-10-CM

## 2022-02-25 DIAGNOSIS — Z90.2 S/P PARTIAL LOBECTOMY OF LUNG: ICD-10-CM

## 2022-02-25 DIAGNOSIS — Z85.118 HISTORY OF LUNG CANCER: ICD-10-CM

## 2022-02-25 DIAGNOSIS — C61 PROSTATE CANCER: ICD-10-CM

## 2022-02-25 DIAGNOSIS — C61 PROSTATE CANCER: Primary | ICD-10-CM

## 2022-02-25 PROCEDURE — 36415 COLL VENOUS BLD VENIPUNCTURE: CPT

## 2022-02-25 PROCEDURE — G0463 HOSPITAL OUTPT CLINIC VISIT: HCPCS | Performed by: RADIOLOGY

## 2022-02-25 PROCEDURE — 84153 ASSAY OF PSA TOTAL: CPT

## 2022-02-25 NOTE — PROGRESS NOTES
[facility]fonsoCAlfonso Lujan MD      Zak Lai, APRN  ____________________________________________________________  [facility]  Department of Radiation Oncology  [address] [place] [id number]  Office:  [phone]  Fax: [phone]    DATE:  02/25/2022  PATIENT: Delgado Desir 1950                         MEDICAL RECORD #:  [id number]                                                       REASON FOR FOLLOW UP VISIT  Delgado Desir is a very pleasant 71 y.o. patient that has completed radiation therapy and returns today for inital follow up exam. Reports constipation, difficulty urinating, urgency with urination, nocturia x3, gait problem, and right knee pain. Denies appetite change, fatigue, unexpected weight change, blood in stool, diarrhea, decreased urine volume, dysuria, frequency with urination, hematuria, light-headedness, weakness, and headaches.He continues to follow .      History of Present Illness:  Diagnosed in July 2020 with Adenocarcinoma of prostate, pre-PSA 21.02, Preston (4+3) 7, 5/12 cores positive, PNI+. He completed 7000 cGy in 28 fractions to the prostate on 01/10/2022.    In October 2020, Mr. Desir was diagnosed with Stage 1A2 of the RLL of the lung, being managed by Dr. Flores and Dr. Bullard.     07/06/2020 - PSA: 21    07/15/2020 - Appointment with :  • Patient is not satisfied with results with Uroxatrol, I will place him on Rapaflo.   • It has been explained to patient that with the extremely high PSA I would highly suggest prostate biopsy. Patient is agreeable to this.   • The risk of infection, bleeding, nausea, vomiting, or even syncope or presyncope was discussed with patient.   • He has been instructed to begin antibiotic today before procedure and instill enema the day of procedure.   • Ciprofloxacin has been sent to his pharmacy.    07/30/2020 - Prostate Biopsy per :  • Prostate, right lateral base,  needle biopsy:  o Prostatic adenocarcinoma, Preston score 7 (4+3)  o Tumor encompasses 20% of the needle core biopsy  o Grade group 3  • Prostate, right lateral mid, needle biopsy:  o Prostatic adenocarcinoma, Manzanita's 7 (4+3)  o Tumor encompasses 20% of the needle core biopsy  o Grade group 3  • Prostate, right lateral apex, needle biopsy:  o Prostatic adenocarcinoma, Preston score 7 (3+4)  o Tumor encompasses 30% of the needle core biopsy  o Grade group 2  o Pattern 4 is approximately 20% of the tumor  • Prostate, right base, needle biopsy:  o Prostatic adenocarcinoma, Preston's 7 (4+3)  o Tumor encompasses 10% of the needle core biopsy  o Grade group 3  • Prostate, right mid, needle biopsy:  o Prostatic adenocarcinoma, Preston's 7 (4+3)  o Tumor encompasses 55% of the needle core biopsy  o Grade group 3  • Prostate, right apex, needle biopsy:  o High-grade prostatic intraepithelial neoplasia  • Prostate, left lateral base, needle biopsy:  Benign prostatic glands and stroma  • Prostate, left lateral mid, needle biopsy:  Benign prostatic glands and stroma  • Prostate, left lateral apex, needle biopsy:  Benign prostatic glands and stroma  • Prostate, left base, needle biopsy:  Benign prostatic glands and stroma  • Prostate, left mid, needle biopsy:  Benign prostatic glands and stroma  • Prostate, left apex, needle biopsy:  Benign prostatic glands and stroma    08/14/2020 - CT Abdomen/Pelvis with and without contrast:  • No acute abnormality of the abdomen or pelvis.  • A moderate splenomegaly.  • An enlarged prostate.  • A stable bilateral renal cysts.  • The lobulated noncalcified nodule in the right lower lobe represent a neoplastic process. Further evaluation with positron emission tomography scan may be obtained.  • The previously seen fractures of the left sacral ala is not visualized in this study. If clinically significant, further evaluation with radionuclide bone scan may be obtained    08/14/2020 - Bone  Scan:  • No evidence of metastatic bone disease.  • Degenerative uptake in the spine, right knee and bilateral ankles/feet. Suspect prior left knee arthroplasty. Correlate clinically.    08/21/2020 - Appointment with :  • PSA on 7/6/2020 was 21 ng/ML  • His prostate volume was 107 grams. PSAD 0.20  • He had a prostate biopsy consisting of a total of 12 cores with 5 positive cores.  • TRUS Findings consisted of no abnormal ultrasound findings  • He has right sided disease with a Preston score of 4+3 equal sign 7 grade group 2 intermediate unfavorable.  • Location of the the Cancer: 5 out of 6 cores on the right the only one that was negative was the right apex and has had high-grade PIN. 0 out of 6 cores on the left   • His clinical TNM stage was: [id number]  • His pathological TNM stage was: [id number]  • The patient has a staging CT and bone scan.  Recommendations:  • All the time was spent face-to-face with the patient and his wife for a total time of 65 minutes. Time was spent discussing his pathology, risk ratification. Management options.   • He asked many questions and these were answered to the best my ability to his satisfaction.  • After a long discussion I told patient that I felt he was not a candidate active surveillance.   • That he should consider surgery as a most aggressive approach particular given the high PSA is Preston score the extent of disease and the fact that he has a really large prostate and some obstructive symptoms but this might be the best choice however his major concern is his quality of life and not cancer control and therefore he is leaning more toward external beam radiation therapy given the large size of his prostate I suggested that he consider 2 years of neoadjuvant hormonal therapy with XRT.   • He wants to see the radiation oncologist for consultation.   • He is not completely made up his mind.   • We will wait to hear back from him after he sees radiation  oncology    09/02/2020 - Consult with  (Covering for ):  • We discussed the goals and plans of care with him and his family, answered all questions and he verbalizes understanding.  Separately however, I discussed with him and his wife that after reviewing his imaging studies and discussing the pulmonary findings with the reading radiologist, Dr. Charlene Grider, it was determined that the right lower lobe pulmonary nodule has had an interval enlargement currently measures 2.8 x 1.7 cm on his CT scan of the abdomen and pelvis performed on 08/14/2020 in comparison to CT scan of the chest in November 2019 when it measured 1.7 x 1.2 cm (an addendum to the report has been made).  He understands that this finding requires further work-up for the possibility of metastatic prostate cancer versus a primary lung cancer (more likely).  He understands that work-up will likely lead to possibility of invasive procedures such as thoracic biopsies.  This new information will necessitate holding off on a definitive determination of prostate cancer treatment until work-up is completed.  Possible clinical scenarios include benign pulmonary etiologies, synchronous prostate and pulmonary malignancies, and less likely prostate cancer with pulmonary metastasis.  In the meantime, we agreed that starting androgen deprivation therapy would be a reasonable decision as we move forward with additional work-up and he will be seeing Dr. Phan for that.  Following this discussion and in consideration of the diagnostic data/evaluation of the patient, I am recommending that he undergo a PET scan with anticipated additional referrals for possible biopsying depending on PET scan results.  • Patient will return for follow-up regarding PET scan results or additional coordination of care will be arranged through telephone conversations as needed.    09/18/2020 - PET Scan:  • Hypermetabolic right lower lobe pulmonary nodule  compatible with malignant neoplasm.  • No scintigraphic evidence of distal metastases.    09/24/2020 - Telephone encounter with :  • I spoke with this patient this morning regarding the results of his PET scan.  The right lower lobe nodule is suspicious for malignancy.  • I will discuss this case with Dr. Nick Flores and he may need this lesion removed for diagnosis and definitive treatment.    09/24/2020 - Documentation per :  • I spoke with Dr. Steven Nunez this morning as well as placed a call for Dr. Phan regarding the management of this patient.  • At this point I believe he needs to start ADT for his prostate carcinoma and perhaps some intervention for his urinary obstructive symptoms.  Radiation will make these symptoms worse with the swelling from the radiation and he may need some proactive intervention to relieve his obstructive symptoms.  • He also needs the right lower lobe lung nodule addressed and I have sent a message to Dr. Nick Flores.  We could consider a CT-guided biopsy, however if negative I think he would still need this removed that is clinically quite suspicious.    09/24/2020 - Documentation per :  • I spoke with Dr. Nick Flores and he is reviewed the films.  • At this time we will schedule a CT-guided biopsy as well as PFTs.  It would be very unusual to have a pulmonary metastases from prostate cancer in the absence of disseminated bone metastases but not outside the realm of possibility.  • Therefore we will proceed with core needle biopsies of the lung for tissue diagnosis, and he may require definitive surgical resection.  • We will also request PFTs to be completed.    09/30/2020 - Telephone encounter with :  • I spoke with this patient today to update him on plans for treatment.  I have discussed his case with Dr. Phan, Dr. Steven Nunez, and Dr. Nick Flores,  • With Dr. Phan he will start him on ADT therapy and discuss surgery versus some type of  intervention procedure to relieve his urinary outlet obstruction from enlarged prostate.  We are cart quite concerned that with radiation he will have total obstruction due to his prostate being over 100 g.  • Also, with his lung nodule that is hypermetabolic on PET scan he needs a CT-guided biopsy and pulmonary function test.  He will also see Dr. Nick Flores and referral has been made.  • Patient verbalized understanding and will await contact from the office of Dr. Flores, Dr. Phan, pulmonary function test lab and CT scanning guided biopsy of the lung nodule.    10/16/2020 - Lung, right lower lobe, fine-needle core biopsies and touch preparation:  • Fragments of skeletal muscle.  • No histologic or cytologic evidence of malignancy.    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  o No visceral pleural invasion.  o Margin of resection free of tumor.  • Pleural tissue, biopsies:  o Fibrosis and chronic inflammation.  o No evidence of malignancy.  • AJCC pathologic stage:  pT1b Nx    Synoptic Checklist   LUNG  8th Edition - Protocol posted: 2/26/2020  LUNG: RESECTION - All Specimens  SPECIMEN   Procedure  Wedge resection    Specimen Laterality  Right    TUMOR   Tumor Site  Lower lobe of lung    Histologic Type  Invasive mucinous adenocarcinoma    Histologic Grade  G1: Well differentiated    Spread Through Air Spaces (MYLENE)  Not identified    Tumor Size     Total Tumor Size (size of entire tumor)  Greatest Dimension (Centimeters): 2.0 cm   Tumor Focality  Single focus    Visceral Pleura Invasion  Not identified    Direct Invasion of Adjacent Structures  No adjacent structures present    Treatment Effect  No known presurgical therapy    Lymphovascular Invasion  Not identified    MARGINS   Margins  All margins are uninvolved by tumor    Margins Examined  Parenchymal     Distance of Invasive Carcinoma from Closest Margin (Centimeters)  0.1 cm   Closest Margin  Parenchymal    LYMPH NODES   Regional Lymph Nodes  No lymph nodes submitted or found    PATHOLOGIC STAGE CLASSIFICATION (pTNM, AJCC 8th Edition)   Primary Tumor (pT)  pT1b    Regional Lymph Nodes (pN)  pNX    ADDITIONAL FINDINGS   Additional Findings  Inflammation (type): Chronic    .        02/15/2021 - THORACOSCOPY WITH DAVINCI ROBOT WITH RIGHT WEDGE RESECTION, MEDIASTINAL LYMPH NODE DISSECTION-RIGHT per :  • Lung, right lower lobe, wedge excision:  o Residual tumor is not present.  o The surgical margins are free of tumor.  o 2 benign lymph nodes are present (intraparenchymal).  • Left node level 7, excision: 1 benign lymph node.  • Lymph node, level 8, excision: 1 benign lymph node.    04/05/2021 - Appointment with :  • Patient referred to oncology for surveillance.    04/15/2021 - Consult with :  ASSESSMENT:  • Adenocarcinoma the lung, right lower lobe  o AJCC stage:1A2 (pT1b, cN0, cM0)  o Treatment status: Post right lower lobe wedge resection.  Pulmonary function test in adequate for lobectomy and node sampling.  • Performance status of 1.   • Hypothyroidism.  On Synthroid.  • Adenocarcinoma of the prostate, Napa score 7, 4+3, grade group 3.  o AJCC stage: IIIA (T2b, N0, M0)  o Treatment status: Neoadjuvant hormone therapy with radiation.  Followed by Dr. Phan and Dr. Lujan.   • Obesity BMI 34.04.  PLAN:  •  Re: The reason for follow-up.  •  regarding role of surveillance.  • Blood for CBC with differential and CMP.  • He will be seen every 6 months with CT scan for the first 3 years then annually thereafter.  • Schedule CT brain for staging.  Patient declined brain MRI.   • Schedule CT chest, abdomen and pelvis 05/2021.   • Continue care per primary care physician and other specialists.  • Plan of care discussed with patient.  Understanding expressed.  Patient able to  proceed.  • Return to office in 6 months with preoffice CBC with differential and CMP.   Next CT 11/2021.   • Refer to PCP for obesity.   • Advance Care Planning  o ACP discussion was declined by the patient. Patient does not have an advance directive, information provided.  • Further recommendations pending.    04/29/2021 - CT Chest with contrast:  • Postsurgical changes in the right lower lobe from prior wedge resection. There has been interval decrease in the size of a right pleural effusion. Adjacent consolidation has also decreased but persists.  • Stable mildly enlarged right hilar lymph node.  • Interval increase in the size of a right middle lobe nodule, possibly a reactive intrafissural lymph node or metastatic nodule. A new nodule in the left lower lobe may be an area of inflammation or atelectasis but should also be followed. Short interval CT is recommended in 3 months.  • Cardiomegaly. Findings suggestive of pulmonary arterial hypertension.    04/29/2021 - CT abdomen/Pelvis with contrast:  • No evidence of metastatic disease in the abdomen or pelvis.   • Atherosclerotic disease.  • Right renal cyst. Probable left renal cysts.  • Prostatomegaly.    04/29/2021 - CT Head with and without contrast:  • Mild cerebral and cerebellar volume loss with chronic microvascular disease but no evidence of acute intracranial process.  • There is no abnormal enhancement.    07/30/2021 - CT Chest with contrast:  • Postsurgical change in the right lung base is stable compared to April 29, 2021  • Previous noted left lower lobe nodule has resolved.    08/11/2021 - PSA: 21.02    08/16/2021 - Appointment with :  Prostate cancer:  • Patient has now been cleared to proceed with treatment for prostate cancer. He choses to do neoadjuvant hormonal therapy with radiation treatment.   • Will getting back into see radiation oncology to get this established.   • We will work on his preauthorization for Lupron will be  contacted as soon as this is completed to come in for his Lupron injection. What he sees radiation oncology can come back to see me for Fiduciary marker placement.  • Return for routine office f/u after seen by radiation oncology for Fiduciary marker placement.     08/23/2021 - Consult with  (Covering for ):  • A definitive course of fractionated radiation therapy is recommended to the prostate,  anticipate a course of 7000 cGy over 28 treatment fractions; pelvic lymphatics will also be treated. The patient verbalizes understanding, voices no further questions and wishes to proceed with recommended therapy.   • Will refer back to urologist for initiation of ADT; fiducial seed placement and Space OAR gel placement to take place approximately 10 weeks after initiation of ADT.  Patient will return for CT simulation approximately 1 week after placement of fiducial markers and spacer gel.  Continue ongoing management per primary care physician and other specialists.   Plan:  • We will refer you to urology for seed and OAR gel placement, they will call you.  • Start ADT asap with Dr. Phan.  • Return to Dr. Lujan for CT simulation 1 week after seed/gel placement.     11/29/2021 - 01/10/2022 - Completed radiation course:  • Received 7000 cGy in 28 fractions to the prostate    01/10/2022 - CT Chest with contrast:  • Stable postsurgical chest stable compared to April 29, 2021 and July 30, 2021  • Cholelithiasis with tiny calculus noted in the gallbladder.    01/10/2022 - CT Abdomen/Pelvis with contrast:  • No evidence of metastatic disease to the abdomen or pelvis.  • Cholelithiasis, without evidence of cholecystitis.  • Additional chronic findings as above.    01/14/2022 - Appointment with :  ASSESSMENT:  • Adenocarcinoma the lung, right lower lobe  o AJCC stage:1A2 (pT1b, cN0, cM0)  o Treatment status: Post right lower lobe wedge resection.  Pulmonary function test in adequate for lobectomy  and node sampling.  • Performance status of 1.   • Elevated bilirubin.  Negative CT of the abdomen pelvis.  • Adenocarcinoma of the prostate, Ponce [place] score 7, 4+3, grade group 3.  • AJCC stage: IIIA (T2b, N0, M0)  o Treatment status: Neoadjuvant hormone therapy with radiation 01/03/2022.  Followed by Dr. Phan and Dr. Lujan.   • Normocytic anemia from chronic disease, post radiation/ADT and iron deficiency.  PLAN:  •  Re: CT chest 01/10/2022. Stable postsurgical chest stable compared to April 29, 2021 and July 30, 2021  • Cholelithiasis with tiny calculus noted in the gallbladder.  •  Re: CT abdomen pelvis 01/10/2022.  No evidence of metastatic disease to the abdomen or pelvis.  • Cholelithiasis, without evidence of cholecystitis.  •  Re: Heme status.  Hemoglobin 12.4, hematocrit 36.3 and MCV 91.4.  Reticulocyte 2.41%.  •  Re: CMP.  GFR 60 ml/min and bilirubin 1.4 from 1.7 from 1.1. Positive for cholelithiasis.  •  Re: Ferritin 269 and saturation 24%.  • He will be seen every 6 months with CT scan for the first 3 years then annually thereafter.  • Continue care per primary care physician and other specialists.  • Plan of care discussed with patient.  Understanding expressed.  Patient able to proceed.  • Stable for observation, cancer.  • Advance Care Planning  o ACP discussion was declined by the patient. Patient does not have an advance directive, information provided.   • CBC with differential, B12, folate, ferritin and iron panel in 3 months  • Return to office in 6 months with preoffice CT chest, abdomen, pelvis, CBC with differential and CMP.     History obtained from  PATIENT and CHART    PAST MEDICAL HISTORY  Past Medical History:   Diagnosis Date   • Arthritis    • Dizzy spells     states on rare occsaion has   • Hypertension    • Lung cancer (HCC)    • PAF (paroxysmal atrial fibrillation) (HCC)    • Prostate cancer (HCC) 2020   • Thyroid disorder       PAST SURGICAL  HISTORY  Past Surgical History:   Procedure Laterality Date   • CARPAL TUNNEL RELEASE Right    • LOBECTOMY Right 2/15/2021    Procedure: THORACOSCOPY WITH DAVINCI ROBOT WITH RIGHT WEDGE RESECTION, MEDIASTINAL LYMPH NODE DISSECTION-RIGHT;  Surgeon: Nick Flores MD;  Location: [facility] OR;  Service: [facility];  Laterality: Right;   • LUNG BIOPSY     • PROSTATE BIOPSY     • REPLACEMENT TOTAL KNEE Left    • SHOULDER SURGERY Bilateral    • THORACOSCOPY Right 10/30/2020    Procedure: MEDIASTINOSCOPY, RIGHT THORACOSCOPY POSSIBLE WEDGE RESECTION WITH DAVINCI ROBOT;  Surgeon: Nick Flores MD;  Location: [facility] OR;  Service: [facility];  Laterality: Right;   • THYROIDECTOMY N/A 2020    Procedure: Total thyroidectomy;  Surgeon: Jean Marie Solis MD;  Location:  [facility] OR;  Service: ENT;  Laterality: N/A;      FAMILY HISTORY  family history includes COPD in his father; Hypertension in his mother.     SOCIAL HISTORY  Social History     Tobacco Use   • Smoking status: Former Smoker     Packs/day: 1.00     Years: 33.00     Pack years: 33.00     Types: Cigarettes     Start date:      Quit date:      Years since quittin.1   • Smokeless tobacco: Never Used   Vaping Use   • Vaping Use: Never used   Substance Use Topics   • Alcohol use: Yes     Alcohol/week: 2.0 standard drinks     Types: 2 Shots of liquor per week     Comment: x2 weekly   • Drug use: Never     ALLERGIES  Patient has no known allergies.      MEDICATIONS    Current Outpatient Medications:   •  acetaminophen (TYLENOL) 500 MG tablet, Take 500 mg by mouth Every 6 (Six) Hours As Needed for Mild Pain ., Disp: , Rfl:   •  alfuzosin (UROXATRAL) 10 MG 24 hr tablet, Take 10 mg by mouth Daily., Disp: , Rfl:   •  Eliquis 5 MG tablet tablet, TAKE 1 TABLET BY MOUTH EVERY 12 HOURS , Disp: 60 tablet, Rfl: 11  •  losartan-hydrochlorothiazide (HYZAAR) 50-12.5 MG per tablet, Take 0.5 tablets by mouth Daily., Disp: 30 tablet, Rfl: 5  •  metoprolol tartrate (LOPRESSOR)  "25 MG tablet, TAKE 1/2 TABLET BY MOUTH EVERY 12 HOURS , Disp: 90 tablet, Rfl: 4  •  therapeutic multivitamin-minerals (THERAGRAN-M) tablet, Take 1 tablet by mouth Daily., Disp: , Rfl:   •  Synthroid 200 MCG tablet, Take 1 tablet by mouth Daily for 30 days., Disp: 30 tablet, Rfl: 2    Current outpatient and discharge medications have been reconciled for the patient.  Reviewed by: Ephraim Lujan III, MD    The following portions of the patient's history were reviewed and updated as appropriate: allergies, current medications, past family history, past medical history, past social history, past surgical history and problem list.     REVIEW OF SYSTEMS  Review of Systems   Constitutional: Negative.  Negative for appetite change, fatigue and unexpected weight change.   HENT: Negative.    Eyes: Negative.    Respiratory: Negative.    Cardiovascular: Negative.    Gastrointestinal: Positive for constipation. Negative for blood in stool and diarrhea.   Endocrine: Negative.    Genitourinary: Positive for difficulty urinating (stream/flow \"not great\" but improving) and urgency. Negative for decreased urine volume, dysuria, frequency and hematuria.        Nocturia 3x  C/o incomplete emptying \"sometimes\"   Musculoskeletal: Positive for gait problem (d/t right knee pain).        Right knee pain   Allergic/Immunologic: Negative.    Hematological: Negative.    Psychiatric/Behavioral: Negative.      PHYSICAL EXAM  VITAL SIGNS:   Vitals:    02/25/22 1316   BP: 161/63   Weight: 121 kg (266 lb)   Height: 185.4 cm (73\")   PainSc:   3   PainLoc: Comment: right      Physical Exam   General:  Alert and oriented, in no acute distress, well-developed, vitals reviewed.  Head:  Normocephalic, without obvious abnormality    Nose/Sinuses:  Nares normal externally  Mouth/Throat:  Mucosa moist, without erythema  Neck:  supple, No evidence of adenopathy in the cervical or supraclavicular areas.  Eyes: No gross abnormalities   Ears: Ears intact with " no external abnormalities noted  Chest:  Respiratory efforts are normal and unlabored, chest is clear to auscultation.  Rectal: deferred  Cardiovascular: Regular rate and rhythm without murmurs, rubs, or gallops.   Abdomen:  Soft, non-tender, normal bowel sounds;   Extremities:  PEDRO well, warm to touch, no cyanosis or edema.  Skin: No suspicious lesions or rashes of concern  Neurologic: non focal exam, strength and sensation grossly normal  Psych: Mood and affect are appropriate    Performance Status: ECOG (0) Fully active, able to carry on all predisease performance without restriction    Clinical Quality Measures  -Pain Documented by Standardized Tool, FPS Delgado Desir reports a pain score of 0. Given his pain assessment as noted, treatment options were discussed and the following options were decided upon as a follow-up plan to address the patient's pain: No pain, no plan given.   Pain Medications             acetaminophen (TYLENOL) 500 MG tablet Take 500 mg by mouth Every 6 (Six) Hours As Needed for Mild Pain .        -Advanced Care Planning Advance Care Planning  ACP discussion was held with the patient during this visit. Patient does not have an advance directive, information provided.    -Body Mass Index Screening and Follow-Up Plan Patient's Body mass index is 35.09 kg/m². indicating that he is obese (BMI >30). Obesity-related health conditions include the following: none.     -Tobacco Use: Screening and Cessation Intervention Social History    Tobacco Use      Smoking status: Former Smoker        Packs/day: 1.00        Years: 33.00        Pack years: 33        Types: Cigarettes        Start date:         Quit date:         Years since quittin.1      Smokeless tobacco: Never Used     ASSESSMENT AND PLAN  1. Prostate cancer (HCC)    2. History of lung cancer    3. S/P partial lobectomy of lung    4. Former smoker      Orders Placed This Encounter   Procedures   • PSA Diagnostic     Standing  Status:   Future     Number of Occurrences:   1     Standing Expiration Date:   2/25/2023     Order Specific Question:   Release to patient     Answer:   Immediate     RECOMMENDATIONS:   Delgado Desir is status post completion of radiation therapy to the prostate, returns to our clinic today for inital follow up exam. Diagnosed in July 2020 with Adenocarcinoma of prostate, pre-PSA 21.02, Preston (4+3) 7, 5/12 cores positive, PNI+. He completed 7000 cGy in 28 fractions to the prostate on 01/10/2022.    On exam, he is currently without symptoms or evidence for recurrent or metastatic disease. Reports mild urgency and nocturia, 2 - 3 times per night. He will continue follow-up/surveillance as discussed or sooner if needed and will continue to see the other health care providers as per their scheduling.    Patient Instructions   1) PSA today   2) Return to Dr. Lujan in 1 year    Todays appointment time was spent in counseling, coordination of care and surveillance related to patients diagnosis as well as radiation therapy possible and probable after effects.   Ephraim Lujan III, MD  02/25/2022

## 2022-02-26 LAB — PSA SERPL-MCNC: 0.12 NG/ML (ref 0–4)

## 2022-03-02 ENCOUNTER — TELEPHONE (OUTPATIENT)
Dept: RADIATION ONCOLOGY | Facility: HOSPITAL | Age: 72
End: 2022-03-02

## 2022-03-02 NOTE — TELEPHONE ENCOUNTER
----- Message from ANDRES Lauren sent at 3/1/2022  3:26 PM CST -----  Regarding: PSA  Please call him and let him know about his PSA results.    Thanks!

## 2022-03-10 ENCOUNTER — NURSE ONLY (OUTPATIENT)
Dept: UROLOGY | Age: 72
End: 2022-03-10
Payer: MEDICARE

## 2022-03-10 DIAGNOSIS — C61 PROSTATE CANCER (HCC): ICD-10-CM

## 2022-03-10 PROCEDURE — 96402 CHEMO HORMON ANTINEOPL SQ/IM: CPT | Performed by: NURSE PRACTITIONER

## 2022-03-10 NOTE — PROGRESS NOTES
After obtaining consent from patient and receiving verbal/written orders from Dr. Lilaine Castro, I gave patient 45mg (6 month) of Lupron injection in right upper quad. gluteus, patient tolerated well. Medication was not supplied by the patient. Patient has completed radiation and is scheduled to follow up with Dr. Liliane Castro 3/14/22. He had PSA drawn 2/25/22.   Patient is to follow up for next injection on or after 8/25/22

## 2022-03-14 ENCOUNTER — OFFICE VISIT (OUTPATIENT)
Dept: UROLOGY | Age: 72
End: 2022-03-14
Payer: MEDICARE

## 2022-03-14 VITALS
BODY MASS INDEX: 35.25 KG/M2 | TEMPERATURE: 97.2 F | WEIGHT: 266 LBS | DIASTOLIC BLOOD PRESSURE: 66 MMHG | HEART RATE: 76 BPM | HEIGHT: 73 IN | SYSTOLIC BLOOD PRESSURE: 162 MMHG

## 2022-03-14 DIAGNOSIS — N30.90 CYSTITIS: ICD-10-CM

## 2022-03-14 DIAGNOSIS — N13.8 BPH WITH URINARY OBSTRUCTION: Primary | ICD-10-CM

## 2022-03-14 DIAGNOSIS — C61 PROSTATE CANCER (HCC): ICD-10-CM

## 2022-03-14 DIAGNOSIS — N40.1 BPH WITH URINARY OBSTRUCTION: Primary | ICD-10-CM

## 2022-03-14 LAB
BACTERIA URINE, POC: ABNORMAL
BILIRUBIN URINE: 0 MG/DL
BLOOD, URINE: POSITIVE
CASTS URINE, POC: 0
CLARITY: CLEAR
COLOR: YELLOW
CRYSTALS URINE, POC: 0
EPI CELLS URINE, POC: 0
GLUCOSE URINE: ABNORMAL
KETONES, URINE: NEGATIVE
LEUKOCYTE EST, POC: ABNORMAL
NITRITE, URINE: NEGATIVE
PH UA: 5.5 (ref 4.5–8)
PROTEIN UA: POSITIVE
RBC URINE, POC: 10
SPECIFIC GRAVITY UA: 1.01 (ref 1–1.03)
UROBILINOGEN, URINE: NORMAL
WBC URINE, POC: 25
YEAST URINE, POC: 0

## 2022-03-14 PROCEDURE — 81001 URINALYSIS AUTO W/SCOPE: CPT | Performed by: UROLOGY

## 2022-03-14 PROCEDURE — 99214 OFFICE O/P EST MOD 30 MIN: CPT | Performed by: UROLOGY

## 2022-03-14 PROCEDURE — 51798 US URINE CAPACITY MEASURE: CPT | Performed by: UROLOGY

## 2022-03-14 RX ORDER — LOSARTAN POTASSIUM AND HYDROCHLOROTHIAZIDE 12.5; 5 MG/1; MG/1
TABLET ORAL
COMMUNITY
Start: 2022-01-25

## 2022-03-14 RX ORDER — LEVOFLOXACIN 500 MG/1
500 TABLET, FILM COATED ORAL DAILY
Qty: 10 TABLET | Refills: 0 | Status: SHIPPED | OUTPATIENT
Start: 2022-03-14 | End: 2022-03-24

## 2022-03-14 RX ORDER — LEVOTHYROXINE SODIUM 200 MCG
TABLET ORAL
COMMUNITY
Start: 2022-03-02 | End: 2022-06-22

## 2022-03-14 ASSESSMENT — ENCOUNTER SYMPTOMS
BACK PAIN: 0
EYE DISCHARGE: 0
EYE REDNESS: 0
SORE THROAT: 0
NAUSEA: 0
WHEEZING: 0
CHEST TIGHTNESS: 0
VOMITING: 0
FACIAL SWELLING: 0

## 2022-03-14 NOTE — PROGRESS NOTES
Brooklyn Hurtado is a 80 y.o. male who presents today   Chief Complaint   Patient presents with    Follow-up     I am here today fu after completion of radiation. I had my PSA done. Prostate Cancer  Patient is here today for prostate cancer which was first diagnosed approximately 1.5  years ago. July 30, 2020  His prostate cancer can be characterized as clinical stage T1c Vandiver 4+3 = 7 hormone sensitive. He has now completed radiation. He also required treatment for lung cancer which did delay the onset of treatment of his prostate cancer. His last several PSA values are as follows:  Lab Results   Component Value Date    PSA 21.02 (H) 08/11/2021   Postop radiation PSA done on 2/25/2022 was 0.119  Previous treatment of prostate cancer: Neoadjuvant hormonal therapy beginning September 23, 2021. Until present. Last injection was on 3/10/2022. External beam radiation therapy completed 1/10/2022  Lower urinary tract symptoms: urgency, frequency, decreased urinary stream and dysuria. He takes Uroxatrol. He began having some dysuria and feeling of incomplete emptying about 3 weeks ago after his last Lupron injection. Feels like he is urgency to void and then he cannot produce any urine. His postvoid residual today however was 46          Past Medical History:   Diagnosis Date    Benign prostatic disease     Elevated PSA     Osteoarthritis     Shoulder pain     fall off porch    Upper respiratory virus     c/o recent tx with otc meds. ; denies fever/chills (about a week ago)       Past Surgical History:   Procedure Laterality Date    HERNIA REPAIR      JOINT REPLACEMENT      KNEE ARTHROPLASTY  12--10    LUNG BIOPSY Right     Jain    MS SHLDR ARTHROSCOP,SURG,W/ROTAT CUFF REPR Left 3/22/2018    SHOULDER ARTHROSCOPY ROTATOR CUFF REPAIR  SUBACROMIAL DECOMPRESSION performed by Nathan Stevens MD at [place] [address] ARTHROSCOPY  2008       Current Outpatient Medications   Medication Sig Dispense Refill    losartan-hydroCHLOROthiazide (HYZAAR) 50-12.5 MG per tablet TAKE 12 TABLET BY MOUTH DAILY.  SYNTHROID 200 MCG tablet TAKE 1 TABLET BY MOUTH ONCE DAILY      levoFLOXacin (LEVAQUIN) 500 MG tablet Take 1 tablet by mouth daily for 10 days 10 tablet 0    ELIQUIS 5 MG TABS tablet       alfuzosin (UROXATRAL) 10 MG extended release tablet Take 1 tablet by mouth daily 30 tablet 11    Multiple Vitamins-Minerals (THERAPEUTIC MULTIVITAMIN-MINERALS) tablet Take 1 tablet by mouth daily       No current facility-administered medications for this visit. No Known Allergies    Social History     Socioeconomic History    Marital status:      Spouse name: None    Number of children: None    Years of education: None    Highest education level: None   Occupational History    None   Tobacco Use    Smoking status: Former Smoker    Smokeless tobacco: Former User     Quit date: 2007   Substance and Sexual Activity    Alcohol use: Not Currently     Alcohol/week: 2.0 standard drinks     Types: 2 Shots of liquor per week     Comment: 2 per week    Drug use: No    Sexual activity: None   Other Topics Concern    None   Social History Narrative    None     Social Determinants of Health     Financial Resource Strain:     Difficulty of Paying Living Expenses: Not on file   Food Insecurity:     Worried About Running Out of Food in the Last Year: Not on file    Fede of Food in the Last Year: Not on file   Transportation Needs:     Lack of Transportation (Medical): Not on file    Lack of Transportation (Non-Medical):  Not on file   Physical Activity:     Days of Exercise per Week: Not on file    Minutes of Exercise per Session: Not on file   Stress:     Feeling of Stress : Not on file   Social Connections:     Frequency of Communication with Friends and Family: Not on file    Frequency of Social Gatherings with Friends and Family: Not on file    Attends Sabianist Services: Not on file  Active Member of Clubs or Organizations: Not on file    Attends Club or Organization Meetings: Not on file    Marital Status: Not on file   Intimate Partner Violence:     Fear of Current or Ex-Partner: Not on file    Emotionally Abused: Not on file    Physically Abused: Not on file    Sexually Abused: Not on file   Housing Stability:     Unable to Pay for Housing in the Last Year: Not on file    Number of [place] in the Last Year: Not on file    Unstable Housing in the Last Year: Not on file       History reviewed. No pertinent family history. REVIEW OF SYSTEMS:  Review of Systems   Constitutional: Negative for chills and fever. HENT: Negative for facial swelling and sore throat. Eyes: Negative for discharge and redness. Respiratory: Negative for chest tightness and wheezing. Cardiovascular: Negative for chest pain and palpitations. Gastrointestinal: Negative for nausea and vomiting. Endocrine: Negative for polyphagia and polyuria. Genitourinary: Positive for dysuria and urgency (got worse after lupron shot. ). Negative for decreased urine volume, difficulty urinating, enuresis, flank pain, frequency, genital sores, hematuria, penile discharge, penile pain, penile swelling, scrotal swelling and testicular pain. Musculoskeletal: Negative for back pain and neck stiffness. Skin: Negative for rash and wound. Neurological: Negative for dizziness and headaches. Hematological: Negative for adenopathy. Does not bruise/bleed easily. Psychiatric/Behavioral: Negative for confusion and hallucinations. PHYSICAL EXAM:  BP (!) 162/66   Pulse 76   Temp 97.2 °F (36.2 °C)   Ht 6' 1\" (1.854 m)   Wt 266 lb (120.7 kg)   BMI 35.09 kg/m²   Physical Exam  Constitutional:       General: He is not in acute distress. Appearance: Normal appearance. He is well-developed. HENT:      Head: Normocephalic and atraumatic.       Nose: Nose normal.   Eyes:      General: No scleral icterus. Conjunctiva/sclera: Conjunctivae normal.      Pupils: Pupils are equal, round, and reactive to light. Neck:      Trachea: No tracheal deviation. Cardiovascular:      Rate and Rhythm: Normal rate and regular rhythm. Pulmonary:      Effort: Pulmonary effort is normal. No respiratory distress. Breath sounds: No stridor. Abdominal:      General: There is no distension. Palpations: Abdomen is soft. There is no mass. Tenderness: There is no abdominal tenderness. Musculoskeletal:         General: No tenderness. Normal range of motion. Cervical back: Normal range of motion and neck supple. Lymphadenopathy:      Cervical: No cervical adenopathy. Skin:     General: Skin is warm and dry. Findings: No erythema. Neurological:      Mental Status: He is alert and oriented to person, place, and time.    Psychiatric:         Behavior: Behavior normal.         Judgment: Judgment normal.             DATA:    Results for orders placed or performed in visit on 03/14/22   POCT Urinalysis Dipstick w/ Micro (Auto)   Result Value Ref Range    Color, UA Yellow     Clarity, UA Clear Clear    Glucose, Ur neg     Bilirubin Urine 0 mg/dL    Ketones, Urine Negative     Specific Gravity, UA 1.015 1.005 - 1.030    Blood, Urine Positive     pH, UA 5.5 4.5 - 8.0    Protein, UA Positive (A) Negative    Nitrite, Urine Negative     Leukocytes, UA moderate     Urobilinogen, Urine Normal     rbc urine, poc 10     wbc urine, poc 25     bacteria urine, poc 1+     yeast urine, poc 0     casts urine, poc 0     Epi Cells Urine, POC 0     crystals urine, poc 0      Lab Results   Component Value Date    PSA 21.02 (H) 08/11/2021     No results found for: PSAFREEPCT           Ref Range & Units 2 wk ago   PSA 0.000 - 4.000 ng/mL 0.119    Resulting Agency  [facility]     Narrative  Performed by Cherrie  Results may be falsely decreased if patient taking Biotin. Specimen Collected: 02/25/22 14:15 Last Resulted: 02/26/22 01:43   Received From: Garry  Result Received: 03/10/22 13:00        1. BPH with urinary obstruction  Patient has a known very large prostate as it hopes that some of his symptoms would improve after starting ADT. He remains on alfuzosin. - MI Measure, post-void residual, US, non-imaging    2. Prostate cancer ([facility] [address]  PSA is down very low continue with neoadjuvant hormonal therapy with a goal for him to receive 2 years. He will return in 3 months with PSA  - PSA, Diagnostic; Future    3. Cystitis  He is complaining of some acute lower urinary tract symptoms urine today did look infected we will send this off for culture empirically start Levaquin  - POCT Urinalysis Dipstick w/ Micro (Auto)  - Culture, Urine  - levoFLOXacin (LEVAQUIN) 500 MG tablet; Take 1 tablet by mouth daily for 10 days  Dispense: 10 tablet; Refill: 0      Orders Placed This Encounter   Procedures    Culture, Urine     Order Specific Question:   Specify (ex-cath, midstream, cysto, etc)? Answer:   clean catch    PSA, Diagnostic     PSA in 3 months     Standing Status:   Future     Standing Expiration Date:   3/14/2023    POCT Urinalysis Dipstick w/ Micro (Auto)    MI Measure, post-void residual, US, non-imaging     46ml        Return in about 3 months (around 6/14/2022). All information inputted into the note by the MA to include chief complaint, past medical history, past surgical history, medications, allergies, social and family history and review of systems has been reviewed and updated as needed by me. EMR Dragon/transcription disclaimer: Much of this documentt is electronic  transcription/translation of spoken language to printed text. The  electronic translation of spoken language may be erroneous, or at times,  nonsensical words or phrases may be inadvertently transcribed.  Although I  have reviewed the document for such errors, some may still exist.

## 2022-03-15 ENCOUNTER — TELEPHONE (OUTPATIENT)
Dept: UROLOGY | Age: 72
End: 2022-03-15

## 2022-03-15 NOTE — TELEPHONE ENCOUNTER
The pt called and stated that Dr. Marcos Cristina was to call a prescription in to [facility] by [facility]. The prescription is for an infection.

## 2022-03-16 ENCOUNTER — TELEPHONE (OUTPATIENT)
Dept: UROLOGY | Age: 72
End: 2022-03-16

## 2022-03-16 LAB
ORGANISM: ABNORMAL
URINE CULTURE, ROUTINE: ABNORMAL
URINE CULTURE, ROUTINE: ABNORMAL

## 2022-03-16 NOTE — TELEPHONE ENCOUNTER
----- Message from ALIYA Watkins CNP sent at 3/16/2022  8:05 AM CDT -----  Please let the patient know his urine came back positive for bacteria. Dr. Correa May treated him empirically with Levaquin. This will cover his infection.  Take as directed

## 2022-03-16 NOTE — RESULT ENCOUNTER NOTE
Please let the patient know his urine came back positive for bacteria. Dr. Valeriy Brown treated him empirically with Levaquin. This will cover his infection.  Take as directed

## 2022-03-16 NOTE — TELEPHONE ENCOUNTER
Pt. Called to let us know he did find medicine at [facility]. They just called to let him know.

## 2022-03-24 DIAGNOSIS — I10 ESSENTIAL HYPERTENSION: ICD-10-CM

## 2022-03-24 RX ORDER — LOSARTAN POTASSIUM AND HYDROCHLOROTHIAZIDE 12.5; 5 MG/1; MG/1
TABLET ORAL
Qty: 45 TABLET | Refills: 3 | Status: SHIPPED | OUTPATIENT
Start: 2022-03-24 | End: 2023-03-13

## 2022-03-28 ENCOUNTER — OFFICE VISIT (OUTPATIENT)
Dept: CARDIOLOGY | Facility: CLINIC | Age: 72
End: 2022-03-28

## 2022-03-28 ENCOUNTER — LAB (OUTPATIENT)
Dept: LAB | Facility: HOSPITAL | Age: 72
End: 2022-03-28

## 2022-03-28 VITALS
OXYGEN SATURATION: 98 % | HEIGHT: 73 IN | HEART RATE: 77 BPM | WEIGHT: 264 LBS | RESPIRATION RATE: 18 BRPM | SYSTOLIC BLOOD PRESSURE: 138 MMHG | BODY MASS INDEX: 34.99 KG/M2 | DIASTOLIC BLOOD PRESSURE: 82 MMHG

## 2022-03-28 DIAGNOSIS — I48.0 PAROXYSMAL ATRIAL FIBRILLATION: Primary | Chronic | ICD-10-CM

## 2022-03-28 DIAGNOSIS — E89.0 POSTOPERATIVE HYPOTHYROIDISM: ICD-10-CM

## 2022-03-28 DIAGNOSIS — I10 PRIMARY HYPERTENSION: ICD-10-CM

## 2022-03-28 DIAGNOSIS — Z79.01 CURRENT USE OF LONG TERM ANTICOAGULATION: Chronic | ICD-10-CM

## 2022-03-28 DIAGNOSIS — E66.09 CLASS 1 OBESITY DUE TO EXCESS CALORIES WITH SERIOUS COMORBIDITY AND BODY MASS INDEX (BMI) OF 34.0 TO 34.9 IN ADULT: Chronic | ICD-10-CM

## 2022-03-28 LAB — TSH SERPL DL<=0.05 MIU/L-ACNC: 0.11 UIU/ML (ref 0.27–4.2)

## 2022-03-28 PROCEDURE — 36415 COLL VENOUS BLD VENIPUNCTURE: CPT

## 2022-03-28 PROCEDURE — 84443 ASSAY THYROID STIM HORMONE: CPT

## 2022-03-28 PROCEDURE — 99214 OFFICE O/P EST MOD 30 MIN: CPT | Performed by: NURSE PRACTITIONER

## 2022-03-28 NOTE — PROGRESS NOTES
Subjective:     Encounter Date:03/28/2022      Patient ID: Delgado Desir is a 71 y.o. male with paroxysmal atrial fibrillation, long-term anticoagulation, prostate cancer, lung nodule with previous wedge resection and confirmation of malignancy, hypertension, previous thyroidectomy, and obesity.  He presents to the office today for routine follow-up.    Chief Complaint: Routine Follow Up  Atrial Fibrillation  Presents for follow-up visit. Symptoms are negative for chest pain, hemodynamic instability, hypertension, hypotension, palpitations, shortness of breath, syncope, tachycardia and weakness. The symptoms have been stable. Past medical history includes atrial fibrillation. There are no medication compliance problems.   Hypertension  This is a chronic problem. The current episode started more than 1 year ago. The problem is controlled. Pertinent negatives include no chest pain, malaise/fatigue, orthopnea, palpitations, peripheral edema, PND or shortness of breath. Risk factors for coronary artery disease include male gender and obesity. Current antihypertension treatment includes beta blockers, angiotensin blockers and diuretics. The current treatment provides significant improvement. There are no compliance problems.  There is no history of angina, CAD/MI or heart failure. Identifiable causes of hypertension include a thyroid problem.     Mr. Desir presents to the office today for routine follow-up.  He reports to be doing well.  He previously was found to be in atrial fibrillation during hospitalization for wedge resection of malignant lung tumor.  He had previously been on antiarrhythmic medication.  He has had no known recurrence but was also asymptomatic at the time of this finding.  He reports no significant shortness of breath, dyspnea on exertion, palpitations, tachycardia.  He has intermittent nosebleeds from time to time but no prolonged bleeding issues on anticoagulation.      The following  "portions of the patient's history were reviewed and updated as appropriate: allergies, current medications, past family history, past medical history, past social history and past surgical history.     No Known Allergies      Current Outpatient Medications:   •  acetaminophen (TYLENOL) 500 MG tablet, Take 500 mg by mouth Every 6 (Six) Hours As Needed for Mild Pain ., Disp: , Rfl:   •  alfuzosin (UROXATRAL) 10 MG 24 hr tablet, Take 10 mg by mouth Daily., Disp: , Rfl:   •  Eliquis 5 MG tablet tablet, TAKE 1 TABLET BY MOUTH EVERY 12 HOURS , Disp: 60 tablet, Rfl: 11  •  losartan-hydrochlorothiazide (HYZAAR) 50-12.5 MG per tablet, TAKE 1/2 TABLET BY MOUTH DAILY., Disp: 45 tablet, Rfl: 3  •  Synthroid 200 MCG tablet, Take 1 tablet by mouth Daily for 30 days., Disp: 30 tablet, Rfl: 2  •  therapeutic multivitamin-minerals (THERAGRAN-M) tablet, Take 1 tablet by mouth Daily., Disp: , Rfl:   •  metoprolol tartrate (LOPRESSOR) 25 MG tablet, TAKE 1/2 TABLET BY MOUTH EVERY 12 HOURS , Disp: 90 tablet, Rfl: 4      Review of Systems   Constitutional: Negative for malaise/fatigue.   Cardiovascular: Negative for chest pain, leg swelling, near-syncope, orthopnea, palpitations, paroxysmal nocturnal dyspnea and syncope.   Respiratory: Negative for shortness of breath and wheezing.    Hematologic/Lymphatic: Negative for bleeding problem.   Neurological: Negative for light-headedness and weakness.   Psychiatric/Behavioral: Negative for altered mental status.       Procedures  /82 (BP Location: Right arm, Patient Position: Sitting, Cuff Size: Adult)   Pulse 77   Resp 18   Ht 185.4 cm (73\")   Wt 120 kg (264 lb)   SpO2 98%   BMI 34.83 kg/m²        Objective:     Vitals reviewed.   Constitutional:       General: Not in acute distress.     Appearance: Well-developed, well-groomed and not in distress. Obese. Chronically ill-appearing. Not diaphoretic.   Eyes:      General:         Right eye: No discharge.         Left eye: No " discharge.   HENT:      Head: Normocephalic and atraumatic.    Mouth/Throat:      Pharynx: No oropharyngeal exudate.   Pulmonary:      Effort: Pulmonary effort is normal. No respiratory distress.      Breath sounds: Normal breath sounds. No wheezing. No rhonchi. No rales.   Chest:      Chest wall: Not tender to palpatation.   Cardiovascular:      Normal rate. Regular rhythm.      Murmurs: There is no murmur.      No gallop. No rub.   Edema:     Peripheral edema absent.   Abdominal:      General: There is no distension.      Palpations: Abdomen is soft.      Tenderness: There is no abdominal tenderness.   Musculoskeletal: Normal range of motion.      Cervical back: Normal range of motion and neck supple. Skin:     General: Skin is warm and dry.      Coloration: Skin is not pale.      Findings: No erythema or rash.   Neurological:      Mental Status: Alert, oriented to person, place, and time and oriented to person, place and time.   Psychiatric:         Mood and Affect: Mood normal.         Speech: Speech normal.         Behavior: Behavior normal. Behavior is cooperative.         Thought Content: Thought content normal.         Cognition and Memory: Cognition normal.         Judgment: Judgment normal.       Lab Review:   Results for orders placed during the hospital encounter of 06/25/20    Adult Transthoracic Echo Complete W/ Cont if Necessary Per Protocol    Interpretation Summary  · Left ventricular wall thickness is consistent with mild-to-moderate concentric hypertrophy.  · Estimated EF = 70%.  · Left ventricular systolic function is normal.  · Left ventricular diastolic dysfunction (grade I) consistent with impaired relaxation.  · Left atrial cavity size is borderline dilated.  · Right ventricular cavity is borderline dilated.        Assessment:          Diagnosis Plan   1. Paroxysmal atrial fibrillation (HCC)     2. Current use of long term anticoagulation     3. Primary hypertension     4. Class 1 obesity due  to excess calories with serious comorbidity and body mass index (BMI) of 34.0 to 34.9 in adult            Plan:       -Paroxysmal atrial fibrillation: Stable.  He denies any palpitations or known recurrence of atrial fibrillation. He, however, was asymptomatic to his previous diagnosis. He remains anticoagulated with Eliquis. He has nose bleeds from time to time. No changes recommended at this time.     -Anticoagulation: continue with use of Eliquis with PAF for stroke risk reduction.     -Hypertension: Stable. The patient's blood pressure is well controlled and monitored through his PCP office.  No changes recommended at this time.    -Obesity: BMI 34.83.  Diet and exercise recommended.      No changes today.  Continue current medications including anticoagulation.  Follow routinely with primary care physician.  Cardiology follow-up, 6 months.  Call sooner if needed.  I attest that all portions of this note have been reviewed and updated to reflect the patient's current status.

## 2022-03-29 PROBLEM — Z79.01 CURRENT USE OF LONG TERM ANTICOAGULATION: Chronic | Status: ACTIVE | Noted: 2022-03-29

## 2022-03-29 PROBLEM — Z79.01 CURRENT USE OF LONG TERM ANTICOAGULATION: Status: ACTIVE | Noted: 2022-03-29

## 2022-03-31 ENCOUNTER — TELEPHONE (OUTPATIENT)
Dept: OTOLARYNGOLOGY | Facility: CLINIC | Age: 72
End: 2022-03-31

## 2022-03-31 DIAGNOSIS — E89.0 POSTOPERATIVE HYPOTHYROIDISM: Primary | ICD-10-CM

## 2022-03-31 RX ORDER — LEVOTHYROXINE SODIUM 175 MCG
175 TABLET ORAL DAILY
Qty: 30 TABLET | Refills: 0 | Status: SHIPPED | OUTPATIENT
Start: 2022-03-31 | End: 2022-04-28 | Stop reason: DRUGHIGH

## 2022-03-31 NOTE — TELEPHONE ENCOUNTER
----- Message from Jean Marie Solis MD sent at 3/30/2022  3:58 PM CDT -----  Please call the patient regarding his abnormal result.  He has slipped in the hyperthyroidism.  We need to adjust his Synthroid down.

## 2022-04-06 NOTE — TELEPHONE ENCOUNTER
Called pt and left a message for him to call about holding his blood thinner. Ask pt to call back to go over information.   
If you need me to type up a letter and send it to you just let me know. 
Sasha Ruiz Tina L, MA    Type of Surgery: SpaceOAR/fiduciary markers for radiation   Date of Procedure: 11/03   Medications to be held: eliquis 5mg                                 For:3 days   Will Lovenox Bridging be needed?   Cardiologist/Provider: Dr Hoover   Requesting Provider: Dr Israel Delacruz     Please send information back in a timely manner so the patient can be notified. Thank you.       Please advise.  
This patient takes Eliquis for history of atrial fibrillation.  The medication would be considered reasonably safe to hold 48 hours prior to the procedure without Lovenox bridging.  The medication can then be resumed when feasible after the procedure.  
cysto bladder biopsy

## 2022-04-14 ENCOUNTER — TELEPHONE (OUTPATIENT)
Dept: ONCOLOGY | Facility: CLINIC | Age: 72
End: 2022-04-14

## 2022-04-14 ENCOUNTER — LAB (OUTPATIENT)
Dept: LAB | Facility: HOSPITAL | Age: 72
End: 2022-04-14

## 2022-04-14 DIAGNOSIS — E61.1 LOW IRON: Primary | ICD-10-CM

## 2022-04-14 DIAGNOSIS — C34.31 MALIGNANT NEOPLASM OF LOWER LOBE OF RIGHT LUNG: ICD-10-CM

## 2022-04-14 DIAGNOSIS — D64.9 ANEMIA, UNSPECIFIED TYPE: ICD-10-CM

## 2022-04-14 DIAGNOSIS — D50.8 IRON DEFICIENCY ANEMIA SECONDARY TO INADEQUATE DIETARY IRON INTAKE: ICD-10-CM

## 2022-04-14 LAB
BASOPHILS # BLD AUTO: 0.02 10*3/MM3 (ref 0–0.2)
BASOPHILS NFR BLD AUTO: 0.4 % (ref 0–1.5)
DEPRECATED RDW RBC AUTO: 46.1 FL (ref 37–54)
EOSINOPHIL # BLD AUTO: 0.09 10*3/MM3 (ref 0–0.4)
EOSINOPHIL NFR BLD AUTO: 1.7 % (ref 0.3–6.2)
ERYTHROCYTE [DISTWIDTH] IN BLOOD BY AUTOMATED COUNT: 13.7 % (ref 12.3–15.4)
FERRITIN SERPL-MCNC: 415.9 NG/ML (ref 30–400)
FOLATE SERPL-MCNC: >20 NG/ML (ref 4.78–24.2)
HCT VFR BLD AUTO: 37.2 % (ref 37.5–51)
HGB BLD-MCNC: 12.1 G/DL (ref 13–17.7)
IMM GRANULOCYTES # BLD AUTO: 0.04 10*3/MM3 (ref 0–0.05)
IMM GRANULOCYTES NFR BLD AUTO: 0.7 % (ref 0–0.5)
IRON 24H UR-MRATE: 47 MCG/DL (ref 59–158)
IRON SATN MFR SERPL: 16 % (ref 20–50)
LYMPHOCYTES # BLD AUTO: 0.36 10*3/MM3 (ref 0.7–3.1)
LYMPHOCYTES NFR BLD AUTO: 6.7 % (ref 19.6–45.3)
MCH RBC QN AUTO: 29.9 PG (ref 26.6–33)
MCHC RBC AUTO-ENTMCNC: 32.5 G/DL (ref 31.5–35.7)
MCV RBC AUTO: 91.9 FL (ref 79–97)
MONOCYTES # BLD AUTO: 0.55 10*3/MM3 (ref 0.1–0.9)
MONOCYTES NFR BLD AUTO: 10.2 % (ref 5–12)
NEUTROPHILS NFR BLD AUTO: 4.31 10*3/MM3 (ref 1.7–7)
NEUTROPHILS NFR BLD AUTO: 80.3 % (ref 42.7–76)
NRBC BLD AUTO-RTO: 0 /100 WBC (ref 0–0.2)
PLATELET # BLD AUTO: 165 10*3/MM3 (ref 140–450)
PMV BLD AUTO: 10.4 FL (ref 6–12)
RBC # BLD AUTO: 4.05 10*6/MM3 (ref 4.14–5.8)
TIBC SERPL-MCNC: 297 MCG/DL (ref 298–536)
TRANSFERRIN SERPL-MCNC: 199 MG/DL (ref 200–360)
VIT B12 BLD-MCNC: 886 PG/ML (ref 211–946)
WBC NRBC COR # BLD: 5.37 10*3/MM3 (ref 3.4–10.8)

## 2022-04-14 PROCEDURE — 84466 ASSAY OF TRANSFERRIN: CPT

## 2022-04-14 PROCEDURE — 83540 ASSAY OF IRON: CPT

## 2022-04-14 PROCEDURE — 85025 COMPLETE CBC W/AUTO DIFF WBC: CPT

## 2022-04-14 PROCEDURE — 82746 ASSAY OF FOLIC ACID SERUM: CPT

## 2022-04-14 PROCEDURE — 82607 VITAMIN B-12: CPT

## 2022-04-14 PROCEDURE — 36415 COLL VENOUS BLD VENIPUNCTURE: CPT

## 2022-04-14 PROCEDURE — 82728 ASSAY OF FERRITIN: CPT

## 2022-04-14 RX ORDER — FERROUS SULFATE 325(65) MG
325 TABLET ORAL
Qty: 60 TABLET | Refills: 2 | Status: SHIPPED | OUTPATIENT
Start: 2022-04-14 | End: 2022-06-08 | Stop reason: SDUPTHER

## 2022-04-14 NOTE — TELEPHONE ENCOUNTER
----- Message from Milan Bullard MD sent at 4/14/2022  2:10 PM CDT -----  Ferrous sulfate 325 mg p.o. daily #60 with 2 refills.  Stop and call if intolerant.    Called patient with the above information. Left him a detailed voicemail with our call back number if he has any questions. Medication will be sent to his pharmacy.    molly

## 2022-04-27 ENCOUNTER — LAB (OUTPATIENT)
Dept: LAB | Facility: HOSPITAL | Age: 72
End: 2022-04-27

## 2022-04-27 DIAGNOSIS — E89.0 POSTOPERATIVE HYPOTHYROIDISM: ICD-10-CM

## 2022-04-27 LAB — TSH SERPL DL<=0.05 MIU/L-ACNC: 0.19 UIU/ML (ref 0.27–4.2)

## 2022-04-27 PROCEDURE — 84443 ASSAY THYROID STIM HORMONE: CPT

## 2022-04-27 PROCEDURE — 36415 COLL VENOUS BLD VENIPUNCTURE: CPT

## 2022-04-28 ENCOUNTER — TELEPHONE (OUTPATIENT)
Dept: OTOLARYNGOLOGY | Facility: CLINIC | Age: 72
End: 2022-04-28

## 2022-04-28 DIAGNOSIS — E89.0 POSTOPERATIVE HYPOTHYROIDISM: Primary | ICD-10-CM

## 2022-04-28 RX ORDER — LEVOTHYROXINE SODIUM 150 MCG
150 TABLET ORAL DAILY
Qty: 30 TABLET | Refills: 0 | Status: SHIPPED | OUTPATIENT
Start: 2022-04-28 | End: 2022-09-28 | Stop reason: DRUGHIGH

## 2022-06-06 ENCOUNTER — TELEPHONE (OUTPATIENT)
Dept: ONCOLOGY | Facility: CLINIC | Age: 72
End: 2022-06-06

## 2022-06-06 NOTE — TELEPHONE ENCOUNTER
VEE WITH APPT DATE AND TIME. HIS CT SCANS ARE ON 07/14/2022 AT 9 AM AT [facility]'S LOCATION. 06/06/2022 BS

## 2022-06-06 NOTE — TELEPHONE ENCOUNTER
Caller: GRECIA    Relationship to patient: SELF    Best call back number: [phone]    Patient is needing: TO MAKE SURE HIS SCANS ARE SCHEDULED FOR HIS F/U IN July.

## 2022-06-08 ENCOUNTER — LAB (OUTPATIENT)
Dept: LAB | Facility: HOSPITAL | Age: 72
End: 2022-06-08

## 2022-06-08 ENCOUNTER — TELEPHONE (OUTPATIENT)
Dept: OTOLARYNGOLOGY | Facility: CLINIC | Age: 72
End: 2022-06-08

## 2022-06-08 ENCOUNTER — TELEPHONE (OUTPATIENT)
Dept: ONCOLOGY | Facility: CLINIC | Age: 72
End: 2022-06-08

## 2022-06-08 DIAGNOSIS — D50.8 IRON DEFICIENCY ANEMIA SECONDARY TO INADEQUATE DIETARY IRON INTAKE: Primary | ICD-10-CM

## 2022-06-08 DIAGNOSIS — E61.1 LOW IRON: ICD-10-CM

## 2022-06-08 DIAGNOSIS — E89.0 S/P TOTAL THYROIDECTOMY: ICD-10-CM

## 2022-06-08 DIAGNOSIS — C34.31 MALIGNANT NEOPLASM OF LOWER LOBE OF RIGHT LUNG: ICD-10-CM

## 2022-06-08 DIAGNOSIS — E89.0 POSTSURGICAL HYPOTHYROIDISM: ICD-10-CM

## 2022-06-08 DIAGNOSIS — D50.8 IRON DEFICIENCY ANEMIA SECONDARY TO INADEQUATE DIETARY IRON INTAKE: ICD-10-CM

## 2022-06-08 DIAGNOSIS — E89.0 POSTOPERATIVE HYPOTHYROIDISM: ICD-10-CM

## 2022-06-08 LAB
IRON 24H UR-MRATE: 53 MCG/DL (ref 59–158)
IRON SATN MFR SERPL: 19 % (ref 20–50)
TIBC SERPL-MCNC: 282 MCG/DL (ref 298–536)
TRANSFERRIN SERPL-MCNC: 189 MG/DL (ref 200–360)
TSH SERPL DL<=0.05 MIU/L-ACNC: 0.79 UIU/ML (ref 0.27–4.2)

## 2022-06-08 PROCEDURE — 84443 ASSAY THYROID STIM HORMONE: CPT

## 2022-06-08 PROCEDURE — 84466 ASSAY OF TRANSFERRIN: CPT

## 2022-06-08 PROCEDURE — 36415 COLL VENOUS BLD VENIPUNCTURE: CPT

## 2022-06-08 PROCEDURE — 83540 ASSAY OF IRON: CPT

## 2022-06-08 RX ORDER — FERROUS SULFATE 325(65) MG
325 TABLET ORAL
Qty: 60 TABLET | Refills: 2 | Status: SHIPPED | OUTPATIENT
Start: 2022-06-08 | End: 2022-09-28 | Stop reason: SDUPTHER

## 2022-06-08 RX ORDER — LEVOTHYROXINE SODIUM 150 MCG
150 TABLET ORAL DAILY
Qty: 30 TABLET | Refills: 3 | Status: SHIPPED | OUTPATIENT
Start: 2022-06-08 | End: 2022-07-07 | Stop reason: SDUPTHER

## 2022-06-08 NOTE — TELEPHONE ENCOUNTER
Notified patient that his iron sat is 19% he will need to continue on his oral Iron tablets. Prescription to be sent to his pharmacy for

## 2022-06-08 NOTE — TELEPHONE ENCOUNTER
Provider: DR MULLEN  Caller: GRECIA      Reason for Call: GRECIA IS CALLING TO LET DR MULLEN KNOW THAT HE HAS DONE HIS LABS.    ALSO HE HAS 4-5 IRON PILLS LEFT AND HE ASSUMES HE IS SUPPOSE TO STAY ON THEM. IF SO HE NEEDS A REFILL.      [facility] # [phone]      PLEASE ADVISE ON IRON PILLS

## 2022-06-20 ENCOUNTER — TELEPHONE (OUTPATIENT)
Dept: UROLOGY | Age: 72
End: 2022-06-20

## 2022-06-20 NOTE — TELEPHONE ENCOUNTER
TRIED TO REACH PT TO LET THEM KNOW THEY ARE STILL NEEDING TO HAVE PSA LABS DRAWN BEFORE WED FU APPT. ;EFT VM STATING REASON FOR CALL AND TO CALL OFFICE BACK

## 2022-06-21 DIAGNOSIS — C61 PROSTATE CANCER (HCC): ICD-10-CM

## 2022-06-21 LAB — PROSTATE SPECIFIC ANTIGEN: 0.03 NG/ML (ref 0–4)

## 2022-06-22 ENCOUNTER — OFFICE VISIT (OUTPATIENT)
Dept: UROLOGY | Age: 72
End: 2022-06-22
Payer: MEDICARE

## 2022-06-22 VITALS
WEIGHT: 263 LBS | BODY MASS INDEX: 34.85 KG/M2 | SYSTOLIC BLOOD PRESSURE: 147 MMHG | TEMPERATURE: 97.2 F | HEIGHT: 73 IN | DIASTOLIC BLOOD PRESSURE: 77 MMHG

## 2022-06-22 DIAGNOSIS — C61 PROSTATE CANCER (HCC): Primary | ICD-10-CM

## 2022-06-22 DIAGNOSIS — N52.35 ERECTILE DYSFUNCTION FOLLOWING RADIATION THERAPY: ICD-10-CM

## 2022-06-22 LAB
APPEARANCE FLUID: CLEAR
BILIRUBIN, POC: NORMAL
BLOOD URINE, POC: NORMAL
CLARITY, POC: CLEAR
COLOR, POC: YELLOW
GLUCOSE URINE, POC: NORMAL
KETONES, POC: NORMAL
LEUKOCYTE EST, POC: NORMAL
NITRITE, POC: NORMAL
PH, POC: 5.5
PROTEIN, POC: NORMAL
SPECIFIC GRAVITY, POC: 1.02
UROBILINOGEN, POC: 0.2

## 2022-06-22 PROCEDURE — 99214 OFFICE O/P EST MOD 30 MIN: CPT | Performed by: UROLOGY

## 2022-06-22 PROCEDURE — 1123F ACP DISCUSS/DSCN MKR DOCD: CPT | Performed by: UROLOGY

## 2022-06-22 PROCEDURE — 81002 URINALYSIS NONAUTO W/O SCOPE: CPT | Performed by: UROLOGY

## 2022-06-22 RX ORDER — LEVOTHYROXINE SODIUM 150 MCG
TABLET ORAL
COMMUNITY
Start: 2022-06-08

## 2022-06-22 RX ORDER — FERROUS SULFATE 325(65) MG
TABLET ORAL
COMMUNITY
Start: 2022-06-08

## 2022-06-22 RX ORDER — SILDENAFIL CITRATE 20 MG/1
20 TABLET ORAL PRN
Qty: 5 TABLET | Refills: 0 | Status: SHIPPED | OUTPATIENT
Start: 2022-06-22 | End: 2022-06-23 | Stop reason: SDUPTHER

## 2022-06-22 NOTE — PROGRESS NOTES
Beth Muhammad is a 71 y.o. male who presents today   Chief Complaint   Patient presents with    Follow-up     I am here today for my 3 mo fu, my PSA is done     Prostate Cancer  Patient is here today for prostate cancer which was first diagnosed approximately 2 years ago. July 2020  His prostate cancer can be characterized as clinical stage T1c Shan[place] 4+3 = 7 hormone sensitive status post XRT  His last several PSA values are as follows:  Lab Results   Component Value Date    PSA 0.03 06/21/2022    PSA 21.02 (H) 08/11/2021   PSA done 2/25/2022 was 0.119  Previous treatment of prostate cancer: Neoadjuvant hormonal therapy beginning September 23, 2021 until present. External beam radiation therapy completed 1/10/2022  Lower urinary tract symptoms: urgency, frequency, decreased urinary stream and nocturia, 3 times per night patient takes alfuzosin     Erectile Dysfunction:  Patient is here today for erectile dysfunction which started a few year(s) ago. But seems to have gotten worse since completing XRT  Recently his ED symptoms: are worsening  Currently sexually active? Yes  Sex drive/libido: normal  Current medical Rx for ED: none    Past Medical History:   Diagnosis Date    Benign prostatic disease     Elevated PSA     Osteoarthritis     Shoulder pain     fall off porch    Upper respiratory virus     c/o recent tx with otc meds. ; denies fever/chills (about a week ago)       Past Surgical History:   Procedure Laterality Date    HERNIA REPAIR      JOINT REPLACEMENT      KNEE ARTHROPLASTY  12--10    LUNG BIOPSY Right     Buddhist    MD SHLDR ARTHROSCOP,SURG,W/ROTAT CUFF REPR Left 3/22/2018    SHOULDER ARTHROSCOPY ROTATOR CUFF REPAIR  SUBACROMIAL DECOMPRESSION performed by Joslyn Shah MD at [place] [id number] ARTHROSCOPY  2008       Current Outpatient Medications   Medication Sig Dispense Refill    sildenafil (REVATIO) 20 MG tablet Take 1 tablet by mouth as needed (For erectile dysfunction) Take 1 to 4 hours prior to sexual activity 5 tablet 0    FEROSUL 325 (65 Fe) MG tablet TAKE 1 TABLET BY MOUTH DAILY WITH BREAKFAST.      SYNTHROID 150 MCG tablet TAKE 1 TABLET BY MOUTH ONCE DAILY      losartan-hydroCHLOROthiazide (HYZAAR) 50-12.5 MG per tablet TAKE 12 TABLET BY MOUTH DAILY.  ELIQUIS 5 MG TABS tablet       alfuzosin (UROXATRAL) 10 MG extended release tablet Take 1 tablet by mouth daily 30 tablet 11    Multiple Vitamins-Minerals (THERAPEUTIC MULTIVITAMIN-MINERALS) tablet Take 1 tablet by mouth daily      sildenafil (REVATIO) 20 MG tablet Take 1 tablet by mouth as needed (For erectile dysfunction) Take 1 to 4 hours prior to sexual activity 5 tablet 0     No current facility-administered medications for this visit. No Known Allergies    Social History     Socioeconomic History    Marital status:      Spouse name: None    Number of children: None    Years of education: None    Highest education level: None   Occupational History    None   Tobacco Use    Smoking status: Former Smoker    Smokeless tobacco: Former User     Quit date: 2007   Substance and Sexual Activity    Alcohol use: Not Currently     Alcohol/week: 2.0 standard drinks     Types: 2 Shots of liquor per week     Comment: 2 per week    Drug use: No    Sexual activity: None   Other Topics Concern    None   Social History Narrative    None     Social Determinants of Health     Financial Resource Strain:     Difficulty of Paying Living Expenses: Not on file   Food Insecurity:     Worried About Running Out of Food in the Last Year: Not on file    Fede of Food in the Last Year: Not on file   Transportation Needs:     Lack of Transportation (Medical): Not on file    Lack of Transportation (Non-Medical):  Not on file   Physical Activity:     Days of Exercise per Week: Not on file    Minutes of Exercise per Session: Not on file   Stress:     Feeling of Stress : Not on file   Social Connections:     Frequency of Communication with Friends and Family: Not on file    Frequency of Social Gatherings with Friends and Family: Not on file    Attends [employer] Services: Not on file    Active Member of Clubs or Organizations: Not on file    Attends Club or Organization Meetings: Not on file    Marital Status: Not on file   Intimate Partner Violence:     Fear of Current or Ex-Partner: Not on file    Emotionally Abused: Not on file    Physically Abused: Not on file    Sexually Abused: Not on file   Housing Stability:     Unable to Pay for Housing in the Last Year: Not on file    Number of [place] in the Last Year: Not on file    Unstable Housing in the Last Year: Not on file       History reviewed. No pertinent family history. REVIEW OF SYSTEMS:  Review of Systems   Constitutional: Negative for chills and fever. HENT: Negative for facial swelling and sore throat. Eyes: Negative for discharge and redness. Respiratory: Negative for chest tightness and wheezing. Cardiovascular: Negative for chest pain and palpitations. Gastrointestinal: Negative for nausea and vomiting. Endocrine: Negative for polyphagia and polyuria. Genitourinary: Positive for urgency (got worse after lupron shot. ). Negative for decreased urine volume, difficulty urinating, dysuria, enuresis, flank pain, frequency, genital sores, hematuria, penile discharge, penile pain, penile swelling, scrotal swelling and testicular pain. Musculoskeletal: Negative for back pain and neck stiffness. Skin: Negative for rash and wound. Neurological: Negative for dizziness and headaches. Hematological: Negative for adenopathy. Does not bruise/bleed easily. Psychiatric/Behavioral: Negative for confusion and hallucinations. PHYSICAL EXAM:  BP (!) 147/77   Temp 97.2 °F (36.2 °C) (Temporal)   Ht 6' 1\" (1.854 m)   Wt 263 lb (119.3 kg)   BMI 34.70 kg/m²   Physical Exam  Constitutional:       General: He is not in acute distress. Appearance: Normal appearance. He is well-developed. HENT:      Head: Normocephalic and atraumatic. Nose: Nose normal.   Eyes:      General: No scleral icterus. Conjunctiva/sclera: Conjunctivae normal.      Pupils: Pupils are equal, round, and reactive to light. Neck:      Trachea: No tracheal deviation. Cardiovascular:      Rate and Rhythm: Normal rate and regular rhythm. Pulmonary:      Effort: Pulmonary effort is normal. No respiratory distress. Breath sounds: No stridor. Abdominal:      General: There is no distension. Palpations: Abdomen is soft. There is no mass. Tenderness: There is no abdominal tenderness. Musculoskeletal:         General: No tenderness. Normal range of motion. Cervical back: Normal range of motion and neck supple. Lymphadenopathy:      Cervical: No cervical adenopathy. Skin:     General: Skin is warm and dry. Findings: No erythema. Neurological:      Mental Status: He is alert and oriented to person, place, and time.    Psychiatric:         Behavior: Behavior normal.         Judgment: Judgment normal.             DATA:  CMP:    Lab Results   Component Value Date     08/14/2020    K 4.4 08/14/2020     08/14/2020    CO2 24 08/14/2020    BUN 16 08/14/2020    CREATININE 1.0 08/14/2020    GFRAA >59 08/14/2020    LABGLOM >60 08/14/2020    GLUCOSE 103 08/14/2020    PROT 6.7 08/14/2020    LABALBU 4.1 08/14/2020    CALCIUM 9.2 08/14/2020    BILITOT 1.2 08/14/2020    ALKPHOS 94 08/14/2020    AST 27 08/14/2020    ALT 39 08/14/2020     Results for orders placed or performed in visit on 06/22/22   POCT Urinalysis no Micro   Result Value Ref Range    Color, UA yellow     Clarity, UA clear     Glucose, UA POC neg     Bilirubin, UA neg     Ketones, UA neg     Spec Grav, UA 1.020     Blood, UA POC trace     pH, UA 5.5     Protein, UA POC neg     Urobilinogen, UA 0.2     Leukocytes, UA neg     Nitrite, UA neg     Appearance, Fluid Clear Clear, Slightly Cloudy     Lab Results   Component Value Date    PSA 0.03 06/21/2022    PSA 21.02 (H) 08/11/2021       1. Prostate cancer ([address]  PSA remains very low. He will continue on neoadjuvant hormonal therapy with Lupron to complete 2 years which would be September 2023. He will keep his appointment for his next injection. A lot of his initial voiding symptoms are improving.  - PSA, Diagnostic; Future  - POCT Urinalysis no Micro    2. Erectile dysfunction following radiation therapy  See how he does with sildenafil  - sildenafil (REVATIO) 20 MG tablet; Take 1 tablet by mouth as needed (For erectile dysfunction) Take 1 to 4 hours prior to sexual activity  Dispense: 5 tablet; Refill: 0      Orders Placed This Encounter   Procedures    PSA, Diagnostic     PSA in 6 month     Standing Status:   Future     Standing Expiration Date:   6/22/2023    POCT Urinalysis no Micro        Return in about 6 months (around 12/22/2022) for PSA prior to vext visit. All information inputted into the note by the MA to include chief complaint, past medical history, past surgical history, medications, allergies, social and family history and review of systems has been reviewed and updated as needed by me. EMR Dragon/transcription disclaimer: Much of this documentt is electronic  transcription/translation of spoken language to printed text. The  electronic translation of spoken language may be erroneous, or at times,  nonsensical words or phrases may be inadvertently transcribed.  Although I  have reviewed the document for such errors, some may still exist.

## 2022-06-23 ENCOUNTER — TELEPHONE (OUTPATIENT)
Dept: UROLOGY | Age: 72
End: 2022-06-23

## 2022-06-23 DIAGNOSIS — N52.35 ERECTILE DYSFUNCTION FOLLOWING RADIATION THERAPY: ICD-10-CM

## 2022-06-23 RX ORDER — SILDENAFIL CITRATE 20 MG/1
20 TABLET ORAL PRN
Qty: 5 TABLET | Refills: 0 | Status: SHIPPED | OUTPATIENT
Start: 2022-06-23

## 2022-06-23 ASSESSMENT — ENCOUNTER SYMPTOMS
NAUSEA: 0
CHEST TIGHTNESS: 0
FACIAL SWELLING: 0
SORE THROAT: 0
EYE REDNESS: 0
VOMITING: 0
EYE DISCHARGE: 0
BACK PAIN: 0
WHEEZING: 0

## 2022-06-23 NOTE — TELEPHONE ENCOUNTER
The sildenafil that was called in for pt yesterday, he states it was sent to the wrong pharmacy and he wants it sent to [facility] the [employer].

## 2022-07-06 NOTE — PROGRESS NOTES
YOB: 1950  Location: [facility]  Location Address: [address] [place] [address], [facility], [address] [place] [address]  Location Phone: [phone]    Chief Complaint   Patient presents with   • S/P total thyroidectomy       History of Present Illness  Delgado Desir is a 72 y.o. male.  Delgado Desir is here for follow up of ENT complaints. The patient is s/p total thyroidectomy 2020 for multinodular goiter. He has had a relatively normal postoperative course. He denies fatigue, globus sensation or dysphagia. He does state he feels as if he goes to bed earlier than he used to, but he is also currently being treated for iron deficiency anemia by Dr. Bullard.   He completed radiation for prostate cancer in 2022.    He is currently taking synthroid 150 mcg     TSH (2022 14:25)  Tissue Pathology Exam (2020 12:32)       Past Medical History:   Diagnosis Date   • Arthritis    • Asthma 22   • Dizzy spells     states on rare occsaion has   • Hypertension    • Lung cancer (HCC)    • Nosebleed 22    Taking  eliquis   • PAF (paroxysmal atrial fibrillation) (HCC)    • Prostate cancer (HCC)    • Thyroid disorder        Past Surgical History:   Procedure Laterality Date   • CARPAL TUNNEL RELEASE Right    • LOBECTOMY Right 2/15/2021    Procedure: THORACOSCOPY WITH DAVINCI ROBOT WITH RIGHT WEDGE RESECTION, MEDIASTINAL LYMPH NODE DISSECTION-RIGHT;  Surgeon: Nick Flores MD;  Location: [facility];  Service: [facility];  Laterality: Right;   • LUNG BIOPSY     • PROSTATE BIOPSY     • REPLACEMENT TOTAL KNEE Left    • SHOULDER SURGERY Bilateral    • THORACOSCOPY Right 10/30/2020    Procedure: MEDIASTINOSCOPY, RIGHT THORACOSCOPY POSSIBLE WEDGE RESECTION WITH DAVINCI ROBOT;  Surgeon: Nick Flores MD;  Location: [facility] OR;  Service: [facility];  Laterality: Right;   • THYROIDECTOMY N/A 2020    Procedure: Total thyroidectomy;  Surgeon: Jean Marie Solis MD;  Location: [facility] OR;  Service:  ENT;  Laterality: N/A;       Outpatient Medications Marked as Taking for the 22 encounter (Office Visit) with Jean Marie Solis MD   Medication Sig Dispense Refill   • acetaminophen (TYLENOL) 500 MG tablet Take 500 mg by mouth Every 6 (Six) Hours As Needed for Mild Pain .     • alfuzosin (UROXATRAL) 10 MG 24 hr tablet Take 10 mg by mouth Daily.     • Eliquis 5 MG tablet tablet TAKE 1 TABLET BY MOUTH EVERY 12 HOURS  60 tablet 11   • ferrous sulfate 325 (65 FE) MG tablet Take 1 tablet by mouth Daily With Breakfast. 60 tablet 2   • losartan-hydrochlorothiazide (HYZAAR) 50-12.5 MG per tablet TAKE 1/2 TABLET BY MOUTH DAILY. 45 tablet 3   • Synthroid 150 MCG tablet Take 1 tablet by mouth Daily for 30 days. 30 tablet 3   • therapeutic multivitamin-minerals (THERAGRAN-M) tablet Take 1 tablet by mouth Daily.     • [DISCONTINUED] Synthroid 150 MCG tablet Take 1 tablet by mouth Daily for 30 days. 30 tablet 3       Patient has no known allergies.    Family History   Problem Relation Age of Onset   • Hypertension Mother    • COPD Father        Social History     Socioeconomic History   • Marital status:    • Number of children: 2   Tobacco Use   • Smoking status: Former Smoker     Packs/day: 1.00     Years: 33.00     Pack years: 33.00     Types: Cigarettes, Cigarettes     Start date: 1969     Quit date: 1996     Years since quittin.5   • Smokeless tobacco: Never Used   Vaping Use   • Vaping Use: Never used   Substance and Sexual Activity   • Alcohol use: Yes     Alcohol/week: 2.0 standard drinks     Types: 2 Shots of liquor per week     Comment: x2 weekly   • Drug use: Never   • Sexual activity: Defer       Review of Systems   Constitutional: Negative.    HENT: Negative for sore throat, trouble swallowing and voice change.    Eyes: Negative.    Respiratory: Negative.    Cardiovascular: Negative.    Endocrine: Negative.    Genitourinary: Negative.    Musculoskeletal: Positive for arthralgias.    Allergic/Immunologic: Negative.    Neurological: Negative.        Vitals:    07/07/22 0858   BP: 125/58   Pulse: 81   Resp: 16   Temp: 97.5 °F (36.4 °C)       Body mass index is 34.51 kg/m².    Objective     Physical Exam  Vitals reviewed.   Constitutional:       Appearance: He is obese.   HENT:      Head: Normocephalic.      Right Ear: Hearing, tympanic membrane, ear canal and external ear normal.      Left Ear: Hearing, tympanic membrane, ear canal and external ear normal.      Nose: Nose normal.      Mouth/Throat:      Lips: Pink.      Mouth: Mucous membranes are moist.      Dentition: Dental caries present.   Neck:     Musculoskeletal:      Cervical back: Full passive range of motion without pain and normal range of motion.   Neurological:      Mental Status: He is alert.       Dr. Solis has examined and assessed the patient and agrees with current treatment plan     Assessment & Plan   Diagnoses and all orders for this visit:    1. Nodule of neck (Primary)    2. S/P total thyroidectomy  -     TSH; Future    3. Postsurgical hypothyroidism  -     TSH; Future    Other orders  -     Synthroid 150 MCG tablet; Take 1 tablet by mouth Daily for 30 days.  Dispense: 30 tablet; Refill: 3      * Surgery not found *  Orders Placed This Encounter   Procedures   • TSH     Standing Status:   Future     Standing Expiration Date:   7/7/2023     Order Specific Question:   Release to patient     Answer:   Immediate     Return in about 6 months (around 1/7/2023) for Recheck.     Call for new/worsening problems   Discussed possible symptoms of hypo/hyperthyroid   Continue medication as directed     Patient Instructions   Will continue to monitor tsh levels routinely  Call or return for new/worsening problems

## 2022-07-07 ENCOUNTER — OFFICE VISIT (OUTPATIENT)
Dept: OTOLARYNGOLOGY | Facility: CLINIC | Age: 72
End: 2022-07-07

## 2022-07-07 VITALS
BODY MASS INDEX: 34.67 KG/M2 | SYSTOLIC BLOOD PRESSURE: 125 MMHG | TEMPERATURE: 97.5 F | RESPIRATION RATE: 16 BRPM | WEIGHT: 261.6 LBS | DIASTOLIC BLOOD PRESSURE: 58 MMHG | HEART RATE: 81 BPM | HEIGHT: 73 IN

## 2022-07-07 DIAGNOSIS — E89.0 POSTSURGICAL HYPOTHYROIDISM: ICD-10-CM

## 2022-07-07 DIAGNOSIS — R22.1 NODULE OF NECK: Primary | ICD-10-CM

## 2022-07-07 DIAGNOSIS — E89.0 S/P TOTAL THYROIDECTOMY: ICD-10-CM

## 2022-07-07 PROCEDURE — 99213 OFFICE O/P EST LOW 20 MIN: CPT | Performed by: NURSE PRACTITIONER

## 2022-07-07 RX ORDER — LEVOTHYROXINE SODIUM 150 MCG
150 TABLET ORAL DAILY
Qty: 30 TABLET | Refills: 3 | Status: SHIPPED | OUTPATIENT
Start: 2022-07-07 | End: 2022-08-05 | Stop reason: SDUPTHER

## 2022-07-07 NOTE — PROGRESS NOTES
[facility] HEMATOLOGY & ONCOLOGY  [address]  [place] [facility] [id number] [phone]    Patient Name: Delgado Desir  Encounter Date: 07/21/2022  YOB: 1950  Patient Number: [id number]      REASON FOR FOLLOW-UP: Delgado Desir is a pleasant 72 y.o. 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  He is also seen for anemia from iron deficiency and off oral iron for the past 9.25 months.  He is seen alone.  History is obtained from patient. He is a reliable historian.         Oncology/Hematology History Overview Note   DIAGNOSTIC ABNORMALITIES:  He was found to have a lung nodule with history of prostate cancer.  CT abdomen pelvis 08/10/2020.  Lobulated noncalcified nodule in the right lower lobe represent a neoplastic process.  PET scan 09/18/2020. The multilobular pulmonary nodule in the right lung base measuring 2.9 x 1.6 cm is hypermetabolic and suspicious for a malignant process with a maximum intensity of 3.62 SUV. Differential considerations include primary lung malignancy as well as early metastatic disease.  No evidence of distant metastasis.  Unsuccessful CT-guided right lower lobe nodule biopsy 10/16/2020 due to right pneumothorax.  Pathology report 11/03/2020.  Invasive mucinous adenocarcinoma, grade 1, 2 cm, right lower lobe.  No visceral pleural invasion.  No lymphovascular invasion. Margins of resection free of tumor.  Distance of invasive carcinoma from closest margin 0.1 cm.  Pleural tissue biopsies no evidence of malignancy.  AJCC stage pT1b.  PD-L1 less than 1%, negative ROS1, ALK, BRAF and EGFR mutation.  Pathology report 2/16/2021, right lower lobe wedge excision, residual tumor is not present.  The surgical margins are free of tumor.  2 benign intraparenchymal nodes  benign.  1 benign node, left level 7.  1 benign no level 8.  CBC 02/19/2021 remarkable for WBC 12.9, hemoglobin 12.8 hematocrit 37.  Had a follow-up with Dr. Nick Flores 04/05/2021 post right lower lobe wedge and lymph node sampling on 02/15/2021.  Patient referred to oncology for surveillance.        PREVIOUS INTERVENTIONS:  Right lower lobe wedge resection 10/30/2020 by Dr. Flores.  His pulmonary function test is not adequate for left lower lobectomy as well as lymph node sampling.  He had undergone right lower lobe wedge 02/15/2021.      PREVIOUS INTERVENTIONS:Anemia.  Oral iron 04/15/2021 through 10/15/2021.     Prostate cancer (HCC)    Initial Diagnosis    Prostate cancer (CMS/HCC)     7/6/2020 Procedure    PSA  21     7/30/2020 Biopsy    [facility]  Prostate Biopsy  A: Prostate, right lateral base, needle biopsy:  Prostatic adenocarcinoma, Preston score 7 (4+3)  Tumor encompasses 20% of the needle core biopsy  Grade group 3    B: Prostate, right lateral mid, needle biopsy:  Prostatic adenocarcinoma, Preston's 7 (4+3)  Tumor encompasses 20% of the needle core biopsy  Grade group 3    C: Prostate, right lateral apex, needle biopsy:  Prostatic adenocarcinoma, Brimfield score 7 (3+4)  Tumor encompasses 30% of the needle core biopsy  Grade group 2  Pattern 4 is approximately 20% of the tumor    D: Prostate, right base, needle biopsy:  Prostatic adenocarcinoma, Preston's 7 (4+3)  Tumor encompasses 10% of the needle core biopsy  Grade group 3    E: Prostate, right mid, needle biopsy:  Prostatic adenocarcinoma, Brimfield's 7 (4+3)  Tumor encompasses 55% of the needle core biopsy  Grade group 3    F: Prostate, right apex, needle biopsy:  High-grade prostatic intraepithelial neoplasia    G: Prostate, left lateral base, needle biopsy:  Benign prostatic glands and stroma    H: Prostate, left lateral mid, needle biopsy:  Benign prostatic glands and stroma    I: Prostate, left lateral apex, needle biopsy:  Benign prostatic glands and  stroma    J: Prostate, left base, needle biopsy:  Benign prostatic glands and stroma    K: Prostate, left mid, needle biopsy:  Benign prostatic glands and stroma    L: Prostate, left apex, needle biopsy:  Benign prostatic glands and stroma     8/14/2020 Imaging    [facility]  CT AP  No acute abnormality of the abdomen or pelvis.  A moderate splenomegaly.  An enlarged prostate.  A stable bilateral renal cysts.  The lobulated noncalcified nodule in the right lower lobe represent a neoplastic process. Further evaluation with positron emission  tomography scan may be obtained.  The previously seen fractures of the left sacral ala is not visualized  in this study. If clinically significant, further evaluation with  radionuclide bone scan may be obtained    Bone Scan  1. No evidence of metastatic bone disease.  2. Degenerative uptake in the spine, right knee and bilateral  ankles/feet. Suspect prior left knee arthroplasty. Correlate clinically.     10/16/2020 Biopsy    Final Diagnosis   Lung, right lower lobe, fine-needle core biopsies and touch preparation:  A.  Fragments of skeletal muscle.  B.  No histologic or cytologic evidence of malignancy.        8/11/2021 Procedure    PSA 21.02     10/4/2021 Cancer Staged    Staging form: Prostate, AJCC 8th Edition  - Pathologic stage from 10/4/2021: Stage IIIA (pT2, pN0, cM0, PSA: 21, Grade Group: 3) - Signed by Milan Bullard MD on 10/4/2021     Malignant neoplasm of lower lobe of right lung (HCC)   10/16/2020 Biopsy    Final Diagnosis   Lung, right lower lobe, fine-needle core biopsies and touch preparation:  A.  Fragments of skeletal muscle.  B.  No histologic or cytologic evidence of malignancy.        4/5/2021 Initial Diagnosis    Malignant neoplasm of lower lobe of right lung (CMS/HCC)     4/15/2021 Cancer Staged    Staging form: Lung, AJCC 8th Edition  - Pathologic stage from 4/15/2021: Stage IA2 (pT1b, pN0, cM0) - Signed by Milan Bullard MD on 4/15/2021         PAST MEDICAL  HISTORY:  ALLERGIES:  No Known Allergies  CURRENT MEDICATIONS:  Outpatient Encounter Medications as of 7/21/2022   Medication Sig Dispense Refill   • acetaminophen (TYLENOL) 500 MG tablet Take 500 mg by mouth Every 6 (Six) Hours As Needed for Mild Pain .     • alfuzosin (UROXATRAL) 10 MG 24 hr tablet Take 10 mg by mouth Daily.     • apixaban (Eliquis) 5 MG tablet tablet Take 1 tablet by mouth Every 12 (Twelve) Hours. 60 tablet 11   • ferrous sulfate 325 (65 FE) MG tablet Take 1 tablet by mouth Daily With Breakfast. 60 tablet 2   • losartan-hydrochlorothiazide (HYZAAR) 50-12.5 MG per tablet TAKE 1/2 TABLET BY MOUTH DAILY. 45 tablet 3   • Synthroid 150 MCG tablet Take 1 tablet by mouth Daily for 30 days. 30 tablet 3   • therapeutic multivitamin-minerals (THERAGRAN-M) tablet Take 1 tablet by mouth Daily.     • Synthroid 150 MCG tablet Take 1 tablet by mouth Daily for 30 days. 30 tablet 0   • [DISCONTINUED] Eliquis 5 MG tablet tablet TAKE 1 TABLET BY MOUTH EVERY 12 HOURS  60 tablet 11   • [DISCONTINUED] metoprolol tartrate (LOPRESSOR) 25 MG tablet TAKE 1/2 TABLET BY MOUTH EVERY 12 HOURS  90 tablet 4   • [DISCONTINUED] Synthroid 150 MCG tablet Take 1 tablet by mouth Daily for 30 days. 30 tablet 3     No facility-administered encounter medications on file as of 7/21/2022.     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.4XXA   • S/P total thyroidectomy E89.0   • Postoperative hypothyroidism E89.0   • Lung mass R91.8   • Paroxysmal atrial fibrillation (HCC) I48.0   • Malignant neoplasm of lower lobe of right lung (HCC) C34.31   • Nodule of neck R22.1   • S/P partial lobectomy of lung Z90.2   • History of lung cancer Z85.118   • Class 1 obesity due to excess calories with serious  comorbidity and body mass index (BMI) of 34.0 to 34.9 in adult E66.09, Z68.34   • Current use of long term anticoagulation Z79.01     SURGERIES:  Past Surgical History:   Procedure Laterality Date   • CARPAL TUNNEL RELEASE Right    • LOBECTOMY Right 2/15/2021    Procedure: THORACOSCOPY WITH DAVINCI ROBOT WITH RIGHT WEDGE RESECTION, MEDIASTINAL LYMPH NODE DISSECTION-RIGHT;  Surgeon: Nick Flores MD;  Location:  [facility] OR;  Service: [facility];  Laterality: Right;   • LUNG BIOPSY     • PROSTATE BIOPSY     • REPLACEMENT TOTAL KNEE Left    • SHOULDER SURGERY Bilateral    • THORACOSCOPY Right 10/30/2020    Procedure: MEDIASTINOSCOPY, RIGHT THORACOSCOPY POSSIBLE WEDGE RESECTION WITH DAVINCI ROBOT;  Surgeon: Nick Flores MD;  Location:  [facility] OR;  Service: [facility];  Laterality: Right;   • THYROIDECTOMY N/A 12/11/2020    Procedure: Total thyroidectomy;  Surgeon: Jean Marie Solis MD;  Location:  [facility] OR;  Service: ENT;  Laterality: N/A;     HEALTH MAINTENANCE ITEMS:  Health Maintenance Due   Topic Date Due   • COLORECTAL CANCER SCREENING  Never done   • TDAP/TD VACCINES (1 - Tdap) Never done   • ZOSTER VACCINE (1 of 2) Never done   • Pneumococcal Vaccine 65+ (1 - PCV) Never done   • HEPATITIS C SCREENING  Never done   • ANNUAL WELLNESS VISIT  Never done   • COVID-19 Vaccine (3 - Moderna risk series) 11/25/2021       <no information>  Last Completed Colonoscopy     This patient has no relevant Health Maintenance data.        There is no immunization history for the selected administration types on file for this patient.  Last Completed Mammogram     This patient has no relevant Health Maintenance data.            FAMILY HISTORY:  Family History   Problem Relation Age of Onset   • Hypertension Mother    • COPD Father      SOCIAL HISTORY:  Social History     Socioeconomic History   • Marital status:    • Number of children: 2   Tobacco Use   • Smoking status: Former Smoker     Packs/day: 1.00     Years: 33.00      "Pack years: 33.00     Types: Cigarettes, Cigarettes     Start date: 1969     Quit date: 1996     Years since quittin.5   • Smokeless tobacco: Never Used   Vaping Use   • Vaping Use: Never used   Substance and Sexual Activity   • Alcohol use: Yes     Alcohol/week: 2.0 standard drinks     Types: 2 Shots of liquor per week     Comment: x2 weekly   • Drug use: Never   • Sexual activity: Defer       REVIEW OF SYSTEMS:    Review of Systems   Constitutional: Positive for fatigue. Negative for chills and fever.        \"Little bit tired.\"   HENT: Negative for congestion, mouth sores and trouble swallowing.    Eyes: Negative for redness and visual disturbance.   Respiratory: Negative for cough, shortness of breath and wheezing.    Cardiovascular: Negative for chest pain and palpitations.   Gastrointestinal: Positive for diarrhea. Negative for abdominal pain, constipation, nausea and vomiting.   Endocrine: Negative for polydipsia and polyphagia.   Genitourinary: Negative for difficulty urinating, dysuria and flank pain.   Musculoskeletal: Negative for joint swelling and myalgias.   Skin: Positive for pallor.   Allergic/Immunologic: Negative for food allergies.   Neurological: Negative for facial asymmetry, speech difficulty and weakness.   Hematological: Negative for adenopathy. Does not bruise/bleed easily.   Psychiatric/Behavioral: Negative for agitation, confusion and hallucinations.       VITAL SIGNS: /70   Pulse 72   Temp 97.2 °F (36.2 °C)   Resp 18   Ht 185.4 cm (73\")   Wt 119 kg (263 lb)   SpO2 95%   BMI 34.70 kg/m²   Pain Score    22 1024   PainSc: 0-No pain       PHYSICAL EXAMINATION:     Physical Exam  Vitals reviewed.   Constitutional:       General: He is not in acute distress.     Comments: He arrived in the exam room with a cane.   HENT:      Head: Normocephalic and atraumatic.   Eyes:      General: No scleral icterus.  Cardiovascular:      Rate and Rhythm: Normal rate. "   Pulmonary:      Effort: No respiratory distress.      Breath sounds: No wheezing or rales.   Abdominal:      General: Bowel sounds are normal.      Palpations: Abdomen is soft.      Tenderness: There is no abdominal tenderness.   Musculoskeletal:         General: No swelling.      Cervical back: Neck supple.   Skin:     General: Skin is warm.      Coloration: Skin is pale.   Neurological:      Mental Status: He is alert and oriented to person, place, and time.   Psychiatric:         Mood and Affect: Mood normal.         Behavior: Behavior normal.         Thought Content: Thought content normal.         Judgment: Judgment normal.         LABS    Lab Results - Last 18 Months   Lab Units 04/14/22  1226 01/07/22  1412 10/15/21  1133 06/18/21  1155 05/18/21  1056 04/15/21  1355   HEMOGLOBIN g/dL 12.1* 12.4* 13.2 12.8* 11.6* 12.9*   HEMATOCRIT % 37.2* 36.3* 39.0 39.5 35.7* 39.7   MCV fL 91.9 91.4 90.5 90.8 87.3 86.9   WBC 10*3/mm3 5.37 6.13 6.34 6.85 8.38 9.29   RDW % 13.7 15.0 13.3 15.7* 15.4 15.4   MPV fL 10.4 10.3 10.5 11.4 10.4 10.5   PLATELETS 10*3/mm3 165 167 177 141 151 204   IMM GRAN % % 0.7* 0.7* 0.5 0.4 0.6* 0.5   NEUTROS ABS 10*3/mm3 4.31 5.12 4.89 5.50 7.03* 7.72*   LYMPHS ABS 10*3/mm3 0.36* 0.38* 0.80 0.74 0.61* 0.87   MONOS ABS 10*3/mm3 0.55 0.46 0.51 0.48 0.65 0.55   EOS ABS 10*3/mm3 0.09 0.10 0.07 0.07 0.01 0.06   BASOS ABS 10*3/mm3 0.02 0.03 0.04 0.03 0.03 0.04   IMMATURE GRANS (ABS) 10*3/mm3 0.04 0.04 0.03 0.03 0.05 0.05   NRBC /100 WBC 0.0 0.0 0.0 0.0 0.0 0.0       Lab Results - Last 18 Months   Lab Units 07/14/22  0935 01/10/22  1010 01/07/22  1412 10/26/21  0944 10/15/21  1133 07/30/21  0954 06/18/21  1155 04/29/21  1008 04/15/21  1356 04/15/21  1355 02/19/21  1034 02/19/21  0442 02/15/21  1401 02/12/21  1011   GLUCOSE mg/dL  --   --  116*  --  101*  --  104*  --   --  104* 136* 127*   < > 120*   SODIUM mmol/L  --   --  138  --  139  --  138  --   --  135* 132* 135*   < > 135*   POTASSIUM mmol/L  --    --  4.0  --  4.0  --  4.3  --   --  4.4 4.0 4.0   < > 4.4   CO2 mmol/L  --   --  24.0  --  27.0  --  25.0  --   --  27.0 29.0 28.0   < > 25.0   CHLORIDE mmol/L  --   --  104  --  103  --  104  --   --  98 95* 97*   < > 100   ANION GAP mmol/L  --   --  10.0  --  9.0  --  9.0  --   --  10.0 8.0 10.0   < > 10.0   CREATININE mg/dL 1.00 1.20 1.19 1.10 1.13 1.40* 1.11   < >  --  1.21 1.35* 1.30*   < > 0.94   BUN mg/dL  --   --  19  --  18  --  18  --   --  17 22 22   < > 16   BUN / CREAT RATIO   --   --  16.0  --  15.9  --  16.2  --   --  14.0 16.3 16.9   < > 17.0   CALCIUM mg/dL  --   --  8.8  --  8.9  --  8.7  --   --  9.0 9.1 8.7   < > 9.4   EGFR IF NONAFRICN AM mL/min/1.73  --   --  60*  --  64  --  65  --   --  59* 52* 55*   < > 80   ALK PHOS U/L  --   --  98  --  98  --  100  --  120* 122*  --   --   --  108   TOTAL PROTEIN g/dL  --   --  6.6  --  6.7  --  6.8  --   --  7.6  --   --   --  7.0   ALT (SGPT) U/L  --   --  26  --  29  --  29  --   --  27  --   --   --  34   AST (SGOT) U/L  --   --  22  --  23  --  26  --   --  25  --   --   --  31   BILIRUBIN mg/dL  --   --  1.4*  --  1.7*  --  1.1  --   --  1.2  --   --   --  1.1   ALBUMIN g/dL  --   --  3.70  --  4.10  --  3.90  --   --  4.20  --   --   --  4.10   GLOBULIN gm/dL  --   --  2.9  --  2.6  --  2.9  --   --  3.4  --   --   --  2.9    < > = values in this interval not displayed.       No results for input(s): MSPIKE, KAPPALAMB, IGLFLC, URICACID, FREEKAPPAL, CEA, LDH, REFLABREPO in the last 22914 hours.    Lab Results - Last 18 Months   Lab Units 06/08/22  1425 04/27/22  1635 04/14/22  1226 03/28/22  1553 01/07/22  1412 12/06/21  1505 10/15/21  1133 10/04/21  1119 09/01/21  1106 06/18/21  1155 05/18/21  1056 04/15/21  1356 04/15/21  1355   IRON mcg/dL 53*  --  47*  --  69  --  39*  --   --   --  31*  --  48*   TIBC mcg/dL 282*  --  297*  --  288*  --  304  --   --   --  288*  --  378   IRON SATURATION % 19*  --  16*  --  24  --  13*  --   --   --  11*  --   13*   FERRITIN ng/mL  --   --  415.90*  --  269.00  --  196.10  --   --   --  214.00  --  125.20   TSH uIU/mL 0.792 0.190*  --  0.107*  --  0.559  --  4.850* 7.600*   < >  --   --   --    FOLATE ng/mL  --   --  >20.00  --   --   --   --   --   --   --   --  >20.00  --     < > = values in this interval not displayed.       Delgado BOWMAN [place]afhawk reports a pain score of 0.          ASSESSMENT:  1.  Adenocarcinoma the lung, right lower lobe  AJCC stage:1A2 (pT1b, cN0, cM0)  Treatment status: Post right lower lobe wedge resection.  Pulmonary function test in adequate for lobectomy and node sampling.  2.  Performance status of 1.   3.  Normocytic anemia from chronic disease, post radiation/ADT and iron deficiency.  4.  Adenocarcinoma of the prostate, Eden score 7, 4+3, grade group 3.  AJCC stage: IIIA (T2b, N0, M0)  Treatment status: Neoadjuvant hormone therapy with radiation 01/03/2022.  Followed by Dr. Phan and Dr. Lujan.   5.  Elevated bilirubin from cholelithiasis.  Negative CT of the abdomen pelvis.        PLAN:  1.   Re: CT chest report 07/14/2022. No evidence of recurrent or metastatic disease in the chest. Stable  scarring and atelectasis adjacent to RIGHT lower lobe wedge resection site. Scattered pulmonary nodules remain stable over multiple prior studies.  2.   Re: CT abdomen pelvis report 07/14/2022. No evidence of metastatic disease in the abdomen or pelvis.  Hepatic steatosis. Mild splenomegaly. Cholelithiasis without evidence of cholecystitis.Urinary bladder wall thickening with mild adjacent fat stranding. Correlate clinically to exclude cystitis.  3.   Re: Heme status. Pending.   Saturation 19% on 06/08/2022.  4.   Re: CMP.  GFR pending and bilirubin pending from 1.4 from 1.7 from 1.1. Positive for cholelithiasis.  5.   Re: Ferritin pending and iron saturation pending.  6.  He will be seen every 6 months with CT scan for the first 3 years then annually  thereafter.  7.  Continue care per primary care physician and other specialists.  8.  Plan of care discussed with patient.  Understanding expressed.  Patient able to proceed.  9.  Stable for observation, cancer.  10. Advance Care Planning   ACP discussion was declined by the patient. Patient does not have an advance directive, information provided.   11.  CBC with differential, ferritin and iron panel in 3 months  12.  Return to office in 6 months with preoffice CT chest, abdomen, pelvis, CBC with differential, ferritin, iron profile, and CMP.   13.  CBC with differential, ferritin, iron profile, and CMP today.       I have reviewed the assessment and plan and verified the accuracy of it. No changes to assessment and plan since the information was documented. Milan Bullard MD 07/21/22       I spent 30 total minutes, face-to-face, caring for Delgado today.  Greater than 50% of this time involved counseling and/or coordination of care as documented within this note regarding the patient's illness(es), pros and cons of various treatment options, instructions and/or risk reduction.            (Jean Marie Solis MD)  (Nick Flores MD)  (Ephraim Lujan MD)  MD Michael Briceño MD

## 2022-07-14 ENCOUNTER — TELEPHONE (OUTPATIENT)
Dept: ONCOLOGY | Facility: CLINIC | Age: 72
End: 2022-07-14

## 2022-07-14 ENCOUNTER — HOSPITAL ENCOUNTER (OUTPATIENT)
Dept: CT IMAGING | Facility: HOSPITAL | Age: 72
Discharge: HOME | End: 2022-07-14
Admitting: INTERNAL MEDICINE

## 2022-07-14 DIAGNOSIS — D50.8 IRON DEFICIENCY ANEMIA SECONDARY TO INADEQUATE DIETARY IRON INTAKE: ICD-10-CM

## 2022-07-14 DIAGNOSIS — C34.31 MALIGNANT NEOPLASM OF LOWER LOBE OF RIGHT LUNG: ICD-10-CM

## 2022-07-14 PROCEDURE — 74177 CT ABD & PELVIS W/CONTRAST: CPT

## 2022-07-14 PROCEDURE — 71260 CT THORAX DX C+: CPT

## 2022-07-14 PROCEDURE — 25010000002 IOPAMIDOL 61 % SOLUTION: Performed by: INTERNAL MEDICINE

## 2022-07-14 PROCEDURE — 82565 ASSAY OF CREATININE: CPT

## 2022-07-14 RX ADMIN — IOPAMIDOL 100 ML: 612 INJECTION, SOLUTION INTRAVENOUS at 09:59

## 2022-07-14 NOTE — TELEPHONE ENCOUNTER
----- Message from Milan Bullard MD sent at 7/14/2022 12:38 PM CDT -----   Fax to PCP.  Urinary bladder wall thickening with mild adjacent fat stranding.  Correlate clinically to exclude cystitis.    No evidence of metastatic disease in the abdomen or pelvis.  Hepatic steatosis. Mild splenomegaly. Cholelithiasis without  evidence of cholecystitis.

## 2022-07-20 ENCOUNTER — TELEPHONE (OUTPATIENT)
Dept: CARDIOLOGY | Facility: CLINIC | Age: 72
End: 2022-07-20

## 2022-07-20 DIAGNOSIS — I48.0 PAROXYSMAL ATRIAL FIBRILLATION: ICD-10-CM

## 2022-07-20 LAB — CREAT BLDA-MCNC: 1 MG/DL (ref 0.6–1.3)

## 2022-07-20 NOTE — TELEPHONE ENCOUNTER
Caller: Delgado Desir    Relationship: Self    Best call back number: [phone]    Requested Prescriptions:   Requested Prescriptions     Pending Prescriptions Disp Refills   • apixaban (Eliquis) 5 MG tablet tablet 60 tablet 11     Sig: Take 1 tablet by mouth Every 12 (Twelve) Hours.        Pharmacy where request should be sent: [facility], [address] - [phone]  - [phone] FX     Additional details provided by patient: HEALTH NAVIGATOR CALLED IN PRESCRIPTION AND REPORTS THAT PATIENT HAS 7 DAYS LEFT OF DOSES    Does the patient have less than a 3 day supply:  [] Yes  [x] No    Manoj Kaiser Rep   07/20/22 09:45 CDT

## 2022-07-21 ENCOUNTER — LAB (OUTPATIENT)
Dept: LAB | Facility: HOSPITAL | Age: 72
End: 2022-07-21

## 2022-07-21 ENCOUNTER — OFFICE VISIT (OUTPATIENT)
Dept: ONCOLOGY | Facility: CLINIC | Age: 72
End: 2022-07-21

## 2022-07-21 VITALS
SYSTOLIC BLOOD PRESSURE: 136 MMHG | TEMPERATURE: 97.2 F | OXYGEN SATURATION: 95 % | HEIGHT: 73 IN | RESPIRATION RATE: 18 BRPM | HEART RATE: 72 BPM | DIASTOLIC BLOOD PRESSURE: 70 MMHG | WEIGHT: 263 LBS | BODY MASS INDEX: 34.85 KG/M2

## 2022-07-21 DIAGNOSIS — D50.8 IRON DEFICIENCY ANEMIA SECONDARY TO INADEQUATE DIETARY IRON INTAKE: ICD-10-CM

## 2022-07-21 DIAGNOSIS — C34.31 MALIGNANT NEOPLASM OF LOWER LOBE OF RIGHT LUNG: Primary | ICD-10-CM

## 2022-07-21 DIAGNOSIS — C34.31 MALIGNANT NEOPLASM OF LOWER LOBE OF RIGHT LUNG: ICD-10-CM

## 2022-07-21 LAB
ALBUMIN SERPL-MCNC: 4 G/DL (ref 3.5–5.2)
ALBUMIN/GLOB SERPL: 1.5 G/DL
ALP SERPL-CCNC: 111 U/L (ref 39–117)
ALT SERPL W P-5'-P-CCNC: 31 U/L (ref 1–41)
ANION GAP SERPL CALCULATED.3IONS-SCNC: 8 MMOL/L (ref 5–15)
AST SERPL-CCNC: 22 U/L (ref 1–40)
BASOPHILS # BLD AUTO: 0.04 10*3/MM3 (ref 0–0.2)
BASOPHILS NFR BLD AUTO: 0.6 % (ref 0–1.5)
BILIRUB SERPL-MCNC: 1.1 MG/DL (ref 0–1.2)
BUN SERPL-MCNC: 20 MG/DL (ref 8–23)
BUN/CREAT SERPL: 19 (ref 7–25)
CALCIUM SPEC-SCNC: 9.5 MG/DL (ref 8.6–10.5)
CHLORIDE SERPL-SCNC: 104 MMOL/L (ref 98–107)
CO2 SERPL-SCNC: 27 MMOL/L (ref 22–29)
CREAT SERPL-MCNC: 1.05 MG/DL (ref 0.76–1.27)
DEPRECATED RDW RBC AUTO: 48.8 FL (ref 37–54)
EGFRCR SERPLBLD CKD-EPI 2021: 75.9 ML/MIN/1.73
EOSINOPHIL # BLD AUTO: 0.05 10*3/MM3 (ref 0–0.4)
EOSINOPHIL NFR BLD AUTO: 0.8 % (ref 0.3–6.2)
ERYTHROCYTE [DISTWIDTH] IN BLOOD BY AUTOMATED COUNT: 14.7 % (ref 12.3–15.4)
FERRITIN SERPL-MCNC: 335.1 NG/ML (ref 30–400)
GLOBULIN UR ELPH-MCNC: 2.7 GM/DL
GLUCOSE SERPL-MCNC: 107 MG/DL (ref 65–99)
HCT VFR BLD AUTO: 37.4 % (ref 37.5–51)
HGB BLD-MCNC: 12.7 G/DL (ref 13–17.7)
IMM GRANULOCYTES # BLD AUTO: 0.03 10*3/MM3 (ref 0–0.05)
IMM GRANULOCYTES NFR BLD AUTO: 0.5 % (ref 0–0.5)
IRON 24H UR-MRATE: 60 MCG/DL (ref 59–158)
IRON SATN MFR SERPL: 19 % (ref 20–50)
LYMPHOCYTES # BLD AUTO: 0.38 10*3/MM3 (ref 0.7–3.1)
LYMPHOCYTES NFR BLD AUTO: 6 % (ref 19.6–45.3)
MCH RBC QN AUTO: 30.8 PG (ref 26.6–33)
MCHC RBC AUTO-ENTMCNC: 34 G/DL (ref 31.5–35.7)
MCV RBC AUTO: 90.6 FL (ref 79–97)
MONOCYTES # BLD AUTO: 0.51 10*3/MM3 (ref 0.1–0.9)
MONOCYTES NFR BLD AUTO: 8 % (ref 5–12)
NEUTROPHILS NFR BLD AUTO: 5.34 10*3/MM3 (ref 1.7–7)
NEUTROPHILS NFR BLD AUTO: 84.1 % (ref 42.7–76)
NRBC BLD AUTO-RTO: 0 /100 WBC (ref 0–0.2)
PLATELET # BLD AUTO: 156 10*3/MM3 (ref 140–450)
PMV BLD AUTO: 10.7 FL (ref 6–12)
POTASSIUM SERPL-SCNC: 4.1 MMOL/L (ref 3.5–5.2)
PROT SERPL-MCNC: 6.7 G/DL (ref 6–8.5)
RBC # BLD AUTO: 4.13 10*6/MM3 (ref 4.14–5.8)
SODIUM SERPL-SCNC: 139 MMOL/L (ref 136–145)
TIBC SERPL-MCNC: 313 MCG/DL (ref 298–536)
TRANSFERRIN SERPL-MCNC: 210 MG/DL (ref 200–360)
WBC NRBC COR # BLD: 6.35 10*3/MM3 (ref 3.4–10.8)

## 2022-07-21 PROCEDURE — 80053 COMPREHEN METABOLIC PANEL: CPT

## 2022-07-21 PROCEDURE — 85025 COMPLETE CBC W/AUTO DIFF WBC: CPT

## 2022-07-21 PROCEDURE — 36415 COLL VENOUS BLD VENIPUNCTURE: CPT

## 2022-07-21 PROCEDURE — 83540 ASSAY OF IRON: CPT

## 2022-07-21 PROCEDURE — 82728 ASSAY OF FERRITIN: CPT

## 2022-07-21 PROCEDURE — 99214 OFFICE O/P EST MOD 30 MIN: CPT | Performed by: INTERNAL MEDICINE

## 2022-07-21 PROCEDURE — 84466 ASSAY OF TRANSFERRIN: CPT

## 2022-07-25 DIAGNOSIS — D50.8 IRON DEFICIENCY ANEMIA SECONDARY TO INADEQUATE DIETARY IRON INTAKE: Primary | ICD-10-CM

## 2022-07-25 RX ORDER — FERROUS SULFATE 325(65) MG
325 TABLET ORAL
Qty: 60 TABLET | Refills: 2 | Status: SHIPPED | OUTPATIENT
Start: 2022-07-25 | End: 2022-08-01

## 2022-08-01 ENCOUNTER — TELEPHONE (OUTPATIENT)
Dept: ONCOLOGY | Facility: CLINIC | Age: 72
End: 2022-08-01

## 2022-08-01 RX ORDER — FERROUS SULFATE 325(65) MG
325 TABLET ORAL 2 TIMES DAILY
Qty: 60 TABLET | Refills: 2 | Status: SHIPPED | OUTPATIENT
Start: 2022-08-01 | End: 2022-09-01 | Stop reason: SDUPTHER

## 2022-08-01 NOTE — TELEPHONE ENCOUNTER
Patient called to report that his ferrous sulfate was sent in wrong. He was told by Dr. Bullard to take the ferrous sulfate 2 times daily but the script was sent in for 1 time daily. He is needing that corrected. I went to the original encounter and updated the script.

## 2022-08-05 RX ORDER — LEVOTHYROXINE SODIUM 150 MCG
150 TABLET ORAL DAILY
Qty: 90 TABLET | Refills: 1 | Status: SHIPPED | OUTPATIENT
Start: 2022-08-05 | End: 2022-09-28 | Stop reason: DRUGHIGH

## 2022-08-25 ENCOUNTER — NURSE ONLY (OUTPATIENT)
Dept: UROLOGY | Age: 72
End: 2022-08-25
Payer: MEDICARE

## 2022-08-25 DIAGNOSIS — C61 PROSTATE CANCER (HCC): Primary | ICD-10-CM

## 2022-08-25 PROCEDURE — 96402 CHEMO HORMON ANTINEOPL SQ/IM: CPT | Performed by: NURSE PRACTITIONER

## 2022-09-01 DIAGNOSIS — D50.8 IRON DEFICIENCY ANEMIA SECONDARY TO INADEQUATE DIETARY IRON INTAKE: ICD-10-CM

## 2022-09-01 RX ORDER — FERROUS SULFATE 325(65) MG
325 TABLET ORAL 2 TIMES DAILY
Qty: 60 TABLET | Refills: 2 | Status: SHIPPED | OUTPATIENT
Start: 2022-09-01 | End: 2023-01-04

## 2022-09-01 NOTE — TELEPHONE ENCOUNTER
Caller: Delgado Desir    Relationship: Self    Best call back number: [phone]    Requested Prescriptions:   Requested Prescriptions     Pending Prescriptions Disp Refills   • ferrous sulfate 325 (65 FE) MG tablet 60 tablet 2     Sig: Take 1 tablet by mouth 2 (Two) Times a Day.        Pharmacy where request should be sent: [place] [address] [address] - [phone] [facility] [phone] FX     Additional details provided by patient:     Does the patient have less than a 3 day supply:  [x] Yes  [] No

## 2022-09-28 ENCOUNTER — OFFICE VISIT (OUTPATIENT)
Dept: CARDIOLOGY | Facility: CLINIC | Age: 72
End: 2022-09-28

## 2022-09-28 VITALS
DIASTOLIC BLOOD PRESSURE: 80 MMHG | HEART RATE: 68 BPM | WEIGHT: 261 LBS | SYSTOLIC BLOOD PRESSURE: 142 MMHG | HEIGHT: 73 IN | RESPIRATION RATE: 18 BRPM | OXYGEN SATURATION: 98 % | BODY MASS INDEX: 34.59 KG/M2

## 2022-09-28 DIAGNOSIS — Z79.01 CURRENT USE OF LONG TERM ANTICOAGULATION: Chronic | ICD-10-CM

## 2022-09-28 DIAGNOSIS — E66.09 CLASS 1 OBESITY DUE TO EXCESS CALORIES WITH SERIOUS COMORBIDITY AND BODY MASS INDEX (BMI) OF 34.0 TO 34.9 IN ADULT: Chronic | ICD-10-CM

## 2022-09-28 DIAGNOSIS — I48.0 PAROXYSMAL ATRIAL FIBRILLATION: Primary | Chronic | ICD-10-CM

## 2022-09-28 DIAGNOSIS — I10 PRIMARY HYPERTENSION: Chronic | ICD-10-CM

## 2022-09-28 DIAGNOSIS — R55 NEAR SYNCOPE: ICD-10-CM

## 2022-09-28 PROCEDURE — 93000 ELECTROCARDIOGRAM COMPLETE: CPT | Performed by: NURSE PRACTITIONER

## 2022-09-28 PROCEDURE — 99214 OFFICE O/P EST MOD 30 MIN: CPT | Performed by: NURSE PRACTITIONER

## 2022-09-28 RX ORDER — LEVOTHYROXINE SODIUM 0.12 MG/1
125 TABLET ORAL DAILY
COMMUNITY
End: 2022-10-26 | Stop reason: SDUPTHER

## 2022-09-28 NOTE — PROGRESS NOTES
"    Subjective:     Encounter Date:09/28/2022      Patient ID: Delgado Desir is a 72 y.o. male with paroxysmal atrial fibrillation, long-term anticoagulation, prostate cancer, lung nodule with previous wedge resection and confirmation of malignancy, hypertension, previous thyroidectomy, and obesity.  He presents to the office today for routine follow-up.    Chief Complaint: Routine Follow Up  Hypertension  This is a chronic problem. The current episode started more than 1 year ago. The problem is controlled. Associated symptoms include headaches. Pertinent negatives include no chest pain, malaise/fatigue, orthopnea, palpitations, peripheral edema, PND or shortness of breath. Risk factors for coronary artery disease include male gender and obesity. Past treatments include beta blockers. Current antihypertension treatment includes angiotensin blockers and diuretics. The current treatment provides significant improvement. There are no compliance problems.  There is no history of angina, CAD/MI or heart failure. Identifiable causes of hypertension include a thyroid problem.   Atrial Fibrillation  Presents for follow-up visit. Symptoms include tachycardia. Symptoms are negative for chest pain, hemodynamic instability, hypertension, hypotension, palpitations, shortness of breath, syncope and weakness. Past medical history includes atrial fibrillation. There are no medication compliance problems.     Mr. Desir presents to the office today for follow up. He is followed in our office due to paroxysmal atrial fibrillation. He     He checks his BP daily. He had a couple times since his last visit where he had episodes of \"feeling funny\" and went to check to his BP and noted his HR to be elevated. He reports that this lasted only for a short time ( 15 minutes or so) and then completely resolved. He felt that he had \"no energy\" during those episodes prompting him to recheck his vitals initially.  Most recent episode was " "one week ago.    He also describes intermittent visual changes like he is going to pass out but denies syncope. He sits down (if he is not already) or lies down and feels better in approximately 15 - 20 minutes.  He tells me that he has noticed these happening at random without any correlation to certain activities or meal time. He describes seeing \"dots\", \"a white cloud\" and feels like he may pass out.      The following portions of the patient's history were reviewed and updated as appropriate: allergies, current medications, past family history, past medical history, past social history and past surgical history.     No Known Allergies      Current Outpatient Medications:   •  acetaminophen (TYLENOL) 500 MG tablet, Take 500 mg by mouth Every 6 (Six) Hours As Needed for Mild Pain ., Disp: , Rfl:   •  alfuzosin (UROXATRAL) 10 MG 24 hr tablet, Take 10 mg by mouth Daily., Disp: , Rfl:   •  apixaban (Eliquis) 5 MG tablet tablet, Take 1 tablet by mouth Every 12 (Twelve) Hours., Disp: 60 tablet, Rfl: 11  •  ferrous sulfate 325 (65 FE) MG tablet, Take 1 tablet by mouth 2 (Two) Times a Day., Disp: 60 tablet, Rfl: 2  •  levothyroxine (SYNTHROID, LEVOTHROID) 125 MCG tablet, Take 125 mcg by mouth Daily., Disp: , Rfl:   •  losartan-hydrochlorothiazide (HYZAAR) 50-12.5 MG per tablet, TAKE 1/2 TABLET BY MOUTH DAILY., Disp: 45 tablet, Rfl: 3  •  therapeutic multivitamin-minerals (THERAGRAN-M) tablet, Take 2 tablets by mouth Daily., Disp: , Rfl:     Review of Systems   Constitutional: Negative for malaise/fatigue.   Eyes: Positive for visual disturbance.   Cardiovascular: Positive for near-syncope. Negative for chest pain, dyspnea on exertion, irregular heartbeat, leg swelling, orthopnea, palpitations, paroxysmal nocturnal dyspnea and syncope.   Respiratory: Negative for shortness of breath and wheezing.    Hematologic/Lymphatic: Negative for bleeding problem.   Neurological: Positive for headaches and light-headedness. Negative " "for weakness.   Psychiatric/Behavioral: Negative for altered mental status.         ECG 12 Lead    Date/Time: 9/28/2022 3:30 PM  Performed by: Sabrina Jackson APRN  Authorized by: Sabrina Jackson APRN   Comparison: compared with previous ECG from 1/24/2022  Similar to previous ECG  Rhythm: sinus rhythm  Rate: normal  BPM: 68  Conduction: conduction normal  ST Segments: ST segments normal  T Waves: T waves normal  QRS axis: normal    Clinical impression: normal ECG          /80 (BP Location: Right arm, Patient Position: Sitting, Cuff Size: Adult)   Pulse 68   Resp 18   Ht 185.4 cm (73\")   Wt 118 kg (261 lb)   SpO2 98%   BMI 34.43 kg/m²        Objective:     Vitals reviewed.   Constitutional:       General: Not in acute distress.     Appearance: Well-developed, well-groomed and not in distress. Obese. Chronically ill-appearing. Not diaphoretic.   Eyes:      General:         Right eye: No discharge.         Left eye: No discharge.   HENT:      Head: Normocephalic and atraumatic.    Mouth/Throat:      Pharynx: No oropharyngeal exudate.   Pulmonary:      Effort: Pulmonary effort is normal. No respiratory distress.      Breath sounds: Normal breath sounds. No wheezing. No rhonchi. No rales.   Chest:      Chest wall: Not tender to palpatation.   Cardiovascular:      Normal rate. Regular rhythm.      Murmurs: There is no murmur.      No gallop. No rub.   Edema:     Peripheral edema absent.   Abdominal:      General: There is no distension.      Palpations: Abdomen is soft.      Tenderness: There is no abdominal tenderness.   Musculoskeletal: Normal range of motion.      Cervical back: Normal range of motion and neck supple. Skin:     General: Skin is warm and dry.      Coloration: Skin is not pale.      Findings: No erythema or rash.   Neurological:      Mental Status: Alert, oriented to person, place, and time and oriented to person, place and time.   Psychiatric:         Attention and Perception: Attention " normal.         Mood and Affect: Mood normal.         Speech: Speech normal.         Behavior: Behavior normal. Behavior is cooperative.         Thought Content: Thought content normal.         Cognition and Memory: Cognition normal.         Judgment: Judgment normal.       Lab Review:   Results for orders placed during the hospital encounter of 06/25/20    Adult Transthoracic Echo Complete W/ Cont if Necessary Per Protocol    Interpretation Summary  · Left ventricular wall thickness is consistent with mild-to-moderate concentric hypertrophy.  · Estimated EF = 70%.  · Left ventricular systolic function is normal.  · Left ventricular diastolic dysfunction (grade I) consistent with impaired relaxation.  · Left atrial cavity size is borderline dilated.  · Right ventricular cavity is borderline dilated.        Assessment:          Diagnosis Plan   1. Paroxysmal atrial fibrillation (HCC)  Holter Monitor - 72 Hour Up To 15 Days   2. Near syncope  Holter Monitor - 72 Hour Up To 15 Days   3. Current use of long term anticoagulation     4. Primary hypertension     5. Class 1 obesity due to excess calories with serious comorbidity and body mass index (BMI) of 34.0 to 34.9 in adult            Plan:       -Paroxysmal atrial fibrillation/near syncope: Stable.  He denies any palpitations or known recurrence of atrial fibrillation.  However, documentation reveals that he was asymptomatic to his previous diagnosis. He remains anticoagulated with Eliquis.  He was recently noted range of occurrences of elevated heart rates and near syncopal episodes that have been short-lived.  He said been occurring recently.  His heart rates have been elevated on 2 occasions but he has had near syncopal episodes multiple times over the course the last several weeks.  We will further evaluate for any atrial fibrillation recurrence or other abnormalities with Holter monitoring.    -Anticoagulation: continue with use of Eliquis with PAF for stroke  risk reduction.     -Hypertension: Stable. The patient's blood pressure is well controlled and monitored through his PCP office.  No changes recommended at this time.    -Obesity: BMI 34.43.  Diet and exercise recommended.      14 day Holter monitor  Continue current medications  We will follow-up with the patient regarding the results of his Holter monitor.  Plan for routine follow-up in 6 months unless Holter monitoring results indicate sooner follow-up is necessary or the patient has worsening symptoms.         I attest that all portions of this note have been reviewed and updated to reflect the patient's current status.

## 2022-10-21 ENCOUNTER — APPOINTMENT (OUTPATIENT)
Dept: CT IMAGING | Facility: HOSPITAL | Age: 72
End: 2022-10-21

## 2022-10-21 ENCOUNTER — APPOINTMENT (OUTPATIENT)
Dept: GENERAL RADIOLOGY | Facility: HOSPITAL | Age: 72
End: 2022-10-21

## 2022-10-21 ENCOUNTER — HOSPITAL ENCOUNTER (EMERGENCY)
Facility: HOSPITAL | Age: 72
Discharge: HOME | End: 2022-10-21
Attending: INTERNAL MEDICINE | Admitting: INTERNAL MEDICINE

## 2022-10-21 VITALS
HEIGHT: 73 IN | SYSTOLIC BLOOD PRESSURE: 179 MMHG | DIASTOLIC BLOOD PRESSURE: 88 MMHG | HEART RATE: 79 BPM | RESPIRATION RATE: 18 BRPM | OXYGEN SATURATION: 95 % | WEIGHT: 258 LBS | BODY MASS INDEX: 34.19 KG/M2 | TEMPERATURE: 98.1 F

## 2022-10-21 DIAGNOSIS — S22.20XA STERNAL FRACTURE WITH RETROSTERNAL CONTUSION, CLOSED, INITIAL ENCOUNTER: Primary | ICD-10-CM

## 2022-10-21 LAB
ALBUMIN SERPL-MCNC: 4.2 G/DL (ref 3.5–5.2)
ALBUMIN/GLOB SERPL: 2 G/DL
ALP SERPL-CCNC: 120 U/L (ref 39–117)
ALT SERPL W P-5'-P-CCNC: 48 U/L (ref 1–41)
AMPHET+METHAMPHET UR QL: NEGATIVE
AMPHETAMINES UR QL: NEGATIVE
ANION GAP SERPL CALCULATED.3IONS-SCNC: 8 MMOL/L (ref 5–15)
APAP SERPL-MCNC: <5 MCG/ML (ref 0–30)
ARTERIAL PATENCY WRIST A: POSITIVE
AST SERPL-CCNC: 42 U/L (ref 1–40)
ATMOSPHERIC PRESS: 747 MMHG
BARBITURATES UR QL SCN: NEGATIVE
BASE EXCESS BLDA CALC-SCNC: 2.2 MMOL/L (ref 0–2)
BASOPHILS # BLD AUTO: 0.02 10*3/MM3 (ref 0–0.2)
BASOPHILS NFR BLD AUTO: 0.4 % (ref 0–1.5)
BDY SITE: ABNORMAL
BENZODIAZ UR QL SCN: NEGATIVE
BILIRUB SERPL-MCNC: 1.1 MG/DL (ref 0–1.2)
BILIRUB UR QL STRIP: NEGATIVE
BODY TEMPERATURE: 37 C
BUN SERPL-MCNC: 23 MG/DL (ref 8–23)
BUN/CREAT SERPL: 23.2 (ref 7–25)
BUPRENORPHINE SERPL-MCNC: NEGATIVE NG/ML
CALCIUM SPEC-SCNC: 8.9 MG/DL (ref 8.6–10.5)
CANNABINOIDS SERPL QL: NEGATIVE
CHLORIDE SERPL-SCNC: 103 MMOL/L (ref 98–107)
CK SERPL-CCNC: 188 U/L (ref 20–200)
CLARITY UR: CLEAR
CO2 SERPL-SCNC: 26 MMOL/L (ref 22–29)
COCAINE UR QL: NEGATIVE
COLOR UR: ABNORMAL
CREAT SERPL-MCNC: 0.99 MG/DL (ref 0.76–1.27)
DEPRECATED RDW RBC AUTO: 48.1 FL (ref 37–54)
EGFRCR SERPLBLD CKD-EPI 2021: 81.4 ML/MIN/1.73
EOSINOPHIL # BLD AUTO: 0.1 10*3/MM3 (ref 0–0.4)
EOSINOPHIL NFR BLD AUTO: 1.8 % (ref 0.3–6.2)
ERYTHROCYTE [DISTWIDTH] IN BLOOD BY AUTOMATED COUNT: 14.4 % (ref 12.3–15.4)
ETHANOL UR QL: <0.01 %
GLOBULIN UR ELPH-MCNC: 2.1 GM/DL
GLUCOSE SERPL-MCNC: 140 MG/DL (ref 65–99)
GLUCOSE UR STRIP-MCNC: NEGATIVE MG/DL
HCO3 BLDA-SCNC: 25.3 MMOL/L (ref 20–26)
HCT VFR BLD AUTO: 37.2 % (ref 37.5–51)
HGB BLD-MCNC: 12.6 G/DL (ref 13–17.7)
HGB UR QL STRIP.AUTO: NEGATIVE
IMM GRANULOCYTES # BLD AUTO: 0.07 10*3/MM3 (ref 0–0.05)
IMM GRANULOCYTES NFR BLD AUTO: 1.2 % (ref 0–0.5)
INR PPP: 1.28 (ref 0.91–1.09)
KETONES UR QL STRIP: NEGATIVE
LEUKOCYTE ESTERASE UR QL STRIP.AUTO: NEGATIVE
LYMPHOCYTES # BLD AUTO: 0.52 10*3/MM3 (ref 0.7–3.1)
LYMPHOCYTES NFR BLD AUTO: 9.1 % (ref 19.6–45.3)
Lab: ABNORMAL
MCH RBC QN AUTO: 30.9 PG (ref 26.6–33)
MCHC RBC AUTO-ENTMCNC: 33.9 G/DL (ref 31.5–35.7)
MCV RBC AUTO: 91.2 FL (ref 79–97)
METHADONE UR QL SCN: NEGATIVE
MODALITY: ABNORMAL
MONOCYTES # BLD AUTO: 0.34 10*3/MM3 (ref 0.1–0.9)
MONOCYTES NFR BLD AUTO: 6 % (ref 5–12)
NEUTROPHILS NFR BLD AUTO: 4.66 10*3/MM3 (ref 1.7–7)
NEUTROPHILS NFR BLD AUTO: 81.5 % (ref 42.7–76)
NITRITE UR QL STRIP: NEGATIVE
NRBC BLD AUTO-RTO: 0 /100 WBC (ref 0–0.2)
OPIATES UR QL: NEGATIVE
OXYCODONE UR QL SCN: NEGATIVE
PCO2 BLDA: 33.6 MM HG (ref 35–45)
PCO2 TEMP ADJ BLD: 33.6 MM HG (ref 35–45)
PCP UR QL SCN: NEGATIVE
PH BLDA: 7.49 PH UNITS (ref 7.35–7.45)
PH UR STRIP.AUTO: 5.5 [PH] (ref 5–8)
PH, TEMP CORRECTED: 7.49 PH UNITS (ref 7.35–7.45)
PLATELET # BLD AUTO: 141 10*3/MM3 (ref 140–450)
PMV BLD AUTO: 10.7 FL (ref 6–12)
PO2 BLDA: 66.2 MM HG (ref 83–108)
PO2 TEMP ADJ BLD: 66.2 MM HG (ref 83–108)
POTASSIUM SERPL-SCNC: 4.1 MMOL/L (ref 3.5–5.2)
PROPOXYPH UR QL: NEGATIVE
PROT SERPL-MCNC: 6.3 G/DL (ref 6–8.5)
PROT UR QL STRIP: NEGATIVE
PROTHROMBIN TIME: 15.5 SECONDS (ref 11.9–14.6)
RBC # BLD AUTO: 4.08 10*6/MM3 (ref 4.14–5.8)
SALICYLATES SERPL-MCNC: <0.3 MG/DL
SAO2 % BLDCOA: 95.3 % (ref 94–99)
SODIUM SERPL-SCNC: 137 MMOL/L (ref 136–145)
SP GR UR STRIP: 1.02 (ref 1–1.03)
TRICYCLICS UR QL SCN: NEGATIVE
TROPONIN T SERPL-MCNC: <0.01 NG/ML (ref 0–0.03)
UROBILINOGEN UR QL STRIP: ABNORMAL
VENTILATOR MODE: ABNORMAL
WBC NRBC COR # BLD: 5.71 10*3/MM3 (ref 3.4–10.8)

## 2022-10-21 PROCEDURE — 36600 WITHDRAWAL OF ARTERIAL BLOOD: CPT

## 2022-10-21 PROCEDURE — 85610 PROTHROMBIN TIME: CPT | Performed by: INTERNAL MEDICINE

## 2022-10-21 PROCEDURE — P9612 CATHETERIZE FOR URINE SPEC: HCPCS

## 2022-10-21 PROCEDURE — 81003 URINALYSIS AUTO W/O SCOPE: CPT | Performed by: INTERNAL MEDICINE

## 2022-10-21 PROCEDURE — 84484 ASSAY OF TROPONIN QUANT: CPT | Performed by: INTERNAL MEDICINE

## 2022-10-21 PROCEDURE — 85025 COMPLETE CBC W/AUTO DIFF WBC: CPT | Performed by: INTERNAL MEDICINE

## 2022-10-21 PROCEDURE — 80179 DRUG ASSAY SALICYLATE: CPT | Performed by: INTERNAL MEDICINE

## 2022-10-21 PROCEDURE — 74176 CT ABD & PELVIS W/O CONTRAST: CPT

## 2022-10-21 PROCEDURE — 82550 ASSAY OF CK (CPK): CPT | Performed by: INTERNAL MEDICINE

## 2022-10-21 PROCEDURE — 72131 CT LUMBAR SPINE W/O DYE: CPT

## 2022-10-21 PROCEDURE — 82077 ASSAY SPEC XCP UR&BREATH IA: CPT | Performed by: INTERNAL MEDICINE

## 2022-10-21 PROCEDURE — 72128 CT CHEST SPINE W/O DYE: CPT

## 2022-10-21 PROCEDURE — 82803 BLOOD GASES ANY COMBINATION: CPT

## 2022-10-21 PROCEDURE — 71045 X-RAY EXAM CHEST 1 VIEW: CPT

## 2022-10-21 PROCEDURE — 80053 COMPREHEN METABOLIC PANEL: CPT | Performed by: INTERNAL MEDICINE

## 2022-10-21 PROCEDURE — 99284 EMERGENCY DEPT VISIT MOD MDM: CPT

## 2022-10-21 PROCEDURE — 70450 CT HEAD/BRAIN W/O DYE: CPT

## 2022-10-21 PROCEDURE — 72125 CT NECK SPINE W/O DYE: CPT

## 2022-10-21 PROCEDURE — 80306 DRUG TEST PRSMV INSTRMNT: CPT | Performed by: INTERNAL MEDICINE

## 2022-10-21 PROCEDURE — 71250 CT THORAX DX C-: CPT

## 2022-10-21 PROCEDURE — 80143 DRUG ASSAY ACETAMINOPHEN: CPT | Performed by: INTERNAL MEDICINE

## 2022-10-21 RX ORDER — KETOROLAC TROMETHAMINE 10 MG/1
10 TABLET, FILM COATED ORAL EVERY 6 HOURS PRN
Qty: 15 TABLET | Refills: 0 | Status: SHIPPED | OUTPATIENT
Start: 2022-10-21 | End: 2023-01-03

## 2022-10-21 RX ORDER — KETOROLAC TROMETHAMINE 10 MG/1
10 TABLET, FILM COATED ORAL EVERY 6 HOURS PRN
Qty: 15 TABLET | Refills: 0 | Status: SHIPPED | OUTPATIENT
Start: 2022-10-21 | End: 2022-10-21 | Stop reason: SDUPTHER
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
## 2022-10-26 RX ORDER — LEVOTHYROXINE SODIUM 0.15 MG/1
150 TABLET ORAL DAILY
Qty: 90 TABLET | Refills: 1 | Status: SHIPPED | OUTPATIENT
Start: 2022-10-26 | End: 2023-01-03 | Stop reason: SDUPTHER

## 2022-10-28 ENCOUNTER — HOSPITAL ENCOUNTER (OUTPATIENT)
Dept: GENERAL RADIOLOGY | Age: 72
Discharge: HOME | End: 2022-10-28
Payer: COMMERCIAL

## 2022-10-28 DIAGNOSIS — M25.572 LEFT ANKLE PAIN, UNSPECIFIED CHRONICITY: ICD-10-CM

## 2022-10-28 PROCEDURE — 73610 X-RAY EXAM OF ANKLE: CPT

## 2022-10-31 ENCOUNTER — TELEPHONE (OUTPATIENT)
Dept: CARDIOLOGY | Facility: CLINIC | Age: 72
End: 2022-10-31
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
## 2022-11-01 NOTE — TELEPHONE ENCOUNTER
Pt informed that it would be ok per Sabrina to use the heat pad 20 min on 20 min off.  Dayday Frank, CMA

## 2022-12-16 ENCOUNTER — TELEPHONE (OUTPATIENT)
Dept: UROLOGY | Age: 72
End: 2022-12-16

## 2022-12-16 NOTE — TELEPHONE ENCOUNTER
Pt is going to continue to try what hes doing and will call if he continues to have issues to get appt moved up. Pt voiced understanding to call office if he gets where hes unable to urinate.

## 2022-12-16 NOTE — TELEPHONE ENCOUNTER
Pt called stating hes having issues urinating. Pt states hes getting up frequently in the night to urinate and isnt completely emptying bladder all the way. Pt would like to speak to nurse about issues.

## 2022-12-16 NOTE — TELEPHONE ENCOUNTER
He will need an appt for this, nothing we can do over the phone for these problems. He will need a bladderscan.

## 2022-12-28 DIAGNOSIS — E89.0 POSTOPERATIVE HYPOTHYROIDISM: Primary | ICD-10-CM
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
## 2022-12-30 ENCOUNTER — LAB (OUTPATIENT)
Dept: LAB | Facility: HOSPITAL | Age: 72
End: 2022-12-30
Payer: MEDICARE

## 2022-12-30 ENCOUNTER — APPOINTMENT (OUTPATIENT)
Dept: LAB | Facility: HOSPITAL | Age: 72
End: 2022-12-30

## 2022-12-30 DIAGNOSIS — C34.31 MALIGNANT NEOPLASM OF LOWER LOBE OF RIGHT LUNG: ICD-10-CM

## 2022-12-30 DIAGNOSIS — E89.0 POSTOPERATIVE HYPOTHYROIDISM: Primary | ICD-10-CM

## 2022-12-30 DIAGNOSIS — E89.0 POSTOPERATIVE HYPOTHYROIDISM: ICD-10-CM

## 2022-12-30 DIAGNOSIS — D50.8 IRON DEFICIENCY ANEMIA SECONDARY TO INADEQUATE DIETARY IRON INTAKE: ICD-10-CM

## 2022-12-30 LAB
ALBUMIN SERPL-MCNC: 4 G/DL (ref 3.5–5.2)
ALBUMIN/GLOB SERPL: 1.3 G/DL
ALP SERPL-CCNC: 120 U/L (ref 39–117)
ALT SERPL W P-5'-P-CCNC: 27 U/L (ref 1–41)
ANION GAP SERPL CALCULATED.3IONS-SCNC: 12 MMOL/L (ref 5–15)
AST SERPL-CCNC: 22 U/L (ref 1–40)
BASOPHILS # BLD AUTO: 0.03 10*3/MM3 (ref 0–0.2)
BASOPHILS NFR BLD AUTO: 0.5 % (ref 0–1.5)
BILIRUB SERPL-MCNC: 1.2 MG/DL (ref 0–1.2)
BUN SERPL-MCNC: 20 MG/DL (ref 8–23)
BUN/CREAT SERPL: 20.4 (ref 7–25)
CALCIUM SPEC-SCNC: 8.5 MG/DL (ref 8.6–10.5)
CHLORIDE SERPL-SCNC: 103 MMOL/L (ref 98–107)
CO2 SERPL-SCNC: 23 MMOL/L (ref 22–29)
CREAT SERPL-MCNC: 0.98 MG/DL (ref 0.76–1.27)
DEPRECATED RDW RBC AUTO: 47.6 FL (ref 37–54)
EGFRCR SERPLBLD CKD-EPI 2021: 82.4 ML/MIN/1.73
EOSINOPHIL # BLD AUTO: 0.09 10*3/MM3 (ref 0–0.4)
EOSINOPHIL NFR BLD AUTO: 1.6 % (ref 0.3–6.2)
ERYTHROCYTE [DISTWIDTH] IN BLOOD BY AUTOMATED COUNT: 14.1 % (ref 12.3–15.4)
FERRITIN SERPL-MCNC: 323 NG/ML (ref 30–400)
GLOBULIN UR ELPH-MCNC: 3 GM/DL
GLUCOSE SERPL-MCNC: 77 MG/DL (ref 65–99)
HCT VFR BLD AUTO: 39.4 % (ref 37.5–51)
HGB BLD-MCNC: 12.3 G/DL (ref 13–17.7)
IMM GRANULOCYTES # BLD AUTO: 0.03 10*3/MM3 (ref 0–0.05)
IMM GRANULOCYTES NFR BLD AUTO: 0.5 % (ref 0–0.5)
IRON 24H UR-MRATE: 56 MCG/DL (ref 59–158)
IRON SATN MFR SERPL: 19 % (ref 20–50)
LYMPHOCYTES # BLD AUTO: 0.65 10*3/MM3 (ref 0.7–3.1)
LYMPHOCYTES NFR BLD AUTO: 11.2 % (ref 19.6–45.3)
MCH RBC QN AUTO: 28.8 PG (ref 26.6–33)
MCHC RBC AUTO-ENTMCNC: 31.2 G/DL (ref 31.5–35.7)
MCV RBC AUTO: 92.3 FL (ref 79–97)
MONOCYTES # BLD AUTO: 0.47 10*3/MM3 (ref 0.1–0.9)
MONOCYTES NFR BLD AUTO: 8.1 % (ref 5–12)
NEUTROPHILS NFR BLD AUTO: 4.53 10*3/MM3 (ref 1.7–7)
NEUTROPHILS NFR BLD AUTO: 78.1 % (ref 42.7–76)
NRBC BLD AUTO-RTO: 0 /100 WBC (ref 0–0.2)
PLATELET # BLD AUTO: 172 10*3/MM3 (ref 140–450)
PMV BLD AUTO: 10.4 FL (ref 6–12)
POTASSIUM SERPL-SCNC: 4.2 MMOL/L (ref 3.5–5.2)
PROT SERPL-MCNC: 7 G/DL (ref 6–8.5)
RBC # BLD AUTO: 4.27 10*6/MM3 (ref 4.14–5.8)
SODIUM SERPL-SCNC: 138 MMOL/L (ref 136–145)
TIBC SERPL-MCNC: 298 MCG/DL (ref 298–536)
TRANSFERRIN SERPL-MCNC: 200 MG/DL (ref 200–360)
TSH SERPL DL<=0.05 MIU/L-ACNC: 3.63 UIU/ML (ref 0.27–4.2)
WBC NRBC COR # BLD: 5.8 10*3/MM3 (ref 3.4–10.8)

## 2022-12-30 PROCEDURE — 82728 ASSAY OF FERRITIN: CPT

## 2022-12-30 PROCEDURE — 83540 ASSAY OF IRON: CPT

## 2022-12-30 PROCEDURE — 84466 ASSAY OF TRANSFERRIN: CPT

## 2022-12-30 PROCEDURE — 36415 COLL VENOUS BLD VENIPUNCTURE: CPT

## 2022-12-30 PROCEDURE — 84443 ASSAY THYROID STIM HORMONE: CPT

## 2022-12-30 PROCEDURE — 80053 COMPREHEN METABOLIC PANEL: CPT

## 2022-12-30 PROCEDURE — 85025 COMPLETE CBC W/AUTO DIFF WBC: CPT

## 2023-01-02 DIAGNOSIS — D50.8 IRON DEFICIENCY ANEMIA SECONDARY TO INADEQUATE DIETARY IRON INTAKE: ICD-10-CM

## 2023-01-03 ENCOUNTER — OFFICE VISIT (OUTPATIENT)
Dept: OTOLARYNGOLOGY | Facility: CLINIC | Age: 73
End: 2023-01-03
Payer: MEDICARE

## 2023-01-03 ENCOUNTER — TELEPHONE (OUTPATIENT)
Dept: OTOLARYNGOLOGY | Facility: CLINIC | Age: 73
End: 2023-01-03
Payer: MEDICARE

## 2023-01-03 VITALS
DIASTOLIC BLOOD PRESSURE: 62 MMHG | SYSTOLIC BLOOD PRESSURE: 124 MMHG | WEIGHT: 264 LBS | HEART RATE: 76 BPM | BODY MASS INDEX: 34.99 KG/M2 | HEIGHT: 73 IN | RESPIRATION RATE: 16 BRPM | TEMPERATURE: 98 F

## 2023-01-03 DIAGNOSIS — E89.0 POSTOPERATIVE HYPOTHYROIDISM: Primary | ICD-10-CM

## 2023-01-03 DIAGNOSIS — E89.0 S/P TOTAL THYROIDECTOMY: ICD-10-CM

## 2023-01-03 DIAGNOSIS — Z90.2 S/P PARTIAL LOBECTOMY OF LUNG: ICD-10-CM

## 2023-01-03 PROCEDURE — 99213 OFFICE O/P EST LOW 20 MIN: CPT | Performed by: OTOLARYNGOLOGY

## 2023-01-03 PROCEDURE — 1159F MED LIST DOCD IN RCRD: CPT | Performed by: OTOLARYNGOLOGY

## 2023-01-03 PROCEDURE — 1160F RVW MEDS BY RX/DR IN RCRD: CPT | Performed by: OTOLARYNGOLOGY

## 2023-01-03 RX ORDER — LEVOTHYROXINE SODIUM 0.15 MG/1
150 TABLET ORAL DAILY
Qty: 90 TABLET | Refills: 1 | Status: SHIPPED | OUTPATIENT
Start: 2023-01-03 | End: 2023-04-03

## 2023-01-03 NOTE — PATIENT INSTRUCTIONS
Continue Synthroid at 150 mcg/day  TSH in 6 months  Increase activity and work on decreasing his weight  Call or return for problems

## 2023-01-03 NOTE — TELEPHONE ENCOUNTER
Patient notified    ----- Message from ANDRES Roberts sent at 12/30/2022  3:31 PM CST -----  Tsh normal

## 2023-01-04 RX ORDER — FERROUS SULFATE 325(65) MG
TABLET ORAL
Qty: 60 TABLET | Refills: 2 | Status: SHIPPED | OUTPATIENT
Start: 2023-01-04 | End: 2023-04-06

## 2023-01-12 NOTE — PROGRESS NOTES
[facility] ON[facility] HEMATOLOGY & ONCOLOGY [address]  [facility] [address] [phone]    Patient Name: Delgado Desir  Encounter Date: 01/19/2023  YOB: 1950  Patient Number: [id number]      REASON FOR FOLLOW-UP: Delgado Desir is a pleasant 72 y.o. 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  He had undergone wedge resection, inadequate pulmonary function test for lobectomy and node sampling.  He is also seen for anemia from iron deficiency and on oral iron for the past 5.5 months.  He is seen alone.  History is obtained from patient.  History is considered accurate.         Oncology/Hematology History Overview Note   DIAGNOSTIC ABNORMALITIES:  He was found to have a lung nodule with history of prostate cancer.  CT abdomen pelvis 08/10/2020.  Lobulated noncalcified nodule in the right lower lobe represent a neoplastic process.  PET scan 09/18/2020. The multilobular pulmonary nodule in the right lung base measuring 2.9 x 1.6 cm is hypermetabolic and suspicious for a malignant process with a maximum intensity of 3.62 SUV. Differential considerations include primary lung malignancy as well as early metastatic disease.  No evidence of distant metastasis.  Unsuccessful CT-guided right lower lobe nodule biopsy 10/16/2020 due to right pneumothorax.  Pathology report 11/03/2020.  Invasive mucinous adenocarcinoma, grade 1, 2 cm, right lower lobe.  No visceral pleural invasion.  No lymphovascular invasion. Margins of resection free of tumor.  Distance of invasive carcinoma from closest margin 0.1 cm.  Pleural tissue biopsies no evidence of malignancy.  AJCC stage pT1b.  PD-L1 less than 1%, negative ROS1, ALK, BRAF and EGFR mutation.  Pathology report 2/16/2021, right lower lobe wedge excision, residual tumor is not present.  The surgical margins are free of tumor.  2 benign  intraparenchymal nodes benign.  1 benign node, left level 7.  1 benign no level 8.  CBC 02/19/2021 remarkable for WBC 12.9, hemoglobin 12.8 hematocrit 37.  Had a follow-up with Dr. Nick Flores 04/05/2021 post right lower lobe wedge and lymph node sampling on 02/15/2021.  Patient referred to oncology for surveillance.        PREVIOUS INTERVENTIONS:  Right lower lobe wedge resection 10/30/2020 by Dr. Flores.  His pulmonary function test is not adequate for left lower lobectomy as well as lymph node sampling.  He had undergone right lower lobe wedge 02/15/2021.      PREVIOUS INTERVENTIONS:Anemia.  Oral iron 04/15/2021 through 10/15/2021.  Resumed 08/01/2022 through present.     Prostate cancer (HCC)    Initial Diagnosis    Prostate cancer (CMS/HCC)     7/6/2020 Procedure    PSA  21     7/30/2020 Biopsy    [facility]  Prostate Biopsy  A: Prostate, right lateral base, needle biopsy:  Prostatic adenocarcinoma, [place] score 7 (4+3)  Tumor encompasses 20% of the needle core biopsy  Grade group 3    B: Prostate, right lateral mid, needle biopsy:  Prostatic adenocarcinoma, [place]'s 7 (4+3)  Tumor encompasses 20% of the needle core biopsy  Grade group 3    C: Prostate, right lateral apex, needle biopsy:  Prostatic adenocarcinoma, [place] score 7 (3+4)  Tumor encompasses 30% of the needle core biopsy  Grade group 2  Pattern 4 is approximately 20% of the tumor    D: Prostate, right base, needle biopsy:  Prostatic adenocarcinoma, [place]'s 7 (4+3)  Tumor encompasses 10% of the needle core biopsy  Grade group 3    E: Prostate, right mid, needle biopsy:  Prostatic adenocarcinoma, [place]'s 7 (4+3)  Tumor encompasses 55% of the needle core biopsy  Grade group 3    F: Prostate, right apex, needle biopsy:  High-grade prostatic intraepithelial neoplasia    G: Prostate, left lateral base, needle biopsy:  Benign prostatic glands and stroma    H: Prostate, left lateral mid, needle biopsy:  Benign prostatic glands and stroma    I: Prostate,  left lateral apex, needle biopsy:  Benign prostatic glands and stroma    J: Prostate, left base, needle biopsy:  Benign prostatic glands and stroma    K: Prostate, left mid, needle biopsy:  Benign prostatic glands and stroma    L: Prostate, left apex, needle biopsy:  Benign prostatic glands and stroma     8/14/2020 Imaging    [facility]  CT AP  No acute abnormality of the abdomen or pelvis.  A moderate splenomegaly.  An enlarged prostate.  A stable bilateral renal cysts.  The lobulated noncalcified nodule in the right lower lobe represent a neoplastic process. Further evaluation with positron emission  tomography scan may be obtained.  The previously seen fractures of the left sacral ala is not visualized  in this study. If clinically significant, further evaluation with  radionuclide bone scan may be obtained    Bone Scan  1. No evidence of metastatic bone disease.  2. Degenerative uptake in the spine, right knee and bilateral  ankles/feet. Suspect prior left knee arthroplasty. Correlate clinically.     10/16/2020 Biopsy    Final Diagnosis   Lung, right lower lobe, fine-needle core biopsies and touch preparation:  A.  Fragments of skeletal muscle.  B.  No histologic or cytologic evidence of malignancy.        8/11/2021 Procedure    PSA 21.02     10/4/2021 Cancer Staged    Staging form: Prostate, AJCC 8th Edition  - Pathologic stage from 10/4/2021: Stage IIIA (pT2, pN0, cM0, PSA: 21, Grade Group: 3) - Signed by Milan Bullard MD on 10/4/2021     Malignant neoplasm of lower lobe of right lung (HCC)   10/16/2020 Biopsy    Final Diagnosis   Lung, right lower lobe, fine-needle core biopsies and touch preparation:  A.  Fragments of skeletal muscle.  B.  No histologic or cytologic evidence of malignancy.        4/5/2021 Initial Diagnosis    Malignant neoplasm of lower lobe of right lung (CMS/HCC)     4/15/2021 Cancer Staged    Staging form: Lung, AJCC 8th Edition  - Pathologic stage from 4/15/2021: Stage IA2 (pT1b, pN0, cM0) -  Signed by Milan Bullard MD on 4/15/2021         PAST MEDICAL HISTORY:  ALLERGIES:  No Known Allergies  CURRENT MEDICATIONS:  Outpatient Encounter Medications as of 1/19/2023   Medication Sig Dispense Refill   • acetaminophen (TYLENOL) 500 MG tablet Take 500 mg by mouth Every 6 (Six) Hours As Needed for Mild Pain .     • alfuzosin (UROXATRAL) 10 MG 24 hr tablet Take 10 mg by mouth Daily.     • apixaban (Eliquis) 5 MG tablet tablet Take 1 tablet by mouth Every 12 (Twelve) Hours. 60 tablet 11   • FeroSul 325 (65 Fe) MG tablet TAKE 1 TABLET BY MOUTH 2 (TWO) TIMES A DAY. 60 tablet 2   • levothyroxine (Synthroid) 150 MCG tablet Take 1 tablet by mouth Daily for 90 days. 90 tablet 1   • losartan-hydrochlorothiazide (HYZAAR) 50-12.5 MG per tablet TAKE 1/2 TABLET BY MOUTH DAILY. 45 tablet 3   • therapeutic multivitamin-minerals (THERAGRAN-M) tablet Take 2 tablets by mouth Daily.       Facility-Administered Encounter Medications as of 1/19/2023   Medication Dose Route Frequency Provider Last Rate Last Admin   • [DISCONTINUED] barium (READI-CAT 2) suspension 900 mL  900 mL Oral Once in imaging Milan Bullard MD         ADULT ILLNESSES:  Patient Active Problem List   Diagnosis Code   • Prostate cancer (HCC) C61   • Former smoker Z87.891   • Lung nodule < 6cm on CT [id number]   • Abnormal PET of right lung R94.2   • Pneumothorax after biopsy J95.811   • Hypertension I10   • Failed intubation of airway T88.4XXA   • S/P total thyroidectomy E89.0   • Postoperative hypothyroidism E89.0   • Lung mass R91.8   • Paroxysmal atrial fibrillation (HCC) I48.0   • Malignant neoplasm of lower lobe of right lung (HCC) C34.31   • Nodule of neck R22.1   • S/P partial lobectomy of lung Z90.2   • History of lung cancer Z85.118   • Class 1 obesity due to excess calories with serious comorbidity and body mass index (BMI) of 34.0 to 34.9 in adult E66.09, Z68.34   • Current use of long term anticoagulation Z79.01     SURGERIES:  Past Surgical History:    Procedure Laterality Date   • CARPAL TUNNEL RELEASE Right    • LOBECTOMY Right 2/15/2021    Procedure: THORACOSCOPY WITH DAVINCI ROBOT WITH RIGHT WEDGE RESECTION, MEDIASTINAL LYMPH NODE DISSECTION-RIGHT;  Surgeon: Nick Flores MD;  Location: [facility] OR;  Service: [facility];  Laterality: Right;   • LUNG BIOPSY     • PROSTATE BIOPSY     • REPLACEMENT TOTAL KNEE Left    • SHOULDER SURGERY Bilateral    • THORACOSCOPY Right 10/30/2020    Procedure: MEDIASTINOSCOPY, RIGHT THORACOSCOPY POSSIBLE WEDGE RESECTION WITH DAVINCI ROBOT;  Surgeon: Nick Flores MD;  Location:  [facility] OR;  Service: [facility];  Laterality: Right;   • THYROIDECTOMY N/A 2020    Procedure: Total thyroidectomy;  Surgeon: Jean Marie Solis MD;  Location:  [facility] OR;  Service: ENT;  Laterality: N/A;     HEALTH MAINTENANCE ITEMS:  Health Maintenance Due   Topic Date Due   • COLORECTAL CANCER SCREENING  Never done   • COVID-19 Vaccine (1) Never done   • Pneumococcal Vaccine 65+ (1 - PCV) Never done   • TDAP/TD VACCINES (1 - Tdap) Never done   • ZOSTER VACCINE (1 of 2) Never done   • HEPATITIS C SCREENING  Never done   • ANNUAL WELLNESS VISIT  Never done   • INFLUENZA VACCINE  2022       <no information>  Last Completed Colonoscopy     This patient has no relevant Health Maintenance data.        There is no immunization history for the selected administration types on file for this patient.  Last Completed Mammogram     This patient has no relevant Health Maintenance data.            FAMILY HISTORY:  Family History   Problem Relation Age of Onset   • Hypertension Mother         New Contact   • Cancer Mother    • COPD Father      SOCIAL HISTORY:  Social History     Socioeconomic History   • Marital status:    • Number of children: 2   Tobacco Use   • Smoking status: Former     Packs/day: 1.00     Years: 33.00     Pack years: 33.00     Types: Cigarettes     Start date: 1969     Quit date: 1996     Years since quittin.0   •  "Smokeless tobacco: Never   Vaping Use   • Vaping Use: Never used   Substance and Sexual Activity   • Alcohol use: Yes     Alcohol/week: 2.0 standard drinks     Types: 2 Shots of liquor per week     Comment: x2 weekly   • Drug use: Never   • Sexual activity: Defer       REVIEW OF SYSTEMS:    Review of Systems   Constitutional: Positive for fatigue. Negative for fever and unexpected weight change.        \"I have a cold.\"   HENT: Positive for congestion. Negative for trouble swallowing.    Eyes: Negative for redness and visual disturbance.   Respiratory: Negative for cough, shortness of breath and wheezing.    Cardiovascular: Negative for chest pain and palpitations.   Gastrointestinal: Negative for abdominal pain, constipation, diarrhea, nausea and vomiting.   Endocrine: Negative for polydipsia and polyphagia.   Genitourinary: Negative for flank pain.        He had hesitancy.    Musculoskeletal: Negative for gait problem and joint swelling.   Skin: Positive for pallor.   Allergic/Immunologic: Negative for food allergies.   Neurological: Negative for dizziness, speech difficulty and weakness.   Hematological: Negative for adenopathy. Does not bruise/bleed easily.   Psychiatric/Behavioral: Negative for agitation, confusion and hallucinations.       VITAL SIGNS: /60   Pulse 78   Temp 97 °F (36.1 °C)   Resp 18   Ht 185.4 cm (73\")   Wt 119 kg (262 lb 14.4 oz)   SpO2 95%   BMI 34.69 kg/m²   Pain Score    01/19/23 1044   PainSc: 0-No pain       PHYSICAL EXAMINATION:     Physical Exam  Vitals reviewed.   Constitutional:       General: He is not in acute distress.  HENT:      Head: Normocephalic and atraumatic.   Eyes:      General: No scleral icterus.  Cardiovascular:      Rate and Rhythm: Normal rate.   Pulmonary:      Effort: No respiratory distress.      Breath sounds: No wheezing or rales.   Abdominal:      General: Bowel sounds are normal.      Palpations: Abdomen is soft.      Tenderness: There is no " abdominal tenderness.   Musculoskeletal:         General: No swelling.      Cervical back: Neck supple.   Skin:     Coloration: Skin is pale.   Neurological:      Mental Status: He is alert and oriented to person, place, and time.   Psychiatric:         Mood and Affect: Mood normal.         Behavior: Behavior normal.         Thought Content: Thought content normal.         Judgment: Judgment normal.         LABS    Lab Results - Last 18 Months   Lab Units 12/30/22  1438 10/21/22  1555 07/21/22  1103 04/14/22  1226 01/07/22  1412 10/15/21  1133   HEMOGLOBIN g/dL 12.3* 12.6* 12.7* 12.1* 12.4* 13.2   HEMATOCRIT % 39.4 37.2* 37.4* 37.2* 36.3* 39.0   MCV fL 92.3 91.2 90.6 91.9 91.4 90.5   WBC 10*3/mm3 5.80 5.71 6.35 5.37 6.13 6.34   RDW % 14.1 14.4 14.7 13.7 15.0 13.3   MPV fL 10.4 10.7 10.7 10.4 10.3 10.5   PLATELETS 10*3/mm3 172 141 156 165 167 177   IMM GRAN % % 0.5 1.2* 0.5 0.7* 0.7* 0.5   NEUTROS ABS 10*3/mm3 4.53 4.66 5.34 4.31 5.12 4.89   LYMPHS ABS 10*3/mm3 0.65* 0.52* 0.38* 0.36* 0.38* 0.80   MONOS ABS 10*3/mm3 0.47 0.34 0.51 0.55 0.46 0.51   EOS ABS 10*3/mm3 0.09 0.10 0.05 0.09 0.10 0.07   BASOS ABS 10*3/mm3 0.03 0.02 0.04 0.02 0.03 0.04   IMMATURE GRANS (ABS) 10*3/mm3 0.03 0.07* 0.03 0.04 0.04 0.03   NRBC /100 WBC 0.0 0.0 0.0 0.0 0.0 0.0       Lab Results - Last 18 Months   Lab Units 01/18/23  0951 12/30/22  1438 10/21/22  1555 07/21/22  1103 07/14/22  0935 01/10/22  1010 01/07/22  1412 10/26/21  0944 10/15/21  1133   GLUCOSE mg/dL  --  77 140* 107*  --   --  116*  --  101*   SODIUM mmol/L  --  138 137 139  --   --  138  --  139   POTASSIUM mmol/L  --  4.2 4.1 4.1  --   --  4.0  --  4.0   CO2 mmol/L  --  23.0 26.0 27.0  --   --  24.0  --  27.0   CHLORIDE mmol/L  --  103 103 104  --   --  104  --  103   ANION GAP mmol/L  --  12.0 8.0 8.0  --   --  10.0  --  9.0   CREATININE mg/dL 1.10 0.98 0.99 1.05 1.00 1.20 1.19   < > 1.13   BUN mg/dL  --  20 23 20  --   --  19  --  18   BUN / CREAT RATIO   --  20.4 23.2 19.0   --   --  16.0  --  15.9   CALCIUM mg/dL  --  8.5* 8.9 9.5  --   --  8.8  --  8.9   EGFR IF NONAFRICN AM mL/min/1.73  --   --   --   --   --   --  60*  --  64   ALK PHOS U/L  --  120* 120* 111  --   --  98  --  98   TOTAL PROTEIN g/dL  --  7.0 6.3 6.7  --   --  6.6  --  6.7   ALT (SGPT) U/L  --  27 48* 31  --   --  26  --  29   AST (SGOT) U/L  --  22 42* 22  --   --  22  --  23   BILIRUBIN mg/dL  --  1.2 1.1 1.1  --   --  1.4*  --  1.7*   ALBUMIN g/dL  --  4.0 4.20 4.00  --   --  3.70  --  4.10   GLOBULIN gm/dL  --  3.0 2.1 2.7  --   --  2.9  --  2.6    < > = values in this interval not displayed.       No results for input(s): MSPIKE, KAPPALAMB, IGLFLC, URICACID, FREEKAPPAL, CEA, LDH, REFLABREPO in the last 64389 hours.    Lab Results - Last 18 Months   Lab Units 12/30/22  1438 07/21/22  1103 06/08/22  1425 04/27/22  1635 04/14/22  1226 03/28/22  1553 01/07/22  1412 12/06/21  1505 10/15/21  1133 10/04/21  1119   IRON mcg/dL 56* 60 53*  --  47*  --  69  --  39*  --    TIBC mcg/dL 298 313 282*  --  297*  --  288*  --  304  --    IRON SATURATION % 19* 19* 19*  --  16*  --  24  --  13*  --    FERRITIN ng/mL 323.00 335.10  --   --  415.90*  --  269.00  --  196.10  --    TSH uIU/mL 3.630  --  0.792 0.190*  --  0.107*  --  0.559  --  4.850*   FOLATE ng/mL  --   --   --   --  >20.00  --   --   --   --   --        Delgado Desir reports a pain score of 0.      ASSESSMENT:  1.  Adenocarcinoma the lung, right lower lobe  AJCC stage:1A2 (pT1b, cN0, cM0)  Treatment status: Post right lower lobe wedge resection.  Pulmonary function test in adequate for lobectomy and node sampling.  2.  Performance status of 1.  3.  Normocytic anemia from chronic disease, post radiation/ADT and iron deficiency. On oral iron.   4.  Adenocarcinoma of the prostate, [place] score 7, 4+3, grade group 3.  AJCC stage: IIIA (T2b, N0, M0)  Treatment status: Neoadjuvant hormone therapy with radiation 01/03/2022.  Followed by Dr. Phan and   Le.   5.  Elevated bilirubin from cholelithiasis.  Negative CT of the abdomen pelvis.        PLAN:  1.   Re: CT chest report 01/18/2023. A nodular area in the right lower lobe laterally and inferiorly  measuring 1.2 cm may be minimally larger, previously measuring 1.1 cm. There is also a new 2 to 3 mm subpleural groundglass nodule in the left upper lobe anteriorly. Other areas of nodularity appear stable.  Stable pleural thickening on the right. Stable parenchymal infiltrates along a wedge resection suture line in the right lower lobe.Emphysematous changes.  A left hilar lymph node is now mildly enlarged by size criteria  measuring 1.4 cm and previously measuring 1 cm. There are several subcentimeter hilar and mediastinal lymph nodes that appear relatively stable.  Atheromatous disease of the thoracic aorta. Minimal coronary artery  calcification. Main pulmonary artery segment mildly dilated at 3.2 cm. Mild cardiomegaly.   2.   Re: CT abdomen and pelvis report 01/18/2023. No acute abnormality of the abdomen or pelvis.  Fatty infiltration of the liver. Moderate splenomegaly.  Low-density renal nodules bilaterally stable since the previous  study. Cholelithiasis. No finding to suggest cholecystitis.  Moderate thickening of the gallbladder wall which may partly be due to incomplete distention. Possibility of chronic cystitis not excluded.  Moderately enlarged prostate with evidence of previous surgery or procedure.  3.   Re: Heme status.  WBC 5.8, hemoglobin 12.3 from 12.6, hematocrit 39.4 from 37.2, MCV 92.3 and platelet 172.  4.   Re: CMP.  GFR 82.4 from 81.4 and bilirubin 1.2 from 1.1 from 1.1 from 1.4 from 1.7 from 1.1.  Positive for cholelithiasis.  5.   Re: Ferritin  323 from 335.1 from 415.9 and iron saturation 19 from 19 from 19%.  6.  He will be seen every 6 months with CT scan for the first 3 years then annually thereafter.  7.  Continue care per primary care physician  and other specialists.  8.  Plan of care discussed with patient.  Understanding expressed.  Patient able to proceed.  9.  Stable for observation, cancer.  Await PET.  10.  eRx ferrous sulfate 325 mg p.o. twice daily #60 with 2 refills.  Stop and call if intolerant.  Monitor for nausea, vomiting, diarrhea, constipation or abdominal pain.  11. Advance Care Planning   ACP discussion was declined by the patient. Patient does not have an advance directive, information provided.   12.  CBC with differential, ferritin and iron panel in 3 months.  13.  Stool for occult blood x3.  14.  PET scan for worsening left hilar node, left upper lobe nodule and larger right lower lobe nodule.  15.  Return to office in 6 months with preoffice CT chest, abdomen, pelvis, CBC with differential, ferritin, iron profile, and CMP.          I have reviewed the assessment and plan and verified the accuracy of it. No changes to assessment and plan since the information was documented. Milan Bullard MD 01/19/23         I spent 32 total minutes, face-to-face, caring for Delgado dale.  Greater than 50% of this time involved counseling and/or coordination of care as documented within this note regarding the patient's illness(es), pros and cons of various treatment options, instructions and/or risk reduction.             (Jean Marie Solis MD)  (Nick Flores MD)  (Ephraim Lujan MD)  (Jake Phan MD)  Michael Nunez MD

## 2023-01-18 ENCOUNTER — HOSPITAL ENCOUNTER (OUTPATIENT)
Dept: CT IMAGING | Facility: HOSPITAL | Age: 73
Discharge: HOME | End: 2023-01-18
Admitting: INTERNAL MEDICINE
Payer: MEDICARE

## 2023-01-18 DIAGNOSIS — C34.31 MALIGNANT NEOPLASM OF LOWER LOBE OF RIGHT LUNG: ICD-10-CM

## 2023-01-18 DIAGNOSIS — D50.8 IRON DEFICIENCY ANEMIA SECONDARY TO INADEQUATE DIETARY IRON INTAKE: ICD-10-CM

## 2023-01-18 LAB — CREAT BLDA-MCNC: 1.1 MG/DL (ref 0.6–1.3)

## 2023-01-18 PROCEDURE — 82565 ASSAY OF CREATININE: CPT

## 2023-01-18 PROCEDURE — 25010000002 IOPAMIDOL 61 % SOLUTION: Performed by: INTERNAL MEDICINE

## 2023-01-18 PROCEDURE — 74177 CT ABD & PELVIS W/CONTRAST: CPT

## 2023-01-18 PROCEDURE — 71260 CT THORAX DX C+: CPT

## 2023-01-18 RX ADMIN — IOPAMIDOL 100 ML: 612 INJECTION, SOLUTION INTRAVENOUS at 10:01

## 2023-01-19 ENCOUNTER — OFFICE VISIT (OUTPATIENT)
Dept: ONCOLOGY | Facility: CLINIC | Age: 73
End: 2023-01-19
Payer: MEDICARE

## 2023-01-19 VITALS
HEART RATE: 78 BPM | TEMPERATURE: 97 F | DIASTOLIC BLOOD PRESSURE: 60 MMHG | SYSTOLIC BLOOD PRESSURE: 144 MMHG | RESPIRATION RATE: 18 BRPM | OXYGEN SATURATION: 95 % | HEIGHT: 73 IN | BODY MASS INDEX: 34.84 KG/M2 | WEIGHT: 262.9 LBS

## 2023-01-19 DIAGNOSIS — C61 PROSTATE CANCER: Primary | ICD-10-CM

## 2023-01-19 DIAGNOSIS — D50.8 IRON DEFICIENCY ANEMIA SECONDARY TO INADEQUATE DIETARY IRON INTAKE: ICD-10-CM

## 2023-01-19 DIAGNOSIS — C34.31 MALIGNANT NEOPLASM OF LOWER LOBE OF RIGHT LUNG: ICD-10-CM

## 2023-01-19 PROCEDURE — 99214 OFFICE O/P EST MOD 30 MIN: CPT | Performed by: INTERNAL MEDICINE

## 2023-01-20 DIAGNOSIS — C61 PROSTATE CANCER (HCC): ICD-10-CM

## 2023-01-20 LAB — PROSTATE SPECIFIC ANTIGEN: <0.01 NG/ML (ref 0–4)

## 2023-01-23 ENCOUNTER — TELEPHONE (OUTPATIENT)
Dept: ONCOLOGY | Facility: CLINIC | Age: 73
End: 2023-01-23
Payer: MEDICARE

## 2023-01-23 ENCOUNTER — LAB (OUTPATIENT)
Dept: LAB | Facility: HOSPITAL | Age: 73
End: 2023-01-23
Payer: MEDICARE

## 2023-01-23 ENCOUNTER — OFFICE VISIT (OUTPATIENT)
Dept: UROLOGY | Age: 73
End: 2023-01-23
Payer: MEDICARE

## 2023-01-23 VITALS — HEIGHT: 73 IN | TEMPERATURE: 97 F | WEIGHT: 264 LBS | BODY MASS INDEX: 34.99 KG/M2

## 2023-01-23 DIAGNOSIS — D50.8 IRON DEFICIENCY ANEMIA SECONDARY TO INADEQUATE DIETARY IRON INTAKE: ICD-10-CM

## 2023-01-23 DIAGNOSIS — N13.8 BPH WITH URINARY OBSTRUCTION: ICD-10-CM

## 2023-01-23 DIAGNOSIS — C61 PROSTATE CANCER (HCC): Primary | ICD-10-CM

## 2023-01-23 DIAGNOSIS — N40.1 BPH WITH URINARY OBSTRUCTION: ICD-10-CM

## 2023-01-23 LAB
COLLECT DATE SP2 STL: NORMAL
COLLECT DATE SP3 STL: NORMAL
COLLECT DATE STL: NORMAL
GASTROCULT GAST QL: NEGATIVE
HEMOCCULT SP2 STL QL: NEGATIVE
HEMOCCULT SP3 STL QL: NORMAL
Lab: 1310
Lab: 900
Lab: NORMAL

## 2023-01-23 PROCEDURE — 82272 OCCULT BLD FECES 1-3 TESTS: CPT

## 2023-01-23 PROCEDURE — 99214 OFFICE O/P EST MOD 30 MIN: CPT | Performed by: UROLOGY

## 2023-01-23 PROCEDURE — 51798 US URINE CAPACITY MEASURE: CPT | Performed by: UROLOGY

## 2023-01-23 PROCEDURE — 1123F ACP DISCUSS/DSCN MKR DOCD: CPT | Performed by: UROLOGY

## 2023-01-23 ASSESSMENT — ENCOUNTER SYMPTOMS
EYE DISCHARGE: 0
TROUBLE SWALLOWING: 0
NAUSEA: 0
ABDOMINAL DISTENTION: 0
ABDOMINAL PAIN: 0
CONSTIPATION: 0
EYE REDNESS: 0
SHORTNESS OF BREATH: 0
COUGH: 0
VOMITING: 0
DIARRHEA: 0
BACK PAIN: 0

## 2023-01-23 NOTE — TELEPHONE ENCOUNTER
----- Message from Milan Bullard MD sent at 1/23/2023 10:43 AM CST -----  Notify patient.  Occult blood negative x2.

## 2023-01-23 NOTE — PROGRESS NOTES
Loren Flores is a 71 y.o. male who presents today   Chief Complaint   Patient presents with    Follow-up     I am here today for my 6 month follow up, my PSA is done      Prostate Cancer  Patient is here today for prostate cancer which was first diagnosed 2.5 years ago. His prostate cancer can be characterized as clinical stage T1c Negra 4+3 = 7 hormone sensitive status post XRT  His last several PSA values are as follows:  Lab Results   Component Value Date    PSA <0.01 01/20/2023    PSA 0.03 06/21/2022    PSA 21.02 (H) 08/11/2021     Previous treatment of prostate cancer: Neoadjuvant hormonal therapy beginning September 23, 2021 until present. External beam radiation therapy completed 1/10/2022  Lower urinary tract symptoms: urgency, frequency, decreased urinary stream, and nocturia, 3 times per night. But recently he says he gets up about every 2 hours  He is on alfuzosin        Past Medical History:   Diagnosis Date    Benign prostatic disease     Elevated PSA     Osteoarthritis     Shoulder pain     fall off porch    Upper respiratory virus     c/o recent tx with otc meds. ; denies fever/chills (about a week ago)       Past Surgical History:   Procedure Laterality Date    HERNIA REPAIR      JOINT REPLACEMENT      KNEE ARTHROPLASTY  12--10    LUNG BIOPSY Right     [facility]    [facility] SURGICAL ARTHROSCOPY SHOULDER W/ROTATOR CUFF RPR Left 3/22/2018    SHOULDER ARTHROSCOPY ROTATOR CUFF REPAIR  SUBACROMIAL DECOMPRESSION performed by Willy Boland MD at [facility] ARTHROSCOPY  2008       Current Outpatient Medications   Medication Sig Dispense Refill    sildenafil (REVATIO) 20 MG tablet Take 1 tablet by mouth as needed (For erectile dysfunction) Take 1 to 4 hours prior to sexual activity 5 tablet 0    FEROSUL 325 (65 Fe) MG tablet TAKE 1 TABLET BY MOUTH DAILY WITH BREAKFAST.       SYNTHROID 150 MCG tablet TAKE 1 TABLET BY MOUTH ONCE DAILY      losartan-hydroCHLOROthiazide (HYZAAR) 50-12.5 MG per tablet TAKE 12 TABLET BY MOUTH DAILY. ELIQUIS 5 MG TABS tablet       alfuzosin (UROXATRAL) 10 MG extended release tablet Take 1 tablet by mouth daily 30 tablet 11    Multiple Vitamins-Minerals (THERAPEUTIC MULTIVITAMIN-MINERALS) tablet Take 1 tablet by mouth daily       No current facility-administered medications for this visit. No Known Allergies    Social History     Socioeconomic History    Marital status:      Spouse name: None    Number of children: None    Years of education: None    Highest education level: None   Tobacco Use    Smoking status: Former    Smokeless tobacco: Former     Quit date: 2007   Substance and Sexual Activity    Alcohol use: Not Currently     Alcohol/week: 2.0 standard drinks     Types: 2 Shots of liquor per week     Comment: 2 per week    Drug use: No       No family history on file. REVIEW OF SYSTEMS:  Review of Systems   Unable to perform ROS: Other   Constitutional:  Negative for chills, fatigue and fever. HENT:  Negative for congestion, ear pain and trouble swallowing. Eyes:  Negative for discharge and redness. Respiratory:  Negative for cough and shortness of breath. Cardiovascular:  Negative for chest pain. Gastrointestinal:  Negative for abdominal distention, abdominal pain, constipation, diarrhea, nausea and vomiting. Endocrine: Negative for cold intolerance and heat intolerance. Genitourinary:  Negative for difficulty urinating, dysuria, flank pain, frequency, hematuria and urgency. Musculoskeletal:  Negative for back pain and gait problem. Skin:  Negative for pallor and wound. Allergic/Immunologic: Negative for environmental allergies and immunocompromised state. Neurological:  Negative for dizziness and weakness. Hematological:  Negative for adenopathy. Does not bruise/bleed easily. Psychiatric/Behavioral:  Negative for agitation and confusion.         PHYSICAL EXAM:  Temp 97 °F (36.1 °C) (Temporal)   Ht 6' 1\" (1.854 m)   Wt 264 lb (119.7 kg)   BMI 34.83 kg/m²   Physical Exam  Constitutional:       General: He is not in acute distress.     Appearance: Normal appearance. He is well-developed.   HENT:      Head: Normocephalic and atraumatic.      Nose: Nose normal.   Eyes:      General: No scleral icterus.     Conjunctiva/sclera: Conjunctivae normal.      Pupils: Pupils are equal, round, and reactive to light.   Neck:      Trachea: No tracheal deviation.   Cardiovascular:      Rate and Rhythm: Normal rate and regular rhythm.      Pulses: Normal pulses.   Pulmonary:      Effort: Pulmonary effort is normal. No respiratory distress.      Breath sounds: No stridor.   Abdominal:      General: There is no distension.      Palpations: Abdomen is soft. There is no mass.      Tenderness: There is no abdominal tenderness.   Musculoskeletal:         General: No tenderness or deformity. Normal range of motion.      Cervical back: Normal range of motion and neck supple.   Lymphadenopathy:      Cervical: No cervical adenopathy.   Skin:     General: Skin is warm and dry.      Findings: No erythema.   Neurological:      General: No focal deficit present.      Mental Status: He is alert and oriented to person, place, and time.   Psychiatric:         Behavior: Behavior normal.         Judgment: Judgment normal.           DATA:  CMP:    Lab Results   Component Value Date/Time     08/14/2020 11:15 AM    K 4.4 08/14/2020 11:15 AM     08/14/2020 11:15 AM    CO2 24 08/14/2020 11:15 AM    BUN 16 08/14/2020 11:15 AM    CREATININE 1.0 08/14/2020 11:15 AM    GFRAA >59 08/14/2020 11:15 AM    LABGLOM >60 08/14/2020 11:15 AM    GLUCOSE 103 08/14/2020 11:15 AM    PROT 6.7 08/14/2020 11:15 AM    LABALBU 4.1 08/14/2020 11:15 AM    CALCIUM 9.2 08/14/2020 11:15 AM    BILITOT 1.2 08/14/2020 11:15 AM    ALKPHOS 94 08/14/2020 11:15 AM    AST 27 08/14/2020 11:15 AM    ALT 39 08/14/2020 11:15 AM     No results found for this visit on 01/23/23.  Lab Results   Component  Value Date    PSA <0.01 01/20/2023    PSA 0.03 06/21/2022    PSA 21.02 (H) 08/11/2021       1. Prostate cancer [facility])  His PSA remains very low. He says he is due a injection February. His last injection would be this summer.  - PSA, Diagnostic; Future    2. BPH with urinary obstruction  He complains of irritative symptoms primarily getting up at night his residual today was okay offered adding an anticholinergic he wants us wants to see how he does  - NM Measure, post-void residual, US, non-imaging    Orders Placed This Encounter   Procedures    PSA, Diagnostic     PSA in 6 month     Standing Status:   Future     Standing Expiration Date:   1/23/2024    NM Measure, post-void residual, US, non-imaging          Return in about 6 months (around 7/23/2023) for PSA prior to vext visit. All information inputted into the note by the MA to include chief complaint, past medical history, past surgical history, medications, allergies, social and family history and review of systems has been reviewed and updated as needed by me. EMR Dragon/transcription disclaimer: Much of this documentt is electronic  transcription/translation of spoken language to printed text. The  electronic translation of spoken language may be erroneous, or at times,  nonsensical words or phrases may be inadvertently transcribed.  Although I  have reviewed the document for such errors, some may still exist.

## 2023-01-30 ENCOUNTER — HOSPITAL ENCOUNTER (OUTPATIENT)
Dept: CT IMAGING | Facility: HOSPITAL | Age: 73
Discharge: HOME | End: 2023-01-30
Payer: MEDICARE

## 2023-01-30 ENCOUNTER — TELEPHONE (OUTPATIENT)
Dept: ONCOLOGY | Facility: CLINIC | Age: 73
End: 2023-01-30
Payer: MEDICARE

## 2023-01-30 DIAGNOSIS — C34.31 MALIGNANT NEOPLASM OF LOWER LOBE OF RIGHT LUNG: ICD-10-CM

## 2023-01-30 DIAGNOSIS — D50.8 IRON DEFICIENCY ANEMIA SECONDARY TO INADEQUATE DIETARY IRON INTAKE: ICD-10-CM

## 2023-01-30 PROCEDURE — 0 FLUDEOXYGLUCOSE F18 SOLUTION: Performed by: INTERNAL MEDICINE

## 2023-01-30 PROCEDURE — 78815 PET IMAGE W/CT SKULL-THIGH: CPT

## 2023-01-30 PROCEDURE — A9552 F18 FDG: HCPCS | Performed by: INTERNAL MEDICINE

## 2023-01-30 RX ADMIN — FLUDEOXYGLUCOSE F18 1 DOSE: 300 INJECTION INTRAVENOUS at 08:44

## 2023-01-30 NOTE — TELEPHONE ENCOUNTER
----- Message from Milan Bullard MD sent at 1/30/2023 12:12 PM CST -----  PET 1/30/2023.  Notify patient.No abnormal hypermetabolic activity to suggest recurrent or  metastatic disease.     There is only low level metabolic activity below liver background  level associated with the nodule in the inferolateral RIGHT upper lobe  for which concern was raised on prior chest CT. Also of note, this  nodule is unchanged over multiple prior studies

## 2023-02-09 ENCOUNTER — NURSE ONLY (OUTPATIENT)
Dept: UROLOGY | Age: 73
End: 2023-02-09
Payer: MEDICARE

## 2023-02-09 DIAGNOSIS — C61 PROSTATE CANCER (HCC): Primary | ICD-10-CM

## 2023-02-09 PROCEDURE — 96402 CHEMO HORMON ANTINEOPL SQ/IM: CPT | Performed by: NURSE PRACTITIONER

## 2023-02-09 NOTE — PROGRESS NOTES
After obtaining consent from patient and receiving verbal/written orders from Dr. Tyler Ramírez, I gave patient 45mg (6 month) of Lupron injection in right upper quad. gluteus, patient tolerated well. Medication was not supplied by the patient. Patient is scheduled to follow up with Dr. Tyler Ramírez with PSA prior on 7/27/23, he will also be due for next Lupron shot that day.

## 2023-02-23 ENCOUNTER — HOSPITAL ENCOUNTER (OUTPATIENT)
Dept: RADIATION ONCOLOGY | Facility: HOSPITAL | Age: 73
Setting detail: RADIATION/ONCOLOGY SERIES
End: 2023-02-23
Payer: MEDICARE

## 2023-02-23 NOTE — PROGRESS NOTES
[facility], SAlfonsoAlfonso Lujan MD      Zak Lai, APRN  ____________________________________________________________  [facility]  Department of Radiation Oncology  [address] [place] [id number]  Office:  [phone]  Fax: [phone]    DATE:  02/24/2023  PATIENT: Delgado Desir 1950                         MEDICAL RECORD #:  [id number]                                                       REASON FOR FOLLOW UP VISIT  Delgado Desir is a very pleasant 72 y.o. patient that has completed radiation therapy and returns today for routine follow up exam. Reports frequency/urgency with urination. Denies fatigue, unexpected weight change, SOB, chest pain, blood in stool, dysuria, hematuria, nasuea/vomiting, diarrhea, light-headedness, weakness, and headaches. He continues to follow  and .      History of Present Illness:  Diagnosed in July 2020 with Adenocarcinoma of prostate, pre-PSA 21.02, Preston (4+3) 7, 5/12 cores positive, PNI+. He completed 7000 cGy in 28 fractions to the prostate on 01/10/2022.    In October 2020, Mr. Desir was diagnosed with Stage 1A2 of the RLL of the lung, being managed by Dr. Flores and Dr. Bullard.     07/06/2020 - PSA: 21    07/15/2020 - Appointment with :  • Patient is not satisfied with results with Uroxatrol, I will place him on Rapaflo.   • It has been explained to patient that with the extremely high PSA I would highly suggest prostate biopsy. Patient is agreeable to this.   • The risk of infection, bleeding, nausea, vomiting, or even syncope or presyncope was discussed with patient.   • He has been instructed to begin antibiotic today before procedure and instill enema the day of procedure.   • Ciprofloxacin has been sent to his pharmacy.    07/30/2020 - Prostate Biopsy per :  • Prostate, right lateral base, needle biopsy:  o Prostatic adenocarcinoma, Preston score 7 (4+3)  o Tumor encompasses  20% of the needle core biopsy  o Grade group 3  • Prostate, right lateral mid, needle biopsy:  o Prostatic adenocarcinoma, Preston's 7 (4+3)  o Tumor encompasses 20% of the needle core biopsy  o Grade group 3  • Prostate, right lateral apex, needle biopsy:  o Prostatic adenocarcinoma, Preston score 7 (3+4)  o Tumor encompasses 30% of the needle core biopsy  o Grade group 2  o Pattern 4 is approximately 20% of the tumor  • Prostate, right base, needle biopsy:  o Prostatic adenocarcinoma, Preston's 7 (4+3)  o Tumor encompasses 10% of the needle core biopsy  o Grade group 3  • Prostate, right mid, needle biopsy:  o Prostatic adenocarcinoma, Preston's 7 (4+3)  o Tumor encompasses 55% of the needle core biopsy  o Grade group 3  • Prostate, right apex, needle biopsy:  o High-grade prostatic intraepithelial neoplasia  • Prostate, left lateral base, needle biopsy:  Benign prostatic glands and stroma  • Prostate, left lateral mid, needle biopsy:  Benign prostatic glands and stroma  • Prostate, left lateral apex, needle biopsy:  Benign prostatic glands and stroma  • Prostate, left base, needle biopsy:  Benign prostatic glands and stroma  • Prostate, left mid, needle biopsy:  Benign prostatic glands and stroma  • Prostate, left apex, needle biopsy:  Benign prostatic glands and stroma    08/14/2020 - CT Abdomen/Pelvis with and without contrast:  • No acute abnormality of the abdomen or pelvis.  • A moderate splenomegaly.  • An enlarged prostate.  • A stable bilateral renal cysts.  • The lobulated noncalcified nodule in the right lower lobe represent a neoplastic process. Further evaluation with positron emission tomography scan may be obtained.  • The previously seen fractures of the left sacral ala is not visualized in this study. If clinically significant, further evaluation with radionuclide bone scan may be obtained    08/14/2020 - Bone Scan:  • No evidence of metastatic bone disease.  • Degenerative uptake in the spine,  right knee and bilateral ankles/feet. Suspect prior left knee arthroplasty. Correlate clinically.    08/21/2020 - Appointment with :  • PSA on 7/6/2020 was 21 ng/ML  • His prostate volume was 107 grams. PSAD 0.20  • He had a prostate biopsy consisting of a total of 12 cores with 5 positive cores.  • TRUS Findings consisted of no abnormal ultrasound findings  • He has right sided disease with a Preston score of 4+3 equal sign 7 grade group 2 intermediate unfavorable.  • Location of the the Cancer: 5 out of 6 cores on the right the only one that was negative was the right apex and has had high-grade PIN. 0 out of 6 cores on the left   • His clinical TNM stage was: [id number]  • His pathological TNM stage was: [id number]  • The patient has a staging CT and bone scan.  Recommendations:  • All the time was spent face-to-face with the patient and his wife for a total time of 65 minutes. Time was spent discussing his pathology, risk ratification. Management options.   • He asked many questions and these were answered to the best my ability to his satisfaction.  • After a long discussion I told patient that I felt he was not a candidate active surveillance.   • That he should consider surgery as a most aggressive approach particular given the high PSA is Prseton score the extent of disease and the fact that he has a really large prostate and some obstructive symptoms but this might be the best choice however his major concern is his quality of life and not cancer control and therefore he is leaning more toward external beam radiation therapy given the large size of his prostate I suggested that he consider 2 years of neoadjuvant hormonal therapy with XRT.   • He wants to see the radiation oncologist for consultation.   • He is not completely made up his mind.   • We will wait to hear back from him after he sees radiation oncology    09/02/2020 - Consult with  (Covering for ):  • We discussed the  goals and plans of care with him and his family, answered all questions and he verbalizes understanding.  Separately however, I discussed with him and his wife that after reviewing his imaging studies and discussing the pulmonary findings with the reading radiologist, Dr. Charlene Grider, it was determined that the right lower lobe pulmonary nodule has had an interval enlargement currently measures 2.8 x 1.7 cm on his CT scan of the abdomen and pelvis performed on 08/14/2020 in comparison to CT scan of the chest in November 2019 when it measured 1.7 x 1.2 cm (an addendum to the report has been made).  He understands that this finding requires further work-up for the possibility of metastatic prostate cancer versus a primary lung cancer (more likely).  He understands that work-up will likely lead to possibility of invasive procedures such as thoracic biopsies.  This new information will necessitate holding off on a definitive determination of prostate cancer treatment until work-up is completed.  Possible clinical scenarios include benign pulmonary etiologies, synchronous prostate and pulmonary malignancies, and less likely prostate cancer with pulmonary metastasis.  In the meantime, we agreed that starting androgen deprivation therapy would be a reasonable decision as we move forward with additional work-up and he will be seeing Dr. Phan for that.  Following this discussion and in consideration of the diagnostic data/evaluation of the patient, I am recommending that he undergo a PET scan with anticipated additional referrals for possible biopsying depending on PET scan results.  • Patient will return for follow-up regarding PET scan results or additional coordination of care will be arranged through telephone conversations as needed.    09/18/2020 - PET Scan:  • Hypermetabolic right lower lobe pulmonary nodule compatible with malignant neoplasm.  • No scintigraphic evidence of distal metastases.    09/24/2020 -  Telephone encounter with :  • I spoke with this patient this morning regarding the results of his PET scan.  The right lower lobe nodule is suspicious for malignancy.  • I will discuss this case with Dr. Nick Flores and he may need this lesion removed for diagnosis and definitive treatment.    09/24/2020 - Documentation per :  • I spoke with Dr. Steven Nunez this morning as well as placed a call for Dr. Phan regarding the management of this patient.  • At this point I believe he needs to start ADT for his prostate carcinoma and perhaps some intervention for his urinary obstructive symptoms.  Radiation will make these symptoms worse with the swelling from the radiation and he may need some proactive intervention to relieve his obstructive symptoms.  • He also needs the right lower lobe lung nodule addressed and I have sent a message to Dr. Nick Flores.  We could consider a CT-guided biopsy, however if negative I think he would still need this removed that is clinically quite suspicious.    09/24/2020 - Documentation per :  • I spoke with Dr. Nick Flores and he is reviewed the films.  • At this time we will schedule a CT-guided biopsy as well as PFTs.  It would be very unusual to have a pulmonary metastases from prostate cancer in the absence of disseminated bone metastases but not outside the realm of possibility.  • Therefore we will proceed with core needle biopsies of the lung for tissue diagnosis, and he may require definitive surgical resection.  • We will also request PFTs to be completed.    09/30/2020 - Telephone encounter with :  • I spoke with this patient today to update him on plans for treatment.  I have discussed his case with Dr. Phan, Dr. Steven Nunez, and Dr. Nick Flores,  • With Dr. Phan he will start him on ADT therapy and discuss surgery versus some type of intervention procedure to relieve his urinary outlet obstruction from enlarged prostate.  We are cart  quite concerned that with radiation he will have total obstruction due to his prostate being over 100 g.  • Also, with his lung nodule that is hypermetabolic on PET scan he needs a CT-guided biopsy and pulmonary function test.  He will also see Dr. Nick Flores and referral has been made.  • Patient verbalized understanding and will await contact from the office of Dr. Flores, Dr. Phan, pulmonary function test lab and CT scanning guided biopsy of the lung nodule.    10/16/2020 - Lung, right lower lobe, fine-needle core biopsies and touch preparation:  • Fragments of skeletal muscle.  • No histologic or cytologic evidence of malignancy.    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  o No visceral pleural invasion.  o Margin of resection free of tumor.  • Pleural tissue, biopsies:  o Fibrosis and chronic inflammation.  o No evidence of malignancy.  • AJCC pathologic stage:  pT1b Nx    Synoptic Checklist   LUNG  8th Edition - Protocol posted: 2/26/2020  LUNG: RESECTION - All Specimens  SPECIMEN   Procedure  Wedge resection    Specimen Laterality  Right    TUMOR   Tumor Site  Lower lobe of lung    Histologic Type  Invasive mucinous adenocarcinoma    Histologic Grade  G1: Well differentiated    Spread Through Air Spaces (MYLENE)  Not identified    Tumor Size     Total Tumor Size (size of entire tumor)  Greatest Dimension (Centimeters): 2.0 cm   Tumor Focality  Single focus    Visceral Pleura Invasion  Not identified    Direct Invasion of Adjacent Structures  No adjacent structures present    Treatment Effect  No known presurgical therapy    Lymphovascular Invasion  Not identified    MARGINS   Margins  All margins are uninvolved by tumor    Margins Examined  Parenchymal    Distance of Invasive Carcinoma from Closest Margin (Centimeters)  0.1 cm   Closest Margin  Parenchymal     LYMPH NODES   Regional Lymph Nodes  No lymph nodes submitted or found    PATHOLOGIC STAGE CLASSIFICATION (pTNM, AJCC 8th Edition)   Primary Tumor (pT)  pT1b    Regional Lymph Nodes (pN)  pNX    ADDITIONAL FINDINGS   Additional Findings  Inflammation (type): Chronic    .        02/15/2021 - THORACOSCOPY WITH DAVINCI ROBOT WITH RIGHT WEDGE RESECTION, MEDIASTINAL LYMPH NODE DISSECTION-RIGHT per :  • Lung, right lower lobe, wedge excision:  o Residual tumor is not present.  o The surgical margins are free of tumor.  o 2 benign lymph nodes are present (intraparenchymal).  • Left node level 7, excision: 1 benign lymph node.  • Lymph node, level 8, excision: 1 benign lymph node.    04/05/2021 - Appointment with :  • Patient referred to oncology for surveillance.    04/15/2021 - Consult with :  ASSESSMENT:  • Adenocarcinoma the lung, right lower lobe  o AJCC stage:1A2 (pT1b, cN0, cM0)  o Treatment status: Post right lower lobe wedge resection.  Pulmonary function test in adequate for lobectomy and node sampling.  • Performance status of 1.   • Hypothyroidism.  On Synthroid.  • Adenocarcinoma of the prostate, Shields score 7, 4+3, grade group 3.  o AJCC stage: IIIA (T2b, N0, M0)  o Treatment status: Neoadjuvant hormone therapy with radiation.  Followed by Dr. Phan and Dr. Lujan.   • Obesity BMI 34.04.  PLAN:  •  Re: The reason for follow-up.  •  regarding role of surveillance.  • Blood for CBC with differential and CMP.  • He will be seen every 6 months with CT scan for the first 3 years then annually thereafter.  • Schedule CT brain for staging.  Patient declined brain MRI.   • Schedule CT chest, abdomen and pelvis 05/2021.   • Continue care per primary care physician and other specialists.  • Plan of care discussed with patient.  Understanding expressed.  Patient able to proceed.  • Return to office in 6 months with preoffice CBC with differential and CMP.   Next CT 11/2021.    • Refer to PCP for obesity.   • Advance Care Planning  o ACP discussion was declined by the patient. Patient does not have an advance directive, information provided.  • Further recommendations pending.    04/29/2021 - CT Chest with contrast:  • Postsurgical changes in the right lower lobe from prior wedge resection. There has been interval decrease in the size of a right pleural effusion. Adjacent consolidation has also decreased but persists.  • Stable mildly enlarged right hilar lymph node.  • Interval increase in the size of a right middle lobe nodule, possibly a reactive intrafissural lymph node or metastatic nodule. A new nodule in the left lower lobe may be an area of inflammation or atelectasis but should also be followed. Short interval CT is recommended in 3 months.  • Cardiomegaly. Findings suggestive of pulmonary arterial hypertension.    04/29/2021 - CT abdomen/Pelvis with contrast:  • No evidence of metastatic disease in the abdomen or pelvis.   • Atherosclerotic disease.  • Right renal cyst. Probable left renal cysts.  • Prostatomegaly.    04/29/2021 - CT Head with and without contrast:  • Mild cerebral and cerebellar volume loss with chronic microvascular disease but no evidence of acute intracranial process.  • There is no abnormal enhancement.    07/30/2021 - CT Chest with contrast:  • Postsurgical change in the right lung base is stable compared to April 29, 2021  • Previous noted left lower lobe nodule has resolved.    08/11/2021 - PSA: 21.02    08/16/2021 - Appointment with :  Prostate cancer:  • Patient has now been cleared to proceed with treatment for prostate cancer. He choses to do neoadjuvant hormonal therapy with radiation treatment.   • Will getting back into see radiation oncology to get this established.   • We will work on his preauthorization for Lupron will be contacted as soon as this is completed to come in for his Lupron injection. What he sees radiation oncology can  come back to see me for Fiduciary marker placement.  • Return for routine office f/u after seen by radiation oncology for Fiduciary marker placement.     08/23/2021 - Consult with  (Covering for ):  • A definitive course of fractionated radiation therapy is recommended to the prostate,  anticipate a course of 7000 cGy over 28 treatment fractions; pelvic lymphatics will also be treated. The patient verbalizes understanding, voices no further questions and wishes to proceed with recommended therapy.   • Will refer back to urologist for initiation of ADT; fiducial seed placement and Space OAR gel placement to take place approximately 10 weeks after initiation of ADT.  Patient will return for CT simulation approximately 1 week after placement of fiducial markers and spacer gel.  Continue ongoing management per primary care physician and other specialists.   Plan:  • We will refer you to urology for seed and OAR gel placement, they will call you.  • Start ADT asap with Dr. Phan.  • Return to Dr. Lujan for CT simulation 1 week after seed/gel placement.     11/29/2021 - 01/10/2022 - Completed radiation course:  • Received 7000 cGy in 28 fractions to the prostate    01/10/2022 - CT Chest with contrast:  • Stable postsurgical chest stable compared to April 29, 2021 and July 30, 2021  • Cholelithiasis with tiny calculus noted in the gallbladder.    01/10/2022 - CT Abdomen/Pelvis with contrast:  • No evidence of metastatic disease to the abdomen or pelvis.  • Cholelithiasis, without evidence of cholecystitis.  • Additional chronic findings as above.    01/14/2022 - Appointment with :  ASSESSMENT:  • Adenocarcinoma the lung, right lower lobe  o AJCC stage:1A2 (pT1b, cN0, cM0)  o Treatment status: Post right lower lobe wedge resection.  Pulmonary function test in adequate for lobectomy and node sampling.  • Performance status of 1.   • Elevated bilirubin.  Negative CT of the abdomen  pelvis.  • Adenocarcinoma of the prostate, Preston score 7, 4+3, grade group 3.  • AJCC stage: IIIA (T2b, N0, M0)  o Treatment status: Neoadjuvant hormone therapy with radiation 01/03/2022.  Followed by Dr. Phan and Dr. Lujan.   • Normocytic anemia from chronic disease, post radiation/ADT and iron deficiency.  PLAN:  •  Re: CT chest 01/10/2022. Stable postsurgical chest stable compared to April 29, 2021 and July 30, 2021  • Cholelithiasis with tiny calculus noted in the gallbladder.  •  Re: CT abdomen pelvis 01/10/2022.  No evidence of metastatic disease to the abdomen or pelvis.  • Cholelithiasis, without evidence of cholecystitis.  •  Re: Heme status.  Hemoglobin 12.4, hematocrit 36.3 and MCV 91.4.  Reticulocyte 2.41%.  •  Re: CMP.  GFR 60 ml/min and bilirubin 1.4 from 1.7 from 1.1. Positive for cholelithiasis.  •  Re: Ferritin 269 and saturation 24%.  • He will be seen every 6 months with CT scan for the first 3 years then annually thereafter.  • Continue care per primary care physician and other specialists.  • Plan of care discussed with patient.  Understanding expressed.  Patient able to proceed.  • Stable for observation, cancer.  • Advance Care Planning  o ACP discussion was declined by the patient. Patient does not have an advance directive, information provided.   • CBC with differential, B12, folate, ferritin and iron panel in 3 months  • Return to office in 6 months with preoffice CT chest, abdomen, pelvis, CBC with differential and CMP.     02/25/2022 - Appointment with :  • PSA today   • Follow up in one year     02/25/2022 - PSA: 0.119    06/21/2022 - PSA: 0.03    01/19/2023 - Appointment with :  ASSESSMENT:  • Adenocarcinoma the lung, right lower lobe  o AJCC stage:1A2 (pT1b, cN0, cM0)  o Treatment status: Post right lower lobe wedge resection.  Pulmonary function test in adequate for lobectomy and node sampling.  • Performance status of  1.  • Normocytic anemia from chronic disease, post radiation/ADT and iron deficiency. On oral iron.   • Adenocarcinoma of the prostate, [place] score 7, 4+3, grade group 3.  o AJCC stage: IIIA (T2b, N0, M0)  o Treatment status: Neoadjuvant hormone therapy with radiation 01/03/2022.  Followed by Dr. Phan and Dr. Lujan.   • Elevated bilirubin from cholelithiasis.  Negative CT of the abdomen pelvis.  PLAN:  •  Re: CT chest report 01/18/2023. A nodular area in the right lower lobe laterally and inferiorly  • measuring 1.2 cm may be minimally larger, previously measuring 1.1 cm. There is also a new 2 to 3 mm subpleural groundglass nodule in the left upper lobe anteriorly. Other areas of nodularity appear stable.  • Stable pleural thickening on the right. Stable parenchymal infiltrates along a wedge resection suture line in the right lower lobe.Emphysematous changes.  A left hilar lymph node is now mildly enlarged by size criteria  • measuring 1.4 cm and previously measuring 1 cm. There are several subcentimeter hilar and mediastinal lymph nodes that appear relatively stable.  Atheromatous disease of the thoracic aorta. Minimal coronary artery  • calcification. Main pulmonary artery segment mildly dilated at 3.2 cm. Mild cardiomegaly.   •  Re: CT abdomen and pelvis report 01/18/2023. No acute abnormality of the abdomen or pelvis.  • Fatty infiltration of the liver. Moderate splenomegaly.  Low-density renal nodules bilaterally stable since the previous  • study. Cholelithiasis. No finding to suggest cholecystitis.  Moderate thickening of the gallbladder wall which may partly be due to incomplete distention. Possibility of chronic cystitis not excluded.  Moderately enlarged prostate with evidence of previous surgery or procedure.  •  Re: Heme status.  WBC 5.8, hemoglobin 12.3 from 12.6, hematocrit 39.4 from 37.2, MCV 92.3 and platelet 172.  •  Re: CMP.  GFR 82.4 from 81.4 and bilirubin 1.2  from 1.1 from 1.1 from 1.4 from 1.7 from 1.1.  Positive for cholelithiasis.  •  Re: Ferritin  323 from 335.1 from 415.9 and iron saturation 19 from 19 from 19%.  • He will be seen every 6 months with CT scan for the first 3 years then annually thereafter.  • Continue care per primary care physician and other specialists.  • Plan of care discussed with patient.  Understanding expressed.  Patient able to proceed.  • Stable for observation, cancer.  Await PET.  • eRx ferrous sulfate 325 mg p.o. twice daily #60 with 2 refills.  Stop and call if intolerant.  Monitor for nausea, vomiting, diarrhea, constipation or abdominal pain.  • Advance Care Planning  o ACP discussion was declined by the patient. Patient does not have an advance directive, information provided.  • CBC with differential, ferritin and iron panel in 3 months.  • Stool for occult blood x3.  • PET scan for worsening left hilar node, left upper lobe nodule and larger right lower lobe nodule.  • Return to office in 6 months with preoffice CT chest, abdomen, pelvis, CBC with differential, ferritin, iron profile, and CMP.     01/19/2023 - PET scan:  • No abnormal hypermetabolic activity to suggest recurrent or metastatic disease.  • There is only low level metabolic activity below liver background level associated with the nodule in the inferolateral RIGHT upper lobe for which concern was raised on prior chest CT. Also of note, this nodule is unchanged over multiple prior studies.    01/20/2023 - PSA: <0.01    01/23/2023 - Appointment with :  Prostate cancer (HCC)  • His PSA remains very low. He says he is due a injection February. His last injection would be this summer.  • Return in about 6 months (around 7/23/2023) for PSA prior to next visit.    History obtained from  PATIENT and CHART    PAST MEDICAL HISTORY  Past Medical History:   Diagnosis Date   • Arthritis    • Asthma 12/1/22   • Dizzy spells     states on rare occsaion has   •  Hypertension    • Lung cancer    • Nosebleed 23    Taking  eliquis   • PAF (paroxysmal atrial fibrillation)    • Prostate cancer    • Thyroid disorder       PAST SURGICAL HISTORY  Past Surgical History:   Procedure Laterality Date   • CARPAL TUNNEL RELEASE Right    • LOBECTOMY Right 2/15/2021    Procedure: THORACOSCOPY WITH DAVINCI ROBOT WITH RIGHT WEDGE RESECTION, MEDIASTINAL LYMPH NODE DISSECTION-RIGHT;  Surgeon: Nick Flores MD;  Location:  [facility] OR;  Service: [facility];  Laterality: Right;   • LUNG BIOPSY     • PROSTATE BIOPSY     • REPLACEMENT TOTAL KNEE Left    • SHOULDER SURGERY Bilateral    • THORACOSCOPY Right 10/30/2020    Procedure: MEDIASTINOSCOPY, RIGHT THORACOSCOPY POSSIBLE WEDGE RESECTION WITH DAVINCI ROBOT;  Surgeon: Nick Flores MD;  Location:  [facility] OR;  Service: [facility];  Laterality: Right;   • THYROIDECTOMY N/A 2020    Procedure: Total thyroidectomy;  Surgeon: Jean Marie Solis MD;  Location:  [facility];  Service: ENT;  Laterality: N/A;      FAMILY HISTORY  family history includes COPD in his father; Cancer in his mother; Hypertension in his mother.     SOCIAL HISTORY  Social History     Tobacco Use   • Smoking status: Former     Packs/day: 1.00     Years: 33.00     Pack years: 33.00     Types: Cigarettes     Start date: 1969     Quit date: 1996     Years since quittin.3   • Smokeless tobacco: Never   Vaping Use   • Vaping Use: Never used   Substance Use Topics   • Alcohol use: Yes     Alcohol/week: 2.0 standard drinks     Types: 2 Shots of liquor per week     Comment: x2 weekly   • Drug use: Never     ALLERGIES  Patient has no known allergies.      MEDICATIONS    Current Outpatient Medications:   •  acetaminophen (TYLENOL) 500 MG tablet, Take 1 tablet by mouth Every 6 (Six) Hours As Needed for Mild Pain., Disp: , Rfl:   •  alfuzosin (UROXATRAL) 10 MG 24 hr tablet, Take 1 tablet by mouth Daily., Disp: , Rfl:   •  apixaban (Eliquis) 5 MG tablet tablet, Take 1  "tablet by mouth Every 12 (Twelve) Hours., Disp: 60 tablet, Rfl: 11  •  levothyroxine (Synthroid) 150 MCG tablet, Take 1 tablet by mouth Daily for 90 days., Disp: 90 tablet, Rfl: 1  •  therapeutic multivitamin-minerals (THERAGRAN-M) tablet, Take 2 tablets by mouth Daily., Disp: , Rfl:   •  FeroSul 325 (65 Fe) MG tablet, TAKE 1 TABLET BY MOUTH 2 (TWO) TIMES A DAY., Disp: 60 tablet, Rfl: 2  •  losartan-hydrochlorothiazide (HYZAAR) 50-12.5 MG per tablet, TAKE 1/2 TABLET BY MOUTH DAILY., Disp: 45 tablet, Rfl: 3    Current outpatient and discharge medications have been reconciled for the patient.  Reviewed by: Ephraim Lujan III, MD    The following portions of the patient's history were reviewed and updated as appropriate: allergies, current medications, past family history, past medical history, past social history, past surgical history and problem list.     REVIEW OF SYSTEMS  Review of Systems   Constitutional: Negative.  Negative for fatigue and unexpected weight change.   HENT: Negative.    Respiratory: Negative.  Negative for shortness of breath.    Cardiovascular: Negative.  Negative for chest pain.   Gastrointestinal: Negative.  Negative for blood in stool.   Genitourinary: Positive for frequency and urgency. Negative for dysuria and hematuria.   Musculoskeletal: Negative.    Skin: Negative.    Neurological: Negative.  Negative for weakness.   Hematological: Negative.  Negative for adenopathy.   Psychiatric/Behavioral: Negative.    All other systems reviewed and are negative.     PHYSICAL EXAM  VITAL SIGNS:   Vitals:    02/24/23 1310   BP: 142/52   Pulse: 73   SpO2: 94%  Comment: room air   Weight: 122 kg (270 lb)   Height: 185.4 cm (73\")   PainSc: 0-No pain        Physical Exam  Vitals reviewed.   Constitutional:       Appearance: Normal appearance.   HENT:      Head: Normocephalic.   Cardiovascular:      Rate and Rhythm: Normal rate and regular rhythm.      Pulses: Normal pulses.      Heart sounds: Normal heart " sounds.   Pulmonary:      Effort: Pulmonary effort is normal. No respiratory distress.      Breath sounds: Normal breath sounds.   Abdominal:      General: Bowel sounds are normal.   Musculoskeletal:         General: Normal range of motion.      Cervical back: Normal range of motion and neck supple.   Skin:     General: Skin is warm and dry.      Capillary Refill: Capillary refill takes less than 2 seconds.   Neurological:      Mental Status: He is alert and oriented to person, place, and time.      Motor: No weakness.   Psychiatric:         Mood and Affect: Mood normal.         Behavior: Behavior normal.        Performance Status: ECOG (0) Fully active, able to carry on all predisease performance without restriction    Clinical Quality Measures  -Pain Documented by Standardized Tool, FPS Delgado Desir reports a pain score of 0. Given his pain assessment as noted, treatment options were discussed and the following options were decided upon as a follow-up plan to address the patient's pain: No pain, no plan given.   Pain Medications             acetaminophen (TYLENOL) 500 MG tablet Take 1 tablet by mouth Every 6 (Six) Hours As Needed for Mild Pain.        -Advanced Care Planning Advance Care Planning  ACP discussion was held with the patient during this visit. Patient does not have an advance directive, information provided.    -Body Mass Index Screening and Follow-Up Plan Patient's Body mass index is 35.62 kg/m². indicating that he is obese (BMI >30). Obesity-related health conditions include the following: none.     -Tobacco Use: Screening and Cessation Intervention Social History    Tobacco Use      Smoking status: Former        Packs/day: 1.00        Years: 33.00        Pack years: 33        Types: Cigarettes        Start date: 1969        Quit date: 1996        Years since quittin.3      Smokeless tobacco: Never     ASSESSMENT AND PLAN  1. Prostate cancer    2. History of lung cancer    3. S/P  partial lobectomy of lung    4. Former smoker      RECOMMENDATIONS:   Delgado Desir is status post completion of radiation therapy to the prostate, returns to our clinic today for routine follow up exam. Diagnosed in July 2020 with Adenocarcinoma of prostate, pre-PSA 21.02, Preston (4+3) 7, 5/12 cores positive, PNI+. He completed 7000 cGy in 28 fractions to the prostate on 01/10/2022.    Last PSA was undetectable. His next lupron shot is scheduled July 2023. On exam, he is currently without symptoms or evidence for recurrent or metastatic disease. Reports frequency/urgency with urination. He will continue follow-up/surveillance as discussed in 1 year or sooner if needed and will continue to see the other health care providers as per their scheduling.    Patient Instructions   1) Return in 1 year     Todays appointment time was spent in counseling, coordination of care and surveillance related to patients diagnosis as well as radiation therapy possible and probable after effects.   Ephraim Lujan III, MD  02/24/2023

## 2023-02-24 ENCOUNTER — OFFICE VISIT (OUTPATIENT)
Dept: RADIATION ONCOLOGY | Facility: HOSPITAL | Age: 73
End: 2023-02-24
Payer: MEDICARE

## 2023-02-24 VITALS
WEIGHT: 270 LBS | DIASTOLIC BLOOD PRESSURE: 52 MMHG | BODY MASS INDEX: 35.78 KG/M2 | HEART RATE: 73 BPM | OXYGEN SATURATION: 94 % | SYSTOLIC BLOOD PRESSURE: 142 MMHG | HEIGHT: 73 IN

## 2023-02-24 DIAGNOSIS — C61 PROSTATE CANCER: Primary | ICD-10-CM

## 2023-02-24 DIAGNOSIS — Z85.118 HISTORY OF LUNG CANCER: ICD-10-CM

## 2023-02-24 DIAGNOSIS — Z87.891 FORMER SMOKER: ICD-10-CM

## 2023-02-24 DIAGNOSIS — Z90.2 S/P PARTIAL LOBECTOMY OF LUNG: ICD-10-CM

## 2023-02-24 PROCEDURE — G0463 HOSPITAL OUTPT CLINIC VISIT: HCPCS | Performed by: RADIOLOGY

## 2023-03-13 DIAGNOSIS — I10 ESSENTIAL HYPERTENSION: ICD-10-CM

## 2023-03-13 RX ORDER — LOSARTAN POTASSIUM AND HYDROCHLOROTHIAZIDE 12.5; 5 MG/1; MG/1
TABLET ORAL
Qty: 45 TABLET | Refills: 3 | Status: SHIPPED | OUTPATIENT
Start: 2023-03-13

## 2023-03-28 ENCOUNTER — OFFICE VISIT (OUTPATIENT)
Dept: CARDIOLOGY | Facility: CLINIC | Age: 73
End: 2023-03-28
Payer: MEDICARE

## 2023-03-28 VITALS
HEART RATE: 74 BPM | HEIGHT: 73 IN | DIASTOLIC BLOOD PRESSURE: 65 MMHG | BODY MASS INDEX: 35.12 KG/M2 | WEIGHT: 265 LBS | OXYGEN SATURATION: 98 % | RESPIRATION RATE: 18 BRPM | SYSTOLIC BLOOD PRESSURE: 120 MMHG

## 2023-03-28 DIAGNOSIS — E66.09 CLASS 1 OBESITY DUE TO EXCESS CALORIES WITH SERIOUS COMORBIDITY AND BODY MASS INDEX (BMI) OF 34.0 TO 34.9 IN ADULT: Chronic | ICD-10-CM

## 2023-03-28 DIAGNOSIS — Z79.01 CURRENT USE OF LONG TERM ANTICOAGULATION: Chronic | ICD-10-CM

## 2023-03-28 DIAGNOSIS — I48.0 PAROXYSMAL ATRIAL FIBRILLATION: Primary | Chronic | ICD-10-CM

## 2023-03-28 DIAGNOSIS — I10 PRIMARY HYPERTENSION: Chronic | ICD-10-CM

## 2023-03-28 PROCEDURE — 1159F MED LIST DOCD IN RCRD: CPT | Performed by: NURSE PRACTITIONER

## 2023-03-28 PROCEDURE — 93000 ELECTROCARDIOGRAM COMPLETE: CPT | Performed by: NURSE PRACTITIONER

## 2023-03-28 PROCEDURE — 3074F SYST BP LT 130 MM HG: CPT | Performed by: NURSE PRACTITIONER

## 2023-03-28 PROCEDURE — 1160F RVW MEDS BY RX/DR IN RCRD: CPT | Performed by: NURSE PRACTITIONER

## 2023-03-28 PROCEDURE — 99214 OFFICE O/P EST MOD 30 MIN: CPT | Performed by: NURSE PRACTITIONER

## 2023-03-28 PROCEDURE — 3078F DIAST BP <80 MM HG: CPT | Performed by: NURSE PRACTITIONER

## 2023-03-28 NOTE — PROGRESS NOTES
Subjective:     Encounter Date:03/28/2023      Patient ID: Delgado Desir is a 72 y.o. male with paroxysmal atrial fibrillation, long-term anticoagulation, prostate cancer, lung nodules with previous wedge resection and confirmation of malignancy in the past, hypertension, previous thyroidectomy, and obesity.  He presents to the office today for routine follow-up.    Chief Complaint: Routine Atrial Fibrillation Follow Up  Atrial Fibrillation  Presents for follow-up visit. Symptoms include dizziness ( chronic) and shortness of breath. Symptoms are negative for bradycardia, chest pain, hemodynamic instability, hypertension, hypotension, palpitations, syncope, tachycardia and weakness. The symptoms have been stable. Past medical history includes atrial fibrillation. There are no medication compliance problems.   Hypertension  This is a chronic problem. The current episode started more than 1 year ago. The problem is unchanged. The problem is controlled. Associated symptoms include shortness of breath. Pertinent negatives include no chest pain, malaise/fatigue, orthopnea, palpitations, peripheral edema or PND. Risk factors for coronary artery disease include male gender, obesity and dyslipidemia. Past treatments include beta blockers. Current antihypertension treatment includes angiotensin blockers and diuretics. The current treatment provides significant improvement. There are no compliance problems.  There is no history of angina, CAD/MI or heart failure. Identifiable causes of hypertension include a thyroid problem.     Mr. Desir presents to the office today for follow up. He is followed in our office due to paroxysmal atrial fibrillation.  He has been stable without known recurrence of his atrial fibrillation. He was concerned that he was having intermittent episodes of AFIB at his last visit due to symptoms at that time, elevated heart rates.  Holter monitor did not uncover any correlation of symptoms or  recurrence of AF.      He has chronic dizziness. His heart rates and blood pressure have been stable and well controlled.  He is anticoagulated on Elquis without bleeding issues. He does report chronic stable dyspnea on exertion. He has had prior lung resection due to adenocarcinoma and is currently being monitored for other nodules which are stable.He denies chest pain. He has had recent CT Chest and PET scan. Of note, CT chest noted minimal coronary calcification.     The following portions of the patient's history were reviewed and updated as appropriate: allergies, current medications, past family history, past medical history, past social history and past surgical history.     No Known Allergies      Current Outpatient Medications:   •  acetaminophen (TYLENOL) 500 MG tablet, Take 1 tablet by mouth Every 6 (Six) Hours As Needed for Mild Pain., Disp: , Rfl:   •  alfuzosin (UROXATRAL) 10 MG 24 hr tablet, Take 1 tablet by mouth Daily., Disp: , Rfl:   •  apixaban (Eliquis) 5 MG tablet tablet, Take 1 tablet by mouth Every 12 (Twelve) Hours., Disp: 60 tablet, Rfl: 11  •  FeroSul 325 (65 Fe) MG tablet, TAKE 1 TABLET BY MOUTH 2 (TWO) TIMES A DAY., Disp: 60 tablet, Rfl: 2  •  levothyroxine (Synthroid) 150 MCG tablet, Take 1 tablet by mouth Daily for 90 days., Disp: 90 tablet, Rfl: 1  •  losartan-hydrochlorothiazide (HYZAAR) 50-12.5 MG per tablet, TAKE 1/2 TABLET BY MOUTH DAILY., Disp: 45 tablet, Rfl: 3  •  therapeutic multivitamin-minerals (THERAGRAN-M) tablet, Take 2 tablets by mouth Daily., Disp: , Rfl:     Review of Systems   Constitutional: Negative for malaise/fatigue.   Cardiovascular: Positive for dyspnea on exertion. Negative for chest pain, irregular heartbeat, leg swelling, near-syncope, orthopnea, palpitations, paroxysmal nocturnal dyspnea and syncope.   Respiratory: Positive for shortness of breath. Negative for wheezing.    Hematologic/Lymphatic: Negative for bleeding problem.   Neurological: Positive for  "dizziness ( chronic) and light-headedness. Negative for weakness.   Psychiatric/Behavioral: Negative for altered mental status.         ECG 12 Lead    Date/Time: 3/28/2023 2:58 PM  Performed by: Sabrina Jackson APRN  Authorized by: Sabrina Jackson APRN   Comparison: compared with previous ECG from 10/21/2022  Similar to previous ECG  Rhythm: sinus rhythm  Ectopy: atrial premature contractions  Rate: normal  BPM: 74  Conduction: conduction normal  ST Segments: ST segments normal  T Waves: T waves normal  QRS axis: normal    Clinical impression: abnormal EKG          /65   Pulse 74   Resp 18   Ht 185.4 cm (73\")   Wt 120 kg (265 lb)   SpO2 98%   BMI 34.96 kg/m²        Objective:     Vitals reviewed.   Constitutional:       General: Not in acute distress.     Appearance: Well-developed, well-groomed and not in distress. Obese. Chronically ill-appearing. Not diaphoretic.   Eyes:      General:         Right eye: No discharge.         Left eye: No discharge.   HENT:      Head: Normocephalic and atraumatic.    Mouth/Throat:      Pharynx: No oropharyngeal exudate.   Pulmonary:      Effort: Pulmonary effort is normal. No respiratory distress.      Breath sounds: Normal breath sounds. No wheezing. No rhonchi. No rales.   Chest:      Chest wall: Not tender to palpatation.   Cardiovascular:      Normal rate. Regular rhythm.      Murmurs: There is no murmur.      No gallop. No rub.   Edema:     Peripheral edema absent.   Abdominal:      General: There is no distension.      Palpations: Abdomen is soft.      Tenderness: There is no abdominal tenderness.   Musculoskeletal: Normal range of motion.      Cervical back: Normal range of motion and neck supple. Skin:     General: Skin is warm and dry.      Coloration: Skin is not pale.      Findings: No erythema or rash.   Neurological:      Mental Status: Alert, oriented to person, place, and time and oriented to person, place and time.   Psychiatric:         Attention and " Perception: Attention normal.         Mood and Affect: Mood normal.         Speech: Speech normal.         Behavior: Behavior normal. Behavior is cooperative.         Thought Content: Thought content normal.         Cognition and Memory: Cognition normal.         Judgment: Judgment normal.       Lab Review:   Interpretation Summary  Holter Monitor 09/28/2022     •  An abnormal monitor study.  •  The predominant rhythm is sinus rhythm with an average heart rate of 66 bpm.  •  Occasional PACs noted.  •  Rare PVCs noted.  •  Several short supraventricular runs noted, likely atrial tachycardia with no definitive evidence of atrial fibrillation.  •  Reported symptoms and patient triggered events generally correlated to sinus rhythm or sinus rhythm with isolated PACs and/or PVCs.      Results for orders placed during the hospital encounter of 06/25/20    Adult Transthoracic Echo Complete W/ Cont if Necessary Per Protocol    Interpretation Summary  · Left ventricular wall thickness is consistent with mild-to-moderate concentric hypertrophy.  · Estimated EF = 70%.  · Left ventricular systolic function is normal.  · Left ventricular diastolic dysfunction (grade I) consistent with impaired relaxation.  · Left atrial cavity size is borderline dilated.  · Right ventricular cavity is borderline dilated.        Assessment:          Diagnosis Plan   1. Paroxysmal atrial fibrillation (HCC)        2. Current use of long term anticoagulation        3. Primary hypertension        4. Class 1 obesity due to excess calories with serious comorbidity and body mass index (BMI) of 34.0 to 34.9 in adult             Plan:       -Paroxysmal atrial fibrillation: Stable. Recent Holter Monitor without findings of atrial arrhythmia recurrence.  No significant burden of ectopic beats.  No medication changes were made.  Currently has felt stable with normal heart rates on checks which she does routinely at home daily.    -Anticoagulation: continue  with use of Eliquis for CVA prophylaxis    -Hypertension: Stable. He brings in a log with him from home, rare outliers with mostly well controlled readings. He has been feeling well.The patient's blood pressure is monitored through his PCP office.  No changes recommended at this time.    -Obesity: BMI 34.96.  Diet and exercise recommended.      No changes today.  Continue current medications.   Follow-up in 6 months, call sooner if needed

## 2023-04-06 DIAGNOSIS — D50.8 IRON DEFICIENCY ANEMIA SECONDARY TO INADEQUATE DIETARY IRON INTAKE: ICD-10-CM

## 2023-04-06 RX ORDER — FERROUS SULFATE 325(65) MG
TABLET ORAL
Qty: 60 TABLET | Refills: 2 | Status: SHIPPED | OUTPATIENT
Start: 2023-04-06

## 2023-04-25 DIAGNOSIS — I48.0 PAROXYSMAL ATRIAL FIBRILLATION: ICD-10-CM

## 2023-04-25 RX ORDER — APIXABAN 5 MG/1
TABLET, FILM COATED ORAL
Qty: 180 TABLET | Refills: 3 | OUTPATIENT
Start: 2023-04-25

## 2023-05-01 DIAGNOSIS — I48.0 PAROXYSMAL ATRIAL FIBRILLATION: ICD-10-CM

## 2023-05-01 NOTE — TELEPHONE ENCOUNTER
"Caller: Delgado Desir \"Quang\"    Relationship: Self    Best call back number: [phone]    Requested Prescriptions:   Requested Prescriptions     Pending Prescriptions Disp Refills   • apixaban (Eliquis) 5 MG tablet tablet 60 tablet 11     Sig: Take 1 tablet by mouth Every 12 (Twelve) Hours.        Pharmacy where request should be sent: [facility] - [phone]  - [phone]     Last office visit with prescribing clinician: 1/24/2022   Last telemedicine visit with prescribing clinician: 9/28/2023   Next office visit with prescribing clinician: Visit date not found     Additional details provided by patient: COMPLETELY OUT OF MEDICATION    Does the patient have less than a 3 day supply:  [x] Yes  [] No      Manoj Simental Rep   05/01/23 08:08 CDT       "

## 2023-05-03 RX ORDER — LEVOTHYROXINE SODIUM 150 MCG
TABLET ORAL
Qty: 90 TABLET | Refills: 1 | Status: SHIPPED | OUTPATIENT
Start: 2023-05-03

## 2023-06-20 NOTE — PROGRESS NOTES
After obtaining consent from patient and receiving verbal/written orders from Dr. Juaquin Crocker, I gave patient 45mg (6 month) of Lupron injection in left upper quad. gluteus, patient tolerated well. Medication was not supplied by the patient. Patient is to follow up for next injection on or after 2/9/22    Patient did mention hot flashes, I asked if he would like for us to send something in for that but he declined at this time as he did not want to have to take anymore medication. He will call if he changes his mind.
stretcher

## 2023-07-20 NOTE — TELEPHONE ENCOUNTER
Tired to contact patient Ciro Desir, unable to do so. Message was left requesting a call back to discuss    STOOL CARDS NEGATIVE X 2     **PHONE HUB CAN READ   Minocycline Counseling: Patient advised regarding possible photosensitivity and discoloration of the teeth, skin, lips, tongue and gums.  Patient instructed to avoid sunlight, if possible.  When exposed to sunlight, patients should wear protective clothing, sunglasses, and sunscreen.  The patient was instructed to call the office immediately if the following severe adverse effects occur:  hearing changes, easy bruising/bleeding, severe headache, or vision changes.  The patient verbalized understanding of the proper use and possible adverse effects of minocycline.  All of the patient's questions and concerns were addressed.

## 2023-07-24 DIAGNOSIS — C61 PROSTATE CANCER (HCC): ICD-10-CM

## 2023-07-24 LAB — PSA SERPL-MCNC: <0.01 NG/ML (ref 0–4)

## 2023-07-26 ENCOUNTER — LAB (OUTPATIENT)
Dept: LAB | Facility: HOSPITAL | Age: 73
End: 2023-07-26
Payer: MEDICARE

## 2023-07-26 DIAGNOSIS — E89.0 POSTOPERATIVE HYPOTHYROIDISM: ICD-10-CM

## 2023-07-26 DIAGNOSIS — E89.0 S/P TOTAL THYROIDECTOMY: ICD-10-CM

## 2023-07-26 DIAGNOSIS — E89.0 POSTSURGICAL HYPOTHYROIDISM: ICD-10-CM

## 2023-07-26 LAB — TSH SERPL DL<=0.05 MIU/L-ACNC: 6.84 UIU/ML (ref 0.27–4.2)

## 2023-07-26 PROCEDURE — 36415 COLL VENOUS BLD VENIPUNCTURE: CPT

## 2023-07-26 PROCEDURE — 84443 ASSAY THYROID STIM HORMONE: CPT

## 2023-07-27 ENCOUNTER — TELEPHONE (OUTPATIENT)
Dept: OTOLARYNGOLOGY | Facility: CLINIC | Age: 73
End: 2023-07-27
Payer: MEDICARE

## 2023-07-27 ENCOUNTER — OFFICE VISIT (OUTPATIENT)
Dept: UROLOGY | Age: 73
End: 2023-07-27
Payer: MEDICARE

## 2023-07-27 VITALS — BODY MASS INDEX: 34.85 KG/M2 | WEIGHT: 263 LBS | HEIGHT: 73 IN | TEMPERATURE: 98.1 F

## 2023-07-27 DIAGNOSIS — R35.1 NOCTURIA: ICD-10-CM

## 2023-07-27 DIAGNOSIS — C61 PROSTATE CANCER (HCC): Primary | ICD-10-CM

## 2023-07-27 DIAGNOSIS — N52.35 ERECTILE DYSFUNCTION FOLLOWING RADIATION THERAPY: ICD-10-CM

## 2023-07-27 DIAGNOSIS — E89.0 POSTOPERATIVE HYPOTHYROIDISM: Primary | ICD-10-CM

## 2023-07-27 PROCEDURE — 99214 OFFICE O/P EST MOD 30 MIN: CPT | Performed by: UROLOGY

## 2023-07-27 PROCEDURE — 1123F ACP DISCUSS/DSCN MKR DOCD: CPT | Performed by: UROLOGY

## 2023-07-27 PROCEDURE — 96402 CHEMO HORMON ANTINEOPL SQ/IM: CPT | Performed by: UROLOGY

## 2023-07-27 PROCEDURE — 51798 US URINE CAPACITY MEASURE: CPT | Performed by: UROLOGY

## 2023-07-27 RX ORDER — LEVOTHYROXINE SODIUM 150 MCG
150 TABLET ORAL DAILY
Qty: 90 TABLET | Refills: 1 | Status: CANCELLED | OUTPATIENT
Start: 2023-07-27

## 2023-07-27 RX ORDER — SILDENAFIL CITRATE 20 MG/1
20 TABLET ORAL PRN
Qty: 5 TABLET | Refills: 0 | Status: SHIPPED | OUTPATIENT
Start: 2023-07-27

## 2023-07-27 RX ORDER — LEVOTHYROXINE SODIUM 175 MCG
175 TABLET ORAL DAILY
Qty: 30 TABLET | Refills: 0 | Status: SHIPPED | OUTPATIENT
Start: 2023-07-27 | End: 2023-08-26

## 2023-07-27 ASSESSMENT — ENCOUNTER SYMPTOMS
ABDOMINAL DISTENTION: 0
VOMITING: 0
BACK PAIN: 0
NAUSEA: 0
EYE REDNESS: 0
COUGH: 0
CONSTIPATION: 0
EYE DISCHARGE: 0
ABDOMINAL PAIN: 0
SHORTNESS OF BREATH: 0
DIARRHEA: 0
TROUBLE SWALLOWING: 0

## 2023-07-27 NOTE — TELEPHONE ENCOUNTER
"  Caller: Delgado Desir \"Quang\"    Relationship: Self    Best call back number: [phone]    What is the best time to reach you: AROUND 2:00 PM OR LATER    Who are you requesting to speak with (clinical staff, provider,  specific staff member): ANYONE    Do you know the name of the person who called: NO    What was the call regarding: UNKNOWN    Is it okay if the provider responds through [facility]: NO      ---PATIENT IS IN ANOTHER DOCTOR'S OFFICE AT THIS TIME AND MAY NOT BE ABLE TO RESPOND BEFORE 1:45 PM.    "

## 2023-08-01 ENCOUNTER — OFFICE VISIT (OUTPATIENT)
Dept: ONCOLOGY | Facility: CLINIC | Age: 73
End: 2023-08-01
Payer: MEDICARE

## 2023-08-01 ENCOUNTER — LAB (OUTPATIENT)
Dept: LAB | Facility: HOSPITAL | Age: 73
End: 2023-08-01
Payer: MEDICARE

## 2023-08-01 VITALS
HEIGHT: 73 IN | SYSTOLIC BLOOD PRESSURE: 140 MMHG | HEART RATE: 68 BPM | DIASTOLIC BLOOD PRESSURE: 58 MMHG | RESPIRATION RATE: 18 BRPM | BODY MASS INDEX: 34.09 KG/M2 | WEIGHT: 257.2 LBS | TEMPERATURE: 97.4 F | OXYGEN SATURATION: 97 %

## 2023-08-01 DIAGNOSIS — C61 PROSTATE CANCER: Primary | ICD-10-CM

## 2023-08-01 DIAGNOSIS — D50.8 IRON DEFICIENCY ANEMIA SECONDARY TO INADEQUATE DIETARY IRON INTAKE: ICD-10-CM

## 2023-08-01 DIAGNOSIS — C34.31 MALIGNANT NEOPLASM OF LOWER LOBE OF RIGHT LUNG: Primary | ICD-10-CM

## 2023-08-01 LAB
BASOPHILS # BLD AUTO: 0.01 10*3/MM3 (ref 0–0.2)
BASOPHILS NFR BLD AUTO: 0.2 % (ref 0–1.5)
DEPRECATED RDW RBC AUTO: 49.8 FL (ref 37–54)
EOSINOPHIL # BLD AUTO: 0.05 10*3/MM3 (ref 0–0.4)
EOSINOPHIL NFR BLD AUTO: 1.1 % (ref 0.3–6.2)
ERYTHROCYTE [DISTWIDTH] IN BLOOD BY AUTOMATED COUNT: 14.5 % (ref 12.3–15.4)
HCT VFR BLD AUTO: 35.9 % (ref 37.5–51)
HGB BLD-MCNC: 11.3 G/DL (ref 13–17.7)
IMM GRANULOCYTES # BLD AUTO: 0.02 10*3/MM3 (ref 0–0.05)
IMM GRANULOCYTES NFR BLD AUTO: 0.4 % (ref 0–0.5)
IRON 24H UR-MRATE: 55 MCG/DL (ref 59–158)
IRON SATN MFR SERPL: 22 % (ref 20–50)
LYMPHOCYTES # BLD AUTO: 0.36 10*3/MM3 (ref 0.7–3.1)
LYMPHOCYTES NFR BLD AUTO: 7.7 % (ref 19.6–45.3)
MCH RBC QN AUTO: 29.4 PG (ref 26.6–33)
MCHC RBC AUTO-ENTMCNC: 31.5 G/DL (ref 31.5–35.7)
MCV RBC AUTO: 93.5 FL (ref 79–97)
MONOCYTES # BLD AUTO: 0.34 10*3/MM3 (ref 0.1–0.9)
MONOCYTES NFR BLD AUTO: 7.3 % (ref 5–12)
NEUTROPHILS NFR BLD AUTO: 3.88 10*3/MM3 (ref 1.7–7)
NEUTROPHILS NFR BLD AUTO: 83.3 % (ref 42.7–76)
NRBC BLD AUTO-RTO: 0 /100 WBC (ref 0–0.2)
PLATELET # BLD AUTO: 139 10*3/MM3 (ref 140–450)
PMV BLD AUTO: 10.6 FL (ref 6–12)
RBC # BLD AUTO: 3.84 10*6/MM3 (ref 4.14–5.8)
TIBC SERPL-MCNC: 247 MCG/DL (ref 298–536)
TRANSFERRIN SERPL-MCNC: 166 MG/DL (ref 200–360)
WBC NRBC COR # BLD: 4.66 10*3/MM3 (ref 3.4–10.8)

## 2023-08-01 PROCEDURE — 83540 ASSAY OF IRON: CPT

## 2023-08-01 PROCEDURE — 85025 COMPLETE CBC W/AUTO DIFF WBC: CPT

## 2023-08-01 PROCEDURE — 82728 ASSAY OF FERRITIN: CPT

## 2023-08-01 PROCEDURE — 36415 COLL VENOUS BLD VENIPUNCTURE: CPT

## 2023-08-01 PROCEDURE — 84466 ASSAY OF TRANSFERRIN: CPT

## 2023-08-01 RX ORDER — FERROUS SULFATE 325(65) MG
325 TABLET ORAL
Qty: 60 TABLET | Refills: 2 | Status: SHIPPED | OUTPATIENT
Start: 2023-08-01

## 2023-08-03 ENCOUNTER — TELEPHONE (OUTPATIENT)
Dept: OTOLARYNGOLOGY | Facility: CLINIC | Age: 73
End: 2023-08-03
Payer: MEDICARE

## 2023-08-03 DIAGNOSIS — E89.0 POSTOPERATIVE HYPOTHYROIDISM: Primary | ICD-10-CM

## 2023-08-28 ENCOUNTER — TELEPHONE (OUTPATIENT)
Dept: UROLOGY | Age: 73
End: 2023-08-28

## 2023-08-28 NOTE — TELEPHONE ENCOUNTER
Patient called needing to know if he should continue taking myrbetriq 25 that he was given samples at of last visit. His f/u isnt until 1/24 and his chart states to take for 28 days. Please advise if he is to come pickup more samples or does he want him to do something else.

## 2023-08-29 ENCOUNTER — LAB (OUTPATIENT)
Dept: LAB | Facility: HOSPITAL | Age: 73
End: 2023-08-29
Payer: MEDICARE

## 2023-08-29 ENCOUNTER — TELEPHONE (OUTPATIENT)
Dept: OTOLARYNGOLOGY | Facility: CLINIC | Age: 73
End: 2023-08-29
Payer: MEDICARE

## 2023-08-29 DIAGNOSIS — E89.0 POSTOPERATIVE HYPOTHYROIDISM: ICD-10-CM

## 2023-08-29 LAB — TSH SERPL DL<=0.05 MIU/L-ACNC: 0.93 UIU/ML (ref 0.27–4.2)

## 2023-08-29 PROCEDURE — 84443 ASSAY THYROID STIM HORMONE: CPT

## 2023-08-29 PROCEDURE — 36415 COLL VENOUS BLD VENIPUNCTURE: CPT

## 2023-08-29 RX ORDER — LEVOTHYROXINE SODIUM 175 MCG
175 TABLET ORAL DAILY
Qty: 30 TABLET | Refills: 1 | Status: SHIPPED | OUTPATIENT
Start: 2023-08-29 | End: 2023-09-28

## 2023-09-21 ENCOUNTER — TELEPHONE (OUTPATIENT)
Dept: OTOLARYNGOLOGY | Facility: CLINIC | Age: 73
End: 2023-09-21
Payer: MEDICARE

## 2023-09-21 RX ORDER — LEVOTHYROXINE SODIUM 175 MCG
175 TABLET ORAL DAILY
Qty: 30 TABLET | Refills: 3 | Status: SHIPPED | OUTPATIENT
Start: 2023-09-21 | End: 2024-01-19

## 2023-09-21 NOTE — TELEPHONE ENCOUNTER
Patient called needing a refill on Synthroid 175 sent to [facility].  No additional lab work is needs since last TSH is normal

## 2023-09-28 ENCOUNTER — OFFICE VISIT (OUTPATIENT)
Dept: CARDIOLOGY | Facility: CLINIC | Age: 73
End: 2023-09-28
Payer: MEDICARE

## 2023-09-28 VITALS
RESPIRATION RATE: 18 BRPM | HEIGHT: 73 IN | BODY MASS INDEX: 33.93 KG/M2 | SYSTOLIC BLOOD PRESSURE: 120 MMHG | DIASTOLIC BLOOD PRESSURE: 70 MMHG | OXYGEN SATURATION: 99 % | WEIGHT: 256 LBS

## 2023-09-28 DIAGNOSIS — I48.0 PAROXYSMAL ATRIAL FIBRILLATION: Primary | Chronic | ICD-10-CM

## 2023-09-28 DIAGNOSIS — R42 DIZZINESS: ICD-10-CM

## 2023-09-28 DIAGNOSIS — Z79.01 CURRENT USE OF LONG TERM ANTICOAGULATION: Chronic | ICD-10-CM

## 2023-09-28 DIAGNOSIS — E66.09 CLASS 1 OBESITY DUE TO EXCESS CALORIES WITH SERIOUS COMORBIDITY AND BODY MASS INDEX (BMI) OF 33.0 TO 33.9 IN ADULT: ICD-10-CM

## 2023-09-28 DIAGNOSIS — I10 PRIMARY HYPERTENSION: Chronic | ICD-10-CM

## 2023-09-28 NOTE — PROGRESS NOTES
"    Subjective:     Encounter Date:09/28/2023      Patient ID: Delgado Desir is a 73 y.o. male with paroxysmal atrial fibrillation, long-term anticoagulation, prostate cancer, lung nodules with previous wedge resection and confirmation of malignancy in the past, hypertension, previous thyroidectomy, and obesity.  He presents to the office today for routine follow-up.    Chief Complaint: Routine Atrial Fibrillation Follow Up  Atrial Fibrillation  Presents for follow-up visit. Symptoms include dizziness, shortness of breath and syncope. Symptoms are negative for bradycardia, chest pain, hemodynamic instability, hypotension, palpitations and tachycardia. The symptoms have been stable. Past medical history includes atrial fibrillation. There are no medication compliance problems.   Hypertension  This is a chronic problem. The current episode started more than 1 year ago. The problem is unchanged. The problem is controlled. Associated symptoms include headaches, malaise/fatigue and shortness of breath. Pertinent negatives include no chest pain, palpitations or peripheral edema. Risk factors for coronary artery disease include male gender, obesity and dyslipidemia. Past treatments include beta blockers. Current antihypertension treatment includes angiotensin blockers and diuretics. The current treatment provides significant improvement. There are no compliance problems.  There is no history of CAD/MI. Identifiable causes of hypertension include a thyroid problem.     Mr. Desir presents to the office today for follow up. He is followed in our office due to paroxysmal atrial fibrillation. He is having episodes of \"blacking out\".  He describes feeling that there are shades being pulled down in his eyes, \"everything doesn't go black but its like shades are pulled with the sun shining through\". He had an episode where he reached for the covers of his bed to make it and fell to the floor - he denies losing consciousness. " He continues to feel dizziness, which he has reported for some time. He was recently was outside burning a brush pile and had a reported syncopal episode.  He feels that his dizziness and near syncopal episodes are happening more often and is uncertain as to why.  He does report chronic stable dyspnea on exertion. He has had prior lung resection due to adenocarcinoma and is currently being monitored for other nodules which are stable.He denies chest pain. He tells me that his BP and HR have been stable at home. He checks his O2 and states that it has been normal. He describes dizziness with position changes. Some times moving his head side to side or turning too quickly, other times when bending over and rising back up.   He tells me that he his BP and HR have all been stable when checked at home without significant elevated readings of low readings.       The following portions of the patient's history were reviewed and updated as appropriate: allergies, current medications, past family history, past medical history, past social history and past surgical history.     No Known Allergies      Current Outpatient Medications:     acetaminophen (TYLENOL) 500 MG tablet, Take 1 tablet by mouth Every 6 (Six) Hours As Needed for Mild Pain., Disp: , Rfl:     alfuzosin (UROXATRAL) 10 MG 24 hr tablet, Take 1 tablet by mouth Daily., Disp: , Rfl:     apixaban (Eliquis) 5 MG tablet tablet, Take 1 tablet by mouth Every 12 (Twelve) Hours., Disp: 60 tablet, Rfl: 11    ferrous sulfate 325 (65 FE) MG tablet, Take 1 tablet by mouth Daily With Breakfast., Disp: 60 tablet, Rfl: 2    losartan-hydrochlorothiazide (HYZAAR) 50-12.5 MG per tablet, TAKE 1/2 TABLET BY MOUTH DAILY., Disp: 45 tablet, Rfl: 3    Mirabegron ER (Myrbetriq) 25 MG tablet sustained-release 24 hour 24 hr tablet, Take 1 tablet by mouth Daily., Disp: , Rfl:     Synthroid 175 MCG tablet, Take 1 tablet by mouth Daily for 120 days., Disp: 30 tablet, Rfl: 3    therapeutic  "multivitamin-minerals (THERAGRAN-M) tablet, Take 2 tablets by mouth Daily., Disp: , Rfl:     Review of Systems   Constitutional: Positive for malaise/fatigue.   Cardiovascular:  Positive for dyspnea on exertion, near-syncope and syncope. Negative for chest pain and palpitations.   Respiratory:  Positive for shortness of breath. Negative for wheezing.    Hematologic/Lymphatic: Negative for bleeding problem. Bruises/bleeds easily.   Neurological:  Positive for dizziness, headaches and light-headedness.   Psychiatric/Behavioral:  Negative for altered mental status.      Procedures    Resp 18   Ht 185.4 cm (73\")   Wt 116 kg (256 lb)   SpO2 99%   BMI 33.78 kg/m²        Objective:     Vitals reviewed.   Constitutional:       General: Not in acute distress.     Appearance: Well-developed, well-groomed and not in distress. Obese. Chronically ill-appearing. Not diaphoretic.   Eyes:      General:         Right eye: No discharge.         Left eye: No discharge.   HENT:      Head: Normocephalic and atraumatic.    Mouth/Throat:      Pharynx: No oropharyngeal exudate.   Pulmonary:      Effort: Pulmonary effort is normal. No respiratory distress.      Breath sounds: Normal breath sounds.   Chest:      Chest wall: Not tender to palpatation.   Cardiovascular:      Regular rhythm.      Murmurs: There is no murmur.      No gallop.  No rub.   Edema:     Peripheral edema absent.   Abdominal:      General: There is no distension.      Palpations: Abdomen is soft.      Tenderness: There is no abdominal tenderness.   Musculoskeletal: Normal range of motion.      Cervical back: Normal range of motion and neck supple. Skin:     General: Skin is warm and dry.      Coloration: Skin is not pale.      Findings: No erythema or rash.   Neurological:      Mental Status: Alert, oriented to person, place, and time and oriented to person, place and time.   Psychiatric:         Attention and Perception: Attention normal.         Mood and Affect: " Mood normal.         Speech: Speech normal.         Behavior: Behavior normal. Behavior is cooperative.         Thought Content: Thought content normal.         Cognition and Memory: Cognition normal.         Judgment: Judgment normal.     Lab Review:   Interpretation Summary  Holter Monitor 09/28/2022       An abnormal monitor study.    The predominant rhythm is sinus rhythm with an average heart rate of 66 bpm.    Occasional PACs noted.    Rare PVCs noted.    Several short supraventricular runs noted, likely atrial tachycardia with no definitive evidence of atrial fibrillation.    Reported symptoms and patient triggered events generally correlated to sinus rhythm or sinus rhythm with isolated PACs and/or PVCs.      Results for orders placed during the hospital encounter of 06/25/20    Adult Transthoracic Echo Complete W/ Cont if Necessary Per Protocol    Interpretation Summary  · Left ventricular wall thickness is consistent with mild-to-moderate concentric hypertrophy.  · Estimated EF = 70%.  · Left ventricular systolic function is normal.  · Left ventricular diastolic dysfunction (grade I) consistent with impaired relaxation.  · Left atrial cavity size is borderline dilated.  · Right ventricular cavity is borderline dilated.        Assessment:          Diagnosis Plan   1. Paroxysmal atrial fibrillation        2. Current use of long term anticoagulation        3. Primary hypertension        4. Class 1 obesity due to excess calories with serious comorbidity and body mass index (BMI) of 33.0 to 33.9 in adult        5. Dizziness             Plan:       -Paroxysmal atrial fibrillation: Stable. Monitor heart rates and continue anticoagulation for stroke risk reduction.  We did discuss that his symptoms do not seem to be consistent with recurrent atrial fibrillation.  Previously was placed in a Holter monitor due to reported elevated heart rates and concern for recurrence of atrial fibrillation.  No atrial arrhythmias  were noted at that time.   Despite normal heart rates and atypical symptoms we could repeat rhythm monitoring surveillance if the patient desired.  He would like to hold off at this time.    -Anticoagulation: continue with use of Eliquis for CVA prophylaxis.    -Hypertension: Stable.  He monitors his blood pressure at home.  He has monitored it in the recent past with some of his symptoms and reports normal readings at home.  Given his complaints of symptoms that seem to correlate at times with position changes orthostatic blood pressures were checked during his office visit.  There were negative for significant changes and so no further adjustments of blood pressure medications would be indicated at this time or felt to improve his symptoms.    -Obesity: BMI 33.78.  Diet and exercise recommended.    -Dizziness: I have encouraged the patient based on orthostatic vital signs today and the chronicity of his dizziness to further discuss with his primary care provider who may recommend further evaluation by ENT, trial medication for vertigo, or vestibular therapy.  As for now, I am uncertain that his dizziness is related to a cardiac cause.      Follow-up in 6 months, call sooner if needed    I attest that all portions of this note have been reviewed and updated to reflect the patient's current status.

## 2023-10-23 DIAGNOSIS — E89.0 POSTOPERATIVE HYPOTHYROIDISM: Primary | ICD-10-CM

## 2023-10-25 ENCOUNTER — LAB (OUTPATIENT)
Dept: LAB | Facility: HOSPITAL | Age: 73
End: 2023-10-25
Payer: MEDICARE

## 2023-10-25 DIAGNOSIS — E89.0 POSTOPERATIVE HYPOTHYROIDISM: ICD-10-CM

## 2023-10-25 LAB — TSH SERPL DL<=0.05 MIU/L-ACNC: 0.16 UIU/ML (ref 0.27–4.2)

## 2023-10-25 PROCEDURE — 84443 ASSAY THYROID STIM HORMONE: CPT

## 2023-10-25 PROCEDURE — 36415 COLL VENOUS BLD VENIPUNCTURE: CPT

## 2023-10-26 ENCOUNTER — TELEPHONE (OUTPATIENT)
Dept: OTOLARYNGOLOGY | Facility: CLINIC | Age: 73
End: 2023-10-26
Payer: MEDICARE

## 2023-10-26 DIAGNOSIS — E89.0 POSTOPERATIVE HYPOTHYROIDISM: Primary | ICD-10-CM

## 2023-10-26 RX ORDER — LEVOTHYROXINE SODIUM 150 MCG
150 TABLET ORAL DAILY
Qty: 30 TABLET | Refills: 0 | Status: SHIPPED | OUTPATIENT
Start: 2023-10-26 | End: 2023-11-25

## 2023-10-26 NOTE — TELEPHONE ENCOUNTER
----- Message from ANDRES Roberts sent at 10/26/2023  9:09 AM CDT -----  Please see if he is taking 175 of synthroid   May need to drop back to 150

## 2023-10-30 DIAGNOSIS — E89.0 S/P TOTAL THYROIDECTOMY: Primary | ICD-10-CM

## 2023-10-30 DIAGNOSIS — E89.0 POSTOPERATIVE HYPOTHYROIDISM: ICD-10-CM

## 2023-10-31 ENCOUNTER — HOSPITAL ENCOUNTER (OUTPATIENT)
Dept: ULTRASOUND IMAGING | Facility: HOSPITAL | Age: 73
Discharge: HOME | End: 2023-10-31
Admitting: NURSE PRACTITIONER
Payer: MEDICARE

## 2023-10-31 PROCEDURE — 76536 US EXAM OF HEAD AND NECK: CPT
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
## 2023-11-01 ENCOUNTER — OFFICE VISIT (OUTPATIENT)
Dept: OTOLARYNGOLOGY | Facility: CLINIC | Age: 73
End: 2023-11-01
Payer: MEDICARE

## 2023-11-01 ENCOUNTER — PROCEDURE VISIT (OUTPATIENT)
Dept: OTOLARYNGOLOGY | Facility: CLINIC | Age: 73
End: 2023-11-01
Payer: MEDICARE

## 2023-11-01 ENCOUNTER — TELEPHONE (OUTPATIENT)
Dept: OTOLARYNGOLOGY | Facility: CLINIC | Age: 73
End: 2023-11-01
Payer: MEDICARE

## 2023-11-01 VITALS
BODY MASS INDEX: 34.59 KG/M2 | HEART RATE: 70 BPM | WEIGHT: 261 LBS | SYSTOLIC BLOOD PRESSURE: 174 MMHG | DIASTOLIC BLOOD PRESSURE: 73 MMHG | HEIGHT: 73 IN | TEMPERATURE: 97.8 F

## 2023-11-01 DIAGNOSIS — H90.3 SENSORINEURAL HEARING LOSS, BILATERAL: Primary | ICD-10-CM

## 2023-11-01 DIAGNOSIS — H93.12 TINNITUS, LEFT: ICD-10-CM

## 2023-11-01 DIAGNOSIS — E89.0 S/P TOTAL THYROIDECTOMY: ICD-10-CM

## 2023-11-01 DIAGNOSIS — R26.9 GAIT DISTURBANCE: Primary | ICD-10-CM

## 2023-11-01 NOTE — PROGRESS NOTES
"YOB: 1950  Location: [facility]  Location Address: [address] [place] [address], [facility] [address], [address] [place] [address]  Location Phone: [phone]    Chief Complaint   Patient presents with    Dizziness       History of Present Illness  Delgado Desir is a 73 y.o. male.  Delgado Desir is here for follow up of ENT complaints. The patient has had problems with feeling off balance/dizzy   He denies room spinning   He describes the feeling as not feeling \"balanced\" this has been going on for several years   Patient has never had gait therapy   Symptoms are worse with position changes   Patient is s/p total thyroidectomy 2020 pathology revealed multinodular goiter     He is taking synthroid 150 mcg this was changed 3 days ago       TSH (10/25/2023 10:55)   Procedure visit with Michelle Aldana AUD (2023)      Past Medical History:   Diagnosis Date    Arthritis     Asthma 23    Dizzy spells     states on rare occsaion has    Hypertension     Lung cancer     Nosebleed 23    Taking  eliquis    PAF (paroxysmal atrial fibrillation)     Prostate cancer     Thyroid disorder        Past Surgical History:   Procedure Laterality Date    CARPAL TUNNEL RELEASE Right     LOBECTOMY Right 2/15/2021    Procedure: THORACOSCOPY WITH DAVINCI ROBOT WITH RIGHT WEDGE RESECTION, MEDIASTINAL LYMPH NODE DISSECTION-RIGHT;  Surgeon: Nick Flores MD;  Location: [facility] OR;  Service: [facility];  Laterality: Right;    LUNG BIOPSY      PROSTATE BIOPSY      REPLACEMENT TOTAL KNEE Left     SHOULDER SURGERY Bilateral     THORACOSCOPY Right 10/30/2020    Procedure: MEDIASTINOSCOPY, RIGHT THORACOSCOPY POSSIBLE WEDGE RESECTION WITH DAVINCI ROBOT;  Surgeon: Nick Flores MD;  Location: [facility] OR;  Service: [facility];  Laterality: Right;    THYROIDECTOMY N/A 2020    Procedure: Total thyroidectomy;  Surgeon: Jean Marie Solis MD;  Location:  [facility] OR;  Service: ENT;  Laterality: N/A;       Outpatient Medications " Marked as Taking for the 23 encounter (Office Visit) with Maryann Serra APRN   Medication Sig Dispense Refill    acetaminophen (TYLENOL) 500 MG tablet Take 1 tablet by mouth Every 6 (Six) Hours As Needed for Mild Pain.      alfuzosin (UROXATRAL) 10 MG 24 hr tablet Take 1 tablet by mouth Daily.      apixaban (Eliquis) 5 MG tablet tablet Take 1 tablet by mouth Every 12 (Twelve) Hours. 60 tablet 11    ferrous sulfate 325 (65 FE) MG tablet Take 1 tablet by mouth Daily With Breakfast. 60 tablet 2    losartan-hydrochlorothiazide (HYZAAR) 50-12.5 MG per tablet TAKE 1/2 TABLET BY MOUTH DAILY. 45 tablet 3    Mirabegron ER (Myrbetriq) 25 MG tablet sustained-release 24 hour 24 hr tablet Take 1 tablet by mouth Daily.      Synthroid 150 MCG tablet Take 1 tablet by mouth Daily for 30 days. 30 tablet 0    therapeutic multivitamin-minerals (THERAGRAN-M) tablet Take 2 tablets by mouth Daily.         Patient has no known allergies.    Family History   Problem Relation Age of Onset    Hypertension Mother         New Contact    Cancer Mother     COPD Father        Social History     Socioeconomic History    Marital status:     Number of children: 2   Tobacco Use    Smoking status: Former     Packs/day: 1.00     Years: 33.00     Additional pack years: 0.00     Total pack years: 33.00     Types: Cigarettes     Start date: 1969     Quit date: 1996     Years since quittin.8    Smokeless tobacco: Never   Vaping Use    Vaping Use: Never used   Substance and Sexual Activity    Alcohol use: Yes     Alcohol/week: 2.0 standard drinks of alcohol     Types: 2 Shots of liquor per week     Comment: x2 weekly    Drug use: Never    Sexual activity: Defer       Review of Systems   Constitutional:  Positive for fatigue.   HENT: Negative.     Musculoskeletal:  Positive for gait problem.       Vitals:    23 1337   BP: 174/73   Pulse: 70   Temp: 97.8 °F (36.6 °C)       Body mass index is 34.43 kg/m².    Objective      Physical Exam  Vitals reviewed.   Constitutional:       Appearance: He is obese.   HENT:      Head: Normocephalic.      Right Ear: Tympanic membrane, ear canal and external ear normal.      Left Ear: Tympanic membrane, ear canal and external ear normal.      Nose: Nose normal.      Mouth/Throat:      Lips: Pink.      Mouth: Mucous membranes are moist.      Comments: Dental caries     Neck:     Neurological:      Mental Status: He is alert.         Assessment & Plan   Diagnoses and all orders for this visit:    1. Gait disturbance (Primary)  -     Cancel: Ambulatory Referral to Physical Therapy Vestibular  -     Ambulatory Referral to Physical Therapy Vestibular    2. S/P total thyroidectomy      * Surgery not found *  Orders Placed This Encounter   Procedures    Ambulatory Referral to Physical Therapy Vestibular     Referral Priority:   Routine     Referral Type:   Physical Therapy     Referral Reason:   Specialty Services Required     Requested Specialty:   Physical Therapy     Number of Visits Requested:   1     No follow-ups on file.     Referral to physical therapy for gait disturbance  Continue synthroid 150 mcg   Recheck tsh in 4 weeks     There are no Patient Instructions on file for this visit.

## 2023-11-01 NOTE — TELEPHONE ENCOUNTER
Physical Therapy at [facility] on November 14th at 9:30 am    [facility]  [address]  [facility] [address]    [phone]

## 2023-11-10 ENCOUNTER — TELEPHONE (OUTPATIENT)
Dept: OTOLARYNGOLOGY | Facility: CLINIC | Age: 73
End: 2023-11-10

## 2023-11-10 NOTE — TELEPHONE ENCOUNTER
"    Caller: Delgado Desir \"Quang\"     Relationship: [unfilled] SELF    Best call back number: [phone]    What is your medical concern? PT NEEDS THE NAME AND PHONE OF THE DR THAT FER SUGGESTED HE SEE NEAR [facility]. PLEASE CALL PT. THANK YOU        "

## 2023-11-23 ENCOUNTER — APPOINTMENT (OUTPATIENT)
Dept: CT IMAGING | Facility: HOSPITAL | Age: 73
End: 2023-11-23
Payer: MEDICARE

## 2023-11-23 ENCOUNTER — APPOINTMENT (OUTPATIENT)
Dept: GENERAL RADIOLOGY | Facility: HOSPITAL | Age: 73
End: 2023-11-23
Payer: MEDICARE

## 2023-11-23 ENCOUNTER — HOSPITAL ENCOUNTER (OUTPATIENT)
Facility: HOSPITAL | Age: 73
Setting detail: OBSERVATION
LOS: 1 days | Discharge: HOME | End: 2023-11-24
Attending: STUDENT IN AN ORGANIZED HEALTH CARE EDUCATION/TRAINING PROGRAM | Admitting: FAMILY MEDICINE
Payer: MEDICARE

## 2023-11-23 DIAGNOSIS — I48.91 ATRIAL FIBRILLATION WITH RVR: Primary | ICD-10-CM

## 2023-11-23 DIAGNOSIS — R79.89 ELEVATED TROPONIN: ICD-10-CM

## 2023-11-23 DIAGNOSIS — R07.9 CHEST PAIN, UNSPECIFIED TYPE: ICD-10-CM

## 2023-11-23 LAB
ANION GAP SERPL CALCULATED.3IONS-SCNC: 13 MMOL/L (ref 5–15)
BASOPHILS # BLD AUTO: 0.03 10*3/MM3 (ref 0–0.2)
BASOPHILS NFR BLD AUTO: 0.2 % (ref 0–1.5)
BUN SERPL-MCNC: 23 MG/DL (ref 8–23)
BUN/CREAT SERPL: 22.5 (ref 7–25)
CALCIUM SPEC-SCNC: 9.6 MG/DL (ref 8.6–10.5)
CHLORIDE SERPL-SCNC: 105 MMOL/L (ref 98–107)
CO2 SERPL-SCNC: 23 MMOL/L (ref 22–29)
CREAT SERPL-MCNC: 1.02 MG/DL (ref 0.76–1.27)
D DIMER PPP FEU-MCNC: 0.34 MCGFEU/ML (ref 0–0.72)
DEPRECATED RDW RBC AUTO: 45.3 FL (ref 37–54)
EGFRCR SERPLBLD CKD-EPI 2021: 78.1 ML/MIN/1.73
EOSINOPHIL # BLD AUTO: 0 10*3/MM3 (ref 0–0.4)
EOSINOPHIL NFR BLD AUTO: 0 % (ref 0.3–6.2)
ERYTHROCYTE [DISTWIDTH] IN BLOOD BY AUTOMATED COUNT: 13.4 % (ref 12.3–15.4)
GLUCOSE SERPL-MCNC: 140 MG/DL (ref 65–99)
HCT VFR BLD AUTO: 41.6 % (ref 37.5–51)
HGB BLD-MCNC: 13.5 G/DL (ref 13–17.7)
HOLD SPECIMEN: NORMAL
IMM GRANULOCYTES # BLD AUTO: 0.07 10*3/MM3 (ref 0–0.05)
IMM GRANULOCYTES NFR BLD AUTO: 0.5 % (ref 0–0.5)
LYMPHOCYTES # BLD AUTO: 0.44 10*3/MM3 (ref 0.7–3.1)
LYMPHOCYTES NFR BLD AUTO: 3 % (ref 19.6–45.3)
MCH RBC QN AUTO: 29.7 PG (ref 26.6–33)
MCHC RBC AUTO-ENTMCNC: 32.5 G/DL (ref 31.5–35.7)
MCV RBC AUTO: 91.6 FL (ref 79–97)
MONOCYTES # BLD AUTO: 0.52 10*3/MM3 (ref 0.1–0.9)
MONOCYTES NFR BLD AUTO: 3.5 % (ref 5–12)
NEUTROPHILS NFR BLD AUTO: 13.71 10*3/MM3 (ref 1.7–7)
NEUTROPHILS NFR BLD AUTO: 92.8 % (ref 42.7–76)
NRBC BLD AUTO-RTO: 0 /100 WBC (ref 0–0.2)
PLATELET # BLD AUTO: 178 10*3/MM3 (ref 140–450)
PMV BLD AUTO: 11.3 FL (ref 6–12)
POTASSIUM SERPL-SCNC: 4 MMOL/L (ref 3.5–5.2)
RBC # BLD AUTO: 4.54 10*6/MM3 (ref 4.14–5.8)
SODIUM SERPL-SCNC: 141 MMOL/L (ref 136–145)
TROPONIN T SERPL HS-MCNC: 43 NG/L
TROPONIN T SERPL HS-MCNC: 47 NG/L
WBC NRBC COR # BLD AUTO: 14.77 10*3/MM3 (ref 3.4–10.8)
WHOLE BLOOD HOLD COAG: NORMAL
WHOLE BLOOD HOLD SPECIMEN: NORMAL

## 2023-11-23 PROCEDURE — 80048 BASIC METABOLIC PNL TOTAL CA: CPT | Performed by: STUDENT IN AN ORGANIZED HEALTH CARE EDUCATION/TRAINING PROGRAM

## 2023-11-23 PROCEDURE — 25810000003 LACTATED RINGERS SOLUTION: Performed by: STUDENT IN AN ORGANIZED HEALTH CARE EDUCATION/TRAINING PROGRAM

## 2023-11-23 PROCEDURE — 93005 ELECTROCARDIOGRAM TRACING: CPT | Performed by: STUDENT IN AN ORGANIZED HEALTH CARE EDUCATION/TRAINING PROGRAM

## 2023-11-23 PROCEDURE — 25510000001 IOPAMIDOL PER 1 ML: Performed by: STUDENT IN AN ORGANIZED HEALTH CARE EDUCATION/TRAINING PROGRAM

## 2023-11-23 PROCEDURE — 71045 X-RAY EXAM CHEST 1 VIEW: CPT

## 2023-11-23 PROCEDURE — 96366 THER/PROPH/DIAG IV INF ADDON: CPT

## 2023-11-23 PROCEDURE — 71275 CT ANGIOGRAPHY CHEST: CPT

## 2023-11-23 PROCEDURE — 96375 TX/PRO/DX INJ NEW DRUG ADDON: CPT

## 2023-11-23 PROCEDURE — 85025 COMPLETE CBC W/AUTO DIFF WBC: CPT | Performed by: STUDENT IN AN ORGANIZED HEALTH CARE EDUCATION/TRAINING PROGRAM

## 2023-11-23 PROCEDURE — 99285 EMERGENCY DEPT VISIT HI MDM: CPT

## 2023-11-23 PROCEDURE — 96376 TX/PRO/DX INJ SAME DRUG ADON: CPT

## 2023-11-23 PROCEDURE — 25010000002 ONDANSETRON PER 1 MG: Performed by: STUDENT IN AN ORGANIZED HEALTH CARE EDUCATION/TRAINING PROGRAM

## 2023-11-23 PROCEDURE — 96365 THER/PROPH/DIAG IV INF INIT: CPT

## 2023-11-23 PROCEDURE — 36415 COLL VENOUS BLD VENIPUNCTURE: CPT

## 2023-11-23 PROCEDURE — 85379 FIBRIN DEGRADATION QUANT: CPT | Performed by: STUDENT IN AN ORGANIZED HEALTH CARE EDUCATION/TRAINING PROGRAM

## 2023-11-23 PROCEDURE — G0378 HOSPITAL OBSERVATION PER HR: HCPCS

## 2023-11-23 PROCEDURE — 84484 ASSAY OF TROPONIN QUANT: CPT | Performed by: STUDENT IN AN ORGANIZED HEALTH CARE EDUCATION/TRAINING PROGRAM

## 2023-11-23 RX ORDER — SODIUM CHLORIDE 9 MG/ML
40 INJECTION, SOLUTION INTRAVENOUS AS NEEDED
Status: DISCONTINUED | OUTPATIENT
Start: 2023-11-23 | End: 2023-11-24 | Stop reason: HOSPADM

## 2023-11-23 RX ORDER — AMOXICILLIN 250 MG
2 CAPSULE ORAL 2 TIMES DAILY
Status: DISCONTINUED | OUTPATIENT
Start: 2023-11-23 | End: 2023-11-24 | Stop reason: HOSPADM

## 2023-11-23 RX ORDER — BISACODYL 10 MG
10 SUPPOSITORY, RECTAL RECTAL DAILY PRN
Status: DISCONTINUED | OUTPATIENT
Start: 2023-11-23 | End: 2023-11-24 | Stop reason: HOSPADM

## 2023-11-23 RX ORDER — POLYETHYLENE GLYCOL 3350 17 G/17G
17 POWDER, FOR SOLUTION ORAL DAILY PRN
Status: DISCONTINUED | OUTPATIENT
Start: 2023-11-23 | End: 2023-11-24 | Stop reason: HOSPADM

## 2023-11-23 RX ORDER — SODIUM CHLORIDE 0.9 % (FLUSH) 0.9 %
10 SYRINGE (ML) INJECTION EVERY 12 HOURS SCHEDULED
Status: DISCONTINUED | OUTPATIENT
Start: 2023-11-23 | End: 2023-11-24 | Stop reason: HOSPADM

## 2023-11-23 RX ORDER — ACETAMINOPHEN 500 MG
500 TABLET ORAL EVERY 6 HOURS PRN
Status: DISCONTINUED | OUTPATIENT
Start: 2023-11-23 | End: 2023-11-24 | Stop reason: HOSPADM

## 2023-11-23 RX ORDER — ONDANSETRON 2 MG/ML
4 INJECTION INTRAMUSCULAR; INTRAVENOUS ONCE
Status: COMPLETED | OUTPATIENT
Start: 2023-11-23 | End: 2023-11-23

## 2023-11-23 RX ORDER — ONDANSETRON 2 MG/ML
4 INJECTION INTRAMUSCULAR; INTRAVENOUS EVERY 6 HOURS PRN
Status: DISCONTINUED | OUTPATIENT
Start: 2023-11-23 | End: 2023-11-24 | Stop reason: HOSPADM

## 2023-11-23 RX ORDER — DILTIAZEM HCL/D5W 125 MG/125
5-15 PLASTIC BAG, INJECTION (ML) INTRAVENOUS
Status: DISCONTINUED | OUTPATIENT
Start: 2023-11-23 | End: 2023-11-24

## 2023-11-23 RX ORDER — BISACODYL 5 MG/1
5 TABLET, DELAYED RELEASE ORAL DAILY PRN
Status: DISCONTINUED | OUTPATIENT
Start: 2023-11-23 | End: 2023-11-24 | Stop reason: HOSPADM

## 2023-11-23 RX ORDER — SODIUM CHLORIDE 0.9 % (FLUSH) 0.9 %
10 SYRINGE (ML) INJECTION AS NEEDED
Status: DISCONTINUED | OUTPATIENT
Start: 2023-11-23 | End: 2023-11-24

## 2023-11-23 RX ORDER — ALUMINA, MAGNESIA, AND SIMETHICONE 2400; 2400; 240 MG/30ML; MG/30ML; MG/30ML
15 SUSPENSION ORAL ONCE
Status: COMPLETED | OUTPATIENT
Start: 2023-11-23 | End: 2023-11-23

## 2023-11-23 RX ORDER — DILTIAZEM HYDROCHLORIDE 5 MG/ML
10 INJECTION INTRAVENOUS ONCE
Status: COMPLETED | OUTPATIENT
Start: 2023-11-23 | End: 2023-11-23

## 2023-11-23 RX ORDER — LEVOTHYROXINE SODIUM 0.15 MG/1
150 TABLET ORAL
Status: DISCONTINUED | OUTPATIENT
Start: 2023-11-24 | End: 2023-11-24 | Stop reason: HOSPADM

## 2023-11-23 RX ORDER — SODIUM CHLORIDE 0.9 % (FLUSH) 0.9 %
10 SYRINGE (ML) INJECTION AS NEEDED
Status: DISCONTINUED | OUTPATIENT
Start: 2023-11-23 | End: 2023-11-24 | Stop reason: HOSPADM

## 2023-11-23 RX ORDER — TAMSULOSIN HYDROCHLORIDE 0.4 MG/1
0.4 CAPSULE ORAL NIGHTLY
Status: DISCONTINUED | OUTPATIENT
Start: 2023-11-23 | End: 2023-11-24 | Stop reason: HOSPADM

## 2023-11-23 RX ORDER — DILTIAZEM HYDROCHLORIDE 5 MG/ML
20 INJECTION INTRAVENOUS ONCE
Status: COMPLETED | OUTPATIENT
Start: 2023-11-23 | End: 2023-11-23

## 2023-11-23 RX ADMIN — APIXABAN 5 MG: 5 TABLET, FILM COATED ORAL at 20:16

## 2023-11-23 RX ADMIN — DILTIAZEM HYDROCHLORIDE 10 MG: 5 INJECTION INTRAVENOUS at 12:32

## 2023-11-23 RX ADMIN — DILTIAZEM HYDROCHLORIDE 20 MG: 5 INJECTION INTRAVENOUS at 13:10

## 2023-11-23 RX ADMIN — IOPAMIDOL 100 ML: 755 INJECTION, SOLUTION INTRAVENOUS at 13:46

## 2023-11-23 RX ADMIN — ALUMINUM HYDROXIDE, MAGNESIUM HYDROXIDE, AND DIMETHICONE 15 ML: 400; 400; 40 SUSPENSION ORAL at 16:07

## 2023-11-23 RX ADMIN — TAMSULOSIN HYDROCHLORIDE 0.4 MG: 0.4 CAPSULE ORAL at 20:16

## 2023-11-23 RX ADMIN — ONDANSETRON 4 MG: 2 INJECTION INTRAMUSCULAR; INTRAVENOUS at 15:53

## 2023-11-23 RX ADMIN — Medication 10 ML: at 20:17

## 2023-11-23 RX ADMIN — Medication 5 MG/HR: at 16:00

## 2023-11-23 RX ADMIN — SODIUM CHLORIDE, POTASSIUM CHLORIDE, SODIUM LACTATE AND CALCIUM CHLORIDE 500 ML: 600; 310; 30; 20 INJECTION, SOLUTION INTRAVENOUS at 13:26

## 2023-11-23 RX ADMIN — METOPROLOL TARTRATE 12.5 MG: 25 TABLET, FILM COATED ORAL at 23:39

## 2023-11-23 NOTE — H&P
[facility]  HISTORY AND PHYSICAL    Date of Admission: 11/23/2023  Primary Care Physician: Michael Nunez MD    Subjective   Primary Historian: Patient .    Chief Complaint: Atrial fibrillation RVR.    History of Present Illness  Patient is a 73-year-old patient presented ER complaining with some chest discomfort and shortness of breath.  Patient does have a chronic history atrial fibs.  Upon arrival patient was in atrial for with RVR.  Patient received IV Cardizem x2 and Cardizem drip was started.  Patient is on chronic Eliquis already at home.  Patient follow-up Dr. Hoover outpatient.  Patient take medication instructed by Dr. Hoover per patient    Troponins 43, second set this pending.  White blood cells 14,000.    Patient has past medical history arthritis, asthma, dizzy spell, failed intubation of the airway, hypertension, lung cancer, nosebleed from Eliquis, atrial fibs, prostate cancer, thyroid disorder.    Review of Systems   Constitutional:  Positive for activity change, appetite change and fatigue. Negative for chills and fever.   HENT:  Negative for hearing loss, nosebleeds, tinnitus and trouble swallowing.    Eyes:  Negative for visual disturbance.   Respiratory:  Positive for shortness of breath. Negative for cough, chest tightness and wheezing.    Cardiovascular:  Negative for chest pain, palpitations and leg swelling.   Gastrointestinal:  Negative for abdominal distention, abdominal pain, blood in stool, constipation, diarrhea, nausea and vomiting.   Endocrine: Negative for cold intolerance, heat intolerance, polydipsia, polyphagia and polyuria.   Genitourinary:  Negative for decreased urine volume, difficulty urinating, dysuria, flank pain, frequency and hematuria.   Musculoskeletal:  Negative for arthralgias, joint swelling and myalgias.   Skin:  Negative for rash.   Allergic/Immunologic: Negative for immunocompromised state.    Neurological:  Positive for weakness. Negative for dizziness, syncope, light-headedness and headaches.   Hematological:  Negative for adenopathy. Does not bruise/bleed easily.   Psychiatric/Behavioral:  Negative for confusion and sleep disturbance. The patient is not nervous/anxious.         Otherwise complete ROS reviewed and negative except as mentioned in the HPI.    Past Medical History:   Past Medical History:   Diagnosis Date    Arthritis     Asthma 12/1/22    Dizzy spells     states on rare occsaion has    Failed intubation of airway 11/10/2020    Hypertension     Lung cancer     Nosebleed 12/1/22    Taking  eliquis    PAF (paroxysmal atrial fibrillation)     Prostate cancer 2020    Thyroid disorder      Past Surgical History:  Past Surgical History:   Procedure Laterality Date    CARPAL TUNNEL RELEASE Right     LOBECTOMY Right 2/15/2021    Procedure: THORACOSCOPY WITH DAVINCI ROBOT WITH RIGHT WEDGE RESECTION, MEDIASTINAL LYMPH NODE DISSECTION-RIGHT;  Surgeon: Nick Flores MD;  Location: [facility] OR;  Service: DaVinci;  Laterality: Right;    LUNG BIOPSY      PROSTATE BIOPSY      REPLACEMENT TOTAL KNEE Left     SHOULDER SURGERY Bilateral     THORACOSCOPY Right 10/30/2020    Procedure: MEDIASTINOSCOPY, RIGHT THORACOSCOPY POSSIBLE WEDGE RESECTION WITH DAVINCI ROBOT;  Surgeon: Nick Flores MD;  Location: [facility] OR;  Service: DaVinci;  Laterality: Right;    THYROIDECTOMY N/A 12/11/2020    Procedure: Total thyroidectomy;  Surgeon: Jean Marie Solis MD;  Location:  [facility] OR;  Service: ENT;  Laterality: N/A;     Social History:  reports that he quit smoking about 27 years ago. His smoking use included cigarettes. He started smoking about 54 years ago. He has a 33.00 pack-year smoking history. He has never used smokeless tobacco. He reports current alcohol use of about 2.0 standard drinks of alcohol per week. He reports that he does not use drugs.    Family History: family history includes COPD in his father;  "Cancer in his mother; Hypertension in his mother.       Allergies:  No Known Allergies    Medications:  Prior to Admission medications    Medication Sig Start Date End Date Taking? Authorizing Provider   acetaminophen (TYLENOL) 500 MG tablet Take 1 tablet by mouth Every 6 (Six) Hours As Needed for Mild Pain.    Walt Hooper MD   alfuzosin (UROXATRAL) 10 MG 24 hr tablet Take 1 tablet by mouth Daily. 10/5/20   Walt Hooper MD   apixaban (Eliquis) 5 MG tablet tablet Take 1 tablet by mouth Every 12 (Twelve) Hours. 5/1/23   Landry Hoover MD   ferrous sulfate 325 (65 FE) MG tablet Take 1 tablet by mouth Daily With Breakfast. 8/1/23   Milan Bullard MD   losartan-hydrochlorothiazide (HYZAAR) 50-12.5 MG per tablet TAKE 1/2 TABLET BY MOUTH DAILY. 3/13/23   Landry Hoover MD   Mirabegron ER (Myrbetriq) 25 MG tablet sustained-release 24 hour 24 hr tablet Take 1 tablet by mouth Daily.    Walt Hooper MD   Synthroid 150 MCG tablet Take 1 tablet by mouth Daily for 30 days. 10/26/23 11/25/23  Maryann Serra APRN   therapeutic multivitamin-minerals (THERAGRAN-M) tablet Take 2 tablets by mouth Daily.    Walt Hooper MD     I have utilized all available immediate resources to obtain, update, or review the patient's current medications (including all prescriptions, over-the-counter products, herbals, cannabis/cannabidiol products, and vitamin/mineral/dietary (nutritional) supplements).    Objective     Vital Signs: /82   Pulse 115   Temp 98.7 °F (37.1 °C) (Oral)   Resp 18   Ht 185.4 cm (73\")   Wt 116 kg (255 lb)   SpO2 97%   BMI 33.64 kg/m²   Physical Exam  Vitals and nursing note reviewed.   HENT:      Head: Normocephalic and atraumatic.   Eyes:      Conjunctiva/sclera: Conjunctivae normal.      Pupils: Pupils are equal, round, and reactive to light.   Cardiovascular:      Rate and Rhythm: Tachycardia present. Rhythm irregular.      Heart sounds: Normal heart " sounds.   Pulmonary:      Effort: No respiratory distress.      Comments: Diminished breath sound bilateral, clear, on room air.  Abdominal:      General: Bowel sounds are normal. There is no distension.      Palpations: Abdomen is soft.      Tenderness: There is no abdominal tenderness.      Comments: Obesity.   Musculoskeletal:         General: No swelling.      Cervical back: Neck supple.   Skin:     General: Skin is warm and dry.      Capillary Refill: Capillary refill takes 2 to 3 seconds.      Findings: No rash.   Neurological:      General: No focal deficit present.      Mental Status: He is alert and oriented to person, place, and time.   Psychiatric:         Mood and Affect: Mood normal.         Behavior: Behavior normal.         Thought Content: Thought content normal.            Results Reviewed:  Lab Results (last 24 hours)       Procedure Component Value Units Date/Time    [place] Draw [[id number]] Collected: 11/23/23 1211    Specimen: Blood Updated: 11/23/23 1315    Narrative:      The following orders were created for panel order [place] Draw.  Procedure                               Abnormality         Status                     ---------                               -----------         ------                     Green Top (Gel)[[id number]]                                  Final result               Lavender Top[[id number]]                                     Final result               Red Top[[id number]]                                          Final result               Godfrey Top[[id number]]                                         In process                 Light Blue Top[[id number]]                                   Final result                 Please view results for these tests on the individual orders.    Green Top (Gel) [[id number]] Collected: 11/23/23 1211    Specimen: Blood Updated: 11/23/23 1315     Extra Tube Hold for add-ons.     Comment: Auto resulted.       Lavender Top [[id number]] Collected:  11/23/23 1211    Specimen: Blood Updated: 11/23/23 1315     Extra Tube hold for add-on     Comment: Auto resulted       Red Top [[id number]] Collected: 11/23/23 1211    Specimen: Blood Updated: 11/23/23 1315     Extra Tube Hold for add-ons.     Comment: Auto resulted.       Light Blue Top [[id number]] Collected: 11/23/23 1211    Specimen: Blood Updated: 11/23/23 1315     Extra Tube Hold for add-ons.     Comment: Auto resulted       Basic Metabolic Panel [[id number]]  (Abnormal) Collected: 11/23/23 1211    Specimen: Blood Updated: 11/23/23 1240     Glucose 140 mg/dL      BUN 23 mg/dL      Creatinine 1.02 mg/dL      Sodium 141 mmol/L      Potassium 4.0 mmol/L      Chloride 105 mmol/L      CO2 23.0 mmol/L      Calcium 9.6 mg/dL      BUN/Creatinine Ratio 22.5     Anion Gap 13.0 mmol/L      eGFR 78.1 mL/min/1.73     Narrative:      GFR Normal >60  Chronic Kidney Disease <60  Kidney Failure <15    The GFR formula is only valid for adults with stable renal function between ages 18 and 70.    Single High Sensitivity Troponin T [[id number]]  (Abnormal) Collected: 11/23/23 1211    Specimen: Blood Updated: 11/23/23 1238     HS Troponin T 43 ng/L     Narrative:      High Sensitive Troponin T Reference Range:  <14.0 ng/L- Negative Female for AMI  <22.0 ng/L- Negative Male for AMI  >=14 - Abnormal Female indicating possible myocardial injury.  >=22 - Abnormal Male indicating possible myocardial injury.   Clinicians would have to utilize clinical acumen, EKG, Troponin, and serial changes to determine if it is an Acute Myocardial Infarction or myocardial injury due to an underlying chronic condition.         D-dimer, Quantitative [[id number]]  (Normal) Collected: 11/23/23 1211    Specimen: Blood Updated: 11/23/23 1238     D-Dimer, Quantitative 0.34 MCGFEU/mL     Narrative:      According to the assay 's published package insert, a normal (<0.50 MCGFEU/mL) D-dimer result in conjunction with a non-high clinical probability  "assessment, excludes deep vein thrombosis (DVT) and pulmonary embolism (PE) with high sensitivity.    D-dimer values increase with age and this can make VTE exclusion of an older population difficult. To address this, the American College of [employer], based on best available evidence and recent guidelines, recommends that clinicians use age-adjusted D-dimer thresholds in patients greater than 50 years of age with: a) a low probability of PE who do not meet all Pulmonary Embolism Rule Out Criteria, or b) in those with intermediate probability of PE.   The formula for an age-adjusted D-dimer cut-off is \"age/100\".  For example, a 60 year old patient would have an age-adjusted cut-off of 0.60 MCGFEU/mL and an 80 year old 0.80 MCGFEU/mL.    CBC & Differential [[id number]]  (Abnormal) Collected: 11/23/23 1211    Specimen: Blood Updated: 11/23/23 1225    Narrative:      The following orders were created for panel order CBC & Differential.  Procedure                               Abnormality         Status                     ---------                               -----------         ------                     CBC Auto Differential[[id number]]        Abnormal            Final result                 Please view results for these tests on the individual orders.    CBC Auto Differential [[id number]]  (Abnormal) Collected: 11/23/23 1211    Specimen: Blood Updated: 11/23/23 1225     WBC 14.77 10*3/mm3      RBC 4.54 10*6/mm3      Hemoglobin 13.5 g/dL      Hematocrit 41.6 %      MCV 91.6 fL      MCH 29.7 pg      MCHC 32.5 g/dL      RDW 13.4 %      RDW-SD 45.3 fl      MPV 11.3 fL      Platelets 178 10*3/mm3      Neutrophil % 92.8 %      Lymphocyte % 3.0 %      Monocyte % 3.5 %      Eosinophil % 0.0 %      Basophil % 0.2 %      Immature Grans % 0.5 %      Neutrophils, Absolute 13.71 10*3/mm3      Lymphocytes, Absolute 0.44 10*3/mm3      Monocytes, Absolute 0.52 10*3/mm3      Eosinophils, Absolute 0.00 10*3/mm3      Basophils, " Absolute 0.03 10*3/mm3      Immature Grans, Absolute 0.07 10*3/mm3      nRBC 0.0 /100 WBC     Godfrey Top [[id number]] Collected: 11/23/23 1211    Specimen: Blood Updated: 11/23/23 1217          Imaging Results (Last 24 Hours)       Procedure Component Value Units Date/Time    CT Angiogram Chest [[id number]] Resulted: 11/23/23 1411     Updated: 11/23/23 1346    XR Chest 1 View [[id number]] Collected: 11/23/23 1316     Updated: 11/23/23 1319    Narrative:      EXAM: XR CHEST 1 VW- 11/23/2023 12:21 PM CST     HISTORY: chest pain       COMPARISON: 10/21/2022.     TECHNIQUE: Single frontal radiograph of the chest was obtained.     FINDINGS:      Support Devices: None.     Cardiac and Mediastinal Silhouettes: Normal.     Lungs/Pleura: Lung volumes are low with bibasilar consolidation. No  sizable pleural effusion. No visible pneumothorax.     Osseous structures: No acute osseous finding.     Other: None.       Impression:         Low lung volumes with bibasilar consolidation. Atelectasis versus  pneumonia.           This report was signed and finalized on 11/23/2023 1:16 PM CST by Nick Laurent.             I have personally reviewed and interpreted the radiology studies and ECG obtained at time of admission.     Assessment / Plan   Assessment:   Active Hospital Problems    Diagnosis     **Paroxysmal atrial fibrillation     Atrial fibrillation with RVR     Current use of long term anticoagulation     Class 1 obesity due to excess calories with serious comorbidity and body mass index (BMI) of 33.0 to 33.9 in adult     Malignant neoplasm of lower lobe of right lung     Lung mass     S/P total thyroidectomy     Hypertension     Prostate cancer        Treatment Plan  The patient will be admitted to my service here at [facility].     Atrial for with RVR/hypertension.  Patient received Cardizem IV in ER.  Continue Cardizem drip for now.  Start on Lopressor p.o .  Continue Eliquis.  Hold Hyzaar for  now.  Echocardiogram-6/25/2020-ejection fraction 70%, mild to moderate concentric hypertrophy, diastolic dysfunction grade 1, left atrial cavity borderline dilated, right ventricle cavity borderline dilated.  Repeat echocardiogram.    COPD/history of lung cancer with resection.    Chest x-ray-.Low lung volumes with bibasilar consolidation- Atelectasis versus  pneumonia.  CTA of the chest-no pulmonary embolus, stable postoperative change right lower lobe without progressive or new masslike consolidation, stable pulmonary nodules, cholelithiasis.    History of prostate cancer/prostate hypertrophy.  Flomax.  Hold Myrbetriq.    Anemia, iron deficiency.  Hold iron sulfate.    Obesity.  BMI is 33.    Nausea.  Zofran as needed.    Status post thyroidectomy.    Nutrition.  Cardiac diet.    Medical Decision Making  Number and Complexity of problems: Atrial for with RVR/hypertension/obesity .  Differential Diagnosis: None.    Conditions and Status        Condition is unchanged.     [facility] Data  External documents reviewed: None  Cardiac tracing (EKG, telemetry) interpretation: Atrial for with RVR  Radiology interpretation: CT scan/x-ray/echo  Labs reviewed: Laboratory  Any tests that were considered but not ordered: Lab in a.m.     Decision rules/scores evaluated (example [id number], Wells, etc): None     Discussed with: Discussed with patient and daughter     Care Planning  Shared decision making: Patient and daughter  Code status and discussions: Full code    Disposition  Social Determinants of Health that impact treatment or disposition: From home  Estimated length of stay is 1 to 3 days.     I confirmed that the patient's advanced care plan is present, code status is documented, and a surrogate decision maker is listed in the patient's medical record.     The patient's surrogate decision maker is daughter, Mini Valdez.     The patient was seen and examined by me on 11/23/2023 at 1410.  Electronically signed by Josesito BRISCOE  MD Pio, 11/23/23, 14:15 CST.

## 2023-11-23 NOTE — ED PROVIDER NOTES
Subjective   History of Present Illness  Patient states that he was just sitting at home when he started having some pain and shortness of breath.  States that he felt like he was in A-fib.  Has a history of A-fib that he takes Eliquis for.  States that he is not on any other medications for it.  States that he is not sure why.  States he currently has no shortness of breath when just sitting there.  States that he feels as if somebody was sitting on his chest earlier.  Denies any current fevers or chills.  Denies any abdominal pain or dysuria hematuria hematochezia.  Denies any new leg swelling or calf tenderness.  Has been taking all of his medications laparoscopic he states that he sees Dr. Hoover.        Review of Systems   All other systems reviewed and are negative.      Past Medical History:   Diagnosis Date    Arthritis     Asthma 12/1/22    Dizzy spells     states on rare occsaion has    Failed intubation of airway 11/10/2020    Hypertension     Lung cancer     Nosebleed 12/1/22    Taking  eliquis    PAF (paroxysmal atrial fibrillation)     Prostate cancer 2020    Thyroid disorder        No Known Allergies    Past Surgical History:   Procedure Laterality Date    CARPAL TUNNEL RELEASE Right     LOBECTOMY Right 2/15/2021    Procedure: THORACOSCOPY WITH DAVINCI ROBOT WITH RIGHT WEDGE RESECTION, MEDIASTINAL LYMPH NODE DISSECTION-RIGHT;  Surgeon: Nick Flores MD;  [facility]: [facility] OR;  Service: [facility];  Laterality: Right;    LUNG BIOPSY      PROSTATE BIOPSY      REPLACEMENT TOTAL KNEE Left     SHOULDER SURGERY Bilateral     THORACOSCOPY Right 10/30/2020    Procedure: MEDIASTINOSCOPY, RIGHT THORACOSCOPY POSSIBLE WEDGE RESECTION WITH DAVINCI ROBOT;  Surgeon: Nick Flores MD;  Location:  [facility] OR;  Service: [facility];  Laterality: Right;    THYROIDECTOMY N/A 12/11/2020    Procedure: Total thyroidectomy;  Surgeon: Jean Marie Solis MD;  Location:  [facility] OR;  Service: ENT;  Laterality: N/A;       Family  History   Problem Relation Age of Onset    Hypertension Mother         New Contact    Cancer Mother     COPD Father        Social History     Socioeconomic History    Marital status:     Number of children: 2   Tobacco Use    Smoking status: Former     Packs/day: 1.00     Years: 33.00     Additional pack years: 0.00     Total pack years: 33.00     Types: Cigarettes     Start date: 1969     Quit date: 1996     Years since quittin.9    Smokeless tobacco: Never   Vaping Use    Vaping Use: Never used   Substance and Sexual Activity    Alcohol use: Yes     Alcohol/week: 2.0 standard drinks of alcohol     Types: 2 Shots of liquor per week     Comment: x2 weekly    Drug use: Never    Sexual activity: Defer           Objective   Physical Exam  Vitals and nursing note reviewed.   Constitutional:       General: He is in acute distress (due to chest pain and afib).      Appearance: Normal appearance. He is not toxic-appearing or diaphoretic.   HENT:      Head: Normocephalic and atraumatic.      Right Ear: External ear normal.      Left Ear: External ear normal.      Nose: Nose normal.      Mouth/Throat:      Mouth: Mucous membranes are moist.   Eyes:      General:         Right eye: No discharge.         Left eye: No discharge.      Extraocular Movements: Extraocular movements intact.      Conjunctiva/sclera: Conjunctivae normal.   Cardiovascular:      Rate and Rhythm: Tachycardia present. Rhythm irregular.      Pulses: Normal pulses.   Pulmonary:      Effort: Pulmonary effort is normal. No respiratory distress.      Breath sounds: No rhonchi.   Abdominal:      General: Abdomen is flat.      Tenderness: There is no abdominal tenderness. There is no guarding or rebound.   Musculoskeletal:         General: No deformity or signs of injury.   Skin:     General: Skin is warm.      Coloration: Skin is not jaundiced.   Neurological:      Mental Status: He is alert and oriented to person, place, and time. Mental  status is at baseline.   Psychiatric:         Mood and Affect: Mood normal.         Behavior: Behavior normal.         Thought Content: Thought content normal.         Judgment: Judgment normal.         Procedures           ED Course                                           Medical Decision Making  71-year-old male presented with A-fib with RVR.  Given his history and physical arrangements obtain a CBC, CMP, troponin, PT/PTT.  Also plan to obtain CTA chest.  Dimer was elevated.  Patient was given diltiazem and started on a Cardizem drip.  CT does not show any obvious pneumonia or blood clot.  Admitted in stable condition to the hospitalist for further treatment.    Problems Addressed:  Atrial fibrillation with RVR: complicated acute illness or injury  Chest pain, unspecified type: complicated acute illness or injury  Elevated troponin: complicated acute illness or injury    Amount and/or Complexity of Data Reviewed  Labs: ordered.  Radiology: ordered.  ECG/medicine tests: ordered.    Risk  OTC drugs.  Prescription drug management.  Decision regarding hospitalization.        Final diagnoses:   Atrial fibrillation with RVR   Chest pain, unspecified type   Elevated troponin       ED Disposition  ED Disposition       ED Disposition   Decision to Admit    Condition   --    Comment   Level of Care: Telemetry [5]   Diagnosis: Atrial fibrillation with RVR [[id number]]   Admitting Physician: MARITZA CHÁVZE [[id number]]   Attending Physician: MARITZA CHÁVEZ [id number]]   Certification: I Certify That Inpatient Hospital Services Are Medically Necessary For Greater Than 2 Midnights                 Micheal Nunez MD  [address] [place] [address] [address] [place] [address]  [phone]    Schedule an appointment as soon as possible for a visit in 1 week(s)  Follow with PCP 1 week         Medication List        New Prescriptions      metoprolol tartrate 25 MG tablet  Commonly known as: LOPRESSOR  Take 0.5 tablets by mouth Every 12 (Twelve)  Hours.            Stop      losartan-hydrochlorothiazide 50-12.5 MG per tablet  Commonly known as: HYZAAR     Myrbetriq 25 MG tablet sustained-release 24 hour 24 hr tablet  Generic drug: Mirabegron ER               Where to Get Your Medications        These medications were sent to [facility] - [address] - [phone]  - [phone]   [address], [place] [address]      Phone: [phone]   metoprolol tartrate 25 MG tablet            Yue Byrne MD  11/25/23 6850

## 2023-11-24 ENCOUNTER — APPOINTMENT (OUTPATIENT)
Dept: CARDIOLOGY | Facility: HOSPITAL | Age: 73
End: 2023-11-24
Payer: MEDICARE

## 2023-11-24 VITALS
BODY MASS INDEX: 34.33 KG/M2 | HEIGHT: 73 IN | HEART RATE: 63 BPM | SYSTOLIC BLOOD PRESSURE: 124 MMHG | RESPIRATION RATE: 16 BRPM | OXYGEN SATURATION: 97 % | WEIGHT: 259 LBS | DIASTOLIC BLOOD PRESSURE: 54 MMHG | TEMPERATURE: 97.6 F

## 2023-11-24 PROBLEM — I48.91 ATRIAL FIBRILLATION: Status: ACTIVE | Noted: 2023-11-24

## 2023-11-24 LAB
ALBUMIN SERPL-MCNC: 3.7 G/DL (ref 3.5–5.2)
ALBUMIN/GLOB SERPL: 1.7 G/DL
ALP SERPL-CCNC: 88 U/L (ref 39–117)
ALT SERPL W P-5'-P-CCNC: 30 U/L (ref 1–41)
ANION GAP SERPL CALCULATED.3IONS-SCNC: 8 MMOL/L (ref 5–15)
AST SERPL-CCNC: 20 U/L (ref 1–40)
BASOPHILS # BLD AUTO: 0.03 10*3/MM3 (ref 0–0.2)
BASOPHILS NFR BLD AUTO: 0.3 % (ref 0–1.5)
BH CV ECHO MEAS - AO MAX PG: 7.4 MMHG
BH CV ECHO MEAS - AO MEAN PG: 3.5 MMHG
BH CV ECHO MEAS - AO ROOT DIAM: 3.8 CM
BH CV ECHO MEAS - AO V2 MAX: 136 CM/SEC
BH CV ECHO MEAS - AO V2 VTI: 29.5 CM
BH CV ECHO MEAS - AVA(I,D): 3.7 CM2
BH CV ECHO MEAS - EDV(CUBED): 74.1 ML
BH CV ECHO MEAS - EDV(MOD-SP2): 112 ML
BH CV ECHO MEAS - EDV(MOD-SP4): 114 ML
BH CV ECHO MEAS - EF(MOD-BP): 60.7 %
BH CV ECHO MEAS - EF(MOD-SP2): 60.6 %
BH CV ECHO MEAS - EF(MOD-SP4): 64.5 %
BH CV ECHO MEAS - ESV(CUBED): 26.2 ML
BH CV ECHO MEAS - ESV(MOD-SP2): 44.1 ML
BH CV ECHO MEAS - ESV(MOD-SP4): 40.5 ML
BH CV ECHO MEAS - FS: 29.3 %
BH CV ECHO MEAS - IVS/LVPW: 0.82 CM
BH CV ECHO MEAS - IVSD: 1.22 CM
BH CV ECHO MEAS - LA DIMENSION: 4.7 CM
BH CV ECHO MEAS - LAT PEAK E' VEL: 9.3 CM/SEC
BH CV ECHO MEAS - LV DIASTOLIC VOL/BSA (35-75): 47.5 CM2
BH CV ECHO MEAS - LV MASS(C)D: 212.3 GRAMS
BH CV ECHO MEAS - LV MAX PG: 4.5 MMHG
BH CV ECHO MEAS - LV MEAN PG: 2 MMHG
BH CV ECHO MEAS - LV SYSTOLIC VOL/BSA (12-30): 16.9 CM2
BH CV ECHO MEAS - LV V1 MAX: 106 CM/SEC
BH CV ECHO MEAS - LV V1 VTI: 26 CM
BH CV ECHO MEAS - LVIDD: 4.2 CM
BH CV ECHO MEAS - LVIDS: 3 CM
BH CV ECHO MEAS - LVOT AREA: 4.2 CM2
BH CV ECHO MEAS - LVOT DIAM: 2.3 CM
BH CV ECHO MEAS - LVPWD: 1.48 CM
BH CV ECHO MEAS - MED PEAK E' VEL: 9.4 CM/SEC
BH CV ECHO MEAS - MV A MAX VEL: 91.3 CM/SEC
BH CV ECHO MEAS - MV DEC SLOPE: 527 CM/SEC2
BH CV ECHO MEAS - MV DEC TIME: 0.23 SEC
BH CV ECHO MEAS - MV E MAX VEL: 122 CM/SEC
BH CV ECHO MEAS - MV E/A: 1.34
BH CV ECHO MEAS - PA V2 MAX: 90.6 CM/SEC
BH CV ECHO MEAS - PI END-D VEL: 88.5 CM/SEC
BH CV ECHO MEAS - RAP SYSTOLE: 10 MMHG
BH CV ECHO MEAS - RVSP: 52.5 MMHG
BH CV ECHO MEAS - SI(MOD-SP2): 28.3 ML/M2
BH CV ECHO MEAS - SI(MOD-SP4): 30.6 ML/M2
BH CV ECHO MEAS - SV(LVOT): 108 ML
BH CV ECHO MEAS - SV(MOD-SP2): 67.9 ML
BH CV ECHO MEAS - SV(MOD-SP4): 73.5 ML
BH CV ECHO MEAS - TAPSE (>1.6): 2.6 CM
BH CV ECHO MEAS - TR MAX PG: 42.5 MMHG
BH CV ECHO MEAS - TR MAX VEL: 326 CM/SEC
BH CV ECHO MEASUREMENTS AVERAGE E/E' RATIO: 13.05
BH CV XLRA - RV BASE: 5.4 CM
BH CV XLRA - RV LENGTH: 8.6 CM
BH CV XLRA - RV MID: 3.7 CM
BH CV XLRA - TDI S': 15.1 CM/SEC
BILIRUB SERPL-MCNC: 0.8 MG/DL (ref 0–1.2)
BUN SERPL-MCNC: 24 MG/DL (ref 8–23)
BUN/CREAT SERPL: 24 (ref 7–25)
CALCIUM SPEC-SCNC: 9 MG/DL (ref 8.6–10.5)
CHLORIDE SERPL-SCNC: 105 MMOL/L (ref 98–107)
CHOLEST SERPL-MCNC: 130 MG/DL (ref 0–200)
CO2 SERPL-SCNC: 26 MMOL/L (ref 22–29)
CREAT SERPL-MCNC: 1 MG/DL (ref 0.76–1.27)
DEPRECATED RDW RBC AUTO: 47.3 FL (ref 37–54)
EGFRCR SERPLBLD CKD-EPI 2021: 80 ML/MIN/1.73
EOSINOPHIL # BLD AUTO: 0.02 10*3/MM3 (ref 0–0.4)
EOSINOPHIL NFR BLD AUTO: 0.2 % (ref 0.3–6.2)
ERYTHROCYTE [DISTWIDTH] IN BLOOD BY AUTOMATED COUNT: 13.9 % (ref 12.3–15.4)
GLOBULIN UR ELPH-MCNC: 2.2 GM/DL
GLUCOSE SERPL-MCNC: 107 MG/DL (ref 65–99)
HBA1C MFR BLD: 5.3 % (ref 4.8–5.6)
HCT VFR BLD AUTO: 37.6 % (ref 37.5–51)
HDLC SERPL-MCNC: 37 MG/DL (ref 40–60)
HGB BLD-MCNC: 12 G/DL (ref 13–17.7)
IMM GRANULOCYTES # BLD AUTO: 0.06 10*3/MM3 (ref 0–0.05)
IMM GRANULOCYTES NFR BLD AUTO: 0.6 % (ref 0–0.5)
LDLC SERPL CALC-MCNC: 78 MG/DL (ref 0–100)
LDLC/HDLC SERPL: 2.11 {RATIO}
LEFT ATRIUM VOLUME INDEX: 36.8 ML/M2
LEFT ATRIUM VOLUME: 88.2 ML
LYMPHOCYTES # BLD AUTO: 0.84 10*3/MM3 (ref 0.7–3.1)
LYMPHOCYTES NFR BLD AUTO: 8.7 % (ref 19.6–45.3)
MCH RBC QN AUTO: 29.7 PG (ref 26.6–33)
MCHC RBC AUTO-ENTMCNC: 31.9 G/DL (ref 31.5–35.7)
MCV RBC AUTO: 93.1 FL (ref 79–97)
MONOCYTES # BLD AUTO: 0.55 10*3/MM3 (ref 0.1–0.9)
MONOCYTES NFR BLD AUTO: 5.7 % (ref 5–12)
NEUTROPHILS NFR BLD AUTO: 8.18 10*3/MM3 (ref 1.7–7)
NEUTROPHILS NFR BLD AUTO: 84.5 % (ref 42.7–76)
NRBC BLD AUTO-RTO: 0 /100 WBC (ref 0–0.2)
PLATELET # BLD AUTO: 178 10*3/MM3 (ref 140–450)
PMV BLD AUTO: 11.3 FL (ref 6–12)
POTASSIUM SERPL-SCNC: 4.2 MMOL/L (ref 3.5–5.2)
PROT SERPL-MCNC: 5.9 G/DL (ref 6–8.5)
RBC # BLD AUTO: 4.04 10*6/MM3 (ref 4.14–5.8)
SODIUM SERPL-SCNC: 139 MMOL/L (ref 136–145)
T4 FREE SERPL-MCNC: 1.62 NG/DL (ref 0.93–1.7)
TRIGL SERPL-MCNC: 75 MG/DL (ref 0–150)
TSH SERPL DL<=0.05 MIU/L-ACNC: 0.14 UIU/ML (ref 0.27–4.2)
VLDLC SERPL-MCNC: 15 MG/DL (ref 5–40)
WBC NRBC COR # BLD AUTO: 9.68 10*3/MM3 (ref 3.4–10.8)

## 2023-11-24 PROCEDURE — 85025 COMPLETE CBC W/AUTO DIFF WBC: CPT | Performed by: FAMILY MEDICINE

## 2023-11-24 PROCEDURE — 84439 ASSAY OF FREE THYROXINE: CPT | Performed by: FAMILY MEDICINE

## 2023-11-24 PROCEDURE — G0378 HOSPITAL OBSERVATION PER HR: HCPCS

## 2023-11-24 PROCEDURE — 84443 ASSAY THYROID STIM HORMONE: CPT | Performed by: FAMILY MEDICINE

## 2023-11-24 PROCEDURE — 80053 COMPREHEN METABOLIC PANEL: CPT | Performed by: FAMILY MEDICINE

## 2023-11-24 PROCEDURE — 25510000001 PERFLUTREN 6.52 MG/ML SUSPENSION: Performed by: FAMILY MEDICINE

## 2023-11-24 PROCEDURE — 83036 HEMOGLOBIN GLYCOSYLATED A1C: CPT | Performed by: FAMILY MEDICINE

## 2023-11-24 PROCEDURE — 80061 LIPID PANEL: CPT | Performed by: FAMILY MEDICINE

## 2023-11-24 PROCEDURE — 93306 TTE W/DOPPLER COMPLETE: CPT

## 2023-11-24 PROCEDURE — 93306 TTE W/DOPPLER COMPLETE: CPT | Performed by: INTERNAL MEDICINE

## 2023-11-24 RX ORDER — LOSARTAN POTASSIUM AND HYDROCHLOROTHIAZIDE 12.5; 5 MG/1; MG/1
0.5 TABLET ORAL DAILY
COMMUNITY
End: 2023-11-24 | Stop reason: HOSPADM

## 2023-11-24 RX ADMIN — LEVOTHYROXINE SODIUM 150 MCG: 150 TABLET ORAL at 06:30

## 2023-11-24 RX ADMIN — APIXABAN 5 MG: 5 TABLET, FILM COATED ORAL at 09:24

## 2023-11-24 RX ADMIN — METOPROLOL TARTRATE 12.5 MG: 25 TABLET, FILM COATED ORAL at 09:24

## 2023-11-24 RX ADMIN — DOCUSATE SODIUM 50 MG AND SENNOSIDES 8.6 MG 2 TABLET: 8.6; 5 TABLET, FILM COATED ORAL at 09:25

## 2023-11-24 RX ADMIN — PERFLUTREN 6.52 MG: 6.52 INJECTION, SUSPENSION INTRAVENOUS at 11:11

## 2023-11-24 NOTE — PLAN OF CARE
Goal Outcome Evaluation:  Plan of Care Reviewed With: patient           Outcome Evaluation: pt was -119 on a cardizem drip. Pt coverted to SB/NS 59-66 at 2319. no c/o pain. vss except for bp that was a little low at beginning of shift but has since then increased. will continue to monitor.

## 2023-11-24 NOTE — DISCHARGE SUMMARY
[facility] Medicine Services  DISCHARGE SUMMARY       Date of Admission: 11/23/2023  Date of Discharge:  11/24/2023  Primary Care Physician: Michael Nunez MD    Presenting Problem/History of Present Illness:    Final Discharge Diagnoses:  Active Hospital Problems    Diagnosis     **Paroxysmal atrial fibrillation     Atrial fibrillation     Atrial fibrillation with RVR     Current use of long term anticoagulation     Class 1 obesity due to excess calories with serious comorbidity and body mass index (BMI) of 33.0 to 33.9 in adult     Malignant neoplasm of lower lobe of right lung     Lung mass     S/P total thyroidectomy     Hypertension     Prostate cancer        Pertinent Test Results:   Results for orders placed during the hospital encounter of 11/23/23    Adult Transthoracic Echo Complete W/ Cont if Necessary Per Protocol    Interpretation Summary    Left ventricular ejection fraction appears to be 61 - 65%.    Left ventricular wall thickness is consistent with mild concentric hypertrophy.    Left ventricular diastolic function was normal.    The left atrial cavity is moderately dilated.    Estimated right ventricular systolic pressure from tricuspid regurgitation is moderately elevated (45-55 mmHg).    The right ventricular cavity is mild to moderately dilated, with normal systolic function..    The right atrial cavity is dilated.    No significant (greater than mild) valvular pathology.      Imaging Results (All)       Procedure Component Value Units Date/Time    CT Angiogram Chest [[id number]] Collected: 11/23/23 1411     Updated: 11/23/23 1423    Narrative:      EXAM: CT ANGIOGRAM CHEST- 11/23/2023 1:40 PM CST     HISTORY: sob, chest pain      COMPARISON: 7/13/2023     DOSE LENGTH PRODUCT: 292 mGy cm. Automated exposure control was also  utilized to decrease patient radiation dose.     TECHNIQUE: Helical tomographic images of the chest were obtained after  the  administration of intravenous contrast following angiogram protocol.  Additionally, 3D and multiplanar reformatted images as well as MIPS were  provided.        FINDINGS:      Support Devices: None.     Central Airways: Patent.     Lungs/Pleura: Postoperative changes in the right lower lobe with stable  bandlike parenchymal abnormality along the suture line and mild traction  bronchiectasis.     Nodules: Stable pulmonary nodules, representative example is a 6 mm  nodule in the right lower lobe (image 98).     Lower Neck: Unremarkable.     Mediastinum/Hilum: No enlarged lymphadenopathy.     Heart: Cardiac size is normal. No pericardial effusion. Minimal aortic  valvular calcifications. Atherosclerosis.     Pulmonary Arteries: Pulmonary arteries are well-opacified to the  proximal segmental level. No pulmonary embolism to the proximal  segmental level. Main pulmonary artery is normal in caliber.     Chest wall/Soft Tissues: Unremarkable.     Upper Abdomen: Cholelithiasis.     Osseous Structures: No acute or suspicious osseous finding.       Impression:         No pulmonary embolism to the proximal segmental level.     Stable postoperative changes in the right lower lobe without progressive  or new masslike consolidation.     Stable pulmonary nodules.     Cholelithiasis.           This report was signed and finalized on 11/23/2023 2:20 PM CST by Nick Laurent.       XR Chest 1 View [[id number]] Collected: 11/23/23 1316     Updated: 11/23/23 1319    Narrative:      EXAM: XR CHEST 1 VW- 11/23/2023 12:21 PM CST     HISTORY: chest pain       COMPARISON: 10/21/2022.     TECHNIQUE: Single frontal radiograph of the chest was obtained.     FINDINGS:      Support Devices: None.     Cardiac and Mediastinal Silhouettes: Normal.     Lungs/Pleura: Lung volumes are low with bibasilar consolidation. No  sizable pleural effusion. No visible pneumothorax.     Osseous structures: No acute osseous finding.     Other: None.        Impression:         Low lung volumes with bibasilar consolidation. Atelectasis versus  pneumonia.           This report was signed and finalized on 11/23/2023 1:16 PM CST by Nick Laurent.             LAB RESULTS:      Lab 11/24/23  0450 11/23/23  1211   WBC 9.68 14.77*   HEMOGLOBIN 12.0* 13.5   HEMATOCRIT 37.6 41.6   PLATELETS 178 178   NEUTROS ABS 8.18* 13.71*   IMMATURE GRANS (ABS) 0.06* 0.07*   LYMPHS ABS 0.84 0.44*   MONOS ABS 0.55 0.52   EOS ABS 0.02 0.00   MCV 93.1 91.6   D DIMER QUANT  --  0.34         Lab 11/24/23  0450 11/23/23  1211   SODIUM 139 141   POTASSIUM 4.2 4.0   CHLORIDE 105 105   CO2 26.0 23.0   ANION GAP 8.0 13.0   BUN 24* 23   CREATININE 1.00 1.02   EGFR 80.0 78.1   GLUCOSE 107* 140*   CALCIUM 9.0 9.6   HEMOGLOBIN A1C 5.30  --    TSH 0.137*  --          Lab 11/24/23  0450   TOTAL PROTEIN 5.9*   ALBUMIN 3.7   GLOBULIN 2.2   ALT (SGPT) 30   AST (SGOT) 20   BILIRUBIN 0.8   ALK PHOS 88         Lab 11/23/23  1431 11/23/23  1211   HSTROP T 47* 43*         Lab 11/24/23  0450   CHOLESTEROL 130   LDL CHOL 78   HDL CHOL 37*   TRIGLYCERIDES 75             Brief Urine Lab Results       None          Microbiology Results (last 10 days)       ** No results found for the last 240 hours. **        HPI .  Patient is a 73-year-old patient presented ER complaining with some chest discomfort and shortness of breath.  Patient does have a chronic history atrial fibs.  Upon arrival patient was in atrial for with RVR.  Patient received IV Cardizem x2 and Cardizem drip was started.  Patient is on chronic Eliquis already at home.  Patient follow-up Dr. Hoover outpatient.  Patient take medication instructed by Dr. Hoover per patient     Troponins 43, second set this pending.  White blood cells 14,000.     Patient has past medical history arthritis, asthma, dizzy spell, failed intubation of the airway, hypertension, lung cancer, nosebleed from Eliquis, atrial fibs, prostate cancer, thyroid disorder.    Hospital  "Course:     Atrial for with RVR/hypertension.  Patient received Cardizem IV in ER.  Continue Start on Lopressor p.o .  Continue Eliquis.  Hold Hyzaar for now.  Echocardiogram-ejection fraction 61 to 65%, mild concentric hypertrophy, diastolic function is normal, left atrial cavity moderate dilated, tricuspid regurgitation, right ventricle mildly to moderately dilated-normal systolic function, right atrial cavity dilated, no significant valvular pathology.  Patient converted to sinus rhythm overnight.  Patient is currently in sinus rhythm.    COPD/history of lung cancer with resection.    Chest x-ray-.Low lung volumes with bibasilar consolidation- Atelectasis versus  pneumonia.  CTA of the chest-no pulmonary embolus, stable postoperative change right lower lobe without progressive or new masslike consolidation, stable pulmonary nodules, cholelithiasis.     History of prostate cancer/prostate hypertrophy.  Flomax.  Hold Myrbetriq.     Anemia, iron deficiency.  Hold iron sulfate.     Obesity.  BMI is 33.     Nausea.  Zofran as needed.     Status post thyroidectomy.     Nutrition.  Cardiac diet.     Vital signs stable, afebrile.  Plan to discharge patient home today.  Discharge home with family.  Follow with PCP outpatient.  Patient follow with Dr. Hoover outpatient.    Physical Exam on Discharge:  /54   Pulse 63   Temp 97.6 °F (36.4 °C) (Oral)   Resp 16   Ht 185.4 cm (73\")   Wt 117 kg (259 lb)   SpO2 97%   BMI 34.17 kg/m²   Physical Exam  Vitals and nursing note reviewed.   HENT:      Head: Normocephalic and atraumatic.   Eyes:      Conjunctiva/sclera: Conjunctivae normal.      Pupils: Pupils are equal, round, and reactive to light.   Cardiovascular:      Rate and Rhythm: Normal rate and regular rhythm.      Heart sounds: Normal heart sounds.   Pulmonary:      Effort: Pulmonary effort is normal. No respiratory distress.      Breath sounds: Normal breath sounds.   Abdominal:      General: Bowel sounds are " normal. There is no distension.      Palpations: Abdomen is soft.      Tenderness: There is no abdominal tenderness.      Comments: Obesity.   Musculoskeletal:         General: No swelling.      Cervical back: Neck supple.   Skin:     General: Skin is warm and dry.      Capillary Refill: Capillary refill takes 2 to 3 seconds.      Findings: No rash.   Neurological:      General: No focal deficit present.      Mental Status: He is alert and oriented to person, place, and time.   Psychiatric:         Mood and Affect: Mood normal.         Behavior: Behavior normal.         Thought Content: Thought content normal.       Condition on Discharge: Stable.    Discharge Disposition: Discharge home with family.      Discharge Medications:     Discharge Medications        New Medications        Instructions Start Date   metoprolol tartrate 25 MG tablet  Commonly known as: LOPRESSOR   12.5 mg, Oral, Every 12 Hours Scheduled             Continue These Medications        Instructions Start Date   acetaminophen 500 MG tablet  Commonly known as: TYLENOL   500 mg, Oral, Every 6 Hours PRN      alfuzosin 10 MG 24 hr tablet  Commonly known as: UROXATRAL   10 mg, Oral, Daily      apixaban 5 MG tablet tablet  Commonly known as: Eliquis   5 mg, Oral, Every 12 Hours      ferrous sulfate 325 (65 FE) MG tablet   325 mg, Oral, Daily With Breakfast      Synthroid 150 MCG tablet  Generic drug: levothyroxine   150 mcg, Oral, Daily      therapeutic multivitamin-minerals tablet  Generic drug: multivitamin with minerals   2 tablets, Oral, Daily             Stop These Medications      losartan-hydrochlorothiazide 50-12.5 MG per tablet  Commonly known as: HYZAAR     Myrbetriq 25 MG tablet sustained-release 24 hour 24 hr tablet  Generic drug: Mirabegron ER              Discharge Diet:   Diet Instructions       Advance Diet As Tolerated -Target Diet: Cardiac .      Target Diet: Cardiac .            Activity at Discharge:   Activity Instructions        Activity as Tolerated              Follow-up Appointments:   Future Appointments   Date Time Provider Department Center   11/27/2023  9:00 AM [facility] 1  [facility]   12/20/2023  3:15 PM Maryann Serra APRN [facility]   2/1/2024 10:30 AM  [facility]  [facility]   2/1/2024 11:00 AM Milan Bullard MD [facility]   2/23/2024  1:00 PM Ephraim Lujan III, MD [place] [facility] None   3/28/2024  1:30 PM Sabrina Jackson APRN [facility] PAD   Follow-up PCP 1 week.  Follow with cardiology outpatient      Electronically signed by Josesito Suero MD, 11/24/23, 14:22 CST.    Time: Greater than 30 minutes.

## 2023-11-24 NOTE — CASE MANAGEMENT/SOCIAL WORK
Discharge Planning Assessment  [facility]     Patient Name: Deglado Desir  MRN: [id number]  Today's Date: 11/24/2023    Admit Date: 11/23/2023        Discharge Needs Assessment       Row Name 11/24/23 0935       Living Environment    People in Home spouse    Name(s) of People in Home Nava    Current Living Arrangements home    Primary Care Provided by self    Provides Primary Care For no one    Family Caregiver if Needed spouse    Family Caregiver Names Nava    Able to Return to Prior Arrangements yes       Resource/Environmental Concerns    Resource/Environmental Concerns none       Transition Planning    Patient/Family Anticipates Transition to home with family    Transportation Anticipated family or friend will provide       Discharge Needs Assessment    Readmission Within the Last 30 Days no previous admission in last 30 days    Equipment Currently Used at Home none    Concerns to be Addressed no discharge needs identified    Equipment Needed After Discharge none    Discharge Coordination/Progress spoke to patient who lives with spouse; has RX coverage and PCP; independent at home prior to illness will follow for DC needs                   Discharge Plan    No documentation.                 Continued Care and Services - Admitted Since 11/23/2023    Coordination has not been started for this encounter.          Demographic Summary    No documentation.                  Functional Status    No documentation.                  Psychosocial    No documentation.                  Abuse/Neglect    No documentation.                  Legal    No documentation.                  Substance Abuse    No documentation.                  Patient Forms    No documentation.                     Nilda Starr, TYRONE

## 2023-11-27 ENCOUNTER — HOSPITAL ENCOUNTER (OUTPATIENT)
Dept: CT IMAGING | Facility: HOSPITAL | Age: 73
Discharge: HOME | End: 2023-11-27
Admitting: INTERNAL MEDICINE
Payer: MEDICARE

## 2023-11-27 DIAGNOSIS — D50.8 IRON DEFICIENCY ANEMIA SECONDARY TO INADEQUATE DIETARY IRON INTAKE: ICD-10-CM

## 2023-11-27 LAB
CREAT BLDA-MCNC: 1.1 MG/DL (ref 0.6–1.3)
QT INTERVAL: 260 MS
QTC INTERVAL: 413 MS

## 2023-11-27 PROCEDURE — 71260 CT THORAX DX C+: CPT

## 2023-11-27 PROCEDURE — 25510000001 IOPAMIDOL 61 % SOLUTION: Performed by: INTERNAL MEDICINE

## 2023-11-27 PROCEDURE — 82565 ASSAY OF CREATININE: CPT

## 2023-11-27 PROCEDURE — 74177 CT ABD & PELVIS W/CONTRAST: CPT

## 2023-11-27 RX ADMIN — IOPAMIDOL 100 ML: 612 INJECTION, SOLUTION INTRAVENOUS at 09:18

## 2023-11-30 ENCOUNTER — TELEPHONE (OUTPATIENT)
Dept: OTOLARYNGOLOGY | Facility: CLINIC | Age: 73
End: 2023-11-30
Payer: MEDICARE

## 2023-11-30 DIAGNOSIS — E89.0 POSTOPERATIVE HYPOTHYROIDISM: Primary | ICD-10-CM

## 2023-11-30 RX ORDER — LEVOTHYROXINE SODIUM 137 MCG
137 TABLET ORAL DAILY
Qty: 30 TABLET | Refills: 0 | Status: SHIPPED | OUTPATIENT
Start: 2023-11-30 | End: 2023-12-30

## 2023-11-30 NOTE — TELEPHONE ENCOUNTER
Patient has TSH drawn on 11/24/23 and he had a low level.  We will decrease to 137 mcg and recheck in one month

## 2023-12-11 DIAGNOSIS — R26.9 GAIT DISTURBANCE: Primary | ICD-10-CM

## 2023-12-20 ENCOUNTER — OFFICE VISIT (OUTPATIENT)
Dept: OTOLARYNGOLOGY | Facility: CLINIC | Age: 73
End: 2023-12-20
Payer: MEDICARE

## 2023-12-20 VITALS
HEIGHT: 73 IN | DIASTOLIC BLOOD PRESSURE: 67 MMHG | SYSTOLIC BLOOD PRESSURE: 154 MMHG | WEIGHT: 264 LBS | HEART RATE: 67 BPM | TEMPERATURE: 98.2 F | BODY MASS INDEX: 34.99 KG/M2

## 2023-12-20 DIAGNOSIS — E89.0 POSTOPERATIVE HYPOTHYROIDISM: ICD-10-CM

## 2023-12-20 DIAGNOSIS — E89.0 S/P TOTAL THYROIDECTOMY: Primary | ICD-10-CM

## 2023-12-20 DIAGNOSIS — H90.3 SENSORINEURAL HEARING LOSS, BILATERAL: ICD-10-CM

## 2023-12-20 DIAGNOSIS — R26.9 GAIT DISTURBANCE: ICD-10-CM

## 2023-12-20 RX ORDER — ALBUTEROL SULFATE 90 UG/1
AEROSOL, METERED RESPIRATORY (INHALATION)
COMMUNITY
Start: 2023-12-15

## 2023-12-20 NOTE — PROGRESS NOTES
YOB: 1950  Location: [facility]  Location Address: [address] [place] [address], [facility], [address] [place] [address]  Location Phone: [phone]    Chief Complaint   Patient presents with    Thyroid Problem    Gait Problem       History of Present Illness  Delgado Desir is a 73 y.o. male.  Delgado Desir is here for follow up of ENT complaints. The patient has had problems with vertigo and s/p total thyroidectomy 2020  Pathology revealed multinodular goiter   He has been doing vestibular rehab and does report some improvement in symptoms     Patient has been diagnosed with a fib recently as well     He is currently taking synthroid 137 mcg     TSH (2023 04:50)      Past Medical History:   Diagnosis Date    A-fib     Arthritis     Asthma 23    Dizzy spells     states on rare occsaion has    Failed intubation of airway 11/10/2020    Hypertension     Lung cancer     Nosebleed 23    Taking  eliquis    PAF (paroxysmal atrial fibrillation)     Prostate cancer     Thyroid disorder        Past Surgical History:   Procedure Laterality Date    CARPAL TUNNEL RELEASE Right     LOBECTOMY Right 2/15/2021    Procedure: THORACOSCOPY WITH DAVINCI ROBOT WITH RIGHT WEDGE RESECTION, MEDIASTINAL LYMPH NODE DISSECTION-RIGHT;  Surgeon: Nick Flores MD;  Location:  [facility];  Service: DaVinci;  Laterality: Right;    LUNG BIOPSY      PROSTATE BIOPSY      REPLACEMENT TOTAL KNEE Left     SHOULDER SURGERY Bilateral     THORACOSCOPY Right 10/30/2020    Procedure: MEDIASTINOSCOPY, RIGHT THORACOSCOPY POSSIBLE WEDGE RESECTION WITH DAVINCI ROBOT;  Surgeon: Nick Flores MD;  Location:  [facility];  Service: DaVinci;  Laterality: Right;    THYROIDECTOMY N/A 2020    Procedure: Total thyroidectomy;  Surgeon: Jean Marie Solis MD;  Location:  [facility];  Service: ENT;  Laterality: N/A;       Outpatient Medications Marked as Taking for the 23 encounter (Office Visit) with Maryann Serra APRN    Medication Sig Dispense Refill    acetaminophen (TYLENOL) 500 MG tablet Take 1 tablet by mouth Every 6 (Six) Hours As Needed for Mild Pain.      albuterol sulfate  (90 Base) MCG/ACT inhaler INHALE 2 PUFFS INTO THE LUNGS EVERY 6 HOURS AS NEEDED FOR WHEEZING AND SHORTNESS OF BREATH      alfuzosin (UROXATRAL) 10 MG 24 hr tablet Take 1 tablet by mouth Daily.      apixaban (Eliquis) 5 MG tablet tablet Take 1 tablet by mouth Every 12 (Twelve) Hours. 60 tablet 11    ferrous sulfate 325 (65 FE) MG tablet Take 1 tablet by mouth Daily With Breakfast. 60 tablet 2    metoprolol tartrate (LOPRESSOR) 25 MG tablet Take 0.5 tablets by mouth Every 12 (Twelve) Hours. 180 tablet 0    Synthroid 137 MCG tablet Take 1 tablet by mouth Daily for 30 days. 30 tablet 0    therapeutic multivitamin-minerals (THERAGRAN-M) tablet Take 2 tablets by mouth Daily.         Patient has no known allergies.    Family History   Problem Relation Age of Onset    Hypertension Mother         New Contact    Cancer Mother     COPD Father        Social History     Socioeconomic History    Marital status:     Number of children: 2   Tobacco Use    Smoking status: Former     Packs/day: 1.00     Years: 33.00     Additional pack years: 0.00     Total pack years: 33.00     Types: Cigarettes     Start date: 1969     Quit date: 1996     Years since quittin.9    Smokeless tobacco: Never   Vaping Use    Vaping Use: Never used   Substance and Sexual Activity    Alcohol use: Yes     Alcohol/week: 2.0 standard drinks of alcohol     Types: 2 Shots of liquor per week     Comment: x2 weekly    Drug use: Never    Sexual activity: Defer       Review of Systems   Constitutional: Negative.    HENT: Negative.     Neurological:  Positive for dizziness.       Vitals:    23 1505   BP: 154/67   Pulse: 67   Temp: 98.2 °F (36.8 °C)       Body mass index is 34.83 kg/m².    Objective     Physical Exam  Vitals reviewed.   Constitutional:       Appearance:  He is obese.   HENT:      Head: Normocephalic.      Right Ear: Tympanic membrane, ear canal and external ear normal.      Left Ear: Tympanic membrane, ear canal and external ear normal.      Nose: Nose normal.      Mouth/Throat:      Lips: Pink.      Mouth: Mucous membranes are moist.      Pharynx: Uvula midline.   Neck:     Neurological:      Mental Status: He is alert.       Assessment & Plan   Diagnoses and all orders for this visit:    1. S/P total thyroidectomy (Primary)    2. Gait disturbance    3. Postoperative hypothyroidism    4. Sensorineural hearing loss, bilateral      * Surgery not found *  No orders of the defined types were placed in this encounter.    Return in about 4 months (around 4/20/2024) for Recheck.     Recheck thyroid hormone level after 4 weeks  Continue vestibular rehab   Fall precautions    Patient Instructions   HEARING LOSS       Hearing loss is the third most common health condition in the [place] affecting more than 1 of every 10 people.  This means that over 28 million Americans suffer from hearing loss.  The condition varies with age: 1 out of every 25 children, 1 out of every 14 aged 14-44 years old, 1 out of every 7 adults aged 45-65 year old, and 1 out of every 3 adults over the age of 65 years old.    The ear works by receiving sound vibrations, amplifying the sound, and then converting the mechanical vibration into an electrical signal that is sent to the hearing center of the brain.  The ear has three basic parts, each with very specific functions:  The External Ear   This is made up of the parts of the ear you can see (the auricle), and the ear canal.  These structures function to funnel the sound vibrations down into the ear so that they can be processed.  The Middle Ear  The external ear and the middle ear are  by the eardrum (tympanic membrane).  The middle ear is an air-filled space that houses the middle ear bones (ossicles).  Sound waves hit the eardrum  and cause it to vibrate.  The vibrations are then transferred to the ear bones that amplify the sound and transfer it to the inner ear (cochlea).  The Inner Ear  The inner ear is a snail-shaped bone that contains a fluid-filled membrane inside another fluid-filled membrane.  It acts like a battery to transform the mechanical vibrations into and electrical signal.  It does this with very delicate hair cells that rest on top of the inner ear membranes.  The electrical signal is then shipped out of the inner ear to the brain where it is processed and understood as sound.    Disease in any one of these parts of the ear can cause hearing loss, but can do so in two different ways.  When there is a problem with the process of bringing the sound to the inner ear (i.e. problems in the external or middle ear) it is called a conductive hearing loss.  Problems in the inner ear or nerves of hearing care called sensorineural hearing losses.  Often times, however, there is a combination of the types of hearing losses or a mixed hearing loss.  Conductive Hearing Loss  Impairment of the transfer of sound into the inner ear because of problems with the external ear, the eardrum or the middle ear can cause conductive hearing loss.  Conductive losses can often be lessened or cured with medication or surgery, depending on the etiology.  Causes of Conductive Hearing Losses:  Wax occlusion of the ear canal  Eardrum perforations  Stiffened or scarred eardrums  Fluid in the middle earspace (otitis media with effusion)  Middle ear infections (acute otitis media)  Scarring or fixation of the ear bones (tympanosclerosis, otosclerosis)  Dislocation of the ear bones  Cholesteatoma: this is a “skin cyst” that develops due to severely retracted eardrums or from skin growth into the middle ear from a chronic eardrum perforation.  Over time the cyst can grow and erode the bones of hearing.  Middle ear tumors or masses  Sensorineural Hearing  Loss  Deficits in hearing due to problems in the inner ear or nerves of hearing are termed sensorineural losses.  These are usually due to damage to the microscopic hair cells in the cochlea, or due to loss of the nerve cells in the hearing nerve.  For the most part, this type of hearing loss cannot be repaired with medication or surgery, except in rare instances.  Often times, however, it can be helped with hearing aids and rarely with cochlear implantation.  This type of loss can also be associated with tinnitus (ringing of the ears) and vertigo (dizziness).  Causes of Sensorineural Hearing Losses:  Damage from noise exposure  Aging:  this is often a slow, progressive loss of the high frequencies  Infection (labrynthitis)  Secondary loss from the effects of meningitis  Genetic/familial related hearing loss  Autoimmune hearing loss (a rheumatoid-like process)  Temporal bond fracture (severe fracture of the bone around or through the inner ear)  Medication induced damage (chemotherapy, high dose IV antibiotics, diuretics)  Inner ear and skull base tumors (acoustic neuroma, memingioma)        Specific Causes    Ear Infections and Effusion in Children  Any time the middle ear is filled with fluid, as in an active infection or in middle ear effusion, there can be a conductive hearing.  Ear infections are the most common cause of infant hearing loss and are a very treatable type of hearing loss.  Usually the middle ear is filled with air, which allows the bones of hearing to freely move.  When there is fluid in this space it slows the vibration and is literally like trying to hear underwater.  Most of the time this infection or fluid can be treated with medication.  If this does not happen, often myringotomy tube placement can allow for drainage of fluid and allow the middle ear space to remain ventilated.  Congenital Hearing Loss  Hearing loss is the most common birth defect, affecting 3 in every 1000 children born in  the [place].  If untreated, this can lead to significant problems in speech, language, and learning.  Recently, most hospitals have early infant screening that can identify newborns with hearing problems.  With accurate diagnosis, these children can often be treated with hearing aids and other forms of treatment that can allow them to develop normal speech and learning.  Noise Induced Hearing Loss  According to the National Institutes of Health, approximately 10 million Americans have hearing losses that are a least partially attributable to loud noise exposure.  Exposures that can cause permanent damage vary, but short exposures of very loud quality (gunfire) can be as bad as longer exposures at lower levels.  Most conversations are at about 65-70 decibels.  Levels over 85dB, with exposures of up to 8 hours a day, will produce permanent hearing loss after many years of exposure.  A stereo headset turned up full blast (about 110 dB) can cause a significant hearing loss in less than a half an hour.  These losses are due to damage of the hair cells of the inner ear from the excessive vibrational acoustic trauma of the loud noise.  Since this loss is usually nonreversible, hearing protection is essential.  Foam earplugs can provide 15-30dB of noise protection.  Age Related Hearing Loss (Presbyacusis)  Age related hearing loss is another very common form of hearing loss in the elderly.  It is usually a slowly progressive, high frequency sensorineural loss that in nonreversible.  Not all elderly people will have hearing loss as this is very much related to the genetic makeup of the individual.  This can cause the patient to withdraw from social situations, or cause them to seem to have a mood change because of the frustration of not being able to hear a conversation or having to ask people to repeat things.  Most of the time, this can be well treated by amplifying sounds with a hearing aid.    Hearing loss can have  a large impact in the way we function on a day to day basis with our environment.  We are fortunate that most hearing loss can be treated by hearing aids or medical and surgical treatments.  If you think you may be suffering from hearing loss, an evaluation by our doctor can help identify the cause and the specific treatment that can help improve your hearing.

## 2023-12-29 ENCOUNTER — LAB (OUTPATIENT)
Dept: LAB | Facility: HOSPITAL | Age: 73
End: 2023-12-29
Payer: MEDICARE

## 2023-12-29 ENCOUNTER — TELEPHONE (OUTPATIENT)
Dept: OTOLARYNGOLOGY | Facility: CLINIC | Age: 73
End: 2023-12-29

## 2023-12-29 DIAGNOSIS — E89.0 POSTOPERATIVE HYPOTHYROIDISM: ICD-10-CM

## 2023-12-29 LAB — TSH SERPL DL<=0.05 MIU/L-ACNC: 2.53 UIU/ML (ref 0.27–4.2)

## 2023-12-29 PROCEDURE — 36415 COLL VENOUS BLD VENIPUNCTURE: CPT

## 2023-12-29 PROCEDURE — 84443 ASSAY THYROID STIM HORMONE: CPT

## 2023-12-29 NOTE — TELEPHONE ENCOUNTER
Provider: [facility] MR    Caller: GRECIA CHAPPELL    Relationship to Patient: SELF    Reason for Call: PT CALLED TO CONFIRM LABS HAVE BEEN COMPLETED.  PLEASE REVIEW AND CALL PT.

## 2024-01-02 ENCOUNTER — TELEPHONE (OUTPATIENT)
Dept: OTOLARYNGOLOGY | Facility: CLINIC | Age: 74
End: 2024-01-02
Payer: MEDICARE

## 2024-01-02 RX ORDER — LEVOTHYROXINE SODIUM 137 MCG
137 TABLET ORAL DAILY
Qty: 30 TABLET | Refills: 3 | Status: SHIPPED | OUTPATIENT
Start: 2024-01-02 | End: 2024-05-01

## 2024-01-02 NOTE — TELEPHONE ENCOUNTER
Called patient and advised that TSH level is normal to stay on current dose of Synthroid.  Patient advised he needs refill and we will send those in.

## 2024-01-23 ENCOUNTER — TELEPHONE (OUTPATIENT)
Dept: UROLOGY | Age: 74
End: 2024-01-23

## 2024-01-23 DIAGNOSIS — C61 PROSTATE CANCER (HCC): ICD-10-CM

## 2024-01-23 LAB — PSA SERPL-MCNC: <0.01 NG/ML (ref 0–4)

## 2024-01-23 NOTE — TELEPHONE ENCOUNTER
Patient called about PSA prior to 1/26 appt. He did not have done but will go today or tomorrow and asking if alright to still come to appt. Please return his call to discuss [phone]      Thank you

## 2024-01-23 NOTE — PROGRESS NOTES
[facility] HEMATOLOGY & ONCOLOGY  [address]  [place] [address] [phone]    Patient Name: Delgado Desir  Encounter Date: 02/01/2024  YOB: 1950  Patient Number: [id number]      REASON FOR FOLLOW-UP: Delgado Desir is a pleasant 73 y.o. 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  He had undergone wedge resection on 10/30/2020, inadequate pulmonary function test for lobectomy and node sampling.  He is also seen for anemia from iron deficiency and back on oral iron for the past 6 months. Resume 8/1/2023 through 2/1/2024.  He is seen alone.  History is obtained from patient.  History is reliable.             Oncology/Hematology History Overview Note   DIAGNOSTIC ABNORMALITIES:  He was found to have a lung nodule with history of prostate cancer.  CT abdomen pelvis 08/10/2020.  Lobulated noncalcified nodule in the right lower lobe represent a neoplastic process.  PET scan 09/18/2020. The multilobular pulmonary nodule in the right lung base measuring 2.9 x 1.6 cm is hypermetabolic and suspicious for a malignant process with a maximum intensity of 3.62 SUV. Differential considerations include primary lung malignancy as well as early metastatic disease.  No evidence of distant metastasis.  Unsuccessful CT-guided right lower lobe nodule biopsy 10/16/2020 due to right pneumothorax.  Pathology report 11/03/2020.  Invasive mucinous adenocarcinoma, grade 1, 2 cm, right lower lobe.  No visceral pleural invasion.  No lymphovascular invasion. Margins of resection free of tumor.  Distance of invasive carcinoma from closest margin 0.1 cm.  Pleural tissue biopsies no evidence of malignancy.  AJCC stage pT1b.  PD-L1 less than 1%, negative ROS1, ALK, BRAF and EGFR mutation.  Pathology report 2/16/2021, right lower lobe wedge excision, residual tumor is not present.  The surgical  margins are free of tumor.  2 benign intraparenchymal nodes benign.  1 benign node, left level 7.  1 benign no level 8.  CBC 02/19/2021 remarkable for WBC 12.9, hemoglobin 12.8 hematocrit 37.  Had a follow-up with Dr. Nick Flores 04/05/2021 post right lower lobe wedge and lymph node sampling on 02/15/2021.  Patient referred to oncology for surveillance.        PREVIOUS INTERVENTIONS:  Right lower lobe wedge resection 10/30/2020 by Dr. Flores.  His pulmonary function test is not adequate for left lower lobectomy as well as lymph node sampling.  He had undergone right lower lobe wedge 02/15/2021.      PREVIOUS INTERVENTIONS:Anemia.  Oral iron 04/15/2021 through 10/15/2021.  Resumed 08/01/2022 through 6/29/2023. Resume 8/1/2023 through 2/1/2024.     Prostate cancer    Initial Diagnosis    Prostate cancer (CMS/HCC)     7/6/2020 Procedure    PSA  21     7/30/2020 Biopsy    [facility]  Prostate Biopsy  A: Prostate, right lateral base, needle biopsy:  Prostatic adenocarcinoma, Preston score 7 (4+3)  Tumor encompasses 20% of the needle core biopsy  Grade group 3    B: Prostate, right lateral mid, needle biopsy:  Prostatic adenocarcinoma, Gilliam's 7 (4+3)  Tumor encompasses 20% of the needle core biopsy  Grade group 3    C: Prostate, right lateral apex, needle biopsy:  Prostatic adenocarcinoma, Preston score 7 (3+4)  Tumor encompasses 30% of the needle core biopsy  Grade group 2  Pattern 4 is approximately 20% of the tumor    D: Prostate, right base, needle biopsy:  Prostatic adenocarcinoma, Preston's 7 (4+3)  Tumor encompasses 10% of the needle core biopsy  Grade group 3    E: Prostate, right mid, needle biopsy:  Prostatic adenocarcinoma, Gilliam's 7 (4+3)  Tumor encompasses 55% of the needle core biopsy  Grade group 3    F: Prostate, right apex, needle biopsy:  High-grade prostatic intraepithelial neoplasia    G: Prostate, left lateral base, needle biopsy:  Benign prostatic glands and stroma    H: Prostate, left lateral mid,  needle biopsy:  Benign prostatic glands and stroma    I: Prostate, left lateral apex, needle biopsy:  Benign prostatic glands and stroma    J: Prostate, left base, needle biopsy:  Benign prostatic glands and stroma    K: Prostate, left mid, needle biopsy:  Benign prostatic glands and stroma    L: Prostate, left apex, needle biopsy:  Benign prostatic glands and stroma     8/14/2020 Imaging    [facility]  CT AP  No acute abnormality of the abdomen or pelvis.  A moderate splenomegaly.  An enlarged prostate.  A stable bilateral renal cysts.  The lobulated noncalcified nodule in the right lower lobe represent a neoplastic process. Further evaluation with positron emission  tomography scan may be obtained.  The previously seen fractures of the left sacral ala is not visualized  in this study. If clinically significant, further evaluation with  radionuclide bone scan may be obtained    Bone Scan  1. No evidence of metastatic bone disease.  2. Degenerative uptake in the spine, right knee and bilateral  ankles/feet. Suspect prior left knee arthroplasty. Correlate clinically.     10/16/2020 Biopsy    Final Diagnosis   Lung, right lower lobe, fine-needle core biopsies and touch preparation:  A.  Fragments of skeletal muscle.  B.  No histologic or cytologic evidence of malignancy.        8/11/2021 Procedure    PSA 21.02     10/4/2021 Cancer Staged    Staging form: Prostate, AJCC 8th Edition  - Pathologic stage from 10/4/2021: Stage IIIA (pT2, pN0, cM0, PSA: 21, Grade Group: 3) - Signed by Milan Bullard MD on 10/4/2021     11/29/2021 - 10/10/2022 Radiation    Radiation OncologyTreatment Course:  Delgado Desir received 7000 cGy in 28 fractions to the prostate via external beam radiation therapy.     Malignant neoplasm of lower lobe of right lung   10/16/2020 Biopsy    Final Diagnosis   Lung, right lower lobe, fine-needle core biopsies and touch preparation:  A.  Fragments of skeletal muscle.  B.  No histologic or cytologic  evidence of malignancy.        4/5/2021 Initial Diagnosis    Malignant neoplasm of lower lobe of right lung (CMS/HCC)     4/15/2021 Cancer Staged    Staging form: Lung, AJCC 8th Edition  - Pathologic stage from 4/15/2021: Stage IA2 (pT1b, pN0, cM0) - Signed by Milan Bullard MD on 4/15/2021     11/29/2021 - 10/10/2022 Radiation    Radiation OncologyTreatment Course:  Delgado Desir received 7000 cGy in 28 fractions to the prostate via external beam radiation therapy.         PAST MEDICAL HISTORY:  ALLERGIES:  No Known Allergies  CURRENT MEDICATIONS:  Outpatient Encounter Medications as of 2/1/2024   Medication Sig Dispense Refill    acetaminophen (TYLENOL) 500 MG tablet Take 1 tablet by mouth Every 6 (Six) Hours As Needed for Mild Pain.      albuterol sulfate  (90 Base) MCG/ACT inhaler INHALE 2 PUFFS INTO THE LUNGS EVERY 6 HOURS AS NEEDED FOR WHEEZING AND SHORTNESS OF BREATH      alfuzosin (UROXATRAL) 10 MG 24 hr tablet Take 1 tablet by mouth Daily.      apixaban (Eliquis) 5 MG tablet tablet Take 1 tablet by mouth Every 12 (Twelve) Hours. 60 tablet 11    ferrous sulfate 325 (65 FE) MG tablet Take 1 tablet by mouth Daily With Breakfast. 60 tablet 2    metoprolol tartrate (LOPRESSOR) 25 MG tablet Take 0.5 tablets by mouth Every 12 (Twelve) Hours. 180 tablet 0    Synthroid 137 MCG tablet Take 1 tablet by mouth Daily for 120 days. 30 tablet 3    therapeutic multivitamin-minerals (THERAGRAN-M) tablet Take 2 tablets by mouth Daily.       No facility-administered encounter medications on file as of 2/1/2024.     ADULT ILLNESSES:  Patient Active Problem List   Diagnosis Code    Prostate cancer C61    Former smoker Z87.891    Lung nodule < 6cm on CT [id number]    Abnormal PET of right lung R94.2    Pneumothorax after biopsy J95.811    Hypertension I10    Failed intubation of airway T88.4XXA    S/P total thyroidectomy E89.0    Postoperative hypothyroidism E89.0    Lung mass R91.8    Paroxysmal atrial fibrillation I48.0     Malignant neoplasm of lower lobe of right lung C34.31    Nodule of neck R22.1    S/P partial lobectomy of lung Z90.2    History of lung cancer Z85.118    Class 1 obesity due to excess calories with serious comorbidity and body mass index (BMI) of 33.0 to 33.9 in adult E66.09, Z68.33    Current use of long term anticoagulation Z79.01    Atrial fibrillation with RVR I48.91    Atrial fibrillation I48.91     SURGERIES:  Past Surgical History:   Procedure Laterality Date    CARPAL TUNNEL RELEASE Right     LOBECTOMY Right 2/15/2021    Procedure: THORACOSCOPY WITH DAVINCI ROBOT WITH RIGHT WEDGE RESECTION, MEDIASTINAL LYMPH NODE DISSECTION-RIGHT;  Surgeon: Nick Flores MD;  Location:  [facility] OR;  Service: [facility];  Laterality: Right;    LUNG BIOPSY      PROSTATE BIOPSY      REPLACEMENT TOTAL KNEE Left     SHOULDER SURGERY Bilateral     THORACOSCOPY Right 10/30/2020    Procedure: MEDIASTINOSCOPY, RIGHT THORACOSCOPY POSSIBLE WEDGE RESECTION WITH DAVINCI ROBOT;  Surgeon: Nick Flores MD;  Location:  [facility] OR;  Service: [facility];  Laterality: Right;    THYROIDECTOMY N/A 12/11/2020    Procedure: Total thyroidectomy;  Surgeon: Jean Marie Solis MD;  Location:  [facility] OR;  Service: ENT;  Laterality: N/A;     HEALTH MAINTENANCE ITEMS:  Health Maintenance Due   Topic Date Due    Pneumococcal Vaccine 65+ (1 of 2 - PCV) Never done    TDAP/TD VACCINES (1 - Tdap) Never done    ZOSTER VACCINE (1 of 2) Never done    HEPATITIS C SCREENING  Never done    ANNUAL WELLNESS VISIT  Never done    INFLUENZA VACCINE  08/01/2023    COVID-19 Vaccine (4 - 2023-24 season) 09/01/2023    COLORECTAL CANCER SCREENING  01/23/2024       <no information>  Last Completed Colonoscopy       This patient has no relevant Health Maintenance data.          Immunization History   Administered Date(s) Administered    COVID-19 (MODERNA) 1st,2nd,3rd Dose Monovalent 02/25/2021, 03/25/2021    COVID-19 (MODERNA) BIVALENT 12+YRS 11/22/2022     Last Completed  "Mammogram       This patient has no relevant Health Maintenance data.              FAMILY HISTORY:  Family History   Problem Relation Age of Onset    Hypertension Mother         New Contact    Cancer Mother     COPD Father      SOCIAL HISTORY:  Social History     Socioeconomic History    Marital status:     Number of children: 2   Tobacco Use    Smoking status: Former     Packs/day: 1.00     Years: 33.00     Additional pack years: 0.00     Total pack years: 33.00     Types: Cigarettes     Start date: 1969     Quit date: 1996     Years since quittin.1    Smokeless tobacco: Never   Vaping Use    Vaping Use: Never used   Substance and Sexual Activity    Alcohol use: Yes     Alcohol/week: 2.0 standard drinks of alcohol     Types: 2 Shots of liquor per week     Comment: x2 weekly    Drug use: Never    Sexual activity: Defer       REVIEW OF SYSTEMS:    Review of Systems   Constitutional:  Positive for fatigue. Negative for fever.        \"Little bit tired.\"   HENT:  Negative for congestion and mouth sores.    Eyes:  Negative for discharge and redness.   Respiratory:  Negative for shortness of breath and wheezing.    Cardiovascular:  Negative for chest pain and leg swelling.   Gastrointestinal:  Negative for abdominal pain, nausea and vomiting.   Endocrine: Negative for polydipsia and polyphagia.   Genitourinary:  Negative for difficulty urinating and dysuria.   Musculoskeletal:  Negative for gait problem and myalgias.   Skin:  Negative for pallor.   Allergic/Immunologic: Negative for food allergies.   Neurological:  Negative for dizziness and speech difficulty.   Hematological:  Negative for adenopathy. Does not bruise/bleed easily.   Psychiatric/Behavioral:  Negative for agitation, confusion and hallucinations.        VITAL SIGNS: /66   Pulse 65   Temp 96.6 °F (35.9 °C)   Resp 18   Ht 185.4 cm (73\")   Wt 122 kg (268 lb 6.4 oz)   SpO2 93%   BMI 35.41 kg/m²  Gained 11 pounds. \"Not moving, my " knees bothers me.'  Pain Score    02/01/24 1118   PainSc: 0-No pain       PHYSICAL EXAMINATION:     Physical Exam  Vitals reviewed.   Constitutional:       General: He is not in acute distress.  HENT:      Head: Normocephalic and atraumatic.   Eyes:      General: No scleral icterus.  Cardiovascular:      Rate and Rhythm: Normal rate.   Pulmonary:      Effort: No respiratory distress.      Breath sounds: No wheezing.   Abdominal:      General: Bowel sounds are normal.      Palpations: Abdomen is soft.      Tenderness: There is no abdominal tenderness.   Musculoskeletal:         General: No swelling.      Cervical back: Neck supple.   Skin:     General: Skin is warm.      Coloration: Skin is not pale.   Neurological:      Mental Status: He is alert and oriented to person, place, and time.   Psychiatric:         Mood and Affect: Mood normal.         Behavior: Behavior normal.         Thought Content: Thought content normal.         Judgment: Judgment normal.         LABS    Lab Results - Last 18 Months   Lab Units 02/01/24  1048 11/24/23  0450 11/23/23  1211 08/01/23  1040 06/29/23  1258 12/30/22  1438   HEMOGLOBIN g/dL 12.9* 12.0* 13.5 11.3* 12.8* 12.3*   HEMATOCRIT % 40.1 37.6 41.6 35.9* 39.6 39.4   MCV fL 92.0 93.1 91.6 93.5 93.4 92.3   WBC 10*3/mm3 6.09 9.68 14.77* 4.66 6.28 5.80   RDW % 14.2 13.9 13.4 14.5 14.6 14.1   MPV fL 11.1 11.3 11.3 10.6 11.1 10.4   PLATELETS 10*3/mm3 157 178 178 139* 144 172   IMM GRAN % % 0.7* 0.6* 0.5 0.4 0.5 0.5   NEUTROS ABS 10*3/mm3 5.07 8.18* 13.71* 3.88 5.31 4.53   LYMPHS ABS 10*3/mm3 0.50* 0.84 0.44* 0.36* 0.47* 0.65*   MONOS ABS 10*3/mm3 0.38 0.55 0.52 0.34 0.41 0.47   EOS ABS 10*3/mm3 0.07 0.02 0.00 0.05 0.04 0.09   BASOS ABS 10*3/mm3 0.03 0.03 0.03 0.01 0.02 0.03   IMMATURE GRANS (ABS) 10*3/mm3 0.04 0.06* 0.07* 0.02 0.03 0.03   NRBC /100 WBC 0.0 0.0 0.0 0.0 0.0 0.0       Lab Results - Last 18 Months   Lab Units 02/01/24  1048 11/27/23  0904 11/24/23  0450 11/23/23  1211  "06/29/23  1258 01/18/23  0951 12/30/22  1438 10/21/22  1555   GLUCOSE mg/dL 94  --  107* 140* 109*  --  77 140*   SODIUM mmol/L 138  --  139 141 138  --  138 137   POTASSIUM mmol/L 4.3  --  4.2 4.0 4.3  --  4.2 4.1   CO2 mmol/L 24.0  --  26.0 23.0 23.0  --  23.0 26.0   CHLORIDE mmol/L 105  --  105 105 104  --  103 103   ANION GAP mmol/L 9.0  --  8.0 13.0 11.0  --  12.0 8.0   CREATININE mg/dL 0.91 1.10 1.00 1.02 1.00 1.10 0.98 0.99   BUN mg/dL 22  --  24* 23 22  --  20 23   BUN / CREAT RATIO  24.2  --  24.0 22.5 22.0  --  20.4 23.2   CALCIUM mg/dL 8.8  --  9.0 9.6 9.1  --  8.5* 8.9   ALK PHOS U/L 108  --  88  --  113  --  120* 120*   TOTAL PROTEIN g/dL 6.5  --  5.9*  --  6.9  --  7.0 6.3   ALT (SGPT) U/L 31  --  30  --  37  --  27 48*   AST (SGOT) U/L 23  --  20  --  23  --  22 42*   BILIRUBIN mg/dL 1.3*  --  0.8  --  1.2  --  1.2 1.1   ALBUMIN g/dL 4.1  --  3.7  --  4.1  --  4.0 4.20   GLOBULIN gm/dL 2.4  --  2.2  --  2.8  --  3.0 2.1       No results for input(s): \"MSPIKE\", \"KAPPALAMB\", \"IGLFLC\", \"URICACID\", \"FREEKAPPAL\", \"CEA\", \"LDH\", \"REFLABREPO\" in the last [id number] hours.    Lab Results - Last 18 Months   Lab Units 02/01/24  1048 12/29/23  1203 11/24/23  0450 10/25/23  1055 08/29/23  1149 08/01/23  1040 07/26/23  1026 06/29/23  1258 12/30/22  1438   IRON mcg/dL 62  --   --   --   --  55*  --  59 56*   TIBC mcg/dL 289*  --   --   --   --  247*  --  283* 298   IRON SATURATION (TSAT) % 21  --   --   --   --  22  --  21 19*   FERRITIN ng/mL 354.30  --   --   --   --  536.90*  --  431.60* 323.00   TSH uIU/mL  --  2.530 0.137* 0.158* 0.929  --  6.840* 5.420* 3.630       Delgado Desir reports a pain score of 0.           ASSESSMENT:  1.  Adenocarcinoma the lung, right lower lobe  AJCC stage:1A2 (pT1b, cN0, cM0)  Treatment status: Post right lower lobe wedge resection.  Pulmonary function test in adequate for lobectomy and node sampling.  2.  Performance status of 1.  3.  Normocytic anemia from chronic disease, post " radiation/ADT and iron deficiency. Post oral iron 6/29/2023.  Resume 8/1/2023 through 2/1/2024.   4.  Adenocarcinoma of the prostate, Preston score 7, 4+3, grade group 3.  AJCC stage: IIIA (T2b, N0, M0)  Treatment status: Neoadjuvant hormone therapy with radiation 01/03/2022.  Followed by Dr. Phan and Dr. Lujan.   5.  Elevated bilirubin from cholelithiasis.  Negative CT of the abdomen pelvis.        PLAN:  1.   Re: CT chest report  11/27/2023. Stable exam. Stable postoperative change of right lower lobe wedge resection with surrounding parenchymal scarring and atelectasis. Stable   pulmonary nodules. CT chest angiogram 11/23/2023 for shortness of breathing.No pulmonary embolism to the proximal segmental level. Stable postoperative changes in the right lower lobe without progressive or new masslike consolidation. Stable pulmonary nodules. Cholelithiasis.  2.   Re: CT abdomen and pelvis report 11/27/2023.No evidence of intra-abdominal or pelvic metastatic disease.  Mild hepatic steatosis. Solitary 2 mm gallstone without evidence of cholecystitis. There are benign-appearing bilateral renal cysts.  Circumferential bladder wall thickening with incomplete bladder distention. Moderate prostamegaly. Evidence of previous right inguinal hernia repair with no recurrent hernia.  3.   Re: Heme status.  WBC 6.09, hemoglobin 12.9, hematocrit 40.1, MCV 92 and platelet 157.  4.   Re: CMP.  GFR 89.5 and bilirubin 1.3.  Positive for cholelithiasis and fatty liver.  5.   Re: Ferritin 354.3, iron 62 and iron saturation 21 from 22 from 21 from 19 from 19 from 19%.   6.  He will be seen every 6 months with CT scan for the first 3 years then annually thereafter.  7.  Continue care per primary care physician and other specialists.  8.  Plan of care discussed with patient.  Understanding expressed.  He is agreeable to proceed.  9.  Stable for observation, lung cancer.    10.  Advance Care Planning   ACP  discussion was declined by the patient. Patient does not have an advance directive, information provided.    11.  CBC with differential, ferritin and iron panel in 3 months.  12.  Stop oral iron.    13.  Next CT chest abdomen pelvis 11/2024.  14.  Return to office in 6 months with preoffice CBC with differential, ferritin, iron profile, and CMP.            I have reviewed the assessment and plan and verified the accuracy of it. No changes to assessment and plan since the information was documented. Milan Bullard MD 02/01/24         I spent 33 total minutes, face-to-face, caring for Delgado dale. Greater than 50% of this time involved counseling and/or coordination of care as documented within this note.               (Jean Marie Solis MD)  (Nick Flores MD)  (Ephraim Lujan MD)  MD Michael Briceño MD

## 2024-01-26 ENCOUNTER — OFFICE VISIT (OUTPATIENT)
Dept: UROLOGY | Age: 74
End: 2024-01-26

## 2024-01-26 VITALS — WEIGHT: 270 LBS | TEMPERATURE: 97.9 F | BODY MASS INDEX: 35.78 KG/M2 | HEIGHT: 73 IN

## 2024-01-26 DIAGNOSIS — C61 PROSTATE CANCER (HCC): Primary | ICD-10-CM

## 2024-01-26 DIAGNOSIS — N13.8 BPH WITH URINARY OBSTRUCTION: ICD-10-CM

## 2024-01-26 DIAGNOSIS — N52.35 ERECTILE DYSFUNCTION FOLLOWING RADIATION THERAPY: ICD-10-CM

## 2024-01-26 DIAGNOSIS — N40.1 BPH WITH URINARY OBSTRUCTION: ICD-10-CM

## 2024-01-26 LAB — POST VOID RESIDUAL (PVR): 0 ML

## 2024-01-26 RX ORDER — ALBUTEROL SULFATE 90 UG/1
AEROSOL, METERED RESPIRATORY (INHALATION)
COMMUNITY
Start: 2023-12-15

## 2024-01-26 ASSESSMENT — ENCOUNTER SYMPTOMS
NAUSEA: 0
TROUBLE SWALLOWING: 0
ABDOMINAL DISTENTION: 0
EYE REDNESS: 0
DIARRHEA: 0
SHORTNESS OF BREATH: 0
CONSTIPATION: 0
COUGH: 0
BACK PAIN: 0
EYE DISCHARGE: 0
ABDOMINAL PAIN: 0
VOMITING: 0

## 2024-01-26 NOTE — PROGRESS NOTES
for back pain and gait problem.   Skin:  Negative for pallor and wound.   Allergic/Immunologic: Negative for environmental allergies and immunocompromised state.   Neurological:  Negative for dizziness and weakness.   Hematological:  Negative for adenopathy. Does not bruise/bleed easily.   Psychiatric/Behavioral:  Negative for agitation and confusion.          PHYSICAL EXAM:  Temp 97.9 °F (36.6 °C) (Temporal)   Ht 1.854 m (6' 1\")   Wt 122.5 kg (270 lb)   BMI 35.62 kg/m²   Physical Exam  Constitutional:       General: He is not in acute distress.     Appearance: Normal appearance. He is well-developed.   HENT:      Head: Normocephalic and atraumatic.      Nose: Nose normal.   Eyes:      General: No scleral icterus.     Conjunctiva/sclera: Conjunctivae normal.      Pupils: Pupils are equal, round, and reactive to light.   Neck:      Trachea: No tracheal deviation.   Cardiovascular:      Rate and Rhythm: Normal rate and regular rhythm.      Pulses: Normal pulses.   Pulmonary:      Effort: Pulmonary effort is normal. No respiratory distress.      Breath sounds: No stridor.   Abdominal:      General: There is no distension.      Palpations: Abdomen is soft. There is no mass.      Tenderness: There is no abdominal tenderness.   Musculoskeletal:         General: No tenderness or deformity. Normal range of motion.      Cervical back: Normal range of motion and neck supple.   Lymphadenopathy:      Cervical: No cervical adenopathy.   Skin:     General: Skin is warm and dry.      Findings: No erythema.   Neurological:      General: No focal deficit present.      Mental Status: He is alert and oriented to person, place, and time.   Psychiatric:         Behavior: Behavior normal.         Judgment: Judgment normal.             DATA:  CMP:    Lab Results   Component Value Date/Time     08/14/2020 11:15 AM    K 4.4 08/14/2020 11:15 AM     08/14/2020 11:15 AM    CO2 24 08/14/2020 11:15 AM    BUN 16 08/14/2020 11:15 AM

## 2024-01-31 NOTE — TELEPHONE ENCOUNTER
"Caller: Delgado Desir \"Quang\"    Relationship: Self    Best call back number: [phone]    Requested Prescriptions:   Requested Prescriptions     Pending Prescriptions Disp Refills    ferrous sulfate 325 (65 FE) MG tablet 60 tablet 2     Sig: Take 1 tablet by mouth Daily With Breakfast.        Pharmacy where request should be sent: [facility] [place] [address] [facility] - [phone]  - [phone] FX     Last office visit with prescribing clinician: 8/1/2023   Last telemedicine visit with prescribing clinician: Visit date not found   Next office visit with prescribing clinician: 2/1/2024     Additional details provided by patient: HE HAS TWO LEFT. HE IS NOT SURE IF HE STILL NEEDS TO TAKE THESE.    Does the patient have less than a 3 day supply:  [x] Yes  [] No    Would you like a call back once the refill request has been completed: [] Yes [x] No    If the office needs to give you a call back, can they leave a voicemail: [] Yes [x] No    Manoj Robbins Rep   01/31/24 11:31 CST     "

## 2024-02-01 ENCOUNTER — LAB (OUTPATIENT)
Dept: LAB | Facility: HOSPITAL | Age: 74
End: 2024-02-01
Payer: MEDICARE

## 2024-02-01 ENCOUNTER — OFFICE VISIT (OUTPATIENT)
Dept: ONCOLOGY | Facility: CLINIC | Age: 74
End: 2024-02-01
Payer: MEDICARE

## 2024-02-01 VITALS
DIASTOLIC BLOOD PRESSURE: 66 MMHG | HEIGHT: 73 IN | RESPIRATION RATE: 18 BRPM | WEIGHT: 268.4 LBS | TEMPERATURE: 96.6 F | HEART RATE: 65 BPM | OXYGEN SATURATION: 93 % | BODY MASS INDEX: 35.57 KG/M2 | SYSTOLIC BLOOD PRESSURE: 142 MMHG

## 2024-02-01 DIAGNOSIS — D50.8 IRON DEFICIENCY ANEMIA SECONDARY TO INADEQUATE DIETARY IRON INTAKE: ICD-10-CM

## 2024-02-01 DIAGNOSIS — C34.31 MALIGNANT NEOPLASM OF LOWER LOBE OF RIGHT LUNG: ICD-10-CM

## 2024-02-01 DIAGNOSIS — C61 PROSTATE CANCER: Primary | ICD-10-CM

## 2024-02-01 LAB
ALBUMIN SERPL-MCNC: 4.1 G/DL (ref 3.5–5.2)
ALBUMIN/GLOB SERPL: 1.7 G/DL
ALP SERPL-CCNC: 108 U/L (ref 39–117)
ALT SERPL W P-5'-P-CCNC: 31 U/L (ref 1–41)
ANION GAP SERPL CALCULATED.3IONS-SCNC: 9 MMOL/L (ref 5–15)
AST SERPL-CCNC: 23 U/L (ref 1–40)
BASOPHILS # BLD AUTO: 0.03 10*3/MM3 (ref 0–0.2)
BASOPHILS NFR BLD AUTO: 0.5 % (ref 0–1.5)
BILIRUB SERPL-MCNC: 1.3 MG/DL (ref 0–1.2)
BUN SERPL-MCNC: 22 MG/DL (ref 8–23)
BUN/CREAT SERPL: 24.2 (ref 7–25)
CALCIUM SPEC-SCNC: 8.8 MG/DL (ref 8.6–10.5)
CHLORIDE SERPL-SCNC: 105 MMOL/L (ref 98–107)
CO2 SERPL-SCNC: 24 MMOL/L (ref 22–29)
CREAT SERPL-MCNC: 0.91 MG/DL (ref 0.76–1.27)
DEPRECATED RDW RBC AUTO: 47.8 FL (ref 37–54)
EGFRCR SERPLBLD CKD-EPI 2021: 89.5 ML/MIN/1.73
EOSINOPHIL # BLD AUTO: 0.07 10*3/MM3 (ref 0–0.4)
EOSINOPHIL NFR BLD AUTO: 1.1 % (ref 0.3–6.2)
ERYTHROCYTE [DISTWIDTH] IN BLOOD BY AUTOMATED COUNT: 14.2 % (ref 12.3–15.4)
FERRITIN SERPL-MCNC: 354.3 NG/ML (ref 30–400)
GLOBULIN UR ELPH-MCNC: 2.4 GM/DL
GLUCOSE SERPL-MCNC: 94 MG/DL (ref 65–99)
HCT VFR BLD AUTO: 40.1 % (ref 37.5–51)
HGB BLD-MCNC: 12.9 G/DL (ref 13–17.7)
IMM GRANULOCYTES # BLD AUTO: 0.04 10*3/MM3 (ref 0–0.05)
IMM GRANULOCYTES NFR BLD AUTO: 0.7 % (ref 0–0.5)
IRON 24H UR-MRATE: 62 MCG/DL (ref 59–158)
IRON SATN MFR SERPL: 21 % (ref 20–50)
LYMPHOCYTES # BLD AUTO: 0.5 10*3/MM3 (ref 0.7–3.1)
LYMPHOCYTES NFR BLD AUTO: 8.2 % (ref 19.6–45.3)
MCH RBC QN AUTO: 29.6 PG (ref 26.6–33)
MCHC RBC AUTO-ENTMCNC: 32.2 G/DL (ref 31.5–35.7)
MCV RBC AUTO: 92 FL (ref 79–97)
MONOCYTES # BLD AUTO: 0.38 10*3/MM3 (ref 0.1–0.9)
MONOCYTES NFR BLD AUTO: 6.2 % (ref 5–12)
NEUTROPHILS NFR BLD AUTO: 5.07 10*3/MM3 (ref 1.7–7)
NEUTROPHILS NFR BLD AUTO: 83.3 % (ref 42.7–76)
NRBC BLD AUTO-RTO: 0 /100 WBC (ref 0–0.2)
PLATELET # BLD AUTO: 157 10*3/MM3 (ref 140–450)
PMV BLD AUTO: 11.1 FL (ref 6–12)
POTASSIUM SERPL-SCNC: 4.3 MMOL/L (ref 3.5–5.2)
PROT SERPL-MCNC: 6.5 G/DL (ref 6–8.5)
RBC # BLD AUTO: 4.36 10*6/MM3 (ref 4.14–5.8)
SODIUM SERPL-SCNC: 138 MMOL/L (ref 136–145)
TIBC SERPL-MCNC: 289 MCG/DL (ref 298–536)
TRANSFERRIN SERPL-MCNC: 194 MG/DL (ref 200–360)
WBC NRBC COR # BLD AUTO: 6.09 10*3/MM3 (ref 3.4–10.8)

## 2024-02-01 PROCEDURE — 84466 ASSAY OF TRANSFERRIN: CPT

## 2024-02-01 PROCEDURE — 80053 COMPREHEN METABOLIC PANEL: CPT

## 2024-02-01 PROCEDURE — 82728 ASSAY OF FERRITIN: CPT

## 2024-02-01 PROCEDURE — 85025 COMPLETE CBC W/AUTO DIFF WBC: CPT

## 2024-02-01 PROCEDURE — 83540 ASSAY OF IRON: CPT

## 2024-02-01 PROCEDURE — 36415 COLL VENOUS BLD VENIPUNCTURE: CPT

## 2024-02-01 RX ORDER — FERROUS SULFATE 325(65) MG
325 TABLET ORAL
Qty: 60 TABLET | Refills: 2 | OUTPATIENT
Start: 2024-02-01

## 2024-02-01 NOTE — LETTER
February 1, 2024       No Recipients    Patient: Delgado Desir   YOB: 1950   Date of Visit: 2/1/2024     Dear Michael Nunez MD:       Thank you for referring Delgado Desir to me for evaluation. Below are the relevant portions of my assessment and plan of care.    If you have questions, please do not hesitate to call me. I look forward to following Delgado along with you.         Sincerely,        Milan Bullard MD        CC:   No Recipients    Milan Bullard MD  02/01/24 1148  Sign when Signing Visit  [facility] H[facility]ATOLOGY & [facility]Y  [address]  [place] [address]  [phone]    Patient Name: Delgado Desir  Encounter Date: 02/01/2024  YOB: 1950  Patient Number: [id number]      REASON FOR FOLLOW-UP: Delgado Desir is a pleasant 73 y.o. 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  He had undergone wedge resection on 10/30/2020, inadequate pulmonary function test for lobectomy and node sampling.  He is also seen for anemia from iron deficiency and back on oral iron for the past 6 months. Resume 8/1/2023 through 2/1/2024.  He is seen alone.  History is obtained from patient.  History is reliable.             Oncology/Hematology History Overview Note   DIAGNOSTIC ABNORMALITIES:  He was found to have a lung nodule with history of prostate cancer.  CT abdomen pelvis 08/10/2020.  Lobulated noncalcified nodule in the right lower lobe represent a neoplastic process.  PET scan 09/18/2020. The multilobular pulmonary nodule in the right lung base measuring 2.9 x 1.6 cm is hypermetabolic and suspicious for a malignant process with a maximum intensity of 3.62 SUV. Differential considerations include primary lung malignancy as well as early metastatic disease.  No evidence of distant metastasis.  Unsuccessful CT-guided right lower lobe nodule biopsy  10/16/2020 due to right pneumothorax.  Pathology report 11/03/2020.  Invasive mucinous adenocarcinoma, grade 1, 2 cm, right lower lobe.  No visceral pleural invasion.  No lymphovascular invasion. Margins of resection free of tumor.  Distance of invasive carcinoma from closest margin 0.1 cm.  Pleural tissue biopsies no evidence of malignancy.  AJCC stage pT1b.  PD-L1 less than 1%, negative ROS1, ALK, BRAF and EGFR mutation.  Pathology report 2/16/2021, right lower lobe wedge excision, residual tumor is not present.  The surgical margins are free of tumor.  2 benign intraparenchymal nodes benign.  1 benign node, left level 7.  1 benign no level 8.  CBC 02/19/2021 remarkable for WBC 12.9, hemoglobin 12.8 hematocrit 37.  Had a follow-up with Dr. Nick Flores 04/05/2021 post right lower lobe wedge and lymph node sampling on 02/15/2021.  Patient referred to oncology for surveillance.        PREVIOUS INTERVENTIONS:  Right lower lobe wedge resection 10/30/2020 by Dr. Flores.  His pulmonary function test is not adequate for left lower lobectomy as well as lymph node sampling.  He had undergone right lower lobe wedge 02/15/2021.      PREVIOUS INTERVENTIONS:Anemia.  Oral iron 04/15/2021 through 10/15/2021.  Resumed 08/01/2022 through 6/29/2023. Resume 8/1/2023 through 2/1/2024.     Prostate cancer    Initial Diagnosis    Prostate cancer (CMS/HCC)     7/6/2020 Procedure    PSA  21     7/30/2020 Biopsy    [facility]  Prostate Biopsy  A: Prostate, right lateral base, needle biopsy:  Prostatic adenocarcinoma, Preston score 7 (4+3)  Tumor encompasses 20% of the needle core biopsy  Grade group 3    B: Prostate, right lateral mid, needle biopsy:  Prostatic adenocarcinoma, [place]'s 7 (4+3)  Tumor encompasses 20% of the needle core biopsy  Grade group 3    C: Prostate, right lateral apex, needle biopsy:  Prostatic adenocarcinoma, Preston score 7 (3+4)  Tumor encompasses 30% of the needle core biopsy  Grade group 2  Pattern 4 is approximately  20% of the tumor    D: Prostate, right base, needle biopsy:  Prostatic adenocarcinoma, [place]'s 7 (4+3)  Tumor encompasses 10% of the needle core biopsy  Grade group 3    E: Prostate, right mid, needle biopsy:  Prostatic adenocarcinoma, Preston's 7 (4+3)  Tumor encompasses 55% of the needle core biopsy  Grade group 3    F: Prostate, right apex, needle biopsy:  High-grade prostatic intraepithelial neoplasia    G: Prostate, left lateral base, needle biopsy:  Benign prostatic glands and stroma    H: Prostate, left lateral mid, needle biopsy:  Benign prostatic glands and stroma    I: Prostate, left lateral apex, needle biopsy:  Benign prostatic glands and stroma    J: Prostate, left base, needle biopsy:  Benign prostatic glands and stroma    K: Prostate, left mid, needle biopsy:  Benign prostatic glands and stroma    L: Prostate, left apex, needle biopsy:  Benign prostatic glands and stroma     8/14/2020 Imaging    [facility]  CT AP  No acute abnormality of the abdomen or pelvis.  A moderate splenomegaly.  An enlarged prostate.  A stable bilateral renal cysts.  The lobulated noncalcified nodule in the right lower lobe represent a neoplastic process. Further evaluation with positron emission  tomography scan may be obtained.  The previously seen fractures of the left sacral ala is not visualized  in this study. If clinically significant, further evaluation with  radionuclide bone scan may be obtained    Bone Scan  1. No evidence of metastatic bone disease.  2. Degenerative uptake in the spine, right knee and bilateral  ankles/feet. Suspect prior left knee arthroplasty. Correlate clinically.     10/16/2020 Biopsy    Final Diagnosis   Lung, right lower lobe, fine-needle core biopsies and touch preparation:  A.  Fragments of skeletal muscle.  B.  No histologic or cytologic evidence of malignancy.        8/11/2021 Procedure    PSA 21.02     10/4/2021 Cancer Staged    Staging form: Prostate, AJCC 8th Edition  - Pathologic stage  from 10/4/2021: Stage IIIA (pT2, pN0, cM0, PSA: 21, Grade Group: 3) - Signed by Milan Bullard MD on 10/4/2021     11/29/2021 - 10/10/2022 Radiation    Radiation OncologyTreatment Course:  Delgado Desir received 7000 cGy in 28 fractions to the prostate via external beam radiation therapy.     Malignant neoplasm of lower lobe of right lung   10/16/2020 Biopsy    Final Diagnosis   Lung, right lower lobe, fine-needle core biopsies and touch preparation:  A.  Fragments of skeletal muscle.  B.  No histologic or cytologic evidence of malignancy.        4/5/2021 Initial Diagnosis    Malignant neoplasm of lower lobe of right lung (CMS/HCC)     4/15/2021 Cancer Staged    Staging form: Lung, AJCC 8th Edition  - Pathologic stage from 4/15/2021: Stage IA2 (pT1b, pN0, cM0) - Signed by Milan Bullard MD on 4/15/2021     11/29/2021 - 10/10/2022 Radiation    Radiation OncologyTreatment Course:  Delgado Desir received 7000 cGy in 28 fractions to the prostate via external beam radiation therapy.         PAST MEDICAL HISTORY:  ALLERGIES:  No Known Allergies  CURRENT MEDICATIONS:  Outpatient Encounter Medications as of 2/1/2024   Medication Sig Dispense Refill   • acetaminophen (TYLENOL) 500 MG tablet Take 1 tablet by mouth Every 6 (Six) Hours As Needed for Mild Pain.     • albuterol sulfate  (90 Base) MCG/ACT inhaler INHALE 2 PUFFS INTO THE LUNGS EVERY 6 HOURS AS NEEDED FOR WHEEZING AND SHORTNESS OF BREATH     • alfuzosin (UROXATRAL) 10 MG 24 hr tablet Take 1 tablet by mouth Daily.     • apixaban (Eliquis) 5 MG tablet tablet Take 1 tablet by mouth Every 12 (Twelve) Hours. 60 tablet 11   • ferrous sulfate 325 (65 FE) MG tablet Take 1 tablet by mouth Daily With Breakfast. 60 tablet 2   • metoprolol tartrate (LOPRESSOR) 25 MG tablet Take 0.5 tablets by mouth Every 12 (Twelve) Hours. 180 tablet 0   • Synthroid 137 MCG tablet Take 1 tablet by mouth Daily for 120 days. 30 tablet 3   • therapeutic multivitamin-minerals  (THERAGRAN-M) tablet Take 2 tablets by mouth Daily.       No facility-administered encounter medications on file as of 2/1/2024.     ADULT ILLNESSES:  Patient Active Problem List   Diagnosis Code   • Prostate cancer C61   • Former smoker Z87.891   • Lung nodule < 6cm on CT [id number]   • Abnormal PET of right lung R94.2   • Pneumothorax after biopsy J95.811   • Hypertension I10   • Failed intubation of airway T88.4XXA   • S/P total thyroidectomy E89.0   • Postoperative hypothyroidism E89.0   • Lung mass R91.8   • Paroxysmal atrial fibrillation I48.0   • Malignant neoplasm of lower lobe of right lung C34.31   • Nodule of neck R22.1   • S/P partial lobectomy of lung Z90.2   • History of lung cancer Z85.118   • Class 1 obesity due to excess calories with serious comorbidity and body mass index (BMI) of 33.0 to 33.9 in adult E66.09, Z68.33   • Current use of long term anticoagulation Z79.01   • Atrial fibrillation with RVR I48.91   • Atrial fibrillation I48.91     SURGERIES:  Past Surgical History:   Procedure Laterality Date   • CARPAL TUNNEL RELEASE Right    • LOBECTOMY Right 2/15/2021    Procedure: THORACOSCOPY WITH DAVINCI ROBOT WITH RIGHT WEDGE RESECTION, MEDIASTINAL LYMPH NODE DISSECTION-RIGHT;  Surgeon: Nick Flores MD;  Location: [facility] OR;  Service: [facility];  Laterality: Right;   • LUNG BIOPSY     • PROSTATE BIOPSY     • REPLACEMENT TOTAL KNEE Left    • SHOULDER SURGERY Bilateral    • THORACOSCOPY Right 10/30/2020    Procedure: MEDIASTINOSCOPY, RIGHT THORACOSCOPY POSSIBLE WEDGE RESECTION WITH DAVINCI ROBOT;  Surgeon: Nick Flores MD;  Location: [facility] OR;  Service: [facility];  Laterality: Right;   • THYROIDECTOMY N/A 12/11/2020    Procedure: Total thyroidectomy;  Surgeon: Jean Marie Solis MD;  Location:  [facility] OR;  Service: ENT;  Laterality: N/A;     HEALTH MAINTENANCE ITEMS:  Health Maintenance Due   Topic Date Due   • Pneumococcal Vaccine 65+ (1 of 2 - PCV) Never done   • TDAP/TD VACCINES (1 - Tdap)  "Never done   • ZOSTER VACCINE (1 of 2) Never done   • HEPATITIS C SCREENING  Never done   • ANNUAL WELLNESS VISIT  Never done   • INFLUENZA VACCINE  2023   • COVID-19 Vaccine ( season) 2023   • COLORECTAL CANCER SCREENING  2024       <no information>  Last Completed Colonoscopy       This patient has no relevant Health Maintenance data.          Immunization History   Administered Date(s) Administered   • COVID-19 (MODERNA) 1st,2nd,3rd Dose Monovalent 2021, 2021   • COVID-19 (MODERNA) BIVALENT 12+YRS 2022     Last Completed Mammogram       This patient has no relevant Health Maintenance data.              FAMILY HISTORY:  Family History   Problem Relation Age of Onset   • Hypertension Mother         New Contact   • Cancer Mother    • COPD Father      SOCIAL HISTORY:  Social History     Socioeconomic History   • Marital status:    • Number of children: 2   Tobacco Use   • Smoking status: Former     Packs/day: 1.00     Years: 33.00     Additional pack years: 0.00     Total pack years: 33.00     Types: Cigarettes     Start date: 1969     Quit date: 1996     Years since quittin.1   • Smokeless tobacco: Never   Vaping Use   • Vaping Use: Never used   Substance and Sexual Activity   • Alcohol use: Yes     Alcohol/week: 2.0 standard drinks of alcohol     Types: 2 Shots of liquor per week     Comment: x2 weekly   • Drug use: Never   • Sexual activity: Defer       REVIEW OF SYSTEMS:    Review of Systems   Constitutional:  Positive for fatigue. Negative for fever.        \"Little bit tired.\"   HENT:  Negative for congestion and mouth sores.    Eyes:  Negative for discharge and redness.   Respiratory:  Negative for shortness of breath and wheezing.    Cardiovascular:  Negative for chest pain and leg swelling.   Gastrointestinal:  Negative for abdominal pain, nausea and vomiting.   Endocrine: Negative for polydipsia and polyphagia.   Genitourinary:  Negative for " "difficulty urinating and dysuria.   Musculoskeletal:  Negative for gait problem and myalgias.   Skin:  Negative for pallor.   Allergic/Immunologic: Negative for food allergies.   Neurological:  Negative for dizziness and speech difficulty.   Hematological:  Negative for adenopathy. Does not bruise/bleed easily.   Psychiatric/Behavioral:  Negative for agitation, confusion and hallucinations.        VITAL SIGNS: /66   Pulse 65   Temp 96.6 °F (35.9 °C)   Resp 18   Ht 185.4 cm (73\")   Wt 122 kg (268 lb 6.4 oz)   SpO2 93%   BMI 35.41 kg/m²  Gained 11 pounds. \"Not moving, my knees bothers me.'  Pain Score    02/01/24 1118   PainSc: 0-No pain       PHYSICAL EXAMINATION:     Physical Exam  Vitals reviewed.   Constitutional:       General: He is not in acute distress.  HENT:      Head: Normocephalic and atraumatic.   Eyes:      General: No scleral icterus.  Cardiovascular:      Rate and Rhythm: Normal rate.   Pulmonary:      Effort: No respiratory distress.      Breath sounds: No wheezing.   Abdominal:      General: Bowel sounds are normal.      Palpations: Abdomen is soft.      Tenderness: There is no abdominal tenderness.   Musculoskeletal:         General: No swelling.      Cervical back: Neck supple.   Skin:     General: Skin is warm.      Coloration: Skin is not pale.   Neurological:      Mental Status: He is alert and oriented to person, place, and time.   Psychiatric:         Mood and Affect: Mood normal.         Behavior: Behavior normal.         Thought Content: Thought content normal.         Judgment: Judgment normal.         LABS    Lab Results - Last 18 Months   Lab Units 02/01/24  1048 11/24/23  0450 11/23/23  1211 08/01/23  1040 06/29/23  1258 12/30/22  1438   HEMOGLOBIN g/dL 12.9* 12.0* 13.5 11.3* 12.8* 12.3*   HEMATOCRIT % 40.1 37.6 41.6 35.9* 39.6 39.4   MCV fL 92.0 93.1 91.6 93.5 93.4 92.3   WBC 10*3/mm3 6.09 9.68 14.77* 4.66 6.28 5.80   RDW % 14.2 13.9 13.4 14.5 14.6 14.1   MPV fL 11.1 11.3 " "11.3 10.6 11.1 10.4   PLATELETS 10*3/mm3 157 178 178 139* 144 172   IMM GRAN % % 0.7* 0.6* 0.5 0.4 0.5 0.5   NEUTROS ABS 10*3/mm3 5.07 8.18* 13.71* 3.88 5.31 4.53   LYMPHS ABS 10*3/mm3 0.50* 0.84 0.44* 0.36* 0.47* 0.65*   MONOS ABS 10*3/mm3 0.38 0.55 0.52 0.34 0.41 0.47   EOS ABS 10*3/mm3 0.07 0.02 0.00 0.05 0.04 0.09   BASOS ABS 10*3/mm3 0.03 0.03 0.03 0.01 0.02 0.03   IMMATURE GRANS (ABS) 10*3/mm3 0.04 0.06* 0.07* 0.02 0.03 0.03   NRBC /100 WBC 0.0 0.0 0.0 0.0 0.0 0.0       Lab Results - Last 18 Months   Lab Units 02/01/24  1048 11/27/23  0904 11/24/23  0450 11/23/23  1211 06/29/23  1258 01/18/23  0951 12/30/22  1438 10/21/22  1555   GLUCOSE mg/dL 94  --  107* 140* 109*  --  77 140*   SODIUM mmol/L 138  --  139 141 138  --  138 137   POTASSIUM mmol/L 4.3  --  4.2 4.0 4.3  --  4.2 4.1   CO2 mmol/L 24.0  --  26.0 23.0 23.0  --  23.0 26.0   CHLORIDE mmol/L 105  --  105 105 104  --  103 103   ANION GAP mmol/L 9.0  --  8.0 13.0 11.0  --  12.0 8.0   CREATININE mg/dL 0.91 1.10 1.00 1.02 1.00 1.10 0.98 0.99   BUN mg/dL 22  --  24* 23 22  --  20 23   BUN / CREAT RATIO  24.2  --  24.0 22.5 22.0  --  20.4 23.2   CALCIUM mg/dL 8.8  --  9.0 9.6 9.1  --  8.5* 8.9   ALK PHOS U/L 108  --  88  --  113  --  120* 120*   TOTAL PROTEIN g/dL 6.5  --  5.9*  --  6.9  --  7.0 6.3   ALT (SGPT) U/L 31  --  30  --  37  --  27 48*   AST (SGOT) U/L 23  --  20  --  23  --  22 42*   BILIRUBIN mg/dL 1.3*  --  0.8  --  1.2  --  1.2 1.1   ALBUMIN g/dL 4.1  --  3.7  --  4.1  --  4.0 4.20   GLOBULIN gm/dL 2.4  --  2.2  --  2.8  --  3.0 2.1       No results for input(s): \"MSPIKE\", \"KAPPALAMB\", \"IGLFLC\", \"URICACID\", \"FREEKAPPAL\", \"CEA\", \"LDH\", \"REFLABREPO\" in the last 22926 hours.    Lab Results - Last 18 Months   Lab Units 02/01/24  1048 12/29/23  1203 11/24/23  0450 10/25/23  1055 08/29/23  1149 08/01/23  1040 07/26/23  1026 06/29/23  1258 12/30/22  1438   IRON mcg/dL 62  --   --   --   --  55*  --  59 56*   TIBC mcg/dL 289*  --   --   --   --  247*  " --  283* 298   IRON SATURATION (TSAT) % 21  --   --   --   --  22  --  21 19*   FERRITIN ng/mL 354.30  --   --   --   --  536.90*  --  431.60* 323.00   TSH uIU/mL  --  2.530 0.137* 0.158* 0.929  --  6.840* 5.420* 3.630       Delgado Desir reports a pain score of 0.           ASSESSMENT:  1.  Adenocarcinoma the lung, right lower lobe  AJCC stage:1A2 (pT1b, cN0, cM0)  Treatment status: Post right lower lobe wedge resection.  Pulmonary function test in adequate for lobectomy and node sampling.  2.  Performance status of 1.  3.  Normocytic anemia from chronic disease, post radiation/ADT and iron deficiency. Post oral iron 6/29/2023.  Resume 8/1/2023 through 2/1/2024.   4.  Adenocarcinoma of the prostate, [place] score 7, 4+3, grade group 3.  AJCC stage: IIIA (T2b, N0, M0)  Treatment status: Neoadjuvant hormone therapy with radiation 01/03/2022.  Followed by Dr. Phan and Dr. Lujan.   5.  Elevated bilirubin from cholelithiasis.  Negative CT of the abdomen pelvis.        PLAN:  1.   Re: CT chest report  11/27/2023. Stable exam. Stable postoperative change of right lower lobe wedge resection with surrounding parenchymal scarring and atelectasis. Stable   pulmonary nodules. CT chest angiogram 11/23/2023 for shortness of breathing.No pulmonary embolism to the proximal segmental level. Stable postoperative changes in the right lower lobe without progressive or new masslike consolidation. Stable pulmonary nodules. Cholelithiasis.  2.   Re: CT abdomen and pelvis report 11/27/2023.No evidence of intra-abdominal or pelvic metastatic disease.  Mild hepatic steatosis. Solitary 2 mm gallstone without evidence of cholecystitis. There are benign-appearing bilateral renal cysts.  Circumferential bladder wall thickening with incomplete bladder distention. Moderate prostamegaly. Evidence of previous right inguinal hernia repair with no recurrent hernia.  3.   Re: Heme status.  WBC 6.09, hemoglobin 12.9,  hematocrit 40.1, MCV 92 and platelet 157.  4.   Re: CMP.  GFR 89.5 and bilirubin 1.3.  Positive for cholelithiasis and fatty liver.  5.   Re: Ferritin 354.3, iron 62 and iron saturation 21 from 22 from 21 from 19 from 19 from 19%.   6.  He will be seen every 6 months with CT scan for the first 3 years then annually thereafter.  7.  Continue care per primary care physician and other specialists.  8.  Plan of care discussed with patient.  Understanding expressed.  He is agreeable to proceed.  9.  Stable for observation, lung cancer.    10.  Advance Care Planning  ACP discussion was declined by the patient. Patient does not have an advance directive, information provided.    11.  CBC with differential, ferritin and iron panel in 3 months.  12.  Stop oral iron.    13.  Next CT chest abdomen pelvis 11/2024.  14.  Return to office in 6 months with preoffice CBC with differential, ferritin, iron profile, and CMP.            I have reviewed the assessment and plan and verified the accuracy of it. No changes to assessment and plan since the information was documented. Milan Bullard MD 02/01/24         I spent 33 total minutes, face-to-face, caring for Delgado dale. Greater than 50% of this time involved counseling and/or coordination of care as documented within this note.               (Jean Marie Solis MD)  (Nick Flores MD)  (Ephraim Lujan MD)  MD Michael Briceño MD

## 2024-02-15 ENCOUNTER — LAB (OUTPATIENT)
Dept: LAB | Facility: HOSPITAL | Age: 74
End: 2024-02-15
Payer: MEDICARE

## 2024-02-15 DIAGNOSIS — E89.0 POSTOPERATIVE HYPOTHYROIDISM: ICD-10-CM

## 2024-02-15 DIAGNOSIS — E89.0 POSTOPERATIVE HYPOTHYROIDISM: Primary | ICD-10-CM

## 2024-02-15 LAB — TSH SERPL DL<=0.05 MIU/L-ACNC: 2.39 UIU/ML (ref 0.27–4.2)

## 2024-02-15 PROCEDURE — 84443 ASSAY THYROID STIM HORMONE: CPT

## 2024-02-15 PROCEDURE — 36415 COLL VENOUS BLD VENIPUNCTURE: CPT

## 2024-02-16 ENCOUNTER — TELEPHONE (OUTPATIENT)
Dept: OTOLARYNGOLOGY | Facility: CLINIC | Age: 74
End: 2024-02-16
Payer: MEDICARE

## 2024-02-22 ENCOUNTER — HOSPITAL ENCOUNTER (OUTPATIENT)
Dept: RADIATION ONCOLOGY | Facility: HOSPITAL | Age: 74
Setting detail: RADIATION/ONCOLOGY SERIES
End: 2024-02-22
Payer: MEDICARE

## 2024-02-22 DIAGNOSIS — R26.9 GAIT DISTURBANCE: Primary | ICD-10-CM

## 2024-02-22 PROBLEM — Z92.3 HISTORY OF RADIATION THERAPY: Status: ACTIVE | Noted: 2024-02-22

## 2024-02-22 NOTE — PROGRESS NOTES
[facility], .SAlfonsoAlfonso Lujan MD      Zak Lai, APRN  ____________________________________________________________  [facility]  Department of Radiation Oncology  [address] [place] [id number]  Office:  [phone]  Fax: [phone]    DATE: 02/23/2024  PATIENT: Delgaod Desir 1950                         MEDICAL RECORD #:  [id number]                                                       REASON FOR FOLLOW UP VISIT  Delgado Desir is a very pleasant 73 y.o. patient that has completed radiation therapy and returns today for routine follow up exam.  Denies appetite change, unexpected weight change, nasuea/vomiting, diarrhea, light-headedness, weakness, and headaches. He continues to follow  and .      History of Present Illness:  Diagnosed in July 2020 with Adenocarcinoma of prostate, pre-PSA 21.02, Preston (4+3) 7, 5/12 cores positive, PNI+. He completed 7000 cGy in 28 fractions to the prostate on 01/10/2022.    In October 2020, Mr. Desir was diagnosed with Stage 1A2 of the RLL of the lung, being managed by Dr. Flores and Dr. Bullard.     07/06/2020 - PSA: 21    07/15/2020 - Appointment with :  Patient is not satisfied with results with Uroxatrol, I will place him on Rapaflo.   It has been explained to patient that with the extremely high PSA I would highly suggest prostate biopsy. Patient is agreeable to this.   The risk of infection, bleeding, nausea, vomiting, or even syncope or presyncope was discussed with patient.   He has been instructed to begin antibiotic today before procedure and instill enema the day of procedure.   Ciprofloxacin has been sent to his pharmacy.    07/30/2020 - Prostate Biopsy per :  Prostate, right lateral base, needle biopsy:  Prostatic adenocarcinoma, Belle score 7 (4+3)  Tumor encompasses 20% of the needle core biopsy  Grade group 3  Prostate, right lateral mid, needle biopsy:  Prostatic  adenocarcinoma, Preston's 7 (4+3)  Tumor encompasses 20% of the needle core biopsy  Grade group 3  Prostate, right lateral apex, needle biopsy:  Prostatic adenocarcinoma, Preston score 7 (3+4)  Tumor encompasses 30% of the needle core biopsy  Grade group 2  Pattern 4 is approximately 20% of the tumor  Prostate, right base, needle biopsy:  Prostatic adenocarcinoma, Chign[place]'s 7 (4+3)  Tumor encompasses 10% of the needle core biopsy  Grade group 3  Prostate, right mid, needle biopsy:  Prostatic adenocarcinoma, Preston's 7 (4+3)  Tumor encompasses 55% of the needle core biopsy  Grade group 3  Prostate, right apex, needle biopsy:  High-grade prostatic intraepithelial neoplasia  Prostate, left lateral base, needle biopsy:  Benign prostatic glands and stroma  Prostate, left lateral mid, needle biopsy:  Benign prostatic glands and stroma  Prostate, left lateral apex, needle biopsy:  Benign prostatic glands and stroma  Prostate, left base, needle biopsy:  Benign prostatic glands and stroma  Prostate, left mid, needle biopsy:  Benign prostatic glands and stroma  Prostate, left apex, needle biopsy:  Benign prostatic glands and stroma    08/14/2020 - CT Abdomen/Pelvis with and without contrast:  No acute abnormality of the abdomen or pelvis.  A moderate splenomegaly.  An enlarged prostate.  A stable bilateral renal cysts.  The lobulated noncalcified nodule in the right lower lobe represent a neoplastic process. Further evaluation with positron emission tomography scan may be obtained.  The previously seen fractures of the left sacral ala is not visualized in this study. If clinically significant, further evaluation with radionuclide bone scan may be obtained    08/14/2020 - Bone Scan:  No evidence of metastatic bone disease.  Degenerative uptake in the spine, right knee and bilateral ankles/feet. Suspect prior left knee arthroplasty. Correlate clinically.    08/21/2020 - Appointment with :  PSA on 7/6/2020 was 21  ng/ML  His prostate volume was 107 grams. PSAD 0.20  He had a prostate biopsy consisting of a total of 12 cores with 5 positive cores.  TRUS Findings consisted of no abnormal ultrasound findings  He has right sided disease with a Preston score of 4+3 equal sign 7 grade group 2 intermediate unfavorable.  Location of the the Cancer: 5 out of 6 cores on the right the only one that was negative was the right apex and has had high-grade PIN. 0 out of 6 cores on the left   His clinical TNM stage was: [id number]  His pathological TNM stage was: [id number]  The patient has a staging CT and bone scan.  Recommendations:  All the time was spent face-to-face with the patient and his wife for a total time of 65 minutes. Time was spent discussing his pathology, risk ratification. Management options.   He asked many questions and these were answered to the best my ability to his satisfaction.  After a long discussion I told patient that I felt he was not a candidate active surveillance.   That he should consider surgery as a most aggressive approach particular given the high PSA is Preston score the extent of disease and the fact that he has a really large prostate and some obstructive symptoms but this might be the best choice however his major concern is his quality of life and not cancer control and therefore he is leaning more toward external beam radiation therapy given the large size of his prostate I suggested that he consider 2 years of neoadjuvant hormonal therapy with XRT.   He wants to see the radiation oncologist for consultation.   He is not completely made up his mind.   We will wait to hear back from him after he sees radiation oncology    09/02/2020 - Consult with  (Covering for ):  We discussed the goals and plans of care with him and his family, answered all questions and he verbalizes understanding.  Separately however, I discussed with him and his wife that after reviewing his imaging studies and  discussing the pulmonary findings with the reading radiologist, Dr. Charlene Grider, it was determined that the right lower lobe pulmonary nodule has had an interval enlargement currently measures 2.8 x 1.7 cm on his CT scan of the abdomen and pelvis performed on 08/14/2020 in comparison to CT scan of the chest in November 2019 when it measured 1.7 x 1.2 cm (an addendum to the report has been made).  He understands that this finding requires further work-up for the possibility of metastatic prostate cancer versus a primary lung cancer (more likely).  He understands that work-up will likely lead to possibility of invasive procedures such as thoracic biopsies.  This new information will necessitate holding off on a definitive determination of prostate cancer treatment until work-up is completed.  Possible clinical scenarios include benign pulmonary etiologies, synchronous prostate and pulmonary malignancies, and less likely prostate cancer with pulmonary metastasis.  In the meantime, we agreed that starting androgen deprivation therapy would be a reasonable decision as we move forward with additional work-up and he will be seeing Dr. Phan for that.  Following this discussion and in consideration of the diagnostic data/evaluation of the patient, I am recommending that he undergo a PET scan with anticipated additional referrals for possible biopsying depending on PET scan results.  Patient will return for follow-up regarding PET scan results or additional coordination of care will be arranged through telephone conversations as needed.    09/18/2020 - PET Scan:  Hypermetabolic right lower lobe pulmonary nodule compatible with malignant neoplasm.  No scintigraphic evidence of distal metastases.    09/24/2020 - Telephone encounter with :  I spoke with this patient this morning regarding the results of his PET scan.  The right lower lobe nodule is suspicious for malignancy.  I will discuss this case with   Nick Flores and he may need this lesion removed for diagnosis and definitive treatment.    09/24/2020 - Documentation per :  I spoke with Dr. Steven Nunez this morning as well as placed a call for Dr. Phan regarding the management of this patient.  At this point I believe he needs to start ADT for his prostate carcinoma and perhaps some intervention for his urinary obstructive symptoms.  Radiation will make these symptoms worse with the swelling from the radiation and he may need some proactive intervention to relieve his obstructive symptoms.  He also needs the right lower lobe lung nodule addressed and I have sent a message to Dr. Nick Flores.  We could consider a CT-guided biopsy, however if negative I think he would still need this removed that is clinically quite suspicious.    09/24/2020 - Documentation per :  I spoke with Dr. Nick Flores and he is reviewed the films.  At this time we will schedule a CT-guided biopsy as well as PFTs.  It would be very unusual to have a pulmonary metastases from prostate cancer in the absence of disseminated bone metastases but not outside the realm of possibility.  Therefore we will proceed with core needle biopsies of the lung for tissue diagnosis, and he may require definitive surgical resection.  We will also request PFTs to be completed.    09/30/2020 - Telephone encounter with :  I spoke with this patient today to update him on plans for treatment.  I have discussed his case with Dr. Phan, Dr. Steven Nunez, and Dr. Nick Flores,  With Dr. Phan he will start him on ADT therapy and discuss surgery versus some type of intervention procedure to relieve his urinary outlet obstruction from enlarged prostate.  We are cart quite concerned that with radiation he will have total obstruction due to his prostate being over 100 g.  Also, with his lung nodule that is hypermetabolic on PET scan he needs a CT-guided biopsy and pulmonary function test.  He will  also see Dr. Nick Flores and referral has been made.  Patient verbalized understanding and will await contact from the office of Dr. Flores, Dr. Phan, pulmonary function test lab and CT scanning guided biopsy of the lung nodule.    10/16/2020 - Lung, right lower lobe, fine-needle core biopsies and touch preparation:  Fragments of skeletal muscle.  No histologic or cytologic evidence of malignancy.    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  No visceral pleural invasion.  Margin of resection free of tumor.  Pleural tissue, biopsies:  Fibrosis and chronic inflammation.  No evidence of malignancy.  AJCC pathologic stage:  pT1b Nx    Synoptic Checklist   LUNG  8th Edition - Protocol posted: 2/26/2020  LUNG: RESECTION - All Specimens  SPECIMEN   Procedure  Wedge resection    Specimen Laterality  Right    TUMOR   Tumor Site  Lower lobe of lung    Histologic Type  Invasive mucinous adenocarcinoma    Histologic Grade  G1: Well differentiated    Spread Through Air Spaces (MYLENE)  Not identified    Tumor Size     Total Tumor Size (size of entire tumor)  Greatest Dimension (Centimeters): 2.0 cm   Tumor Focality  Single focus    Visceral Pleura Invasion  Not identified    Direct Invasion of Adjacent Structures  No adjacent structures present    Treatment Effect  No known presurgical therapy    Lymphovascular Invasion  Not identified    MARGINS   Margins  All margins are uninvolved by tumor    Margins Examined  Parenchymal    Distance of Invasive Carcinoma from Closest Margin (Centimeters)  0.1 cm   Closest Margin  Parenchymal    LYMPH NODES   Regional Lymph Nodes  No lymph nodes submitted or found    PATHOLOGIC STAGE CLASSIFICATION (pTNM, AJCC 8th Edition)   Primary Tumor (pT)  pT1b    Regional Lymph Nodes (pN)  pNX    ADDITIONAL FINDINGS   Additional Findings  Inflammation  (type): Chronic    .        02/15/2021 - THORACOSCOPY WITH DAVINCI ROBOT WITH RIGHT WEDGE RESECTION, MEDIASTINAL LYMPH NODE DISSECTION-RIGHT per :  Lung, right lower lobe, wedge excision:  Residual tumor is not present.  The surgical margins are free of tumor.  2 benign lymph nodes are present (intraparenchymal).  Left node level 7, excision: 1 benign lymph node.  Lymph node, level 8, excision: 1 benign lymph node.    04/05/2021 - Appointment with :  Patient referred to oncology for surveillance.    04/15/2021 - Consult with :  ASSESSMENT:  Adenocarcinoma the lung, right lower lobe  AJCC stage:1A2 (pT1b, cN0, cM0)  Treatment status: Post right lower lobe wedge resection.  Pulmonary function test in adequate for lobectomy and node sampling.  Performance status of 1.   Hypothyroidism.  On Synthroid.  Adenocarcinoma of the prostate, Preston score 7, 4+3, grade group 3.  AJCC stage: IIIA (T2b, N0, M0)  Treatment status: Neoadjuvant hormone therapy with radiation.  Followed by Dr. Phan and Dr. Lujan.   Obesity BMI 34.04.  PLAN:   Re: The reason for follow-up.   regarding role of surveillance.  Blood for CBC with differential and CMP.  He will be seen every 6 months with CT scan for the first 3 years then annually thereafter.  Schedule CT brain for staging.  Patient declined brain MRI.   Schedule CT chest, abdomen and pelvis 05/2021.   Continue care per primary care physician and other specialists.  Plan of care discussed with patient.  Understanding expressed.  Patient able to proceed.  Return to office in 6 months with preoffice CBC with differential and CMP.   Next CT 11/2021.   Refer to PCP for obesity.   Advance Care Planning  ACP discussion was declined by the patient. Patient does not have an advance directive, information provided.  Further recommendations pending.    04/29/2021 - CT Chest with contrast:  Postsurgical changes in the right lower lobe from prior wedge  resection. There has been interval decrease in the size of a right pleural effusion. Adjacent consolidation has also decreased but persists.  Stable mildly enlarged right hilar lymph node.  Interval increase in the size of a right middle lobe nodule, possibly a reactive intrafissural lymph node or metastatic nodule. A new nodule in the left lower lobe may be an area of inflammation or atelectasis but should also be followed. Short interval CT is recommended in 3 months.  Cardiomegaly. Findings suggestive of pulmonary arterial hypertension.    04/29/2021 - CT abdomen/Pelvis with contrast:  No evidence of metastatic disease in the abdomen or pelvis.   Atherosclerotic disease.  Right renal cyst. Probable left renal cysts.  Prostatomegaly.    04/29/2021 - CT Head with and without contrast:  Mild cerebral and cerebellar volume loss with chronic microvascular disease but no evidence of acute intracranial process.  There is no abnormal enhancement.    07/30/2021 - CT Chest with contrast:  Postsurgical change in the right lung base is stable compared to April 29, 2021  Previous noted left lower lobe nodule has resolved.    08/11/2021 - PSA: 21.02    08/16/2021 - Appointment with :  Prostate cancer:  Patient has now been cleared to proceed with treatment for prostate cancer. He choses to do neoadjuvant hormonal therapy with radiation treatment.   Will getting back into see radiation oncology to get this established.   We will work on his preauthorization for Lupron will be contacted as soon as this is completed to come in for his Lupron injection. What he sees radiation oncology can come back to see me for Fiduciary marker placement.  Return for routine office f/u after seen by radiation oncology for Fiduciary marker placement.     08/23/2021 - Consult with  (Covering for ):  A definitive course of fractionated radiation therapy is recommended to the prostate,  anticipate a course of 7000 cGy  over 28 treatment fractions; pelvic lymphatics will also be treated. The patient verbalizes understanding, voices no further questions and wishes to proceed with recommended therapy.   Will refer back to urologist for initiation of ADT; fiducial seed placement and Space OAR gel placement to take place approximately 10 weeks after initiation of ADT.  Patient will return for CT simulation approximately 1 week after placement of fiducial markers and spacer gel.  Continue ongoing management per primary care physician and other specialists.   Plan:  We will refer you to urology for seed and OAR gel placement, they will call you.  Start ADT asap with Dr. Phan.  Return to Dr. Lujan for CT simulation 1 week after seed/gel placement.     11/29/2021 - 01/10/2022 - Completed radiation course:  Received 7000 cGy in 28 fractions to the prostate    01/10/2022 - CT Chest with contrast:  Stable postsurgical chest stable compared to April 29, 2021 and July 30, 2021  Cholelithiasis with tiny calculus noted in the gallbladder.    01/10/2022 - CT Abdomen/Pelvis with contrast:  No evidence of metastatic disease to the abdomen or pelvis.  Cholelithiasis, without evidence of cholecystitis.  Additional chronic findings as above.    01/14/2022 - Appointment with :  ASSESSMENT:  Adenocarcinoma the lung, right lower lobe  AJCC stage:1A2 (pT1b, cN0, cM0)  Treatment status: Post right lower lobe wedge resection.  Pulmonary function test in adequate for lobectomy and node sampling.  Performance status of 1.   Elevated bilirubin.  Negative CT of the abdomen pelvis.  Adenocarcinoma of the prostate, Preston score 7, 4+3, grade group 3.  AJCC stage: IIIA (T2b, N0, M0)  Treatment status: Neoadjuvant hormone therapy with radiation 01/03/2022.  Followed by Dr. Phan and Dr. Lujan.   Normocytic anemia from chronic disease, post radiation/ADT and iron deficiency.  PLAN:   Re: CT chest 01/10/2022. Stable postsurgical chest stable  compared to April 29, 2021 and July 30, 2021  Cholelithiasis with tiny calculus noted in the gallbladder.   Re: CT abdomen pelvis 01/10/2022.  No evidence of metastatic disease to the abdomen or pelvis.  Cholelithiasis, without evidence of cholecystitis.   Re: Heme status.  Hemoglobin 12.4, hematocrit 36.3 and MCV 91.4.  Reticulocyte 2.41%.   Re: CMP.  GFR 60 ml/min and bilirubin 1.4 from 1.7 from 1.1. Positive for cholelithiasis.   Re: Ferritin 269 and saturation 24%.  He will be seen every 6 months with CT scan for the first 3 years then annually thereafter.  Continue care per primary care physician and other specialists.  Plan of care discussed with patient.  Understanding expressed.  Patient able to proceed.  Stable for observation, cancer.  Advance Care Planning  ACP discussion was declined by the patient. Patient does not have an advance directive, information provided.   CBC with differential, B12, folate, ferritin and iron panel in 3 months  Return to office in 6 months with preoffice CT chest, abdomen, pelvis, CBC with differential and CMP.     02/25/2022 - Appointment with :  PSA today   Follow up in one year     02/25/2022 - PSA: 0.119    06/21/2022 - PSA: 0.03    01/19/2023 - Appointment with :  ASSESSMENT:  Adenocarcinoma the lung, right lower lobe  AJCC stage:1A2 (pT1b, cN0, cM0)  Treatment status: Post right lower lobe wedge resection.  Pulmonary function test in adequate for lobectomy and node sampling.  Performance status of 1.  Normocytic anemia from chronic disease, post radiation/ADT and iron deficiency. On oral iron.   Adenocarcinoma of the prostate, Preston score 7, 4+3, grade group 3.  AJCC stage: IIIA (T2b, N0, M0)  Treatment status: Neoadjuvant hormone therapy with radiation 01/03/2022.  Followed by Dr. Phan and Dr. Lujan.   Elevated bilirubin from cholelithiasis.  Negative CT of the abdomen pelvis.  PLAN:   Re: CT chest report  01/18/2023. A nodular area in the right lower lobe laterally and inferiorly  measuring 1.2 cm may be minimally larger, previously measuring 1.1 cm. There is also a new 2 to 3 mm subpleural groundglass nodule in the left upper lobe anteriorly. Other areas of nodularity appear stable.  Stable pleural thickening on the right. Stable parenchymal infiltrates along a wedge resection suture line in the right lower lobe.Emphysematous changes.  A left hilar lymph node is now mildly enlarged by size criteria  measuring 1.4 cm and previously measuring 1 cm. There are several subcentimeter hilar and mediastinal lymph nodes that appear relatively stable.  Atheromatous disease of the thoracic aorta. Minimal coronary artery  calcification. Main pulmonary artery segment mildly dilated at 3.2 cm. Mild cardiomegaly.    Re: CT abdomen and pelvis report 01/18/2023. No acute abnormality of the abdomen or pelvis.  Fatty infiltration of the liver. Moderate splenomegaly.  Low-density renal nodules bilaterally stable since the previous  study. Cholelithiasis. No finding to suggest cholecystitis.  Moderate thickening of the gallbladder wall which may partly be due to incomplete distention. Possibility of chronic cystitis not excluded.  Moderately enlarged prostate with evidence of previous surgery or procedure.   Re: Heme status.  WBC 5.8, hemoglobin 12.3 from 12.6, hematocrit 39.4 from 37.2, MCV 92.3 and platelet 172.   Re: CMP.  GFR 82.4 from 81.4 and bilirubin 1.2 from 1.1 from 1.1 from 1.4 from 1.7 from 1.1.  Positive for cholelithiasis.   Re: Ferritin  323 from 335.1 from 415.9 and iron saturation 19 from 19 from 19%.  He will be seen every 6 months with CT scan for the first 3 years then annually thereafter.  Continue care per primary care physician and other specialists.  Plan of care discussed with patient.  Understanding expressed.  Patient able to proceed.  Stable for observation, cancer.  Await  PET.  eRx ferrous sulfate 325 mg p.o. twice daily #60 with 2 refills.  Stop and call if intolerant.  Monitor for nausea, vomiting, diarrhea, constipation or abdominal pain.  Advance Care Planning  ACP discussion was declined by the patient. Patient does not have an advance directive, information provided.  CBC with differential, ferritin and iron panel in 3 months.  Stool for occult blood x3.  PET scan for worsening left hilar node, left upper lobe nodule and larger right lower lobe nodule.  Return to office in 6 months with preoffice CT chest, abdomen, pelvis, CBC with differential, ferritin, iron profile, and CMP.     01/19/2023 - PET scan:  No abnormal hypermetabolic activity to suggest recurrent or metastatic disease.  There is only low level metabolic activity below liver background level associated with the nodule in the inferolateral RIGHT upper lobe for which concern was raised on prior chest CT. Also of note, this nodule is unchanged over multiple prior studies.    01/20/2023 - PSA: <0.01    01/23/2023 - Appointment with :  Prostate cancer (HCC)  His PSA remains very low. He says he is due a injection February. His last injection would be this summer.  Return in about 6 months (around 7/23/2023) for PSA prior to next visit.    02/24/2023 - Appointment with :  Follow up in 1 year     07/24/2023 - PSA: <0.01     01/23/2024 - PSA: <0.01     01/26/2024 - Appointment with :  His PSA remains undetectable. Continue to monitor closely.  Return in about 6 months (around 7/26/2024) for PSA prior to vext visit.    02/01/2024 - Appointment with :   Re: CT chest report  11/27/2023. Stable exam. Stable postoperative change of right lower lobe wedge resection with surrounding parenchymal scarring and atelectasis. Stable   pulmonary nodules. CT chest angiogram 11/23/2023 for shortness of breathing.No pulmonary embolism to the proximal segmental level. Stable postoperative changes  in the right lower lobe without progressive or new masslike consolidation. Stable pulmonary nodules. Cholelithiasis.   Re: CT abdomen and pelvis report 11/27/2023.No evidence of intra-abdominal or pelvic metastatic disease.  Mild hepatic steatosis. Solitary 2 mm gallstone without evidence of cholecystitis. There are benign-appearing bilateral renal cysts.  Circumferential bladder wall thickening with incomplete bladder distention. Moderate prostamegaly. Evidence of previous right inguinal hernia repair with no recurrent hernia.   Re: Heme status.  WBC 6.09, hemoglobin 12.9, hematocrit 40.1, MCV 92 and platelet 157.   Re: CMP.  GFR 89.5 and bilirubin 1.3.  Positive for cholelithiasis and fatty liver.   Re: Ferritin 354.3, iron 62 and iron saturation 21 from 22 from 21 from 19 from 19 from 19%.   He will be seen every 6 months with CT scan for the first 3 years then annually thereafter.  Continue care per primary care physician and other specialists.  Plan of care discussed with patient.  Understanding expressed.  He is agreeable to proceed.  Stable for observation, lung cancer.    Advance Care Planning  ACP discussion was declined by the patient. Patient does not have an advance directive, information provided.  CBC with differential, ferritin and iron panel in 3 months.  Stop oral iron.    Next CT chest abdomen pelvis 11/2024.  Return to office in 6 months with preoffice CBC with differential, ferritin, iron profile, and CMP.           History obtained from  PATIENT and CHART    PAST MEDICAL HISTORY  Past Medical History:   Diagnosis Date    A-fib     Arthritis     Asthma 12/1/22    Dizzy spells     states on rare occsaion has    Failed intubation of airway 11/10/2020    Hypertension     Lung cancer     Nosebleed 12/1/22    Taking  eliquis    PAF (paroxysmal atrial fibrillation)     Prostate cancer 2020    Thyroid disorder       PAST SURGICAL HISTORY  Past Surgical History:   Procedure  Laterality Date    CARPAL TUNNEL RELEASE Right     LOBECTOMY Right 2/15/2021    Procedure: THORACOSCOPY WITH DAVINCI ROBOT WITH RIGHT WEDGE RESECTION, MEDIASTINAL LYMPH NODE DISSECTION-RIGHT;  Surgeon: Nick Flores MD;  Location:  [facility] OR;  [facility]: [facility];  Laterality: Right;    LUNG BIOPSY      PROSTATE BIOPSY      REPLACEMENT TOTAL KNEE Left     SHOULDER SURGERY Bilateral     THORACOSCOPY Right 10/30/2020    Procedure: MEDIASTINOSCOPY, RIGHT THORACOSCOPY POSSIBLE WEDGE RESECTION WITH DAVINCI ROBOT;  Surgeon: Nick Flores MD;  Location:  [facility] OR;  Service: [facility];  Laterality: Right;    THYROIDECTOMY N/A 2020    Procedure: Total thyroidectomy;  Surgeon: Jean Marie Solis MD;  Location:  [facility] OR;  Service: ENT;  Laterality: N/A;      FAMILY HISTORY  family history includes COPD in his father; Cancer in his mother; Hypertension in his mother.     SOCIAL HISTORY  Social History     Tobacco Use    Smoking status: Former     Packs/day: 1.00     Years: 33.00     Additional pack years: 0.00     Total pack years: 33.00     Types: Cigarettes     Start date: 1969     Quit date: 1996     Years since quittin.1    Smokeless tobacco: Never   Vaping Use    Vaping Use: Never used   Substance Use Topics    Alcohol use: Yes     Alcohol/week: 2.0 standard drinks of alcohol     Types: 2 Shots of liquor per week     Comment: x2 weekly    Drug use: Never     ALLERGIES  Patient has no known allergies.      MEDICATIONS    Current Outpatient Medications:     acetaminophen (TYLENOL) 500 MG tablet, Take 1 tablet by mouth Every 6 (Six) Hours As Needed for Mild Pain., Disp: , Rfl:     albuterol sulfate  (90 Base) MCG/ACT inhaler, INHALE 2 PUFFS INTO THE LUNGS EVERY 6 HOURS AS NEEDED FOR WHEEZING AND SHORTNESS OF BREATH, Disp: , Rfl:     alfuzosin (UROXATRAL) 10 MG 24 hr tablet, Take 1 tablet by mouth Daily., Disp: , Rfl:     apixaban (Eliquis) 5 MG tablet tablet, Take 1 tablet by mouth Every 12 (Twelve)  "Hours., Disp: 60 tablet, Rfl: 11    metoprolol tartrate (LOPRESSOR) 25 MG tablet, Take 0.5 tablets by mouth Every 12 (Twelve) Hours., Disp: 180 tablet, Rfl: 0    Synthroid 137 MCG tablet, Take 1 tablet by mouth Daily for 120 days., Disp: 30 tablet, Rfl: 3    therapeutic multivitamin-minerals (THERAGRAN-M) tablet, Take 2 tablets by mouth Daily., Disp: , Rfl:     Current outpatient and discharge medications have been reconciled for the patient.  Reviewed by: Nilda Griffiths LPN    The following portions of the patient's history were reviewed and updated as appropriate: allergies, current medications, past family history, past medical history, past social history, past surgical history and problem list.     REVIEW OF SYSTEMS  Review of Systems   Constitutional: Negative.  Negative for activity change, fatigue and unexpected weight change.   HENT: Negative.     Respiratory: Negative.  Negative for cough and shortness of breath.    Cardiovascular: Negative.    Gastrointestinal: Negative.  Negative for blood in stool and diarrhea.   Musculoskeletal: Negative.    Skin: Negative.    Neurological: Negative.  Negative for dizziness, weakness, light-headedness and headaches.   Hematological: Negative.  Negative for adenopathy.   Psychiatric/Behavioral: Negative.     All other systems reviewed and are negative.       PHYSICAL EXAM  VITAL SIGNS:   Vitals:    02/23/24 1303   BP: 162/59   Pulse: 61   SpO2: 95%  Comment: room air   Weight: 123 kg (270 lb 3.2 oz)   Height: 185.4 cm (73\")   PainSc: 0-No pain        Physical Exam  Vitals reviewed.   Constitutional:       Appearance: Normal appearance.   HENT:      Head: Normocephalic.   Cardiovascular:      Rate and Rhythm: Normal rate and regular rhythm.      Pulses: Normal pulses.      Heart sounds: Normal heart sounds.   Pulmonary:      Effort: Pulmonary effort is normal. No respiratory distress.      Breath sounds: Normal breath sounds.   Abdominal:      General: Bowel sounds are " normal.   Musculoskeletal:         General: Normal range of motion.      Cervical back: Normal range of motion and neck supple.   Skin:     General: Skin is warm.      Capillary Refill: Capillary refill takes less than 2 seconds.   Neurological:      General: No focal deficit present.      Mental Status: He is alert and oriented to person, place, and time.   Psychiatric:         Mood and Affect: Mood normal.         Behavior: Behavior normal.        Performance Status: ECOG (0) Fully active, able to carry on all predisease performance without restriction    Clinical Quality Measures  -Pain Documented by Standardized Tool, FPS Delgado Desir reports a pain score of 0. Given his pain assessment as noted, treatment options were discussed and the following options were decided upon as a follow-up plan to address the patient's pain:  No pain, no plan given .   Pain Medications               acetaminophen (TYLENOL) 500 MG tablet Take 1 tablet by mouth Every 6 (Six) Hours As Needed for Mild Pain.          -Advanced Care Planning Advance Care Planning  ACP discussion was held with the patient during this visit. Patient does not have an advance directive, information provided.    -Body Mass Index Screening and Follow-Up Plan Patient's Body mass index is 35.65 kg/m². indicating that he is obese (BMI >30). Obesity-related health conditions include the following: none.     -Tobacco Use: Screening and Cessation Intervention Social History    Tobacco Use      Smoking status: Former        Packs/day: 1.00        Years: 33.00        Additional pack years: 0.00        Total pack years: 33.00        Types: Cigarettes        Start date: 1969        Quit date: 1996        Years since quittin.1      Smokeless tobacco: Never     ASSESSMENT AND PLAN  1. Prostate cancer    2. History of lung cancer    3. S/P partial lobectomy of lung    4. History of radiation therapy    5. Former smoker      RECOMMENDATIONS:   Delgado BOWMAN  Karlee is status post completion of radiation therapy to the prostate, returns to our clinic today for routine follow up exam. Diagnosed in July 2020 with Adenocarcinoma of prostate, pre-PSA 21.02, Silas (4+3) 7, 5/12 cores positive, PNI+. He completed 7000 cGy in 28 fractions to the prostate on 01/10/2022.    On exam, he is currently without symptoms or evidence for recurrent or metastatic disease. Reports no voiding issues. Last PSA undetectable. He will continue follow-up/surveillance as discussed in 1 year or sooner if needed and will continue to see the other health care providers as per their scheduling.    Patient Instructions   1) Return in 1 year     Todays appointment time was spent in counseling, coordination of care and surveillance related to patients diagnosis as well as radiation therapy possible and probable after effects.   Nilda Griffiths LPN  02/23/2024

## 2024-02-23 ENCOUNTER — OFFICE VISIT (OUTPATIENT)
Dept: RADIATION ONCOLOGY | Facility: HOSPITAL | Age: 74
End: 2024-02-23
Payer: MEDICARE

## 2024-02-23 VITALS
BODY MASS INDEX: 35.81 KG/M2 | SYSTOLIC BLOOD PRESSURE: 162 MMHG | OXYGEN SATURATION: 95 % | WEIGHT: 270.2 LBS | HEIGHT: 73 IN | HEART RATE: 61 BPM | DIASTOLIC BLOOD PRESSURE: 59 MMHG

## 2024-02-23 DIAGNOSIS — Z92.3 HISTORY OF RADIATION THERAPY: ICD-10-CM

## 2024-02-23 DIAGNOSIS — Z87.891 FORMER SMOKER: ICD-10-CM

## 2024-02-23 DIAGNOSIS — Z85.118 HISTORY OF LUNG CANCER: ICD-10-CM

## 2024-02-23 DIAGNOSIS — C61 PROSTATE CANCER: Primary | ICD-10-CM

## 2024-02-23 DIAGNOSIS — Z90.2 S/P PARTIAL LOBECTOMY OF LUNG: ICD-10-CM

## 2024-02-23 PROCEDURE — G0463 HOSPITAL OUTPT CLINIC VISIT: HCPCS | Performed by: RADIOLOGY

## 2024-03-02 ENCOUNTER — APPOINTMENT (OUTPATIENT)
Dept: GENERAL RADIOLOGY | Facility: HOSPITAL | Age: 74
DRG: 309 | End: 2024-03-02
Payer: MEDICARE

## 2024-03-02 ENCOUNTER — NURSE TRIAGE (OUTPATIENT)
Dept: CALL CENTER | Facility: HOSPITAL | Age: 74
End: 2024-03-02
Payer: MEDICARE

## 2024-03-02 ENCOUNTER — HOSPITAL ENCOUNTER (INPATIENT)
Facility: HOSPITAL | Age: 74
LOS: 2 days | Discharge: HOME | DRG: 309 | End: 2024-03-04
Attending: EMERGENCY MEDICINE | Admitting: FAMILY MEDICINE
Payer: MEDICARE

## 2024-03-02 DIAGNOSIS — I48.91 ATRIAL FIBRILLATION WITH RVR: Primary | ICD-10-CM

## 2024-03-02 LAB
ALBUMIN SERPL-MCNC: 4 G/DL (ref 3.5–5.2)
ALBUMIN/GLOB SERPL: 1.5 G/DL
ALP SERPL-CCNC: 110 U/L (ref 39–117)
ALT SERPL W P-5'-P-CCNC: 32 U/L (ref 1–41)
ANION GAP SERPL CALCULATED.3IONS-SCNC: 10 MMOL/L (ref 5–15)
AST SERPL-CCNC: 22 U/L (ref 1–40)
BASOPHILS # BLD AUTO: 0.05 10*3/MM3 (ref 0–0.2)
BASOPHILS NFR BLD AUTO: 0.6 % (ref 0–1.5)
BILIRUB SERPL-MCNC: 1 MG/DL (ref 0–1.2)
BUN SERPL-MCNC: 34 MG/DL (ref 8–23)
BUN/CREAT SERPL: 34.7 (ref 7–25)
CALCIUM SPEC-SCNC: 8.6 MG/DL (ref 8.6–10.5)
CHLORIDE SERPL-SCNC: 106 MMOL/L (ref 98–107)
CO2 SERPL-SCNC: 23 MMOL/L (ref 22–29)
CREAT SERPL-MCNC: 0.98 MG/DL (ref 0.76–1.27)
DEPRECATED RDW RBC AUTO: 46.6 FL (ref 37–54)
EGFRCR SERPLBLD CKD-EPI 2021: 81.9 ML/MIN/1.73
EOSINOPHIL # BLD AUTO: 0.09 10*3/MM3 (ref 0–0.4)
EOSINOPHIL NFR BLD AUTO: 1.1 % (ref 0.3–6.2)
ERYTHROCYTE [DISTWIDTH] IN BLOOD BY AUTOMATED COUNT: 14.4 % (ref 12.3–15.4)
GEN 5 2HR TROPONIN T REFLEX: 23 NG/L
GLOBULIN UR ELPH-MCNC: 2.7 GM/DL
GLUCOSE SERPL-MCNC: 107 MG/DL (ref 65–99)
HCT VFR BLD AUTO: 41.2 % (ref 37.5–51)
HGB BLD-MCNC: 13.6 G/DL (ref 13–17.7)
HOLD SPECIMEN: NORMAL
HOLD SPECIMEN: NORMAL
IMM GRANULOCYTES # BLD AUTO: 0.03 10*3/MM3 (ref 0–0.05)
IMM GRANULOCYTES NFR BLD AUTO: 0.4 % (ref 0–0.5)
INR PPP: 1.21 (ref 0.91–1.09)
LYMPHOCYTES # BLD AUTO: 0.67 10*3/MM3 (ref 0.7–3.1)
LYMPHOCYTES NFR BLD AUTO: 8.4 % (ref 19.6–45.3)
MAGNESIUM SERPL-MCNC: 2.2 MG/DL (ref 1.6–2.4)
MCH RBC QN AUTO: 29.6 PG (ref 26.6–33)
MCHC RBC AUTO-ENTMCNC: 33 G/DL (ref 31.5–35.7)
MCV RBC AUTO: 89.8 FL (ref 79–97)
MONOCYTES # BLD AUTO: 0.68 10*3/MM3 (ref 0.1–0.9)
MONOCYTES NFR BLD AUTO: 8.6 % (ref 5–12)
NEUTROPHILS NFR BLD AUTO: 6.42 10*3/MM3 (ref 1.7–7)
NEUTROPHILS NFR BLD AUTO: 80.9 % (ref 42.7–76)
NRBC BLD AUTO-RTO: 0 /100 WBC (ref 0–0.2)
NT-PROBNP SERPL-MCNC: 1116 PG/ML (ref 0–900)
PLATELET # BLD AUTO: 174 10*3/MM3 (ref 140–450)
PMV BLD AUTO: 11 FL (ref 6–12)
POTASSIUM SERPL-SCNC: 4.2 MMOL/L (ref 3.5–5.2)
PROT SERPL-MCNC: 6.7 G/DL (ref 6–8.5)
PROTHROMBIN TIME: 15.8 SECONDS (ref 11.8–14.8)
RBC # BLD AUTO: 4.59 10*6/MM3 (ref 4.14–5.8)
SODIUM SERPL-SCNC: 139 MMOL/L (ref 136–145)
TROPONIN T DELTA: -1 NG/L
TROPONIN T SERPL HS-MCNC: 24 NG/L
WBC NRBC COR # BLD AUTO: 7.94 10*3/MM3 (ref 3.4–10.8)
WHOLE BLOOD HOLD COAG: NORMAL
WHOLE BLOOD HOLD SPECIMEN: NORMAL

## 2024-03-02 PROCEDURE — 93005 ELECTROCARDIOGRAM TRACING: CPT | Performed by: EMERGENCY MEDICINE

## 2024-03-02 PROCEDURE — 83880 ASSAY OF NATRIURETIC PEPTIDE: CPT | Performed by: EMERGENCY MEDICINE

## 2024-03-02 PROCEDURE — 36415 COLL VENOUS BLD VENIPUNCTURE: CPT

## 2024-03-02 PROCEDURE — 83735 ASSAY OF MAGNESIUM: CPT | Performed by: EMERGENCY MEDICINE

## 2024-03-02 PROCEDURE — 99285 EMERGENCY DEPT VISIT HI MDM: CPT

## 2024-03-02 PROCEDURE — 85025 COMPLETE CBC W/AUTO DIFF WBC: CPT | Performed by: EMERGENCY MEDICINE

## 2024-03-02 PROCEDURE — 80053 COMPREHEN METABOLIC PANEL: CPT | Performed by: EMERGENCY MEDICINE

## 2024-03-02 PROCEDURE — 93010 ELECTROCARDIOGRAM REPORT: CPT | Performed by: INTERNAL MEDICINE

## 2024-03-02 PROCEDURE — 84484 ASSAY OF TROPONIN QUANT: CPT | Performed by: EMERGENCY MEDICINE

## 2024-03-02 PROCEDURE — 94799 UNLISTED PULMONARY SVC/PX: CPT

## 2024-03-02 PROCEDURE — 71045 X-RAY EXAM CHEST 1 VIEW: CPT

## 2024-03-02 PROCEDURE — 85610 PROTHROMBIN TIME: CPT | Performed by: EMERGENCY MEDICINE

## 2024-03-02 RX ORDER — ACETAMINOPHEN 500 MG
500 TABLET ORAL EVERY 6 HOURS PRN
Status: DISCONTINUED | OUTPATIENT
Start: 2024-03-02 | End: 2024-03-04 | Stop reason: HOSPADM

## 2024-03-02 RX ORDER — AMOXICILLIN 250 MG
2 CAPSULE ORAL 2 TIMES DAILY PRN
Status: DISCONTINUED | OUTPATIENT
Start: 2024-03-02 | End: 2024-03-04 | Stop reason: HOSPADM

## 2024-03-02 RX ORDER — SODIUM CHLORIDE 0.9 % (FLUSH) 0.9 %
10 SYRINGE (ML) INJECTION AS NEEDED
Status: DISCONTINUED | OUTPATIENT
Start: 2024-03-02 | End: 2024-03-04 | Stop reason: HOSPADM

## 2024-03-02 RX ORDER — BISACODYL 5 MG/1
5 TABLET, DELAYED RELEASE ORAL DAILY PRN
Status: DISCONTINUED | OUTPATIENT
Start: 2024-03-02 | End: 2024-03-04 | Stop reason: HOSPADM

## 2024-03-02 RX ORDER — DILTIAZEM HYDROCHLORIDE 5 MG/ML
10 INJECTION INTRAVENOUS ONCE
Status: COMPLETED | OUTPATIENT
Start: 2024-03-02 | End: 2024-03-02

## 2024-03-02 RX ORDER — DILTIAZEM HCL/D5W 125 MG/125
5-15 PLASTIC BAG, INJECTION (ML) INTRAVENOUS
Status: DISCONTINUED | OUTPATIENT
Start: 2024-03-02 | End: 2024-03-04

## 2024-03-02 RX ORDER — SODIUM CHLORIDE 9 MG/ML
40 INJECTION, SOLUTION INTRAVENOUS AS NEEDED
Status: DISCONTINUED | OUTPATIENT
Start: 2024-03-02 | End: 2024-03-04 | Stop reason: HOSPADM

## 2024-03-02 RX ORDER — TAMSULOSIN HYDROCHLORIDE 0.4 MG/1
0.4 CAPSULE ORAL NIGHTLY
Status: DISCONTINUED | OUTPATIENT
Start: 2024-03-02 | End: 2024-03-04 | Stop reason: HOSPADM

## 2024-03-02 RX ORDER — MULTIPLE VITAMINS W/ MINERALS TAB 9MG-400MCG
2 TAB ORAL DAILY
Status: DISCONTINUED | OUTPATIENT
Start: 2024-03-02 | End: 2024-03-04 | Stop reason: HOSPADM

## 2024-03-02 RX ORDER — ALBUTEROL SULFATE 90 UG/1
2 AEROSOL, METERED RESPIRATORY (INHALATION) EVERY 6 HOURS PRN
Status: DISCONTINUED | OUTPATIENT
Start: 2024-03-02 | End: 2024-03-04 | Stop reason: HOSPADM

## 2024-03-02 RX ORDER — POLYETHYLENE GLYCOL 3350 17 G/17G
17 POWDER, FOR SOLUTION ORAL DAILY PRN
Status: DISCONTINUED | OUTPATIENT
Start: 2024-03-02 | End: 2024-03-04 | Stop reason: HOSPADM

## 2024-03-02 RX ORDER — SODIUM CHLORIDE 0.9 % (FLUSH) 0.9 %
10 SYRINGE (ML) INJECTION EVERY 12 HOURS SCHEDULED
Status: DISCONTINUED | OUTPATIENT
Start: 2024-03-02 | End: 2024-03-04 | Stop reason: HOSPADM

## 2024-03-02 RX ORDER — BISACODYL 10 MG
10 SUPPOSITORY, RECTAL RECTAL DAILY PRN
Status: DISCONTINUED | OUTPATIENT
Start: 2024-03-02 | End: 2024-03-04 | Stop reason: HOSPADM

## 2024-03-02 RX ORDER — OMEGA-3 FATTY ACIDS/FISH OIL 300-1000MG
2 CAPSULE ORAL DAILY
COMMUNITY

## 2024-03-02 RX ADMIN — Medication 15 MG/HR: at 19:23

## 2024-03-02 RX ADMIN — APIXABAN 5 MG: 5 TABLET, FILM COATED ORAL at 20:00

## 2024-03-02 RX ADMIN — Medication 5 MG/HR: at 09:48

## 2024-03-02 RX ADMIN — DILTIAZEM HYDROCHLORIDE 10 MG: 5 INJECTION INTRAVENOUS at 11:47

## 2024-03-02 RX ADMIN — METOPROLOL TARTRATE 12.5 MG: 25 TABLET, FILM COATED ORAL at 20:00

## 2024-03-02 RX ADMIN — Medication 2 TABLET: at 14:40

## 2024-03-02 RX ADMIN — TAMSULOSIN HYDROCHLORIDE 0.4 MG: 0.4 CAPSULE ORAL at 20:00

## 2024-03-02 NOTE — ED PROVIDER NOTES
Subjective   History of Present Illness  73-year-old male presents to the ED with complaint of dyspnea, chest tightness, irregular heart rate.  He has a history of paroxysmal A-fib and reports compliance with Eliquis.  History of hypertension, lung cancer.  Patient reports some chest tightness and dyspnea for the past couple of days.  Describes chest tightness as a sensation of mild pressure on both of his shoulders.  No nausea or diaphoresis.  No syncope or near syncope.  Checked his blood pressure this morning several times and it has been as low as 80s systolic.  Heart rate has ranged from 70s to 150s.  He called the [facility] nurse line and they recommended come to the ED for evaluation.  On arrival to the ER, patient was found to be in A-fib with RVR.    History provided by:  Patient      Review of Systems   All other systems reviewed and are negative.      Past Medical History:   Diagnosis Date    A-fib     Arthritis     Asthma 12/1/22    Dizzy spells     states on rare occsaion has    Failed intubation of airway 11/10/2020    Hypertension     Lung cancer     Nosebleed 12/1/22    Taking  eliquis    PAF (paroxysmal atrial fibrillation)     Prostate cancer 2020    Thyroid disorder        No Known Allergies    Past Surgical History:   Procedure Laterality Date    CARPAL TUNNEL RELEASE Right     LOBECTOMY Right 2/15/2021    Procedure: THORACOSCOPY WITH DAVINCI ROBOT WITH RIGHT WEDGE RESECTION, MEDIASTINAL LYMPH NODE DISSECTION-RIGHT;  Surgeon: Nick Flores MD;  Location: [facility] OR;  Service: [facility];  Laterality: Right;    LUNG BIOPSY      PROSTATE BIOPSY      REPLACEMENT TOTAL KNEE Left     SHOULDER SURGERY Bilateral     THORACOSCOPY Right 10/30/2020    Procedure: MEDIASTINOSCOPY, RIGHT THORACOSCOPY POSSIBLE WEDGE RESECTION WITH DAVINCI ROBOT;  Surgeon: Nick Flores MD;  Location: [facility] OR;  Service: [facility];  Laterality: Right;    THYROIDECTOMY N/A 12/11/2020    Procedure: Total thyroidectomy;   Surgeon: Jean Marie Solis MD;  Location:  [facility] OR;  Service: ENT;  Laterality: N/A;       Family History   Problem Relation Age of Onset    Hypertension Mother         New Contact    Cancer Mother     COPD Father        Social History     Socioeconomic History    Marital status:     Number of children: 2   Tobacco Use    Smoking status: Former     Current packs/day: 0.00     Average packs/day: 1 pack/day for 33.0 years (33.0 ttl pk-yrs)     Types: Cigarettes     Start date: 1969     Quit date: 1996     Years since quittin.1    Smokeless tobacco: Never   Vaping Use    Vaping status: Never Used   Substance and Sexual Activity    Alcohol use: Yes     Alcohol/week: 2.0 standard drinks of alcohol     Types: 2 Shots of liquor per week     Comment: x2 weekly    Drug use: Never    Sexual activity: Defer           Objective   Physical Exam  Vitals and nursing note reviewed.   Constitutional:       Appearance: Normal appearance. He is normal weight.   HENT:      Head: Normocephalic and atraumatic.      Nose: Nose normal. No congestion or rhinorrhea.   Eyes:      Extraocular Movements: Extraocular movements intact.      Conjunctiva/sclera: Conjunctivae normal.      Pupils: Pupils are equal, round, and reactive to light.   Cardiovascular:      Rate and Rhythm: Tachycardia present. Rhythm irregular.      Heart sounds: Normal heart sounds. No murmur heard.  Pulmonary:      Effort: Pulmonary effort is normal.      Breath sounds: Normal breath sounds. No wheezing, rhonchi or rales.   Abdominal:      General: Abdomen is flat. Bowel sounds are normal.      Palpations: Abdomen is soft.   Musculoskeletal:      Right lower leg: No edema.      Left lower leg: No edema.   Skin:     General: Skin is warm and dry.      Capillary Refill: Capillary refill takes less than 2 seconds.   Neurological:      General: No focal deficit present.      Mental Status: He is alert and oriented to person, place, and time. Mental  status is at baseline.         ECG 12 Lead      Date/Time: 3/2/2024 8:41 AM    Performed by: Chan Hernandez MD  Authorized by: Chan Hernandez MD  Interpreted by ED physician  Comments: A-fib with RVR, rate 130, occasional PVC, no acute ischemic changes           Lab Results (last 24 hours)       Procedure Component Value Units Date/Time    CBC & Differential [[id number]]  (Abnormal) Collected: 03/02/24 0912    Specimen: Blood Updated: 03/02/24 0922    Narrative:      The following orders were created for panel order CBC & Differential.  Procedure                               Abnormality         Status                     ---------                               -----------         ------                     CBC Auto Differential[[id number]]        Abnormal            Final result                 Please view results for these tests on the individual orders.    Comprehensive Metabolic Panel [[id number]]  (Abnormal) Collected: 03/02/24 0912    Specimen: Blood Updated: 03/02/24 0941     Glucose 107 mg/dL      BUN 34 mg/dL      Creatinine 0.98 mg/dL      Sodium 139 mmol/L      Potassium 4.2 mmol/L      Chloride 106 mmol/L      CO2 23.0 mmol/L      Calcium 8.6 mg/dL      Total Protein 6.7 g/dL      Albumin 4.0 g/dL      ALT (SGPT) 32 U/L      AST (SGOT) 22 U/L      Alkaline Phosphatase 110 U/L      Total Bilirubin 1.0 mg/dL      Globulin 2.7 gm/dL      A/G Ratio 1.5 g/dL      BUN/Creatinine Ratio 34.7     Anion Gap 10.0 mmol/L      eGFR 81.9 mL/min/1.73     Narrative:      GFR Normal >60  Chronic Kidney Disease <60  Kidney Failure <15    The GFR formula is only valid for adults with stable renal function between ages 18 and 70.    Protime-INR [[id number]]  (Abnormal) Collected: 03/02/24 0912    Specimen: Blood Updated: 03/02/24 0931     Protime 15.8 Seconds      INR 1.21    Magnesium [[id number]]  (Normal) Collected: 03/02/24 0912    Specimen: Blood Updated: 03/02/24 0941     Magnesium 2.2 mg/dL     BNP  [[id number]]  (Abnormal) Collected: 03/02/24 0912    Specimen: Blood Updated: 03/02/24 0939     proBNP 1,116.0 pg/mL     Narrative:      This assay is used as an aid in the diagnosis of individuals suspected of having heart failure. It can be used as an aid in the diagnosis of acute decompensated heart failure (ADHF) in patients presenting with signs and symptoms of ADHF to the emergency department (ED). In addition, NT-proBNP of <300 pg/mL indicates ADHF is not likely.    Age Range Result Interpretation  NT-proBNP Concentration (pg/mL:      <50             Positive            >450                   Gray                 300-450                    Negative             <300    50-75           Positive            >900                  Gray                300-900                  Negative            <300      >75             Positive            >1800                  Gray                300-1800                  Negative            <300    High Sensitivity Troponin T [[id number]]  (Abnormal) Collected: 03/02/24 0912    Specimen: Blood Updated: 03/02/24 0938     HS Troponin T 24 ng/L     Narrative:      High Sensitive Troponin T Reference Range:  <14.0 ng/L- Negative Female for AMI  <22.0 ng/L- Negative Male for AMI  >=14 - Abnormal Female indicating possible myocardial injury.  >=22 - Abnormal Male indicating possible myocardial injury.   Clinicians would have to utilize clinical acumen, EKG, Troponin, and serial changes to determine if it is an Acute Myocardial Infarction or myocardial injury due to an underlying chronic condition.         CBC Auto Differential [[id number]]  (Abnormal) Collected: 03/02/24 0912    Specimen: Blood Updated: 03/02/24 0922     WBC 7.94 10*3/mm3      RBC 4.59 10*6/mm3      Hemoglobin 13.6 g/dL      Hematocrit 41.2 %      MCV 89.8 fL      MCH 29.6 pg      MCHC 33.0 g/dL      RDW 14.4 %      RDW-SD 46.6 fl      MPV 11.0 fL      Platelets 174 10*3/mm3      Neutrophil % 80.9 %      Lymphocyte % 8.4  %      Monocyte % 8.6 %      Eosinophil % 1.1 %      Basophil % 0.6 %      Immature Grans % 0.4 %      Neutrophils, Absolute 6.42 10*3/mm3      Lymphocytes, Absolute 0.67 10*3/mm3      Monocytes, Absolute 0.68 10*3/mm3      Eosinophils, Absolute 0.09 10*3/mm3      Basophils, Absolute 0.05 10*3/mm3      Immature Grans, Absolute 0.03 10*3/mm3      nRBC 0.0 /100 WBC          XR Chest 1 View    Result Date: 3/2/2024  EXAMINATION: XR CHEST 1 VW-  3/2/2024 9:11 AM  HISTORY: Dyspnea  FINDINGS: Today's exam is compared to previous study of 11/23/2023. There is mild atelectasis or scarring in the right lung base. Remote right-sided rib fractures are present. The heart is mildly enlarged. No acute infiltrate or effusion.      1. Right basilar atelectasis or scarring. Lungs are otherwise clear. 2. Mild cardiomegaly.  This report was signed and finalized on 3/2/2024 9:12 AM by Dr. Fermin Lantigua MD.        ED Course  ED Course as of 03/02/24 1121   Sat Mar 02, 2024   1119 73-year-old male with history of paroxysmal A-fib on Eliquis, hypertension, lung and thyroid cancer presents to the ED with complaint of chest tightness, shortness of breath palpitations.  He was found to be in A-fib with RVR on arrival to the ER.  Electrolytes include magnesium were within normal limits.  His initial high-sensitivity troponin 24, repeat pending.  proBNP 1116.  Chest x-ray negative for pulmonary edema.  Patient was given bolus of IV Cardizem followed by Cardizem drip for rate control.  As he reports compliance with his Eliquis he is a candidate for cardioversion.  This was offered to the patient but he refused stating he has had previous success with medications in the past and would prefer to forego sedation and cardioversion in the emergency department at this time.  Will plan for admission to the hospitalist service for rate control and cardiology consultation if does not convert with meds. [AW]      ED Course User Index  [AW] David  Chan Haddad MD                                             Medical Decision Making  Problems Addressed:  Atrial fibrillation with RVR: complicated acute illness or injury    Amount and/or Complexity of Data Reviewed  Labs: ordered.  Radiology: ordered.  ECG/medicine tests: ordered.    Risk  Prescription drug management.  Decision regarding hospitalization.        Final diagnoses:   Atrial fibrillation with RVR       ED Disposition  ED Disposition       ED Disposition   Decision to Admit    Condition   --    Comment   Level of Care: Telemetry [5]   Diagnosis: Atrial fibrillation with RVR [[id number]]   Admitting Physician: MARITZA CHÁVEZ [[id number]]   Attending Physician: MARITZA CHÁVEZ [[id number]]   Isolate for COVID?: No [0]   Certification: I Certify That Inpatient Hospital Services Are Medically Necessary For Greater Than 2 Midnights                 No follow-up provider specified.       Medication List      No changes were made to your prescriptions during this visit.            Chan Hernandez MD  03/02/24 1121

## 2024-03-02 NOTE — NURSING NOTE
Pt received to unit from ED on Cardizem drip at 10 mcg/min to left hand IV. Pt in a-fib per telemetry and EKG in ER. Pt oriented to room, call lights, bed controls, staff names, safety protocols. History obtained and physical assessment completed. Pt denies pain or SOB at this time. Reports only headache discomfort. Pt denies needs.

## 2024-03-02 NOTE — H&P
[facility] Medicine Services  HISTORY AND PHYSICAL    Date of Admission: 3/2/2024  Primary Care Physician: Michael Nunez MD    Subjective   Primary Historian: Patient.    Chief Complaint: Atrial for with RVR.    History of Present Illness  Patient is a 73-year-old presented to the ER complaining of palpitation.  Patient's heart rate was in the 150, patient refused cardioversion by ER doctor, patient wants to wait to see the medication work.  Patient is currently requiring a Cardizem drip.    Laboratory shows BNP 1100.  Troponin 24.  Patient is on chronic Eliquis.    Patient has history of atrial fibs, arthritis, asthma, dizzy spell, failed intubation of airway, hypertension, lung cancer, nosebleed, prostate cancer, thyroid cancer.    Review of Systems   Constitutional:  Positive for activity change, appetite change and fatigue. Negative for chills and fever.   HENT:  Negative for hearing loss, nosebleeds, tinnitus and trouble swallowing.    Eyes:  Negative for visual disturbance.   Respiratory:  Negative for cough, chest tightness, shortness of breath and wheezing.    Cardiovascular:  Negative for chest pain, palpitations and leg swelling.   Gastrointestinal:  Negative for abdominal distention, abdominal pain, blood in stool, constipation, diarrhea, nausea and vomiting.   Endocrine: Negative for cold intolerance, heat intolerance, polydipsia, polyphagia and polyuria.   Genitourinary:  Negative for decreased urine volume, difficulty urinating, dysuria, flank pain, frequency and hematuria.   Musculoskeletal:  Negative for arthralgias, joint swelling and myalgias.   Skin:  Negative for rash.   Allergic/Immunologic: Negative for immunocompromised state.   Neurological:  Positive for weakness. Negative for dizziness, syncope, light-headedness and headaches.   Hematological:  Negative for adenopathy. Does not bruise/bleed easily.   Psychiatric/Behavioral:  Negative for confusion  and sleep disturbance. The patient is not nervous/anxious.       Otherwise complete ROS reviewed and negative except as mentioned in the HPI.    Past Medical History:   Past Medical History:   Diagnosis Date    A-fib     Arthritis     Asthma 12/1/22    Dizzy spells     states on rare occsaion has    Failed intubation of airway 11/10/2020    Hypertension     Lung cancer     Nosebleed 12/1/22    Taking  eliquis    PAF (paroxysmal atrial fibrillation)     Prostate cancer 2020    Thyroid disorder      Past Surgical History:  Past Surgical History:   Procedure Laterality Date    CARPAL TUNNEL RELEASE Right     LOBECTOMY Right 2/15/2021    Procedure: THORACOSCOPY WITH DAVINCI ROBOT WITH RIGHT WEDGE RESECTION, MEDIASTINAL LYMPH NODE DISSECTION-RIGHT;  Surgeon: Nick Flores MD;  Location:  [facility] OR;  Service: DaVinci;  Laterality: Right;    LUNG BIOPSY      PROSTATE BIOPSY      REPLACEMENT TOTAL KNEE Left     SHOULDER SURGERY Bilateral     THORACOSCOPY Right 10/30/2020    Procedure: MEDIASTINOSCOPY, RIGHT THORACOSCOPY POSSIBLE WEDGE RESECTION WITH DAVINCI ROBOT;  Surgeon: Nick Flores MD;  Location:  [facility] OR;  Service: DaVinci;  Laterality: Right;    THYROIDECTOMY N/A 12/11/2020    Procedure: Total thyroidectomy;  Surgeon: Jean Marie Solis MD;  Location:  [facility] OR;  Service: ENT;  Laterality: N/A;     Social History:  reports that he quit smoking about 28 years ago. His smoking use included cigarettes. He started smoking about 55 years ago. He has a 33 pack-year smoking history. He has never used smokeless tobacco. He reports current alcohol use of about 2.0 standard drinks of alcohol per week. He reports that he does not use drugs.    Family History: family history includes COPD in his father; Cancer in his mother; Hypertension in his mother.       Allergies:  No Known Allergies    Medications:  Prior to Admission medications    Medication Sig Start Date End Date Taking? Authorizing Provider   acetaminophen  "(TYLENOL) 500 MG tablet Take 1 tablet by mouth Every 6 (Six) Hours As Needed for Mild Pain.    Walt Hooper MD   albuterol sulfate  (90 Base) MCG/ACT inhaler INHALE 2 PUFFS INTO THE LUNGS EVERY 6 HOURS AS NEEDED FOR WHEEZING AND SHORTNESS OF BREATH 12/15/23   Walt Hooper MD   alfuzosin (UROXATRAL) 10 MG 24 hr tablet Take 1 tablet by mouth Daily. 10/5/20   Walt Hooper MD   apixaban (Eliquis) 5 MG tablet tablet Take 1 tablet by mouth Every 12 (Twelve) Hours. 5/1/23   Landry Hoover MD   metoprolol tartrate (LOPRESSOR) 25 MG tablet Take 0.5 tablets by mouth Every 12 (Twelve) Hours. 11/24/23   Josesito Suero MD   Synthroid 137 MCG tablet Take 1 tablet by mouth Daily for 120 days. 1/2/24 5/1/24  Maryann Serra APRN   therapeutic multivitamin-minerals (THERAGRAN-M) tablet Take 2 tablets by mouth Daily.    ProviderWalt MD     I have utilized all available immediate resources to obtain, update, or review the patient's current medications (including all prescriptions, over-the-counter products, herbals, cannabis/cannabidiol products, and vitamin/mineral/dietary (nutritional) supplements).    Objective     Vital Signs: BP (!) 130/107   Pulse (!) 127   Temp 97.7 °F (36.5 °C) (Oral)   Resp 20   Ht 185.4 cm (73\")   Wt 122 kg (269 lb)   SpO2 95%   BMI 35.49 kg/m²   Physical Exam  Vitals and nursing note reviewed.   HENT:      Head: Normocephalic.   Eyes:      Conjunctiva/sclera: Conjunctivae normal.      Pupils: Pupils are equal, round, and reactive to light.   Cardiovascular:      Rate and Rhythm: Tachycardia present. Rhythm irregular.      Heart sounds: Normal heart sounds.   Pulmonary:      Effort: No respiratory distress.      Comments: Diminished breath sound bilateral, clear, on room air.  Abdominal:      General: Bowel sounds are normal. There is no distension.      Palpations: Abdomen is soft.      Tenderness: There is no abdominal tenderness.      Comments: " Obesity patient   Musculoskeletal:         General: No swelling.      Cervical back: Neck supple.   Skin:     General: Skin is warm and dry.      Capillary Refill: Capillary refill takes 2 to 3 seconds.      Findings: No rash.   Neurological:      General: No focal deficit present.      Mental Status: He is alert and oriented to person, place, and time.      Motor: Weakness present.   Psychiatric:         Mood and Affect: Mood normal.         Behavior: Behavior normal.         Thought Content: Thought content normal.              Results Reviewed:  Lab Results (last 24 hours)       Procedure Component Value Units Date/Time    [place] Draw [[id number]] Collected: 03/02/24 0912    Specimen: Blood Updated: 03/02/24 1016    Narrative:      The following orders were created for panel order [place] Draw.  Procedure                               Abnormality         Status                     ---------                               -----------         ------                     Green Top (Gel)[[id number]]                                  Final result               Lavender Top[[id number]]                                     Final result               Red Top[[id number]]                                          Final result               Light Blue Top[[id number]]                                   Final result                 Please view results for these tests on the individual orders.    Green Top (Gel) [[id number]] Collected: 03/02/24 0912    Specimen: Blood Updated: 03/02/24 1016     Extra Tube Hold for add-ons.     Comment: Auto resulted.       Lavender Top [[id number]] Collected: 03/02/24 0912    Specimen: Blood Updated: 03/02/24 1016     Extra Tube hold for add-on     Comment: Auto resulted       Red Top [[id number]] Collected: 03/02/24 0912    Specimen: Blood Updated: 03/02/24 1016     Extra Tube Hold for add-ons.     Comment: Auto resulted.       Light Blue Top [[id number]] Collected: 03/02/24 0912    Specimen: Blood  Updated: 03/02/24 1016     Extra Tube Hold for add-ons.     Comment: Auto resulted       Comprehensive Metabolic Panel [[id number]]  (Abnormal) Collected: 03/02/24 0912    Specimen: Blood Updated: 03/02/24 0941     Glucose 107 mg/dL      BUN 34 mg/dL      Creatinine 0.98 mg/dL      Sodium 139 mmol/L      Potassium 4.2 mmol/L      Chloride 106 mmol/L      CO2 23.0 mmol/L      Calcium 8.6 mg/dL      Total Protein 6.7 g/dL      Albumin 4.0 g/dL      ALT (SGPT) 32 U/L      AST (SGOT) 22 U/L      Alkaline Phosphatase 110 U/L      Total Bilirubin 1.0 mg/dL      Globulin 2.7 gm/dL      A/G Ratio 1.5 g/dL      BUN/Creatinine Ratio 34.7     Anion Gap 10.0 mmol/L      eGFR 81.9 mL/min/1.73     Narrative:      GFR Normal >60  Chronic Kidney Disease <60  Kidney Failure <15    The GFR formula is only valid for adults with stable renal function between ages 18 and 70.    Magnesium [[id number]]  (Normal) Collected: 03/02/24 0912    Specimen: Blood Updated: 03/02/24 0941     Magnesium 2.2 mg/dL     BNP [[id number]]  (Abnormal) Collected: 03/02/24 0912    Specimen: Blood Updated: 03/02/24 0939     proBNP 1,116.0 pg/mL     Narrative:      This assay is used as an aid in the diagnosis of individuals suspected of having heart failure. It can be used as an aid in the diagnosis of acute decompensated heart failure (ADHF) in patients presenting with signs and symptoms of ADHF to the emergency department (ED). In addition, NT-proBNP of <300 pg/mL indicates ADHF is not likely.    Age Range Result Interpretation  NT-proBNP Concentration (pg/mL:      <50             Positive            >450                   Gray                 300-450                    Negative             <300    50-75           Positive            >900                  Gray                300-900                  Negative            <300      >75             Positive            >1800                  Gray                300-1800                  Negative            <300     High Sensitivity Troponin T [[id number]]  (Abnormal) Collected: 03/02/24 0912    Specimen: Blood Updated: 03/02/24 0938     HS Troponin T 24 ng/L     Narrative:      High Sensitive Troponin T Reference Range:  <14.0 ng/L- Negative Female for AMI  <22.0 ng/L- Negative Male for AMI  >=14 - Abnormal Female indicating possible myocardial injury.  >=22 - Abnormal Male indicating possible myocardial injury.   Clinicians would have to utilize clinical acumen, EKG, Troponin, and serial changes to determine if it is an Acute Myocardial Infarction or myocardial injury due to an underlying chronic condition.         Protime-INR [[id number]]  (Abnormal) Collected: 03/02/24 0912    Specimen: Blood Updated: 03/02/24 0931     Protime 15.8 Seconds      INR 1.21    CBC & Differential [[id number]]  (Abnormal) Collected: 03/02/24 0912    Specimen: Blood Updated: 03/02/24 0922    Narrative:      The following orders were created for panel order CBC & Differential.  Procedure                               Abnormality         Status                     ---------                               -----------         ------                     CBC Auto Differential[[id number]]        Abnormal            Final result                 Please view results for these tests on the individual orders.    CBC Auto Differential [[id number]]  (Abnormal) Collected: 03/02/24 0912    Specimen: Blood Updated: 03/02/24 0922     WBC 7.94 10*3/mm3      RBC 4.59 10*6/mm3      Hemoglobin 13.6 g/dL      Hematocrit 41.2 %      MCV 89.8 fL      MCH 29.6 pg      MCHC 33.0 g/dL      RDW 14.4 %      RDW-SD 46.6 fl      MPV 11.0 fL      Platelets 174 10*3/mm3      Neutrophil % 80.9 %      Lymphocyte % 8.4 %      Monocyte % 8.6 %      Eosinophil % 1.1 %      Basophil % 0.6 %      Immature Grans % 0.4 %      Neutrophils, Absolute 6.42 10*3/mm3      Lymphocytes, Absolute 0.67 10*3/mm3      Monocytes, Absolute 0.68 10*3/mm3      Eosinophils, Absolute 0.09 10*3/mm3      Basophils,  Absolute 0.05 10*3/mm3      Immature Grans, Absolute 0.03 10*3/mm3      nRBC 0.0 /100 WBC           Imaging Results (Last 24 Hours)       Procedure Component Value Units Date/Time    XR Chest 1 View [[id number]] Collected: 03/02/24 0911     Updated: 03/02/24 0915    Narrative:      EXAMINATION: XR CHEST 1 VW-  3/2/2024 9:11 AM     HISTORY: Dyspnea     FINDINGS: Today's exam is compared to previous study of 11/23/2023.  There is mild atelectasis or scarring in the right lung base. Remote  right-sided rib fractures are present. The heart is mildly enlarged. No  acute infiltrate or effusion.       Impression:      1. Right basilar atelectasis or scarring. Lungs are otherwise clear.  2. Mild cardiomegaly.     This report was signed and finalized on 3/2/2024 9:12 AM by Dr. Fermin Lantigua MD.             I have personally reviewed and interpreted the radiology studies and ECG obtained at time of admission.     Assessment / Plan   Assessment:   Active Hospital Problems    Diagnosis     **Atrial fibrillation with RVR     History of radiation therapy     Current use of long term anticoagulation     Class 1 obesity due to excess calories with serious comorbidity and body mass index (BMI) of 33.0 to 33.9 in adult     Malignant neoplasm of lower lobe of right lung     Hypertension     Prostate cancer        Treatment Plan  The patient will be admitted to my service here at [facility].     Atrial fibrillation RVR/hypertension .  Patient is currently on a Cardizem drip continue Eliquis.  Consult EP.  Status post 10 mg of Cardizem IV in ER.  Continue Cardizem drip for now.  Patient did not want to cardioverted in ER, patient wants to wait to see medication works.  Echocardiogram 11/24/2023-ejection fraction 61 to 65%, mild concentric hypertrophy, left atrial cavity moderate dilated, tricuspid regurgitation, right ventricle cavity mild to moderate dilated with normal systolic function, right atrial cavity dilated, no  significant valvular pathology.    COPD.  Patient is ex-smoker.  Albuterol inhaler as needed.  Chest x-ray-Right basilar atelectasis or scarring,  Lungs are otherwise clear, Mild cardiomegaly.    Prostate hypertrophy.  Flomax.    History of thyroid surgery.  Continue Synthroid.    History of lung cancer, prostate cancer, thyroid cancer.    Nausea.  Zofran as needed    Obesity.  BMI is 35.    Medical Decision Making  Number and Complexity of problems: Atrial fibrillation VR/hypertension/COPD  Differential Diagnosis: None    Conditions and Status        Condition is unchanged.     [facility] Data  External documents reviewed: ER notes.  Cardiac tracing (EKG, telemetry) interpretation: Atrial fibs with RVR  Radiology interpretation: Echo/chest x-ray  Labs reviewed: Laboratory  Any tests that were considered but not ordered: Laboratory in AM     Decision rules/scores evaluated (example [id number], Wells, etc): None     Discussed with: Patient     Care Planning  Shared decision making: Patient  Code status and discussions: Full code    Disposition  Social Determinants of Health that impact treatment or disposition: From home  Estimated length of stay is 1 to 3 days.     I confirmed that the patient's advanced care plan is present, code status is documented, and a surrogate decision maker is listed in the patient's medical record.     The patient's surrogate decision maker is wife.     The patient was seen and examined by me on 3/2/2024 at 1130.    Electronically signed by Josesito Suero MD, 03/02/24, 11:39 CST.

## 2024-03-02 NOTE — TELEPHONE ENCOUNTER
"He states his blood pressure is all over the place- 147/67, 132/ 56, 143/66. This is the last 3 days. It is 86/59- now - 106/73, 108/79- His pulse is 138 to 144. He feels achy all over head hurts, he does not feel well.     He has a history of Atrial fib. His pulse has been rapid .   Go to the ER please let them know that you feel like you are in and out of Afib. That bloop pressure was low 85/59 times 1.  Do not drive self.   Reason for Disposition   [1] Heart beating very rapidly (e.g., > 140 / minute) AND [2] present now  (Exception: During exercise.)    Additional Information   Negative: Passed out (i.e., lost consciousness, collapsed and was not responding)   Negative: Shock suspected (e.g., cold/pale/clammy skin, too weak to stand, low BP, rapid pulse)   Negative: Difficult to awaken or acting confused (e.g., disoriented, slurred speech)   Negative: Visible sweat on face or sweat dripping down face   Negative: Unable to walk, or can only walk with assistance (e.g., requires support)   Negative: [1] Received SHOCK from implantable cardiac defibrillator AND [2] persisting symptoms (i.e., palpitations, lightheadedness)   Negative: [1] Dizziness, lightheadedness, or weakness AND [2] heart beating very rapidly (e.g., > 140 / minute)   Negative: [1] Dizziness, lightheadedness, or weakness AND [2] heart beating very slowly (e.g., < 50 / minute)   Negative: Sounds like a life-threatening emergency to the triager   Negative: Chest pain   Negative: Implantable Cardiac Defibrillator (ICD) or a pacemaker symptoms or questions   Negative: Difficulty breathing   Negative: Dizziness, lightheadedness, or weakness    Answer Assessment - Initial Assessment Questions  1. DESCRIPTION: \"Please describe your heart rate or heartbeat that you are having\" (e.g., fast/slow, regular/irregular, skipped or extra beats, \"palpitations\")     Fast heart rate and low blood pressure   2. ONSET: \"When did it start?\" (Minutes, hours or days)      " " This am   3. DURATION: \"How long does it last\" (e.g., seconds, minutes, hours)      It comes and goes.   4. PATTERN \"Does it come and go, or has it been constant since it started?\"  \"Does it get worse with exertion?\"   \"Are you feeling it now?\"      Comes and goes very quickly in and out every few seconds.   5. TAP: \"Using your hand, can you tap out what you are feeling on a chair or table in front of you, so that I can hear?\" (Note: not all patients can do this)        no  6. HEART RATE: \"Can you tell me your heart rate?\" \"How many beats in 15 seconds?\"  (Note: not all patients can do this)        142  7. RECURRENT SYMPTOM: \"Have you ever had this before?\" If Yes, ask: \"When was the last time?\" and \"What happened that time?\"       Yes   8. CAUSE: \"What do you think is causing the palpitations?\"      A fib  9. CARDIAC HISTORY: \"Do you have any history of heart disease?\" (e.g., heart attack, angina, bypass surgery, angioplasty, arrhythmia)       Yes   10. OTHER SYMPTOMS: \"Do you have any other symptoms?\" (e.g., dizziness, chest pain, sweating, difficulty breathing)        Head ache feels weak and achy all over.  Blood pressure 1 reading 86/59 108/79 now.   11. PREGNANCY: \"Is there any chance you are pregnant?\" \"When was your last menstrual period?\"        no    Protocols used: Heart Rate and Heartbeat Questions-ADULT-AH    "

## 2024-03-02 NOTE — NURSING NOTE
Pt HR 110s-140s. Pt talking on phone at time.. Denies any change in symtoms at this time. Pt denies CP. Pt Cardizem drip rate increased to 15 mcg at this time for increased HR Pt verbalized understanding to same.

## 2024-03-02 NOTE — ED NOTES
"Nursing report ED to floor  Delgado Desir  73 y.o.  male    HPI:   Chief Complaint   Patient presents with    Shortness of Breath       Admitting doctor:   Josesito Suero MD    Consulting provider(s):  Consults       No orders found from 2/2/2024 to 3/3/2024.             Admitting diagnosis:   The encounter diagnosis was Atrial fibrillation with RVR.    Code status:   Current Code Status       Date Active Code Status Order ID Comments User Context       Prior            Allergies:   Patient has no known allergies.    Intake and Output  No intake or output data in the 24 hours ending 03/02/24 1124    Weight:       03/02/24  0833   Weight: 122 kg (269 lb)       Most recent vitals:   Vitals:    03/02/24 0833 03/02/24 0946   BP: 116/96 (!) 123/109   BP Location: Right arm    Patient Position: Sitting    Pulse: 77 (!) 147   Resp: 20    Temp: 97.7 °F (36.5 °C)    TempSrc: Oral    SpO2: 95% 96%   Weight: 122 kg (269 lb)    Height: 185.4 cm (73\")      Oxygen Therapy: .    Active LDAs/IV Access:   Lines, Drains & Airways       Active LDAs       Name Placement date Placement time Site Days    Peripheral IV 03/02/24 0913 Left;Posterior Hand 03/02/24  0913  Hand  less than 1                    Labs (abnormal labs have a star):   Labs Reviewed   COMPREHENSIVE METABOLIC PANEL - Abnormal; Notable for the following components:       Result Value    Glucose 107 (*)     BUN 34 (*)     BUN/Creatinine Ratio 34.7 (*)     All other components within normal limits    Narrative:     GFR Normal >60  Chronic Kidney Disease <60  Kidney Failure <15    The GFR formula is only valid for adults with stable renal function between ages 18 and 70.   PROTIME-INR - Abnormal; Notable for the following components:    Protime 15.8 (*)     INR 1.21 (*)     All other components within normal limits   BNP (IN-HOUSE) - Abnormal; Notable for the following components:    proBNP 1,116.0 (*)     All other components within normal limits    Narrative:     " This assay is used as an aid in the diagnosis of individuals suspected of having heart failure. It can be used as an aid in the diagnosis of acute decompensated heart failure (ADHF) in patients presenting with signs and symptoms of ADHF to the emergency department (ED). In addition, NT-proBNP of <300 pg/mL indicates ADHF is not likely.    Age Range Result Interpretation  NT-proBNP Concentration (pg/mL:      <50             Positive            >450                   Gray                 300-450                    Negative             <300    50-75           Positive            >900                  Gray                300-900                  Negative            <300      >75             Positive            >1800                  Gray                300-1800                  Negative            <300   TROPONIN - Abnormal; Notable for the following components:    HS Troponin T 24 (*)     All other components within normal limits    Narrative:     High Sensitive Troponin T Reference Range:  <14.0 ng/L- Negative Female for AMI  <22.0 ng/L- Negative Male for AMI  >=14 - Abnormal Female indicating possible myocardial injury.  >=22 - Abnormal Male indicating possible myocardial injury.   Clinicians would have to utilize clinical acumen, EKG, Troponin, and serial changes to determine if it is an Acute Myocardial Infarction or myocardial injury due to an underlying chronic condition.        CBC WITH AUTO DIFFERENTIAL - Abnormal; Notable for the following components:    Neutrophil % 80.9 (*)     Lymphocyte % 8.4 (*)     Lymphocytes, Absolute 0.67 (*)     All other components within normal limits   MAGNESIUM - Normal   RAINBOW DRAW    Narrative:     The following orders were created for panel order [place] Draw.  Procedure                               Abnormality         Status                     ---------                               -----------         ------                     Green Top (Gel)[[id number]]                                   Final result               Lavender Top[[id number]]                                     Final result               Red Top[[id number]]                                          Final result               Light Blue Top[[id number]]                                   Final result                 Please view results for these tests on the individual orders.   HIGH SENSITIVITIY TROPONIN T 2HR   GREEN TOP   LAVENDER TOP   RED TOP   LIGHT BLUE TOP   CBC AND DIFFERENTIAL    Narrative:     The following orders were created for panel order CBC & Differential.  Procedure                               Abnormality         Status                     ---------                               -----------         ------                     CBC Auto Differential[[id number]]        Abnormal            Final result                 Please view results for these tests on the individual orders.       Meds given in ED:   Medications   sodium chloride 0.9 % flush 10 mL (has no administration in time range)   dilTIAZem (CARDIZEM) injection 10 mg (has no administration in time range)   dilTIAZem (CARDIZEM) 125 mg in 125 mL D5W infusion (5 mg/hr Intravenous New Bag 3/2/24 0948)     dilTIAZem, 5-15 mg/hr, Last Rate: 5 mg/hr (03/02/24 0948)         NIH Stroke Scale:       Isolation/Infection(s):  No active isolations   No active infections     COVID Testing  Collected .  Resulted .    Nursing report ED to floor:  Mental status: .  Ambulatory status: .  Precautions: .    ED nurse phone extentsion- ..

## 2024-03-03 LAB
ALBUMIN SERPL-MCNC: 3.8 G/DL (ref 3.5–5.2)
ALBUMIN/GLOB SERPL: 1.5 G/DL
ALP SERPL-CCNC: 112 U/L (ref 39–117)
ALT SERPL W P-5'-P-CCNC: 30 U/L (ref 1–41)
ANION GAP SERPL CALCULATED.3IONS-SCNC: 11 MMOL/L (ref 5–15)
AST SERPL-CCNC: 20 U/L (ref 1–40)
BASOPHILS # BLD AUTO: 0.06 10*3/MM3 (ref 0–0.2)
BASOPHILS NFR BLD AUTO: 0.7 % (ref 0–1.5)
BILIRUB SERPL-MCNC: 0.9 MG/DL (ref 0–1.2)
BUN SERPL-MCNC: 33 MG/DL (ref 8–23)
BUN/CREAT SERPL: 31.1 (ref 7–25)
CALCIUM SPEC-SCNC: 8.4 MG/DL (ref 8.6–10.5)
CHLORIDE SERPL-SCNC: 105 MMOL/L (ref 98–107)
CHOLEST SERPL-MCNC: 143 MG/DL (ref 0–200)
CO2 SERPL-SCNC: 25 MMOL/L (ref 22–29)
CREAT SERPL-MCNC: 1.06 MG/DL (ref 0.76–1.27)
DEPRECATED RDW RBC AUTO: 47.5 FL (ref 37–54)
EGFRCR SERPLBLD CKD-EPI 2021: 74.6 ML/MIN/1.73
EOSINOPHIL # BLD AUTO: 0.16 10*3/MM3 (ref 0–0.4)
EOSINOPHIL NFR BLD AUTO: 2 % (ref 0.3–6.2)
ERYTHROCYTE [DISTWIDTH] IN BLOOD BY AUTOMATED COUNT: 14.4 % (ref 12.3–15.4)
GLOBULIN UR ELPH-MCNC: 2.6 GM/DL
GLUCOSE SERPL-MCNC: 115 MG/DL (ref 65–99)
HBA1C MFR BLD: 5.6 % (ref 4.8–5.6)
HCT VFR BLD AUTO: 40.7 % (ref 37.5–51)
HDLC SERPL-MCNC: 37 MG/DL (ref 40–60)
HGB BLD-MCNC: 13.5 G/DL (ref 13–17.7)
IMM GRANULOCYTES # BLD AUTO: 0.04 10*3/MM3 (ref 0–0.05)
IMM GRANULOCYTES NFR BLD AUTO: 0.5 % (ref 0–0.5)
LDLC SERPL CALC-MCNC: 87 MG/DL (ref 0–100)
LDLC/HDLC SERPL: 2.3 {RATIO}
LYMPHOCYTES # BLD AUTO: 0.99 10*3/MM3 (ref 0.7–3.1)
LYMPHOCYTES NFR BLD AUTO: 12.1 % (ref 19.6–45.3)
MCH RBC QN AUTO: 30.1 PG (ref 26.6–33)
MCHC RBC AUTO-ENTMCNC: 33.2 G/DL (ref 31.5–35.7)
MCV RBC AUTO: 90.6 FL (ref 79–97)
MONOCYTES # BLD AUTO: 0.64 10*3/MM3 (ref 0.1–0.9)
MONOCYTES NFR BLD AUTO: 7.9 % (ref 5–12)
NEUTROPHILS NFR BLD AUTO: 6.26 10*3/MM3 (ref 1.7–7)
NEUTROPHILS NFR BLD AUTO: 76.8 % (ref 42.7–76)
NRBC BLD AUTO-RTO: 0 /100 WBC (ref 0–0.2)
PLATELET # BLD AUTO: 198 10*3/MM3 (ref 140–450)
PMV BLD AUTO: 10.8 FL (ref 6–12)
POTASSIUM SERPL-SCNC: 4.1 MMOL/L (ref 3.5–5.2)
PROT SERPL-MCNC: 6.4 G/DL (ref 6–8.5)
RBC # BLD AUTO: 4.49 10*6/MM3 (ref 4.14–5.8)
SODIUM SERPL-SCNC: 141 MMOL/L (ref 136–145)
T4 FREE SERPL-MCNC: 1.38 NG/DL (ref 0.93–1.7)
TRIGL SERPL-MCNC: 105 MG/DL (ref 0–150)
TSH SERPL DL<=0.05 MIU/L-ACNC: 5.85 UIU/ML (ref 0.27–4.2)
VLDLC SERPL-MCNC: 19 MG/DL (ref 5–40)
WBC NRBC COR # BLD AUTO: 8.15 10*3/MM3 (ref 3.4–10.8)

## 2024-03-03 PROCEDURE — 84439 ASSAY OF FREE THYROXINE: CPT | Performed by: FAMILY MEDICINE

## 2024-03-03 PROCEDURE — 83036 HEMOGLOBIN GLYCOSYLATED A1C: CPT | Performed by: FAMILY MEDICINE

## 2024-03-03 PROCEDURE — 85025 COMPLETE CBC W/AUTO DIFF WBC: CPT | Performed by: FAMILY MEDICINE

## 2024-03-03 PROCEDURE — 80061 LIPID PANEL: CPT | Performed by: FAMILY MEDICINE

## 2024-03-03 PROCEDURE — 84443 ASSAY THYROID STIM HORMONE: CPT | Performed by: FAMILY MEDICINE

## 2024-03-03 PROCEDURE — 80053 COMPREHEN METABOLIC PANEL: CPT | Performed by: FAMILY MEDICINE

## 2024-03-03 PROCEDURE — 99222 1ST HOSP IP/OBS MODERATE 55: CPT | Performed by: INTERNAL MEDICINE

## 2024-03-03 PROCEDURE — 99222 1ST HOSP IP/OBS MODERATE 55: CPT | Performed by: STUDENT IN AN ORGANIZED HEALTH CARE EDUCATION/TRAINING PROGRAM

## 2024-03-03 RX ORDER — DILTIAZEM HYDROCHLORIDE 120 MG/1
120 CAPSULE, COATED, EXTENDED RELEASE ORAL
Status: DISCONTINUED | OUTPATIENT
Start: 2024-03-03 | End: 2024-03-04 | Stop reason: HOSPADM

## 2024-03-03 RX ADMIN — Medication 2 TABLET: at 08:40

## 2024-03-03 RX ADMIN — DILTIAZEM HYDROCHLORIDE 120 MG: 120 CAPSULE, EXTENDED RELEASE ORAL at 11:02

## 2024-03-03 RX ADMIN — METOPROLOL TARTRATE 25 MG: 25 TABLET, FILM COATED ORAL at 20:09

## 2024-03-03 RX ADMIN — APIXABAN 5 MG: 5 TABLET, FILM COATED ORAL at 08:40

## 2024-03-03 RX ADMIN — Medication 10 ML: at 08:40

## 2024-03-03 RX ADMIN — APIXABAN 5 MG: 5 TABLET, FILM COATED ORAL at 20:09

## 2024-03-03 RX ADMIN — Medication 15 MG/HR: at 02:00

## 2024-03-03 RX ADMIN — TAMSULOSIN HYDROCHLORIDE 0.4 MG: 0.4 CAPSULE ORAL at 20:09

## 2024-03-03 RX ADMIN — Medication 15 MG/HR: at 08:41

## 2024-03-03 RX ADMIN — Medication 10 ML: at 20:11

## 2024-03-03 RX ADMIN — LEVOTHYROXINE SODIUM 137 MCG: 112 TABLET ORAL at 11:02

## 2024-03-03 RX ADMIN — METOPROLOL TARTRATE 25 MG: 25 TABLET, FILM COATED ORAL at 08:40

## 2024-03-03 NOTE — NURSING NOTE
Pt Cardizem drip decreased to 7.5 mcg/min. Current Hr 80s irregular A-fib per telemetry and monitor. Pt /54. Pt denies CP, SOB, ,or dizziness.

## 2024-03-03 NOTE — PLAN OF CARE
Goal Outcome Evaluation:  Plan of Care Reviewed With: patient, daughter        Progress: improving  Outcome Evaluation: Pt off Cardizem drip today after Metoprolol po given. Pt to have Stress testing tomorrow AM per Cardiology orders. Pt daughter notified of orders for stress tesitng. Pt HR today 81-90s A-fib with HR noted to go to 70 before Cardizem drip discontinued at 1340. Pt daughter informed of plan for stress tesitng. Pt denies CP, dizziness, or SOB.

## 2024-03-03 NOTE — PLAN OF CARE
Goal Outcome Evaluation:      Afib, rate in the 90's to low 100's,cardizem drip as ordered,tele monitor in place,safety rounds per hospital policy

## 2024-03-03 NOTE — CASE MANAGEMENT/SOCIAL WORK
Discharge Planning Assessment  [facility]     Patient Name: Delgado Desir  MRN: [id number]  Today's Date: 3/3/2024    Admit Date: 3/2/2024        Discharge Needs Assessment       Row Name 03/03/24 0851       Living Environment    People in Home spouse    Current Living Arrangements home    Potentially Unsafe Housing Conditions none    Primary Care Provided by self    Provides Primary Care For no one    Family Caregiver if Needed spouse    Family Caregiver Names Nava    Quality of Family Relationships helpful;involved;supportive    Able to Return to Prior Arrangements yes       Resource/Environmental Concerns    Resource/Environmental Concerns none    Transportation Concerns none       Transition Planning    Patient/Family Anticipates Transition to home    Patient/Family Anticipated Services at Transition none    Transportation Anticipated family or friend will provide       Discharge Needs Assessment    Readmission Within the Last 30 Days no previous admission in last 30 days    Equipment Currently Used at Home none    Anticipated Changes Related to Illness none    Equipment Needed After Discharge none    Discharge Coordination/Progress PT resides at home with spouse and plans to dc to the same. PT was independent prior to admission and denies any needs at this time. PT has a PCP and Rx coverage. SW will follow and assist as needed.                   Discharge Plan    No documentation.                 Continued Care and Services - Admitted Since 3/2/2024    No active coordination exists for this encounter.          Demographic Summary    No documentation.                  Functional Status    No documentation.                  Psychosocial    No documentation.                  Abuse/Neglect    No documentation.                  Legal    No documentation.                  Substance Abuse    No documentation.                  Patient Forms    No documentation.                     SHELLY Powers

## 2024-03-03 NOTE — PROGRESS NOTES
[facility]  INPATIENT PROGRESS NOTE    Patient Name: Delgado Desir  Date of Admission: 3/2/2024  Today's Date: 03/03/24  Length of Stay: 1  Primary Care Physician: Michael Nunez MD    Subjective   Chief Complaint: headache  HPI   He was sitting up in chair.  He is rate controlled atrial fibrillation on telemetry.  Cardizem drip has just been discontinued as he was started on oral Cardizem.  Denies chest pain or pressure.  He does complain of a headache.    Review of Systems   All pertinent negatives and positives are as above. All other systems have been reviewed and are negative unless otherwise stated.     Objective    Temp:  [97.6 °F (36.4 °C)-98.7 °F (37.1 °C)] 98 °F (36.7 °C)  Heart Rate:  [] 84  Resp:  [18-20] 20  BP: (113-141)/() 117/89  Physical Exam  Vitals reviewed.   Constitutional:       General: He is not in acute distress.     Appearance: He is obese. He is not toxic-appearing.      Comments: Up in chair.  No acute distress.  On room air.  No family at bedside.  Seen and examined in conjunction with Dr. Suero.   HENT:      Head: Normocephalic and atraumatic.      Mouth/Throat:      Mouth: Mucous membranes are moist.      Pharynx: Oropharynx is clear.   Eyes:      Extraocular Movements: Extraocular movements intact.      Conjunctiva/sclera: Conjunctivae normal.      Pupils: Pupils are equal, round, and reactive to light.   Cardiovascular:      Rate and Rhythm: Normal rate. Rhythm irregular.      Pulses: Normal pulses.      Comments: Atrial fibrillation 95-97  Pulmonary:      Effort: Pulmonary effort is normal. No respiratory distress.      Breath sounds: Normal breath sounds. No wheezing.   Abdominal:      General: Bowel sounds are normal. There is no distension.      Palpations: Abdomen is soft.      Tenderness: There is no abdominal tenderness.   Musculoskeletal:         General: No swelling or tenderness. Normal range of  "motion.      Cervical back: Normal range of motion and neck supple. No muscular tenderness.   Skin:     General: Skin is warm and dry.      Findings: No erythema or rash.   Neurological:      General: No focal deficit present.      Mental Status: He is alert and oriented to person, place, and time.      Cranial Nerves: No cranial nerve deficit.      Motor: No weakness.   Psychiatric:         Mood and Affect: Mood normal.         Behavior: Behavior normal.       Results Review:  I have reviewed the labs, radiology results, and diagnostic studies.    Laboratory Data:   Results from last 7 days   Lab Units 03/03/24  0216 03/02/24  0912   WBC 10*3/mm3 8.15 7.94   HEMOGLOBIN g/dL 13.5 13.6   HEMATOCRIT % 40.7 41.2   PLATELETS 10*3/mm3 198 174        Results from last 7 days   Lab Units 03/03/24  0216 03/02/24  0912   SODIUM mmol/L 141 139   POTASSIUM mmol/L 4.1 4.2   CHLORIDE mmol/L 105 106   CO2 mmol/L 25.0 23.0   BUN mg/dL 33* 34*   CREATININE mg/dL 1.06 0.98   CALCIUM mg/dL 8.4* 8.6   BILIRUBIN mg/dL 0.9 1.0   ALK PHOS U/L 112 110   ALT (SGPT) U/L 30 32   AST (SGOT) U/L 20 22   GLUCOSE mg/dL 115* 107*       Culture Data:   No results found for: \"ACANTHNAEG\", \"AFBCX\", \"BPERTUSSISCX\", \"BLOODCX\"  No results found for: \"BCIDPCR\", \"CXREFLEX\", \"CSFCX\", \"CULTURETIS\"  No results found for: \"CULTURES\", \"HSVCX\", \"URCX\"  No results found for: \"EYECULTURE\", \"GCCX\", \"HSVCULTURE\", \"LABHSV\"  No results found for: \"LEGIONELLA\", \"MRSACX\", \"MUMPSCX\", \"MYCOPLASCX\"  No results found for: \"NOCARDIACX\", \"STOOLCX\"  No results found for: \"THROATCX\", \"UNSTIMCULT\", \"URINECX\", \"CULTURE\", \"VZVCULTUR\"  No results found for: \"VIRALCULTU\", \"WOUNDCX\"    Radiology Data:   Imaging Results (Last 24 Hours)       ** No results found for the last 24 hours. **            I have reviewed the patient's current medications.     Assessment/Plan   Assessment  Active Hospital Problems    Diagnosis     **Atrial fibrillation with RVR     History of radiation " therapy     Current use of long term anticoagulation     Class 1 obesity due to excess calories with serious comorbidity and body mass index (BMI) of 33.0 to 33.9 in adult     Malignant neoplasm of lower lobe of right lung     Hypertension     Prostate cancer      Mr. Desir is a 73-year-old male who presented to [facility] on 3/2 chief complaint dyspnea, chest tightness and irregular heart rate.  He describes chest tightness as sensation of mild pressure on both of his shoulders.  He noticed his heart rate had ranged from 70-150s.  No history of coronary artery disease.  He has a history of paroxysmal atrial fibrillation and is chronically anticoagulated on Eliquis followed by cardiology.  He reports compliance with Eliquis.  He has a history of prostate cancer, lung nodules with previous wedge resection and confirmation of malignancy in the past, Status post total thyroidectomy.  In the ED, he was found to be in atrial fibrillation with rapid ventricular response with rate 150s.  Chest x-ray showed no acute findings    Treatment Plan  Atrial fibrillation with rapid ventricular response on admission.  Cardiology, Dr. Sebastian consulted.  Cardiology recommends to start Cardizem  mg daily and wean Cardizem drip.  Continue metoprolol increased dose 25 mg twice daily.  Continue anticoagulation with Eliquis.  Continue to monitor on telemetry.    Results for orders placed during the hospital encounter of 11/23/23    Adult Transthoracic Echo Complete W/ Cont if Necessary Per Protocol    Interpretation Summary    Left ventricular ejection fraction appears to be 61 - 65%.    Left ventricular wall thickness is consistent with mild concentric hypertrophy.    Left ventricular diastolic function was normal.    The left atrial cavity is moderately dilated.    Estimated right ventricular systolic pressure from tricuspid regurgitation is moderately elevated (45-55 mmHg).    The right ventricular cavity is mild to  moderately dilated, with normal systolic function..    The right atrial cavity is dilated.    No significant (greater than mild) valvular pathology.    Nori scan ordered for tomorrow per cardiology.    Eliquis will serve as VTE prophylaxis.    Medical Decision Making  Number and Complexity of problems: 2 acute problems  Differential Diagnosis: None considered at present    Conditions and Status        Condition is improving.     [facility] Data  External documents reviewed: Prior epic records  Cardiac tracing (EKG, telemetry) interpretation: Reviewed  Radiology interpretation: Interpreted by radiology  Labs reviewed: As above  Any tests that were considered but not ordered: None considered at present     Decision rules/scores evaluated (example [id number], [place], etc): None considered at present     Discussed with: patient and Dr. Suero     Care Planning  Shared decision making: Patient is agreeable to ongoing workup and treatment  Code status and discussions: Full code with full interventions    Disposition  Social Determinants of Health that impact treatment or disposition: none  I expect the patient to be discharged to home in 1-2 days.     Electronically signed by ANDRES Arana, 03/03/24, 10:42 CST.

## 2024-03-03 NOTE — CONSULTS
LOS: 1 day   Patient Care Team:  Michael Nunez MD as PCP - General (Family Medicine)  Flores, Nick MILES MD as Referring Physician (Cardiothoracic Surgery)  Landry Hoover MD as Cardiologist (Cardiology)  Sabrina Jackson APRN as Nurse Practitioner (Nurse Practitioner)  Jean Marie Solis MD as Consulting Physician (Otolaryngology)  Maryann Serra APRN as Nurse Practitioner (Otolaryngology)    Chief Complaint: Shortness of breath and chest pain     Subjective    Delgado Desir is a 73 y.o. male who is being seen in consultation.  Initially patient states he is not sure why he is here and wants me to tell him the reason  Subsequently nurse mentions he is admitted with shortness of breath and also fats like a child was sitting on his shoulders  Latest test results as below  Has had some palpitations  History of falls  Apparently fell 3 times over the last 1 year last 1 was less than 1 month ago  States this happens when he turns suddenly and gets disbalance  Currently denies any chest pain  No bleeding issues  Tolerating current medications well  Vitals reviewed  Overall ventricular rates are better controlled now    Telemetry: no malignant arrhythmia. No significant pauses.    Review of Systems   Constitutional: No chills   Has fatigue   No fever.   HENT: Negative.    Eyes: Negative.    Respiratory: Negative for cough,   No chest wall soreness,   Shortness of breath,   no wheezing, no stridor.    Cardiovascular: As above  Gastrointestinal: Negative for abdominal distention,  No abdominal pain,   No blood in stool,   No constipation,   No diarrhea,   No nausea   No vomiting.   Endocrine: Negative.    Genitourinary: Negative for difficulty urinating, dysuria, flank pain and hematuria.   Musculoskeletal: Negative.    Skin: Negative for rash and wound.   Allergic/Immunologic: Negative.    Neurological: Negative for dizziness, syncope, weakness,   No light-headedness  No  headaches.   Hematological:  Does not bruise/bleed easily.   Psychiatric/Behavioral: Negative for agitation or behavioral problems,   No confusion,   the patient is  nervous/anxious.       History:   Past Medical History:   Diagnosis Date    A-fib     Arthritis     Asthma 23    Dizzy spells     states on rare occsaion has    Failed intubation of airway 11/10/2020    Hypertension     Lung cancer     Nosebleed 23    Taking  eliquis    PAF (paroxysmal atrial fibrillation)     Prostate cancer     Thyroid disorder      Past Surgical History:   Procedure Laterality Date    CARPAL TUNNEL RELEASE Right     LOBECTOMY Right 2/15/2021    Procedure: THORACOSCOPY WITH DAVINCI ROBOT WITH RIGHT WEDGE RESECTION, MEDIASTINAL LYMPH NODE DISSECTION-RIGHT;  Surgeon: Nick Flores MD;  Location:  [facility];  Service: DaVinci;  Laterality: Right;    LUNG BIOPSY      PROSTATE BIOPSY      REPLACEMENT TOTAL KNEE Left     SHOULDER SURGERY Bilateral     THORACOSCOPY Right 10/30/2020    Procedure: MEDIASTINOSCOPY, RIGHT THORACOSCOPY POSSIBLE WEDGE RESECTION WITH DAVINCI ROBOT;  Surgeon: Nick Flores MD;  Location:  [facility];  Service: DaVinci;  Laterality: Right;    THYROIDECTOMY N/A 2020    Procedure: Total thyroidectomy;  Surgeon: Jean Marie Solis MD;  Location:  [facility];  Service: ENT;  Laterality: N/A;     Social History     Socioeconomic History    Marital status:     Number of children: 2   Tobacco Use    Smoking status: Former     Current packs/day: 0.00     Average packs/day: 1 pack/day for 33.0 years (33.0 ttl pk-yrs)     Types: Cigarettes     Start date: 1969     Quit date: 1996     Years since quittin.1    Smokeless tobacco: Never   Vaping Use    Vaping status: Never Used   Substance and Sexual Activity    Alcohol use: Yes     Alcohol/week: 2.0 standard drinks of alcohol     Types: 2 Shots of liquor per week     Comment: x2 weekly    Drug use: Never    Sexual activity: Defer     Family History   Problem Relation  Age of Onset    Hypertension Mother         New Contact    Cancer Mother     COPD Father        Labs:  WBC WBC   Date Value Ref Range Status   03/03/2024 8.15 3.40 - 10.80 10*3/mm3 Final   03/02/2024 7.94 3.40 - 10.80 10*3/mm3 Final      HGB Hemoglobin   Date Value Ref Range Status   03/03/2024 13.5 13.0 - 17.7 g/dL Final   03/02/2024 13.6 13.0 - 17.7 g/dL Final      HCT Hematocrit   Date Value Ref Range Status   03/03/2024 40.7 37.5 - 51.0 % Final   03/02/2024 41.2 37.5 - 51.0 % Final      Platelets Platelets   Date Value Ref Range Status   03/03/2024 198 140 - 450 10*3/mm3 Final   03/02/2024 174 140 - 450 10*3/mm3 Final      MCV MCV   Date Value Ref Range Status   03/03/2024 90.6 79.0 - 97.0 fL Final   03/02/2024 89.8 79.0 - 97.0 fL Final        Results from last 7 days   Lab Units 03/03/24  0216 03/02/24  0912   SODIUM mmol/L 141 139   POTASSIUM mmol/L 4.1 4.2   CHLORIDE mmol/L 105 106   CO2 mmol/L 25.0 23.0   BUN mg/dL 33* 34*   CREATININE mg/dL 1.06 0.98   CALCIUM mg/dL 8.4* 8.6   BILIRUBIN mg/dL 0.9 1.0   ALK PHOS U/L 112 110   ALT (SGPT) U/L 30 32   AST (SGOT) U/L 20 22   GLUCOSE mg/dL 115* 107*     Lab Results   Component Value Date    CKTOTAL 188 10/21/2022    TROPONINT 23 (H) 03/02/2024     PT/INR:    Protime   Date Value Ref Range Status   03/02/2024 15.8 (H) 11.8 - 14.8 Seconds Final   /  INR   Date Value Ref Range Status   03/02/2024 1.21 (H) 0.91 - 1.09 Final       Imaging Results (Last 72 Hours)       Procedure Component Value Units Date/Time    XR Chest 1 View [[id number]] Collected: 03/02/24 0911     Updated: 03/02/24 0915    Narrative:      EXAMINATION: XR CHEST 1 VW-  3/2/2024 9:11 AM     HISTORY: Dyspnea     FINDINGS: Today's exam is compared to previous study of 11/23/2023.  There is mild atelectasis or scarring in the right lung base. Remote  right-sided rib fractures are present. The heart is mildly enlarged. No  acute infiltrate or effusion.       Impression:      1. Right basilar atelectasis  or scarring. Lungs are otherwise clear.  2. Mild cardiomegaly.     This report was signed and finalized on 3/2/2024 9:12 AM by Dr. Fermin Lantigua MD.               Objective     No Known Allergies    Medication Review: Performed  Current Facility-Administered Medications   Medication Dose Route Frequency Provider Last Rate Last Admin    acetaminophen (TYLENOL) tablet 500 mg  500 mg Oral Q6H PRN Josesito Suero MD        albuterol sulfate HFA (PROVENTIL HFA;VENTOLIN HFA;PROAIR HFA) inhaler 2 puff  2 puff Inhalation Q6H PRN Josesito Suero MD        apixaban (ELIQUIS) tablet 5 mg  5 mg Oral Q12H Josesito Suero MD   5 mg at 03/03/24 0840    sennosides-docusate (PERICOLACE) 8.6-50 MG per tablet 2 tablet  2 tablet Oral BID PRN Josesito Suero MD        And    polyethylene glycol (MIRALAX) packet 17 g  17 g Oral Daily PRN Josesito Suero MD        And    bisacodyl (DULCOLAX) EC tablet 5 mg  5 mg Oral Daily PRN Josesito Suero MD        And    bisacodyl (DULCOLAX) suppository 10 mg  10 mg Rectal Daily PRN Josesito Suero MD        dilTIAZem (CARDIZEM) 125 mg in 125 mL D5W infusion  5-15 mg/hr Intravenous Titrated Josesito Suero MD 15 mL/hr at 03/03/24 0841 15 mg/hr at 03/03/24 0841    levothyroxine (SYNTHROID, LEVOTHROID) tablet 137 mcg  137 mcg Oral Daily Josesito Suero MD        metoprolol tartrate (LOPRESSOR) tablet 25 mg  25 mg Oral Q12H Josesito Suero MD   25 mg at 03/03/24 0840    multivitamin with minerals 2 tablet  2 tablet Oral Daily Josesito Suero MD   2 tablet at 03/03/24 0840    sodium chloride 0.9 % flush 10 mL  10 mL Intravenous PRN Josesito Suero MD        sodium chloride 0.9 % flush 10 mL  10 mL Intravenous Q12H Josesito Suero MD   10 mL at 03/03/24 0840    sodium chloride 0.9 % flush 10 mL  10 mL Intravenous PRN Josesito Suero MD        sodium chloride 0.9 % infusion 40 mL  40 mL Intravenous PRN Josesito Suero MD        tamsulosin (FLOMAX) 24 hr capsule 0.4 mg  0.4 mg Oral Nightly Josesito Suero  "MD SAM   0.4 mg at 03/02/24 2000       Vital Sign Min/Max for last 24 hours  Temp  Min: 97.6 °F (36.4 °C)  Max: 98.7 °F (37.1 °C)   BP  Min: 113/58  Max: 141/92   Pulse  Min: 84  Max: 147   Resp  Min: 18  Max: 20   SpO2  Min: 95 %  Max: 97 %   No data recorded   Weight  Min: 121 kg (267 lb 3.2 oz)  Max: 122 kg (268 lb 12.8 oz)     Flowsheet Rows      Flowsheet Row First Filed Value   Admission Height 185.4 cm (73\") Documented at 03/02/2024 0833   Admission Weight 122 kg (269 lb) Documented at 03/02/2024 0833            Results for orders placed during the hospital encounter of 11/23/23    Adult Transthoracic Echo Complete W/ Cont if Necessary Per Protocol    Interpretation Summary    Left ventricular ejection fraction appears to be 61 - 65%.    Left ventricular wall thickness is consistent with mild concentric hypertrophy.    Left ventricular diastolic function was normal.    The left atrial cavity is moderately dilated.    Estimated right ventricular systolic pressure from tricuspid regurgitation is moderately elevated (45-55 mmHg).    The right ventricular cavity is mild to moderately dilated, with normal systolic function..    The right atrial cavity is dilated.    No significant (greater than mild) valvular pathology.      Physical Exam:    General Appearance: Awake, alert, in no acute distress  Eyes: Pupils equal and reactive    Ears: Appear intact with no abnormalities noted  Nose: Nares normal, no drainage  Neck: supple, trachea midline, no carotid bruit and no JVD  Back: no kyphosis present,    Lungs: respirations regular, respirations even and respirations unlabored  Heart: normal S1, S2, 2/6 systolic murmur left sternal border, irregular  Abdomen: normal bowel sounds, no tenderness   Skin: no bleeding, bruising or rash  Extremities: no cyanosis  Psychiatric/Behavioral: Negative for agitation, behavioral problems, confusion, the patient does  appear to be nervous/anxious.       Results Review:   I reviewed " the patient's new clinical results.  I reviewed the patient's new imaging results and agree with the interpretation.  I reviewed the patient's other test results and agree with the interpretation  I personally viewed and interpreted the patient's EKG/Telemetry data    Discussed with patient  Updated patient regarding any new or relevant abnormalities on review of records or any new findings on physical exam.   Mentioned to patient about purpose of visit and desirable health short and long term goals and objectives.     Reviewed available prior notes, consults, prior visits, laboratory findings, radiology and cardiology relevant reports.   Updated chart as applicable.   I have reviewed the patient's medical history in detail and updated the computerized patient record as relevant.          Assessment & Plan       Atrial fibrillation with RVR    Prostate cancer    Hypertension    Malignant neoplasm of lower lobe of right lung    Class 1 obesity due to excess calories with serious comorbidity and body mass index (BMI) of 33.0 to 33.9 in adult    Current use of long term anticoagulation    History of radiation therapy  Elevated troponin with flat trajectory  Chest discomfort  Right atrial enlargement  Moderate left atrial enlargement  Mild to moderate pulmonary hypertension    Plan    Continue on current medication  Rate control agent as required  Add oral Cardizem CD1 20 mg p.o. daily  Taper and discontinue IV Cardizem  Continue on other rate control agents  Anticoagulation as tolerated  Fall precautions  Monitor for any overt or covert signs of bleeding  Ischemia workup  Electrophysiology has already been consulted  May require Watchman closure device in future  In addition cardiology consulted   Consider Lexiscan Cardiolite stress test tomorrow    Telemetry  Deep vein thrombosis prophylaxis/precautions [on anticoagulation]  Appropriate diet, fluid, sodium, caffeine, stimulants intake   Questions were encouraged,  asked and answered to the patient's  understanding and satisfaction.  Compliance to diet and medications       Mesfin Sebastian MD  03/03/24  09:45 CST    EMR Dragon/Transcription was used to dictate part of this note

## 2024-03-03 NOTE — NURSING NOTE
"Pt care RN called to speak with Pt daughter per Pt request. Daughter Mini Valdez called at [phone] after RN verified permission via Pt. RN explained to Pt daughter that stress testing was scheduled for tomorrow 3/4/23. RN explained stress testing details and need for testing per Cardiology request. Daughter reports that Pt has been getting confused lately and that she was told by the Pt that he would be \"getting shocked\" tomorrow by cardiology.  RN explained that when the MD was in the room explaining the plan for A-fib MD told Pt that sometime later, if medication did not work, that he may have to have Electrocardioversion done. RN explained to daughter that the MD did not tell the Pt he would be shocked tomorrow and that the Pt had confused that information given. Daughter conveyed to nurse that the Pt had not been forthright in his current health situation. Daughter stated that several times in the past month the Pt had reported \"Passing out\" in his home and once when driving his car on the road.  The Pt specifically denied to the nursing staff AND to MD regarding his falls. Pt reported that he had only \"tripped over his feet\" at home, and specifically denied ever passing out or fainting anytime in the recent past. Daughter voiced concern that the Pt was in fear that if he told the MD what was happening his driving license would be taken. RN explained that it is very important that she convince her father to be truthful in his current health situation and not to misinform providers of his care.  RN did notify daughter that note would be made in Pt chart that the Pt has had several syncopal episodes lately, but denied that happening when asked directly. Daughter verbalized understanding to same.   "

## 2024-03-04 ENCOUNTER — READMISSION MANAGEMENT (OUTPATIENT)
Dept: CALL CENTER | Facility: HOSPITAL | Age: 74
End: 2024-03-04
Payer: MEDICARE

## 2024-03-04 ENCOUNTER — APPOINTMENT (OUTPATIENT)
Dept: CARDIOLOGY | Facility: HOSPITAL | Age: 74
DRG: 309 | End: 2024-03-04
Payer: MEDICARE

## 2024-03-04 ENCOUNTER — PREP FOR SURGERY (OUTPATIENT)
Dept: OTHER | Facility: HOSPITAL | Age: 74
End: 2024-03-04
Payer: MEDICARE

## 2024-03-04 VITALS
TEMPERATURE: 98.4 F | WEIGHT: 264 LBS | HEIGHT: 73 IN | DIASTOLIC BLOOD PRESSURE: 52 MMHG | SYSTOLIC BLOOD PRESSURE: 112 MMHG | OXYGEN SATURATION: 95 % | BODY MASS INDEX: 34.99 KG/M2 | HEART RATE: 55 BPM | RESPIRATION RATE: 18 BRPM

## 2024-03-04 DIAGNOSIS — I48.0 PAROXYSMAL ATRIAL FIBRILLATION: Primary | ICD-10-CM

## 2024-03-04 LAB
BH CV NUCLEAR PRIOR STUDY: 3
BH CV REST NUCLEAR ISOTOPE DOSE: 11.6 MCI
BH CV STRESS NUCLEAR ISOTOPE DOSE: 33.4 MCI
LV EF NUC BP: 70 %
MAXIMAL PREDICTED HEART RATE: 148 BPM
STRESS TARGET HR: 126 BPM

## 2024-03-04 PROCEDURE — 78452 HT MUSCLE IMAGE SPECT MULT: CPT

## 2024-03-04 PROCEDURE — 93017 CV STRESS TEST TRACING ONLY: CPT

## 2024-03-04 PROCEDURE — 25010000002 REGADENOSON 0.4 MG/5ML SOLUTION: Performed by: INTERNAL MEDICINE

## 2024-03-04 PROCEDURE — 0 TECHNETIUM TETROFOSMIN KIT: Performed by: FAMILY MEDICINE

## 2024-03-04 PROCEDURE — 78452 HT MUSCLE IMAGE SPECT MULT: CPT | Performed by: INTERNAL MEDICINE

## 2024-03-04 PROCEDURE — 93018 CV STRESS TEST I&R ONLY: CPT | Performed by: INTERNAL MEDICINE

## 2024-03-04 PROCEDURE — 99232 SBSQ HOSP IP/OBS MODERATE 35: CPT | Performed by: STUDENT IN AN ORGANIZED HEALTH CARE EDUCATION/TRAINING PROGRAM

## 2024-03-04 PROCEDURE — 99232 SBSQ HOSP IP/OBS MODERATE 35: CPT | Performed by: NURSE PRACTITIONER

## 2024-03-04 PROCEDURE — A9502 TC99M TETROFOSMIN: HCPCS | Performed by: FAMILY MEDICINE

## 2024-03-04 RX ORDER — FLECAINIDE ACETATE 50 MG/1
50 TABLET ORAL EVERY 12 HOURS SCHEDULED
Qty: 60 TABLET | Refills: 11 | Status: SHIPPED | OUTPATIENT
Start: 2024-03-04 | End: 2024-03-12 | Stop reason: SDUPTHER

## 2024-03-04 RX ORDER — REGADENOSON 0.08 MG/ML
0.4 INJECTION, SOLUTION INTRAVENOUS ONCE
Status: COMPLETED | OUTPATIENT
Start: 2024-03-04 | End: 2024-03-04

## 2024-03-04 RX ORDER — SODIUM CHLORIDE 9 MG/ML
40 INJECTION, SOLUTION INTRAVENOUS AS NEEDED
OUTPATIENT
Start: 2024-03-04

## 2024-03-04 RX ORDER — FLECAINIDE ACETATE 50 MG/1
50 TABLET ORAL EVERY 12 HOURS SCHEDULED
Status: DISCONTINUED | OUTPATIENT
Start: 2024-03-04 | End: 2024-03-04 | Stop reason: HOSPADM

## 2024-03-04 RX ORDER — SODIUM CHLORIDE 0.9 % (FLUSH) 0.9 %
10 SYRINGE (ML) INJECTION AS NEEDED
OUTPATIENT
Start: 2024-03-04

## 2024-03-04 RX ORDER — SODIUM CHLORIDE 0.9 % (FLUSH) 0.9 %
10 SYRINGE (ML) INJECTION EVERY 12 HOURS SCHEDULED
OUTPATIENT
Start: 2024-03-04

## 2024-03-04 RX ADMIN — Medication 2 TABLET: at 08:35

## 2024-03-04 RX ADMIN — REGADENOSON 0.4 MG: 0.08 INJECTION, SOLUTION INTRAVENOUS at 12:05

## 2024-03-04 RX ADMIN — DILTIAZEM HYDROCHLORIDE 120 MG: 120 CAPSULE, EXTENDED RELEASE ORAL at 08:35

## 2024-03-04 RX ADMIN — Medication 10 ML: at 08:35

## 2024-03-04 RX ADMIN — TETROFOSMIN 1 DOSE: 1.38 INJECTION, POWDER, LYOPHILIZED, FOR SOLUTION INTRAVENOUS at 12:06

## 2024-03-04 RX ADMIN — LEVOTHYROXINE SODIUM 137 MCG: 112 TABLET ORAL at 08:35

## 2024-03-04 RX ADMIN — APIXABAN 5 MG: 5 TABLET, FILM COATED ORAL at 08:35

## 2024-03-04 RX ADMIN — TETROFOSMIN 1 DOSE: 1.38 INJECTION, POWDER, LYOPHILIZED, FOR SOLUTION INTRAVENOUS at 09:36

## 2024-03-04 RX ADMIN — METOPROLOL TARTRATE 25 MG: 25 TABLET, FILM COATED ORAL at 13:05

## 2024-03-04 NOTE — PROGRESS NOTES
[facility] Medicine Services  INPATIENT PROGRESS NOTE    Patient Name: Delgado Desir  Date of Admission: 3/2/2024  Today's Date: 03/04/24  Length of Stay: 2  Primary Care Physician: Michael Nunez MD    Subjective   Chief Complaint: none offered  HPI   He was standing up in room.  He converted to sinus rhythm this morning and has maintained sinus 60s since.      He denies any shortness of breath, chest pain, shoulder discomfort.  He went for nuclear perfusion study today-awaiting results.    Review of Systems   All pertinent negatives and positives are as above. All other systems have been reviewed and are negative unless otherwise stated.     Objective    Temp:  [97.4 °F (36.3 °C)-99.5 °F (37.5 °C)] 98.3 °F (36.8 °C)  Heart Rate:  [52-80] 61  Resp:  [18] 18  BP: ()/(51-80) 153/67  Physical Exam  Vitals reviewed.   Constitutional:       General: He is not in acute distress.     Appearance: He is obese. He is not toxic-appearing.      Comments: Up in room.  No acute distress.  On room air.  No family at bedside.  Discussed with his nurse Katerine.   HENT:      Head: Normocephalic and atraumatic.      Mouth/Throat:      Mouth: Mucous membranes are moist.      Pharynx: Oropharynx is clear.   Eyes:      Extraocular Movements: Extraocular movements intact.      Conjunctiva/sclera: Conjunctivae normal.      Pupils: Pupils are equal, round, and reactive to light.   Cardiovascular:      Rate and Rhythm: Normal rate and regular rhythm.      Pulses: Normal pulses.      Comments: Converted from atrial fibrillation to sinus rhythm at 0606.  Sinus 62-69 today  Pulmonary:      Effort: Pulmonary effort is normal. No respiratory distress.      Breath sounds: Normal breath sounds. No wheezing.   Abdominal:      General: Bowel sounds are normal. There is no distension.      Palpations: Abdomen is soft.      Tenderness: There is no abdominal tenderness.   Musculoskeletal:          "General: No swelling or tenderness. Normal range of motion.      Cervical back: Normal range of motion and neck supple. No muscular tenderness.   Skin:     General: Skin is warm and dry.      Findings: No erythema or rash.   Neurological:      General: No focal deficit present.      Mental Status: He is alert and oriented to person, place, and time.      Cranial Nerves: No cranial nerve deficit.      Motor: No weakness.   Psychiatric:         Mood and Affect: Mood normal.         Behavior: Behavior normal.       Results Review:  I have reviewed the labs, radiology results, and diagnostic studies.    Laboratory Data:   Results from last 7 days   Lab Units 03/03/24  0216 03/02/24  0912   WBC 10*3/mm3 8.15 7.94   HEMOGLOBIN g/dL 13.5 13.6   HEMATOCRIT % 40.7 41.2   PLATELETS 10*3/mm3 198 174        Results from last 7 days   Lab Units 03/03/24  0216 03/02/24  0912   SODIUM mmol/L 141 139   POTASSIUM mmol/L 4.1 4.2   CHLORIDE mmol/L 105 106   CO2 mmol/L 25.0 23.0   BUN mg/dL 33* 34*   CREATININE mg/dL 1.06 0.98   CALCIUM mg/dL 8.4* 8.6   BILIRUBIN mg/dL 0.9 1.0   ALK PHOS U/L 112 110   ALT (SGPT) U/L 30 32   AST (SGOT) U/L 20 22   GLUCOSE mg/dL 115* 107*       Culture Data:   No results found for: \"ACANTHNAEG\", \"AFBCX\", \"BPERTUSSISCX\", \"BLOODCX\"  No results found for: \"BCIDPCR\", \"CXREFLEX\", \"CSFCX\", \"CULTURETIS\"  No results found for: \"CULTURES\", \"HSVCX\", \"URCX\"  No results found for: \"EYECULTURE\", \"GCCX\", \"HSVCULTURE\", \"LABHSV\"  No results found for: \"LEGIONELLA\", \"MRSACX\", \"MUMPSCX\", \"MYCOPLASCX\"  No results found for: \"NOCARDIACX\", \"STOOLCX\"  No results found for: \"THROATCX\", \"UNSTIMCULT\", \"URINECX\", \"CULTURE\", \"VZVCULTUR\"  No results found for: \"VIRALCULTU\", \"WOUNDCX\"    Radiology Data:   Imaging Results (Last 24 Hours)       ** No results found for the last 24 hours. **            I have reviewed the patient's current medications.     Assessment/Plan   Assessment  Active Hospital Problems    Diagnosis     **Atrial " fibrillation with RVR     History of radiation therapy     Current use of long term anticoagulation     Class 1 obesity due to excess calories with serious comorbidity and body mass index (BMI) of 33.0 to 33.9 in adult     Malignant neoplasm of lower lobe of right lung     Hypertension     Prostate cancer      Mr. Desir is a 73-year-old male who presented to [facility] on 3/2 chief complaint dyspnea, chest tightness and irregular heart rate.  He describes chest tightness as sensation of mild pressure on both of his shoulders.  He noticed his heart rate had ranged from 70-150s.  No history of coronary artery disease.  He has a history of paroxysmal atrial fibrillation and is chronically anticoagulated on Eliquis followed by cardiology.  He reports compliance with Eliquis.  He has a history of prostate cancer, lung nodules with previous wedge resection and confirmation of malignancy in the past, Status post total thyroidectomy.  In the ED, he was found to be in atrial fibrillation with rapid ventricular response with rate 150s.  Chest x-ray showed no acute findings    Treatment Plan  Atrial fibrillation with rapid ventricular response on admission.  EP consulted -he converted to sinus rhythm this morning around 6 AM.  Continue Cardizem and metoprolol.  Continue chronic anticoagulation with Eliquis. Dr. Johnson to determine short-term antiarrhythmic plan depending on stress test results.    Nori scan today.    Eliquis will serve as VTE prophylaxis.    Medical Decision Making  Number and Complexity of problems: 2 acute problems  Differential Diagnosis: None considered at present    Conditions and Status        Condition is improving.     [facility] Data  External documents reviewed: Prior epic records  Cardiac tracing (EKG, telemetry) interpretation: Reviewed  Radiology interpretation: Interpreted by radiology  Labs reviewed: As above  Any tests that were considered but not ordered: None considered at present      Decision rules/scores evaluated (example [id number], Wells, etc): None considered at present     Discussed with: patient and Dr. Lee     Care Planning  Shared decision making: Patient is agreeable to ongoing workup and treatment  Code status and discussions: Full code with full interventions    Disposition  Social Determinants of Health that impact treatment or disposition: none  I expect the patient to be discharged to home in 1-2 days.     Electronically signed by ANDRES Arana, 03/04/24, 13:13 CST.

## 2024-03-04 NOTE — PAYOR COMM NOTE
"3/4/24 [facility] [phone]  [phone]    ER ADMIT TO INPATIENT ON 3/2/24.                Delgado Chappell \"Quang\" (73 y.o. Male)       Date of Birth   1950    Social Security Number       Address   [address] [address]    Home Phone   [phone]    MRN   [id number]       Congregation   Other    Marital Status                               Admission Date   3/2/24    Admission Type   Emergency    Admitting Provider   Frederick Lee MD    Attending Provider   Frederick Lee MD    Department, Room/Bed   [facility] 4B, 404/1       Discharge Date       Discharge Disposition       Discharge Destination                                 Attending Provider: Frederick Lee MD    Allergies: No Known Allergies    Isolation: None   Infection: None   Code Status: CPR    Ht: 185.4 cm (73\")   Wt: 120 kg (264 lb)    Admission Cmt: None   Principal Problem: Atrial fibrillation with RVR [I48.91]                   Active Insurance as of 3/2/2024       Primary Coverage       Payor Plan Insurance Group Employer/Plan Group    ANTHEM MEDICARE REPLACEMENT ANTHEM MEDICARE ADVANTAGE [id number]       Payor Plan Address Payor Plan Phone Number Payor Plan Fax Number Effective Dates    [address] [phone]  1/1/2020 - None Entered    [facility] [address]         Subscriber Name Subscriber Birth Date Member ID       DELGADO CHAPPELL 1950 [id number]                     Emergency Contacts        (Rel.) Home Phone Work Phone Mobile Phone    Nava Chappell (Spouse) [phone] -- [phone]    DAVE THOMPSON (Friend) -- -- [phone]    Mini Valdez (Daughter) -- -- [phone]             [facility] Encounter Date/Time: 3/2/2024 0838   Hospital Account: [id number]    MRN: [id number]   Patient:  Delgado Chappell   Contact Serial #: [id number]   SSN:          ENCOUNTER           "   Patient Class: Inpatient   Unit: [address] Service: Medicine     Bed: 404/1   Admitting Provider: Frederick Lee,*   Referring Physician: Chan Hernandez   Attending Provider: Frederick Lee,*   Adm Diagnosis: Atrial fibrillation with*               PATIENT             Name: Delgado Chappell : 1950 (73 yrs)   Address: [address] [place] [address] Sex: Male   City: [place] [address]   County: [facility]   Marital Status:  Ethnicity: NOT                                                                         Race: WHITE   Primary Care Provider: Michael Nunez MD Patients Phone: Home Phone: [phone]     Mobile Phone: [phone]     EMERGENCY CONTACT   Contact Name Legal Guardian? Relationship to Patient Home Phone Work Phone Mobile Phone   1. Nava Chappell  2. DAVE THOMPSON No  No Spouse  Friend [phone]   GUARANTOR             Guarantor: Delgado Chappell     : 1950   Address: [address] [place] [address] Sex: [place] [address]     Relation to Patient: Self       Home Phone: [phone]   Guarantor ID: [id number]       Work Phone:     GUARANTOR EMPLOYER   Employer:           Status: RETIRED   COVERAGE          PRIMARY INSURANCE   Payor: [employer] MEDICARE REPLACEMENT Plan: [employer] MEDICARE ADVANTAGE   Group Number: [id number] Insurance Type: INDEMNITY   Subscriber Name: DELGADO CHAPPELL Subscriber : 1950   Subscriber ID: [id number] Coverage Address: [place] [id number]  [place] [address]   Pat. Rel. to Subscriber: Self Coverage Phone: [phone]   SECONDARY INSURANCE   Payor: N/A Plan: N/A   Group Number:   Insurance Type:     Subscriber Name:   Subscriber :     Subscriber ID:   Coverage Address:     Pat. Rel. to Subscriber:   Coverage Phone:        Contact Serial # [id number])         2024    Chart ID ([id number])         Chan Hernandez MD   Physician  Emergency  Medicine     ED Provider Notes     Signed     Date of Service: 03/02/24 1057  Creation Time: 03/02/24 1057  Procedure Orders   ECG 12 Lead [[id number]] ordered by Chan Hernandez MD          Signed       Expand All Collapse All       Subjective  History of Present Illness  73-year-old male presents to the ED with complaint of dyspnea, chest tightness, irregular heart rate.  He has a history of paroxysmal A-fib and reports compliance with Eliquis.  History of hypertension, lung cancer.  Patient reports some chest tightness and dyspnea for the past couple of days.  Describes chest tightness as a sensation of mild pressure on both of his shoulders.  No nausea or diaphoresis.  No syncope or near syncope.  Checked his blood pressure this morning several times and it has been as low as 80s systolic.  Heart rate has ranged from 70s to 150s.  He called the [facility] nurse line and they recommended come to the ED for evaluation.  On arrival to the ER, patient was found to be in A-fib with RVR.     History provided by:  Patient        Review of Systems   All other systems reviewed and are negative.        Medical History[]Expand by Default        Past Medical History:   Diagnosis Date    A-fib      Arthritis      Asthma 12/1/22    Dizzy spells       states on rare occsaion has    Failed intubation of airway 11/10/2020    Hypertension      Lung cancer      Nosebleed 12/1/22     Taking  eliquis    PAF (paroxysmal atrial fibrillation)      Prostate cancer 2020    Thyroid disorder              Allergies   No Known Allergies        Surgical History         Past Surgical History:   Procedure Laterality Date    CARPAL TUNNEL RELEASE Right      LOBECTOMY Right 2/15/2021     Procedure: THORACOSCOPY WITH SupportSpace[employer] ROBOT WITH RIGHT WEDGE RESECTION, MEDIASTINAL LYMPH NODE DISSECTION-RIGHT;  Surgeon: Nick Flores MD;  Location: [facility];  Service: [facility];  Laterality: Right;    LUNG BIOPSY        PROSTATE BIOPSY         REPLACEMENT TOTAL KNEE Left      SHOULDER SURGERY Bilateral      THORACOSCOPY Right 10/30/2020     Procedure: MEDIASTINOSCOPY, RIGHT THORACOSCOPY POSSIBLE WEDGE RESECTION WITH DAVINCI ROBOT;  Surgeon: Nick Flores MD;  Location:  [facility] OR;  Service: [facility];  Laterality: Right;    THYROIDECTOMY N/A 2020     Procedure: Total thyroidectomy;  Surgeon: Jean Marie Solis MD;  Location:  [facility] OR;  Service: ENT;  Laterality: N/A;                  Family History   Problem Relation Age of Onset    Hypertension Mother           New Contact    Cancer Mother      COPD Father           Social History   Social History            Socioeconomic History    Marital status:     Number of children: 2   Tobacco Use    Smoking status: Former       Current packs/day: 0.00       Average packs/day: 1 pack/day for 33.0 years (33.0 ttl pk-yrs)       Types: Cigarettes       Start date: 1969       Quit date: 1996       Years since quittin.1    Smokeless tobacco: Never   Vaping Use    Vaping status: Never Used   Substance and Sexual Activity    Alcohol use: Yes       Alcohol/week: 2.0 standard drinks of alcohol       Types: 2 Shots of liquor per week       Comment: x2 weekly    Drug use: Never    Sexual activity: Defer                        Objective  Physical Exam  Vitals and nursing note reviewed.   Constitutional:       Appearance: Normal appearance. He is normal weight.   HENT:      Head: Normocephalic and atraumatic.      Nose: Nose normal. No congestion or rhinorrhea.   Eyes:      Extraocular Movements: Extraocular movements intact.      Conjunctiva/sclera: Conjunctivae normal.      Pupils: Pupils are equal, round, and reactive to light.   Cardiovascular:      Rate and Rhythm: Tachycardia present. Rhythm irregular.      Heart sounds: Normal heart sounds. No murmur heard.  Pulmonary:      Effort: Pulmonary effort is normal.      Breath sounds: Normal breath sounds. No wheezing, rhonchi or rales.    Abdominal:      General: Abdomen is flat. Bowel sounds are normal.      Palpations: Abdomen is soft.   Musculoskeletal:      Right lower leg: No edema.      Left lower leg: No edema.   Skin:     General: Skin is warm and dry.      Capillary Refill: Capillary refill takes less than 2 seconds.   Neurological:      General: No focal deficit present.      Mental Status: He is alert and oriented to person, place, and time. Mental status is at baseline.            ECG 12 Lead        Date/Time: 3/2/2024 8:41 AM     Performed by: Chan Hernandez MD  Authorized by: Chan Hernandez MD  Interpreted by ED physician  Comments: A-fib with RVR, rate 130, occasional PVC, no acute ischemic changes                 Lab Results (last 24 hours)         Procedure Component Value Units Date/Time     CBC & Differential [[id number]]  (Abnormal) Collected: 03/02/24 0912     Specimen: Blood Updated: 03/02/24 0922     Narrative:       The following orders were created for panel order CBC & Differential.  Procedure                               Abnormality         Status                     ---------                               -----------         ------                     CBC Auto Differential[[id number]]        Abnormal            Final result                  Please view results for these tests on the individual orders.     Comprehensive Metabolic Panel [[id number]]  (Abnormal) Collected: 03/02/24 0912     Specimen: Blood Updated: 03/02/24 0941       Glucose 107 mg/dL         BUN 34 mg/dL         Creatinine 0.98 mg/dL         Sodium 139 mmol/L         Potassium 4.2 mmol/L         Chloride 106 mmol/L         CO2 23.0 mmol/L         Calcium 8.6 mg/dL         Total Protein 6.7 g/dL         Albumin 4.0 g/dL         ALT (SGPT) 32 U/L         AST (SGOT) 22 U/L         Alkaline Phosphatase 110 U/L         Total Bilirubin 1.0 mg/dL         Globulin 2.7 gm/dL         A/G Ratio 1.5 g/dL         BUN/Creatinine Ratio 34.7       Anion Gap  10.0 mmol/L         eGFR 81.9 mL/min/1.73       Narrative:       GFR Normal >60  Chronic Kidney Disease <60  Kidney Failure <15     The GFR formula is only valid for adults with stable renal function between ages 18 and 70.     Protime-INR [[id number]]  (Abnormal) Collected: 03/02/24 0912     Specimen: Blood Updated: 03/02/24 0931       Protime 15.8 Seconds         INR 1.21     Magnesium [[id number]]  (Normal) Collected: 03/02/24 0912     Specimen: Blood Updated: 03/02/24 0941       Magnesium 2.2 mg/dL       BNP [[id number]]  (Abnormal) Collected: 03/02/24 0912     Specimen: Blood Updated: 03/02/24 0939       proBNP 1,116.0 pg/mL       Narrative:       This assay is used as an aid in the diagnosis of individuals suspected of having heart failure. It can be used as an aid in the diagnosis of acute decompensated heart failure (ADHF) in patients presenting with signs and symptoms of ADHF to the emergency department (ED). In addition, NT-proBNP of <300 pg/mL indicates ADHF is not likely.     Age Range        Result Interpretation  NT-proBNP Concentration (pg/mL:        <50             Positive            >450                        Godfrey                        300-450                          Negative                        <300     50-75           Positive            >900                  Gray                300-900                  Negative            <300        >75             Positive            >1800                  Gray                300-1800                  Negative            <300     High Sensitivity Troponin T [[id number]]  (Abnormal) Collected: 03/02/24 0912     Specimen: Blood Updated: 03/02/24 0938       HS Troponin T 24 ng/L       Narrative:       High Sensitive Troponin T Reference Range:  <14.0 ng/L- Negative Female for AMI  <22.0 ng/L- Negative Male for AMI  >=14 - Abnormal Female indicating possible myocardial injury.  >=22 - Abnormal Male indicating possible myocardial injury.   Clinicians would have  to utilize clinical acumen, EKG, Troponin, and serial changes to determine if it is an Acute Myocardial Infarction or myocardial injury due to an underlying chronic condition.           CBC Auto Differential [[id number]]  (Abnormal) Collected: 03/02/24 0912     Specimen: Blood Updated: 03/02/24 0922       WBC 7.94 10*3/mm3         RBC 4.59 10*6/mm3         Hemoglobin 13.6 g/dL         Hematocrit 41.2 %         MCV 89.8 fL         MCH 29.6 pg         MCHC 33.0 g/dL         RDW 14.4 %         RDW-SD 46.6 fl         MPV 11.0 fL         Platelets 174 10*3/mm3         Neutrophil % 80.9 %         Lymphocyte % 8.4 %         Monocyte % 8.6 %         Eosinophil % 1.1 %         Basophil % 0.6 %         Immature Grans % 0.4 %         Neutrophils, Absolute 6.42 10*3/mm3         Lymphocytes, Absolute 0.67 10*3/mm3         Monocytes, Absolute 0.68 10*3/mm3         Eosinophils, Absolute 0.09 10*3/mm3         Basophils, Absolute 0.05 10*3/mm3         Immature Grans, Absolute 0.03 10*3/mm3         nRBC 0.0 /100 WBC             XR Chest 1 View     Result Date: 3/2/2024  EXAMINATION: XR CHEST 1 VW-  3/2/2024 9:11 AM  HISTORY: Dyspnea  FINDINGS: Today's exam is compared to previous study of 11/23/2023. There is mild atelectasis or scarring in the right lung base. Remote right-sided rib fractures are present. The heart is mildly enlarged. No acute infiltrate or effusion.       1. Right basilar atelectasis or scarring. Lungs are otherwise clear. 2. Mild cardiomegaly.  This report was signed and finalized on 3/2/2024 9:12 AM by Dr. Fermin Lantigua MD.         ED Course      ED Course as of 03/02/24 1121   Sat Mar 02, 2024   1119 73-year-old male with history of paroxysmal A-fib on Eliquis, hypertension, lung and thyroid cancer presents to the ED with complaint of chest tightness, shortness of breath palpitations.  He was found to be in A-fib with RVR on arrival to the ER.  Electrolytes include magnesium were within normal limits.  His  initial high-sensitivity troponin 24, repeat pending.  proBNP 1116.  Chest x-ray negative for pulmonary edema.  Patient was given bolus of IV Cardizem followed by Cardizem drip for rate control.  As he reports compliance with his Eliquis he is a candidate for cardioversion.  This was offered to the patient but he refused stating he has had previous success with medications in the past and would prefer to forego sedation and cardioversion in the emergency department at this time.  Will plan for admission to the hospitalist service for rate control and cardiology consultation if does not convert with meds. [AW]       ED Course User Index  [AW] Chan Hernandez MD                                           Medical Decision Making  Problems Addressed:  Atrial fibrillation with RVR: complicated acute illness or injury     Amount and/or Complexity of Data Reviewed  Labs: ordered.  Radiology: ordered.  ECG/medicine tests: ordered.     Risk  Prescription drug management.  Decision regarding hospitalization.           Final diagnoses:   Atrial fibrillation with RVR         ED Disposition  ED Disposition         ED Disposition   Decision to Admit    Condition   --    Comment   Level of Care: Telemetry [5]   Diagnosis: Atrial fibrillation with RVR [918482]   Admitting Physician: MARITZA SUERO [[id number]]   Attending Physician: MARITZA SUERO [[id number]]   Isolate for COVID?: No [0]   Certification: I Certify That Inpatient Hospital Services Are Medically Necessary For Greater Than 2 Midnights                      No follow-up provider specified.         Medication List       No changes were made to your prescriptions during this visit.               Chan Hernandez MD  03/02/24 1121                 Maritza Suero MD   Physician  Hospitalist     H&P     Addendum     Date of Service: 03/02/24 1114  Creation Time: 03/02/24 1114     Expand All Collapse [facility]  Services  HISTORY AND PHYSICAL     Date of Admission: 3/2/2024  Primary Care Physician: Michael Nunez MD     Subjective   Primary Historian: Patient.     Chief Complaint: Atrial for with RVR.     History of Present Illness  Patient is a 73-year-old presented to the ER complaining of palpitation.  Patient's heart rate was in the 150, patient refused cardioversion by ER doctor, patient wants to wait to see the medication work.  Patient is currently requiring a Cardizem drip.     Laboratory shows BNP 1100.  Troponin 24.  Patient is on chronic Eliquis.     Patient has history of atrial fibs, arthritis, asthma, dizzy spell, failed intubation of airway, hypertension, lung cancer, nosebleed, prostate cancer, thyroid cancer.     Review of Systems   Constitutional:  Positive for activity change, appetite change and fatigue. Negative for chills and fever.   HENT:  Negative for hearing loss, nosebleeds, tinnitus and trouble swallowing.    Eyes:  Negative for visual disturbance.   Respiratory:  Negative for cough, chest tightness, shortness of breath and wheezing.    Cardiovascular:  Negative for chest pain, palpitations and leg swelling.   Gastrointestinal:  Negative for abdominal distention, abdominal pain, blood in stool, constipation, diarrhea, nausea and vomiting.   Endocrine: Negative for cold intolerance, heat intolerance, polydipsia, polyphagia and polyuria.   Genitourinary:  Negative for decreased urine volume, difficulty urinating, dysuria, flank pain, frequency and hematuria.   Musculoskeletal:  Negative for arthralgias, joint swelling and myalgias.   Skin:  Negative for rash.   Allergic/Immunologic: Negative for immunocompromised state.   Neurological:  Positive for weakness. Negative for dizziness, syncope, light-headedness and headaches.   Hematological:  Negative for adenopathy. Does not bruise/bleed easily.   Psychiatric/Behavioral:  Negative for confusion and sleep disturbance. The patient is not  nervous/anxious.       Otherwise complete ROS reviewed and negative except as mentioned in the HPI.     Past Medical History:   Medical History        Past Medical History:   Diagnosis Date    A-fib      Arthritis      Asthma 12/1/22    Dizzy spells       states on rare occsaion has    Failed intubation of airway 11/10/2020    Hypertension      Lung cancer      Nosebleed 12/1/22     Taking  eliquis    PAF (paroxysmal atrial fibrillation)      Prostate cancer 2020    Thyroid disorder           Past Surgical History:  Surgical History         Past Surgical History:   Procedure Laterality Date    CARPAL TUNNEL RELEASE Right      LOBECTOMY Right 2/15/2021     Procedure: THORACOSCOPY WITH DAVINCI ROBOT WITH RIGHT WEDGE RESECTION, MEDIASTINAL LYMPH NODE DISSECTION-RIGHT;  Surgeon: Nick Flores MD;  Location:  [facility] OR;  Service: [facility];  Laterality: Right;    LUNG BIOPSY        PROSTATE BIOPSY        REPLACEMENT TOTAL KNEE Left      SHOULDER SURGERY Bilateral      THORACOSCOPY Right 10/30/2020     Procedure: MEDIASTINOSCOPY, RIGHT THORACOSCOPY POSSIBLE WEDGE RESECTION WITH DAVINCI ROBOT;  Surgeon: Nick Flores MD;  Location:  [facility];  Service: [facility];  Laterality: Right;    THYROIDECTOMY N/A 12/11/2020     Procedure: Total thyroidectomy;  Surgeon: Jean Marie Solis MD;  Location:  [facility] OR;  Service: ENT;  Laterality: N/A;         Social History:  reports that he quit smoking about 28 years ago. His smoking use included cigarettes. He started smoking about 55 years ago. He has a 33 pack-year smoking history. He has never used smokeless tobacco. He reports current alcohol use of about 2.0 standard drinks of alcohol per week. He reports that he does not use drugs.     Family History: family history includes COPD in his father; Cancer in his mother; Hypertension in his mother.        Allergies:  Allergies   No Known Allergies        Medications:          Prior to Admission medications    Medication Sig Start  "Date End Date Taking? Authorizing Provider   acetaminophen (TYLENOL) 500 MG tablet Take 1 tablet by mouth Every 6 (Six) Hours As Needed for Mild Pain.       Walt Hooper MD   albuterol sulfate  (90 Base) MCG/ACT inhaler INHALE 2 PUFFS INTO THE LUNGS EVERY 6 HOURS AS NEEDED FOR WHEEZING AND SHORTNESS OF BREATH 12/15/23     Walt Hooper MD   alfuzosin (UROXATRAL) 10 MG 24 hr tablet Take 1 tablet by mouth Daily. 10/5/20     Walt Hooper MD   apixaban (Eliquis) 5 MG tablet tablet Take 1 tablet by mouth Every 12 (Twelve) Hours. 5/1/23     Landry Hoover MD   metoprolol tartrate (LOPRESSOR) 25 MG tablet Take 0.5 tablets by mouth Every 12 (Twelve) Hours. 11/24/23     Josesito Suero MD   Synthroid 137 MCG tablet Take 1 tablet by mouth Daily for 120 days. 1/2/24 5/1/24   Maryann Serra APRN   therapeutic multivitamin-minerals (THERAGRAN-M) tablet Take 2 tablets by mouth Daily.       ProviderWalt MD      I have utilized all available immediate resources to obtain, update, or review the patient's current medications (including all prescriptions, over-the-counter products, herbals, cannabis/cannabidiol products, and vitamin/mineral/dietary (nutritional) supplements).     Objective      Vital Signs: BP (!) 130/107   Pulse (!) 127   Temp 97.7 °F (36.5 °C) (Oral)   Resp 20   Ht 185.4 cm (73\")   Wt 122 kg (269 lb)   SpO2 95%   BMI 35.49 kg/m²   Physical Exam  Vitals and nursing note reviewed.   HENT:      Head: Normocephalic.   Eyes:      Conjunctiva/sclera: Conjunctivae normal.      Pupils: Pupils are equal, round, and reactive to light.   Cardiovascular:      Rate and Rhythm: Tachycardia present. Rhythm irregular.      Heart sounds: Normal heart sounds.   Pulmonary:      Effort: No respiratory distress.      Comments: Diminished breath sound bilateral, clear, on room air.  Abdominal:      General: Bowel sounds are normal. There is no distension.      Palpations: Abdomen " is soft.      Tenderness: There is no abdominal tenderness.      Comments: Obesity patient   Musculoskeletal:         General: No swelling.      Cervical back: Neck supple.   Skin:     General: Skin is warm and dry.      Capillary Refill: Capillary refill takes 2 to 3 seconds.      Findings: No rash.   Neurological:      General: No focal deficit present.      Mental Status: He is alert and oriented to person, place, and time.      Motor: Weakness present.   Psychiatric:         Mood and Affect: Mood normal.         Behavior: Behavior normal.         Thought Content: Thought content normal.                  Results Reviewed:  Lab Results (last 24 hours)         Procedure Component Value Units Date/Time     Wendel Draw [[id number]] Collected: 03/02/24 0912     Specimen: Blood Updated: 03/02/24 1016     Narrative:       The following orders were created for panel order Wendel Draw.  Procedure                               Abnormality         Status                     ---------                               -----------         ------                     Green Top (Gel)[[id number]]                                  Final result               Lavender Top[[id number]]                                     Final result               Red Top[[id number]]                                          Final result               Light Blue Top[[id number]]                                   Final result                  Please view results for these tests on the individual orders.     Green Top (Gel) [[id number]] Collected: 03/02/24 0912     Specimen: Blood Updated: 03/02/24 1016       Extra Tube Hold for add-ons.       Comment: Auto resulted.        Lavender Top [[id number]] Collected: 03/02/24 0912     Specimen: Blood Updated: 03/02/24 1016       Extra Tube hold for add-on       Comment: Auto resulted        Red Top [[id number]] Collected: 03/02/24 0912     Specimen: Blood Updated: 03/02/24 1016       Extra Tube Hold for add-ons.        Comment: Auto resulted.        Light Blue Top [[id number]] Collected: 03/02/24 0912     Specimen: Blood Updated: 03/02/24 1016       Extra Tube Hold for add-ons.       Comment: Auto resulted        Comprehensive Metabolic Panel [[id number]]  (Abnormal) Collected: 03/02/24 0912     Specimen: Blood Updated: 03/02/24 0941       Glucose 107 mg/dL         BUN 34 mg/dL         Creatinine 0.98 mg/dL         Sodium 139 mmol/L         Potassium 4.2 mmol/L         Chloride 106 mmol/L         CO2 23.0 mmol/L         Calcium 8.6 mg/dL         Total Protein 6.7 g/dL         Albumin 4.0 g/dL         ALT (SGPT) 32 U/L         AST (SGOT) 22 U/L         Alkaline Phosphatase 110 U/L         Total Bilirubin 1.0 mg/dL         Globulin 2.7 gm/dL         A/G Ratio 1.5 g/dL         BUN/Creatinine Ratio 34.7       Anion Gap 10.0 mmol/L         eGFR 81.9 mL/min/1.73       Narrative:       GFR Normal >60  Chronic Kidney Disease <60  Kidney Failure <15     The GFR formula is only valid for adults with stable renal function between ages 18 and 70.     Magnesium [[id number]]  (Normal) Collected: 03/02/24 0912     Specimen: Blood Updated: 03/02/24 0941       Magnesium 2.2 mg/dL       BNP [[id number]]  (Abnormal) Collected: 03/02/24 0912     Specimen: Blood Updated: 03/02/24 0939       proBNP 1,116.0 pg/mL       Narrative:       This assay is used as an aid in the diagnosis of individuals suspected of having heart failure. It can be used as an aid in the diagnosis of acute decompensated heart failure (ADHF) in patients presenting with signs and symptoms of ADHF to the emergency department (ED). In addition, NT-proBNP of <300 pg/mL indicates ADHF is not likely.     Age Range         Result Interpretation  NT-proBNP Concentration (pg/mL:        <50             Positive            >450                         Godfrey                           300-450                           Negative               <300     50-75           Positive            >900                   Godfrey                300-900                  Negative            <300        >75             Positive            >1800                  Gray                300-1800                  Negative            <300     High Sensitivity Troponin T [[id number]]  (Abnormal) Collected: 03/02/24 0912     Specimen: Blood Updated: 03/02/24 0938       HS Troponin T 24 ng/L       Narrative:       High Sensitive Troponin T Reference Range:  <14.0 ng/L- Negative Female for AMI  <22.0 ng/L- Negative Male for AMI  >=14 - Abnormal Female indicating possible myocardial injury.  >=22 - Abnormal Male indicating possible myocardial injury.   Clinicians would have to utilize clinical acumen, EKG, Troponin, and serial changes to determine if it is an Acute Myocardial Infarction or myocardial injury due to an underlying chronic condition.           Protime-INR [[id number]]  (Abnormal) Collected: 03/02/24 0912     Specimen: Blood Updated: 03/02/24 0931       Protime 15.8 Seconds         INR 1.21     CBC & Differential [[id number]]  (Abnormal) Collected: 03/02/24 0912     Specimen: Blood Updated: 03/02/24 0922     Narrative:       The following orders were created for panel order CBC & Differential.  Procedure                               Abnormality         Status                     ---------                               -----------         ------                     CBC Auto Differential[[id number]]        Abnormal            Final result                  Please view results for these tests on the individual orders.     CBC Auto Differential [[id number]]  (Abnormal) Collected: 03/02/24 0912     Specimen: Blood Updated: 03/02/24 0922       WBC 7.94 10*3/mm3         RBC 4.59 10*6/mm3         Hemoglobin 13.6 g/dL         Hematocrit 41.2 %         MCV 89.8 fL         MCH 29.6 pg         MCHC 33.0 g/dL         RDW 14.4 %         RDW-SD 46.6 fl         MPV 11.0 fL         Platelets 174 10*3/mm3         Neutrophil % 80.9 %         Lymphocyte %  8.4 %         Monocyte % 8.6 %         Eosinophil % 1.1 %         Basophil % 0.6 %         Immature Grans % 0.4 %         Neutrophils, Absolute 6.42 10*3/mm3         Lymphocytes, Absolute 0.67 10*3/mm3         Monocytes, Absolute 0.68 10*3/mm3         Eosinophils, Absolute 0.09 10*3/mm3         Basophils, Absolute 0.05 10*3/mm3         Immature Grans, Absolute 0.03 10*3/mm3         nRBC 0.0 /100 WBC               Imaging Results (Last 24 Hours)         Procedure Component Value Units Date/Time     XR Chest 1 View [[id number]] Collected: 03/02/24 0911       Updated: 03/02/24 0915     Narrative:       EXAMINATION: XR CHEST 1 VW-  3/2/2024 9:11 AM     HISTORY: Dyspnea     FINDINGS: Today's exam is compared to previous study of 11/23/2023.  There is mild atelectasis or scarring in the right lung base. Remote  right-sided rib fractures are present. The heart is mildly enlarged. No  acute infiltrate or effusion.        Impression:       1. Right basilar atelectasis or scarring. Lungs are otherwise clear.  2. Mild cardiomegaly.     This report was signed and finalized on 3/2/2024 9:12 AM by Dr. Fermin Lantigua MD.                I have personally reviewed and interpreted the radiology studies and ECG obtained at time of admission.      Assessment / Plan   Assessment:        Active Hospital Problems     Diagnosis      **Atrial fibrillation with RVR      History of radiation therapy      Current use of long term anticoagulation      Class 1 obesity due to excess calories with serious comorbidity and body mass index (BMI) of 33.0 to 33.9 in adult      Malignant neoplasm of lower lobe of right lung      Hypertension      Prostate cancer           Treatment Plan  The patient will be admitted to my service here at [facility].      Atrial fibrillation RVR/hypertension .  Patient is currently on a Cardizem drip continue Eliquis.  Consult EP.  Status post 10 mg of Cardizem IV in ER.  Continue Cardizem drip for now.   Patient did not want to cardioverted in ER, patient wants to wait to see medication works.  Echocardiogram 11/24/2023-ejection fraction 61 to 65%, mild concentric hypertrophy, left atrial cavity moderate dilated, tricuspid regurgitation, right ventricle cavity mild to moderate dilated with normal systolic function, right atrial cavity dilated, no significant valvular pathology.     COPD.  Patient is ex-smoker.  Albuterol inhaler as needed.  Chest x-ray-Right basilar atelectasis or scarring,  Lungs are otherwise clear, Mild cardiomegaly.     Prostate hypertrophy.  Flomax.     History of thyroid surgery.  Continue Synthroid.     History of lung cancer, prostate cancer, thyroid cancer.     Nausea.  Zofran as needed     Obesity.  BMI is 35.     Medical Decision Making  Number and Complexity of problems: Atrial fibrillation VR/hypertension/COPD  Differential Diagnosis: None     Conditions and Status        Condition is unchanged.     [facility] Data  External documents reviewed: ER notes.  Cardiac tracing (EKG, telemetry) interpretation: Atrial fibs with RVR  Radiology interpretation: Echo/chest x-ray  Labs reviewed: Laboratory  Any tests that were considered but not ordered: Laboratory in AM     Decision rules/scores evaluated (example [id number], Wells, etc): None     Discussed with: Patient     Care Planning  Shared decision making: Patient  Code status and discussions: Full code     Disposition  Social Determinants of Health that impact treatment or disposition: From home  Estimated length of stay is 1 to 3 days.      I confirmed that the patient's advanced care plan is present, code status is documented, and a surrogate decision maker is listed in the patient's medical record.      The patient's surrogate decision maker is wife.      The patient was seen and examined by me on 3/2/2024 at 1130.     Electronically signed by Josesito Suero MD, 03/02/24, 11:39 CST.                        Mesfin Sebastian MD    Physician  Cardiology     Consults     Signed     Date of Service: 03/03/24 0945  Creation Time: 03/03/24 0945  Consult Orders   Inpatient Cardiology Consult [[id number]] ordered by Josesito Suero MD at 03/03/24 0818          Signed       Expand All Collapse All        LOS: 1 day   Patient Care Team:  Michael Nunez MD as PCP - General (Family Medicine)  Flores, Nick MIELS MD as Referring Physician (Cardiothoracic Surgery)  Landry Hoover MD as Cardiologist (Cardiology)  Sabrina Jackson APRN as Nurse Practitioner (Nurse Practitioner)  Jean Marie Solis MD as Consulting Physician (Otolaryngology)  Maryann Serra APRN as Nurse Practitioner (Otolaryngology)     Chief Complaint: Shortness of breath and chest pain     Subjective     Delgado Desir is a 73 y.o. male who is being seen in consultation.  Initially patient states he is not sure why he is here and wants me to tell him the reason  Subsequently nurse mentions he is admitted with shortness of breath and also fats like a child was sitting on his shoulders  Latest test results as below  Has had some palpitations  History of falls  Apparently fell 3 times over the last 1 year last 1 was less than 1 month ago  States this happens when he turns suddenly and gets disbalance  Currently denies any chest pain  No bleeding issues  Tolerating current medications well  Vitals reviewed  Overall ventricular rates are better controlled now     Telemetry: no malignant arrhythmia. No significant pauses.     Review of Systems   Constitutional: No chills   Has fatigue   No fever.   HENT: Negative.    Eyes: Negative.    Respiratory: Negative for cough,   No chest wall soreness,   Shortness of breath,   no wheezing, no stridor.    Cardiovascular: As above  Gastrointestinal: Negative for abdominal distention,  No abdominal pain,   No blood in stool,   No constipation,   No diarrhea,   No nausea   No vomiting.   Endocrine: Negative.    Genitourinary: Negative for  difficulty urinating, dysuria, flank pain and hematuria.   Musculoskeletal: Negative.    Skin: Negative for rash and wound.   Allergic/Immunologic: Negative.    Neurological: Negative for dizziness, syncope, weakness,   No light-headedness  No  headaches.   Hematological: Does not bruise/bleed easily.   Psychiatric/Behavioral: Negative for agitation or behavioral problems,   No confusion,   the patient is  nervous/anxious.        History:   Medical History        Past Medical History:   Diagnosis Date    A-fib      Arthritis      Asthma 12/1/22    Dizzy spells       states on rare occsaion has    Failed intubation of airway 11/10/2020    Hypertension      Lung cancer      Nosebleed 12/1/22     Taking  eliquis    PAF (paroxysmal atrial fibrillation)      Prostate cancer 2020    Thyroid disorder           Surgical History         Past Surgical History:   Procedure Laterality Date    CARPAL TUNNEL RELEASE Right      LOBECTOMY Right 2/15/2021     Procedure: THORACOSCOPY WITH DAVINCI ROBOT WITH RIGHT WEDGE RESECTION, MEDIASTINAL LYMPH NODE DISSECTION-RIGHT;  Surgeon: Nick Flores MD;  Location: [facility] OR;  Service: [facility];  Laterality: Right;    LUNG BIOPSY        PROSTATE BIOPSY        REPLACEMENT TOTAL KNEE Left      SHOULDER SURGERY Bilateral      THORACOSCOPY Right 10/30/2020     Procedure: MEDIASTINOSCOPY, RIGHT THORACOSCOPY POSSIBLE WEDGE RESECTION WITH DAVINCI ROBOT;  Surgeon: Nick Flores MD;  Location:  [facility] OR;  Service: [facility];  Laterality: Right;    THYROIDECTOMY N/A 12/11/2020     Procedure: Total thyroidectomy;  Surgeon: Jean Marie Solis MD;  Location:  [facility] OR;  Service: ENT;  Laterality: N/A;         Social History   Social History            Socioeconomic History    Marital status:     Number of children: 2   Tobacco Use    Smoking status: Former       Current packs/day: 0.00       Average packs/day: 1 pack/day for 33.0 years (33.0 ttl pk-yrs)       Types: Cigarettes       Start  date: 1969       Quit date: 1996       Years since quittin.1    Smokeless tobacco: Never   Vaping Use    Vaping status: Never Used   Substance and Sexual Activity    Alcohol use: Yes       Alcohol/week: 2.0 standard drinks of alcohol       Types: 2 Shots of liquor per week       Comment: x2 weekly    Drug use: Never    Sexual activity: Defer               Family History   Problem Relation Age of Onset    Hypertension Mother           New Contact    Cancer Mother      COPD Father           Labs:  WBC       WBC   Date Value Ref Range Status   2024 8.15 3.40 - 10.80 10*3/mm3 Final   2024 7.94 3.40 - 10.80 10*3/mm3 Final      HGB       Hemoglobin   Date Value Ref Range Status   2024 13.5 13.0 - 17.7 g/dL Final   2024 13.6 13.0 - 17.7 g/dL Final      HCT       Hematocrit   Date Value Ref Range Status   2024 40.7 37.5 - 51.0 % Final   2024 41.2 37.5 - 51.0 % Final      Platelets       Platelets   Date Value Ref Range Status   2024 198 140 - 450 10*3/mm3 Final   2024 174 140 - 450 10*3/mm3 Final      MCV       MCV   Date Value Ref Range Status   2024 90.6 79.0 - 97.0 fL Final   2024 89.8 79.0 - 97.0 fL Final               Results from last 7 days   Lab Units 24  0216 24  0912   SODIUM mmol/L 141 139   POTASSIUM mmol/L 4.1 4.2   CHLORIDE mmol/L 105 106   CO2 mmol/L 25.0 23.0   BUN mg/dL 33* 34*   CREATININE mg/dL 1.06 0.98   CALCIUM mg/dL 8.4* 8.6   BILIRUBIN mg/dL 0.9 1.0   ALK PHOS U/L 112 110   ALT (SGPT) U/L 30 32   AST (SGOT) U/L 20 22   GLUCOSE mg/dL 115* 107*            Lab Results   Component Value Date     CKTOTAL 188 10/21/2022     TROPONINT 23 (H) 2024      PT/INR:          Protime   Date Value Ref Range Status   2024 15.8 (H) 11.8 - 14.8 Seconds Final   /        INR   Date Value Ref Range Status   2024 1.21 (H) 0.91 - 1.09 Final         Imaging Results (Last 72 Hours)         Procedure Component Value Units  Date/Time     XR Chest 1 View [[id number]] Collected: 03/02/24 0911       Updated: 03/02/24 0915     Narrative:       EXAMINATION: XR CHEST 1 VW-  3/2/2024 9:11 AM     HISTORY: Dyspnea     FINDINGS: Today's exam is compared to previous study of 11/23/2023.  There is mild atelectasis or scarring in the right lung base. Remote  right-sided rib fractures are present. The heart is mildly enlarged. No  acute infiltrate or effusion.        Impression:       1. Right basilar atelectasis or scarring. Lungs are otherwise clear.  2. Mild cardiomegaly.     This report was signed and finalized on 3/2/2024 9:12 AM by Dr. Fermin Lantigua MD.                      Objective[]Expand by Default  Allergies   No Known Allergies        Medication Review: Performed  Current Medications             Current Facility-Administered Medications   Medication Dose Route Frequency Provider Last Rate Last Admin    acetaminophen (TYLENOL) tablet 500 mg  500 mg Oral Q6H PRN Josesito Suero MD        albuterol sulfate HFA (PROVENTIL HFA;VENTOLIN HFA;PROAIR HFA) inhaler 2 puff  2 puff Inhalation Q6H PRN Josesito Suero MD        apixaban (ELIQUIS) tablet 5 mg  5 mg Oral Q12H Josesito Suero MD   5 mg at 03/03/24 0840    sennosides-docusate (PERICOLACE) 8.6-50 MG per tablet 2 tablet  2 tablet Oral BID PRN Josesito Suero MD         And    polyethylene glycol (MIRALAX) packet 17 g  17 g Oral Daily PRN Josesito Suero MD         And    bisacodyl (DULCOLAX) EC tablet 5 mg  5 mg Oral Daily PRN Josesito Suero MD         And    bisacodyl (DULCOLAX) suppository 10 mg  10 mg Rectal Daily PRN Josesito Suero MD        dilTIAZem (CARDIZEM) 125 mg in 125 mL D5W infusion  5-15 mg/hr Intravenous Titrated Josesito Suero MD 15 mL/hr at 03/03/24 0841 15 mg/hr at 03/03/24 0841    levothyroxine (SYNTHROID, LEVOTHROID) tablet 137 mcg  137 mcg Oral Daily Josesito Suero MD        metoprolol tartrate (LOPRESSOR) tablet 25 mg  25 mg Oral Q12H Josesito Suero MD   25 mg  "at 03/03/24 0840    multivitamin with minerals 2 tablet  2 tablet Oral Daily Josesito Suero MD   2 tablet at 03/03/24 0840    sodium chloride 0.9 % flush 10 mL  10 mL Intravenous PRN Josesito Suero MD        sodium chloride 0.9 % flush 10 mL  10 mL Intravenous Q12H Josesito Suero MD   10 mL at 03/03/24 0840    sodium chloride 0.9 % flush 10 mL  10 mL Intravenous PRN Josesito Suero MD        sodium chloride 0.9 % infusion 40 mL  40 mL Intravenous PRN Josesito Suero MD        tamsulosin (FLOMAX) 24 hr capsule 0.4 mg  0.4 mg Oral Nightly Josesito Suero MD   0.4 mg at 03/02/24 2000            Vital Sign Min/Max for last 24 hours  Temp  Min: 97.6 °F (36.4 °C)  Max: 98.7 °F (37.1 °C)   BP  Min: 113/58  Max: 141/92   Pulse  Min: 84  Max: 147   Resp  Min: 18  Max: 20   SpO2  Min: 95 %  Max: 97 %   No data recorded   Weight  Min: 121 kg (267 lb 3.2 oz)  Max: 122 kg (268 lb 12.8 oz)      Flowsheet Rows       Flowsheet Row First Filed Value   Admission Height 185.4 cm (73\") Documented at 03/02/2024 0833   Admission Weight 122 kg (269 lb) Documented at 03/02/2024 0833                Results for orders placed during the hospital encounter of 11/23/23     Adult Transthoracic Echo Complete W/ Cont if Necessary Per Protocol     Interpretation Summary    Left ventricular ejection fraction appears to be 61 - 65%.    Left ventricular wall thickness is consistent with mild concentric hypertrophy.    Left ventricular diastolic function was normal.    The left atrial cavity is moderately dilated.    Estimated right ventricular systolic pressure from tricuspid regurgitation is moderately elevated (45-55 mmHg).    The right ventricular cavity is mild to moderately dilated, with normal systolic function..    The right atrial cavity is dilated.    No significant (greater than mild) valvular pathology.        Physical Exam:     General Appearance: Awake, alert, in no acute distress  Eyes: Pupils equal and reactive    Ears: Appear intact " with no abnormalities noted  Nose: Nares normal, no drainage  Neck: supple, trachea midline, no carotid bruit and no JVD  Back: no kyphosis present,    Lungs: respirations regular, respirations even and respirations unlabored  Heart: normal S1, S2, 2/6 systolic murmur left sternal border, irregular  Abdomen: normal bowel sounds, no tenderness   Skin: no bleeding, bruising or rash  Extremities: no cyanosis  Psychiatric/Behavioral: Negative for agitation, behavioral problems, confusion, the patient does  appear to be nervous/anxious.        Results Review:   I reviewed the patient's new clinical results.  I reviewed the patient's new imaging results and agree with the interpretation.  I reviewed the patient's other test results and agree with the interpretation  I personally viewed and interpreted the patient's EKG/Telemetry data     Discussed with patient  Updated patient regarding any new or relevant abnormalities on review of records or any new findings on physical exam.   Mentioned to patient about purpose of visit and desirable health short and long term goals and objectives.      Reviewed available prior notes, consults, prior visits, laboratory findings, radiology and cardiology relevant reports.   Updated chart as applicable.   I have reviewed the patient's medical history in detail and updated the computerized patient record as relevant.                   Assessment & Plan    Atrial fibrillation with RVR    Prostate cancer    Hypertension    Malignant neoplasm of lower lobe of right lung    Class 1 obesity due to excess calories with serious comorbidity and body mass index (BMI) of 33.0 to 33.9 in adult    Current use of long term anticoagulation    History of radiation therapy  Elevated troponin with flat trajectory  Chest discomfort  Right atrial enlargement  Moderate left atrial enlargement  Mild to moderate pulmonary hypertension     Plan     Continue on current medication  Rate control agent as  required  Add oral Cardizem CD1 20 mg p.o. daily  Taper and discontinue IV Cardizem  Continue on other rate control agents  Anticoagulation as tolerated  Fall precautions  Monitor for any overt or covert signs of bleeding  Ischemia workup  Electrophysiology has already been consulted  May require Watchman closure device in future  In addition cardiology consulted   Consider Lexiscan Cardiolite stress test tomorrow     Telemetry  Deep vein thrombosis prophylaxis/precautions [on anticoagulation]  Appropriate diet, fluid, sodium, caffeine, stimulants intake   Questions were encouraged, asked and answered to the patient's  understanding and satisfaction.  Compliance to diet and medications         Mesfin Sebastian MD  03/03/24  09:45 CST     EMR Dragon/Transcription was used to dictate part of this note                         Denice Doss APRN   Nurse Practitioner  Hospitalist     Progress Notes     Attested     Date of Service: 03/03/24 1042  Creation Time: 03/03/24 1042     Attested           Attestation signed by Josesito Suero MD at 03/03/24 1354     I have reviewed this documentation and agree.                      [facility]  INPATIENT PROGRESS NOTE     Patient Name: Delgado Desir  Date of Admission: 3/2/2024  Today's Date: 03/03/24  Length of Stay: 1  Primary Care Physician: Michael Nunez MD     Subjective   Chief Complaint: headache  HPI   He was sitting up in chair.  He is rate controlled atrial fibrillation on telemetry.  Cardizem drip has just been discontinued as he was started on oral Cardizem.  Denies chest pain or pressure.  He does complain of a headache.     Review of Systems   All pertinent negatives and positives are as above. All other systems have been reviewed and are negative unless otherwise stated.      Objective    Temp:  [97.6 °F (36.4 °C)-98.7 °F (37.1 °C)] 98 °F (36.7 °C)  Heart Rate:  [] 84  Resp:  [18-20] 20  BP:  (113-141)/() 117/89  Physical Exam  Vitals reviewed.   Constitutional:       General: He is not in acute distress.     Appearance: He is obese. He is not toxic-appearing.      Comments: Up in chair.  No acute distress.  On room air.  No family at bedside.  Seen and examined in conjunction with Dr. Suero.   HENT:      Head: Normocephalic and atraumatic.      Mouth/Throat:      Mouth: Mucous membranes are moist.      Pharynx: Oropharynx is clear.   Eyes:      Extraocular Movements: Extraocular movements intact.      Conjunctiva/sclera: Conjunctivae normal.      Pupils: Pupils are equal, round, and reactive to light.   Cardiovascular:      Rate and Rhythm: Normal rate. Rhythm irregular.      Pulses: Normal pulses.      Comments: Atrial fibrillation 95-97  Pulmonary:      Effort: Pulmonary effort is normal. No respiratory distress.      Breath sounds: Normal breath sounds. No wheezing.   Abdominal:      General: Bowel sounds are normal. There is no distension.      Palpations: Abdomen is soft.      Tenderness: There is no abdominal tenderness.   Musculoskeletal:         General: No swelling or tenderness. Normal range of motion.      Cervical back: Normal range of motion and neck supple. No muscular tenderness.   Skin:     General: Skin is warm and dry.      Findings: No erythema or rash.   Neurological:      General: No focal deficit present.      Mental Status: He is alert and oriented to person, place, and time.      Cranial Nerves: No cranial nerve deficit.      Motor: No weakness.   Psychiatric:         Mood and Affect: Mood normal.         Behavior: Behavior normal.         Results Review:  I have reviewed the labs, radiology results, and diagnostic studies.     Laboratory Data:         Results from last 7 days   Lab Units 03/03/24  0216 03/02/24  0912   WBC 10*3/mm3 8.15 7.94   HEMOGLOBIN g/dL 13.5 13.6   HEMATOCRIT % 40.7 41.2   PLATELETS 10*3/mm3 198 174               Results from last 7 days   Lab Units  "03/03/24  0216 03/02/24  0912   SODIUM mmol/L 141 139   POTASSIUM mmol/L 4.1 4.2   CHLORIDE mmol/L 105 106   CO2 mmol/L 25.0 23.0   BUN mg/dL 33* 34*   CREATININE mg/dL 1.06 0.98   CALCIUM mg/dL 8.4* 8.6   BILIRUBIN mg/dL 0.9 1.0   ALK PHOS U/L 112 110   ALT (SGPT) U/L 30 32   AST (SGOT) U/L 20 22   GLUCOSE mg/dL 115* 107*         Culture Data:   No results found for: \"ACANTHNAEG\", \"AFBCX\", \"BPERTUSSISCX\", \"BLOODCX\"  No results found for: \"BCIDPCR\", \"CXREFLEX\", \"CSFCX\", \"CULTURETIS\"  No results found for: \"CULTURES\", \"HSVCX\", \"URCX\"  No results found for: \"EYECULTURE\", \"GCCX\", \"HSVCULTURE\", \"LABHSV\"  No results found for: \"LEGIONELLA\", \"MRSACX\", \"MUMPSCX\", \"MYCOPLASCX\"  No results found for: \"NOCARDIACX\", \"STOOLCX\"  No results found for: \"THROATCX\", \"UNSTIMCULT\", \"URINECX\", \"CULTURE\", \"VZVCULTUR\"  No results found for: \"VIRALCULTU\", \"WOUNDCX\"     Radiology Data:   Imaging Results (Last 24 Hours)         ** No results found for the last 24 hours. **                I have reviewed the patient's current medications.      Assessment/Plan   Assessment       Active Hospital Problems     Diagnosis      **Atrial fibrillation with RVR      History of radiation therapy      Current use of long term anticoagulation      Class 1 obesity due to excess calories with serious comorbidity and body mass index (BMI) of 33.0 to 33.9 in adult      Malignant neoplasm of lower lobe of right lung      Hypertension      Prostate cancer        Mr. Desir is a 73-year-old male who [facility] on 3/2 chief complaint dyspnea, chest tightness and irregular heart rate.  He describes chest tightness as sensation of mild pressure on both of his shoulders.  He noticed his heart rate had ranged from 70-150s.  No history of coronary artery disease.  He has a history of paroxysmal atrial fibrillation and is chronically anticoagulated on Eliquis followed by cardiology.  He reports compliance with Eliquis.  He has a history of " prostate cancer, lung nodules with previous wedge resection and confirmation of malignancy in the past, Status post total thyroidectomy.  In the ED, he was found to be in atrial fibrillation with rapid ventricular response with rate 150s.  Chest x-ray showed no acute findings     Treatment Plan  Atrial fibrillation with rapid ventricular response on admission.  Cardiology, Dr. Sebastian consulted.  Cardiology recommends to start Cardizem  mg daily and wean Cardizem drip.  Continue metoprolol increased dose 25 mg twice daily.  Continue anticoagulation with Eliquis.  Continue to monitor on telemetry.     Results for orders placed during the hospital encounter of 11/23/23     Adult Transthoracic Echo Complete W/ Cont if Necessary Per Protocol     Interpretation Summary    Left ventricular ejection fraction appears to be 61 - 65%.    Left ventricular wall thickness is consistent with mild concentric hypertrophy.    Left ventricular diastolic function was normal.    The left atrial cavity is moderately dilated.    Estimated right ventricular systolic pressure from tricuspid regurgitation is moderately elevated (45-55 mmHg).    The right ventricular cavity is mild to moderately dilated, with normal systolic function..    The right atrial cavity is dilated.    No significant (greater than mild) valvular pathology.     Nori scan ordered for tomorrow per cardiology.     Eliquis will serve as VTE prophylaxis.     Medical Decision Making  Number and Complexity of problems: 2 acute problems  Differential Diagnosis: None considered at present     Conditions and Status        Condition is improving.     [facility] Data  External documents reviewed: Prior epic records  Cardiac tracing (EKG, telemetry) interpretation: Reviewed  Radiology interpretation: Interpreted by radiology  Labs reviewed: As above  Any tests that were considered but not ordered: None considered at present     Decision rules/scores evaluated (example [id number],  Zack, etc): None considered at present     Discussed with: patient and Dr. Suero     Care Planning  Shared decision making: Patient is agreeable to ongoing workup and treatment  Code status and discussions: Full code with full interventions     Disposition  Social Determinants of Health that impact treatment or disposition: none  I expect the patient to be discharged to home in 1-2 days.      Electronically signed by ANDRES Arana, 03/03/24, 10:42 CST.               Cosigned by: Josesito Suero MD at 03/03/24 1354          Master Johnson MD   Physician  Cardiac EP     Consults     Signed     Date of Service: 03/03/24 1200  Creation Time: 03/04/24 0728  Consult Orders   Inpatient Cardiac Electrophysiology Consult [[id number]] ordered by Josesito Suero MD at 03/02/24 1152          Signed       Expand All Collapse All    EP CONSULT NOTE        Subjective        History of Present Illness     EP Problems:  1.  Paroxysmal atrial fibrillation     Cardiology Problems:  1.  Hypertension     Medical Problems:  1.  Prostate cancer  2.  Lung cancer  3.  Pneumothorax  4.  Obesity  5.  Hypothyroidism status post thyroidectomy        Delgado Desir is a 73 y.o. male with problem list as above for whom EP is consulted regarding paroxysmal atrial fibrillation.  He states that he has had 1 or 2 previous episodes of atrial fibrillation.  In the past, he has been treated with IV diltiazem with spontaneous termination of his episodes.  This time, he was having palpitations and shortness of breath at home and came to the ER for evaluation.  In the ER, he was found to be in atrial fibrillation with rapid ventricular rates.  He was admitted to the hospital with IV diltiazem infusion.  He feels like his breathing has been better with appropriate rate control.  He denies missing any doses of his anticoagulation in the last 3 weeks.     Family History:  family history includes COPD in his father; Cancer in his mother; Hypertension  "in his mother.     Social History:  Social History   Social History            Tobacco Use    Smoking status: Former       Current packs/day: 0.00       Average packs/day: 1 pack/day for 33.0 years (33.0 ttl pk-yrs)       Types: Cigarettes       Start date: 1969       Quit date: 1996       Years since quittin.1    Smokeless tobacco: Never   Vaping Use    Vaping status: Never Used   Substance Use Topics    Alcohol use: Yes       Alcohol/week: 2.0 standard drinks of alcohol       Types: 2 Shots of liquor per week       Comment: x2 weekly    Drug use: Never            Allergies:  Patient has no known allergies.              Objective  Vital Signs:  /60 (BP Location: Left arm, Patient Position: Sitting)   Pulse 78   Temp 97.4 °F (36.3 °C) (Oral)   Resp 18   Ht 185.4 cm (73\")   Wt 120 kg (264 lb 3.2 oz)   SpO2 97%   BMI 34.86 kg/m²   Estimated body mass index is 34.86 kg/m² as calculated from the following:    Height as of this encounter: 185.4 cm (73\").    Weight as of this encounter: 120 kg (264 lb 3.2 oz).        Physical Exam  Vitals reviewed.   Constitutional:       Appearance: He is obese.   Cardiovascular:      Rate and Rhythm: Normal rate. Rhythm irregular.      Pulses: Normal pulses.      Heart sounds: Normal heart sounds.   Pulmonary:      Effort: Pulmonary effort is normal.      Breath sounds: Normal breath sounds.   Musculoskeletal:         General: No swelling.   Neurological:      Mental Status: He is alert and oriented to person, place, and time.   Psychiatric:         Mood and Affect: Mood normal.         Judgment: Judgment normal.                  Result Review  :  The following data was reviewed by: Master Johnson MD on 2024:  CMP Results:   CMP            2024    10:48 3/2/2024    09:12 3/3/2024    02:16   CMP   Glucose 94  107  115    BUN 22  34  33    Creatinine 0.91  0.98  1.06    EGFR 89.5  81.9  74.6    Sodium 138  139  141    Potassium 4.3  4.2  4.1    Chloride " 105  106  105    Calcium 8.8  8.6  8.4    Total Protein 6.5  6.7  6.4    Albumin 4.1  4.0  3.8    Globulin 2.4  2.7  2.6    Total Bilirubin 1.3  1.0  0.9    Alkaline Phosphatase 108  110  112    AST (SGOT) 23  22  20    ALT (SGPT) 31  32  30    Albumin/Globulin Ratio 1.7  1.5  1.5    BUN/Creatinine Ratio 24.2  34.7  31.1    Anion Gap 9.0  10.0  11.0          CBC Results   CBC            2/1/2024    10:48 3/2/2024    09:12 3/3/2024    02:16   CBC   WBC 6.09  7.94  8.15    RBC 4.36  4.59  4.49    Hemoglobin 12.9  13.6  13.5    Hematocrit 40.1  41.2  40.7    MCV 92.0  89.8  90.6    MCH 29.6  29.6  30.1    MCHC 32.2  33.0  33.2    RDW 14.2  14.4  14.4    Platelets 157  174  198          TSH Results:   TSH            12/29/2023    12:03 2/15/2024    14:34 3/3/2024    02:16   TSH   TSH 2.530  2.390  5.850             [id number] SCORE   [id number] Score: 2 (3/3/2024 10:44 AM)                 Assessment[]Expand by Default  Assessment and Plan   Problems:  Paroxysmal atrial fibrillation with acute exacerbation     Delgado Desir is a 73 y.o. male with problem list as above for whom EP is consulted regarding paroxysmal atrial fibrillation.  He has findings consistent with symptomatic paroxysmal atrial fibrillation.  It is unclear if he is having a more prolonged episode now or if he would spontaneously terminate with additional time.  Regardless, I do think that his best treatment option at this time is a cardioversion tomorrow should he not spontaneously return back to sinus rhythm.  Long-term, ablation or antiarrhythmics may be a reasonable treatment option for him.  We will plan to decide on the treatment approach following restoration of sinus rhythm and depending on his stress test results as if he is not a candidate for 1C antiarrhythmics, I would certainly favor ablation in this scenario.  Long-term, he also needs more aggressive risk factor modification with weight loss, exercise, sleep apnea testing with his  PCP.     I have discussed risks, benefits, and alternatives of a cardioversion with the patient.  Alternatives discussed include continued observation and medical management.  A cardioversion is generally not considered a high risk procedure but still has possible complications including but not limited to skin irritation, skin burns, stroke, or onset of either tachy or bradyarrhythmias at the time of the procedure.  Possibilities of recurrent arrhythmias and the possible need for additional procedures and/or medical therapy was discussed. Questions asked were appropriately answered.  No guarantees were made or implied.   Despite this, they would still like to proceed.     Plan:  -N.p.o. at midnight for cardioversion tomorrow  -Continue anticoagulation with apixaban given elevated [id number]  -Stress test per general cardiology team  -Continue metoprolol and diltiazem oral in combination with IV infusion for rate control  -Will determine long-term antiarrhythmic versus ablation plan following stress test results                    Part of this note may be an electronic transcription/translation of spoken language to printed text using the Dragon Dictation System.                    24 1852 97.7 (36.5) 92 20 141/92 Sitting room air 95   03/02/24 1724 97.8 (36.6) 103 20 124/69 Sitting room air 96   03/02/24 1450 -- 110 -- -- -- room air 97   03/02/24 1400 -- 108 -- -- -- -- --   03/02/24 1350 -- 130 Abnormal  -- -- -- -- --   03/02/24 1245 -- -- -- -- -- room air --   03/02/24 1203 97.6 (36.4) 105 20 119/78 Sitting room air 97   03/02/24 1147 -- 136 Abnormal  -- -- -- -- --   03/02/24 1132 -- 127 Abnormal  -- 130/107 Abnormal  -- -- 95   03/02/24 0946 -- 147 Abnormal  -- 123/109 Abnormal  -- -- 96   03/02/24 0833 97.7 (36.5) 77 20 116/96 Sitting room air 95     Intak                                    Current Facility-Administered Medications   Medication Dose Route Frequency Provider Last Rate Last Admin     acetaminophen (TYLENOL) tablet 500 mg  500 mg Oral Q6H PRN Josesito Suero MD        albuterol sulfate HFA (PROVENTIL HFA;VENTOLIN HFA;PROAIR HFA) inhaler 2 puff  2 puff Inhalation Q6H PRN Josesito Suero MD        apixaban (ELIQUIS) tablet 5 mg  5 mg Oral Q12H Josesito Suero MD   5 mg at 03/04/24 0835    sennosides-docusate (PERICOLACE) 8.6-50 MG per tablet 2 tablet  2 tablet Oral BID PRN Josesito Suero MD        And    polyethylene glycol (MIRALAX) packet 17 g  17 g Oral Daily PRN Josesito Suero MD        And    bisacodyl (DULCOLAX) EC tablet 5 mg  5 mg Oral Daily PRN Josesito Suero MD        And    bisacodyl (DULCOLAX) suppository 10 mg  10 mg Rectal Daily PRN Josesito Suero MD        dilTIAZem (CARDIZEM) 125 mg in 125 mL D5W infusion  5-15 mg/hr Intravenous Titrated Josesito Suero MD   Stopped at 03/03/24 1342    dilTIAZem CD (CARDIZEM CD) 24 hr capsule 120 mg  120 mg Oral Q24H Mesfin Sebastian MD   120 mg at 03/04/24 0835    levothyroxine (SYNTHROID, LEVOTHROID) tablet 137 mcg  137 mcg Oral Daily Josesito Suero MD   137 mcg at 03/04/24 0835    metoprolol tartrate (LOPRESSOR) tablet 25 mg  25 mg Oral Q12H Josesito Suero MD   25 mg at 03/03/24 2009    multivitamin with minerals 2 tablet  2 tablet Oral Daily Josesito Suero MD   2 tablet at 03/04/24 0835    sodium chloride 0.9 % flush 10 mL  10 mL Intravenous PRN Josesito Suero MD        sodium chloride 0.9 % flush 10 mL  10 mL Intravenous Q12H Josesito Suero MD   10 mL at 03/04/24 0835    sodium chloride 0.9 % flush 10 mL  10 mL Intravenous PRN Josesito Suero MD        sodium chloride 0.9 % infusion 40 mL  40 mL Intravenous PRN Josesito Suero MD        tamsulosin (FLOMAX) 24 hr capsule 0.4 mg  0.4 mg Oral Nightly Josesito Suero MD   0.4 mg at 03/03/24 2009

## 2024-03-04 NOTE — PROGRESS NOTES
[facility] -  Progress Note     LOS: 2 days   Patient Care Team:  Michael Nunez MD as PCP - General (Family Medicine)  Flores, Nick MILES MD as Referring Physician (Cardiothoracic Surgery)  Landry Hoover MD as Cardiologist (Cardiology)  Sabrina Jackson APRN as Nurse Practitioner (Nurse Practitioner)  Jean Marie Solis MD as Consulting Physician (Otolaryngology)  Maryann Serra APRN as Nurse Practitioner (Otolaryngology)    Chief Complaint: AF follow up    Subjective     Interval History: The patient is sitting in the chair feeling well.  He denies any complaints at this time.  He converted from atrial fibrillation back to sinus rhythm at 6 AM.  He had associated shortness of breath, chest tightness, and pressure in his shoulder area when he presented and was found to be in atrial fibrillation.  He reports although symptoms have resolved.    Telemetry: Sinus rhythm    Review of Systems:     Review of Systems   Constitutional:  Negative for appetite change, chills, diaphoresis and fatigue.   Respiratory:  Negative for chest tightness and wheezing.    Cardiovascular:  Negative for chest pain, palpitations and leg swelling.   Gastrointestinal:  Negative for abdominal distention, abdominal pain and vomiting.   Genitourinary:  Negative for difficulty urinating.   Neurological:  Negative for dizziness, weakness and light-headedness.     Objective     Vital Sign Min/Max for last 24 hours  Temp  Min: 97.4 °F (36.3 °C)  Max: 99.5 °F (37.5 °C)   BP  Min: 96/51  Max: 140/60   Pulse  Min: 52  Max: 87   Resp  Min: 18  Max: 20   SpO2  Min: 94 %  Max: 97 %   No data recorded   Weight  Min: 120 kg (264 lb 3.2 oz)  Max: 120 kg (264 lb 3.2 oz)         03/04/24  0357   Weight: 120 kg (264 lb 3.2 oz)       Physical Exam:    Vitals reviewed.   Constitutional:       General: Awake.      Appearance: Normal appearance. Well-developed, well-groomed and not in distress. Obese.   HENT:      Head:  Normocephalic and atraumatic.   Pulmonary:      Effort: Pulmonary effort is normal.      Breath sounds: Normal breath sounds.   Cardiovascular:      Normal rate. Regular rhythm.      Murmurs: There is no murmur.      No gallop.  No rub.   Edema:     Peripheral edema absent.   Musculoskeletal:      Cervical back: Normal range of motion and neck supple. Skin:     General: Skin is warm and dry.   Neurological:      General: No focal deficit present.      Mental Status: Alert, oriented to person, place, and time and oriented to person, place and time.   Psychiatric:         Attention and Perception: Attention normal.         Mood and Affect: Mood normal.         Speech: Speech normal.         Behavior: Behavior normal. Behavior is cooperative.         Thought Content: Thought content normal.         Cognition and Memory: Cognition and memory normal.         Judgment: Judgment normal.       Results Review:   Lab Results (last 72 hours)      Lab Results   Component Value Date    GLUCOSE 115 (H) 03/03/2024    CALCIUM 8.4 (L) 03/03/2024     03/03/2024    K 4.1 03/03/2024    CO2 25.0 03/03/2024     03/03/2024    BUN 33 (H) 03/03/2024    CREATININE 1.06 03/03/2024    EGFR 74.6 03/03/2024    BCR 31.1 (H) 03/03/2024    ANIONGAP 11.0 03/03/2024     Lab Results   Component Value Date    WBC 8.15 03/03/2024    HGB 13.5 03/03/2024    HCT 40.7 03/03/2024    MCV 90.6 03/03/2024     03/03/2024     Lab Results   Component Value Date    CHOL 143 03/03/2024    TRIG 105 03/03/2024    HDL 37 (L) 03/03/2024    LDL 87 03/03/2024        Echo EF Estimated  Lab Results   Component Value Date    ECHOEFEST 70 06/25/2020     Medication Review: yes  Current Facility-Administered Medications   Medication Dose Route Frequency Provider Last Rate Last Admin    acetaminophen (TYLENOL) tablet 500 mg  500 mg Oral Q6H PRN Josesito Suero MD        albuterol sulfate HFA (PROVENTIL HFA;VENTOLIN HFA;PROAIR HFA) inhaler 2 puff  2 puff  Inhalation Q6H PRN Josesito Suero MD        apixaban (ELIQUIS) tablet 5 mg  5 mg Oral Q12H Josesito Suero MD   5 mg at 03/04/24 0835    sennosides-docusate (PERICOLACE) 8.6-50 MG per tablet 2 tablet  2 tablet Oral BID PRN Josesito Suero MD        And    polyethylene glycol (MIRALAX) packet 17 g  17 g Oral Daily PRN Josesito Suero MD        And    bisacodyl (DULCOLAX) EC tablet 5 mg  5 mg Oral Daily PRN Josesito Suero MD        And    bisacodyl (DULCOLAX) suppository 10 mg  10 mg Rectal Daily PRN Josesito Suero MD        dilTIAZem (CARDIZEM) 125 mg in 125 mL D5W infusion  5-15 mg/hr Intravenous Titrated Josesito Suero MD   Stopped at 03/03/24 1342    dilTIAZem CD (CARDIZEM CD) 24 hr capsule 120 mg  120 mg Oral Q24H Mesfin Sebastian MD   120 mg at 03/04/24 0835    levothyroxine (SYNTHROID, LEVOTHROID) tablet 137 mcg  137 mcg Oral Daily Josesito Suero MD   137 mcg at 03/04/24 0835    metoprolol tartrate (LOPRESSOR) tablet 25 mg  25 mg Oral Q12H Josesito Suero MD   25 mg at 03/03/24 2009    multivitamin with minerals 2 tablet  2 tablet Oral Daily Josesito uSero MD   2 tablet at 03/04/24 0835    sodium chloride 0.9 % flush 10 mL  10 mL Intravenous PRN Josesito Suero MD        sodium chloride 0.9 % flush 10 mL  10 mL Intravenous Q12H Josesito Suero MD   10 mL at 03/04/24 0835    sodium chloride 0.9 % flush 10 mL  10 mL Intravenous PRN Josesito Suero MD        sodium chloride 0.9 % infusion 40 mL  40 mL Intravenous PRN Josesito Suero MD        tamsulosin (FLOMAX) 24 hr capsule 0.4 mg  0.4 mg Oral Nightly Josesito Suero MD   0.4 mg at 03/03/24 2009         Assessment & Plan     1.  Atrial fibrillation with rapid ventricular response/anticoagulation: Converted to sinus rhythm at 6 AM.  Previously on diltiazem drip for rate control and was converted to oral Cardizem.  He was evaluated by general cardiology yesterday with consultation to EP.  He has been seen by EP with discussion about possible ablation in future.   EP is awaiting results of stress testing for further recommendations.  Patient has been maintained on his home anticoagulation (Eliquis).    2.  Shortness of breath, chest tightness, shoulder discomfort: Stress testing was ordered yesterday.  The patient is n.p.o. with nuclear perfusion study planned for this a.m.  Further recommendations pending the results of that study.  Symptoms have all since resolved and likely related to atrial fibrillation with rapid ventricular response.    3.  Hypertension    4.  Hypothyroidism with prior thyroidectomy    5.  Obesity BMI 34.86      Awaiting results of nuclear perfusion study today.   Defer further plans for management of A-fib to electrophysiology.        Electronically signed by ANDRES Gonzalez, 03/04/24, 9:56 AM CST.

## 2024-03-04 NOTE — PROGRESS NOTES
"EP Problems:  1.  Paroxysmal atrial fibrillation     Cardiology Problems:  1.  Hypertension     Medical Problems:  1.  Prostate cancer  2.  Lung cancer  3.  Pneumothorax  4.  Obesity  5.  Hypothyroidism status post thyroidectomy    Patient ID:  Delgado Desir is a 73 y.o. male with problem list as above as above who EP is following for paroxysmal atrial fibrillation.    Subjective:  Had recurrence of sinus rhythm this morning at approximately 6:30 AM.    Objective:  /60 (BP Location: Left arm, Patient Position: Sitting)   Pulse 78   Temp 97.4 °F (36.3 °C) (Oral)   Resp 18   Ht 185.4 cm (73\")   Wt 120 kg (264 lb 3.2 oz)   SpO2 97%   BMI 34.86 kg/m²     Well-appearing, no acute distress, obese  Clear to auscultation bilaterally  Regular rate and rhythm  Warm, well-perfused    [id number] SCORE   [id number] Score: 2 (3/3/2024 10:44 AM)    Assessment:  Paroxysmal atrial fibrillation    Plan:  -Cancel cardioversion given return of sinus rhythm  -Discussed risks and benefits of ablation and he would like to proceed  -Determine short-term antiarrhythmic plan depending on stress test results  -Long-term, would benefit from risk factor modification with weight loss, exercise, sleep apnea evaluation with PCP    Part of this note may be an electronic transcription/translation of spoken language to printed text using the Dragon Dictation System.    "

## 2024-03-04 NOTE — CONSULTS
"EP CONSULT NOTE    Subjective        History of Present Illness    EP Problems:  1.  Paroxysmal atrial fibrillation    Cardiology Problems:  1.  Hypertension    Medical Problems:  1.  Prostate cancer  2.  Lung cancer  3.  Pneumothorax  4.  Obesity  5.  Hypothyroidism status post thyroidectomy      Delgado Desir is a 73 y.o. male with problem list as above for whom EP is consulted regarding paroxysmal atrial fibrillation.  He states that he has had 1 or 2 previous episodes of atrial fibrillation.  In the past, he has been treated with IV diltiazem with spontaneous termination of his episodes.  This time, he was having palpitations and shortness of breath at home and came to the ER for evaluation.  In the ER, he was found to be in atrial fibrillation with rapid ventricular rates.  He was admitted to the hospital with IV diltiazem infusion.  He feels like his breathing has been better with appropriate rate control.  He denies missing any doses of his anticoagulation in the last 3 weeks.    Family History:  family history includes COPD in his father; Cancer in his mother; Hypertension in his mother.    Social History:  Social History     Tobacco Use    Smoking status: Former     Current packs/day: 0.00     Average packs/day: 1 pack/day for 33.0 years (33.0 ttl pk-yrs)     Types: Cigarettes     Start date: 1969     Quit date: 1996     Years since quittin.1    Smokeless tobacco: Never   Vaping Use    Vaping status: Never Used   Substance Use Topics    Alcohol use: Yes     Alcohol/week: 2.0 standard drinks of alcohol     Types: 2 Shots of liquor per week     Comment: x2 weekly    Drug use: Never       Allergies:  Patient has no known allergies.      Objective   Vital Signs:  /60 (BP Location: Left arm, Patient Position: Sitting)   Pulse 78   Temp 97.4 °F (36.3 °C) (Oral)   Resp 18   Ht 185.4 cm (73\")   Wt 120 kg (264 lb 3.2 oz)   SpO2 97%   BMI 34.86 kg/m²   Estimated body mass index is 34.86 " "kg/m² as calculated from the following:    Height as of this encounter: 185.4 cm (73\").    Weight as of this encounter: 120 kg (264 lb 3.2 oz).      Physical Exam  Vitals reviewed.   Constitutional:       Appearance: He is obese.   Cardiovascular:      Rate and Rhythm: Normal rate. Rhythm irregular.      Pulses: Normal pulses.      Heart sounds: Normal heart sounds.   Pulmonary:      Effort: Pulmonary effort is normal.      Breath sounds: Normal breath sounds.   Musculoskeletal:         General: No swelling.   Neurological:      Mental Status: He is alert and oriented to person, place, and time.   Psychiatric:         Mood and Affect: Mood normal.         Judgment: Judgment normal.          Result Review :  The following data was reviewed by: Master Johnson MD on 03/02/2024:  CMP          2/1/2024    10:48 3/2/2024    09:12 3/3/2024    02:16   CMP   Glucose 94  107  115    BUN 22  34  33    Creatinine 0.91  0.98  1.06    EGFR 89.5  81.9  74.6    Sodium 138  139  141    Potassium 4.3  4.2  4.1    Chloride 105  106  105    Calcium 8.8  8.6  8.4    Total Protein 6.5  6.7  6.4    Albumin 4.1  4.0  3.8    Globulin 2.4  2.7  2.6    Total Bilirubin 1.3  1.0  0.9    Alkaline Phosphatase 108  110  112    AST (SGOT) 23  22  20    ALT (SGPT) 31  32  30    Albumin/Globulin Ratio 1.7  1.5  1.5    BUN/Creatinine Ratio 24.2  34.7  31.1    Anion Gap 9.0  10.0  11.0      CBC          2/1/2024    10:48 3/2/2024    09:12 3/3/2024    02:16   CBC   WBC 6.09  7.94  8.15    RBC 4.36  4.59  4.49    Hemoglobin 12.9  13.6  13.5    Hematocrit 40.1  41.2  40.7    MCV 92.0  89.8  90.6    MCH 29.6  29.6  30.1    MCHC 32.2  33.0  33.2    RDW 14.2  14.4  14.4    Platelets 157  174  198      TSH          12/29/2023    12:03 2/15/2024    14:34 3/3/2024    02:16   TSH   TSH 2.530  2.390  5.850        [id number] SCORE   [id number] Score: 2 (3/3/2024 10:44 AM)             Assessment and Plan   Problems:  Paroxysmal atrial fibrillation with acute " exacerbation    Delgado Desir is a 73 y.o. male with problem list as above for whom EP is consulted regarding paroxysmal atrial fibrillation.  He has findings consistent with symptomatic paroxysmal atrial fibrillation.  It is unclear if he is having a more prolonged episode now or if he would spontaneously terminate with additional time.  Regardless, I do think that his best treatment option at this time is a cardioversion tomorrow should he not spontaneously return back to sinus rhythm.  Long-term, ablation or antiarrhythmics may be a reasonable treatment option for him.  We will plan to decide on the treatment approach following restoration of sinus rhythm and depending on his stress test results as if he is not a candidate for 1C antiarrhythmics, I would certainly favor ablation in this scenario.  Long-term, he also needs more aggressive risk factor modification with weight loss, exercise, sleep apnea testing with his PCP.    I have discussed risks, benefits, and alternatives of a cardioversion with the patient.  Alternatives discussed include continued observation and medical management.  A cardioversion is generally not considered a high risk procedure but still has possible complications including but not limited to skin irritation, skin burns, stroke, or onset of either tachy or bradyarrhythmias at the time of the procedure.  Possibilities of recurrent arrhythmias and the possible need for additional procedures and/or medical therapy was discussed. Questions asked were appropriately answered.  No guarantees were made or implied.   Despite this, they would still like to proceed.    Plan:  -N.p.o. at midnight for cardioversion tomorrow  -Continue anticoagulation with apixaban given elevated [id number]  -Stress test per general cardiology team  -Continue metoprolol and diltiazem oral in combination with IV infusion for rate control  -Will determine long-term antiarrhythmic versus ablation plan following  stress test results             Part of this note may be an electronic transcription/translation of spoken language to printed text using the Dragon Dictation System.

## 2024-03-04 NOTE — DISCHARGE SUMMARY
[facility]  DISCHARGE SUMMARY       Date of Admission: 3/2/2024  Date of Discharge:  3/5/2024  Primary Care Physician: Michael Nunez MD    Presenting Problem/History of Present Illness:  Chest pain    Final Discharge Diagnoses:  Active Hospital Problems    Diagnosis     **Atrial fibrillation with RVR     History of radiation therapy     Current use of long term anticoagulation     Class 1 obesity due to excess calories with serious comorbidity and body mass index (BMI) of 33.0 to 33.9 in adult     Malignant neoplasm of lower lobe of right lung     Hypertension     Prostate cancer        Consults: Dr. Sebastian with cardiology.  Dr. Johnson with electrophysiology.    Procedures Performed: none.    Pertinent Test Results:   Results for orders placed during the hospital encounter of 11/23/23    Adult Transthoracic Echo Complete W/ Cont if Necessary Per Protocol    Interpretation Summary    Left ventricular ejection fraction appears to be 61 - 65%.    Left ventricular wall thickness is consistent with mild concentric hypertrophy.    Left ventricular diastolic function was normal.    The left atrial cavity is moderately dilated.    Estimated right ventricular systolic pressure from tricuspid regurgitation is moderately elevated (45-55 mmHg).    The right ventricular cavity is mild to moderately dilated, with normal systolic function..    The right atrial cavity is dilated.    No significant (greater than mild) valvular pathology.      Imaging Results (All)       Procedure Component Value Units Date/Time    XR Chest 1 View [[id number]] Collected: 03/02/24 0911     Updated: 03/02/24 0915    Narrative:      EXAMINATION: XR CHEST 1 VW-  3/2/2024 9:11 AM     HISTORY: Dyspnea     FINDINGS: Today's exam is compared to previous study of 11/23/2023.  There is mild atelectasis or scarring in the right lung base. Remote  right-sided rib fractures are present. The heart is mildly  enlarged. No  acute infiltrate or effusion.       Impression:      1. Right basilar atelectasis or scarring. Lungs are otherwise clear.  2. Mild cardiomegaly.     This report was signed and finalized on 3/2/2024 9:12 AM by Dr. Fermin Lantigua MD.             LAB RESULTS:      Lab 03/03/24  0216 03/02/24  0912   WBC 8.15 7.94   HEMOGLOBIN 13.5 13.6   HEMATOCRIT 40.7 41.2   PLATELETS 198 174   NEUTROS ABS 6.26 6.42   IMMATURE GRANS (ABS) 0.04 0.03   LYMPHS ABS 0.99 0.67*   MONOS ABS 0.64 0.68   EOS ABS 0.16 0.09   MCV 90.6 89.8   PROTIME  --  15.8*         Lab 03/03/24  0216 03/02/24  0912   SODIUM 141 139   POTASSIUM 4.1 4.2   CHLORIDE 105 106   CO2 25.0 23.0   ANION GAP 11.0 10.0   BUN 33* 34*   CREATININE 1.06 0.98   EGFR 74.6 81.9   GLUCOSE 115* 107*   CALCIUM 8.4* 8.6   MAGNESIUM  --  2.2   HEMOGLOBIN A1C 5.60  --    TSH 5.850*  --          Lab 03/03/24  0216 03/02/24  0912   TOTAL PROTEIN 6.4 6.7   ALBUMIN 3.8 4.0   GLOBULIN 2.6 2.7   ALT (SGPT) 30 32   AST (SGOT) 20 22   BILIRUBIN 0.9 1.0   ALK PHOS 112 110         Lab 03/02/24  1137 03/02/24  0912   PROBNP  --  1,116.0*   HSTROP T 23* 24*   PROTIME  --  15.8*   INR  --  1.21*         Lab 03/03/24 0216   CHOLESTEROL 143   LDL CHOL 87   HDL CHOL 37*   TRIGLYCERIDES 105             Brief Urine Lab Results       None          Microbiology Results (last 10 days)       ** No results found for the last 240 hours. **            Hospital Course:   Mr. Desir is a 73-year-old male who presented to [facility] on 3/2 chief complaint dyspnea, chest tightness and irregular heart rate.  He describes chest tightness as sensation of mild pressure on both of his shoulders.  He noticed his heart rate had ranged from 70-150s.  No history of coronary artery disease.  He has a history of paroxysmal atrial fibrillation and is chronically anticoagulated on Eliquis followed by cardiology.  He reports compliance with Eliquis.  He has a history of prostate cancer, lung  "nodules with previous wedge resection and confirmation of malignancy in the past, Status post total thyroidectomy.  In the ED, he was found to be in atrial fibrillation with rapid ventricular response with rate 150s.  Chest x-ray showed no acute findings     Atrial fibrillation with rapid ventricular response on admission.  EP consulted -he converted to sinus rhythm this morning around 6 AM.  EP recommends to continue Lopressor 25 mg twice daily and has been started on Tambocor 50 mg twice daily.  Continue chronic anticoagulation with Eliquis.  He is okay for discharge from EP standpoint with close follow-up visit as outpatient to discuss ablation.     Myocardial perfusion imaging indicates a normal myocardial perfusion study with no evidence of ischemia.  Impressions are consistent with a low risk study.  Left ventricular ejection fraction is normal.    He has reached maximum benefit of hospitalization and is medically stable for discharge home.    Physical Exam on Discharge:  /52 (BP Location: Right arm, Patient Position: Sitting)   Pulse 55   Temp 98.4 °F (36.9 °C) (Oral)   Resp 18   Ht 185.4 cm (73\")   Wt 120 kg (264 lb)   SpO2 95%   BMI 34.83 kg/m²   Physical Exam  Vitals reviewed.   Constitutional:       General: He is not in acute distress.     Appearance: He is obese. He is not toxic-appearing.      Comments: Up in room.  No acute distress.  On room air.  No family at bedside.  Discussed with his nurse Sekou   HENT:      Head: Normocephalic and atraumatic.      Mouth/Throat:      Mouth: Mucous membranes are moist.      Pharynx: Oropharynx is clear.   Eyes:      Extraocular Movements: Extraocular movements intact.      Conjunctiva/sclera: Conjunctivae normal.      Pupils: Pupils are equal, round, and reactive to light.   Cardiovascular:      Rate and Rhythm: Normal rate and regular rhythm.      Pulses: Normal pulses.      Comments: Converted from atrial fibrillation to sinus rhythm at 0606.  " Sinus 62-69 today  Pulmonary:      Effort: Pulmonary effort is normal. No respiratory distress.      Breath sounds: Normal breath sounds. No wheezing.   Abdominal:      General: Bowel sounds are normal. There is no distension.      Palpations: Abdomen is soft.      Tenderness: There is no abdominal tenderness.   Musculoskeletal:         General: No swelling or tenderness. Normal range of motion.      Cervical back: Normal range of motion and neck supple. No muscular tenderness.   Skin:     General: Skin is warm and dry.      Findings: No erythema or rash.   Neurological:      General: No focal deficit present.      Mental Status: He is alert and oriented to person, place, and time.      Cranial Nerves: No cranial nerve deficit.      Motor: No weakness.   Psychiatric:         Mood and Affect: Mood normal.         Behavior: Behavior normal.     Condition on Discharge: medically.    Discharge Disposition:  Home or Self Care    Discharge Medications:     Discharge Medications        New Medications        Instructions Start Date   flecainide 50 MG tablet  Commonly known as: TAMBOCOR   50 mg, Oral, Every 12 Hours Scheduled             Changes to Medications        Instructions Start Date   apixaban 5 MG tablet tablet  Commonly known as: Eliquis  What changed: additional instructions   5 mg, Oral, Every 12 Hours      metoprolol tartrate 25 MG tablet  Commonly known as: LOPRESSOR  What changed:   how much to take  when to take this   25 mg, Oral, 2 Times Daily      Synthroid 137 MCG tablet  Generic drug: levothyroxine  What changed:   when to take this  additional instructions   137 mcg, Oral, Daily             Continue These Medications        Instructions Start Date   acetaminophen 500 MG tablet  Commonly known as: TYLENOL   500 mg, Oral, Every 6 Hours PRN      albuterol sulfate  (90 Base) MCG/ACT inhaler  Commonly known as: PROVENTIL HFA;VENTOLIN HFA;PROAIR HFA   Inhale 2 puffs Every 6 (Six) Hours As Needed for  Shortness of Air or Wheezing.      alfuzosin 10 MG 24 hr tablet  Commonly known as: UROXATRAL   10 mg, Oral, Daily      Ibuprofen 200 MG capsule   2 capsules, Oral, Daily      therapeutic multivitamin-minerals tablet  Generic drug: multivitamin with minerals   2 tablets, Oral, Daily               Discharge Diet:   Diet as tolerated    Activity at Discharge:   Activity as tolerated    Follow-up Appointments:   Follow up with Dr. Nunez in one week.  Follow up with EP per recommendations.  Follow up with cardiology per recommendations.  Future Appointments   Date Time Provider Department Center   3/28/2024  1:30 PM Sabrina Jackson APRN [facility] CD PAD PAD   4/24/2024 10:30 AM PAD [facility] ENT US [facility] ENT PAD PAD   4/24/2024 11:00 AM Maryann Serra APRN [facility] ENT PAD PAD   8/15/2024 10:30 AM [facility] CANCER CTR LAB [facility] CCLAB PAD   8/15/2024 11:00 AM Milan Bullard MD [facility] ONC PAD PAD   2/21/2025  1:00 PM Ephraim Lujan III, MD NEW [facility] PAD None       Test Results Pending at Discharge: none.    Electronically signed by ANDRES Arana, 03/05/24, 08:10 CST.    Time: 35 minutes.

## 2024-03-05 DIAGNOSIS — I48.0 PAROXYSMAL ATRIAL FIBRILLATION: Primary | ICD-10-CM

## 2024-03-05 NOTE — PLAN OF CARE
Goal Outcome Evaluation: Patient converted to NSR at 0640. Stress test completed, no evidence of ischemia found. Patient discharging, all follow up appointments and new medications reviewed and questions addressed with patient. IV and tele removed, all belongings are being sent home with patient.                     Problem: Adult Inpatient Plan of Care  Goal: Plan of Care Review  Outcome: Adequate for Care Transition  Goal: Patient-Specific Goal (Individualized)  Outcome: Adequate for Care Transition  Goal: Absence of Hospital-Acquired Illness or Injury  Outcome: Adequate for Care Transition  Intervention: Identify and Manage Fall Risk  Recent Flowsheet Documentation  Taken 3/4/2024 1500 by Aniyah Jimenez RN  Safety Promotion/Fall Prevention: safety round/check completed  Taken 3/4/2024 1400 by Aniyah Jimenez RN  Safety Promotion/Fall Prevention: safety round/check completed  Taken 3/4/2024 1100 by Aniyah Jimenez RN  Safety Promotion/Fall Prevention: safety round/check completed  Taken 3/4/2024 1000 by Aniyah Jimenez RN  Safety Promotion/Fall Prevention: safety round/check completed  Taken 3/4/2024 0910 by Aniyah Jimenez RN  Safety Promotion/Fall Prevention: safety round/check completed  Taken 3/4/2024 0835 by Aniyah Jimenez RN  Safety Promotion/Fall Prevention: safety round/check completed  Taken 3/4/2024 0710 by Aniyah Jimenez RN  Safety Promotion/Fall Prevention: safety round/check completed  Intervention: Prevent Skin Injury  Recent Flowsheet Documentation  Taken 3/4/2024 0835 by Aniyah Jimenez RN  Body Position: position changed independently  Intervention: Prevent and Manage VTE (Venous Thromboembolism) Risk  Recent Flowsheet Documentation  Taken 3/4/2024 0835 by Aniyah Jimenez RN  Activity Management: ambulated in room  VTE Prevention/Management: (See MAR) other (see comments)  Range of Motion: active ROM (range of motion) encouraged  Intervention: Prevent  Infection  Recent Flowsheet Documentation  Taken 3/4/2024 0835 by Aniyah Jimenez RN  Infection Prevention:   single patient room provided   rest/sleep promoted  Goal: Optimal Comfort and Wellbeing  Outcome: Adequate for Care Transition  Intervention: Provide Person-Centered Care  Recent Flowsheet Documentation  Taken 3/4/2024 0835 by Aniyah Jimenez RN  Trust Relationship/Rapport:   care explained   questions answered  Goal: Readiness for Transition of Care  Outcome: Adequate for Care Transition     Problem: Fall Injury Risk  Goal: Absence of Fall and Fall-Related Injury  Outcome: Adequate for Care Transition  Intervention: Identify and Manage Contributors  Recent Flowsheet Documentation  Taken 3/4/2024 0835 by Aniyah Jimenez RN  Medication Review/Management: medications reviewed  Intervention: Promote Injury-Free Environment  Recent Flowsheet Documentation  Taken 3/4/2024 1500 by Aniyah Jimenez RN  Safety Promotion/Fall Prevention: safety round/check completed  Taken 3/4/2024 1400 by Aniyah Jimenez RN  Safety Promotion/Fall Prevention: safety round/check completed  Taken 3/4/2024 1100 by Aniyah Jimenez RN  Safety Promotion/Fall Prevention: safety round/check completed  Taken 3/4/2024 1000 by Aniyah Jimenez RN  Safety Promotion/Fall Prevention: safety round/check completed  Taken 3/4/2024 0910 by Aniyah Jimenez RN  Safety Promotion/Fall Prevention: safety round/check completed  Taken 3/4/2024 0835 by Aniyah Jimenez RN  Safety Promotion/Fall Prevention: safety round/check completed  Taken 3/4/2024 0710 by Aniyah Jimenez RN  Safety Promotion/Fall Prevention: safety round/check completed

## 2024-03-05 NOTE — PAYOR COMM NOTE
"REF:    [id number]     [facility]  FAX  [phone]     Delgado Chappell P \"Quang\" (73 y.o. Male)       Date of Birth   1950    Social Security Number       [address] [facility] [address]    Home Phone   [phone]    MRN   [id number]       Islam   Other    Marital Status                               Admission Date   3/2/24    Admission Type   Emergency    Admitting Provider   Frederick Norman MD    Attending Provider       Department, Room/Bed   [facility] 4B, 404/1       Discharge Date   3/4/2024    Discharge Disposition   Home or Self Care    Discharge Destination                                 Attending Provider: (none)   Allergies: No Known Allergies    Isolation: None   Infection: None   Code Status: Prior    Ht: 185.4 cm (73\")   Wt: 120 kg (264 lb)    Admission Cmt: None   Principal Problem: Atrial fibrillation with RVR [I48.91]                   Active Insurance as of 3/2/2024       Primary Coverage       Payor Plan Insurance Group Employer/Plan Group    ANTH MEDICARE REPLACEMENT ANTHEM MEDICARE ADVANTAGE [id number]       Payor Plan Address Payor Plan Phone Number Payor Plan Fax Number Effective Dates    [address] [phone]  1/1/2020 - None Entered    [facility] [id number]         Subscriber Name Subscriber Birth Date Member ID       DELGADO CHAPPELL 1950 [id number]                     Emergency Contacts        (Rel.) Home Phone Work Phone Mobile Phone    KarleeNava (Spouse) [phone] -- [phone]    DAVE THOMSPON (Friend) -- -- [phone]    Mini Valdez (Daughter) -- -- [phone]                  Discharge Order (From admission, onward)       Start     Ordered    03/04/24 1648  Discharge patient  Once        Expected Discharge Date: 03/04/24   Discharge Disposition: Home or Self Care   Physician of Record for Attribution - Please select from Treatment Team: FREDERICK NORMAN " [[id number]]   Review needed by CMO to determine Physician of Record: No      Question Answer Comment   Physician of Record for Attribution - Please select from Treatment Team ELIOT NORMAN    Review needed by CMO to determine Physician of Record No        03/04/24 7696

## 2024-03-05 NOTE — OUTREACH NOTE
Prep Survey      Flowsheet Row Responses   [facility] facility patient discharged from? [place]   Is LACE score < 7 ? No   Eligibility Readm Mgmt   Discharge diagnosis Atrial fibrillation with RVR   Does the patient have one of the following disease processes/diagnoses(primary or secondary)? Other   Does the patient have Home health ordered? No   Is there a DME ordered? No   Prep survey completed? Yes            Nilda CHOI - Registered Nurse

## 2024-03-07 ENCOUNTER — READMISSION MANAGEMENT (OUTPATIENT)
Dept: CALL CENTER | Facility: HOSPITAL | Age: 74
End: 2024-03-07
Payer: MEDICARE

## 2024-03-07 LAB
QT INTERVAL: 314 MS
QTC INTERVAL: 462 MS

## 2024-03-07 NOTE — OUTREACH NOTE
Medical Week 1 Survey      Flowsheet Row Responses   [facility] patient discharged from? [place]   Does the patient have one of the following disease processes/diagnoses(primary or secondary)? Other   Week 1 attempt successful? No   Unsuccessful attempts Attempt 1  [Reached spouse and she requested we speak to patient only]            Scarlett FELIX - Registered Nurse

## 2024-03-12 ENCOUNTER — HOSPITAL ENCOUNTER (OUTPATIENT)
Dept: CARDIOLOGY | Facility: HOSPITAL | Age: 74
Discharge: HOME | End: 2024-03-12
Admitting: STUDENT IN AN ORGANIZED HEALTH CARE EDUCATION/TRAINING PROGRAM
Payer: MEDICARE

## 2024-03-12 ENCOUNTER — TELEPHONE (OUTPATIENT)
Dept: CARDIOLOGY | Facility: CLINIC | Age: 74
End: 2024-03-12
Payer: MEDICARE

## 2024-03-12 DIAGNOSIS — I48.0 PAROXYSMAL ATRIAL FIBRILLATION: ICD-10-CM

## 2024-03-12 DIAGNOSIS — I48.0 PAROXYSMAL ATRIAL FIBRILLATION: Primary | Chronic | ICD-10-CM

## 2024-03-12 LAB
QT INTERVAL: 468 MS
QTC INTERVAL: 451 MS

## 2024-03-12 PROCEDURE — 93005 ELECTROCARDIOGRAM TRACING: CPT | Performed by: STUDENT IN AN ORGANIZED HEALTH CARE EDUCATION/TRAINING PROGRAM

## 2024-03-12 RX ORDER — FLECAINIDE ACETATE 100 MG/1
100 TABLET ORAL EVERY 12 HOURS SCHEDULED
Qty: 60 TABLET | Refills: 11 | Status: SHIPPED | OUTPATIENT
Start: 2024-03-12

## 2024-03-12 NOTE — TELEPHONE ENCOUNTER
EKG done for 1 week follow up on Flecainide. Called Mr. Desir to instruct him to increase Flecainide to 100 mg BID per Dr. Johnson's order. Verbalized understanding.    Lisset Rojo RN     Patient educated on condition.

## 2024-03-13 ENCOUNTER — READMISSION MANAGEMENT (OUTPATIENT)
Dept: CALL CENTER | Facility: HOSPITAL | Age: 74
End: 2024-03-13
Payer: MEDICARE

## 2024-03-13 NOTE — OUTREACH NOTE
Medical Week 1 Survey      Flowsheet Row Responses   [facility] facility patient discharged from? [place]   Does the patient have one of the following disease processes/diagnoses(primary or secondary)? Other   Week 1 attempt successful? No   Unsuccessful attempts Attempt 2            YAW MURDOCK - Registered Nurse

## 2024-03-21 ENCOUNTER — READMISSION MANAGEMENT (OUTPATIENT)
Dept: CALL CENTER | Facility: HOSPITAL | Age: 74
End: 2024-03-21
Payer: MEDICARE

## 2024-03-21 NOTE — OUTREACH NOTE
Medical Week 3 Survey      Flowsheet Row Responses   [facility] patient discharged from? [place]   Does the patient have one of the following disease processes/diagnoses(primary or secondary)? Other   Week 3 attempt successful? No   Unsuccessful attempts Attempt 1   Discharge diagnosis Atrial fibrillation with RVR            YAW H - Registered Nurse

## 2024-03-25 ENCOUNTER — READMISSION MANAGEMENT (OUTPATIENT)
Dept: CALL CENTER | Facility: HOSPITAL | Age: 74
End: 2024-03-25
Payer: MEDICARE

## 2024-03-25 ENCOUNTER — HOSPITAL ENCOUNTER (OUTPATIENT)
Dept: CARDIOLOGY | Facility: HOSPITAL | Age: 74
Discharge: HOME | End: 2024-03-25
Admitting: STUDENT IN AN ORGANIZED HEALTH CARE EDUCATION/TRAINING PROGRAM
Payer: MEDICARE

## 2024-03-25 DIAGNOSIS — I48.0 PAROXYSMAL ATRIAL FIBRILLATION: Chronic | ICD-10-CM

## 2024-03-25 PROCEDURE — 93010 ELECTROCARDIOGRAM REPORT: CPT | Performed by: HOSPITALIST

## 2024-03-25 PROCEDURE — 93005 ELECTROCARDIOGRAM TRACING: CPT | Performed by: STUDENT IN AN ORGANIZED HEALTH CARE EDUCATION/TRAINING PROGRAM

## 2024-03-25 NOTE — OUTREACH NOTE
Medical Week 3 Survey      Flowsheet Row Responses   [facility] patient discharged from? [place]   Does the patient have one of the following disease processes/diagnoses(primary or secondary)? Other   Week 3 attempt successful? Yes   Call start time 1615   Call end time 1621   Discharge diagnosis Atrial fibrillation with RVR   Meds reviewed with patient/caregiver? Yes   Is the patient having any side effects they believe may be caused by any medication additions or changes? No  [possible side of effect of Eliquis-pt reported once occurrence of blood in his urine - pt will let his physician know]   Does the patient have all medications ordered at discharge? Yes   Is the patient taking all medications as directed (includes completed medication regime)? Yes   Comments regarding appointments Cards apt on 3/28/24   Does the patient have a primary care provider?  Yes   Has the patient kept scheduled appointments due by today? Yes   Has home health visited the patient within 72 hours of discharge? N/A   Psychosocial issues? No   Did the patient receive a copy of their discharge instructions? Yes   Nursing interventions Reviewed instructions with patient   What is the patient's perception of their health status since discharge? New symptoms unrelated to diagnosis  [Pt detected blood in his urine on one occurrence-pt plans to speak with his physician]   Is the patient/caregiver able to teach back signs and symptoms related to disease process for when to call PCP? Yes   Is the patient/caregiver able to teach back signs and symptoms related to disease process for when to call 911? Yes   Is the patient/caregiver able to teach back the hierarchy of who to call/visit for symptoms/problems? PCP, Specialist, Home health nurse, Urgent Care, ED, 911 Yes   If the patient is a current smoker, are they able to teach back resources for cessation? Not a smoker   Week 3 Call Completed? Yes   Graduated Yes   Graduated/Revoked comments  Pt detected blood in his urine on one occurrence (pt is on Eliquis)-pt plans to speak with his physician,  otherwise, pt is doing well   Call end time 1621            Valeria H - Registered Nurse

## 2024-03-27 LAB
QT INTERVAL: 464 MS
QTC INTERVAL: 455 MS

## 2024-03-28 ENCOUNTER — OFFICE VISIT (OUTPATIENT)
Dept: CARDIOLOGY | Facility: CLINIC | Age: 74
End: 2024-03-28
Payer: MEDICARE

## 2024-03-28 VITALS
HEART RATE: 47 BPM | BODY MASS INDEX: 35.39 KG/M2 | HEIGHT: 73 IN | WEIGHT: 267 LBS | SYSTOLIC BLOOD PRESSURE: 132 MMHG | DIASTOLIC BLOOD PRESSURE: 60 MMHG | OXYGEN SATURATION: 98 %

## 2024-03-28 DIAGNOSIS — Z79.01 CURRENT USE OF LONG TERM ANTICOAGULATION: Chronic | ICD-10-CM

## 2024-03-28 DIAGNOSIS — E66.01 CLASS 2 SEVERE OBESITY DUE TO EXCESS CALORIES WITH SERIOUS COMORBIDITY AND BODY MASS INDEX (BMI) OF 35.0 TO 35.9 IN ADULT: ICD-10-CM

## 2024-03-28 DIAGNOSIS — I10 PRIMARY HYPERTENSION: Chronic | ICD-10-CM

## 2024-03-28 DIAGNOSIS — I48.0 PAROXYSMAL ATRIAL FIBRILLATION: Primary | Chronic | ICD-10-CM

## 2024-03-28 PROBLEM — E66.812 CLASS 2 SEVERE OBESITY DUE TO EXCESS CALORIES WITH SERIOUS COMORBIDITY AND BODY MASS INDEX (BMI) OF 35.0 TO 35.9 IN ADULT: Status: ACTIVE | Noted: 2021-09-29

## 2024-03-28 NOTE — PROGRESS NOTES
Subjective:     Encounter Date: 03/28/2024      Patient ID: Delgado Desir is a 73 y.o. male with paroxysmal atrial fibrillation, long-term anticoagulation, prostate cancer, lung nodules with previous wedge resection and confirmation of malignancy in the past, hypertension, previous thyroidectomy, and obesity.  He presents to the office today for discharge follow up.     Chief Complaint: Discharge Follow Up   Atrial Fibrillation  Presents for follow-up visit. Symptoms include dizziness and shortness of breath. Symptoms are negative for bradycardia, chest pain, hemodynamic instability, hypotension, palpitations, syncope and tachycardia. The symptoms have been stable. Past medical history includes atrial fibrillation. There are no medication compliance problems.   Hypertension  This is a chronic problem. The current episode started more than 1 year ago. The problem is unchanged. The problem is controlled. Associated symptoms include headaches, malaise/fatigue and shortness of breath. Pertinent negatives include no chest pain, palpitations or peripheral edema. Risk factors for coronary artery disease include male gender, obesity and dyslipidemia. Past treatments include beta blockers. Current antihypertension treatment includes angiotensin blockers and diuretics. The current treatment provides significant improvement. There are no compliance problems.  There is no history of CAD/MI. Identifiable causes of hypertension include a thyroid problem.     Mr. Desir presents to the office today for discharge follow up. He was seen by cardiology recently when he presented to the emergency department with complaints of chest tightness, irregular heartbeats.  He was found to be in atrial fibrillation with rapid ventricular response.  He was admitted by the hospitalist however cardiology was consulted.    He was initially seen in consultation by Dr. Sebastian, on-call cardiologist.  He was treated with rate control and his  "complaints of chest tightness upon presentation Dr. Sebastian ordered myocardial perfusion study.  In addition, electrophysiology was consulted.    He was seen by Dr. Johnson.  Cardioversion was recommended.  The patient spontaneously converted back to normal sinus rhythm prior to cardioversion.  Antiarrhythmic therapy and outpatient ablation were discussed and planned.  He was started on flecainide.    He is due to follow-up with electrophysiology next month.  It appears he has an ablation scheduled for the following month (in May).  The patient reports that he has been stable since discharge.  He does not feel that he is having recurrent atrial fibrillation but reports he has ongoing symptoms that he is experienced for some time including dizziness.  He reports this to be \"a little bit worse but feel the same as before\"    The following portions of the patient's history were reviewed and updated as appropriate: allergies, current medications, past family history, past medical history, past social history and past surgical history.     No Known Allergies      Current Outpatient Medications:     acetaminophen (TYLENOL) 500 MG tablet, Take 1 tablet by mouth Every 6 (Six) Hours As Needed for Mild Pain., Disp: , Rfl:     albuterol sulfate  (90 Base) MCG/ACT inhaler, Inhale 2 puffs Every 6 (Six) Hours As Needed for Shortness of Air or Wheezing., Disp: , Rfl:     alfuzosin (UROXATRAL) 10 MG 24 hr tablet, Take 1 tablet by mouth Daily., Disp: , Rfl:     apixaban (Eliquis) 5 MG tablet tablet, Take 1 tablet by mouth Every 12 (Twelve) Hours., Disp: 60 tablet, Rfl: 11    flecainide (TAMBOCOR) 100 MG tablet, Take 1 tablet by mouth Every 12 (Twelve) Hours., Disp: 60 tablet, Rfl: 11    Ibuprofen 200 MG capsule, Take 2 capsules by mouth Daily., Disp: , Rfl:     metoprolol tartrate (LOPRESSOR) 25 MG tablet, Take 1 tablet by mouth 2 (Two) Times a Day., Disp: 180 tablet, Rfl: 0    Synthroid 137 MCG tablet, Take 1 tablet by mouth Daily " "for 120 days., Disp: 30 tablet, Rfl: 3    therapeutic multivitamin-minerals (THERAGRAN-M) tablet, Take 2 tablets by mouth Daily., Disp: , Rfl:     Review of Systems   Constitutional: Positive for malaise/fatigue.   Cardiovascular:  Positive for dyspnea on exertion and near-syncope. Negative for chest pain, palpitations and syncope.   Respiratory:  Positive for shortness of breath. Negative for wheezing.    Hematologic/Lymphatic: Negative for bleeding problem. Bruises/bleeds easily.   Neurological:  Positive for dizziness, headaches and light-headedness.   Psychiatric/Behavioral:  Negative for altered mental status.        Procedures    /60   Pulse (!) 47   Ht 185.4 cm (73\")   Wt 121 kg (267 lb)   SpO2 98%   BMI 35.23 kg/m²        Objective:     Vitals reviewed.   Constitutional:       General: Not in acute distress.     Appearance: Well-developed, well-groomed and not in distress. Obese. Chronically ill-appearing. Not diaphoretic.   Eyes:      General:         Right eye: No discharge.         Left eye: No discharge.   HENT:      Head: Normocephalic and atraumatic.    Mouth/Throat:      Pharynx: No oropharyngeal exudate.   Pulmonary:      Effort: Pulmonary effort is normal. No respiratory distress.      Breath sounds: Normal breath sounds.   Chest:      Chest wall: Not tender to palpatation.   Cardiovascular:      Bradycardia present. Regular rhythm.      Murmurs: There is no murmur.      No gallop.  No rub.   Edema:     Peripheral edema absent.   Abdominal:      General: There is no distension.      Palpations: Abdomen is soft.      Tenderness: There is no abdominal tenderness.   Musculoskeletal: Normal range of motion.      Cervical back: Normal range of motion and neck supple. Skin:     General: Skin is warm and dry.      Coloration: Skin is not pale.      Findings: No erythema or rash.   Neurological:      Mental Status: Alert, oriented to person, place, and time and oriented to person, place and time. "   Psychiatric:         Attention and Perception: Attention normal.         Mood and Affect: Mood normal.         Speech: Speech normal.         Behavior: Behavior normal. Behavior is cooperative.         Thought Content: Thought content normal.         Cognition and Memory: Cognition normal.         Judgment: Judgment normal.       Lab Review:   Interpretation Summary  Holter Monitor 09/28/2022       An abnormal monitor study.    The predominant rhythm is sinus rhythm with an average heart rate of 66 bpm.    Occasional PACs noted.    Rare PVCs noted.    Several short supraventricular runs noted, likely atrial tachycardia with no definitive evidence of atrial fibrillation.    Reported symptoms and patient triggered events generally correlated to sinus rhythm or sinus rhythm with isolated PACs and/or PVCs.      Results for orders placed during the hospital encounter of 11/23/23    Adult Transthoracic Echo Complete W/ Cont if Necessary Per Protocol    Interpretation Summary    Left ventricular ejection fraction appears to be 61 - 65%.    Left ventricular wall thickness is consistent with mild concentric hypertrophy.    Left ventricular diastolic function was normal.    The left atrial cavity is moderately dilated.    Estimated right ventricular systolic pressure from tricuspid regurgitation is moderately elevated (45-55 mmHg).    The right ventricular cavity is mild to moderately dilated, with normal systolic function..    The right atrial cavity is dilated.    No significant (greater than mild) valvular pathology.        Assessment:          Diagnosis Plan   1. Paroxysmal atrial fibrillation        2. Current use of long term anticoagulation        3. Primary hypertension        4. Class 2 severe obesity due to excess calories with serious comorbidity and body mass index (BMI) of 35.0 to 35.9 in adult               Plan:       -Paroxysmal atrial fibrillation: Stable.  He reports compliance with his medications.   Continue current management as previously outlined by electrophysiology.  He is currently on flecainide and metoprolol.  Follow-up with electrophysiology next month as scheduled.  Patient denies any recurrent symptoms felt to be consistent with recurrent atrial fibrillation.  He reports his heart rates have been stable.    -Anticoagulation: continue with use of Eliquis for CVA prophylaxis.    -Hypertension: Stable.      -Obesity: BMI 35.23.  Diet and exercise recommended.      No changes at this time.  Recommend to continue current medications as previously outlined by electrophysiology.  The patient should follow-up with electrophysiology as planned.  No follow-up required with interventional cardiology currently as he has no other known cardiac issues and has ablation scheduled.    I attest that all portions of this note have been reviewed and updated to reflect the patient's current status.

## 2024-04-16 ENCOUNTER — TELEPHONE (OUTPATIENT)
Dept: CARDIOLOGY | Facility: CLINIC | Age: 74
End: 2024-04-16
Payer: MEDICARE

## 2024-04-16 NOTE — TELEPHONE ENCOUNTER
Patient states that his heart rate has been running high for the past week.      133/62  51  155/67  49  133/69  66  155/74  82

## 2024-04-17 NOTE — TELEPHONE ENCOUNTER
Patient is fine with Friday. He is fatigued and is having shortness of breath. He is inquiring if there is anything he can do to calm his heart rate down until his appointment?

## 2024-04-19 ENCOUNTER — OFFICE VISIT (OUTPATIENT)
Dept: CARDIOLOGY | Facility: CLINIC | Age: 74
End: 2024-04-19
Payer: MEDICARE

## 2024-04-19 VITALS
BODY MASS INDEX: 35.25 KG/M2 | OXYGEN SATURATION: 99 % | WEIGHT: 266 LBS | SYSTOLIC BLOOD PRESSURE: 128 MMHG | HEART RATE: 112 BPM | DIASTOLIC BLOOD PRESSURE: 74 MMHG | HEIGHT: 73 IN

## 2024-04-19 DIAGNOSIS — I48.91 ATRIAL FIBRILLATION WITH RVR: ICD-10-CM

## 2024-04-19 DIAGNOSIS — Z90.2 S/P PARTIAL LOBECTOMY OF LUNG: ICD-10-CM

## 2024-04-19 DIAGNOSIS — I48.0 PAROXYSMAL ATRIAL FIBRILLATION: Primary | ICD-10-CM

## 2024-04-19 NOTE — PROGRESS NOTES
"[facility] -  CLINIC FOLLOW UP     Patient Care Team:  Michael Nunez MD as PCP - General (Family Medicine)  Flores, Nick MILES MD as Referring Physician (Cardiothoracic Surgery)  Landry Hoover MD as Cardiologist (Cardiology)  Sabrina Jackson APRN as Nurse Practitioner (Nurse Practitioner)  Jean Marie Solis MD as Consulting Physician (Otolaryngology)  Maryann Serra APRN as Nurse Practitioner (Otolaryngology)    Chief Complaint: PAF    Subjective   EP Problems:  1.  Paroxysmal atrial fibrillation     Cardiology Problems:  1.  Hypertension  2. Normal stress test 3-4-24     Medical Problems:  1.  Prostate cancer  2.  Lung cancer (RLL wedge resection)  3.  Pneumothorax  4.  Obesity  5.  Hypothyroidism status post thyroidectomy      HPI: Today I had the pleasure of seeing Delgado Desir in the cardiology clinic for follow up. He is a 73 year old male with a history of paroxysmal atrial fibrillation, long-term anticoagulation, prostate cancer, lung nodules with previous wedge resection and confirmation of malignancy in the past, hypertension, previous thyroidectomy, and obesity. He is now on the schedule for afib ablation May 6 and presents today to discuss procedure.     He is most concerned with the potential improvement on his breathing. He previously has had RLL wedge resection in the past. He denies any prior COPD or emphysema, quit smoking long ago. He does not think he has LIA, but has not ever had sleep study.  His flecainide was recently increased to 100mg BID, however, it appears by EKG today that he is back out of rhythm.     Objective     Visit Vitals  /74   Pulse 112   Ht 185.4 cm (72.99\")   Wt 121 kg (266 lb)   SpO2 99%   BMI 35.10 kg/m²           Vitals reviewed.   Constitutional:       Appearance: Not in distress.   Eyes:      Extraocular Movements: Extraocular movements intact.      Conjunctiva/sclera: Conjunctivae normal.      Pupils: Pupils are equal, " round, and reactive to light.   HENT:      Head: Normocephalic and atraumatic.      Nose: Nose normal.    Mouth/Throat:      Lips: Pink.      Mouth: Mucous membranes are moist.      Pharynx: Oropharynx is clear.   Neck:      Vascular: No carotid bruit or JVD. JVD normal.   Pulmonary:      Effort: Pulmonary effort is normal.      Breath sounds: Normal breath sounds.   Chest:      Chest wall: Not tender to palpatation.   Cardiovascular:      PMI at left midclavicular line. Normal rate. Regular rhythm. Normal S1. Normal S2.       Murmurs: There is no murmur.      No gallop.  No rub.   Pulses:     Radial: 2+ bilaterally.  Edema:     Peripheral edema absent.   Abdominal:      Palpations: Abdomen is soft.   Musculoskeletal: Normal range of motion.      Extremities: No clubbing present.     Cervical back: Normal range of motion. Skin:     General: Skin is warm and dry.   Neurological:      General: No focal deficit present.      Mental Status: Alert and oriented to person, place, and time.   Psychiatric:         Attention and Perception: Attention normal.         Mood and Affect: Affect normal.         Speech: Speech normal.         Behavior: Behavior normal.         Cognition and Memory: Cognition normal.             The following portions of the patient's history were reviewed and updated as appropriate: allergies, current medications, past medical history, past social history, past and problem list.     Review of Systems   Constitutional: Negative.    HENT: Negative.     Eyes: Negative.    Respiratory: Negative.  Positive for shortness of breath.    Cardiovascular: Negative.    Gastrointestinal: Negative.    Endocrine: Negative.    Genitourinary: Negative.    Musculoskeletal: Negative.    Skin: Negative.    Allergic/Immunologic: Negative.    Neurological: Negative.    Hematological: Negative.    Psychiatric/Behavioral: Negative.            Echo EF Estimated  Lab Results   Component Value Date    ECHOEFEST 70 06/25/2020          ECG 12 Lead    Date/Time: 4/19/2024 11:28 AM  Performed by: Zulema Pierce PA    Authorized by: Zulema Pierce PA  Comparison: compared with previous ECG from 3/2/2024  Rhythm: atrial flutter  Rate: tachycardic  BPM: 110  Conduction: non-specific intraventricular conduction delay  QRS axis: normal    Clinical impression: abnormal EKG  Comments: QRS duration 124ms             Medication Review: yes    Current Outpatient Medications:     acetaminophen (TYLENOL) 500 MG tablet, Take 1 tablet by mouth Every 6 (Six) Hours As Needed for Mild Pain., Disp: , Rfl:     albuterol sulfate  (90 Base) MCG/ACT inhaler, Inhale 2 puffs Every 6 (Six) Hours As Needed for Shortness of Air or Wheezing., Disp: , Rfl:     alfuzosin (UROXATRAL) 10 MG 24 hr tablet, Take 1 tablet by mouth Daily., Disp: , Rfl:     apixaban (Eliquis) 5 MG tablet tablet, Take 1 tablet by mouth Every 12 (Twelve) Hours., Disp: 60 tablet, Rfl: 11    flecainide (TAMBOCOR) 100 MG tablet, Take 1 tablet by mouth Every 12 (Twelve) Hours., Disp: 60 tablet, Rfl: 11    Ibuprofen 200 MG capsule, Take 2 capsules by mouth Daily., Disp: , Rfl:     metoprolol tartrate (LOPRESSOR) 25 MG tablet, Take 1 tablet by mouth 2 (Two) Times a Day., Disp: 180 tablet, Rfl: 0    Synthroid 137 MCG tablet, Take 1 tablet by mouth Daily for 120 days., Disp: 30 tablet, Rfl: 3    therapeutic multivitamin-minerals (THERAGRAN-M) tablet, Take 2 tablets by mouth Daily., Disp: , Rfl:    No Known Allergies    I have reviewed       CBC:  Lab Results - Last 18 Months   Lab Units 03/03/24  0216 03/02/24  0912 02/01/24  1048   WBC 10*3/mm3 8.15   < > 6.09   HEMOGLOBIN g/dL 13.5   < > 12.9*   HEMATOCRIT % 40.7   < > 40.1   PLATELETS 10*3/mm3 198   < > 157   IRON mcg/dL  --   --  62    < > = values in this interval not displayed.      BMP/CMP:  Lab Results - Last 18 Months   Lab Units 03/03/24  0216   SODIUM mmol/L 141   POTASSIUM mmol/L 4.1   CHLORIDE mmol/L 105   CO2 mmol/L 25.0    GLUCOSE mg/dL 115*   BUN mg/dL 33*   CREATININE mg/dL 1.06   CALCIUM mg/dL 8.4*     BNP:   Lab Results - Last 18 Months   Lab Units 03/02/24  0912   PROBNP pg/mL 1,116.0*      THYROID:  Lab Results - Last 18 Months   Lab Units 03/03/24  0216   TSH uIU/mL 5.850*   FREE T4 ng/dL 1.38       Results for orders placed during the hospital encounter of 11/23/23    Adult Transthoracic Echo Complete W/ Cont if Necessary Per Protocol    Interpretation Summary    Left ventricular ejection fraction appears to be 61 - 65%.    Left ventricular wall thickness is consistent with mild concentric hypertrophy.    Left ventricular diastolic function was normal.    The left atrial cavity is moderately dilated.    Estimated right ventricular systolic pressure from tricuspid regurgitation is moderately elevated (45-55 mmHg).    The right ventricular cavity is mild to moderately dilated, with normal systolic function..    The right atrial cavity is dilated.    No significant (greater than mild) valvular pathology.     Assessment:   Diagnoses and all orders for this visit:    1. Paroxysmal atrial fibrillation (Primary)  -     ECG 12 Lead; Future  -     metoprolol tartrate (LOPRESSOR) 25 MG tablet; Take 1 tablet by mouth 2 (Two) Times a Day.  Dispense: 180 tablet; Refill: 0  -     ECG 12 Lead    2. Atrial fibrillation with RVR    3. S/P partial lobectomy of lung    PAF/Atrial flutter: Recurrent today on EKG despite meds, he has increased Flecainide 100mg BID. However, will increase metoprolol to 50mg BID. Continue anticoagulation and do not miss doses.   -Consider sleep study. Discussed correlation with LIA and persistent afib  -Follow up with EPS/PVI ablation as scheduled May 6th. We opted to not pursue cardioversion again given his upcoming PVI and he's relatively stable and asymptomatic.     SOB: Discussed that PVI ablation may help some of his dyspnea, but given his previous lung cancer and wedge resection, there may be residual SOB  that is strictly pulmonary in origin. He is understanding of this.     I spent 30 minutes caring for Delgado on this date of service. This time includes time spent by me in the following activities:preparing for the visit, reviewing tests, obtaining and/or reviewing a separately obtained history, performing a medically appropriate examination and/or evaluation , counseling and educating the patient/family/caregiver, ordering medications, tests, or procedures, referring and communicating with other health care professionals , documenting information in the medical record, and independently interpreting results and communicating that information with the patient/family/caregiver        Electronically signed by ZEFERINO Rojas

## 2024-04-19 NOTE — PATIENT INSTRUCTIONS
Increase metoprolol to 50mg twice a day   -You can take 2 tablets twice a day so you don't waste current meds  -Continue flecainide 100mg twice a day  -Continue blood thinner

## 2024-04-22 RX ORDER — LEVOTHYROXINE SODIUM 137 MCG
137 TABLET ORAL DAILY
Qty: 30 TABLET | Refills: 3 | Status: SHIPPED | OUTPATIENT
Start: 2024-04-22

## 2024-04-24 ENCOUNTER — OFFICE VISIT (OUTPATIENT)
Dept: OTOLARYNGOLOGY | Facility: CLINIC | Age: 74
End: 2024-04-24
Payer: MEDICARE

## 2024-04-24 VITALS
HEIGHT: 73 IN | BODY MASS INDEX: 35.52 KG/M2 | OXYGEN SATURATION: 98 % | HEART RATE: 100 BPM | SYSTOLIC BLOOD PRESSURE: 119 MMHG | WEIGHT: 268 LBS | DIASTOLIC BLOOD PRESSURE: 64 MMHG

## 2024-04-24 DIAGNOSIS — H90.3 SENSORINEURAL HEARING LOSS, BILATERAL: ICD-10-CM

## 2024-04-24 DIAGNOSIS — E89.0 S/P TOTAL THYROIDECTOMY: ICD-10-CM

## 2024-04-24 DIAGNOSIS — H93.12 TINNITUS, LEFT: ICD-10-CM

## 2024-04-24 DIAGNOSIS — E89.0 POSTOPERATIVE HYPOTHYROIDISM: Primary | ICD-10-CM

## 2024-04-24 NOTE — PROGRESS NOTES
"YOB: 1950  Location: [facility]  Location Address: [address] [place] [address], [facility], [address] [place] [address]  Location Phone: [phone]    Chief Complaint   Patient presents with    Follow-up     3 month follow up with u/s. Complains of red painful lump on throat that appeared and later went away. Stated that this has happened before. Patient did have a cold when this appeared.        History of Present Illness  Delgado Desir is a 73 y.o. male.  Delgado Desir is here for follow up of ENT complaints. The patient is s/p total thyroidectomy 2020  Pathology revealed multinodular goiter  He is currently taking synthroid 137 mcg     Patient states 2 weeks ago he woke up to go to the bathroom and noticed a small \"bump\" close to his rochelle thyroidectomy incision. He states this resolved shortly after. He states this has happened once prior     Patient has a recent diagnosis of a fib and has been having intermittent episodes of vertigo. He has completed the majority of vestibular therapy, but states insurance stopped paying for sessions   Patient denies episodes of spinning. He complains of feeling lightheaded when he gets up or when he starts walking       TSH (2024 02:16)      Past Medical History:   Diagnosis Date    A-fib     Arthritis     Asthma 23    Dizzy spells     states on rare occsaion has    Failed intubation of airway 11/10/2020    Hypertension     Lung cancer     Nosebleed 23    Taking  eliquis    PAF (paroxysmal atrial fibrillation)     Prostate cancer     Thyroid disorder        Past Surgical History:   Procedure Laterality Date    CARPAL TUNNEL RELEASE Right     LOBECTOMY Right 2/15/2021    Procedure: THORACOSCOPY WITH DAVINCI ROBOT WITH RIGHT WEDGE RESECTION, MEDIASTINAL LYMPH NODE DISSECTION-RIGHT;  Surgeon: Nick Flores MD;  Location: [facility];  Service: [facility];  Laterality: Right;    LUNG BIOPSY      PROSTATE BIOPSY      REPLACEMENT TOTAL KNEE Left     " SHOULDER SURGERY Bilateral     THORACOSCOPY Right 10/30/2020    Procedure: MEDIASTINOSCOPY, RIGHT THORACOSCOPY POSSIBLE WEDGE RESECTION WITH DAVINCI ROBOT;  Surgeon: Nick Flores MD;  Location:  [facility];  Service: [facility];  Laterality: Right;    THYROIDECTOMY N/A 2020    Procedure: Total thyroidectomy;  Surgeon: Jean Marie Solis MD;  Location:  [facility] OR;  Service: ENT;  Laterality: N/A;       Outpatient Medications Marked as Taking for the 24 encounter (Office Visit) with Maryann Serra APRN   Medication Sig Dispense Refill    acetaminophen (TYLENOL) 500 MG tablet Take 1 tablet by mouth Every 6 (Six) Hours As Needed for Mild Pain.      albuterol sulfate  (90 Base) MCG/ACT inhaler Inhale 2 puffs Every 6 (Six) Hours As Needed for Shortness of Air or Wheezing.      alfuzosin (UROXATRAL) 10 MG 24 hr tablet Take 1 tablet by mouth Daily.      apixaban (Eliquis) 5 MG tablet tablet Take 1 tablet by mouth Every 12 (Twelve) Hours. 60 tablet 11    flecainide (TAMBOCOR) 100 MG tablet Take 1 tablet by mouth Every 12 (Twelve) Hours. 60 tablet 11    Ibuprofen 200 MG capsule Take 2 capsules by mouth Daily.      metoprolol tartrate (LOPRESSOR) 25 MG tablet Take 1 tablet by mouth 2 (Two) Times a Day. (Patient taking differently: Take 1 tablet by mouth 4 (Four) Times a Day. 2 morning, 2 afternoon) 180 tablet 0    therapeutic multivitamin-minerals (THERAGRAN-M) tablet Take 2 tablets by mouth Daily.         Patient has no known allergies.    Family History   Problem Relation Age of Onset    Hypertension Mother         New Contact    Cancer Mother     COPD Father        Social History     Socioeconomic History    Marital status:     Number of children: 2   Tobacco Use    Smoking status: Former     Current packs/day: 0.00     Average packs/day: 1 pack/day for 33.0 years (33.0 ttl pk-yrs)     Types: Cigarettes     Start date: 1969     Quit date: 1996     Years since quittin.3    Smokeless  tobacco: Never   Vaping Use    Vaping status: Never Used   Substance and Sexual Activity    Alcohol use: Yes     Alcohol/week: 2.0 standard drinks of alcohol     Types: 2 Shots of liquor per week     Comment: x2 weekly    Drug use: Never    Sexual activity: Defer       Review of Systems   Constitutional:  Positive for fatigue.   HENT: Negative.     Neurological:  Positive for dizziness.       Vitals:    04/24/24 1052   BP: 119/64   Pulse: 100   SpO2: 98%       Body mass index is 35.36 kg/m².    Objective     Physical Exam  Vitals reviewed.   Constitutional:       Appearance: He is obese.   HENT:      Head: Normocephalic.      Right Ear: Tympanic membrane, ear canal and external ear normal.      Left Ear: Tympanic membrane, ear canal and external ear normal.      Nose: Nose normal.      Mouth/Throat:      Lips: Pink.      Mouth: Mucous membranes are moist.      Pharynx: Uvula midline.   Neck:     Neurological:      Mental Status: He is alert.         Assessment & Plan   Diagnoses and all orders for this visit:    1. Postoperative hypothyroidism (Primary)  -     TSH; Future    2. S/P total thyroidectomy  -     TSH; Future    3. Sensorineural hearing loss, bilateral    4. Tinnitus, left      * Surgery not found *  Orders Placed This Encounter   Procedures    TSH     Standing Status:   Future     Standing Expiration Date:   4/24/2025     Order Specific Question:   Release to patient     Answer:   Routine Release [[id number]]     Return in about 3 months (around 7/24/2024) for Recheck.     F/u with tsh in 4 weeks   Discussed atrial fibrillation as possible cause of dizziness - ablation scheduled for 5/6/2024   Fall precautions  Repeat tsh in 4 weeks   There are no Patient Instructions on file for this visit.

## 2024-04-29 DIAGNOSIS — I48.0 PAROXYSMAL ATRIAL FIBRILLATION: ICD-10-CM

## 2024-04-29 RX ORDER — APIXABAN 5 MG/1
5 TABLET, FILM COATED ORAL EVERY 12 HOURS
Qty: 60 TABLET | Refills: 11 | Status: ON HOLD | OUTPATIENT
Start: 2024-04-29

## 2024-05-06 ENCOUNTER — ANESTHESIA EVENT (OUTPATIENT)
Dept: CARDIOLOGY | Facility: HOSPITAL | Age: 74
End: 2024-05-06
Payer: MEDICARE

## 2024-05-06 ENCOUNTER — ANESTHESIA (OUTPATIENT)
Dept: CARDIOLOGY | Facility: HOSPITAL | Age: 74
End: 2024-05-06
Payer: MEDICARE

## 2024-05-06 ENCOUNTER — HOSPITAL ENCOUNTER (OUTPATIENT)
Facility: HOSPITAL | Age: 74
Discharge: HOME | End: 2024-05-07
Attending: STUDENT IN AN ORGANIZED HEALTH CARE EDUCATION/TRAINING PROGRAM | Admitting: STUDENT IN AN ORGANIZED HEALTH CARE EDUCATION/TRAINING PROGRAM
Payer: MEDICARE

## 2024-05-06 DIAGNOSIS — I48.0 PAROXYSMAL ATRIAL FIBRILLATION: ICD-10-CM

## 2024-05-06 LAB
ACT BLD: 379 SECONDS (ref 82–152)
ACT BLD: 380 SECONDS (ref 82–152)
ACT BLD: 385 SECONDS (ref 82–152)
ACT BLD: 390 SECONDS (ref 82–152)
ANION GAP SERPL CALCULATED.3IONS-SCNC: 8 MMOL/L (ref 5–15)
BASOPHILS # BLD AUTO: 0.03 10*3/MM3 (ref 0–0.2)
BASOPHILS NFR BLD AUTO: 0.5 % (ref 0–1.5)
BUN SERPL-MCNC: 16 MG/DL (ref 8–23)
BUN/CREAT SERPL: 14.7 (ref 7–25)
CALCIUM SPEC-SCNC: 8.8 MG/DL (ref 8.6–10.5)
CHLORIDE SERPL-SCNC: 105 MMOL/L (ref 98–107)
CO2 SERPL-SCNC: 26 MMOL/L (ref 22–29)
CREAT SERPL-MCNC: 1.09 MG/DL (ref 0.76–1.27)
DEPRECATED RDW RBC AUTO: 49.9 FL (ref 37–54)
EGFRCR SERPLBLD CKD-EPI 2021: 72.1 ML/MIN/1.73
EOSINOPHIL # BLD AUTO: 0.05 10*3/MM3 (ref 0–0.4)
EOSINOPHIL NFR BLD AUTO: 0.8 % (ref 0.3–6.2)
ERYTHROCYTE [DISTWIDTH] IN BLOOD BY AUTOMATED COUNT: 14.9 % (ref 12.3–15.4)
GLUCOSE SERPL-MCNC: 124 MG/DL (ref 65–99)
HCT VFR BLD AUTO: 39.7 % (ref 37.5–51)
HGB BLD-MCNC: 12.7 G/DL (ref 13–17.7)
IMM GRANULOCYTES # BLD AUTO: 0.03 10*3/MM3 (ref 0–0.05)
IMM GRANULOCYTES NFR BLD AUTO: 0.5 % (ref 0–0.5)
INR PPP: 1.37 (ref 0.91–1.09)
LYMPHOCYTES # BLD AUTO: 0.41 10*3/MM3 (ref 0.7–3.1)
LYMPHOCYTES NFR BLD AUTO: 6.7 % (ref 19.6–45.3)
MCH RBC QN AUTO: 29.3 PG (ref 26.6–33)
MCHC RBC AUTO-ENTMCNC: 32 G/DL (ref 31.5–35.7)
MCV RBC AUTO: 91.7 FL (ref 79–97)
MONOCYTES # BLD AUTO: 0.4 10*3/MM3 (ref 0.1–0.9)
MONOCYTES NFR BLD AUTO: 6.5 % (ref 5–12)
NEUTROPHILS NFR BLD AUTO: 5.24 10*3/MM3 (ref 1.7–7)
NEUTROPHILS NFR BLD AUTO: 85 % (ref 42.7–76)
NRBC BLD AUTO-RTO: 0 /100 WBC (ref 0–0.2)
PLATELET # BLD AUTO: 160 10*3/MM3 (ref 140–450)
PMV BLD AUTO: 10.7 FL (ref 6–12)
POTASSIUM SERPL-SCNC: 4.5 MMOL/L (ref 3.5–5.2)
PROTHROMBIN TIME: 17.4 SECONDS (ref 11.8–14.8)
RBC # BLD AUTO: 4.33 10*6/MM3 (ref 4.14–5.8)
SODIUM SERPL-SCNC: 139 MMOL/L (ref 136–145)
WBC NRBC COR # BLD AUTO: 6.16 10*3/MM3 (ref 3.4–10.8)

## 2024-05-06 PROCEDURE — A9270 NON-COVERED ITEM OR SERVICE: HCPCS | Performed by: STUDENT IN AN ORGANIZED HEALTH CARE EDUCATION/TRAINING PROGRAM

## 2024-05-06 PROCEDURE — 25010000002 HEPARIN (PORCINE) 2000-0.9 UNIT/L-% SOLUTION: Performed by: STUDENT IN AN ORGANIZED HEALTH CARE EDUCATION/TRAINING PROGRAM

## 2024-05-06 PROCEDURE — 25010000002 SUGAMMADEX 200 MG/2ML SOLUTION: Performed by: NURSE ANESTHETIST, CERTIFIED REGISTERED

## 2024-05-06 PROCEDURE — 25010000002 PROPOFOL 10 MG/ML EMULSION: Performed by: NURSE ANESTHETIST, CERTIFIED REGISTERED

## 2024-05-06 PROCEDURE — 25010000002 HEPARIN (PORCINE) 1000-0.9 UT/500ML-% SOLUTION: Performed by: STUDENT IN AN ORGANIZED HEALTH CARE EDUCATION/TRAINING PROGRAM

## 2024-05-06 PROCEDURE — 63710000001 APIXABAN 5 MG TABLET: Performed by: STUDENT IN AN ORGANIZED HEALTH CARE EDUCATION/TRAINING PROGRAM

## 2024-05-06 PROCEDURE — C1733 CATH, EP, OTHR THAN COOL-TIP: HCPCS | Performed by: STUDENT IN AN ORGANIZED HEALTH CARE EDUCATION/TRAINING PROGRAM

## 2024-05-06 PROCEDURE — 63710000001 TAMSULOSIN 0.4 MG CAPSULE: Performed by: STUDENT IN AN ORGANIZED HEALTH CARE EDUCATION/TRAINING PROGRAM

## 2024-05-06 PROCEDURE — 25010000002 HEPARIN (PORCINE) PER 1000 UNITS: Performed by: NURSE ANESTHETIST, CERTIFIED REGISTERED

## 2024-05-06 PROCEDURE — 25810000003 SODIUM CHLORIDE 0.9 % SOLUTION: Performed by: NURSE ANESTHETIST, CERTIFIED REGISTERED

## 2024-05-06 PROCEDURE — 85610 PROTHROMBIN TIME: CPT | Performed by: STUDENT IN AN ORGANIZED HEALTH CARE EDUCATION/TRAINING PROGRAM

## 2024-05-06 PROCEDURE — C1732 CATH, EP, DIAG/ABL, 3D/VECT: HCPCS | Performed by: STUDENT IN AN ORGANIZED HEALTH CARE EDUCATION/TRAINING PROGRAM

## 2024-05-06 PROCEDURE — C1766 INTRO/SHEATH,STRBLE,NON-PEEL: HCPCS | Performed by: STUDENT IN AN ORGANIZED HEALTH CARE EDUCATION/TRAINING PROGRAM

## 2024-05-06 PROCEDURE — 25010000002 ONDANSETRON PER 1 MG: Performed by: NURSE ANESTHETIST, CERTIFIED REGISTERED

## 2024-05-06 PROCEDURE — 93656 COMPRE EP EVAL ABLTJ ATR FIB: CPT | Performed by: STUDENT IN AN ORGANIZED HEALTH CARE EDUCATION/TRAINING PROGRAM

## 2024-05-06 PROCEDURE — C1894 INTRO/SHEATH, NON-LASER: HCPCS | Performed by: STUDENT IN AN ORGANIZED HEALTH CARE EDUCATION/TRAINING PROGRAM

## 2024-05-06 PROCEDURE — C1730 CATH, EP, 19 OR FEW ELECT: HCPCS | Performed by: STUDENT IN AN ORGANIZED HEALTH CARE EDUCATION/TRAINING PROGRAM

## 2024-05-06 PROCEDURE — C1760 CLOSURE DEV, VASC: HCPCS | Performed by: STUDENT IN AN ORGANIZED HEALTH CARE EDUCATION/TRAINING PROGRAM

## 2024-05-06 PROCEDURE — 93005 ELECTROCARDIOGRAM TRACING: CPT | Performed by: STUDENT IN AN ORGANIZED HEALTH CARE EDUCATION/TRAINING PROGRAM

## 2024-05-06 PROCEDURE — 80048 BASIC METABOLIC PNL TOTAL CA: CPT | Performed by: STUDENT IN AN ORGANIZED HEALTH CARE EDUCATION/TRAINING PROGRAM

## 2024-05-06 PROCEDURE — 85025 COMPLETE CBC W/AUTO DIFF WBC: CPT | Performed by: STUDENT IN AN ORGANIZED HEALTH CARE EDUCATION/TRAINING PROGRAM

## 2024-05-06 PROCEDURE — C1759 CATH, INTRA ECHOCARDIOGRAPHY: HCPCS | Performed by: STUDENT IN AN ORGANIZED HEALTH CARE EDUCATION/TRAINING PROGRAM

## 2024-05-06 PROCEDURE — 25010000002 DEXAMETHASONE PER 1 MG: Performed by: NURSE ANESTHETIST, CERTIFIED REGISTERED

## 2024-05-06 PROCEDURE — 85347 COAGULATION TIME ACTIVATED: CPT

## 2024-05-06 PROCEDURE — 93655 ICAR CATH ABLTJ DSCRT ARRHYT: CPT | Performed by: STUDENT IN AN ORGANIZED HEALTH CARE EDUCATION/TRAINING PROGRAM

## 2024-05-06 RX ORDER — LIDOCAINE HYDROCHLORIDE 20 MG/ML
INJECTION, SOLUTION INFILTRATION; PERINEURAL
Status: DISCONTINUED | OUTPATIENT
Start: 2024-05-06 | End: 2024-05-06 | Stop reason: HOSPADM

## 2024-05-06 RX ORDER — EPHEDRINE SULFATE 50 MG/ML
INJECTION, SOLUTION INTRAVENOUS AS NEEDED
Status: DISCONTINUED | OUTPATIENT
Start: 2024-05-06 | End: 2024-05-06 | Stop reason: SURG

## 2024-05-06 RX ORDER — SODIUM CHLORIDE 9 MG/ML
INJECTION, SOLUTION INTRAVENOUS CONTINUOUS PRN
Status: DISCONTINUED | OUTPATIENT
Start: 2024-05-06 | End: 2024-05-06 | Stop reason: SURG

## 2024-05-06 RX ORDER — HEPARIN SODIUM 1000 [USP'U]/ML
INJECTION, SOLUTION INTRAVENOUS; SUBCUTANEOUS AS NEEDED
Status: DISCONTINUED | OUTPATIENT
Start: 2024-05-06 | End: 2024-05-06 | Stop reason: SURG

## 2024-05-06 RX ORDER — SODIUM CHLORIDE 0.9 % (FLUSH) 0.9 %
10 SYRINGE (ML) INJECTION AS NEEDED
Status: DISCONTINUED | OUTPATIENT
Start: 2024-05-06 | End: 2024-05-06 | Stop reason: HOSPADM

## 2024-05-06 RX ORDER — PROPOFOL 10 MG/ML
VIAL (ML) INTRAVENOUS AS NEEDED
Status: DISCONTINUED | OUTPATIENT
Start: 2024-05-06 | End: 2024-05-06 | Stop reason: SURG

## 2024-05-06 RX ORDER — ALBUTEROL SULFATE 2.5 MG/3ML
2.5 SOLUTION RESPIRATORY (INHALATION) EVERY 6 HOURS PRN
Status: DISCONTINUED | OUTPATIENT
Start: 2024-05-06 | End: 2024-05-07 | Stop reason: HOSPADM

## 2024-05-06 RX ORDER — DEXAMETHASONE SODIUM PHOSPHATE 4 MG/ML
INJECTION, SOLUTION INTRA-ARTICULAR; INTRALESIONAL; INTRAMUSCULAR; INTRAVENOUS; SOFT TISSUE AS NEEDED
Status: DISCONTINUED | OUTPATIENT
Start: 2024-05-06 | End: 2024-05-06 | Stop reason: SURG

## 2024-05-06 RX ORDER — HEPARIN SODIUM 200 [USP'U]/100ML
INJECTION, SOLUTION INTRAVENOUS
Status: DISCONTINUED | OUTPATIENT
Start: 2024-05-06 | End: 2024-05-06 | Stop reason: HOSPADM

## 2024-05-06 RX ORDER — SUCCINYLCHOLINE/SOD CL,ISO/PF 200MG/10ML
SYRINGE (ML) INTRAVENOUS AS NEEDED
Status: DISCONTINUED | OUTPATIENT
Start: 2024-05-06 | End: 2024-05-06 | Stop reason: SURG

## 2024-05-06 RX ORDER — BUPIVACAINE HCL/0.9 % NACL/PF 0.125 %
PLASTIC BAG, INJECTION (ML) EPIDURAL AS NEEDED
Status: DISCONTINUED | OUTPATIENT
Start: 2024-05-06 | End: 2024-05-06 | Stop reason: SURG

## 2024-05-06 RX ORDER — TAMSULOSIN HYDROCHLORIDE 0.4 MG/1
0.4 CAPSULE ORAL NIGHTLY
Status: DISCONTINUED | OUTPATIENT
Start: 2024-05-06 | End: 2024-05-07 | Stop reason: HOSPADM

## 2024-05-06 RX ORDER — IBUPROFEN 800 MG/1
400 TABLET ORAL EVERY 6 HOURS PRN
Status: DISCONTINUED | OUTPATIENT
Start: 2024-05-06 | End: 2024-05-07 | Stop reason: HOSPADM

## 2024-05-06 RX ORDER — ROCURONIUM BROMIDE 10 MG/ML
INJECTION, SOLUTION INTRAVENOUS AS NEEDED
Status: DISCONTINUED | OUTPATIENT
Start: 2024-05-06 | End: 2024-05-06 | Stop reason: SURG

## 2024-05-06 RX ORDER — SODIUM CHLORIDE 9 MG/ML
40 INJECTION, SOLUTION INTRAVENOUS AS NEEDED
Status: DISCONTINUED | OUTPATIENT
Start: 2024-05-06 | End: 2024-05-06 | Stop reason: HOSPADM

## 2024-05-06 RX ORDER — ONDANSETRON 2 MG/ML
INJECTION INTRAMUSCULAR; INTRAVENOUS AS NEEDED
Status: DISCONTINUED | OUTPATIENT
Start: 2024-05-06 | End: 2024-05-06 | Stop reason: SURG

## 2024-05-06 RX ORDER — METOPROLOL TARTRATE 50 MG/1
25 TABLET, FILM COATED ORAL 2 TIMES DAILY
Status: DISCONTINUED | OUTPATIENT
Start: 2024-05-06 | End: 2024-05-07 | Stop reason: HOSPADM

## 2024-05-06 RX ORDER — ACETAMINOPHEN 500 MG
500 TABLET ORAL EVERY 6 HOURS PRN
Status: DISCONTINUED | OUTPATIENT
Start: 2024-05-06 | End: 2024-05-07 | Stop reason: HOSPADM

## 2024-05-06 RX ORDER — SODIUM CHLORIDE 0.9 % (FLUSH) 0.9 %
10 SYRINGE (ML) INJECTION EVERY 12 HOURS SCHEDULED
Status: DISCONTINUED | OUTPATIENT
Start: 2024-05-06 | End: 2024-05-06 | Stop reason: HOSPADM

## 2024-05-06 RX ADMIN — HEPARIN SODIUM 2000 UNITS: 1000 INJECTION, SOLUTION INTRAVENOUS; SUBCUTANEOUS at 15:48

## 2024-05-06 RX ADMIN — SUGAMMADEX 200 MG: 100 INJECTION, SOLUTION INTRAVENOUS at 16:53

## 2024-05-06 RX ADMIN — ROCURONIUM BROMIDE 40 MG: 10 INJECTION INTRAVENOUS at 14:32

## 2024-05-06 RX ADMIN — Medication 80 MCG: at 16:53

## 2024-05-06 RX ADMIN — HEPARIN SODIUM 20000 UNITS: 1000 INJECTION, SOLUTION INTRAVENOUS; SUBCUTANEOUS at 14:38

## 2024-05-06 RX ADMIN — TAMSULOSIN HYDROCHLORIDE 0.4 MG: 0.4 CAPSULE ORAL at 21:41

## 2024-05-06 RX ADMIN — Medication 100 MCG: at 15:45

## 2024-05-06 RX ADMIN — HEPARIN SODIUM 3000 UNITS: 1000 INJECTION, SOLUTION INTRAVENOUS; SUBCUTANEOUS at 15:26

## 2024-05-06 RX ADMIN — Medication 140 MG: at 14:04

## 2024-05-06 RX ADMIN — SODIUM CHLORIDE: 9 INJECTION, SOLUTION INTRAVENOUS at 13:53

## 2024-05-06 RX ADMIN — SODIUM CHLORIDE: 9 INJECTION, SOLUTION INTRAVENOUS at 16:52

## 2024-05-06 RX ADMIN — Medication 100 MCG: at 16:09

## 2024-05-06 RX ADMIN — Medication 200 MCG: at 16:12

## 2024-05-06 RX ADMIN — DEXAMETHASONE SODIUM PHOSPHATE 4 MG: 4 INJECTION INTRA-ARTICULAR; INTRALESIONAL; INTRAMUSCULAR; INTRAVENOUS; SOFT TISSUE at 17:03

## 2024-05-06 RX ADMIN — ONDANSETRON 4 MG: 2 INJECTION INTRAMUSCULAR; INTRAVENOUS at 17:03

## 2024-05-06 RX ADMIN — PROPOFOL 200 MG: 10 INJECTION, EMULSION INTRAVENOUS at 14:04

## 2024-05-06 RX ADMIN — HEPARIN SODIUM 2000 UNITS: 1000 INJECTION, SOLUTION INTRAVENOUS; SUBCUTANEOUS at 15:00

## 2024-05-06 RX ADMIN — ROCURONIUM BROMIDE 10 MG: 10 INJECTION INTRAVENOUS at 14:04

## 2024-05-06 RX ADMIN — APIXABAN 5 MG: 5 TABLET, FILM COATED ORAL at 21:41

## 2024-05-06 RX ADMIN — ROCURONIUM BROMIDE 20 MG: 10 INJECTION INTRAVENOUS at 15:43

## 2024-05-06 RX ADMIN — PROPOFOL 150 MCG/KG/MIN: 10 INJECTION, EMULSION INTRAVENOUS at 14:08

## 2024-05-06 RX ADMIN — EPHEDRINE SULFATE 10 MG: 50 INJECTION INTRAVENOUS at 16:55

## 2024-05-07 VITALS
HEART RATE: 68 BPM | TEMPERATURE: 97.6 F | BODY MASS INDEX: 36.05 KG/M2 | WEIGHT: 272 LBS | SYSTOLIC BLOOD PRESSURE: 120 MMHG | HEIGHT: 73 IN | OXYGEN SATURATION: 92 % | RESPIRATION RATE: 16 BRPM | DIASTOLIC BLOOD PRESSURE: 55 MMHG

## 2024-05-07 LAB
QT INTERVAL: 402 MS
QT INTERVAL: 558 MS
QTC INTERVAL: 482 MS
QTC INTERVAL: 486 MS

## 2024-05-07 PROCEDURE — 63710000001 LEVOTHYROXINE 112 MCG TABLET: Performed by: STUDENT IN AN ORGANIZED HEALTH CARE EDUCATION/TRAINING PROGRAM

## 2024-05-07 PROCEDURE — 63710000001 METOPROLOL TARTRATE 50 MG TABLET: Performed by: STUDENT IN AN ORGANIZED HEALTH CARE EDUCATION/TRAINING PROGRAM

## 2024-05-07 PROCEDURE — A9270 NON-COVERED ITEM OR SERVICE: HCPCS | Performed by: STUDENT IN AN ORGANIZED HEALTH CARE EDUCATION/TRAINING PROGRAM

## 2024-05-07 PROCEDURE — 63710000001 LEVOTHYROXINE 25 MCG TABLET: Performed by: STUDENT IN AN ORGANIZED HEALTH CARE EDUCATION/TRAINING PROGRAM

## 2024-05-07 PROCEDURE — 63710000001 APIXABAN 5 MG TABLET: Performed by: STUDENT IN AN ORGANIZED HEALTH CARE EDUCATION/TRAINING PROGRAM

## 2024-05-07 RX ORDER — LEVOTHYROXINE SODIUM 137 UG/1
137 TABLET ORAL DAILY
COMMUNITY

## 2024-05-07 RX ADMIN — APIXABAN 5 MG: 5 TABLET, FILM COATED ORAL at 10:51

## 2024-05-07 RX ADMIN — METOPROLOL TARTRATE 25 MG: 50 TABLET, FILM COATED ORAL at 10:51

## 2024-05-07 RX ADMIN — LEVOTHYROXINE SODIUM 137 MCG: 112 TABLET ORAL at 10:51

## 2024-05-30 ENCOUNTER — LAB (OUTPATIENT)
Dept: LAB | Facility: HOSPITAL | Age: 74
End: 2024-05-30
Payer: MEDICARE

## 2024-05-30 ENCOUNTER — TELEPHONE (OUTPATIENT)
Dept: OTOLARYNGOLOGY | Facility: CLINIC | Age: 74
End: 2024-05-30
Payer: MEDICARE

## 2024-05-30 DIAGNOSIS — E89.0 S/P TOTAL THYROIDECTOMY: ICD-10-CM

## 2024-05-30 DIAGNOSIS — E89.0 POSTOPERATIVE HYPOTHYROIDISM: ICD-10-CM

## 2024-05-30 LAB — TSH SERPL DL<=0.05 MIU/L-ACNC: 1.97 UIU/ML (ref 0.27–4.2)

## 2024-05-30 PROCEDURE — 36415 COLL VENOUS BLD VENIPUNCTURE: CPT

## 2024-05-30 PROCEDURE — 84443 ASSAY THYROID STIM HORMONE: CPT

## 2024-05-30 RX ORDER — LEVOTHYROXINE SODIUM 137 MCG
137 TABLET ORAL DAILY
Qty: 30 TABLET | Refills: 3 | Status: SHIPPED | OUTPATIENT
Start: 2024-05-30

## 2024-06-10 ENCOUNTER — TRANSCRIBE ORDERS (OUTPATIENT)
Dept: ADMINISTRATIVE | Facility: HOSPITAL | Age: 74
End: 2024-06-10
Payer: MEDICARE

## 2024-06-10 DIAGNOSIS — R26.89 OTHER ABNORMALITIES OF GAIT AND MOBILITY: Primary | ICD-10-CM

## 2024-06-20 ENCOUNTER — HOSPITAL ENCOUNTER (OUTPATIENT)
Dept: CT IMAGING | Facility: HOSPITAL | Age: 74
Discharge: HOME | End: 2024-06-20
Admitting: NURSE PRACTITIONER
Payer: MEDICARE

## 2024-06-20 ENCOUNTER — OFFICE VISIT (OUTPATIENT)
Dept: CARDIOLOGY | Facility: CLINIC | Age: 74
End: 2024-06-20
Payer: MEDICARE

## 2024-06-20 VITALS
BODY MASS INDEX: 34.99 KG/M2 | HEART RATE: 63 BPM | DIASTOLIC BLOOD PRESSURE: 70 MMHG | SYSTOLIC BLOOD PRESSURE: 136 MMHG | OXYGEN SATURATION: 98 % | WEIGHT: 264 LBS | HEIGHT: 73 IN

## 2024-06-20 DIAGNOSIS — I48.0 PAROXYSMAL ATRIAL FIBRILLATION: Primary | ICD-10-CM

## 2024-06-20 DIAGNOSIS — Z79.01 CURRENT USE OF LONG TERM ANTICOAGULATION: Chronic | ICD-10-CM

## 2024-06-20 DIAGNOSIS — R26.89 OTHER ABNORMALITIES OF GAIT AND MOBILITY: ICD-10-CM

## 2024-06-20 PROCEDURE — 70450 CT HEAD/BRAIN W/O DYE: CPT

## 2024-06-20 NOTE — PROGRESS NOTES
"[facility] -  CLINIC FOLLOW UP     Patient Care Team:  Provider, No Known as PCP - General  Flores, Nick MILES MD as Referring Physician (Cardiothoracic Surgery)  Landry Hoover MD as Cardiologist (Cardiology)  Sabrina Jackson APRN as Nurse Practitioner (Nurse Practitioner)  Jean Marie Solis MD as Consulting Physician (Otolaryngology)  Maryann Serra APRN as Nurse Practitioner (Otolaryngology)    Chief Complaint: Follow after ablation.     Subjective   EP Problems:  1.  Paroxysmal atrial fibrillation  -5/2024: PFA, Dr. Johnson   2.  Typical atrial flutter  -5/2024: CTI ablation     Cardiology Problems:  1.  Hypertension     Medical Problems:  1.  Prostate cancer  2.  Lung cancer  3.  Pneumothorax  4.  Obesity  5.  Hypothyroidism status post thyroidectomy      HPI: Today I had the pleasure of seeing Delgado Desir in the cardiology clinic for follow up. He is a 74 year old male with a history of PAF with previous hospitalization in March with RVR. He underwent PFA of the pulmonary veins and regular CTI RF ablation of typical atrial flutter in May successfully. His flecainide was stopped at the time of discharge and he follows up today. EKG shows SR at 66 today.     The only complaint he has is dizziness that may be progressive. He feels like the floor is moving and he staggers. Sometimes he has to reach out and grab something. No spinning when he lays down. Denies any tunnel vision or disturbances. Blurry vision and headaches seem to be more prominent as well, but not associated simultaneously with dizziness. He is scheduled for a CT of the head today as well.     Objective     Visit Vitals  /70   Pulse 63   Ht 185.4 cm (72.99\")   Wt 120 kg (264 lb)   SpO2 98%   BMI 34.84 kg/m²           Vitals reviewed.   Constitutional:       Appearance: Healthy appearance. Not in distress.   Eyes:      Extraocular Movements: Extraocular movements intact.      Conjunctiva/sclera: " Conjunctivae normal.      Pupils: Pupils are equal, round, and reactive to light.   HENT:      Head: Normocephalic and atraumatic.      Nose: Nose normal.    Mouth/Throat:      Lips: Pink.      Mouth: Mucous membranes are moist.      Pharynx: Oropharynx is clear.   Neck:      Vascular: No carotid bruit or JVD. JVD normal.   Pulmonary:      Effort: Pulmonary effort is normal.      Breath sounds: Normal breath sounds.   Chest:      Chest wall: Not tender to palpatation.   Cardiovascular:      PMI at left midclavicular line. Normal rate. Regular rhythm. Normal S1. Normal S2.       Murmurs: There is no murmur.      No gallop.  No rub.   Pulses:     Radial: 2+ bilaterally.  Edema:     Peripheral edema absent.   Abdominal:      General: Bowel sounds are normal.      Palpations: Abdomen is soft.   Musculoskeletal: Normal range of motion.      Extremities: No clubbing present.     Cervical back: Normal range of motion. Skin:     General: Skin is warm and dry.   Neurological:      General: No focal deficit present.      Mental Status: Alert and oriented to person, place, and time.   Psychiatric:         Attention and Perception: Attention normal.         Mood and Affect: Affect normal.         Speech: Speech normal.         Behavior: Behavior normal.         Cognition and Memory: Cognition normal.             The following portions of the patient's history were reviewed and updated as appropriate: allergies, current medications, past medical history, past social history, past and problem list.     Review of Systems   Constitutional: Negative.    HENT: Negative.     Eyes: Negative.    Respiratory: Negative.     Cardiovascular: Negative.    Gastrointestinal: Negative.    Endocrine: Negative.    Genitourinary: Negative.    Musculoskeletal: Negative.    Skin: Negative.    Allergic/Immunologic: Negative.    Neurological: Negative.  Positive for dizziness and headaches.   Hematological: Negative.    Psychiatric/Behavioral:  Negative.            Echo EF Estimated  Lab Results   Component Value Date    ECHOEFEST 70 06/25/2020         ECG 12 Lead    Date/Time: 6/20/2024 8:47 AM  Performed by: Zulema Pierce PA    Authorized by: Zulema Pierce PA  Comparison: compared with previous ECG from 5/2/2024  Rhythm: sinus rhythm  Rate: normal  BPM: 66  QRS axis: normal    Clinical impression: normal ECG            Medication Review: yes    Current Outpatient Medications:     acetaminophen (TYLENOL) 500 MG tablet, Take 1 tablet by mouth Every 6 (Six) Hours As Needed for Mild Pain., Disp: , Rfl:     albuterol sulfate  (90 Base) MCG/ACT inhaler, Inhale 2 puffs Every 6 (Six) Hours As Needed for Shortness of Air or Wheezing., Disp: , Rfl:     alfuzosin (UROXATRAL) 10 MG 24 hr tablet, Take 1 tablet by mouth Daily., Disp: , Rfl:     apixaban (ELIQUIS) 5 MG tablet tablet, Take 1 tablet by mouth 2 (Two) Times a Day., Disp: , Rfl:     Ibuprofen 200 MG capsule, Take 2 capsules by mouth Daily., Disp: , Rfl:     metoprolol tartrate (LOPRESSOR) 25 MG tablet, Take 1 tablet by mouth 2 (Two) Times a Day., Disp: 180 tablet, Rfl: 0    Synthroid 137 MCG tablet, Take 1 tablet by mouth Daily. *BRAND NAME*, Disp: 30 tablet, Rfl: 3    therapeutic multivitamin-minerals (THERAGRAN-M) tablet, Take 2 tablets by mouth Daily., Disp: , Rfl:    No Known Allergies    I have reviewed       CBC:  Lab Results - Last 18 Months   Lab Units 05/06/24  0932 03/02/24  0912 02/01/24  1048   WBC 10*3/mm3 6.16   < > 6.09   HEMOGLOBIN g/dL 12.7*   < > 12.9*   HEMATOCRIT % 39.7   < > 40.1   PLATELETS 10*3/mm3 160   < > 157   IRON mcg/dL  --   --  62    < > = values in this interval not displayed.      BMP/CMP:  Lab Results - Last 18 Months   Lab Units 05/06/24  0932   SODIUM mmol/L 139   POTASSIUM mmol/L 4.5   CHLORIDE mmol/L 105   CO2 mmol/L 26.0   GLUCOSE mg/dL 124*   BUN mg/dL 16   CREATININE mg/dL 1.09   CALCIUM mg/dL 8.8     BNP:   Lab Results - Last 18 Months   Lab Units  03/02/24  0912   PROBNP pg/mL 1,116.0*      THYROID:  Lab Results - Last 18 Months   Lab Units 05/30/24  1148 03/03/24  0216   TSH uIU/mL 1.970 5.850*   FREE T4 ng/dL  --  1.38       Results for orders placed during the hospital encounter of 11/23/23    Adult Transthoracic Echo Complete W/ Cont if Necessary Per Protocol    Interpretation Summary    Left ventricular ejection fraction appears to be 61 - 65%.    Left ventricular wall thickness is consistent with mild concentric hypertrophy.    Left ventricular diastolic function was normal.    The left atrial cavity is moderately dilated.    Estimated right ventricular systolic pressure from tricuspid regurgitation is moderately elevated (45-55 mmHg).    The right ventricular cavity is mild to moderately dilated, with normal systolic function..    The right atrial cavity is dilated.    No significant (greater than mild) valvular pathology.     Assessment:   Diagnoses and all orders for this visit:    1. Paroxysmal atrial fibrillation (Primary)  -     ECG 12 Lead; Future    2. Current use of long term anticoagulation    Other orders  -     ECG 12 Lead      PAF/Atrial flutter: doing well s/p PFA/CTI ablations. EKG is stable off of flecainide. Continue anticoagulation and follow up in 3 months     Dizziness: CT of head today scheduled.       I spent 30 minutes caring for Delgado on this date of service. This time includes time spent by me in the following activities:preparing for the visit, reviewing tests, obtaining and/or reviewing a separately obtained history, performing a medically appropriate examination and/or evaluation , counseling and educating the patient/family/caregiver, ordering medications, tests, or procedures, referring and communicating with other health care professionals , documenting information in the medical record, and independently interpreting results and communicating that information with the patient/family/caregiver        Electronically signed  by Zulema Pierce PA

## 2024-06-24 DIAGNOSIS — I48.0 PAROXYSMAL ATRIAL FIBRILLATION: ICD-10-CM

## 2024-06-24 NOTE — TELEPHONE ENCOUNTER
Caller: ESTELLA    Relationship: SELF     Best call back number: [phone]    Requested Prescriptions:   Requested Prescriptions     Pending Prescriptions Disp Refills    metoprolol tartrate (LOPRESSOR) 25 MG tablet 180 tablet 0     Sig: Take 1 tablet by mouth 2 (Two) Times a Day.        Pharmacy where request should be sent: [facility] [address] - [phone]  - [phone] FX     Last office visit with prescribing clinician: 6/20/2024   Last telemedicine visit with prescribing clinician: Visit date not found   Next office visit with prescribing clinician: 9/30/2024     Additional details provided by patient: 90 DAY SUPPLY PLEASE - PATIENT TOOK LAST PILL LAST NIGHT.  PATIENT IS MISSING MORNING DOSE  - WOULD PATIENT TRY TO MAKE UP DOSE WHEN GETTING THE MEDICINE FROM PHARMACY OR JUST SKIP THE MORNING DOSE. PLEASE ADVISE PATIENT. THANK YOU!    Does the patient have less than a 3 day supply:  [x] Yes  [] No    Would you like a call back once the refill request has been completed: [] Yes [] No    If the office needs to give you a call back, can they leave a voicemail: [] Yes [] No    Manoj Li Rep   06/24/24 08:06 CDT

## 2024-07-09 ENCOUNTER — TRANSCRIBE ORDERS (OUTPATIENT)
Dept: ADMINISTRATIVE | Facility: HOSPITAL | Age: 74
End: 2024-07-09
Payer: MEDICARE

## 2024-07-09 DIAGNOSIS — R06.00 DYSPNEA, UNSPECIFIED TYPE: Primary | ICD-10-CM

## 2024-07-22 ENCOUNTER — HOSPITAL ENCOUNTER (OUTPATIENT)
Dept: CT IMAGING | Facility: HOSPITAL | Age: 74
Discharge: HOME | End: 2024-07-22
Admitting: NURSE PRACTITIONER
Payer: MEDICARE

## 2024-07-22 DIAGNOSIS — R06.00 DYSPNEA, UNSPECIFIED TYPE: ICD-10-CM

## 2024-07-22 PROCEDURE — 71250 CT THORAX DX C-: CPT

## 2024-07-25 DIAGNOSIS — C61 PROSTATE CANCER (HCC): ICD-10-CM

## 2024-07-25 LAB — PSA SERPL-MCNC: <0.01 NG/ML (ref 0–4)

## 2024-07-26 ENCOUNTER — OFFICE VISIT (OUTPATIENT)
Dept: OTOLARYNGOLOGY | Facility: CLINIC | Age: 74
End: 2024-07-26
Payer: MEDICARE

## 2024-07-26 VITALS
BODY MASS INDEX: 34.99 KG/M2 | SYSTOLIC BLOOD PRESSURE: 126 MMHG | DIASTOLIC BLOOD PRESSURE: 62 MMHG | TEMPERATURE: 97.3 F | HEIGHT: 73 IN | HEART RATE: 60 BPM | WEIGHT: 264 LBS

## 2024-07-26 DIAGNOSIS — E89.0 POSTOPERATIVE HYPOTHYROIDISM: ICD-10-CM

## 2024-07-26 DIAGNOSIS — E89.0 S/P TOTAL THYROIDECTOMY: ICD-10-CM

## 2024-07-26 DIAGNOSIS — R26.9 GAIT DISTURBANCE: Primary | ICD-10-CM

## 2024-07-26 DIAGNOSIS — H90.3 SENSORINEURAL HEARING LOSS, BILATERAL: ICD-10-CM

## 2024-07-26 DIAGNOSIS — E89.0 POSTSURGICAL HYPOTHYROIDISM: ICD-10-CM

## 2024-07-26 DIAGNOSIS — H93.12 TINNITUS, LEFT: ICD-10-CM

## 2024-07-26 RX ORDER — FLUTICASONE FUROATE, UMECLIDINIUM BROMIDE AND VILANTEROL TRIFENATATE 100; 62.5; 25 UG/1; UG/1; UG/1
1 POWDER RESPIRATORY (INHALATION)
COMMUNITY
Start: 2024-07-22

## 2024-07-26 RX ORDER — LEVOCETIRIZINE DIHYDROCHLORIDE 5 MG/1
1 TABLET, FILM COATED ORAL NIGHTLY
COMMUNITY

## 2024-07-26 NOTE — PROGRESS NOTES
YOB: 1950  Location: [facility]  Location Address: [address] [place] [address], [facility], [address] [place] [address]  Location Phone: [phone]    Chief Complaint   Patient presents with    Thyroid Problem    Dizziness       History of Present Illness  Delgado Desir is a 74 y.o. male.  Delgado Desir is here for follow up of ENT complaints. The patient is s/p total thyroidectomy 2020  Pathology revealed multinodular goiter    He does report improvement in dizziness. Patient went to vestibular rehab a few times, but has stopped going at this time   He denies room spinning with lying   Intermittently he has a feeling of off balance, this is typically with quick position changes   He is taking synthroid 137 mcg      Patient was placed on xyzal by Dr. Hoover and feels as if this has improved symtpoms       TSH (2024 11:48)   Past Medical History:   Diagnosis Date    A-fib     Arthritis     Asthma 23    Dizzy spells     states on rare occsaion has    Failed intubation of airway 11/10/2020    Hypertension     Lung cancer     Nosebleed 23    Taking  eliquis    PAF (paroxysmal atrial fibrillation)     Prostate cancer     Thyroid disorder        Past Surgical History:   Procedure Laterality Date    CARDIAC ELECTROPHYSIOLOGY PROCEDURE N/A 2024    Procedure: Ablation atrial fibrillation;  Surgeon: Master Johnson MD;  Location: [facility] CATH INVASIVE LOCATION;  Service: Cardiovascular;  Laterality: N/A;    CARPAL TUNNEL RELEASE Right     LOBECTOMY Right 2/15/2021    Procedure: THORACOSCOPY WITH DAVINCI ROBOT WITH RIGHT WEDGE RESECTION, MEDIASTINAL LYMPH NODE DISSECTION-RIGHT;  Surgeon: Nick Flores MD;  [facility];  [facility];  Laterality: Right;    LUNG BIOPSY      PROSTATE BIOPSY      REPLACEMENT TOTAL KNEE Left     SHOULDER SURGERY Bilateral     THORACOSCOPY Right 10/30/2020    Procedure: MEDIASTINOSCOPY, RIGHT THORACOSCOPY POSSIBLE WEDGE RESECTION WITH DAVINCI ROBOT;   Surgeon: Nick Flores MD;  Location:  [facility];  Service: [facility];  Laterality: Right;    THYROIDECTOMY N/A 2020    Procedure: Total thyroidectomy;  Surgeon: Jean Marie Solis MD;  Location:  [facility];  Service: ENT;  Laterality: N/A;       Outpatient Medications Marked as Taking for the 24 encounter (Office Visit) with Maryann Serra APRN   Medication Sig Dispense Refill    acetaminophen (TYLENOL) 500 MG tablet Take 1 tablet by mouth Every 6 (Six) Hours As Needed for Mild Pain.      albuterol sulfate  (90 Base) MCG/ACT inhaler Inhale 2 puffs Every 6 (Six) Hours As Needed for Shortness of Air or Wheezing.      alfuzosin (UROXATRAL) 10 MG 24 hr tablet Take 1 tablet by mouth Daily.      apixaban (ELIQUIS) 5 MG tablet tablet Take 1 tablet by mouth 2 (Two) Times a Day.      Ibuprofen 200 MG capsule Take 2 capsules by mouth Daily.      levocetirizine (XYZAL) 5 MG tablet Take 1 tablet by mouth Every Night.      metoprolol tartrate (LOPRESSOR) 25 MG tablet Take 2 tablets by mouth 2 (Two) Times a Day. 180 tablet 3    Synthroid 137 MCG tablet Take 1 tablet by mouth Daily. *BRAND NAME* 30 tablet 3    therapeutic multivitamin-minerals (THERAGRAN-M) tablet Take 2 tablets by mouth Daily.      Trelegy Ellipta 100-62.5-25 MCG/ACT inhaler Inhale 1 puff Daily.         Patient has no known allergies.    Family History   Problem Relation Age of Onset    Hypertension Mother         New Contact    Cancer Mother     COPD Father        Social History     Socioeconomic History    Marital status:     Number of children: 2   Tobacco Use    Smoking status: Former     Current packs/day: 0.00     Average packs/day: 1 pack/day for 33.0 years (33.0 ttl pk-yrs)     Types: Cigarettes     Start date: 1969     Quit date: 1996     Years since quittin.5    Smokeless tobacco: Never   Vaping Use    Vaping status: Never Used   Substance and Sexual Activity    Alcohol use: Yes     Alcohol/week: 2.0 standard  drinks of alcohol     Types: 2 Shots of liquor per week     Comment: x2 weekly    Drug use: Never    Sexual activity: Defer       Review of Systems   Constitutional:  Positive for fatigue.   HENT: Negative.     Neurological:  Positive for dizziness.       Vitals:    07/26/24 1043   BP: 126/62   Pulse: 60   Temp: 97.3 °F (36.3 °C)       Body mass index is 34.83 kg/m².    Objective     Physical Exam  Vitals reviewed.   Constitutional:       Appearance: He is obese.   HENT:      Head: Normocephalic.      Right Ear: Tympanic membrane, ear canal and external ear normal.      Left Ear: Tympanic membrane, ear canal and external ear normal.      Nose: Nose normal.      Mouth/Throat:      Lips: Pink.      Mouth: Mucous membranes are moist.      Pharynx: Uvula midline.   Neck:     Neurological:      Mental Status: He is alert.         Assessment & Plan   Problems Addressed this Visit          Endocrine and Metabolic    S/P total thyroidectomy    Postoperative hypothyroidism     Other Visit Diagnoses       Gait disturbance    -  Primary    Postsurgical hypothyroidism        Relevant Orders    TSH    Tinnitus, left        Sensorineural hearing loss, bilateral              Diagnoses         Codes Comments    Gait disturbance    -  Primary ICD-10-CM: R26.9  ICD-9-CM: 781.2     Postsurgical hypothyroidism     ICD-10-CM: E89.0  ICD-9-CM: 244.0     Tinnitus, left     ICD-10-CM: H93.12  ICD-9-CM: 388.30     Sensorineural hearing loss, bilateral     ICD-10-CM: H90.3  ICD-9-CM: 389.18     S/P total thyroidectomy     ICD-10-CM: E89.0  ICD-9-CM: V45.89     Postoperative hypothyroidism     ICD-10-CM: E89.0  ICD-9-CM: 244.0           * Surgery not found *  Orders Placed This Encounter   Procedures    TSH     Standing Status:   Future     Standing Expiration Date:   7/26/2025     Order Specific Question:   Release to patient     Answer:   Routine Release [[id number]]     Return in about 3 months (around 10/26/2024) for Recheck.     Fall  precautions   Continue synthroid   Recheck tsh in 3 months     There are no Patient Instructions on file for this visit.

## 2024-07-29 ENCOUNTER — OFFICE VISIT (OUTPATIENT)
Dept: UROLOGY | Age: 74
End: 2024-07-29
Payer: MEDICARE

## 2024-07-29 VITALS — HEIGHT: 73 IN | BODY MASS INDEX: 35.52 KG/M2 | WEIGHT: 268 LBS | TEMPERATURE: 98 F

## 2024-07-29 DIAGNOSIS — C61 PROSTATE CANCER (HCC): Primary | ICD-10-CM

## 2024-07-29 DIAGNOSIS — N40.1 BPH WITH URINARY OBSTRUCTION: ICD-10-CM

## 2024-07-29 DIAGNOSIS — N13.8 BPH WITH URINARY OBSTRUCTION: ICD-10-CM

## 2024-07-29 DIAGNOSIS — N52.35 ERECTILE DYSFUNCTION FOLLOWING RADIATION THERAPY: ICD-10-CM

## 2024-07-29 LAB
APPEARANCE FLUID: CLEAR
BILIRUBIN, POC: NORMAL
BLOOD URINE, POC: NORMAL
CLARITY, POC: CLEAR
COLOR, POC: YELLOW
GLUCOSE URINE, POC: NORMAL
KETONES, POC: NORMAL
LEUKOCYTE EST, POC: NORMAL
NITRITE, POC: NORMAL
PH, POC: 6
POST VOID RESIDUAL (PVR): 0 ML
PROTEIN, POC: NORMAL
SPECIFIC GRAVITY, POC: 1.02
UROBILINOGEN, POC: 2

## 2024-07-29 PROCEDURE — 81002 URINALYSIS NONAUTO W/O SCOPE: CPT | Performed by: UROLOGY

## 2024-07-29 PROCEDURE — 1123F ACP DISCUSS/DSCN MKR DOCD: CPT | Performed by: UROLOGY

## 2024-07-29 PROCEDURE — 99214 OFFICE O/P EST MOD 30 MIN: CPT | Performed by: UROLOGY

## 2024-07-29 PROCEDURE — 51798 US URINE CAPACITY MEASURE: CPT | Performed by: UROLOGY

## 2024-07-29 RX ORDER — FLUTICASONE FUROATE, UMECLIDINIUM BROMIDE AND VILANTEROL TRIFENATATE 100; 62.5; 25 UG/1; UG/1; UG/1
1 POWDER RESPIRATORY (INHALATION)
COMMUNITY
Start: 2024-07-22

## 2024-07-29 RX ORDER — LEVOCETIRIZINE DIHYDROCHLORIDE 5 MG/1
5 TABLET, FILM COATED ORAL NIGHTLY
COMMUNITY

## 2024-07-29 ASSESSMENT — ENCOUNTER SYMPTOMS
VOMITING: 0
NAUSEA: 0
DIARRHEA: 0
CONSTIPATION: 0
SHORTNESS OF BREATH: 0
ABDOMINAL DISTENTION: 0
COUGH: 0
BACK PAIN: 0
TROUBLE SWALLOWING: 0
EYE REDNESS: 0
ABDOMINAL PAIN: 0
EYE DISCHARGE: 0

## 2024-07-29 NOTE — PROGRESS NOTES
Alfred Krishna is a 74 y.o. male who presents today   Chief Complaint   Patient presents with    Follow-up     I am here today for my 6 mo prostate cancer/ BPH follow up        Prostate Cancer  Patient is here today for prostate cancer which was first diagnosed 4 years ago.  His prostate cancer can be characterized as clinical stage T1c [place] 4+3 = 7 grade group 3 hormone sensitive status post XRT with undetectable PSA  His last several PSA values are as follows:  Lab Results   Component Value Date    PSA <0.01 07/25/2024    PSA <0.01 01/23/2024    PSA <0.01 07/24/2023    PSA <0.01 01/20/2023    PSA 0.03 06/21/2022     Previous treatment of prostate cancer: Neoadjuvant hormonal therapy beginning September 23, 2001 through July 27, 2023.  External beam radiation therapy completed 9/10/2022  Lower urinary tract symptoms: frequency and nocturia, 2 times per night his AUA subscores today is 7 quality-of-life score is 2 mostly satisfied.  He currently is on alfuzosin.    Erectile dysfunction  His postradiation ED he continues taking sildenafil 20 mg as needed note new complaints today      Past Medical History:   Diagnosis Date    Benign prostatic disease     Elevated PSA     Osteoarthritis     Shoulder pain     fall off porch    Upper respiratory virus     c/o recent tx with otc meds.; denies fever/chills (about a week ago)       Past Surgical History:   Procedure Laterality Date    HERNIA REPAIR      JOINT REPLACEMENT      KNEE ARTHROPLASTY  12--10    LUNG BIOPSY Right     [facility]    SD SURGICAL ARTHROSCOPY SHOULDER W/ROTATOR CUFF RPR Left 3/22/2018    SHOULDER ARTHROSCOPY ROTATOR CUFF REPAIR  SUBACROMIAL DECOMPRESSION performed by Randy Sterling MD at [facility] OR    SHOULDER ARTHROSCOPY  2008       Current Outpatient Medications   Medication Sig Dispense Refill    levocetirizine (XYZAL) 5 MG tablet Take 1 tablet by mouth nightly      TRELEGY ELLIPTA 100-62.5-25 MCG/ACT AEPB inhaler Inhale 1 puff into the lungs in

## 2024-08-16 ENCOUNTER — TELEPHONE (OUTPATIENT)
Dept: CARDIOLOGY | Facility: CLINIC | Age: 74
End: 2024-08-16
Payer: MEDICARE

## 2024-08-22 NOTE — PROGRESS NOTES
[facility] HEMATOLOGY & ONCOLOGY  [address]  [facility] [id number] [phone]    Patient Name: Delgado Desir  Encounter Date: 01/14/2022  YOB: 1950  Patient Number: [id number]      REASON FOR FOLLOW-UP: Delgado Desir is a pleasant 71 y.o. 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  He is also seen for anemia from iron deficiency and off oral iron for the past 3 months.  He is seen alone. History is obtained from patient. History is considered to be accurate.        Oncology/Hematology History Overview Note   DIAGNOSTIC ABNORMALITIES:  He was found to have a lung nodule with history of prostate cancer.  CT abdomen pelvis 08/10/2020.  Lobulated noncalcified nodule in the right lower lobe represent a neoplastic process.  PET scan 09/18/2020. The multilobular pulmonary nodule in the right lung base measuring 2.9 x 1.6 cm is hypermetabolic and suspicious for a malignant process with a maximum intensity of 3.62 SUV. Differential considerations include primary lung malignancy as well as early metastatic disease.  No evidence of distant metastasis.  Unsuccessful CT-guided right lower lobe nodule biopsy 10/16/2020 due to right pneumothorax.  Pathology report 11/03/2020.  Invasive mucinous adenocarcinoma, grade 1, 2 cm, right lower lobe.  No visceral pleural invasion.  No lymphovascular invasion. Margins of resection free of tumor.  Distance of invasive carcinoma from closest margin 0.1 cm.  Pleural tissue biopsies no evidence of malignancy.  AJCC stage pT1b.  PD-L1 less than 1%, negative ROS1, ALK, BRAF and EGFR mutation.  Pathology report 2/16/2021, right lower lobe wedge excision, residual tumor is not present.  The surgical margins are free of tumor.  2 benign intraparenchymal nodes  benign.  1 benign node, left level 7.  1 benign no level 8.  CBC 02/19/2021 remarkable for WBC 12.9, hemoglobin 12.8 hematocrit 37.  Had a follow-up with Dr. Nick Flores 04/05/2021 post right lower lobe wedge and lymph node sampling on 02/15/2021.  Patient referred to oncology for surveillance.        PREVIOUS INTERVENTIONS:  Right lower lobe wedge resection 10/30/2020 by Dr. Flores.  His pulmonary function test is not adequate for left lower lobectomy as well as lymph node sampling.  He had undergone right lower lobe wedge 02/15/2021.      PREVIOUS INTERVENTIONS:Anemia.  Oral iron 04/15/2021 through 10/15/2021.     Prostate cancer (HCC)    Initial Diagnosis    Prostate cancer (CMS/HCC)     7/6/2020 Procedure    PSA  21     7/30/2020 Biopsy    [facility]  Prostate Biopsy  A: Prostate, right lateral base, needle biopsy:  Prostatic adenocarcinoma, Preston score 7 (4+3)  Tumor encompasses 20% of the needle core biopsy  Grade group 3    B: Prostate, right lateral mid, needle biopsy:  Prostatic adenocarcinoma, Preston's 7 (4+3)  Tumor encompasses 20% of the needle core biopsy  Grade group 3    C: Prostate, right lateral apex, needle biopsy:  Prostatic adenocarcinoma, [place] score 7 (3+4)  Tumor encompasses 30% of the needle core biopsy  Grade group 2  Pattern 4 is approximately 20% of the tumor    D: Prostate, right base, needle biopsy:  Prostatic adenocarcinoma, Preston's 7 (4+3)  Tumor encompasses 10% of the needle core biopsy  Grade group 3    E: Prostate, right mid, needle biopsy:  Prostatic adenocarcinoma, [place]'s 7 (4+3)  Tumor encompasses 55% of the needle core biopsy  Grade group 3    F: Prostate, right apex, needle biopsy:  High-grade prostatic intraepithelial neoplasia    G: Prostate, left lateral base, needle biopsy:  Benign prostatic glands and stroma    H: Prostate, left lateral mid, needle biopsy:  Benign prostatic glands and stroma    I: Prostate, left lateral apex, needle biopsy:  Benign prostatic glands and  stroma    J: Prostate, left base, needle biopsy:  Benign prostatic glands and stroma    K: Prostate, left mid, needle biopsy:  Benign prostatic glands and stroma    L: Prostate, left apex, needle biopsy:  Benign prostatic glands and stroma     8/14/2020 Imaging    [facility]  CT AP  No acute abnormality of the abdomen or pelvis.  A moderate splenomegaly.  An enlarged prostate.  A stable bilateral renal cysts.  The lobulated noncalcified nodule in the right lower lobe represent a neoplastic process. Further evaluation with positron emission  tomography scan may be obtained.  The previously seen fractures of the left sacral ala is not visualized  in this study. If clinically significant, further evaluation with  radionuclide bone scan may be obtained    Bone Scan  1. No evidence of metastatic bone disease.  2. Degenerative uptake in the spine, right knee and bilateral  ankles/feet. Suspect prior left knee arthroplasty. Correlate clinically.     10/16/2020 Biopsy    Final Diagnosis   Lung, right lower lobe, fine-needle core biopsies and touch preparation:  A.  Fragments of skeletal muscle.  B.  No histologic or cytologic evidence of malignancy.        8/11/2021 Procedure    PSA 21.02     10/4/2021 Cancer Staged    Staging form: Prostate, AJCC 8th Edition  - Pathologic stage from 10/4/2021: Stage IIIA (pT2, pN0, cM0, PSA: 21, Grade Group: 3) - Signed by Milan Bullard MD on 10/4/2021     Malignant neoplasm of lower lobe of right lung (HCC)   10/16/2020 Biopsy    Final Diagnosis   Lung, right lower lobe, fine-needle core biopsies and touch preparation:  A.  Fragments of skeletal muscle.  B.  No histologic or cytologic evidence of malignancy.        4/5/2021 Initial Diagnosis    Malignant neoplasm of lower lobe of right lung (CMS/HCC)     4/15/2021 Cancer Staged    Staging form: Lung, AJCC 8th Edition  - Pathologic stage from 4/15/2021: Stage IA2 (pT1b, pN0, cM0) - Signed by Milan Bullard MD on 4/15/2021         PAST MEDICAL  HISTORY:  ALLERGIES:  No Known Allergies  CURRENT MEDICATIONS:  Outpatient Encounter Medications as of 2022   Medication Sig Dispense Refill   • acetaminophen (TYLENOL) 500 MG tablet Take 500 mg by mouth Every 6 (Six) Hours As Needed for Mild Pain .     • alfuzosin (UROXATRAL) 10 MG 24 hr tablet Take 10 mg by mouth Daily.     • Eliquis 5 MG tablet tablet TAKE 1 TABLET BY MOUTH EVERY 12 HOURS  60 tablet 11   • losartan-hydrochlorothiazide (HYZAAR) 50-12.5 MG per tablet Take 0.5 tablets by mouth Daily. 30 tablet 5   • metoprolol tartrate (LOPRESSOR) 25 MG tablet TAKE 1/2 TABLET BY MOUTH EVERY 12 HOURS  90 tablet 4   • Synthroid 200 MCG tablet Take 1 tablet by mouth Daily for 30 days. 30 tablet 2   • therapeutic multivitamin-minerals (THERAGRAN-M) tablet Take 1 tablet by mouth Daily.     • ferrous sulfate 325 (65 FE) MG tablet Take 1 tablet by mouth Daily With Breakfast. 60 tablet 2   • HYDROcodone-acetaminophen (NORCO) 5-325 MG per tablet Take 1 tablet by mouth Every 8 (Eight) Hours As Needed for Moderate Pain  (Pain). 6 tablet 0   • [] iopamidol (ISOVUE-300) 61 % injection 100 mL      • [] iopamidol (ISOVUE-300) 61 % injection 50 mL        No facility-administered encounter medications on file as of 2022.     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.4XXA   • S/P total thyroidectomy E89.0   • Postoperative hypothyroidism E89.0   • Lung mass R91.8   • Paroxysmal atrial fibrillation (HCC) I48.0   • Malignant neoplasm of lower lobe of right lung (HCC) C34.31   • Nodule of neck R22.1   • S/P partial lobectomy of lung Z90.2   • History of lung cancer Z85.118   • Class 1 obesity due to excess calories with serious comorbidity and body mass index (BMI) of 33.0  to 33.9 in adult E66.09, Z68.33     SURGERIES:  Past Surgical History:   Procedure Laterality Date   • CARPAL TUNNEL RELEASE Right    • LOBECTOMY Right 2/15/2021    Procedure: THORACOSCOPY WITH DAVINCI ROBOT WITH RIGHT WEDGE RESECTION, MEDIASTINAL LYMPH NODE DISSECTION-RIGHT;  Surgeon: Nick Flores MD;  Location:  [facility] OR;  Service: [facility];  Laterality: Right;   • LUNG BIOPSY     • PROSTATE BIOPSY     • REPLACEMENT TOTAL KNEE Left    • SHOULDER SURGERY Bilateral    • THORACOSCOPY Right 10/30/2020    Procedure: MEDIASTINOSCOPY, RIGHT THORACOSCOPY POSSIBLE WEDGE RESECTION WITH DAVINCI ROBOT;  Surgeon: Nick Flores MD;  Location:  [facility] OR;  Service: [facility];  Laterality: Right;   • THYROIDECTOMY N/A 12/11/2020    Procedure: Total thyroidectomy;  Surgeon: Jean Marie Solis MD;  Location:  [facility] OR;  Service: ENT;  Laterality: N/A;     HEALTH MAINTENANCE ITEMS:  Health Maintenance Due   Topic Date Due   • COLORECTAL CANCER SCREENING  Never done   • COVID-19 Vaccine (1) Never done   • TDAP/TD VACCINES (1 - Tdap) Never done   • ZOSTER VACCINE (1 of 2) Never done   • Pneumococcal Vaccine 65+ (1 of 1 - PPSV23) Never done   • HEPATITIS C SCREENING  Never done   • ANNUAL WELLNESS VISIT  Never done   • INFLUENZA VACCINE  Never done       <no information>  Last Completed Colonoscopy     This patient has no relevant Health Maintenance data.        There is no immunization history for the selected administration types on file for this patient.  Last Completed Mammogram     This patient has no relevant Health Maintenance data.            FAMILY HISTORY:  Family History   Problem Relation Age of Onset   • Hypertension Mother    • COPD Father      SOCIAL HISTORY:  Social History     Socioeconomic History   • Marital status:    • Number of children: 2   Tobacco Use   • Smoking status: Former Smoker     Packs/day: 1.00     Years: 33.00     Pack years: 33.00     Types: Cigarettes     Start date: 1969     Quit date:  "1996     Years since quittin.0   • Smokeless tobacco: Never Used   Vaping Use   • Vaping Use: Never used   Substance and Sexual Activity   • Alcohol use: Yes     Alcohol/week: 2.0 standard drinks     Types: 2 Shots of liquor per week     Comment: x2 weekly   • Drug use: Never   • Sexual activity: Defer       REVIEW OF SYSTEMS:    Review of Systems   Constitutional: Positive for fatigue. Negative for chills and fever.        \"I feel tired.\"   HENT: Negative for congestion, hearing loss and trouble swallowing.    Eyes: Negative for discharge and redness.   Respiratory: Negative for cough, shortness of breath and wheezing.    Cardiovascular: Negative for chest pain and palpitations.   Gastrointestinal: Positive for diarrhea. Negative for abdominal pain, constipation, nausea and vomiting.        \"I take imodium.\"   Endocrine: Negative for polydipsia and polyphagia.   Genitourinary: Negative for dysuria and flank pain.   Musculoskeletal: Negative for gait problem and joint swelling.   Skin: Positive for pallor.   Allergic/Immunologic: Negative for food allergies.   Neurological: Negative for dizziness, speech difficulty and weakness.   Hematological: Negative for adenopathy. Does not bruise/bleed easily.   Psychiatric/Behavioral: Negative for agitation, confusion and hallucinations.       VITAL SIGNS: /60   Pulse 70   Temp 96.2 °F (35.7 °C)   Resp 18   Ht 185.4 cm (73\")   Wt 116 kg (255 lb 6.4 oz)   SpO2 97%   BMI 33.70 kg/m²   Pain Score    22 1051   PainSc: 0-No pain       PHYSICAL EXAMINATION:     Physical Exam  Vitals reviewed.   Constitutional:       General: He is not in acute distress.  HENT:      Head: Normocephalic and atraumatic.   Eyes:      General: No scleral icterus.  Cardiovascular:      Rate and Rhythm: Normal rate.   Pulmonary:      Effort: No respiratory distress.      Breath sounds: No wheezing or rales.   Abdominal:      General: Bowel sounds are normal.      Palpations: " Abdomen is soft.      Tenderness: There is no abdominal tenderness.   Musculoskeletal:         General: No swelling.      Cervical back: Neck supple.   Skin:     General: Skin is warm.      Coloration: Skin is pale.   Neurological:      Mental Status: He is alert and oriented to person, place, and time.   Psychiatric:         Mood and Affect: Mood normal.         Behavior: Behavior normal.         Thought Content: Thought content normal.         Judgment: Judgment normal.         LABS    Lab Results - Last 18 Months   Lab Units 01/07/22  1412 10/15/21  1133 06/18/21  1155 05/18/21  1056 04/15/21  1355 02/19/21  0442 02/15/21  1401 02/12/21  1012   HEMOGLOBIN g/dL 12.4* 13.2 12.8* 11.6* 12.9* 12.8*   < > 14.0   HEMATOCRIT % 36.3* 39.0 39.5 35.7* 39.7 37.0*   < > 40.8   MCV fL 91.4 90.5 90.8 87.3 86.9 87.3   < > 86.1   WBC 10*3/mm3 6.13 6.34 6.85 8.38 9.29 12.93*   < > 7.23   RDW % 15.0 13.3 15.7* 15.4 15.4 14.5   < > 14.0   MPV fL 10.3 10.5 11.4 10.4 10.5 10.7   < > 10.8   PLATELETS 10*3/mm3 167 177 141 151 204 179   < > 148   IMM GRAN % % 0.7* 0.5 0.4 0.6* 0.5  --   --  0.7*   NEUTROS ABS 10*3/mm3 5.12 4.89 5.50 7.03* 7.72*  --   --  5.73   LYMPHS ABS 10*3/mm3 0.38* 0.80 0.74 0.61* 0.87  --   --  0.78   MONOS ABS 10*3/mm3 0.46 0.51 0.48 0.65 0.55  --   --  0.57   EOS ABS 10*3/mm3 0.10 0.07 0.07 0.01 0.06  --   --  0.06   BASOS ABS 10*3/mm3 0.03 0.04 0.03 0.03 0.04  --   --  0.04   IMMATURE GRANS (ABS) 10*3/mm3 0.04 0.03 0.03 0.05 0.05  --   --  0.05   NRBC /100 WBC 0.0 0.0 0.0 0.0 0.0  --   --  0.0    < > = values in this interval not displayed.       Lab Results - Last 18 Months   Lab Units 01/07/22  1412 10/26/21  0944 10/15/21  1133 07/30/21  0954 06/18/21  1155 04/29/21  1008 04/15/21  1356 04/15/21  1355 02/19/21  1034 02/19/21  1034 02/19/21  0442 02/19/21  0442 02/15/21  1401 02/12/21  1011 12/12/20  0819 12/12/20  0421   GLUCOSE mg/dL 116*  --  101*  --  104*  --   --  104*  --  136*  --  127*   < > 120*   <  > 121*   SODIUM mmol/L 138  --  139  --  138  --   --  135*  --  132*  --  135*   < > 135*   < > 139   POTASSIUM mmol/L 4.0  --  4.0  --  4.3  --   --  4.4  --  4.0  --  4.0   < > 4.4   < > 4.3   CO2 mmol/L 24.0  --  27.0  --  25.0  --   --  27.0  --  29.0  --  28.0   < > 25.0   < > 23.0   CHLORIDE mmol/L 104  --  103  --  104  --   --  98  --  95*  --  97*   < > 100   < > 104   ANION GAP mmol/L 10.0  --  9.0  --  9.0  --   --  10.0  --  8.0  --  10.0   < > 10.0   < > 12.0   CREATININE mg/dL 1.19 1.10 1.13 1.40* 1.11 1.40*  --  1.21   < > 1.35*   < > 1.30*   < > 0.94   < > 0.90   BUN mg/dL 19  --  18  --  18  --   --  17  --  22  --  22   < > 16   < > 20   BUN / CREAT RATIO  16.0  --  15.9  --  16.2  --   --  14.0  --  16.3  --  16.9   < > 17.0   < > 22.2   CALCIUM mg/dL 8.8  --  8.9  --  8.7  --   --  9.0  --  9.1  --  8.7   < > 9.4   < > 9.0   EGFR IF NONAFRICN AM mL/min/1.73 60*  --  64  --  65  --   --  59*  --  52*  --  55*   < > 80   < > 84   ALK PHOS U/L 98  --  98  --  100  --  120* 122*  --   --   --   --   --  108   < > 104   TOTAL PROTEIN g/dL 6.6  --  6.7  --  6.8  --   --  7.6  --   --   --   --   --  7.0  --  6.3   ALT (SGPT) U/L 26  --  29  --  29  --   --  27  --   --   --   --   --  34  --  29   AST (SGOT) U/L 22  --  23  --  26  --   --  25  --   --   --   --   --  31  --  24   BILIRUBIN mg/dL 1.4*  --  1.7*  --  1.1  --   --  1.2  --   --   --   --   --  1.1  --  1.5*   ALBUMIN g/dL 3.70  --  4.10  --  3.90  --   --  4.20  --   --   --   --   --  4.10  --  4.00   GLOBULIN gm/dL 2.9  --  2.6  --  2.9  --   --  3.4  --   --   --   --   --  2.9  --  2.3    < > = values in this interval not displayed.       No results for input(s): MSPIKE, KAPPALAMB, IGLFLC, URICACID, FREEKAPPAL, CEA, LDH, REFLABREPO in the last [id number] hours.    Lab Results - Last 18 Months   Lab Units 01/07/22  1412 12/06/21  1505 10/15/21  1133 10/04/21  1119 09/01/21  1106 07/30/21  1000 06/18/21  1155 05/18/21  1056 04/15/21  1356  04/15/21  1355 02/17/21  0922   IRON mcg/dL 69  --  39*  --   --   --   --  31*  --  48*  --    TIBC mcg/dL 288*  --  304  --   --   --   --  288*  --  378  --    IRON SATURATION % 24  --  13*  --   --   --   --  11*  --  13*  --    FERRITIN ng/mL 269.00  --  196.10  --   --   --   --  214.00  --  125.20  --    TSH uIU/mL  --  0.559  --  4.850* 7.600* 19.100* 27.750*  --   --   --  7.400*   FOLATE ng/mL  --   --   --   --   --   --   --   --  >20.00  --   --        Delgado Desir reports a pain score of 0.      ASSESSMENT:  1.  Adenocarcinoma the lung, right lower lobe  AJCC stage:1A2 (pT1b, cN0, cM0)  Treatment status: Post right lower lobe wedge resection.  Pulmonary function test in adequate for lobectomy and node sampling.  2.  Performance status of 1.   3.  Elevated bilirubin.  Negative CT of the abdomen pelvis.  4.  Adenocarcinoma of the prostate, Preston score 7, 4+3, grade group 3.  AJCC stage: IIIA (T2b, N0, M0)  Treatment status: Neoadjuvant hormone therapy with radiation 01/03/2022.  Followed by Dr. Phan and Dr. Lujan.   5.  Normocytic anemia from chronic disease, post radiation/ADT and iron deficiency.        PLAN:  1.   Re: CT chest 01/10/2022. Stable postsurgical chest stable compared to April 29, 2021 and July 30, 2021  Cholelithiasis with tiny calculus noted in the gallbladder.  2.   Re: CT abdomen pelvis 01/10/2022.  No evidence of metastatic disease to the abdomen or pelvis.  Cholelithiasis, without evidence of cholecystitis.  3.   Re: Heme status.  Hemoglobin 12.4, hematocrit 36.3 and MCV 91.4.  Reticulocyte 2.41%.  4.   Re: CMP.  GFR 60 ml/min and bilirubin 1.4 from 1.7 from 1.1. Positive for cholelithiasis.  5.   Re: Ferritin 269 and saturation 24%.  6.  He will be seen every 6 months with CT scan for the first 3 years then annually thereafter.  7.  Continue care per primary care physician and other specialists.  8.  Plan of care discussed with patient.   Understanding expressed.  Patient able to proceed.  9.  Stable for observation, cancer.  10.  Advance Care Planning   ACP discussion was declined by the patient. Patient does not have an advance directive, information provided.   11.  CBC with differential, B12, folate, ferritin and iron panel in 3 months  12.  Return to office in 6 months with preoffice CT chest, abdomen, pelvis, CBC with differential and CMP.         I have reviewed the assessment and plan and verified the accuracy of it. No changes to assessment and plan since the information was documented. Milan Bullard MD 01/14/22       I spent 32 total minutes, face-to-face, caring for Delgado dale.  Greater than 50% of this time involved counseling and/or coordination of care as documented within this note regarding the patient's illness(es), pros and cons of various treatment options, instructions and/or risk reduction.             (Jean Marie Solis MD)  (Nick Flores MD)  (Ephraim Lujan MD)  (Jake Phan MD)  Michael Nunez MD     [Follow-Up] : a follow-up evaluation of

## 2024-08-22 NOTE — PROGRESS NOTES
"[facility] HEMATOLOGY & ONCOLOGY  [address]  [facility] [place] [facility] [address] [phone]    Patient Name: Delgado Desir  Encounter Date: 08/28/2024  YOB: 1950  Patient Number: [id number]      REASON FOR FOLLOW-UP:  Delgado \"Ramu Desir is a pleasant 74 y.o. male who is seen on follow-up for stage 1A2 carcinoma involving the right lower lobe of the lung.  Patient underwent wedge resection on 10/30/2020, inadequate pulmonary function test for lobectomy and node sampling.  He is also seen for anemia from iron deficiency.  Oral iron was resumed 8/1/2023 through 2/1/2024.  He is seen with spouse, Nava.  History is obtained from patient.  He is a reliable historian.                Oncology/Hematology History Overview Note   DIAGNOSTIC ABNORMALITIES:  He was found to have a lung nodule with history of prostate cancer.  CT abdomen pelvis 08/10/2020.  Lobulated noncalcified nodule in the right lower lobe represent a neoplastic process.  PET scan 09/18/2020. The multilobular pulmonary nodule in the right lung base measuring 2.9 x 1.6 cm is hypermetabolic and suspicious for a malignant process with a maximum intensity of 3.62 SUV. Differential considerations include primary lung malignancy as well as early metastatic disease.  No evidence of distant metastasis.  Unsuccessful CT-guided right lower lobe nodule biopsy 10/16/2020 due to right pneumothorax.  Pathology report 11/03/2020.  Invasive mucinous adenocarcinoma, grade 1, 2 cm, right lower lobe.  No visceral pleural invasion.  No lymphovascular invasion. Margins of resection free of tumor.  Distance of invasive carcinoma from closest margin 0.1 cm.  Pleural tissue biopsies no evidence of malignancy.  AJCC stage pT1b.  PD-L1 less than 1%, negative ROS1, ALK, BRAF and EGFR mutation.  Pathology report 2/16/2021, right lower lobe wedge excision, residual tumor is not present.  The " surgical margins are free of tumor.  2 benign intraparenchymal nodes benign.  1 benign node, left level 7.  1 benign no level 8.  CBC 02/19/2021 remarkable for WBC 12.9, hemoglobin 12.8 hematocrit 37.  Had a follow-up with Dr. Nick Flores 04/05/2021 post right lower lobe wedge and lymph node sampling on 02/15/2021.  Patient referred to oncology for surveillance.        PREVIOUS INTERVENTIONS:  Right lower lobe wedge resection 10/30/2020 by Dr. Flores.  His pulmonary function test is not adequate for left lower lobectomy as well as lymph node sampling.  He had undergone right lower lobe wedge 02/15/2021.      PREVIOUS INTERVENTIONS:Anemia.  Oral iron 04/15/2021 through 10/15/2021.  Resumed 08/01/2022 through 6/29/2023. Resume 8/1/2023 through 2/1/2024.     Prostate cancer    Initial Diagnosis    Prostate cancer (CMS/HCC)     7/6/2020 Procedure    PSA  21     7/30/2020 Biopsy    [facility]  Prostate Biopsy  A: Prostate, right lateral base, needle biopsy:  Prostatic adenocarcinoma, [place] score 7 (4+3)  Tumor encompasses 20% of the needle core biopsy  Grade group 3    B: Prostate, right lateral mid, needle biopsy:  Prostatic adenocarcinoma, [place]'s 7 (4+3)  Tumor encompasses 20% of the needle core biopsy  Grade group 3    C: Prostate, right lateral apex, needle biopsy:  Prostatic adenocarcinoma, [place] score 7 (3+4)  Tumor encompasses 30% of the needle core biopsy  Grade group 2  Pattern 4 is approximately 20% of the tumor    D: Prostate, right base, needle biopsy:  Prostatic adenocarcinoma, [place]'s 7 (4+3)  Tumor encompasses 10% of the needle core biopsy  Grade group 3    E: Prostate, right mid, needle biopsy:  Prostatic adenocarcinoma, [place]'s 7 (4+3)  Tumor encompasses 55% of the needle core biopsy  Grade group 3    F: Prostate, right apex, needle biopsy:  High-grade prostatic intraepithelial neoplasia    G: Prostate, left lateral base, needle biopsy:  Benign prostatic glands and stroma    H: Prostate, left lateral  mid, needle biopsy:  Benign prostatic glands and stroma    I: Prostate, left lateral apex, needle biopsy:  Benign prostatic glands and stroma    J: Prostate, left base, needle biopsy:  Benign prostatic glands and stroma    K: Prostate, left mid, needle biopsy:  Benign prostatic glands and stroma    L: Prostate, left apex, needle biopsy:  Benign prostatic glands and stroma     8/14/2020 Imaging    [facility]  CT AP  No acute abnormality of the abdomen or pelvis.  A moderate splenomegaly.  An enlarged prostate.  A stable bilateral renal cysts.  The lobulated noncalcified nodule in the right lower lobe represent a neoplastic process. Further evaluation with positron emission  tomography scan may be obtained.  The previously seen fractures of the left sacral ala is not visualized  in this study. If clinically significant, further evaluation with  radionuclide bone scan may be obtained    Bone Scan  1. No evidence of metastatic bone disease.  2. Degenerative uptake in the spine, right knee and bilateral  ankles/feet. Suspect prior left knee arthroplasty. Correlate clinically.     10/16/2020 Biopsy    Final Diagnosis   Lung, right lower lobe, fine-needle core biopsies and touch preparation:  A.  Fragments of skeletal muscle.  B.  No histologic or cytologic evidence of malignancy.        8/11/2021 Procedure    PSA 21.02     10/4/2021 Cancer Staged    Staging form: Prostate, AJCC 8th Edition  - Pathologic stage from 10/4/2021: Stage IIIA (pT2, pN0, cM0, PSA: 21, Grade Group: 3) - Signed by Milan Bullard MD on 10/4/2021     11/29/2021 - 10/10/2022 Radiation    Radiation OncologyTreatment Course:  Delgado Desir received 7000 cGy in 28 fractions to the prostate via external beam radiation therapy.     Malignant neoplasm of lower lobe of right lung   10/16/2020 Biopsy    Final Diagnosis   Lung, right lower lobe, fine-needle core biopsies and touch preparation:  A.  Fragments of skeletal muscle.  B.  No histologic or cytologic  evidence of malignancy.        4/5/2021 Initial Diagnosis    Malignant neoplasm of lower lobe of right lung (CMS/HCC)     4/15/2021 Cancer Staged    Staging form: Lung, AJCC 8th Edition  - Pathologic stage from 4/15/2021: Stage IA2 (pT1b, pN0, cM0) - Signed by Milan Bullard MD on 4/15/2021     11/29/2021 - 10/10/2022 Radiation    Radiation OncologyTreatment Course:  Delgado Desir received 7000 cGy in 28 fractions to the prostate via external beam radiation therapy.         PAST MEDICAL HISTORY:  ALLERGIES:  No Known Allergies  CURRENT MEDICATIONS:  Outpatient Encounter Medications as of 8/28/2024   Medication Sig Dispense Refill    acetaminophen (TYLENOL) 500 MG tablet Take 1 tablet by mouth Every 6 (Six) Hours As Needed for Mild Pain.      alfuzosin (UROXATRAL) 10 MG 24 hr tablet Take 1 tablet by mouth Daily.      apixaban (ELIQUIS) 5 MG tablet tablet Take 1 tablet by mouth 2 (Two) Times a Day.      Ibuprofen 200 MG capsule Take 2 capsules by mouth Daily.      levocetirizine (XYZAL) 5 MG tablet Take 1 tablet by mouth Every Night.      metoprolol tartrate (LOPRESSOR) 25 MG tablet Take 2 tablets by mouth 2 (Two) Times a Day. 180 tablet 3    Synthroid 137 MCG tablet Take 1 tablet by mouth Daily. *BRAND NAME* 30 tablet 3    therapeutic multivitamin-minerals (THERAGRAN-M) tablet Take 2 tablets by mouth Daily.      Trelegy Ellipta 100-62.5-25 MCG/ACT inhaler Inhale 1 puff Daily.      [DISCONTINUED] albuterol sulfate  (90 Base) MCG/ACT inhaler Inhale 2 puffs Every 6 (Six) Hours As Needed for Shortness of Air or Wheezing.       No facility-administered encounter medications on file as of 8/28/2024.     ADULT ILLNESSES:  Patient Active Problem List   Diagnosis Code    Prostate cancer C61    Former smoker Z87.891    Lung nodule < 6cm on CT [id number]    Abnormal PET of right lung R94.2    Pneumothorax after biopsy J95.811    Hypertension I10    Failed intubation of airway T88.4XXA    S/P total thyroidectomy E89.0     Postoperative hypothyroidism E89.0    Lung mass R91.8    Paroxysmal atrial fibrillation I48.0    Malignant neoplasm of lower lobe of right lung C34.31    Nodule of neck R22.1    S/P partial lobectomy of lung Z90.2    History of lung cancer Z85.118    Class 2 severe obesity due to excess calories with serious comorbidity and body mass index (BMI) of 35.0 to 35.9 in adult E66.01, Z68.35    Current use of long term anticoagulation Z79.01    Atrial fibrillation with RVR I48.91    Atrial fibrillation I48.91    History of radiation therapy Z92.3     SURGERIES:  Past Surgical History:   Procedure Laterality Date    CARDIAC ELECTROPHYSIOLOGY PROCEDURE N/A 5/6/2024    Procedure: Ablation atrial fibrillation;  Surgeon: Master Johnson MD;  Location:  [facility] CATH INVASIVE LOCATION;  Service: Cardiovascular;  Laterality: N/A;    CARPAL TUNNEL RELEASE Right     LOBECTOMY Right 2/15/2021    Procedure: THORACOSCOPY WITH DAVINCI ROBOT WITH RIGHT WEDGE RESECTION, MEDIASTINAL LYMPH NODE DISSECTION-RIGHT;  Surgeon: Nick Flores MD;  Location:  [facility] OR;  Service: DaVinci;  Laterality: Right;    LUNG BIOPSY      PROSTATE BIOPSY      REPLACEMENT TOTAL KNEE Left     SHOULDER SURGERY Bilateral     THORACOSCOPY Right 10/30/2020    Procedure: MEDIASTINOSCOPY, RIGHT THORACOSCOPY POSSIBLE WEDGE RESECTION WITH DAVINCI ROBOT;  Surgeon: Nick Flores MD;  Location:  [facility] OR;  Service: DaVinci;  Laterality: Right;    THYROIDECTOMY N/A 12/11/2020    Procedure: Total thyroidectomy;  Surgeon: Jean Marie Solis MD;  Location:  [facility] OR;  Service: ENT;  Laterality: N/A;     HEALTH MAINTENANCE ITEMS:  Health Maintenance Due   Topic Date Due    Pneumococcal Vaccine 65+ (1 of 2 - PCV) Never done    TDAP/TD VACCINES (1 - Tdap) Never done    ZOSTER VACCINE (1 of 2) Never done    HEPATITIS C SCREENING  Never done    ANNUAL WELLNESS VISIT  Never done    COLORECTAL CANCER SCREENING  01/23/2024    COVID-19 Vaccine (5 - 2023-24 season) 04/10/2024     "INFLUENZA VACCINE  2024       <no information>  Last Completed Colonoscopy       This patient has no relevant Health Maintenance data.          Immunization History   Administered Date(s) Administered    COVID-19 (MODERNA) 1st,2nd,3rd Dose Monovalent 2021, 2021    COVID-19 (MODERNA) BIVALENT 12+YRS 2022    COVID-19 F23 (MODERNA) 12YRS+ (SPIKEVAX) 2024     Last Completed Mammogram       This patient has no relevant Health Maintenance data.              FAMILY HISTORY:  Family History   Problem Relation Age of Onset    Hypertension Mother         New Contact    Cancer Mother     COPD Father      SOCIAL HISTORY:  Social History     Socioeconomic History    Marital status:     Number of children: 2   Tobacco Use    Smoking status: Former     Current packs/day: 0.00     Average packs/day: 1 pack/day for 33.0 years (33.0 ttl pk-yrs)     Types: Cigarettes     Start date: 1969     Quit date: 1996     Years since quittin.6    Smokeless tobacco: Never   Vaping Use    Vaping status: Never Used   Substance and Sexual Activity    Alcohol use: Yes     Alcohol/week: 2.0 standard drinks of alcohol     Types: 2 Shots of liquor per week     Comment: x2 weekly    Drug use: Never    Sexual activity: Defer       REVIEW OF SYSTEMS:    Review of Systems   Constitutional:  Positive for fatigue. Negative for chills and fever.        \"Just tired.\"   HENT:  Negative for congestion and mouth sores.    Eyes:  Negative for discharge and redness.   Respiratory:  Negative for cough and shortness of breath.    Cardiovascular:  Negative for chest pain and palpitations.   Gastrointestinal:  Negative for abdominal pain, nausea and vomiting.   Endocrine: Negative for cold intolerance and heat intolerance.   Genitourinary:  Negative for dysuria and flank pain.   Musculoskeletal:         \"Right knee is wore out.\"   Skin:  Negative for pallor.   Allergic/Immunologic: Negative for food allergies. " "  Neurological:  Negative for dizziness, speech difficulty and weakness.   Hematological:  Negative for adenopathy. Does not bruise/bleed easily.   Psychiatric/Behavioral:  Negative for agitation, confusion and hallucinations.        VITAL SIGNS: /70   Pulse 60   Temp 97.1 °F (36.2 °C)   Resp 18   Ht 185.4 cm (73\")   Wt 121 kg (266 lb 3.2 oz)   SpO2 95%   BMI 35.12 kg/m²  Lost 2 pounds.   Pain Score    08/28/24 1136   PainSc: 0-No pain       PHYSICAL EXAMINATION:     Physical Exam  Vitals reviewed.   Constitutional:       General: He is not in acute distress.  HENT:      Head: Normocephalic and atraumatic.   Eyes:      General: No scleral icterus.  Cardiovascular:      Rate and Rhythm: Normal rate.   Pulmonary:      Effort: No respiratory distress.      Breath sounds: No wheezing.   Abdominal:      General: Bowel sounds are normal.      Palpations: Abdomen is soft.      Tenderness: There is no abdominal tenderness.   Musculoskeletal:         General: No swelling.      Cervical back: Neck supple.   Skin:     Coloration: Skin is not pale.   Neurological:      Mental Status: He is alert and oriented to person, place, and time.   Psychiatric:         Mood and Affect: Mood normal.         Behavior: Behavior normal.         Thought Content: Thought content normal.         Judgment: Judgment normal.         LABS    Lab Results - Last 18 Months   Lab Units 08/28/24  1107 05/06/24  0932 03/03/24  0216 03/02/24  0912 02/01/24  1048 11/24/23  0450   HEMOGLOBIN g/dL 12.9* 12.7* 13.5 13.6 12.9* 12.0*   HEMATOCRIT % 40.3 39.7 40.7 41.2 40.1 37.6   MCV fL 90.8 91.7 90.6 89.8 92.0 93.1   WBC 10*3/mm3 6.04 6.16 8.15 7.94 6.09 9.68   RDW % 14.4 14.9 14.4 14.4 14.2 13.9   MPV fL 11.0 10.7 10.8 11.0 11.1 11.3   PLATELETS 10*3/mm3 149 160 198 174 157 178   IMM GRAN % % 0.5 0.5 0.5 0.4 0.7* 0.6*   NEUTROS ABS 10*3/mm3 4.94 5.24 6.26 6.42 5.07 8.18*   LYMPHS ABS 10*3/mm3 0.49* 0.41* 0.99 0.67* 0.50* 0.84   MONOS ABS 10*3/mm3 " "0.48 0.40 0.64 0.68 0.38 0.55   EOS ABS 10*3/mm3 0.07 0.05 0.16 0.09 0.07 0.02   BASOS ABS 10*3/mm3 0.03 0.03 0.06 0.05 0.03 0.03   IMMATURE GRANS (ABS) 10*3/mm3 0.03 0.03 0.04 0.03 0.04 0.06*   NRBC /100 WBC 0.0 0.0 0.0 0.0 0.0 0.0       Lab Results - Last 18 Months   Lab Units 05/06/24  0932 03/03/24  0216 03/02/24  0912 02/01/24  1048 11/27/23  0904 11/24/23  0450 11/23/23  1211 06/29/23  1258   GLUCOSE mg/dL 124* 115* 107* 94  --  107* 140* 109*   SODIUM mmol/L 139 141 139 138  --  139 141 138   POTASSIUM mmol/L 4.5 4.1 4.2 4.3  --  4.2 4.0 4.3   CO2 mmol/L 26.0 25.0 23.0 24.0  --  26.0 23.0 23.0   CHLORIDE mmol/L 105 105 106 105  --  105 105 104   ANION GAP mmol/L 8.0 11.0 10.0 9.0  --  8.0 13.0 11.0   CREATININE mg/dL 1.09 1.06 0.98 0.91 1.10 1.00 1.02 1.00   BUN mg/dL 16 33* 34* 22  --  24* 23 22   BUN / CREAT RATIO  14.7 31.1* 34.7* 24.2  --  24.0 22.5 22.0   CALCIUM mg/dL 8.8 8.4* 8.6 8.8  --  9.0 9.6 9.1   ALK PHOS U/L  --  112 110 108  --  88  --  113   TOTAL PROTEIN g/dL  --  6.4 6.7 6.5  --  5.9*  --  6.9   ALT (SGPT) U/L  --  30 32 31  --  30  --  37   AST (SGOT) U/L  --  20 22 23  --  20  --  23   BILIRUBIN mg/dL  --  0.9 1.0 1.3*  --  0.8  --  1.2   ALBUMIN g/dL  --  3.8 4.0 4.1  --  3.7  --  4.1   GLOBULIN gm/dL  --  2.6 2.7 2.4  --  2.2  --  2.8       No results for input(s): \"MSPIKE\", \"KAPPALAMB\", \"IGLFLC\", \"URICACID\", \"FREEKAPPAL\", \"CEA\", \"LDH\", \"REFLABREPO\" in the last 62515 hours.    Lab Results - Last 18 Months   Lab Units 05/30/24  1148 03/03/24  0216 02/15/24  1434 02/01/24  1048 12/29/23  1203 11/24/23  0450 10/25/23  1055 08/29/23  1149 08/01/23  1040 07/26/23  1026 06/29/23  1258   IRON mcg/dL  --   --   --  62  --   --   --   --  55*  --  59   TIBC mcg/dL  --   --   --  289*  --   --   --   --  247*  --  283*   IRON SATURATION (TSAT) %  --   --   --  21  --   --   --   --  22  --  21   FERRITIN ng/mL  --   --   --  354.30  --   --   --   --  536.90*  --  431.60*   TSH uIU/mL 1.970 5.850* " 2.390  --  2.530 0.137* 0.158*   < >  --    < > 5.420*    < > = values in this interval not displayed.       Delgado Desir reports a pain score of 0.       ASSESSMENT:  1.  Adenocarcinoma the lung, right lower lobe  AJCC stage:1A2 (pT1b, cN0, cM0)  Treatment status: Post right lower lobe wedge resection.  Pulmonary function test in adequate for lobectomy and node sampling.  2.  Performance status of 1.  3.  Normocytic anemia from chronic disease, post radiation/ADT and iron deficiency. Post oral iron 6/29/2023.  Resume 8/1/2023 through 2/1/2024.   4.  Adenocarcinoma of the prostate, [place] score 7, 4+3, grade group 3.  AJCC stage: IIIA (T2b, N0, M0)  Treatment status: Neoadjuvant hormone therapy with radiation 01/03/2022.  Followed by Dr. Phan and Dr. Lujan.   5.  Elevated bilirubin from cholelithiasis.  Negative CT of the abdomen pelvis.        PLAN:  1.   Re: Patient admitted 3/2/2024 for chest pain.  He was discharged 3/5/2024.  He was found to be in atrial fibrillation with rapid ventricular response.  2.   Re: Patient underwent pulsed field ablation of the pulmonary veins in combination with CTI RF ablation on 5/7/2024.  Follow-up with cardiology 6/20/2024.  Doing well post ablation.  EKG stable and off flecainide.  3.   Re: Heme status.  WBC 6.04, hemoglobin 12.9, hematocrit 40.3, MCV 90.8 and platelet 149.  4.   Re: CMP.  GFR 90.2 and bilirubin 1.6 from 0.9 from 1 from 1.3, observe.  Positive for cholelithiasis and fatty liver.  5.   Re: Ferritin 292.7, iron 59 and iron saturation 20%.   6.   Re: CT chest 7/22/2024. Mild increase in maximum diameter of a nodular band of probable scar tissue in the inferior aspect of the right upper lobe laterally near the major fissure. This measures approximately 12 x 15 mm, and is somewhat linear in shape. This may represent progressive scarring and radiation fibrosis. Given the slight change, consider repeat chest CT or  PET/CT in  3 months. Otherwise stable postoperative fibrosis in the right lower lobe with evidence of previous wedge resection. There are also a few stable pulmonary nodules within the right lung.  7.  He will be seen every 6 months with CT scan for the first 3 years then annually thereafter.  8.  Continue care per primary care physician and other specialists.  9.  Plan of care discussed with patient.  Understanding expressed.  The patient agreed to proceed.  10.  Stable for observation, adenocarcinoma the lung.    11.  Advance Care Planning  ACP discussion was declined by the patient. Patient does not have an advance directive, information provided.  12.  CBC with differential, ferritin and iron panel in 3 months.  13.  Schedule PET next week . Mild increase in maximum diameter of a nodular band of probable scar  tissue in the inferior aspect of the right upper lobe.  14.  Return to office in 6 months with preoffice CT chest, abdomen, pelvis, CBC with differential, ferritin, iron profile, and CMP.   15.  For right knee replacement 9/16/2024.            I have reviewed the assessment and plan and verified the accuracy of it. No changes to assessment and plan since the information was documented. Milan Bullard MD 08/28/24         I spent 32 total minutes, face-to-face, caring for Delgado dale. Greater than 50% of this time involved counseling and/or coordination of care as documented within this note.              (Jean Marie Solis MD)  (Nick Flores MD)  (Ephraim Lujan MD)  MD Jake Valadez MD Jeffrey Riney, MD

## 2024-08-28 ENCOUNTER — LAB (OUTPATIENT)
Dept: LAB | Facility: HOSPITAL | Age: 74
End: 2024-08-28
Payer: MEDICARE

## 2024-08-28 ENCOUNTER — TELEPHONE (OUTPATIENT)
Dept: OTOLARYNGOLOGY | Facility: CLINIC | Age: 74
End: 2024-08-28
Payer: MEDICARE

## 2024-08-28 ENCOUNTER — OFFICE VISIT (OUTPATIENT)
Dept: ONCOLOGY | Facility: CLINIC | Age: 74
End: 2024-08-28
Payer: MEDICARE

## 2024-08-28 VITALS
DIASTOLIC BLOOD PRESSURE: 70 MMHG | HEART RATE: 60 BPM | WEIGHT: 266.2 LBS | RESPIRATION RATE: 18 BRPM | HEIGHT: 73 IN | OXYGEN SATURATION: 95 % | TEMPERATURE: 97.1 F | SYSTOLIC BLOOD PRESSURE: 144 MMHG | BODY MASS INDEX: 35.28 KG/M2

## 2024-08-28 DIAGNOSIS — D50.8 IRON DEFICIENCY ANEMIA SECONDARY TO INADEQUATE DIETARY IRON INTAKE: ICD-10-CM

## 2024-08-28 DIAGNOSIS — C34.31 MALIGNANT NEOPLASM OF LOWER LOBE OF RIGHT LUNG: Primary | ICD-10-CM

## 2024-08-28 DIAGNOSIS — E89.0 POSTSURGICAL HYPOTHYROIDISM: ICD-10-CM

## 2024-08-28 DIAGNOSIS — C34.31 MALIGNANT NEOPLASM OF LOWER LOBE OF RIGHT LUNG: ICD-10-CM

## 2024-08-28 LAB
ALBUMIN SERPL-MCNC: 3.8 G/DL (ref 3.5–5.2)
ALBUMIN/GLOB SERPL: 1.4 G/DL
ALP SERPL-CCNC: 112 U/L (ref 39–117)
ALT SERPL W P-5'-P-CCNC: 26 U/L (ref 1–41)
ANION GAP SERPL CALCULATED.3IONS-SCNC: 9 MMOL/L (ref 5–15)
AST SERPL-CCNC: 24 U/L (ref 1–40)
BASOPHILS # BLD AUTO: 0.03 10*3/MM3 (ref 0–0.2)
BASOPHILS NFR BLD AUTO: 0.5 % (ref 0–1.5)
BILIRUB SERPL-MCNC: 1.6 MG/DL (ref 0–1.2)
BUN SERPL-MCNC: 16 MG/DL (ref 8–23)
BUN/CREAT SERPL: 17.8 (ref 7–25)
CALCIUM SPEC-SCNC: 8.9 MG/DL (ref 8.6–10.5)
CHLORIDE SERPL-SCNC: 103 MMOL/L (ref 98–107)
CO2 SERPL-SCNC: 26 MMOL/L (ref 22–29)
CREAT SERPL-MCNC: 0.9 MG/DL (ref 0.76–1.27)
DEPRECATED RDW RBC AUTO: 47.4 FL (ref 37–54)
EGFRCR SERPLBLD CKD-EPI 2021: 90.2 ML/MIN/1.73
EOSINOPHIL # BLD AUTO: 0.07 10*3/MM3 (ref 0–0.4)
EOSINOPHIL NFR BLD AUTO: 1.2 % (ref 0.3–6.2)
ERYTHROCYTE [DISTWIDTH] IN BLOOD BY AUTOMATED COUNT: 14.4 % (ref 12.3–15.4)
FERRITIN SERPL-MCNC: 292.7 NG/ML (ref 30–400)
GLOBULIN UR ELPH-MCNC: 2.7 GM/DL
GLUCOSE SERPL-MCNC: 110 MG/DL (ref 65–99)
HCT VFR BLD AUTO: 40.3 % (ref 37.5–51)
HGB BLD-MCNC: 12.9 G/DL (ref 13–17.7)
IMM GRANULOCYTES # BLD AUTO: 0.03 10*3/MM3 (ref 0–0.05)
IMM GRANULOCYTES NFR BLD AUTO: 0.5 % (ref 0–0.5)
IRON 24H UR-MRATE: 59 MCG/DL (ref 59–158)
IRON SATN MFR SERPL: 20 % (ref 20–50)
LYMPHOCYTES # BLD AUTO: 0.49 10*3/MM3 (ref 0.7–3.1)
LYMPHOCYTES NFR BLD AUTO: 8.1 % (ref 19.6–45.3)
MCH RBC QN AUTO: 29.1 PG (ref 26.6–33)
MCHC RBC AUTO-ENTMCNC: 32 G/DL (ref 31.5–35.7)
MCV RBC AUTO: 90.8 FL (ref 79–97)
MONOCYTES # BLD AUTO: 0.48 10*3/MM3 (ref 0.1–0.9)
MONOCYTES NFR BLD AUTO: 7.9 % (ref 5–12)
NEUTROPHILS NFR BLD AUTO: 4.94 10*3/MM3 (ref 1.7–7)
NEUTROPHILS NFR BLD AUTO: 81.8 % (ref 42.7–76)
NRBC BLD AUTO-RTO: 0 /100 WBC (ref 0–0.2)
PLATELET # BLD AUTO: 149 10*3/MM3 (ref 140–450)
PMV BLD AUTO: 11 FL (ref 6–12)
POTASSIUM SERPL-SCNC: 4.2 MMOL/L (ref 3.5–5.2)
PROT SERPL-MCNC: 6.5 G/DL (ref 6–8.5)
RBC # BLD AUTO: 4.44 10*6/MM3 (ref 4.14–5.8)
SODIUM SERPL-SCNC: 138 MMOL/L (ref 136–145)
TIBC SERPL-MCNC: 298 MCG/DL (ref 298–536)
TRANSFERRIN SERPL-MCNC: 200 MG/DL (ref 200–360)
TSH SERPL DL<=0.05 MIU/L-ACNC: 4.29 UIU/ML (ref 0.27–4.2)
WBC NRBC COR # BLD AUTO: 6.04 10*3/MM3 (ref 3.4–10.8)

## 2024-08-28 PROCEDURE — 85025 COMPLETE CBC W/AUTO DIFF WBC: CPT

## 2024-08-28 PROCEDURE — 1159F MED LIST DOCD IN RCRD: CPT | Performed by: INTERNAL MEDICINE

## 2024-08-28 PROCEDURE — 3078F DIAST BP <80 MM HG: CPT | Performed by: INTERNAL MEDICINE

## 2024-08-28 PROCEDURE — 84443 ASSAY THYROID STIM HORMONE: CPT

## 2024-08-28 PROCEDURE — 83540 ASSAY OF IRON: CPT

## 2024-08-28 PROCEDURE — 99214 OFFICE O/P EST MOD 30 MIN: CPT | Performed by: INTERNAL MEDICINE

## 2024-08-28 PROCEDURE — 80053 COMPREHEN METABOLIC PANEL: CPT

## 2024-08-28 PROCEDURE — 3077F SYST BP >= 140 MM HG: CPT | Performed by: INTERNAL MEDICINE

## 2024-08-28 PROCEDURE — 82728 ASSAY OF FERRITIN: CPT

## 2024-08-28 PROCEDURE — 1160F RVW MEDS BY RX/DR IN RCRD: CPT | Performed by: INTERNAL MEDICINE

## 2024-08-28 PROCEDURE — 84466 ASSAY OF TRANSFERRIN: CPT

## 2024-08-28 PROCEDURE — 1126F AMNT PAIN NOTED NONE PRSNT: CPT | Performed by: INTERNAL MEDICINE

## 2024-08-28 PROCEDURE — 36415 COLL VENOUS BLD VENIPUNCTURE: CPT

## 2024-08-28 NOTE — LETTER
"August 28, 2024       No Recipients    Patient: Delgado Desir   YOB: 1950   Date of Visit: 8/28/2024     Dear Abner Connolly MD:       Thank you for referring Delgado Desir to me for evaluation. Below are the relevant portions of my assessment and plan of care.    If you have questions, please do not hesitate to call me. I look forward to following Delgado along with you.         Sincerely,        Milan Bullard MD        CC:   No Recipients    Milan Bullard MD  08/28/24 1202  Sign when Signing Visit  [facility] HEMATOLOGY & [facility]COLOGY  [address]  Located within [facility] [address] [phone]    Patient Name: Delgado Desir  Encounter Date: 08/28/2024  YOB: 1950  Patient Number: [id number]      REASON FOR FOLLOW-UP:  Delgado Desir (Phil\) is a pleasant 74 y.o. male who is seen on follow-up for stage 1A2 carcinoma involving the right lower lobe of the lung.  Patient underwent wedge resection on 10/30/2020, inadequate pulmonary function test for lobectomy and node sampling.  He is also seen for anemia from iron deficiency.  Oral iron was resumed 8/1/2023 through 2/1/2024.  He is seen with spouse, Nava.  History is obtained from patient.  He is a reliable historian.                Oncology/Hematology History Overview Note   DIAGNOSTIC ABNORMALITIES:  He was found to have a lung nodule with history of prostate cancer.  CT abdomen pelvis 08/10/2020.  Lobulated noncalcified nodule in the right lower lobe represent a neoplastic process.  PET scan 09/18/2020. The multilobular pulmonary nodule in the right lung base measuring 2.9 x 1.6 cm is hypermetabolic and suspicious for a malignant process with a maximum intensity of 3.62 SUV. Differential considerations include primary lung malignancy as well as early metastatic disease.  No evidence of distant metastasis.  Unsuccessful CT-guided right lower lobe nodule biopsy " 10/16/2020 due to right pneumothorax.  Pathology report 11/03/2020.  Invasive mucinous adenocarcinoma, grade 1, 2 cm, right lower lobe.  No visceral pleural invasion.  No lymphovascular invasion. Margins of resection free of tumor.  Distance of invasive carcinoma from closest margin 0.1 cm.  Pleural tissue biopsies no evidence of malignancy.  AJCC stage pT1b.  PD-L1 less than 1%, negative ROS1, ALK, BRAF and EGFR mutation.  Pathology report 2/16/2021, right lower lobe wedge excision, residual tumor is not present.  The surgical margins are free of tumor.  2 benign intraparenchymal nodes benign.  1 benign node, left level 7.  1 benign no level 8.  CBC 02/19/2021 remarkable for WBC 12.9, hemoglobin 12.8 hematocrit 37.  Had a follow-up with Dr. Nick Flores 04/05/2021 post right lower lobe wedge and lymph node sampling on 02/15/2021.  Patient referred to oncology for surveillance.        PREVIOUS INTERVENTIONS:  Right lower lobe wedge resection 10/30/2020 by Dr. Flores.  His pulmonary function test is not adequate for left lower lobectomy as well as lymph node sampling.  He had undergone right lower lobe wedge 02/15/2021.      PREVIOUS INTERVENTIONS:Anemia.  Oral iron 04/15/2021 through 10/15/2021.  Resumed 08/01/2022 through 6/29/2023. Resume 8/1/2023 through 2/1/2024.     Prostate cancer    Initial Diagnosis    Prostate cancer (CMS/HCC)     7/6/2020 Procedure    PSA  21     7/30/2020 Biopsy    [facility]  Prostate Biopsy  A: Prostate, right lateral base, needle biopsy:  Prostatic adenocarcinoma, Preston score 7 (4+3)  Tumor encompasses 20% of the needle core biopsy  Grade group 3    B: Prostate, right lateral mid, needle biopsy:  Prostatic adenocarcinoma, [place]'s 7 (4+3)  Tumor encompasses 20% of the needle core biopsy  Grade group 3    C: Prostate, right lateral apex, needle biopsy:  Prostatic adenocarcinoma, Preston score 7 (3+4)  Tumor encompasses 30% of the needle core biopsy  Grade group 2  Pattern 4 is approximately  20% of the tumor    D: Prostate, right base, needle biopsy:  Prostatic adenocarcinoma, [place]itter's 7 (4+3)  Tumor encompasses 10% of the needle core biopsy  Grade group 3    E: Prostate, right mid, needle biopsy:  Prostatic adenocarcinoma, Preston's 7 (4+3)  Tumor encompasses 55% of the needle core biopsy  Grade group 3    F: Prostate, right apex, needle biopsy:  High-grade prostatic intraepithelial neoplasia    G: Prostate, left lateral base, needle biopsy:  Benign prostatic glands and stroma    H: Prostate, left lateral mid, needle biopsy:  Benign prostatic glands and stroma    I: Prostate, left lateral apex, needle biopsy:  Benign prostatic glands and stroma    J: Prostate, left base, needle biopsy:  Benign prostatic glands and stroma    K: Prostate, left mid, needle biopsy:  Benign prostatic glands and stroma    L: Prostate, left apex, needle biopsy:  Benign prostatic glands and stroma     8/14/2020 Imaging    [facility]  CT AP  No acute abnormality of the abdomen or pelvis.  A moderate splenomegaly.  An enlarged prostate.  A stable bilateral renal cysts.  The lobulated noncalcified nodule in the right lower lobe represent a neoplastic process. Further evaluation with positron emission  tomography scan may be obtained.  The previously seen fractures of the left sacral ala is not visualized  in this study. If clinically significant, further evaluation with  radionuclide bone scan may be obtained    Bone Scan  1. No evidence of metastatic bone disease.  2. Degenerative uptake in the spine, right knee and bilateral  ankles/feet. Suspect prior left knee arthroplasty. Correlate clinically.     10/16/2020 Biopsy    Final Diagnosis   Lung, right lower lobe, fine-needle core biopsies and touch preparation:  A.  Fragments of skeletal muscle.  B.  No histologic or cytologic evidence of malignancy.        8/11/2021 Procedure    PSA 21.02     10/4/2021 Cancer Staged    Staging form: Prostate, AJCC 8th Edition  - Pathologic stage  from 10/4/2021: Stage IIIA (pT2, pN0, cM0, PSA: 21, Grade Group: 3) - Signed by Milan Bullard MD on 10/4/2021     11/29/2021 - 10/10/2022 Radiation    Radiation OncologyTreatment Course:  Delgado Desir received 7000 cGy in 28 fractions to the prostate via external beam radiation therapy.     Malignant neoplasm of lower lobe of right lung   10/16/2020 Biopsy    Final Diagnosis   Lung, right lower lobe, fine-needle core biopsies and touch preparation:  A.  Fragments of skeletal muscle.  B.  No histologic or cytologic evidence of malignancy.        4/5/2021 Initial Diagnosis    Malignant neoplasm of lower lobe of right lung (CMS/HCC)     4/15/2021 Cancer Staged    Staging form: Lung, AJCC 8th Edition  - Pathologic stage from 4/15/2021: Stage IA2 (pT1b, pN0, cM0) - Signed by Milan Bullard MD on 4/15/2021     11/29/2021 - 10/10/2022 Radiation    Radiation OncologyTreatment Course:  Delgado Desir received 7000 cGy in 28 fractions to the prostate via external beam radiation therapy.         PAST MEDICAL HISTORY:  ALLERGIES:  No Known Allergies  CURRENT MEDICATIONS:  Outpatient Encounter Medications as of 8/28/2024   Medication Sig Dispense Refill   • acetaminophen (TYLENOL) 500 MG tablet Take 1 tablet by mouth Every 6 (Six) Hours As Needed for Mild Pain.     • alfuzosin (UROXATRAL) 10 MG 24 hr tablet Take 1 tablet by mouth Daily.     • apixaban (ELIQUIS) 5 MG tablet tablet Take 1 tablet by mouth 2 (Two) Times a Day.     • Ibuprofen 200 MG capsule Take 2 capsules by mouth Daily.     • levocetirizine (XYZAL) 5 MG tablet Take 1 tablet by mouth Every Night.     • metoprolol tartrate (LOPRESSOR) 25 MG tablet Take 2 tablets by mouth 2 (Two) Times a Day. 180 tablet 3   • Synthroid 137 MCG tablet Take 1 tablet by mouth Daily. *BRAND NAME* 30 tablet 3   • therapeutic multivitamin-minerals (THERAGRAN-M) tablet Take 2 tablets by mouth Daily.     • Trelegy Ellipta 100-62.5-25 MCG/ACT inhaler Inhale 1 puff Daily.     •  [DISCONTINUED] albuterol sulfate  (90 Base) MCG/ACT inhaler Inhale 2 puffs Every 6 (Six) Hours As Needed for Shortness of Air or Wheezing.       No facility-administered encounter medications on file as of 8/28/2024.     ADULT ILLNESSES:  Patient Active Problem List   Diagnosis Code   • Prostate cancer C61   • Former smoker Z87.891   • Lung nodule < 6cm on CT [id number]   • Abnormal PET of right lung R94.2   • Pneumothorax after biopsy J95.811   • Hypertension I10   • Failed intubation of airway T88.4XXA   • S/P total thyroidectomy E89.0   • Postoperative hypothyroidism E89.0   • Lung mass R91.8   • Paroxysmal atrial fibrillation I48.0   • Malignant neoplasm of lower lobe of right lung C34.31   • Nodule of neck R22.1   • S/P partial lobectomy of lung Z90.2   • History of lung cancer Z85.118   • Class 2 severe obesity due to excess calories with serious comorbidity and body mass index (BMI) of 35.0 to 35.9 in adult E66.01, Z68.35   • Current use of long term anticoagulation Z79.01   • Atrial fibrillation with RVR I48.91   • Atrial fibrillation I48.91   • History of radiation therapy Z92.3     SURGERIES:  Past Surgical History:   Procedure Laterality Date   • CARDIAC ELECTROPHYSIOLOGY PROCEDURE N/A 5/6/2024    Procedure: Ablation atrial fibrillation;  Surgeon: Master Johnson MD;  Location: [facility] CATH INVASIVE LOCATION;  Service: Cardiovascular;  Laterality: N/A;   • CARPAL TUNNEL RELEASE Right    • LOBECTOMY Right 2/15/2021    Procedure: THORACOSCOPY WITH DAVINCI ROBOT WITH RIGHT WEDGE RESECTION, MEDIASTINAL LYMPH NODE DISSECTION-RIGHT;  Surgeon: Nick Flores MD;  Location: [facility] OR;  Service: [facility];  Laterality: Right;   • LUNG BIOPSY     • PROSTATE BIOPSY     • REPLACEMENT TOTAL KNEE Left    • SHOULDER SURGERY Bilateral    • THORACOSCOPY Right 10/30/2020    Procedure: MEDIASTINOSCOPY, RIGHT THORACOSCOPY POSSIBLE WEDGE RESECTION WITH DAVINCI ROBOT;  Surgeon: Nick Flores MD;  Location: [facility] OR;   Service: DaVinci;  Laterality: Right;   • THYROIDECTOMY N/A 2020    Procedure: Total thyroidectomy;  Surgeon: Jean Marie Solis MD;  Location: [facility];  Service: ENT;  Laterality: N/A;     HEALTH MAINTENANCE ITEMS:  Health Maintenance Due   Topic Date Due   • Pneumococcal Vaccine 65+ (1 of 2 - PCV) Never done   • TDAP/TD VACCINES (1 - Tdap) Never done   • ZOSTER VACCINE (1 of 2) Never done   • HEPATITIS C SCREENING  Never done   • ANNUAL WELLNESS VISIT  Never done   • COLORECTAL CANCER SCREENING  2024   • COVID-19 Vaccine ( season) 04/10/2024   • INFLUENZA VACCINE  2024       <no information>  Last Completed Colonoscopy       This patient has no relevant Health Maintenance data.          Immunization History   Administered Date(s) Administered   • COVID-19 (MODERNA) 1st,2nd,3rd Dose Monovalent 2021, 2021   • COVID-19 (MODERNA) BIVALENT 12+YRS 2022   • COVID-19 F23 (MODERNA) 12YRS+ (SPIKEVAX) 2024     Last Completed Mammogram       This patient has no relevant Health Maintenance data.              FAMILY HISTORY:  Family History   Problem Relation Age of Onset   • Hypertension Mother         New Contact   • Cancer Mother    • COPD Father      SOCIAL HISTORY:  Social History     Socioeconomic History   • Marital status:    • Number of children: 2   Tobacco Use   • Smoking status: Former     Current packs/day: 0.00     Average packs/day: 1 pack/day for 33.0 years (33.0 ttl pk-yrs)     Types: Cigarettes     Start date: 1969     Quit date: 1996     Years since quittin.6   • Smokeless tobacco: Never   Vaping Use   • Vaping status: Never Used   Substance and Sexual Activity   • Alcohol use: Yes     Alcohol/week: 2.0 standard drinks of alcohol     Types: 2 Shots of liquor per week     Comment: x2 weekly   • Drug use: Never   • Sexual activity: Defer       REVIEW OF SYSTEMS:    Review of Systems   Constitutional:  Positive for fatigue. Negative for  "chills and fever.        \"Just tired.\"   HENT:  Negative for congestion and mouth sores.    Eyes:  Negative for discharge and redness.   Respiratory:  Negative for cough and shortness of breath.    Cardiovascular:  Negative for chest pain and palpitations.   Gastrointestinal:  Negative for abdominal pain, nausea and vomiting.   Endocrine: Negative for cold intolerance and heat intolerance.   Genitourinary:  Negative for dysuria and flank pain.   Musculoskeletal:         \"Right knee is wore out.\"   Skin:  Negative for pallor.   Allergic/Immunologic: Negative for food allergies.   Neurological:  Negative for dizziness, speech difficulty and weakness.   Hematological:  Negative for adenopathy. Does not bruise/bleed easily.   Psychiatric/Behavioral:  Negative for agitation, confusion and hallucinations.        VITAL SIGNS: /70   Pulse 60   Temp 97.1 °F (36.2 °C)   Resp 18   Ht 185.4 cm (73\")   Wt 121 kg (266 lb 3.2 oz)   SpO2 95%   BMI 35.12 kg/m²  Lost 2 pounds.   Pain Score    08/28/24 1136   PainSc: 0-No pain       PHYSICAL EXAMINATION:     Physical Exam  Vitals reviewed.   Constitutional:       General: He is not in acute distress.  HENT:      Head: Normocephalic and atraumatic.   Eyes:      General: No scleral icterus.  Cardiovascular:      Rate and Rhythm: Normal rate.   Pulmonary:      Effort: No respiratory distress.      Breath sounds: No wheezing.   Abdominal:      General: Bowel sounds are normal.      Palpations: Abdomen is soft.      Tenderness: There is no abdominal tenderness.   Musculoskeletal:         General: No swelling.      Cervical back: Neck supple.   Skin:     Coloration: Skin is not pale.   Neurological:      Mental Status: He is alert and oriented to person, place, and time.   Psychiatric:         Mood and Affect: Mood normal.         Behavior: Behavior normal.         Thought Content: Thought content normal.         Judgment: Judgment normal.         LABS    Lab Results - Last 18 " "Months   Lab Units 08/28/24  1107 05/06/24  0932 03/03/24  0216 03/02/24  0912 02/01/24  1048 11/24/23  0450   HEMOGLOBIN g/dL 12.9* 12.7* 13.5 13.6 12.9* 12.0*   HEMATOCRIT % 40.3 39.7 40.7 41.2 40.1 37.6   MCV fL 90.8 91.7 90.6 89.8 92.0 93.1   WBC 10*3/mm3 6.04 6.16 8.15 7.94 6.09 9.68   RDW % 14.4 14.9 14.4 14.4 14.2 13.9   MPV fL 11.0 10.7 10.8 11.0 11.1 11.3   PLATELETS 10*3/mm3 149 160 198 174 157 178   IMM GRAN % % 0.5 0.5 0.5 0.4 0.7* 0.6*   NEUTROS ABS 10*3/mm3 4.94 5.24 6.26 6.42 5.07 8.18*   LYMPHS ABS 10*3/mm3 0.49* 0.41* 0.99 0.67* 0.50* 0.84   MONOS ABS 10*3/mm3 0.48 0.40 0.64 0.68 0.38 0.55   EOS ABS 10*3/mm3 0.07 0.05 0.16 0.09 0.07 0.02   BASOS ABS 10*3/mm3 0.03 0.03 0.06 0.05 0.03 0.03   IMMATURE GRANS (ABS) 10*3/mm3 0.03 0.03 0.04 0.03 0.04 0.06*   NRBC /100 WBC 0.0 0.0 0.0 0.0 0.0 0.0       Lab Results - Last 18 Months   Lab Units 05/06/24  0932 03/03/24  0216 03/02/24  0912 02/01/24  1048 11/27/23  0904 11/24/23  0450 11/23/23  1211 06/29/23  1258   GLUCOSE mg/dL 124* 115* 107* 94  --  107* 140* 109*   SODIUM mmol/L 139 141 139 138  --  139 141 138   POTASSIUM mmol/L 4.5 4.1 4.2 4.3  --  4.2 4.0 4.3   CO2 mmol/L 26.0 25.0 23.0 24.0  --  26.0 23.0 23.0   CHLORIDE mmol/L 105 105 106 105  --  105 105 104   ANION GAP mmol/L 8.0 11.0 10.0 9.0  --  8.0 13.0 11.0   CREATININE mg/dL 1.09 1.06 0.98 0.91 1.10 1.00 1.02 1.00   BUN mg/dL 16 33* 34* 22  --  24* 23 22   BUN / CREAT RATIO  14.7 31.1* 34.7* 24.2  --  24.0 22.5 22.0   CALCIUM mg/dL 8.8 8.4* 8.6 8.8  --  9.0 9.6 9.1   ALK PHOS U/L  --  112 110 108  --  88  --  113   TOTAL PROTEIN g/dL  --  6.4 6.7 6.5  --  5.9*  --  6.9   ALT (SGPT) U/L  --  30 32 31  --  30  --  37   AST (SGOT) U/L  --  20 22 23  --  20  --  23   BILIRUBIN mg/dL  --  0.9 1.0 1.3*  --  0.8  --  1.2   ALBUMIN g/dL  --  3.8 4.0 4.1  --  3.7  --  4.1   GLOBULIN gm/dL  --  2.6 2.7 2.4  --  2.2  --  2.8       No results for input(s): \"MSPIKE\", \"KAPPALAMB\", \"IGLFLC\", \"URICACID\", " "\"FREEKAPPAL\", \"CEA\", \"LDH\", \"REFLABREPO\" in the last 20033 hours.    Lab Results - Last 18 Months   Lab Units 05/30/24  1148 03/03/24  0216 02/15/24  1434 02/01/24  1048 12/29/23  1203 11/24/23  0450 10/25/23  1055 08/29/23  1149 08/01/23  1040 07/26/23  1026 06/29/23  1258   IRON mcg/dL  --   --   --  62  --   --   --   --  55*  --  59   TIBC mcg/dL  --   --   --  289*  --   --   --   --  247*  --  283*   IRON SATURATION (TSAT) %  --   --   --  21  --   --   --   --  22  --  21   FERRITIN ng/mL  --   --   --  354.30  --   --   --   --  536.90*  --  431.60*   TSH uIU/mL 1.970 5.850* 2.390  --  2.530 0.137* 0.158*   < >  --    < > 5.420*    < > = values in this interval not displayed.       Delgado Desir reports a pain score of 0.       ASSESSMENT:  1.  Adenocarcinoma the lung, right lower lobe  AJCC stage:1A2 (pT1b, cN0, cM0)  Treatment status: Post right lower lobe wedge resection.  Pulmonary function test in adequate for lobectomy and node sampling.  2.  Performance status of 1.  3.  Normocytic anemia from chronic disease, post radiation/ADT and iron deficiency. Post oral iron 6/29/2023.  Resume 8/1/2023 through 2/1/2024.   4.  Adenocarcinoma of the prostate, Gallagher score 7, 4+3, grade group 3.  AJCC stage: IIIA (T2b, N0, M0)  Treatment status: Neoadjuvant hormone therapy with radiation 01/03/2022.  Followed by Dr. Phan and Dr. Lujan.   5.  Elevated bilirubin from cholelithiasis.  Negative CT of the abdomen pelvis.        PLAN:  1.   Re: Patient admitted 3/2/2024 for chest pain.  He was discharged 3/5/2024.  He was found to be in atrial fibrillation with rapid ventricular response.  2.   Re: Patient underwent pulsed field ablation of the pulmonary veins in combination with CTI RF ablation on 5/7/2024.  Follow-up with cardiology 6/20/2024.  Doing well post ablation.  EKG stable and off flecainide.  3.   Re: Heme status.  WBC 6.04, hemoglobin 12.9, hematocrit 40.3, MCV 90.8 and platelet " 149.  4.   Re: CMP.  GFR 90.2 and bilirubin 1.6 from 0.9 from 1 from 1.3, observe.  Positive for cholelithiasis and fatty liver.  5.   Re: Ferritin 292.7, iron 59 and iron saturation 20%.   6.   Re: CT chest 7/22/2024. Mild increase in maximum diameter of a nodular band of probable scar tissue in the inferior aspect of the right upper lobe laterally near the major fissure. This measures approximately 12 x 15 mm, and is somewhat linear in shape. This may represent progressive scarring and radiation fibrosis. Given the slight change, consider repeat chest CT or PET/CT in  3 months. Otherwise stable postoperative fibrosis in the right lower lobe with evidence of previous wedge resection. There are also a few stable pulmonary nodules within the right lung.  7.  He will be seen every 6 months with CT scan for the first 3 years then annually thereafter.  8.  Continue care per primary care physician and other specialists.  9.  Plan of care discussed with patient.  Understanding expressed.  The patient agreed to proceed.  10.  Stable for observation, adenocarcinoma the lung.    11.  Advance Care Planning  ACP discussion was declined by the patient. Patient does not have an advance directive, information provided.  12.  CBC with differential, ferritin and iron panel in 3 months.  13.  Schedule PET next week . Mild increase in maximum diameter of a nodular band of probable scar  tissue in the inferior aspect of the right upper lobe.  14.  Return to office in 6 months with preoffice CT chest, abdomen, pelvis, CBC with differential, ferritin, iron profile, and CMP.   15.  For right knee replacement 9/16/2024.            I have reviewed the assessment and plan and verified the accuracy of it. No changes to assessment and plan since the information was documented. Milan Bullard MD 08/28/24         I spent 32 total minutes, face-to-face, caring for Delgado today. Greater than 50% of this time involved  counseling and/or coordination of care as documented within this note.              (eJan Marie Solis MD)  (Nick Flores MD)  (Ephraim Lujan MD)  MD Jake Valadez MD Jeffrey Riney, MD

## 2024-08-29 ENCOUNTER — HOSPITAL ENCOUNTER (OUTPATIENT)
Dept: PREADMISSION TESTING | Age: 74
Discharge: HOME | End: 2024-09-02
Payer: MEDICARE

## 2024-08-29 VITALS — WEIGHT: 263 LBS | BODY MASS INDEX: 34.7 KG/M2

## 2024-08-29 LAB
APTT PPP: 35.8 SEC (ref 26–36.2)
BILIRUB UR QL STRIP: NEGATIVE
CLARITY UR: CLEAR
COLOR UR: ABNORMAL
GLUCOSE UR STRIP.AUTO-MCNC: NEGATIVE MG/DL
HGB UR STRIP.AUTO-MCNC: NEGATIVE MG/L
INR PPP: 1.35 (ref 0.88–1.18)
KETONES UR STRIP.AUTO-MCNC: NEGATIVE MG/DL
LEUKOCYTE ESTERASE UR QL STRIP.AUTO: NEGATIVE
MRSA DNA SPEC QL NAA+PROBE: NOT DETECTED
NITRITE UR QL STRIP.AUTO: NEGATIVE
PH UR STRIP.AUTO: 6 [PH] (ref 5–8)
PROT UR STRIP.AUTO-MCNC: NEGATIVE MG/DL
PROTHROMBIN TIME: 16.3 SEC (ref 12–14.6)
SP GR UR STRIP.AUTO: 1.02 (ref 1–1.03)
UROBILINOGEN UR STRIP.AUTO-MCNC: 1 E.U./DL

## 2024-08-29 PROCEDURE — 85610 PROTHROMBIN TIME: CPT

## 2024-08-29 PROCEDURE — 87641 MR-STAPH DNA AMP PROBE: CPT

## 2024-08-29 PROCEDURE — 81003 URINALYSIS AUTO W/O SCOPE: CPT

## 2024-08-29 PROCEDURE — 85730 THROMBOPLASTIN TIME PARTIAL: CPT

## 2024-08-29 RX ORDER — ACETAMINOPHEN 500 MG
1000 TABLET ORAL ONCE
OUTPATIENT
Start: 2024-09-16

## 2024-08-29 RX ORDER — CELECOXIB 200 MG/1
200 CAPSULE ORAL ONCE
OUTPATIENT
Start: 2024-09-16

## 2024-08-29 RX ORDER — OXYCODONE HCL 10 MG/1
10 TABLET, FILM COATED, EXTENDED RELEASE ORAL ONCE
OUTPATIENT
Start: 2024-09-16

## 2024-08-29 RX ORDER — PREGABALIN 75 MG/1
75 CAPSULE ORAL ONCE
OUTPATIENT
Start: 2024-09-16

## 2024-08-29 RX ORDER — DEXAMETHASONE SODIUM PHOSPHATE 10 MG/ML
10 INJECTION, SOLUTION INTRAMUSCULAR; INTRAVENOUS ONCE
OUTPATIENT
Start: 2024-09-16

## 2024-08-29 RX ORDER — LEVOTHYROXINE SODIUM 137 UG/1
137 TABLET ORAL DAILY
COMMUNITY

## 2024-09-09 ENCOUNTER — HOSPITAL ENCOUNTER (OUTPATIENT)
Dept: CT IMAGING | Facility: HOSPITAL | Age: 74
Discharge: HOME | End: 2024-09-09
Payer: MEDICARE

## 2024-09-09 DIAGNOSIS — C34.31 MALIGNANT NEOPLASM OF LOWER LOBE OF RIGHT LUNG: ICD-10-CM

## 2024-09-09 DIAGNOSIS — D50.8 IRON DEFICIENCY ANEMIA SECONDARY TO INADEQUATE DIETARY IRON INTAKE: ICD-10-CM

## 2024-09-09 PROCEDURE — A9552 F18 FDG: HCPCS | Performed by: INTERNAL MEDICINE

## 2024-09-09 PROCEDURE — 78815 PET IMAGE W/CT SKULL-THIGH: CPT

## 2024-09-09 PROCEDURE — 0 FLUDEOXYGLUCOSE F18 SOLUTION: Performed by: INTERNAL MEDICINE

## 2024-09-09 RX ADMIN — FLUDEOXYGLUCOSE F 18 1 DOSE: 200 INJECTION, SOLUTION INTRAVENOUS at 13:30

## 2024-09-12 ENCOUNTER — TELEPHONE (OUTPATIENT)
Dept: ONCOLOGY | Facility: CLINIC | Age: 74
End: 2024-09-12
Payer: MEDICARE

## 2024-09-12 NOTE — TELEPHONE ENCOUNTER
----- Message from Milan Bullard sent at 9/10/2024  7:38 AM CDT -----  Notify patient.  Await his repeat CT 6 months from his last visit    Solid right upper lobe nodule inferiorly in the right upper lobe  again shows no significant increased tracer uptake. Nodularity may have  increased slightly from 1/30/2023. Continued follow-up with diagnostic  CT chest in 6 months is recommended.        No PET evidence for metastatic disease to the abdomen, pelvis, or  skeleton.      Fax report to Dr. Phan.  Prostatic hypertrophy with focus of low-grade tracer uptake  inferiorly at the prostate apex. Correlate with PSA recommended.

## 2024-09-16 ENCOUNTER — APPOINTMENT (OUTPATIENT)
Dept: GENERAL RADIOLOGY | Age: 74
End: 2024-09-16
Attending: ORTHOPAEDIC SURGERY
Payer: MEDICARE

## 2024-09-16 ENCOUNTER — HOSPITAL ENCOUNTER (OUTPATIENT)
Age: 74
Setting detail: OBSERVATION
Discharge: HOME HEALTH | End: 2024-09-18
Attending: ORTHOPAEDIC SURGERY | Admitting: ORTHOPAEDIC SURGERY
Payer: MEDICARE

## 2024-09-16 ENCOUNTER — ANESTHESIA (OUTPATIENT)
Dept: OPERATING ROOM | Age: 74
End: 2024-09-16
Payer: MEDICARE

## 2024-09-16 ENCOUNTER — ANESTHESIA EVENT (OUTPATIENT)
Dept: OPERATING ROOM | Age: 74
End: 2024-09-16
Payer: MEDICARE

## 2024-09-16 DIAGNOSIS — M17.11 PRIMARY OSTEOARTHRITIS OF RIGHT KNEE: Primary | ICD-10-CM

## 2024-09-16 PROBLEM — K59.03 DRUG-INDUCED CONSTIPATION: Status: ACTIVE | Noted: 2024-09-16

## 2024-09-16 PROBLEM — I48.0 PAROXYSMAL ATRIAL FIBRILLATION (HCC): Status: ACTIVE | Noted: 2024-09-16

## 2024-09-16 PROBLEM — M17.10 ARTHRITIS OF KNEE: Status: ACTIVE | Noted: 2024-09-16

## 2024-09-16 PROBLEM — R35.0 BENIGN PROSTATIC HYPERPLASIA WITH URINARY FREQUENCY: Status: ACTIVE | Noted: 2022-03-14

## 2024-09-16 PROCEDURE — 7100000000 HC PACU RECOVERY - FIRST 15 MIN: Performed by: ORTHOPAEDIC SURGERY

## 2024-09-16 PROCEDURE — C1776 JOINT DEVICE (IMPLANTABLE): HCPCS | Performed by: ORTHOPAEDIC SURGERY

## 2024-09-16 PROCEDURE — 94640 AIRWAY INHALATION TREATMENT: CPT

## 2024-09-16 PROCEDURE — 6370000000 HC RX 637 (ALT 250 FOR IP): Performed by: ORTHOPAEDIC SURGERY

## 2024-09-16 PROCEDURE — G0378 HOSPITAL OBSERVATION PER HR: HCPCS

## 2024-09-16 PROCEDURE — 3600000015 HC SURGERY LEVEL 5 ADDTL 15MIN: Performed by: ORTHOPAEDIC SURGERY

## 2024-09-16 PROCEDURE — 3600000005 HC SURGERY LEVEL 5 BASE: Performed by: ORTHOPAEDIC SURGERY

## 2024-09-16 PROCEDURE — 3700000001 HC ADD 15 MINUTES (ANESTHESIA): Performed by: ORTHOPAEDIC SURGERY

## 2024-09-16 PROCEDURE — 6360000002 HC RX W HCPCS: Performed by: ORTHOPAEDIC SURGERY

## 2024-09-16 PROCEDURE — C9290 INJ, BUPIVACAINE LIPOSOME: HCPCS | Performed by: ORTHOPAEDIC SURGERY

## 2024-09-16 PROCEDURE — 94760 N-INVAS EAR/PLS OXIMETRY 1: CPT

## 2024-09-16 PROCEDURE — 2500000003 HC RX 250 WO HCPCS: Performed by: ANESTHESIOLOGY

## 2024-09-16 PROCEDURE — 7100000001 HC PACU RECOVERY - ADDTL 15 MIN: Performed by: ORTHOPAEDIC SURGERY

## 2024-09-16 PROCEDURE — 2709999900 HC NON-CHARGEABLE SUPPLY: Performed by: ORTHOPAEDIC SURGERY

## 2024-09-16 PROCEDURE — 64447 NJX AA&/STRD FEMORAL NRV IMG: CPT | Performed by: NURSE ANESTHETIST, CERTIFIED REGISTERED

## 2024-09-16 PROCEDURE — 94150 VITAL CAPACITY TEST: CPT

## 2024-09-16 PROCEDURE — 73560 X-RAY EXAM OF KNEE 1 OR 2: CPT

## 2024-09-16 PROCEDURE — 2580000003 HC RX 258: Performed by: ORTHOPAEDIC SURGERY

## 2024-09-16 PROCEDURE — 6360000002 HC RX W HCPCS: Performed by: ANESTHESIOLOGY

## 2024-09-16 PROCEDURE — 2720000010 HC SURG SUPPLY STERILE: Performed by: ORTHOPAEDIC SURGERY

## 2024-09-16 PROCEDURE — 2500000003 HC RX 250 WO HCPCS: Performed by: NURSE ANESTHETIST, CERTIFIED REGISTERED

## 2024-09-16 PROCEDURE — A4217 STERILE WATER/SALINE, 500 ML: HCPCS | Performed by: ORTHOPAEDIC SURGERY

## 2024-09-16 PROCEDURE — 2580000003 HC RX 258: Performed by: NURSE ANESTHETIST, CERTIFIED REGISTERED

## 2024-09-16 PROCEDURE — 3700000000 HC ANESTHESIA ATTENDED CARE: Performed by: ORTHOPAEDIC SURGERY

## 2024-09-16 PROCEDURE — 6370000000 HC RX 637 (ALT 250 FOR IP): Performed by: FAMILY MEDICINE

## 2024-09-16 PROCEDURE — 6360000002 HC RX W HCPCS: Performed by: NURSE ANESTHETIST, CERTIFIED REGISTERED

## 2024-09-16 PROCEDURE — 2580000003 HC RX 258: Performed by: ANESTHESIOLOGY

## 2024-09-16 DEVICE — PSN FEM CR POR CCR STD SZ12 R: Type: IMPLANTABLE DEVICE | Site: KNEE | Status: FUNCTIONAL

## 2024-09-16 DEVICE — PSN MC VE ASF R 10MM 12/GH: Type: IMPLANTABLE DEVICE | Site: KNEE | Status: FUNCTIONAL

## 2024-09-16 DEVICE — BASEPLATE TIB KEELED 0 DEG G RT KNEE SPIKE OSSEOTI PERSONA: Type: IMPLANTABLE DEVICE | Site: KNEE | Status: FUNCTIONAL

## 2024-09-16 RX ORDER — GLYCOPYRROLATE 0.2 MG/ML
INJECTION INTRAMUSCULAR; INTRAVENOUS
Status: DISCONTINUED | OUTPATIENT
Start: 2024-09-16 | End: 2024-09-16 | Stop reason: SDUPTHER

## 2024-09-16 RX ORDER — CYCLOBENZAPRINE HCL 10 MG
10 TABLET ORAL 3 TIMES DAILY PRN
Qty: 40 TABLET | Refills: 0 | Status: SHIPPED | OUTPATIENT
Start: 2024-09-16

## 2024-09-16 RX ORDER — HYDROMORPHONE HYDROCHLORIDE 1 MG/ML
0.25 INJECTION, SOLUTION INTRAMUSCULAR; INTRAVENOUS; SUBCUTANEOUS
Status: DISCONTINUED | OUTPATIENT
Start: 2024-09-16 | End: 2024-09-18 | Stop reason: HOSPADM

## 2024-09-16 RX ORDER — ACETAMINOPHEN 325 MG/1
650 TABLET ORAL EVERY 6 HOURS
Status: DISCONTINUED | OUTPATIENT
Start: 2024-09-16 | End: 2024-09-18 | Stop reason: HOSPADM

## 2024-09-16 RX ORDER — ONDANSETRON 2 MG/ML
INJECTION INTRAMUSCULAR; INTRAVENOUS
Status: DISCONTINUED | OUTPATIENT
Start: 2024-09-16 | End: 2024-09-16 | Stop reason: SDUPTHER

## 2024-09-16 RX ORDER — HYDROMORPHONE HYDROCHLORIDE 1 MG/ML
0.25 INJECTION, SOLUTION INTRAMUSCULAR; INTRAVENOUS; SUBCUTANEOUS EVERY 5 MIN PRN
Status: DISCONTINUED | OUTPATIENT
Start: 2024-09-16 | End: 2024-09-16 | Stop reason: HOSPADM

## 2024-09-16 RX ORDER — MIDAZOLAM HYDROCHLORIDE 2 MG/2ML
2 INJECTION, SOLUTION INTRAMUSCULAR; INTRAVENOUS
Status: COMPLETED | OUTPATIENT
Start: 2024-09-16 | End: 2024-09-16

## 2024-09-16 RX ORDER — ONDANSETRON 4 MG/1
4 TABLET, FILM COATED ORAL EVERY 8 HOURS PRN
Qty: 30 TABLET | Refills: 0 | Status: SHIPPED | OUTPATIENT
Start: 2024-09-16

## 2024-09-16 RX ORDER — CELECOXIB 200 MG/1
200 CAPSULE ORAL ONCE
Status: COMPLETED | OUTPATIENT
Start: 2024-09-16 | End: 2024-09-16

## 2024-09-16 RX ORDER — LIDOCAINE HYDROCHLORIDE 10 MG/ML
1 INJECTION, SOLUTION EPIDURAL; INFILTRATION; INTRACAUDAL; PERINEURAL
Status: DISCONTINUED | OUTPATIENT
Start: 2024-09-16 | End: 2024-09-16 | Stop reason: HOSPADM

## 2024-09-16 RX ORDER — OXYCODONE HYDROCHLORIDE 5 MG/1
5 TABLET ORAL EVERY 4 HOURS PRN
Status: DISCONTINUED | OUTPATIENT
Start: 2024-09-16 | End: 2024-09-18 | Stop reason: HOSPADM

## 2024-09-16 RX ORDER — TAMSULOSIN HYDROCHLORIDE 0.4 MG/1
0.4 CAPSULE ORAL DAILY
Status: DISCONTINUED | OUTPATIENT
Start: 2024-09-16 | End: 2024-09-18 | Stop reason: HOSPADM

## 2024-09-16 RX ORDER — EPHEDRINE SULFATE/0.9% NACL/PF 25 MG/5 ML
SYRINGE (ML) INTRAVENOUS
Status: DISCONTINUED | OUTPATIENT
Start: 2024-09-16 | End: 2024-09-16 | Stop reason: SDUPTHER

## 2024-09-16 RX ORDER — METOPROLOL TARTRATE 25 MG/1
25 TABLET, FILM COATED ORAL 2 TIMES DAILY
Status: DISCONTINUED | OUTPATIENT
Start: 2024-09-16 | End: 2024-09-18 | Stop reason: HOSPADM

## 2024-09-16 RX ORDER — HYDROMORPHONE HYDROCHLORIDE 1 MG/ML
1 INJECTION, SOLUTION INTRAMUSCULAR; INTRAVENOUS; SUBCUTANEOUS
Status: DISCONTINUED | OUTPATIENT
Start: 2024-09-16 | End: 2024-09-18 | Stop reason: HOSPADM

## 2024-09-16 RX ORDER — SODIUM CHLORIDE 0.9 % (FLUSH) 0.9 %
5-40 SYRINGE (ML) INJECTION PRN
Status: DISCONTINUED | OUTPATIENT
Start: 2024-09-16 | End: 2024-09-16 | Stop reason: HOSPADM

## 2024-09-16 RX ORDER — SCOLOPAMINE TRANSDERMAL SYSTEM 1 MG/1
1 PATCH, EXTENDED RELEASE TRANSDERMAL
Status: DISCONTINUED | OUTPATIENT
Start: 2024-09-16 | End: 2024-09-16 | Stop reason: HOSPADM

## 2024-09-16 RX ORDER — DIPHENHYDRAMINE HYDROCHLORIDE 50 MG/ML
25 INJECTION INTRAMUSCULAR; INTRAVENOUS EVERY 6 HOURS PRN
Status: DISCONTINUED | OUTPATIENT
Start: 2024-09-16 | End: 2024-09-18 | Stop reason: HOSPADM

## 2024-09-16 RX ORDER — SODIUM CHLORIDE 9 MG/ML
INJECTION, SOLUTION INTRAVENOUS CONTINUOUS
Status: DISCONTINUED | OUTPATIENT
Start: 2024-09-16 | End: 2024-09-18 | Stop reason: HOSPADM

## 2024-09-16 RX ORDER — TRANEXAMIC ACID 100 MG/ML
INJECTION, SOLUTION INTRAVENOUS
Status: DISCONTINUED | OUTPATIENT
Start: 2024-09-16 | End: 2024-09-16 | Stop reason: SDUPTHER

## 2024-09-16 RX ORDER — PROPOFOL 10 MG/ML
INJECTION, EMULSION INTRAVENOUS
Status: DISCONTINUED | OUTPATIENT
Start: 2024-09-16 | End: 2024-09-16 | Stop reason: SDUPTHER

## 2024-09-16 RX ORDER — ROCURONIUM BROMIDE 10 MG/ML
INJECTION, SOLUTION INTRAVENOUS
Status: DISCONTINUED | OUTPATIENT
Start: 2024-09-16 | End: 2024-09-16 | Stop reason: SDUPTHER

## 2024-09-16 RX ORDER — SODIUM CHLORIDE 0.9 % (FLUSH) 0.9 %
5-40 SYRINGE (ML) INJECTION EVERY 12 HOURS SCHEDULED
Status: DISCONTINUED | OUTPATIENT
Start: 2024-09-16 | End: 2024-09-18 | Stop reason: HOSPADM

## 2024-09-16 RX ORDER — ACETAMINOPHEN 500 MG
1000 TABLET ORAL ONCE
Status: COMPLETED | OUTPATIENT
Start: 2024-09-16 | End: 2024-09-16

## 2024-09-16 RX ORDER — OXYCODONE HYDROCHLORIDE 10 MG/1
10 TABLET ORAL EVERY 4 HOURS PRN
Status: DISCONTINUED | OUTPATIENT
Start: 2024-09-16 | End: 2024-09-18 | Stop reason: HOSPADM

## 2024-09-16 RX ORDER — 0.9 % SODIUM CHLORIDE 0.9 %
500 INTRAVENOUS SOLUTION INTRAVENOUS PRN
Status: DISCONTINUED | OUTPATIENT
Start: 2024-09-16 | End: 2024-09-18 | Stop reason: HOSPADM

## 2024-09-16 RX ORDER — SODIUM CHLORIDE 9 MG/ML
INJECTION, SOLUTION INTRAVENOUS PRN
Status: DISCONTINUED | OUTPATIENT
Start: 2024-09-16 | End: 2024-09-18 | Stop reason: HOSPADM

## 2024-09-16 RX ORDER — SODIUM CHLORIDE 0.9 % (FLUSH) 0.9 %
5-40 SYRINGE (ML) INJECTION EVERY 12 HOURS SCHEDULED
Status: DISCONTINUED | OUTPATIENT
Start: 2024-09-16 | End: 2024-09-16 | Stop reason: HOSPADM

## 2024-09-16 RX ORDER — CETIRIZINE HYDROCHLORIDE 5 MG/1
5 TABLET ORAL NIGHTLY
Status: DISCONTINUED | OUTPATIENT
Start: 2024-09-16 | End: 2024-09-18 | Stop reason: HOSPADM

## 2024-09-16 RX ORDER — DEXAMETHASONE SODIUM PHOSPHATE 10 MG/ML
10 INJECTION, SOLUTION INTRAMUSCULAR; INTRAVENOUS ONCE
Status: DISCONTINUED | OUTPATIENT
Start: 2024-09-16 | End: 2024-09-16 | Stop reason: HOSPADM

## 2024-09-16 RX ORDER — SUCCINYLCHOLINE CHLORIDE 20 MG/ML
INJECTION INTRAMUSCULAR; INTRAVENOUS
Status: DISCONTINUED | OUTPATIENT
Start: 2024-09-16 | End: 2024-09-16 | Stop reason: SDUPTHER

## 2024-09-16 RX ORDER — FENTANYL CITRATE 50 UG/ML
INJECTION, SOLUTION INTRAMUSCULAR; INTRAVENOUS
Status: DISCONTINUED | OUTPATIENT
Start: 2024-09-16 | End: 2024-09-16 | Stop reason: SDUPTHER

## 2024-09-16 RX ORDER — BUPIVACAINE HYDROCHLORIDE 2.5 MG/ML
INJECTION, SOLUTION INFILTRATION; PERINEURAL PRN
Status: DISCONTINUED | OUTPATIENT
Start: 2024-09-16 | End: 2024-09-16 | Stop reason: ALTCHOICE

## 2024-09-16 RX ORDER — NALOXONE HYDROCHLORIDE 0.4 MG/ML
INJECTION, SOLUTION INTRAMUSCULAR; INTRAVENOUS; SUBCUTANEOUS PRN
Status: DISCONTINUED | OUTPATIENT
Start: 2024-09-16 | End: 2024-09-16 | Stop reason: HOSPADM

## 2024-09-16 RX ORDER — ALBUTEROL SULFATE 90 UG/1
2 INHALANT RESPIRATORY (INHALATION) EVERY 6 HOURS PRN
Status: DISCONTINUED | OUTPATIENT
Start: 2024-09-16 | End: 2024-09-18 | Stop reason: HOSPADM

## 2024-09-16 RX ORDER — DIPHENHYDRAMINE HYDROCHLORIDE 50 MG/ML
12.5 INJECTION INTRAMUSCULAR; INTRAVENOUS
Status: DISCONTINUED | OUTPATIENT
Start: 2024-09-16 | End: 2024-09-16 | Stop reason: HOSPADM

## 2024-09-16 RX ORDER — LIDOCAINE HYDROCHLORIDE 10 MG/ML
INJECTION, SOLUTION INFILTRATION; PERINEURAL
Status: COMPLETED | OUTPATIENT
Start: 2024-09-16 | End: 2024-09-16

## 2024-09-16 RX ORDER — HYDROMORPHONE HYDROCHLORIDE 1 MG/ML
0.5 INJECTION, SOLUTION INTRAMUSCULAR; INTRAVENOUS; SUBCUTANEOUS EVERY 5 MIN PRN
Status: DISCONTINUED | OUTPATIENT
Start: 2024-09-16 | End: 2024-09-16 | Stop reason: HOSPADM

## 2024-09-16 RX ORDER — SODIUM CHLORIDE 9 MG/ML
INJECTION, SOLUTION INTRAVENOUS PRN
Status: DISCONTINUED | OUTPATIENT
Start: 2024-09-16 | End: 2024-09-16 | Stop reason: HOSPADM

## 2024-09-16 RX ORDER — MEPERIDINE HYDROCHLORIDE 25 MG/ML
12.5 INJECTION INTRAMUSCULAR; INTRAVENOUS; SUBCUTANEOUS EVERY 5 MIN PRN
Status: DISCONTINUED | OUTPATIENT
Start: 2024-09-16 | End: 2024-09-16 | Stop reason: HOSPADM

## 2024-09-16 RX ORDER — PREGABALIN 75 MG/1
75 CAPSULE ORAL ONCE
Status: COMPLETED | OUTPATIENT
Start: 2024-09-16 | End: 2024-09-16

## 2024-09-16 RX ORDER — M-VIT,TX,IRON,MINS/CALC/FOLIC 27MG-0.4MG
1 TABLET ORAL DAILY
Status: DISCONTINUED | OUTPATIENT
Start: 2024-09-16 | End: 2024-09-18 | Stop reason: HOSPADM

## 2024-09-16 RX ORDER — POLYETHYLENE GLYCOL 3350 17 G/17G
17 POWDER, FOR SOLUTION ORAL 2 TIMES DAILY
Status: DISCONTINUED | OUTPATIENT
Start: 2024-09-16 | End: 2024-09-18 | Stop reason: HOSPADM

## 2024-09-16 RX ORDER — DEXAMETHASONE SODIUM PHOSPHATE 10 MG/ML
INJECTION, SOLUTION INTRAMUSCULAR; INTRAVENOUS
Status: DISCONTINUED | OUTPATIENT
Start: 2024-09-16 | End: 2024-09-16 | Stop reason: SDUPTHER

## 2024-09-16 RX ORDER — OXYCODONE HCL 10 MG/1
10 TABLET, FILM COATED, EXTENDED RELEASE ORAL ONCE
Status: COMPLETED | OUTPATIENT
Start: 2024-09-16 | End: 2024-09-16

## 2024-09-16 RX ORDER — SODIUM CHLORIDE 0.9 % (FLUSH) 0.9 %
5-40 SYRINGE (ML) INJECTION PRN
Status: DISCONTINUED | OUTPATIENT
Start: 2024-09-16 | End: 2024-09-18 | Stop reason: HOSPADM

## 2024-09-16 RX ORDER — CYCLOBENZAPRINE HCL 10 MG
10 TABLET ORAL EVERY 12 HOURS PRN
Status: DISCONTINUED | OUTPATIENT
Start: 2024-09-16 | End: 2024-09-18 | Stop reason: HOSPADM

## 2024-09-16 RX ORDER — FAMOTIDINE 20 MG/1
20 TABLET, FILM COATED ORAL ONCE
Status: DISCONTINUED | OUTPATIENT
Start: 2024-09-16 | End: 2024-09-16 | Stop reason: HOSPADM

## 2024-09-16 RX ORDER — ROPIVACAINE HYDROCHLORIDE 5 MG/ML
30 INJECTION, SOLUTION EPIDURAL; INFILTRATION; PERINEURAL ONCE
Status: DISCONTINUED | OUTPATIENT
Start: 2024-09-16 | End: 2024-09-18 | Stop reason: HOSPADM

## 2024-09-16 RX ORDER — HYDROMORPHONE HYDROCHLORIDE 1 MG/ML
0.5 INJECTION, SOLUTION INTRAMUSCULAR; INTRAVENOUS; SUBCUTANEOUS
Status: DISCONTINUED | OUTPATIENT
Start: 2024-09-16 | End: 2024-09-18 | Stop reason: HOSPADM

## 2024-09-16 RX ORDER — ONDANSETRON 4 MG/1
4 TABLET, ORALLY DISINTEGRATING ORAL EVERY 8 HOURS PRN
Status: DISCONTINUED | OUTPATIENT
Start: 2024-09-16 | End: 2024-09-18 | Stop reason: HOSPADM

## 2024-09-16 RX ORDER — SODIUM CHLORIDE, SODIUM LACTATE, POTASSIUM CHLORIDE, CALCIUM CHLORIDE 600; 310; 30; 20 MG/100ML; MG/100ML; MG/100ML; MG/100ML
INJECTION, SOLUTION INTRAVENOUS CONTINUOUS
Status: DISCONTINUED | OUTPATIENT
Start: 2024-09-16 | End: 2024-09-16 | Stop reason: HOSPADM

## 2024-09-16 RX ORDER — ONDANSETRON 2 MG/ML
4 INJECTION INTRAMUSCULAR; INTRAVENOUS EVERY 6 HOURS PRN
Status: DISCONTINUED | OUTPATIENT
Start: 2024-09-16 | End: 2024-09-18 | Stop reason: HOSPADM

## 2024-09-16 RX ORDER — DIPHENHYDRAMINE HCL 25 MG
25 TABLET ORAL EVERY 6 HOURS PRN
Status: DISCONTINUED | OUTPATIENT
Start: 2024-09-16 | End: 2024-09-18 | Stop reason: HOSPADM

## 2024-09-16 RX ORDER — BUDESONIDE AND FORMOTEROL FUMARATE DIHYDRATE 80; 4.5 UG/1; UG/1
2 AEROSOL RESPIRATORY (INHALATION)
Status: DISCONTINUED | OUTPATIENT
Start: 2024-09-16 | End: 2024-09-18 | Stop reason: HOSPADM

## 2024-09-16 RX ORDER — BISACODYL 5 MG/1
5 TABLET, DELAYED RELEASE ORAL DAILY
Status: DISCONTINUED | OUTPATIENT
Start: 2024-09-16 | End: 2024-09-18 | Stop reason: HOSPADM

## 2024-09-16 RX ORDER — METOCLOPRAMIDE HYDROCHLORIDE 5 MG/ML
10 INJECTION INTRAMUSCULAR; INTRAVENOUS
Status: DISCONTINUED | OUTPATIENT
Start: 2024-09-16 | End: 2024-09-16 | Stop reason: HOSPADM

## 2024-09-16 RX ORDER — LIDOCAINE HYDROCHLORIDE 10 MG/ML
INJECTION, SOLUTION INFILTRATION; PERINEURAL
Status: DISCONTINUED | OUTPATIENT
Start: 2024-09-16 | End: 2024-09-16 | Stop reason: SDUPTHER

## 2024-09-16 RX ORDER — SODIUM CHLORIDE, SODIUM LACTATE, POTASSIUM CHLORIDE, CALCIUM CHLORIDE 600; 310; 30; 20 MG/100ML; MG/100ML; MG/100ML; MG/100ML
INJECTION, SOLUTION INTRAVENOUS
Status: DISCONTINUED | OUTPATIENT
Start: 2024-09-16 | End: 2024-09-16 | Stop reason: SDUPTHER

## 2024-09-16 RX ORDER — ROPIVACAINE HYDROCHLORIDE 5 MG/ML
INJECTION, SOLUTION EPIDURAL; INFILTRATION; PERINEURAL
Status: COMPLETED | OUTPATIENT
Start: 2024-09-16 | End: 2024-09-16

## 2024-09-16 RX ORDER — OXYCODONE HYDROCHLORIDE 5 MG/1
5 TABLET ORAL SEE ADMIN INSTRUCTIONS
Qty: 90 TABLET | Refills: 0 | Status: SHIPPED | OUTPATIENT
Start: 2024-09-16 | End: 2024-10-17

## 2024-09-16 RX ADMIN — WATER 2000 MG: 1 INJECTION INTRAMUSCULAR; INTRAVENOUS; SUBCUTANEOUS at 17:31

## 2024-09-16 RX ADMIN — OXYCODONE HYDROCHLORIDE 10 MG: 10 TABLET, FILM COATED, EXTENDED RELEASE ORAL at 07:03

## 2024-09-16 RX ADMIN — CETIRIZINE HYDROCHLORIDE 5 MG: 5 TABLET ORAL at 20:34

## 2024-09-16 RX ADMIN — APIXABAN 5 MG: 5 TABLET, FILM COATED ORAL at 20:34

## 2024-09-16 RX ADMIN — DEXAMETHASONE SODIUM PHOSPHATE 10 MG: 10 INJECTION, SOLUTION INTRAMUSCULAR; INTRAVENOUS at 09:12

## 2024-09-16 RX ADMIN — ROCURONIUM BROMIDE 10 MG: 10 INJECTION, SOLUTION INTRAVENOUS at 08:59

## 2024-09-16 RX ADMIN — SODIUM CHLORIDE, SODIUM LACTATE, POTASSIUM CHLORIDE, AND CALCIUM CHLORIDE: 600; 310; 30; 20 INJECTION, SOLUTION INTRAVENOUS at 08:52

## 2024-09-16 RX ADMIN — SODIUM CHLORIDE, POTASSIUM CHLORIDE, SODIUM LACTATE AND CALCIUM CHLORIDE: 600; 310; 30; 20 INJECTION, SOLUTION INTRAVENOUS at 07:02

## 2024-09-16 RX ADMIN — ACETAMINOPHEN 650 MG: 325 TABLET ORAL at 20:34

## 2024-09-16 RX ADMIN — EPHEDRINE SULFATE 10 MG: 5 INJECTION INTRAVENOUS at 10:33

## 2024-09-16 RX ADMIN — PROPOFOL 200 MG: 10 INJECTION, EMULSION INTRAVENOUS at 09:00

## 2024-09-16 RX ADMIN — METOPROLOL TARTRATE 25 MG: 25 TABLET, FILM COATED ORAL at 14:00

## 2024-09-16 RX ADMIN — ONDANSETRON 4 MG: 2 INJECTION INTRAMUSCULAR; INTRAVENOUS at 10:30

## 2024-09-16 RX ADMIN — BUDESONIDE AND FORMOTEROL FUMARATE DIHYDRATE 2 PUFF: 80; 4.5 AEROSOL RESPIRATORY (INHALATION) at 18:35

## 2024-09-16 RX ADMIN — FENTANYL CITRATE 50 MCG: 0.05 INJECTION, SOLUTION INTRAMUSCULAR; INTRAVENOUS at 09:35

## 2024-09-16 RX ADMIN — CEFAZOLIN 3000 MG: 2 INJECTION, POWDER, FOR SOLUTION INTRAMUSCULAR; INTRAVENOUS at 09:09

## 2024-09-16 RX ADMIN — BUDESONIDE AND FORMOTEROL FUMARATE DIHYDRATE 2 PUFF: 80; 4.5 AEROSOL RESPIRATORY (INHALATION) at 14:13

## 2024-09-16 RX ADMIN — Medication 1 TABLET: at 14:00

## 2024-09-16 RX ADMIN — HYDROMORPHONE HYDROCHLORIDE 0.5 MG: 1 INJECTION, SOLUTION INTRAMUSCULAR; INTRAVENOUS; SUBCUTANEOUS at 10:00

## 2024-09-16 RX ADMIN — ROCURONIUM BROMIDE 40 MG: 10 INJECTION, SOLUTION INTRAVENOUS at 09:11

## 2024-09-16 RX ADMIN — SODIUM CHLORIDE, SODIUM LACTATE, POTASSIUM CHLORIDE, AND CALCIUM CHLORIDE: 600; 310; 30; 20 INJECTION, SOLUTION INTRAVENOUS at 09:39

## 2024-09-16 RX ADMIN — TAMSULOSIN HYDROCHLORIDE 0.4 MG: 0.4 CAPSULE ORAL at 14:00

## 2024-09-16 RX ADMIN — SUGAMMADEX 300 MG: 100 INJECTION, SOLUTION INTRAVENOUS at 10:38

## 2024-09-16 RX ADMIN — HYDROMORPHONE HYDROCHLORIDE 0.5 MG: 1 INJECTION, SOLUTION INTRAMUSCULAR; INTRAVENOUS; SUBCUTANEOUS at 10:34

## 2024-09-16 RX ADMIN — LIDOCAINE HYDROCHLORIDE 50 MG: 10 INJECTION, SOLUTION INFILTRATION; PERINEURAL at 08:59

## 2024-09-16 RX ADMIN — TRANEXAMIC ACID 1000 MG: 1 INJECTION, SOLUTION INTRAVENOUS at 10:32

## 2024-09-16 RX ADMIN — LIDOCAINE HYDROCHLORIDE 1 ML: 10 INJECTION, SOLUTION INFILTRATION; PERINEURAL at 08:35

## 2024-09-16 RX ADMIN — SUCCINYLCHOLINE CHLORIDE 180 MG: 20 INJECTION, SOLUTION INTRAMUSCULAR; INTRAVENOUS at 09:01

## 2024-09-16 RX ADMIN — ACETAMINOPHEN 1000 MG: 500 TABLET ORAL at 07:03

## 2024-09-16 RX ADMIN — TIOTROPIUM BROMIDE INHALATION SPRAY 2 PUFF: 3.12 SPRAY, METERED RESPIRATORY (INHALATION) at 14:19

## 2024-09-16 RX ADMIN — ROCURONIUM BROMIDE 50 MG: 10 INJECTION, SOLUTION INTRAVENOUS at 09:33

## 2024-09-16 RX ADMIN — MIDAZOLAM 2 MG: 1 INJECTION INTRAMUSCULAR; INTRAVENOUS at 08:31

## 2024-09-16 RX ADMIN — ACETAMINOPHEN 650 MG: 325 TABLET ORAL at 13:59

## 2024-09-16 RX ADMIN — FENTANYL CITRATE 50 MCG: 0.05 INJECTION, SOLUTION INTRAMUSCULAR; INTRAVENOUS at 11:07

## 2024-09-16 RX ADMIN — SODIUM CHLORIDE: 9 INJECTION, SOLUTION INTRAVENOUS at 13:24

## 2024-09-16 RX ADMIN — POLYETHYLENE GLYCOL 3350 17 G: 17 POWDER, FOR SOLUTION ORAL at 20:26

## 2024-09-16 RX ADMIN — BISACODYL 5 MG: 5 TABLET, COATED ORAL at 14:00

## 2024-09-16 RX ADMIN — FENTANYL CITRATE 50 MCG: 0.05 INJECTION, SOLUTION INTRAMUSCULAR; INTRAVENOUS at 09:44

## 2024-09-16 RX ADMIN — CELECOXIB 200 MG: 200 CAPSULE ORAL at 07:03

## 2024-09-16 RX ADMIN — GLYCOPYRROLATE 0.2 MG: 0.2 INJECTION, SOLUTION INTRAMUSCULAR; INTRAVENOUS at 09:13

## 2024-09-16 RX ADMIN — TRANEXAMIC ACID 1000 MG: 1 INJECTION, SOLUTION INTRAVENOUS at 09:10

## 2024-09-16 RX ADMIN — FENTANYL CITRATE 100 MCG: 0.05 INJECTION, SOLUTION INTRAMUSCULAR; INTRAVENOUS at 08:59

## 2024-09-16 RX ADMIN — ROPIVACAINE HYDROCHLORIDE 20 ML: 5 INJECTION, SOLUTION EPIDURAL; INFILTRATION; PERINEURAL at 08:35

## 2024-09-16 RX ADMIN — PREGABALIN 75 MG: 75 CAPSULE ORAL at 07:03

## 2024-09-16 ASSESSMENT — PAIN - FUNCTIONAL ASSESSMENT: PAIN_FUNCTIONAL_ASSESSMENT: NONE - DENIES PAIN

## 2024-09-16 ASSESSMENT — PAIN SCALES - GENERAL
PAINLEVEL_OUTOF10: 0
PAINLEVEL_OUTOF10: 3

## 2024-09-16 ASSESSMENT — LIFESTYLE VARIABLES: SMOKING_STATUS: 0

## 2024-09-17 LAB
ANION GAP SERPL CALCULATED.3IONS-SCNC: 9 MMOL/L (ref 7–19)
BUN SERPL-MCNC: 28 MG/DL (ref 8–23)
CALCIUM SERPL-MCNC: 8.4 MG/DL (ref 8.8–10.2)
CHLORIDE SERPL-SCNC: 105 MMOL/L (ref 98–111)
CO2 SERPL-SCNC: 24 MMOL/L (ref 22–29)
CREAT SERPL-MCNC: 1.2 MG/DL (ref 0.7–1.2)
FOLATE SERPL-MCNC: 18 NG/ML (ref 4.5–32.2)
GLUCOSE SERPL-MCNC: 138 MG/DL (ref 70–99)
HCT VFR BLD AUTO: 34.4 % (ref 42–52)
HGB BLD-MCNC: 10.7 G/DL (ref 14–18)
IRON SATN MFR SERPL: 15 % (ref 14–50)
IRON SERPL-MCNC: 31 UG/DL (ref 59–158)
POTASSIUM SERPL-SCNC: 4.8 MMOL/L (ref 3.5–5)
SODIUM SERPL-SCNC: 138 MMOL/L (ref 136–145)
TIBC SERPL-MCNC: 212 UG/DL (ref 250–400)
VIT B12 SERPL-MCNC: 646 PG/ML (ref 232–1245)

## 2024-09-17 PROCEDURE — 82746 ASSAY OF FOLIC ACID SERUM: CPT

## 2024-09-17 PROCEDURE — 6370000000 HC RX 637 (ALT 250 FOR IP): Performed by: ORTHOPAEDIC SURGERY

## 2024-09-17 PROCEDURE — 36415 COLL VENOUS BLD VENIPUNCTURE: CPT

## 2024-09-17 PROCEDURE — G0378 HOSPITAL OBSERVATION PER HR: HCPCS

## 2024-09-17 PROCEDURE — 97116 GAIT TRAINING THERAPY: CPT

## 2024-09-17 PROCEDURE — 6370000000 HC RX 637 (ALT 250 FOR IP): Performed by: FAMILY MEDICINE

## 2024-09-17 PROCEDURE — 82607 VITAMIN B-12: CPT

## 2024-09-17 PROCEDURE — 85018 HEMOGLOBIN: CPT

## 2024-09-17 PROCEDURE — 80048 BASIC METABOLIC PNL TOTAL CA: CPT

## 2024-09-17 PROCEDURE — 83540 ASSAY OF IRON: CPT

## 2024-09-17 PROCEDURE — 85014 HEMATOCRIT: CPT

## 2024-09-17 PROCEDURE — 94640 AIRWAY INHALATION TREATMENT: CPT

## 2024-09-17 PROCEDURE — 83550 IRON BINDING TEST: CPT

## 2024-09-17 PROCEDURE — 94760 N-INVAS EAR/PLS OXIMETRY 1: CPT

## 2024-09-17 PROCEDURE — 97161 PT EVAL LOW COMPLEX 20 MIN: CPT

## 2024-09-17 PROCEDURE — 2580000003 HC RX 258: Performed by: ORTHOPAEDIC SURGERY

## 2024-09-17 PROCEDURE — 6360000002 HC RX W HCPCS: Performed by: ORTHOPAEDIC SURGERY

## 2024-09-17 RX ORDER — FERROUS SULFATE 325(65) MG
325 TABLET ORAL
Status: DISCONTINUED | OUTPATIENT
Start: 2024-09-17 | End: 2024-09-18 | Stop reason: HOSPADM

## 2024-09-17 RX ADMIN — CETIRIZINE HYDROCHLORIDE 5 MG: 5 TABLET ORAL at 21:37

## 2024-09-17 RX ADMIN — APIXABAN 5 MG: 5 TABLET, FILM COATED ORAL at 21:39

## 2024-09-17 RX ADMIN — POLYETHYLENE GLYCOL 3350 17 G: 17 POWDER, FOR SOLUTION ORAL at 21:39

## 2024-09-17 RX ADMIN — ACETAMINOPHEN 650 MG: 325 TABLET ORAL at 17:41

## 2024-09-17 RX ADMIN — OXYCODONE HYDROCHLORIDE 10 MG: 10 TABLET ORAL at 21:37

## 2024-09-17 RX ADMIN — SODIUM CHLORIDE, PRESERVATIVE FREE 10 ML: 5 INJECTION INTRAVENOUS at 21:39

## 2024-09-17 RX ADMIN — TIOTROPIUM BROMIDE INHALATION SPRAY 2 PUFF: 3.12 SPRAY, METERED RESPIRATORY (INHALATION) at 08:01

## 2024-09-17 RX ADMIN — FERROUS SULFATE TAB 325 MG (65 MG ELEMENTAL FE) 325 MG: 325 (65 FE) TAB at 09:48

## 2024-09-17 RX ADMIN — ACETAMINOPHEN 650 MG: 325 TABLET ORAL at 13:19

## 2024-09-17 RX ADMIN — BISACODYL 5 MG: 5 TABLET, COATED ORAL at 09:49

## 2024-09-17 RX ADMIN — LEVOTHYROXINE SODIUM 137 MCG: 25 TABLET ORAL at 05:30

## 2024-09-17 RX ADMIN — METOPROLOL TARTRATE 25 MG: 25 TABLET, FILM COATED ORAL at 21:37

## 2024-09-17 RX ADMIN — BUDESONIDE AND FORMOTEROL FUMARATE DIHYDRATE 2 PUFF: 80; 4.5 AEROSOL RESPIRATORY (INHALATION) at 08:01

## 2024-09-17 RX ADMIN — METOPROLOL TARTRATE 25 MG: 25 TABLET, FILM COATED ORAL at 09:49

## 2024-09-17 RX ADMIN — WATER 2000 MG: 1 INJECTION INTRAMUSCULAR; INTRAVENOUS; SUBCUTANEOUS at 17:42

## 2024-09-17 RX ADMIN — WATER 2000 MG: 1 INJECTION INTRAMUSCULAR; INTRAVENOUS; SUBCUTANEOUS at 09:50

## 2024-09-17 RX ADMIN — POLYETHYLENE GLYCOL 3350 17 G: 17 POWDER, FOR SOLUTION ORAL at 09:50

## 2024-09-17 RX ADMIN — OXYCODONE HYDROCHLORIDE 10 MG: 10 TABLET ORAL at 17:40

## 2024-09-17 RX ADMIN — WATER 2000 MG: 1 INJECTION INTRAMUSCULAR; INTRAVENOUS; SUBCUTANEOUS at 00:00

## 2024-09-17 RX ADMIN — TAMSULOSIN HYDROCHLORIDE 0.4 MG: 0.4 CAPSULE ORAL at 09:49

## 2024-09-17 RX ADMIN — OXYCODONE 5 MG: 5 TABLET ORAL at 13:19

## 2024-09-17 RX ADMIN — APIXABAN 5 MG: 5 TABLET, FILM COATED ORAL at 09:48

## 2024-09-17 RX ADMIN — ACETAMINOPHEN 650 MG: 325 TABLET ORAL at 00:00

## 2024-09-17 RX ADMIN — ACETAMINOPHEN 650 MG: 325 TABLET ORAL at 05:30

## 2024-09-17 RX ADMIN — Medication 1 TABLET: at 09:49

## 2024-09-17 ASSESSMENT — PAIN DESCRIPTION - DESCRIPTORS
DESCRIPTORS: SHARP
DESCRIPTORS: ACHING

## 2024-09-17 ASSESSMENT — PAIN DESCRIPTION - LOCATION
LOCATION: KNEE
LOCATION: KNEE

## 2024-09-17 ASSESSMENT — PAIN DESCRIPTION - PAIN TYPE: TYPE: SURGICAL PAIN

## 2024-09-17 ASSESSMENT — PAIN SCALES - GENERAL
PAINLEVEL_OUTOF10: 6
PAINLEVEL_OUTOF10: 4
PAINLEVEL_OUTOF10: 4
PAINLEVEL_OUTOF10: 8

## 2024-09-17 ASSESSMENT — PAIN - FUNCTIONAL ASSESSMENT: PAIN_FUNCTIONAL_ASSESSMENT: PREVENTS OR INTERFERES SOME ACTIVE ACTIVITIES AND ADLS

## 2024-09-17 ASSESSMENT — PAIN DESCRIPTION - FREQUENCY: FREQUENCY: INTERMITTENT

## 2024-09-17 ASSESSMENT — PAIN DESCRIPTION - ORIENTATION
ORIENTATION: RIGHT
ORIENTATION: RIGHT

## 2024-09-18 VITALS
OXYGEN SATURATION: 95 % | WEIGHT: 263 LBS | RESPIRATION RATE: 18 BRPM | HEART RATE: 63 BPM | BODY MASS INDEX: 34.85 KG/M2 | HEIGHT: 73 IN | DIASTOLIC BLOOD PRESSURE: 53 MMHG | SYSTOLIC BLOOD PRESSURE: 129 MMHG | TEMPERATURE: 97.2 F

## 2024-09-18 LAB
ANION GAP SERPL CALCULATED.3IONS-SCNC: 10 MMOL/L (ref 7–19)
BUN SERPL-MCNC: 26 MG/DL (ref 8–23)
CALCIUM SERPL-MCNC: 8 MG/DL (ref 8.8–10.2)
CHLORIDE SERPL-SCNC: 103 MMOL/L (ref 98–111)
CO2 SERPL-SCNC: 25 MMOL/L (ref 22–29)
CREAT SERPL-MCNC: 1.1 MG/DL (ref 0.7–1.2)
GLUCOSE SERPL-MCNC: 110 MG/DL (ref 70–99)
HCT VFR BLD AUTO: 31.9 % (ref 42–52)
HGB BLD-MCNC: 10.1 G/DL (ref 14–18)
POTASSIUM SERPL-SCNC: 4.3 MMOL/L (ref 3.5–5)
SODIUM SERPL-SCNC: 138 MMOL/L (ref 136–145)

## 2024-09-18 PROCEDURE — 94640 AIRWAY INHALATION TREATMENT: CPT

## 2024-09-18 PROCEDURE — 97116 GAIT TRAINING THERAPY: CPT

## 2024-09-18 PROCEDURE — 94760 N-INVAS EAR/PLS OXIMETRY 1: CPT

## 2024-09-18 PROCEDURE — G0378 HOSPITAL OBSERVATION PER HR: HCPCS

## 2024-09-18 PROCEDURE — 85018 HEMOGLOBIN: CPT

## 2024-09-18 PROCEDURE — 85014 HEMATOCRIT: CPT

## 2024-09-18 PROCEDURE — 6360000002 HC RX W HCPCS: Performed by: ORTHOPAEDIC SURGERY

## 2024-09-18 PROCEDURE — 36415 COLL VENOUS BLD VENIPUNCTURE: CPT

## 2024-09-18 PROCEDURE — 80048 BASIC METABOLIC PNL TOTAL CA: CPT

## 2024-09-18 PROCEDURE — 6370000000 HC RX 637 (ALT 250 FOR IP): Performed by: ORTHOPAEDIC SURGERY

## 2024-09-18 PROCEDURE — 2580000003 HC RX 258: Performed by: ORTHOPAEDIC SURGERY

## 2024-09-18 PROCEDURE — 97530 THERAPEUTIC ACTIVITIES: CPT

## 2024-09-18 PROCEDURE — 6370000000 HC RX 637 (ALT 250 FOR IP): Performed by: FAMILY MEDICINE

## 2024-09-18 RX ADMIN — ACETAMINOPHEN 650 MG: 325 TABLET ORAL at 00:37

## 2024-09-18 RX ADMIN — OXYCODONE HYDROCHLORIDE 15 MG: 10 TABLET ORAL at 01:59

## 2024-09-18 RX ADMIN — WATER 2000 MG: 1 INJECTION INTRAMUSCULAR; INTRAVENOUS; SUBCUTANEOUS at 00:37

## 2024-09-18 RX ADMIN — CYCLOBENZAPRINE 10 MG: 10 TABLET, FILM COATED ORAL at 08:40

## 2024-09-18 RX ADMIN — Medication 1 TABLET: at 08:37

## 2024-09-18 RX ADMIN — LEVOTHYROXINE SODIUM 137 MCG: 25 TABLET ORAL at 05:45

## 2024-09-18 RX ADMIN — ACETAMINOPHEN 650 MG: 325 TABLET ORAL at 13:32

## 2024-09-18 RX ADMIN — METOPROLOL TARTRATE 25 MG: 25 TABLET, FILM COATED ORAL at 08:37

## 2024-09-18 RX ADMIN — APIXABAN 5 MG: 5 TABLET, FILM COATED ORAL at 08:37

## 2024-09-18 RX ADMIN — BISACODYL 5 MG: 5 TABLET, COATED ORAL at 08:38

## 2024-09-18 RX ADMIN — BUDESONIDE AND FORMOTEROL FUMARATE DIHYDRATE 2 PUFF: 80; 4.5 AEROSOL RESPIRATORY (INHALATION) at 07:17

## 2024-09-18 RX ADMIN — SODIUM CHLORIDE, PRESERVATIVE FREE 10 ML: 5 INJECTION INTRAVENOUS at 08:40

## 2024-09-18 RX ADMIN — ACETAMINOPHEN 650 MG: 325 TABLET ORAL at 05:45

## 2024-09-18 RX ADMIN — WATER 2000 MG: 1 INJECTION INTRAMUSCULAR; INTRAVENOUS; SUBCUTANEOUS at 08:37

## 2024-09-18 RX ADMIN — FERROUS SULFATE TAB 325 MG (65 MG ELEMENTAL FE) 325 MG: 325 (65 FE) TAB at 08:38

## 2024-09-18 RX ADMIN — TIOTROPIUM BROMIDE INHALATION SPRAY 2 PUFF: 3.12 SPRAY, METERED RESPIRATORY (INHALATION) at 07:17

## 2024-09-18 RX ADMIN — OXYCODONE HYDROCHLORIDE 15 MG: 10 TABLET ORAL at 05:44

## 2024-09-18 RX ADMIN — TAMSULOSIN HYDROCHLORIDE 0.4 MG: 0.4 CAPSULE ORAL at 08:38

## 2024-09-18 RX ADMIN — POLYETHYLENE GLYCOL 3350 17 G: 17 POWDER, FOR SOLUTION ORAL at 08:37

## 2024-09-18 ASSESSMENT — PAIN SCALES - GENERAL
PAINLEVEL_OUTOF10: 7
PAINLEVEL_OUTOF10: 6
PAINLEVEL_OUTOF10: 7
PAINLEVEL_OUTOF10: 6

## 2024-09-18 ASSESSMENT — PAIN DESCRIPTION - FREQUENCY
FREQUENCY: INTERMITTENT
FREQUENCY: INTERMITTENT

## 2024-09-18 ASSESSMENT — PAIN DESCRIPTION - PAIN TYPE
TYPE: SURGICAL PAIN
TYPE: SURGICAL PAIN

## 2024-09-18 ASSESSMENT — PAIN DESCRIPTION - LOCATION
LOCATION: KNEE
LOCATION: KNEE;LEG

## 2024-09-18 ASSESSMENT — PAIN DESCRIPTION - DESCRIPTORS
DESCRIPTORS: ACHING
DESCRIPTORS: BURNING

## 2024-09-18 ASSESSMENT — PAIN - FUNCTIONAL ASSESSMENT
PAIN_FUNCTIONAL_ASSESSMENT: PREVENTS OR INTERFERES SOME ACTIVE ACTIVITIES AND ADLS
PAIN_FUNCTIONAL_ASSESSMENT: PREVENTS OR INTERFERES SOME ACTIVE ACTIVITIES AND ADLS

## 2024-09-18 ASSESSMENT — PAIN DESCRIPTION - ORIENTATION
ORIENTATION: RIGHT

## 2024-09-20 ENCOUNTER — APPOINTMENT (OUTPATIENT)
Dept: GENERAL RADIOLOGY | Age: 74
End: 2024-09-20
Payer: MEDICARE

## 2024-09-20 ENCOUNTER — APPOINTMENT (OUTPATIENT)
Dept: CT IMAGING | Age: 74
End: 2024-09-20
Payer: MEDICARE

## 2024-09-20 ENCOUNTER — HOSPITAL ENCOUNTER (EMERGENCY)
Age: 74
Discharge: HOME | End: 2024-09-20
Attending: STUDENT IN AN ORGANIZED HEALTH CARE EDUCATION/TRAINING PROGRAM
Payer: MEDICARE

## 2024-09-20 VITALS
TEMPERATURE: 97.5 F | RESPIRATION RATE: 18 BRPM | OXYGEN SATURATION: 96 % | DIASTOLIC BLOOD PRESSURE: 88 MMHG | HEART RATE: 66 BPM | SYSTOLIC BLOOD PRESSURE: 122 MMHG

## 2024-09-20 DIAGNOSIS — S09.90XA INJURY OF HEAD, INITIAL ENCOUNTER: Primary | ICD-10-CM
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
## 2024-09-20 PROCEDURE — 73560 X-RAY EXAM OF KNEE 1 OR 2: CPT

## 2024-09-20 PROCEDURE — 99284 EMERGENCY DEPT VISIT MOD MDM: CPT

## 2024-09-20 PROCEDURE — 73610 X-RAY EXAM OF ANKLE: CPT

## 2024-09-20 PROCEDURE — 70450 CT HEAD/BRAIN W/O DYE: CPT

## 2024-09-20 PROCEDURE — 72125 CT NECK SPINE W/O DYE: CPT

## 2024-09-20 ASSESSMENT — ENCOUNTER SYMPTOMS
NAUSEA: 0
SHORTNESS OF BREATH: 0
VOMITING: 0

## 2024-09-26 ENCOUNTER — TELEPHONE (OUTPATIENT)
Dept: INPATIENT UNIT | Age: 74
End: 2024-09-26

## 2024-09-30 ENCOUNTER — OFFICE VISIT (OUTPATIENT)
Dept: CARDIOLOGY | Facility: CLINIC | Age: 74
End: 2024-09-30
Payer: MEDICARE

## 2024-09-30 VITALS
BODY MASS INDEX: 35.25 KG/M2 | DIASTOLIC BLOOD PRESSURE: 64 MMHG | WEIGHT: 266 LBS | OXYGEN SATURATION: 97 % | SYSTOLIC BLOOD PRESSURE: 146 MMHG | HEART RATE: 56 BPM | HEIGHT: 73 IN

## 2024-09-30 DIAGNOSIS — R94.2 ABNORMAL PET OF RIGHT LUNG: ICD-10-CM

## 2024-09-30 DIAGNOSIS — I48.0 PAF (PAROXYSMAL ATRIAL FIBRILLATION): Primary | ICD-10-CM

## 2024-09-30 DIAGNOSIS — I48.0 PAROXYSMAL ATRIAL FIBRILLATION: Chronic | ICD-10-CM

## 2024-09-30 PROCEDURE — 99214 OFFICE O/P EST MOD 30 MIN: CPT | Performed by: PHYSICIAN ASSISTANT

## 2024-09-30 PROCEDURE — 3077F SYST BP >= 140 MM HG: CPT | Performed by: PHYSICIAN ASSISTANT

## 2024-09-30 PROCEDURE — 3078F DIAST BP <80 MM HG: CPT | Performed by: PHYSICIAN ASSISTANT

## 2024-09-30 PROCEDURE — 93000 ELECTROCARDIOGRAM COMPLETE: CPT | Performed by: PHYSICIAN ASSISTANT

## 2024-09-30 RX ORDER — CYCLOBENZAPRINE HCL 10 MG
10 TABLET ORAL
COMMUNITY
Start: 2024-09-16

## 2024-09-30 RX ORDER — TAMSULOSIN HYDROCHLORIDE 0.4 MG/1
CAPSULE ORAL
COMMUNITY
Start: 2024-08-14

## 2024-09-30 RX ORDER — TRAMADOL HYDROCHLORIDE 50 MG/1
1 TABLET ORAL EVERY 6 HOURS
COMMUNITY
Start: 2024-09-27

## 2024-09-30 NOTE — PROGRESS NOTES
[facility] -  CLINIC FOLLOW UP     Patient Care Team:  Provider, No Known as PCP - General  Flores, Nick MILES MD as Referring Physician (Cardiothoracic Surgery)  Landry Hoover MD as Cardiologist (Cardiology)  Sabrina Jackson APRN as Nurse Practitioner (Nurse Practitioner)  Jean Marie Solis MD as Consulting Physician (Otolaryngology)  Maryann Serra APRN as Nurse Practitioner (Otolaryngology)  Master Johnson MD as Cardiologist (Cardiac Electrophysiology)  Zulema Pierce PA as Physician Assistant (Physician Assistant)    Chief Complaint: PAF, recent falls     Subjective   EP Problems:  1.  Paroxysmal atrial fibrillation  -5/9/2024: PFA, Dr. Johnson   2.  Typical atrial flutter  -5/9/2024: CTI ablation     Cardiology Problems:  1.  Hypertension     Medical Problems:  1.  Prostate cancer  2.  Lung cancer  3.  Pneumothorax  4.  Obesity  5.  Hypothyroidism status post thyroidectomy    HPI: Today I had the pleasure of seeing Delgado Desir in the cardiology clinic for follow up. He is a 74 year old male with a history of PAF with previous hospitalization in March with RVR. He underwent PFA of the pulmonary veins and regular CTI RF ablation of typical atrial flutter in May successfully. His flecainide was stopped at the time of discharge. He was having dizziness at his last follow up and had a CT of the head scheduled. This did not demonstrate any significant pathology.     He had a knee replacement earlier this month Sept 16. Unfortunately, he fell and suffered a head injury and required a an ER visit. He actually didn't remember this visit today until prompted and reviewed records with him. He doesn't really remember what made him fall, but ER records show that he denied any presyncope. His dizziness is still ongoing that comes and goes and random.     He has not seen ortho yet and has an appt Oct 30th. He hallucinated with opiates post op, and is now on tramadol.     He is SOB, no  "change from baseline. Recently had PET scan earlier this month for follow up of previous lung cancer. Dr. Bullard feels that a nodule has increased in size over the past 2 years and will want a repeat CT of the chest in 6 months again.     Objective     Visit Vitals  /64   Pulse 56   Ht 185.4 cm (72.99\")   Wt 121 kg (266 lb)   SpO2 97%   BMI 35.10 kg/m²           Vitals reviewed.   Constitutional:       Appearance: Not in distress.   Eyes:      Extraocular Movements: Extraocular movements intact.      Conjunctiva/sclera: Conjunctivae normal.      Pupils: Pupils are equal, round, and reactive to light.   HENT:      Head: Normocephalic and atraumatic.      Nose: Nose normal.    Mouth/Throat:      Lips: Pink.      Mouth: Mucous membranes are moist.   Neck:      Vascular: No carotid bruit or JVD. JVD normal.   Pulmonary:      Effort: Pulmonary effort is normal.      Breath sounds: Normal breath sounds.   Chest:      Chest wall: Not tender to palpatation.   Cardiovascular:      PMI at left midclavicular line. Normal rate. Regular rhythm. Normal S1. Normal S2.       Murmurs: There is no murmur.      No gallop.  No rub.      Comments: RLE swelling   Pulses:     Radial: 2+ bilaterally.  Edema:     Peripheral edema absent.   Musculoskeletal:      Extremities: No clubbing present.     Cervical back: Normal range of motion. Skin:     General: Skin is warm and dry.   Neurological:      General: No focal deficit present.      Mental Status: Alert and oriented to person, place, and time.   Psychiatric:         Attention and Perception: Attention normal.         Mood and Affect: Affect normal.         Speech: Speech normal.         Behavior: Behavior normal.         Cognition and Memory: Cognition normal.           The following portions of the patient's history were reviewed and updated as appropriate: allergies, current medications, past medical history, past social history, past and problem list.     Review of Systems "   Constitutional:  Positive for fatigue.   HENT: Negative.     Eyes: Negative.    Respiratory: Negative.     Cardiovascular:  Positive for leg swelling.   Gastrointestinal: Negative.    Endocrine: Negative.    Genitourinary: Negative.    Musculoskeletal: Negative.    Skin: Negative.    Allergic/Immunologic: Negative.    Neurological:  Positive for dizziness.   Hematological: Negative.    Psychiatric/Behavioral: Negative.            Echo EF Estimated  Lab Results   Component Value Date    ECHOEFEST 70 06/25/2020         ECG 12 Lead    Date/Time: 9/30/2024 10:27 AM  Performed by: Zulema Pierce PA    Authorized by: Zulema Pierce PA  Comparison: compared with previous ECG from 5/6/2024  Rhythm: sinus rhythm  Ectopy: atrial premature contractions  Rate: normal  BPM: 58  QRS axis: normal    Clinical impression: abnormal EKG          Medication Review: yes    Current Outpatient Medications:     acetaminophen (TYLENOL) 500 MG tablet, Take 1 tablet by mouth Every 6 (Six) Hours As Needed for Mild Pain., Disp: , Rfl:     alfuzosin (UROXATRAL) 10 MG 24 hr tablet, Take 1 tablet by mouth Daily., Disp: , Rfl:     apixaban (ELIQUIS) 5 MG tablet tablet, Take 1 tablet by mouth 2 (Two) Times a Day., Disp: , Rfl:     Ibuprofen 200 MG capsule, Take 2 capsules by mouth Daily., Disp: , Rfl:     levocetirizine (XYZAL) 5 MG tablet, Take 1 tablet by mouth Every Night., Disp: , Rfl:     metoprolol tartrate (LOPRESSOR) 25 MG tablet, Take 2 tablets by mouth 2 (Two) Times a Day., Disp: 180 tablet, Rfl: 3    Synthroid 137 MCG tablet, Take 1 tablet by mouth Daily. *BRAND NAME*, Disp: 30 tablet, Rfl: 3    therapeutic multivitamin-minerals (THERAGRAN-M) tablet, Take 2 tablets by mouth Daily., Disp: , Rfl:     Trelegy Ellipta 100-62.5-25 MCG/ACT inhaler, Inhale 1 puff Daily., Disp: , Rfl:    No Known Allergies    I have reviewed       CBC:  Lab Results - Last 18 Months   Lab Units 09/18/24  0213 09/17/24  0224 09/17/24  0224 08/28/24  1100    WBC 10*3/mm3  --   --   --  6.04   HEMOGLOBIN g/dL 10.1*   < > 10.7* 12.9*   HEMATOCRIT % 31.9*   < > 34.4* 40.3   PLATELETS 10*3/mm3  --   --   --  149   IRON ug/dL  --   --  31* 59    < > = values in this interval not displayed.      BMP/CMP:  Lab Results - Last 18 Months   Lab Units 08/28/24  1107   SODIUM mmol/L 138   POTASSIUM mmol/L 4.2   CHLORIDE mmol/L 103   CO2 mmol/L 26.0   GLUCOSE mg/dL 110*   BUN mg/dL 16   CREATININE mg/dL 0.90   CALCIUM mg/dL 8.9     BNP:   Lab Results - Last 18 Months   Lab Units 03/02/24  0912   PROBNP pg/mL 1,116.0*      THYROID:  Lab Results - Last 18 Months   Lab Units 08/28/24  1107 05/30/24  1148 03/03/24  0216   TSH uIU/mL 4.290*   < > 5.850*   FREE T4 ng/dL  --   --  1.38    < > = values in this interval not displayed.       Results for orders placed during the hospital encounter of 11/23/23    Adult Transthoracic Echo Complete W/ Cont if Necessary Per Protocol    Interpretation Summary    Left ventricular ejection fraction appears to be 61 - 65%.    Left ventricular wall thickness is consistent with mild concentric hypertrophy.    Left ventricular diastolic function was normal.    The left atrial cavity is moderately dilated.    Estimated right ventricular systolic pressure from tricuspid regurgitation is moderately elevated (45-55 mmHg).    The right ventricular cavity is mild to moderately dilated, with normal systolic function..    The right atrial cavity is dilated.    No significant (greater than mild) valvular pathology.     Assessment:   Diagnoses and all orders for this visit:    1. PAF (paroxysmal atrial fibrillation) (Primary)  -     ECG 12 Lead; Future  -     ECG 12 Lead    2. Paroxysmal atrial fibrillation    3. Abnormal PET of right lung    PAF: No recurrence that he knows of. Now back on anticoagulation following knee replacement. Continue anticoagulation indefinitely due to elevated [id number] score   -PACs on EKG today, not felt to be culprit of  dizziness  -Consider holter in future if symptoms worsen or change   -Follow up in 6 months in EP clinic      Abnormal PET: Due to nodule, will have repeat CT chest in 6 months per Dr. Bullard.   -Still SOB at baseline         I spent 40 minutes caring for Delgado on this date of service. This time includes time spent by me in the following activities:preparing for the visit, reviewing tests, obtaining and/or reviewing a separately obtained history, performing a medically appropriate examination and/or evaluation , counseling and educating the patient/family/caregiver, ordering medications, tests, or procedures, referring and communicating with other health care professionals , documenting information in the medical record, and independently interpreting results and communicating that information with the patient/family/caregiver        Electronically signed by ZEFERINO Rojas

## 2024-10-01 RX ORDER — LEVOTHYROXINE SODIUM 137 MCG
137 TABLET ORAL DAILY
Qty: 30 TABLET | Refills: 3 | Status: SHIPPED | OUTPATIENT
Start: 2024-10-01

## 2024-10-02 ENCOUNTER — TELEPHONE (OUTPATIENT)
Dept: INPATIENT UNIT | Age: 74
End: 2024-10-02

## 2024-10-03 DIAGNOSIS — M25.562 LEFT KNEE PAIN, UNSPECIFIED CHRONICITY: Primary | ICD-10-CM

## 2024-10-03 RX ORDER — CYCLOBENZAPRINE HCL 10 MG
10 TABLET ORAL 3 TIMES DAILY PRN
Qty: 40 TABLET | Refills: 0 | Status: CANCELLED | OUTPATIENT
Start: 2024-10-03

## 2024-10-03 NOTE — TELEPHONE ENCOUNTER
Requested Prescriptions     Pending Prescriptions Disp Refills    cyclobenzaprine (FLEXERIL) 10 MG tablet 40 tablet 0     Sig: Take 1 tablet by mouth 3 times daily as needed for Muscle spasms        Last Medication Refill: 09/16/2024  Last Office Visit: Visit date not found   Next Office Visit: 10/30/2024   Surgery Date: 09/16/2024

## 2024-10-03 NOTE — TELEPHONE ENCOUNTER
Called pt back he asked if he needs to get a refill of Tamsulosin advised pt he did not unless he was having any issues which he stated he was not.

## 2024-10-03 NOTE — TELEPHONE ENCOUNTER
Pt called again to ask about several other medications and whether or not he needed to discontinue them.  Please reach out to patient to discuss:    [phone]

## 2024-10-11 ENCOUNTER — TELEPHONE (OUTPATIENT)
Age: 74
End: 2024-10-11

## 2024-10-11 NOTE — TELEPHONE ENCOUNTER
Gracie from [facility] called to request permission to extend therapy. Patient is doing better since hospital trip but still needs more therapy. Therapy will be extended for another week.

## 2024-10-25 DIAGNOSIS — Z96.651 STATUS POST RIGHT KNEE REPLACEMENT: Primary | ICD-10-CM

## 2024-10-30 ENCOUNTER — OFFICE VISIT (OUTPATIENT)
Age: 74
End: 2024-10-30

## 2024-10-30 VITALS — WEIGHT: 265 LBS | BODY MASS INDEX: 35.12 KG/M2 | HEIGHT: 73 IN

## 2024-10-30 DIAGNOSIS — Z96.651 STATUS POST RIGHT KNEE REPLACEMENT: Primary | ICD-10-CM

## 2024-10-30 DIAGNOSIS — M17.11 PRIMARY OSTEOARTHRITIS OF ONE KNEE, RIGHT: ICD-10-CM

## 2024-10-30 PROCEDURE — 99024 POSTOP FOLLOW-UP VISIT: CPT | Performed by: PHYSICIAN ASSISTANT

## 2024-10-30 NOTE — PROGRESS NOTES
dysfunction) Take 1 to 4 hours prior to sexual activity 7/27/23  Yes Sven Delcid MD   ELIQUIS 5 MG TABS tablet Take 1 tablet by mouth 2 times daily 7/19/21  Yes Nirmal Layton MD   alfuzosin (UROXATRAL) 10 MG extended release tablet Take 1 tablet by mouth daily 8/16/21  Yes Sven Delcid MD   ondansetron (ZOFRAN) 4 MG tablet Take 1 tablet by mouth every 8 hours as needed for Nausea or Vomiting  Patient not taking: Reported on 10/30/2024 9/16/24   Mario Muro MD   cyclobenzaprine (FLEXERIL) 10 MG tablet Take 1 tablet by mouth 3 times daily as needed for Muscle spasms  Patient not taking: Reported on 10/30/2024 9/16/24   Mario Muro MD   levocetirizine (XYZAL) 5 MG tablet Take 1 tablet by mouth nightly  Patient not taking: Reported on 10/30/2024    Nirmal Layton MD   Multiple Vitamins-Minerals (THERAPEUTIC MULTIVITAMIN-MINERALS) tablet Take 1 tablet by mouth daily  Patient not taking: Reported on 10/30/2024    Nirmal Layton MD       Allergies:    Patient has no known allergies.      PHYSICAL EXAM:    Musculoskeletal: right knee shows no redness, warmth.  He has 1+ effusion.  Good stability with varus/valgus stressing.  Active motion from 3-95 degrees of flexion.  Incisions are clean, dry, and intact.  No signs of infection. Neurovascularly intact.    Radiology:   AP view of the pelvis, AP & Lateral view of right hip was obtained in the office on today's visit, reviewed and interpreted by me.  Imaging quality is adequate for review.  There are no fractures or dislocations present, bone and tissue quality appear normal. He has a right press-fit total knee which is well aligned and placed.  Midline tracking with the patella.  --------------------------------------------------------------------------------------------------------------------    Assessment:   Aftercare following musculoskeletal surgery as above  Encounter Diagnoses   Name Primary?    Status post right knee

## 2024-11-01 ENCOUNTER — OFFICE VISIT (OUTPATIENT)
Dept: OTOLARYNGOLOGY | Facility: CLINIC | Age: 74
End: 2024-11-01
Payer: MEDICARE

## 2024-11-01 VITALS
HEART RATE: 54 BPM | HEIGHT: 73 IN | SYSTOLIC BLOOD PRESSURE: 164 MMHG | WEIGHT: 266 LBS | TEMPERATURE: 97.8 F | BODY MASS INDEX: 35.25 KG/M2 | DIASTOLIC BLOOD PRESSURE: 64 MMHG

## 2024-11-01 DIAGNOSIS — H93.12 TINNITUS, LEFT: ICD-10-CM

## 2024-11-01 DIAGNOSIS — E89.0 S/P TOTAL THYROIDECTOMY: ICD-10-CM

## 2024-11-01 DIAGNOSIS — E89.0 POSTSURGICAL HYPOTHYROIDISM: ICD-10-CM

## 2024-11-01 DIAGNOSIS — I48.0 PAROXYSMAL ATRIAL FIBRILLATION: Chronic | ICD-10-CM

## 2024-11-01 DIAGNOSIS — R26.9 GAIT DISTURBANCE: Primary | ICD-10-CM

## 2024-11-01 RX ORDER — ALFUZOSIN HYDROCHLORIDE 10 MG/1
1 TABLET, EXTENDED RELEASE ORAL DAILY
COMMUNITY
Start: 2024-10-11

## 2024-11-01 RX ORDER — TRIAMCINOLONE ACETONIDE 55 UG/1
2 SPRAY, METERED NASAL DAILY
COMMUNITY
Start: 2024-10-11

## 2024-11-01 NOTE — PROGRESS NOTES
YOB: 1950  Location: [facility]  Location Address: [address] [place] [address], [facility], [address] [place] [address]  Location Phone: [phone]    Chief Complaint   Patient presents with    Thyroid Problem    Dizziness       History of Present Illness  Delgado Desir is a 74 y.o. male.  Delgado Desir is here for follow up of ENT complaints. The patient is s/p total thyroidectomy 2020  Pathology revealed multinodular goiter   He reports ongoing sensation of being off balance that has been going on for the past several months   He was treated with vestibular rehab, but states he went for 3 weeks and did not see improvement and stopped going   He denies room spinning   Patient states his off balance symptoms are typically with position changes     He is currently taking synthroid 137 mcg     Patient was taking xyzal that was prescribed by dr. Hoover and feels as if symptoms improved   Past Medical History:   Diagnosis Date    A-fib     Arthritis     Asthma 23    Dizzy spells     states on rare occsaion has    Failed intubation of airway 11/10/2020    Hypertension     Lung cancer     Nosebleed 23    Taking  eliquis    PAF (paroxysmal atrial fibrillation)     Prostate cancer     Thyroid disorder        Past Surgical History:   Procedure Laterality Date    CARDIAC ELECTROPHYSIOLOGY PROCEDURE N/A 2024    Procedure: Ablation atrial fibrillation;  Surgeon: Master Johnson MD;  Location: [facility] CATH INVASIVE LOCATION;  Service: Cardiovascular;  Laterality: N/A;    CARPAL TUNNEL RELEASE Right     LOBECTOMY Right 2/15/2021    Procedure: THORACOSCOPY WITH DAVINCI ROBOT WITH RIGHT WEDGE RESECTION, MEDIASTINAL LYMPH NODE DISSECTION-RIGHT;  Surgeon: Nick Flores MD;  Location: [facility] OR;  Service: DaVinci;  Laterality: Right;    LUNG BIOPSY      PROSTATE BIOPSY      REPLACEMENT TOTAL KNEE Left     SHOULDER SURGERY Bilateral     THORACOSCOPY Right 10/30/2020    Procedure: MEDIASTINOSCOPY, RIGHT  THORACOSCOPY POSSIBLE WEDGE RESECTION WITH DAVINCI ROBOT;  Surgeon: Nick Flores MD;  Location:  [facility] OR;  Service: DaVinci;  Laterality: Right;    THYROIDECTOMY N/A 12/11/2020    Procedure: Total thyroidectomy;  Surgeon: Jean Marie Solis MD;  Location:  [facility] OR;  Service: ENT;  Laterality: N/A;       Outpatient Medications Marked as Taking for the 11/1/24 encounter (Office Visit) with Maryann Serra APRN   Medication Sig Dispense Refill    acetaminophen (TYLENOL) 500 MG tablet Take 1 tablet by mouth Every 6 (Six) Hours As Needed for Mild Pain.      alfuzosin (UROXATRAL) 10 MG 24 hr tablet Take 1 tablet by mouth Daily.      apixaban (ELIQUIS) 5 MG tablet tablet Take 1 tablet by mouth 2 (Two) Times a Day.      cyclobenzaprine (FLEXERIL) 10 MG tablet Take 1 tablet by mouth.      Ibuprofen 200 MG capsule Take 2 capsules by mouth Daily.      levocetirizine (XYZAL) 5 MG tablet Take 1 tablet by mouth Every Night.      metoprolol tartrate (LOPRESSOR) 25 MG tablet Take 2 tablets by mouth 2 (Two) Times a Day. 180 tablet 3    Synthroid 137 MCG tablet TAKE 1 TABLET BY MOUTH ONCE DAILY 30 tablet 3    tamsulosin (FLOMAX) 0.4 MG capsule 24 hr capsule TAKE 1 CAPSULE BY MOUTH EVERY DAY STARTING 7 DAYS PRIOR TO SURGERY AND CONTINUE UNTIL FINISHED.      therapeutic multivitamin-minerals (THERAGRAN-M) tablet Take 2 tablets by mouth Daily.      traMADol (ULTRAM) 50 MG tablet Take 1 tablet by mouth Every 6 (Six) Hours.      Trelegy Ellipta 100-62.5-25 MCG/ACT inhaler Inhale 1 puff Daily.      Triamcinolone Acetonide (NASACORT) 55 MCG/ACT nasal inhaler 2 sprays Daily.         Patient has no known allergies.    Family History   Problem Relation Age of Onset    Hypertension Mother         New Contact    Cancer Mother     COPD Father        Social History     Socioeconomic History    Marital status:     Number of children: 2   Tobacco Use    Smoking status: Former     Current packs/day: 0.00     Average packs/day: 1  pack/day for 33.0 years (33.0 ttl pk-yrs)     Types: Cigarettes     Start date: 1969     Quit date: 1996     Years since quittin.8    Smokeless tobacco: Never   Vaping Use    Vaping status: Never Used   Substance and Sexual Activity    Alcohol use: Yes     Alcohol/week: 2.0 standard drinks of alcohol     Types: 2 Shots of liquor per week     Comment: x2 weekly    Drug use: Never    Sexual activity: Defer       Review of Systems   Constitutional:  Positive for fatigue.   HENT: Negative.     Neurological:  Positive for dizziness.       Vitals:    24 1031   BP: 164/64   Pulse: 54   Temp: 97.8 °F (36.6 °C)       Body mass index is 35.1 kg/m².    Objective     Physical Exam  Vitals reviewed.   Constitutional:       Appearance: He is obese.   HENT:      Head: Normocephalic.      Right Ear: Tympanic membrane, ear canal and external ear normal.      Left Ear: Tympanic membrane, ear canal and external ear normal.      Nose: Nose normal.      Mouth/Throat:      Lips: Pink.      Mouth: Mucous membranes are moist.      Pharynx: Uvula midline.   Neck:     Neurological:      Mental Status: He is alert.         Assessment & Plan   Diagnoses and all orders for this visit:    1. Gait disturbance (Primary)    2. Postsurgical hypothyroidism    3. Tinnitus, left    4. S/P total thyroidectomy    5. Paroxysmal atrial fibrillation      * Surgery not found *  No orders of the defined types were placed in this encounter.    Return in about 3 months (around 2025).     Orthostatic positive in clinic today   Discussed this with patient including s/s and precautions to follow   Discussed possible contributing factors to bp fluctuations   Recheck labs in 4 weeks     There are no Patient Instructions on file for this visit.

## 2024-11-11 ENCOUNTER — OFFICE VISIT (OUTPATIENT)
Dept: NEUROLOGY | Age: 74
End: 2024-11-11

## 2024-11-11 VITALS
HEART RATE: 71 BPM | HEIGHT: 73 IN | DIASTOLIC BLOOD PRESSURE: 75 MMHG | WEIGHT: 265 LBS | SYSTOLIC BLOOD PRESSURE: 140 MMHG | BODY MASS INDEX: 35.12 KG/M2 | OXYGEN SATURATION: 100 %

## 2024-11-11 DIAGNOSIS — R42 DIZZINESS: Primary | ICD-10-CM

## 2024-11-11 NOTE — PROGRESS NOTES
[facility] Office Note      Patient:   Alfred Krishna  MR#:    [id number]  Account Number:                         YOB: 1950  Date of Evaluation:  11/11/2024  Time of Note:                          9:13 AM  Primary/Referring Physician:  Claudia Woods APRN - CNP   Consulting Physician:  Vy Kaiser DNP, APRN    NEW PATIENT CONSULTATION    Chief Complaint   Patient presents with    New Patient    Abnormal Imaging     Abnormal CT.     Gait Problem     C/o balance issues and fall      HISTORY OF PRESENT ILLNESS    Alfred Krishna is a 74 y.o. year old male here for evaluation of abnormal CT head, falls, dizziness. He has noted dizziness over the past few months. Dizziness is a sudden onset. Feels like he loses his balance. Occurs with ambulating, standing up or turning around/sudden movements. Denies nausea with dizziness. He does note dizziness daily, brief in nature. Using a cane to walk, related to knee pain. He had a recent fall, no clear injury, unclear if he was dizzy during the fall. He has had other falls as well in the past few months. He has atrial fibrillation, on Eliquis. No carotid artery stenosis history. Not a diabetic. History of lobectomy, has noted shortness of breath since that time.     Past Medical History:   Diagnosis Date    Atrial fibrillation (HCC)     sees dr. woods, on eliquis    Benign prostatic disease     Elevated PSA     Knee pain     Osteoarthritis     Thyroid disease      Past Surgical History:   Procedure Laterality Date    ATRIAL ABLATION SURGERY      at     HERNIA REPAIR      JOINT REPLACEMENT      KNEE ARTHROPLASTY  12/2010    LUNG BIOPSY Right     [place]    [place] SURGICAL ARTHROSCOPY SHOULDER W/ROTATOR CUFF RPR Left 03/22/2018    SHOULDER ARTHROSCOPY ROTATOR CUFF REPAIR  SUBACROMIAL DECOMPRESSION performed by Randy Sterling MD at [facility] OR    SHOULDER ARTHROSCOPY Bilateral 2008    TOTAL KNEE ARTHROPLASTY Right 9/16/2024    ROBOTIC RIGHT TOTAL

## 2024-12-06 ENCOUNTER — TELEPHONE (OUTPATIENT)
Dept: ONCOLOGY | Facility: CLINIC | Age: 74
End: 2024-12-06
Payer: MEDICARE

## 2024-12-06 NOTE — TELEPHONE ENCOUNTER
Call from patient and he is requesting his CT scans be done at [facility] I gave him the number for [employer] scheduling.

## 2024-12-10 ENCOUNTER — LAB (OUTPATIENT)
Dept: LAB | Facility: HOSPITAL | Age: 74
End: 2024-12-10
Payer: MEDICARE

## 2024-12-10 DIAGNOSIS — D50.8 IRON DEFICIENCY ANEMIA SECONDARY TO INADEQUATE DIETARY IRON INTAKE: ICD-10-CM

## 2024-12-10 DIAGNOSIS — C34.31 MALIGNANT NEOPLASM OF LOWER LOBE OF RIGHT LUNG: ICD-10-CM

## 2024-12-10 LAB
ALBUMIN SERPL-MCNC: 3.8 G/DL (ref 3.5–5.2)
ALBUMIN/GLOB SERPL: 1.5 G/DL
ALP SERPL-CCNC: 112 U/L (ref 39–117)
ALT SERPL W P-5'-P-CCNC: 25 U/L (ref 1–41)
ANION GAP SERPL CALCULATED.3IONS-SCNC: 8 MMOL/L (ref 5–15)
AST SERPL-CCNC: 21 U/L (ref 1–40)
BASOPHILS # BLD AUTO: 0.03 10*3/MM3 (ref 0–0.2)
BASOPHILS NFR BLD AUTO: 0.4 % (ref 0–1.5)
BILIRUB SERPL-MCNC: 1.1 MG/DL (ref 0–1.2)
BUN SERPL-MCNC: 26 MG/DL (ref 8–23)
BUN/CREAT SERPL: 28.3 (ref 7–25)
CALCIUM SPEC-SCNC: 8.6 MG/DL (ref 8.6–10.5)
CHLORIDE SERPL-SCNC: 106 MMOL/L (ref 98–107)
CO2 SERPL-SCNC: 26 MMOL/L (ref 22–29)
CREAT SERPL-MCNC: 0.92 MG/DL (ref 0.76–1.27)
DEPRECATED RDW RBC AUTO: 50.3 FL (ref 37–54)
EGFRCR SERPLBLD CKD-EPI 2021: 87.8 ML/MIN/1.73
EOSINOPHIL # BLD AUTO: 0.07 10*3/MM3 (ref 0–0.4)
EOSINOPHIL NFR BLD AUTO: 1 % (ref 0.3–6.2)
ERYTHROCYTE [DISTWIDTH] IN BLOOD BY AUTOMATED COUNT: 15.1 % (ref 12.3–15.4)
FERRITIN SERPL-MCNC: 250.4 NG/ML (ref 30–400)
GLOBULIN UR ELPH-MCNC: 2.6 GM/DL
GLUCOSE SERPL-MCNC: 130 MG/DL (ref 65–99)
HCT VFR BLD AUTO: 39.5 % (ref 37.5–51)
HGB BLD-MCNC: 12.5 G/DL (ref 13–17.7)
IMM GRANULOCYTES # BLD AUTO: 0.05 10*3/MM3 (ref 0–0.05)
IMM GRANULOCYTES NFR BLD AUTO: 0.7 % (ref 0–0.5)
IRON 24H UR-MRATE: 60 MCG/DL (ref 59–158)
IRON SATN MFR SERPL: 19 % (ref 20–50)
LYMPHOCYTES # BLD AUTO: 0.48 10*3/MM3 (ref 0.7–3.1)
LYMPHOCYTES NFR BLD AUTO: 6.9 % (ref 19.6–45.3)
MCH RBC QN AUTO: 28.7 PG (ref 26.6–33)
MCHC RBC AUTO-ENTMCNC: 31.6 G/DL (ref 31.5–35.7)
MCV RBC AUTO: 90.6 FL (ref 79–97)
MONOCYTES # BLD AUTO: 0.32 10*3/MM3 (ref 0.1–0.9)
MONOCYTES NFR BLD AUTO: 4.6 % (ref 5–12)
NEUTROPHILS NFR BLD AUTO: 6.04 10*3/MM3 (ref 1.7–7)
NEUTROPHILS NFR BLD AUTO: 86.4 % (ref 42.7–76)
NRBC BLD AUTO-RTO: 0 /100 WBC (ref 0–0.2)
PLATELET # BLD AUTO: 158 10*3/MM3 (ref 140–450)
PMV BLD AUTO: 11.3 FL (ref 6–12)
POTASSIUM SERPL-SCNC: 4.4 MMOL/L (ref 3.5–5.2)
PROT SERPL-MCNC: 6.4 G/DL (ref 6–8.5)
RBC # BLD AUTO: 4.36 10*6/MM3 (ref 4.14–5.8)
SODIUM SERPL-SCNC: 140 MMOL/L (ref 136–145)
TIBC SERPL-MCNC: 308 MCG/DL (ref 298–536)
TRANSFERRIN SERPL-MCNC: 207 MG/DL (ref 200–360)
WBC NRBC COR # BLD AUTO: 6.99 10*3/MM3 (ref 3.4–10.8)

## 2024-12-10 PROCEDURE — 82728 ASSAY OF FERRITIN: CPT

## 2024-12-10 PROCEDURE — 80053 COMPREHEN METABOLIC PANEL: CPT

## 2024-12-10 PROCEDURE — 84466 ASSAY OF TRANSFERRIN: CPT

## 2024-12-10 PROCEDURE — 85025 COMPLETE CBC W/AUTO DIFF WBC: CPT

## 2024-12-10 PROCEDURE — 36415 COLL VENOUS BLD VENIPUNCTURE: CPT

## 2024-12-10 PROCEDURE — 83540 ASSAY OF IRON: CPT

## 2024-12-12 ENCOUNTER — TELEPHONE (OUTPATIENT)
Dept: ONCOLOGY | Facility: CLINIC | Age: 74
End: 2024-12-12
Payer: MEDICARE

## 2024-12-12 RX ORDER — FERROUS SULFATE 325(65) MG
325 TABLET ORAL DAILY
Qty: 60 TABLET | Refills: 2 | Status: SHIPPED | OUTPATIENT
Start: 2024-12-12

## 2024-12-12 NOTE — TELEPHONE ENCOUNTER
The patient returned my call. I informed him that we were sending in ferrous sulfate for him to take orally daily. He said that he has taken this before without any issues, but will call us if anything new develops. He verbalized understanding and voice no c/o at this time.

## 2024-12-12 NOTE — TELEPHONE ENCOUNTER
Called and left message to return our call concerning lab results. Left main number for return call.

## 2024-12-12 NOTE — TELEPHONE ENCOUNTER
----- Message from Anisa FELIX sent at 12/12/2024  8:05 AM CST -----    ----- Message -----  From: Milan Bullard MD  Sent: 12/10/2024  12:21 PM CST  To: Kimberlee Castro LPN; Anisa Locke MA    Ferrous sulfate 325 mg p.o. daily #60 with 2 refills.  Stop and call if intolerant.

## 2024-12-18 ENCOUNTER — TELEPHONE (OUTPATIENT)
Age: 74
End: 2024-12-18

## 2025-01-07 ENCOUNTER — APPOINTMENT (OUTPATIENT)
Dept: GENERAL RADIOLOGY | Facility: HOSPITAL | Age: 75
End: 2025-01-07
Payer: MEDICARE

## 2025-01-07 PROCEDURE — 71046 X-RAY EXAM CHEST 2 VIEWS: CPT

## 2025-01-23 DIAGNOSIS — C61 PROSTATE CANCER (HCC): ICD-10-CM

## 2025-01-23 LAB — PSA SERPL-MCNC: 0.02 NG/ML (ref 0–4)

## 2025-01-30 ENCOUNTER — OFFICE VISIT (OUTPATIENT)
Age: 75
End: 2025-01-30

## 2025-01-30 VITALS — BODY MASS INDEX: 35.12 KG/M2 | WEIGHT: 265 LBS | HEIGHT: 73 IN

## 2025-01-30 DIAGNOSIS — Z96.651 STATUS POST RIGHT KNEE REPLACEMENT: Primary | ICD-10-CM

## 2025-01-30 RX ORDER — LEVOCETIRIZINE DIHYDROCHLORIDE 5 MG/1
5 TABLET, FILM COATED ORAL NIGHTLY
COMMUNITY
Start: 2024-12-05 | End: 2025-01-31 | Stop reason: CLARIF

## 2025-01-30 RX ORDER — FERROUS SULFATE 325(65) MG
1 TABLET ORAL DAILY
COMMUNITY
Start: 2024-12-12

## 2025-01-30 RX ORDER — METHYLPREDNISOLONE 4 MG/1
TABLET ORAL
COMMUNITY
Start: 2024-11-18 | End: 2025-01-31 | Stop reason: ALTCHOICE

## 2025-01-30 RX ORDER — TRIAMCINOLONE ACETONIDE 55 UG/1
SPRAY, METERED NASAL
COMMUNITY
Start: 2024-12-23 | End: 2025-01-31 | Stop reason: CLARIF

## 2025-01-30 RX ORDER — MOMETASONE FUROATE MONOHYDRATE 50 UG/1
SPRAY, METERED NASAL
COMMUNITY
Start: 2024-12-05 | End: 2025-01-31 | Stop reason: CLARIF

## 2025-01-30 RX ORDER — PREDNISONE 5 MG/1
1 TABLET ORAL 2 TIMES DAILY
COMMUNITY
Start: 2025-01-07 | End: 2025-01-31 | Stop reason: CLARIF

## 2025-01-30 NOTE — PROGRESS NOTES
Orthopaedic Clinic Note - Postop    NAME:  Alfred Krishna   : 1950  MRN: [id number]      2025     CHIEF COMPLAINT:   Chief Complaint   Patient presents with    Knee Pain     Right knee-tkr -SX: 2024       HISTORY OF PRESENT ILLNESS:    Patient returns today for follow up of the right knee.  Pain has improved. There were no postoperative complications.         Past Medical History:        Diagnosis Date    Atrial fibrillation (HCC)     sees dr. woods, on eliquis    Benign prostatic disease     Elevated PSA     Knee pain     Osteoarthritis     Thyroid disease        Past Surgical History:        Procedure Laterality Date    ATRIAL ABLATION SURGERY      at     HERNIA REPAIR      JOINT REPLACEMENT      KNEE ARTHROPLASTY  2010    LUNG BIOPSY Right     Religion    [place] SURGICAL ARTHROSCOPY SHOULDER W/ROTATOR CUFF RPR Left 2018    SHOULDER ARTHROSCOPY ROTATOR CUFF REPAIR  SUBACROMIAL DECOMPRESSION performed by Randy Sterling MD at [facility] OR    SHOULDER ARTHROSCOPY Bilateral     TOTAL KNEE ARTHROPLASTY Right 2024    ROBOTIC RIGHT TOTAL KNEE ARTHROPLASTY performed by Mario Muro MD at [facility] OR       Current Medications:   Prior to Admission medications    Medication Sig Start Date End Date Taking? Authorizing Provider   ferrous sulfate (IRON 325) 325 (65 Fe) MG tablet Take 1 tablet by mouth daily 24  Yes Provider, MD Nirmal   levothyroxine (SYNTHROID) 137 MCG tablet Take 1 tablet by mouth Daily Indications: Underactive Thyroid   Yes Provider, MD Nirmal   metoprolol tartrate (LOPRESSOR) 25 MG tablet Take 1 tablet by mouth 2 times daily 23  Yes Provider, MD Nirmal   albuterol sulfate HFA (PROVENTIL;VENTOLIN;PROAIR) 108 (90 Base) MCG/ACT inhaler Inhale 2 puffs into the lungs every 6 hours as needed 12/15/23  Yes Provider, MD Nirmal   sildenafil (REVATIO) 20 MG tablet Take 1 tablet by mouth as needed (For erectile dysfunction) Take 1 to 4 hours prior to

## 2025-01-31 ENCOUNTER — OFFICE VISIT (OUTPATIENT)
Dept: UROLOGY | Age: 75
End: 2025-01-31
Payer: MEDICARE

## 2025-01-31 VITALS — HEIGHT: 73 IN | TEMPERATURE: 97.2 F | WEIGHT: 275 LBS | BODY MASS INDEX: 36.45 KG/M2

## 2025-01-31 DIAGNOSIS — N40.1 BPH WITH URINARY OBSTRUCTION: ICD-10-CM

## 2025-01-31 DIAGNOSIS — N13.8 BPH WITH URINARY OBSTRUCTION: ICD-10-CM

## 2025-01-31 DIAGNOSIS — C61 PROSTATE CANCER (HCC): Primary | ICD-10-CM

## 2025-01-31 DIAGNOSIS — N52.35 ERECTILE DYSFUNCTION FOLLOWING RADIATION THERAPY: ICD-10-CM

## 2025-01-31 LAB
APPEARANCE FLUID: CLEAR
BILIRUBIN, POC: NORMAL
BLOOD URINE, POC: NORMAL
CLARITY, POC: CLEAR
COLOR, POC: YELLOW
GLUCOSE URINE, POC: NORMAL MG/DL
KETONES, POC: NORMAL MG/DL
LEUKOCYTE EST, POC: NORMAL
NITRITE, POC: NORMAL
PH, POC: 5.5
PROTEIN, POC: NORMAL MG/DL
SPECIFIC GRAVITY, POC: 1.02
UROBILINOGEN, POC: 0.2 MG/DL

## 2025-01-31 PROCEDURE — 81002 URINALYSIS NONAUTO W/O SCOPE: CPT | Performed by: UROLOGY

## 2025-01-31 PROCEDURE — 1159F MED LIST DOCD IN RCRD: CPT | Performed by: UROLOGY

## 2025-01-31 PROCEDURE — 99214 OFFICE O/P EST MOD 30 MIN: CPT | Performed by: UROLOGY

## 2025-01-31 PROCEDURE — 1123F ACP DISCUSS/DSCN MKR DOCD: CPT | Performed by: UROLOGY

## 2025-01-31 RX ORDER — ALFUZOSIN HYDROCHLORIDE 10 MG/1
10 TABLET, EXTENDED RELEASE ORAL DAILY
Qty: 30 TABLET | Refills: 11 | Status: SHIPPED | OUTPATIENT
Start: 2025-01-31

## 2025-01-31 RX ORDER — SILDENAFIL CITRATE 20 MG/1
20 TABLET ORAL PRN
Qty: 5 TABLET | Refills: 0 | Status: SHIPPED | OUTPATIENT
Start: 2025-01-31

## 2025-01-31 ASSESSMENT — ENCOUNTER SYMPTOMS
COUGH: 0
NAUSEA: 0
VOMITING: 0
ABDOMINAL DISTENTION: 0
BACK PAIN: 0
EYE REDNESS: 0
ABDOMINAL PAIN: 0
SHORTNESS OF BREATH: 0
EYE DISCHARGE: 0
DIARRHEA: 0
CONSTIPATION: 0
TROUBLE SWALLOWING: 0

## 2025-01-31 NOTE — PROGRESS NOTES
Alfred Krishna is a 74 y.o. male who presents today   Chief Complaint   Patient presents with    Follow-up     I am here today for  my 6 month follow up. My PSA is done.        Prostate Cancer  Patient is here today for prostate cancer which was first diagnosed 4.5 years ago.  His prostate cancer can be characterized as clinical stage T1c Negra 4+3 = 7 grade group 3.  Hormone sensitive status post XRT  His last several PSA values are as follows:  Lab Results   Component Value Date    PSA 0.02 01/23/2025    PSA <0.01 07/25/2024    PSA <0.01 01/23/2024    PSA <0.01 07/24/2023    PSA <0.01 01/20/2023     Previous treatment of prostate cancer: Neoadjuvant hormonal therapy beginning September 23, 2021 through July 7, 2023  External beam radiation therapy completed 9/10/2022  Lower urinary tract symptoms: frequency and nocturia, 2 times per night he takes Zosyn.    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 woods, on eliquis    Benign prostatic disease     Elevated PSA     Knee pain     Osteoarthritis     Thyroid disease        Past Surgical History:   Procedure Laterality Date    ATRIAL ABLATION SURGERY      at     HERNIA REPAIR      JOINT REPLACEMENT      KNEE ARTHROPLASTY  12/2010    LUNG BIOPSY Right     Jew    MO SURGICAL ARTHROSCOPY SHOULDER W/ROTATOR CUFF RPR Left 03/22/2018    SHOULDER ARTHROSCOPY ROTATOR CUFF REPAIR  SUBACROMIAL DECOMPRESSION performed by Randy Sterling MD at [facility] OR    SHOULDER ARTHROSCOPY Bilateral 2008    TOTAL KNEE ARTHROPLASTY Right 9/16/2024    ROBOTIC RIGHT TOTAL KNEE ARTHROPLASTY performed by Mario Muro MD at [facility] OR       Current Outpatient Medications   Medication Sig Dispense Refill    ferrous sulfate (IRON 325) 325 (65 Fe) MG tablet Take 1 tablet by mouth daily      levothyroxine (SYNTHROID) 137 MCG tablet

## 2025-02-05 NOTE — PAYOR COMM NOTE
"Delgado Chappell (70 y.o. Male) [id number]   D/C notification     [facility]   james phone   Fax        Date of Birth Social Security Number Address Home Phone MRN    1950  [address] [facility] [address] [phone] [id number]    Islam Marital Status          Other        Admission Date Admission Type Admitting Provider Attending Provider Department, Room/Bed    2/15/21 Elective Flores, Nick MILES MD  [facility] 3C, 363/1    Discharge Date Discharge Disposition Discharge Destination        2/19/2021 Home or Self Care              Attending Provider: (none)   Allergies: No Known Allergies    Isolation: None   Infection: None   Code Status: CPR    Ht: 183 cm (72.05\")   Wt: 121 kg (266 lb 1.5 oz)    Admission Cmt: None   Principal Problem: Lung mass [R91.8]                 Active Insurance as of 2/15/2021     Primary Coverage     Payor Plan Insurance Group Employer/Plan Group    ANTHEM MEDICARE REPLACEMENT ANTHEM MEDICARE ADVANTAGE [id number]     Payor Plan Address Payor Plan Phone Number Payor Plan Fax Number Effective Dates    [address] [phone]  1/1/2020 - None Entered    [facility] [id number]       Subscriber Name Subscriber Birth Date Member ID       DELGADO CHAPPELL P 1950 [id number]           Secondary Coverage     Payor Plan Insurance Group Employer/Plan Group    MISC MC SUP   MISC MC SUP              PLAN G     Coverage Address Coverage Phone Number Coverage Fax Number Effective Dates    [address] [address] [phone]  10/1/2019 - None Entered    [facility] [id number]       Subscriber Name Subscriber Birth Date Member ID       DELGADO CHAPPELL P 1950 [id number]                 Emergency Contacts      (Rel.) Home Phone Work Phone Mobile Phone    Nava Chappell (Spouse) [phone] -- [phone]    DAVE THOMPSON (Friend) -- -- [phone]    Mini Valdez (Daughter) -- -- [phone]               " "  Physician Progress Notes (last 24 hours) (Notes from 21 1452 through 21 1452)      Nilda Faustin APRN at 21 0903     Attestation signed by Nick Flores MD at 21 1014    I have personally seen and examined Delgado Desir and reviewed the record. Agree with the aforementioned plan rendered jointly with Nilda Faustin.    Converted back to sinus rhythm overnight.  Chest x-ray is stable.  Slight elevation in creatinine today, likely due to aggressive diuresis.  He is on room air ambulating independently and overall doing well.  I would like for him to be evaluated as an outpatient for his A. fib, I believe this is likely a chronic problem with paroxysmal A. fib when I talk with him about history of a syncopal episode and sometimes having palpitations.  Will likely need further evaluation with a monitor once over his acute surgical phase in a month or so.  Will refer to cardiology for this, will DC on amiodarone and blood thinners.  Will follow up with us as scheduled.  I did discuss his pathology report with him.    Nick Flores M.D.  Cardiothoracic Surgeon                      Patient name: Delgado Desir  Patient : 1950  VISIT # [id number]  MR #[id number]    Procedure:Procedure(s):  THORACOSCOPY WITH DAVINCI ROBOT WITH RIGHT WEDGE RESECTION, MEDIASTINAL LYMPH NODE DISSECTION-RIGHT  Procedure Date:2/15/2021  POD:4 Days Post-Op    Subjective     Patient resting in chair.  He converted to sinus rhythm yesterday evening.  Creatinine 1.3 this morning.  He is hopeful to go home today.      Objective     Visit Vitals  /51 (BP Location: Left arm, Patient Position: Sitting)   Pulse 80   Temp 98.3 °F (36.8 °C) (Oral)   Resp 16   Ht 183 cm (72.05\")   Wt 121 kg (266 lb 1.5 oz)   SpO2 95%   BMI 36.04 kg/m²       Intake/Output Summary (Last 24 hours) at 2021 0903  Last data filed at 2021 0715  Gross per 24 hour   Intake 191 ml   Output 1250 ml   Net -1059 ml "         Lab:        IMAGES:       Imaging Results (Last 24 Hours)     Procedure Component Value Units Date/Time    XR Chest 1 View [[id number]] Collected: 02/19/21 0735     Updated: 02/19/21 0739    Narrative:      EXAMINATION: XR CHEST 1 VW-  2/19/2021 7:35 AM CST 1 view     HISTORY: right lung nodule, s/p wedge     COMPARISON: 02/18/2021.     FINDINGS:   Small amount of extrathoracic air on the RIGHT is again seen and  decreased. No large pneumothorax. There is some consolidative change at  the RIGHT lung base, possibly atelectasis. Linear atelectasis noted at  the LEFT lung base as well. No change in the cardia mediastinal  silhouette.      The osseous structures and surrounding soft tissues demonstrate no acute  abnormality.          Impression:         1.  RIGHT basilar consolidation, likely atelectasis but nonspecific.  Otherwise, no interval change.        This report was finalized on 02/19/2021 07:36 by Dr. Peyman Sweeney MD.    XR Chest PA & Lateral [[id number]] Collected: 02/18/21 1216     Updated: 02/18/21 1221    Narrative:      Exam:   XR CHEST PA AND LATERAL-       Date:  2/18/2021      History:  Male, age  70 years;post chest tube removal; R91.8-Other  nonspecific abnormal finding of lung field     COMPARISON:  Chest x-ray dated 2/18/2021.     Findings :  Interval removal of right-sided chest tube. There is some right lateral  chest wall emphysema. No measurable pneumothorax. Bilateral lower lung  zone residual opacities.  The heart and mediastinum are unchanged in size.  The bones show no  acute pathology.         Impression:      Impression:     Interval removal of right-sided chest tube, without measurable  pneumothorax.     This report was finalized on 02/18/2021 12:18 by Dr. Rachel Matson MD.        CXR: Right basilar atelectasis, no pneumothorax.  Small amount of subcutaneous emphysema again noted on the right but decreased.      Physical Exam:  General: Alert, oriented. No apparent distress.  64y Female with history of CHF presents as a transfer for LE CO2 angiogram. Nephrology consulted for elevated Scr.    1) JAMEL: likely due to ATN in setting of infection/hypotension. Scr improving. UA relatively bland. FeUrea low consistent with low flow state. Renal US unremarkable. Bladder scan on 2/3 negative.  gtt @ 2.5 mcg as per cardiology. Avoid nephrotoxins.    2) HTN: BP low normal. Metoprolol as per cardiology. Holding ARNI.    3) LE edema: Not secondary to nephrotic syndrome given subnephrotic range proteinuria. Start bumex gtt @ 1 mg/hour to help expedite volume status. TTE with moderately decreased LVSF. Monitor UO.    4) Hyperphosphatemia: Resolved. Continue with low phosphorus diet.       [facility] NEPHROLOGY  Raji Waterman M.D.  Mars Feliz D.O.  Kelley Parks M.D.  MD Nancy Kulkarni, MSN, ANP-C    Telephone: [phone]  Facsimile: [phone] [place] [address], [address]  [place] [address]     Cardiovascular: Regular rate and rhythm without murmur, rubs, or gallops.    Pulmonary: Clear to auscultation bilaterally without wheezing, rubs, or rales.  Chest: Thoracic incisions clean, dry, and intact.   Abdomen: Soft, non distended, and non tender.  Extremities: Warm, moves all extremities. No edema.   Neurologic:  Grossly intact with no focal deficits.           Impression:  Lung mass  Former smoker  Prostate cancer  Paroxysmal atrial fibrillation      Plan:  DC amiodarone gtt  PA and lateral CXR and repeat BMP at 1100 today  Refer to cardiology as an outpatient for atrial fibrillation.  Will likely need Zio Patch for further evaluation.    Encourage pulmonary toilet and ambulation  Routine post thoracic surgery care  Possible dc home this afternoon  DW patient and nursing    ANDRES Jenkins  02/19/21  09:03 CST      Electronically signed by Nick Flores MD at 02/19/21 1014          Discharge Summary      Nilda Faustin APRN at 02/19/21 0945     Attestation signed by Nick Flores MD at 02/19/21 9098    I have personally seen and examined Delgado Desir and reviewed the record. Agree with the aforementioned plan rendered jointly with Nilda Faustin.    Nick Flores M.D.  Cardiothoracic Surgeon                                  [facility] Cardiothoracic Surgery  DISCHARGE SUMMARY        Date of Admission: 2/15/2021  Date of Discharge:  2/19/2021  Primary Care Physician: Michael Nunez MD    Discharge Diagnoses:  Active Hospital Problems    Diagnosis   • Paroxysmal atrial fibrillation (CMS/HCC)   • Lung mass   • Former smoker   • Prostate cancer (CMS/HCC)     Procedures Performed: Diagnostic Bronchoscopy , Right Robotic Assisted VATS Diagnostic Wedge Resection, Right Robot Assisted VATS mediastinal lymphadenectomy    HPI:  Mr. Delgado Desir is a 70 y.o. male with a history of prostate cancer who presents with a lung nodule.  He was referred to Dr. Flores by Dr. Ye  Le.  Patient was recently diagnosed with prostate cancer.  During work-up of this he had a CT scan of the chest which showed a right lower lobe lung nodule.  He is a previous smoker who quit 25 years ago with a 20-pack-year smoking history.    He underwent wedge resection which final path was consistent with lung cancer not prostate primary.  His lung reserve is not adequate for an anatomic resection.  He denies any hemoptysis, productive cough, fevers chills, night sweats, unwanted weight loss.  He has no history of cancer personally other than the prostate cancer.  He has no known family history of cancer.  He does report he is more short of breath with activity recently but attributes this to weight gain and getting older.  After discussion with Dr. Flores, the decision was made to proceed with surgical resection.  He has undergone wedge resection but needs an additional margin as well as lymph node sampling.      Hospital Course: On 2/15/2021, Mr. Desir was taken to the operating room for diagnostic bronchoscopy, right robotic assisted VATS wedge resection, and mediastinal lymph node dissection.  See separate op note by Dr. Flores detailing the operation.  Following surgery he was transferred to the PACU in stable but guarded condition.  After meeting PACU discharge criteria, he was transferred to  for ongoing recovery.  The remainder of his hospital stay was significant for encouraging pulmonary toilet and ambulation, diuresis, and pain control.  He is tolerating room air.  He converted to atrial fibrillation on post op day 2.  Amiodarone drip was started and he will be discharged home on oral Amiodarone as well as Eliquis.  Right chest tubes were removed without remark on post op day 3.  Follow up chest xray demonstrated stability with no pneumothorax.   He has good pain control and is eating well.  He meets criteria for discharge home on post op day 4 and the patient is agreeable.      Final pathology  results reveal right lower lobe wedge resection margins are free of tumor.  Lymph nodes are benign.  He will be discharged home on Amiodarone and Eliquis due to paroxysmal atrial fibrillation.  We will refer him to cardiology as he will likely need further workup for this with a monitor in the next month or so.      Condition on Discharge:  Neurologically intact and has good pain control.  He is eating well and has demonstrable good bowel function.  All thoracic incisions are healing nicely without evidence of thoracic hernia.  The heart is in normal sinus rhythm.         Discharge Disposition:  Home or Self Care [1]    Discharge Medications:     Discharge Medications      New Medications      Instructions Start Date   amiodarone 400 MG tablet  Commonly known as: PACERONE   400 mg, Oral, Daily      apixaban 5 MG tablet tablet  Commonly known as: ELIQUIS   5 mg, Oral, Every 12 Hours Scheduled      HYDROcodone-acetaminophen 5-325 MG per tablet  Commonly known as: Norco   1 tablet, Oral, Every 6 Hours PRN      metoprolol tartrate 25 MG tablet  Commonly known as: LOPRESSOR   12.5 mg, Oral, Every 12 Hours Scheduled      Synthroid 125 MCG tablet  Generic drug: levothyroxine   125 mcg, Oral, Daily         Continue These Medications      Instructions Start Date   acetaminophen 500 MG tablet  Commonly known as: TYLENOL   500 mg, Oral, Every 6 Hours PRN      alfuzosin 10 MG 24 hr tablet  Commonly known as: UROXATRAL   10 mg, Oral, Daily      losartan-hydrochlorothiazide 50-12.5 MG per tablet  Commonly known as: HYZAAR   0.5 tablets, Oral, Daily      therapeutic multivitamin-minerals tablet tablet  Generic drug: multivitamin with minerals   1 tablet, Oral, Daily             Discharge Diet: Regular diet     Discharge Care Plan / Instructions:   Keep incisions clean and open to air.  May wash with soap and water.  Chest tube sites with dressings to keep on for 48 hours.  Ok to reapply dressing as needed after removal.           Activity at Discharge:   No heavy lifting greater than 5 pounds or a gallon of milk and refrain from driving until cleared.      Tobacco: The patient does not use tobacco products and therefore does not need tobacco cessation education.    BMI: Patient's Body mass index is 36.04 kg/m². BMI is above normal parameters. Recommendations include: educational material, nutrition counseling and referral to primary care.      Follow-up Appointments: Delgado Desir  is requested to see Michael Nunez MD within 1-2 weeks from time of discharge, to follow-up with ANDRES Jenkins in 2 weeks with chest xray before appointment, and He is to follow-up with Dr. Lujan in 4 weeks.     Electronically signed by Amanda Flores MD at 02/19/21 1424       Discharge Order (From admission, onward)     Start     Ordered    02/19/21 1143  Discharge patient  Once     Expected Discharge Date: 02/19/21    Discharge Disposition: Home or Self Care    Physician of Record for Attribution - Please select from Treatment Team: AMANDA FLORES [[id number]]    Review needed by CMO to determine Physician of Record: No       Question Answer Comment   Physician of Record for Attribution - Please select from Treatment Team AMANDA FLORES    Review needed by CMO to determine Physician of Record No        02/19/21 1149

## 2025-02-07 DIAGNOSIS — I48.0 PAROXYSMAL ATRIAL FIBRILLATION: ICD-10-CM

## 2025-02-10 RX ORDER — METOPROLOL TARTRATE 25 MG/1
50 TABLET, FILM COATED ORAL 2 TIMES DAILY
Qty: 180 TABLET | Refills: 3 | Status: SHIPPED | OUTPATIENT
Start: 2025-02-10

## 2025-02-11 ENCOUNTER — TELEPHONE (OUTPATIENT)
Dept: UROLOGY | Age: 75
End: 2025-02-11

## 2025-02-18 NOTE — PROGRESS NOTES
"[facility] HEMATOLOGY & ONCOLOGY  [address]  [place] [address] [phone]    Patient Name: Delgado Desir  Encounter Date: 02/26/2025  YOB: 1950  Patient Number: [id number]      REASON FOR FOLLOW-UP:  Delgado \"Quang\"Karlee is a pleasant 74 y.o. 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  Patient underwent wedge resection on 10/30/2020, inadequate pulmonary function test for lobectomy and node sampling.  He is also seen for anemia from iron deficiency.  Treated with oral iron 8/1/2023 through 2/1/2024.  Patient is seen with spouse, Nava.  History is obtained from patient.  Patient is a reliable historian.            Oncology/Hematology History Overview Note   DIAGNOSTIC ABNORMALITIES:  He was found to have a lung nodule with history of prostate cancer.  CT abdomen pelvis 08/10/2020.  Lobulated noncalcified nodule in the right lower lobe represent a neoplastic process.  PET scan 09/18/2020. The multilobular pulmonary nodule in the right lung base measuring 2.9 x 1.6 cm is hypermetabolic and suspicious for a malignant process with a maximum intensity of 3.62 SUV. Differential considerations include primary lung malignancy as well as early metastatic disease.  No evidence of distant metastasis.  Unsuccessful CT-guided right lower lobe nodule biopsy 10/16/2020 due to right pneumothorax.  Pathology report 11/03/2020.  Invasive mucinous adenocarcinoma, grade 1, 2 cm, right lower lobe.  No visceral pleural invasion.  No lymphovascular invasion. Margins of resection free of tumor.  Distance of invasive carcinoma from closest margin 0.1 cm.  Pleural tissue biopsies no evidence of malignancy.  AJCC stage pT1b.  PD-L1 less than 1%, negative ROS1, ALK, BRAF and EGFR mutation.  Pathology report 2/16/2021, right lower lobe wedge excision, residual tumor is not present.  The " surgical margins are free of tumor.  2 benign intraparenchymal nodes benign.  1 benign node, left level 7.  1 benign no level 8.  CBC 02/19/2021 remarkable for WBC 12.9, hemoglobin 12.8 hematocrit 37.  Had a follow-up with Dr. Nick Flores 04/05/2021 post right lower lobe wedge and lymph node sampling on 02/15/2021.  Patient referred to oncology for surveillance.        PREVIOUS INTERVENTIONS:  Right lower lobe wedge resection 10/30/2020 by Dr. Flores.  His pulmonary function test is not adequate for left lower lobectomy as well as lymph node sampling.  He had undergone right lower lobe wedge 02/15/2021.      PREVIOUS INTERVENTIONS:Anemia.  Oral iron 04/15/2021 through 10/15/2021.  Resumed 08/01/2022 through 6/29/2023. Resume 8/1/2023 through 2/1/2024.     Prostate cancer    Initial Diagnosis    Prostate cancer (CMS/HCC)     7/6/2020 Procedure    PSA  21     7/30/2020 Biopsy    [facility]  Prostate Biopsy  A: Prostate, right lateral base, needle biopsy:  Prostatic adenocarcinoma, [place] score 7 (4+3)  Tumor encompasses 20% of the needle core biopsy  Grade group 3    B: Prostate, right lateral mid, needle biopsy:  Prostatic adenocarcinoma, [place]'s 7 (4+3)  Tumor encompasses 20% of the needle core biopsy  Grade group 3    C: Prostate, right lateral apex, needle biopsy:  Prostatic adenocarcinoma, [place] score 7 (3+4)  Tumor encompasses 30% of the needle core biopsy  Grade group 2  Pattern 4 is approximately 20% of the tumor    D: Prostate, right base, needle biopsy:  Prostatic adenocarcinoma, [place]'s 7 (4+3)  Tumor encompasses 10% of the needle core biopsy  Grade group 3    E: Prostate, right mid, needle biopsy:  Prostatic adenocarcinoma, [place]'s 7 (4+3)  Tumor encompasses 55% of the needle core biopsy  Grade group 3    F: Prostate, right apex, needle biopsy:  High-grade prostatic intraepithelial neoplasia    G: Prostate, left lateral base, needle biopsy:  Benign prostatic glands and stroma    H: Prostate, left lateral  mid, needle biopsy:  Benign prostatic glands and stroma    I: Prostate, left lateral apex, needle biopsy:  Benign prostatic glands and stroma    J: Prostate, left base, needle biopsy:  Benign prostatic glands and stroma    K: Prostate, left mid, needle biopsy:  Benign prostatic glands and stroma    L: Prostate, left apex, needle biopsy:  Benign prostatic glands and stroma     8/14/2020 Imaging    [facility]  CT AP  No acute abnormality of the abdomen or pelvis.  A moderate splenomegaly.  An enlarged prostate.  A stable bilateral renal cysts.  The lobulated noncalcified nodule in the right lower lobe represent a neoplastic process. Further evaluation with positron emission  tomography scan may be obtained.  The previously seen fractures of the left sacral ala is not visualized  in this study. If clinically significant, further evaluation with  radionuclide bone scan may be obtained    Bone Scan  1. No evidence of metastatic bone disease.  2. Degenerative uptake in the spine, right knee and bilateral  ankles/feet. Suspect prior left knee arthroplasty. Correlate clinically.     10/16/2020 Biopsy    Final Diagnosis   Lung, right lower lobe, fine-needle core biopsies and touch preparation:  A.  Fragments of skeletal muscle.  B.  No histologic or cytologic evidence of malignancy.        8/11/2021 Procedure    PSA 21.02     10/4/2021 Cancer Staged    Staging form: Prostate, AJCC 8th Edition  - Pathologic stage from 10/4/2021: Stage IIIA (pT2, pN0, cM0, PSA: 21, Grade Group: 3) - Signed by Milan Bullard MD on 10/4/2021     11/29/2021 - 10/10/2022 Radiation    Radiation OncologyTreatment Course:  Delgado Desir received 7000 cGy in 28 fractions to the prostate via external beam radiation therapy.     Malignant neoplasm of lower lobe of right lung   10/16/2020 Biopsy    Final Diagnosis   Lung, right lower lobe, fine-needle core biopsies and touch preparation:  A.  Fragments of skeletal muscle.  B.  No histologic or cytologic  evidence of malignancy.        4/5/2021 Initial Diagnosis    Malignant neoplasm of lower lobe of right lung (CMS/HCC)     4/15/2021 Cancer Staged    Staging form: Lung, AJCC 8th Edition  - Pathologic stage from 4/15/2021: Stage IA2 (pT1b, pN0, cM0) - Signed by Milan Bullard MD on 4/15/2021     11/29/2021 - 10/10/2022 Radiation    Radiation OncologyTreatment Course:  Delgado Desir received 7000 cGy in 28 fractions to the prostate via external beam radiation therapy.         PAST MEDICAL HISTORY:  ALLERGIES:  No Known Allergies  CURRENT MEDICATIONS:  Outpatient Encounter Medications as of 2/26/2025   Medication Sig Dispense Refill    acetaminophen (TYLENOL) 500 MG tablet Take 1 tablet by mouth Every 6 (Six) Hours As Needed for Mild Pain.      alfuzosin (UROXATRAL) 10 MG 24 hr tablet Take 1 tablet by mouth Daily.      apixaban (ELIQUIS) 5 MG tablet tablet Take 1 tablet by mouth 2 (Two) Times a Day.      ferrous sulfate 325 (65 FE) MG tablet Take 1 tablet by mouth Daily. 60 tablet 2    Ibuprofen 200 MG capsule Take 2 capsules by mouth Daily.      levocetirizine (XYZAL) 5 MG tablet Take 1 tablet by mouth Every Night.      metoprolol tartrate (LOPRESSOR) 25 MG tablet TAKE 2 TABLETS BY MOUTH 2 (TWO) TIMES A DAY. 180 tablet 3    predniSONE (DELTASONE) 5 MG tablet Take 1 tablet by mouth 2 (Two) Times a Day. 10 tablet 0    Synthroid 137 MCG tablet TAKE 1 TABLET BY MOUTH ONCE DAILY 30 tablet 3    therapeutic multivitamin-minerals (THERAGRAN-M) tablet Take 2 tablets by mouth Daily.      Triamcinolone Acetonide (NASACORT) 55 MCG/ACT nasal inhaler 2 sprays Daily.      [DISCONTINUED] traMADol (ULTRAM) 50 MG tablet Take 1 tablet by mouth Every 6 (Six) Hours. (Patient not taking: Reported on 2/26/2025)       No facility-administered encounter medications on file as of 2/26/2025.     ADULT ILLNESSES:  Patient Active Problem List   Diagnosis Code    Prostate cancer C61    Former smoker Z87.891    Lung nodule < 6cm on CT [id number]     Abnormal PET of right lung R94.2    Pneumothorax after biopsy J95.811    Hypertension I10    Failed intubation of airway T88.4XXA    S/P total thyroidectomy E89.0    Postoperative hypothyroidism E89.0    Lung mass R91.8    Paroxysmal atrial fibrillation I48.0    Malignant neoplasm of lower lobe of right lung C34.31    Nodule of neck R22.1    S/P partial lobectomy of lung Z90.2    History of lung cancer Z85.118    Class 2 severe obesity due to excess calories with serious comorbidity and body mass index (BMI) of 35.0 to 35.9 in adult E66.812, E66.01, Z68.35    Current use of long term anticoagulation Z79.01    Atrial fibrillation with RVR I48.91    Atrial fibrillation I48.91    History of radiation therapy Z92.3     SURGERIES:  Past Surgical History:   Procedure Laterality Date    CARDIAC ELECTROPHYSIOLOGY PROCEDURE N/A 5/6/2024    Procedure: Ablation atrial fibrillation;  Surgeon: Master Johnson MD;  Location:  [facility] CATH INVASIVE LOCATION;  Service: Cardiovascular;  Laterality: N/A;    CARPAL TUNNEL RELEASE Right     LOBECTOMY Right 2/15/2021    Procedure: THORACOSCOPY WITH DAVINCI ROBOT WITH RIGHT WEDGE RESECTION, MEDIASTINAL LYMPH NODE DISSECTION-RIGHT;  Surgeon: Nick Flores MD;  Location:  [facility] OR;  Service: DaVinci;  Laterality: Right;    LUNG BIOPSY      PROSTATE BIOPSY      REPLACEMENT TOTAL KNEE Left     SHOULDER SURGERY Bilateral     THORACOSCOPY Right 10/30/2020    Procedure: MEDIASTINOSCOPY, RIGHT THORACOSCOPY POSSIBLE WEDGE RESECTION WITH DAVINCI ROBOT;  Surgeon: Nick Flores MD;  Location:  [facility] OR;  Service: DaVinci;  Laterality: Right;    THYROIDECTOMY N/A 12/11/2020    Procedure: Total thyroidectomy;  Surgeon: Jean Marie Solis MD;  Location:  [facility] OR;  Service: ENT;  Laterality: N/A;     HEALTH MAINTENANCE ITEMS:  Health Maintenance Due   Topic Date Due    Pneumococcal Vaccine 50+ (1 of 2 - PCV) Never done    ZOSTER VACCINE (1 of 2) Never done    HEPATITIS C SCREENING  Never done     "COVID-19 Vaccine ( season) 2024    BMI FOLLOWUP  2025       <no information>  Last Completed Colonoscopy            COLORECTAL CANCER SCREENING (COLONOSCOPY - Every 10 Years) Tentatively due on 2024  Outside Procedure: COLONOSCOPY    2023  Occult Blood X 3, Stool - Stool, Per Rectum                  Immunization History   Administered Date(s) Administered    COVID-19 (MODERNA) 12YRS+ (SPIKEVAX) 2024    COVID-19 (MODERNA) 1st,2nd,3rd Dose Monovalent 2021, 2021    COVID-19 (MODERNA) BIVALENT 12+YRS 2022    COVID-19 (MODERNA) Monovalent Original Booster 10/28/2021     Last Completed Mammogram       This patient has no relevant Health Maintenance data.              FAMILY HISTORY:  Family History   Problem Relation Age of Onset    Hypertension Mother         New Contact    Cancer Mother     COPD Father      SOCIAL HISTORY:  Social History     Socioeconomic History    Marital status:     Number of children: 2   Tobacco Use    Smoking status: Former     Current packs/day: 0.00     Average packs/day: 1 pack/day for 33.0 years (33.0 ttl pk-yrs)     Types: Cigarettes     Start date: 1969     Quit date: 1996     Years since quittin.1    Smokeless tobacco: Never   Vaping Use    Vaping status: Never Used   Substance and Sexual Activity    Alcohol use: Yes     Alcohol/week: 2.0 standard drinks of alcohol     Types: 2 Shots of liquor per week     Comment: x2 weekly    Drug use: Never    Sexual activity: Defer       REVIEW OF SYSTEMS:    Review of Systems   Constitutional:  Positive for fatigue. Negative for fever and unexpected weight change.        \"Just a little tired.\"   HENT:  Negative for congestion and mouth sores.    Eyes:  Negative for discharge and redness.   Respiratory:  Negative for chest tightness and wheezing.    Cardiovascular:  Negative for chest pain and leg swelling.   Gastrointestinal:  Negative for blood in stool, " "nausea and vomiting.   Endocrine: Negative for cold intolerance and heat intolerance.   Genitourinary:  Negative for difficulty urinating and hematuria.   Musculoskeletal:  Negative for myalgias.        \"Just had my right knee done.\"   Skin:  Negative for pallor.   Allergic/Immunologic: Negative for food allergies.   Neurological:  Negative for facial asymmetry, speech difficulty and weakness.   Hematological:  Negative for adenopathy. Bruises/bleeds easily.   Psychiatric/Behavioral:  Negative for agitation, confusion and hallucinations.        VITAL SIGNS: /60   Pulse 58   Temp 96.9 °F (36.1 °C)   Resp 18   Ht 185.4 cm (73\")   Wt 126 kg (278 lb)   SpO2 94%   BMI 36.68 kg/m²  Gained 12 pounds. \"I sit at home.\"  Pain Score    02/26/25 1125   PainSc: 0-No pain       PHYSICAL EXAMINATION:     Physical Exam  Vitals reviewed.   Constitutional:       General: He is not in acute distress.  HENT:      Head: Normocephalic and atraumatic.   Eyes:      General: No scleral icterus.  Cardiovascular:      Rate and Rhythm: Normal rate.   Pulmonary:      Effort: No respiratory distress.      Breath sounds: No wheezing.   Abdominal:      General: Bowel sounds are normal.      Palpations: Abdomen is soft.      Tenderness: There is no abdominal tenderness.   Musculoskeletal:         General: No swelling.      Cervical back: Neck supple.   Skin:     Coloration: Skin is not pale.   Neurological:      Mental Status: He is alert and oriented to person, place, and time.   Psychiatric:         Mood and Affect: Mood normal.         Behavior: Behavior normal.         Thought Content: Thought content normal.         Judgment: Judgment normal.         LABS    Lab Results - Last 18 Months   Lab Units 12/10/24  1100 09/18/24  0213 09/17/24  0224 08/28/24  1107 05/06/24  0932 03/03/24  0216 03/02/24  0912 02/01/24  1048   HEMOGLOBIN g/dL 12.5* 10.1* 10.7* 12.9* 12.7* 13.5 13.6 12.9*   HEMATOCRIT % 39.5 31.9* 34.4* 40.3 39.7 40.7 41.2 " "40.1   MCV fL 90.6  --   --  90.8 91.7 90.6 89.8 92.0   WBC 10*3/mm3 6.99  --   --  6.04 6.16 8.15 7.94 6.09   RDW % 15.1  --   --  14.4 14.9 14.4 14.4 14.2   MPV fL 11.3  --   --  11.0 10.7 10.8 11.0 11.1   PLATELETS 10*3/mm3 158  --   --  149 160 198 174 157   IMM GRAN % % 0.7*  --   --  0.5 0.5 0.5 0.4 0.7*   NEUTROS ABS 10*3/mm3 6.04  --   --  4.94 5.24 6.26 6.42 5.07   LYMPHS ABS 10*3/mm3 0.48*  --   --  0.49* 0.41* 0.99 0.67* 0.50*   MONOS ABS 10*3/mm3 0.32  --   --  0.48 0.40 0.64 0.68 0.38   EOS ABS 10*3/mm3 0.07  --   --  0.07 0.05 0.16 0.09 0.07   BASOS ABS 10*3/mm3 0.03  --   --  0.03 0.03 0.06 0.05 0.03   IMMATURE GRANS (ABS) 10*3/mm3 0.05  --   --  0.03 0.03 0.04 0.03 0.04   NRBC /100 WBC 0.0  --   --  0.0 0.0 0.0 0.0 0.0       Lab Results - Last 18 Months   Lab Units 12/10/24  1100 08/28/24  1107 05/06/24  0932 03/03/24  0216 03/02/24  0912 02/01/24  1048 11/27/23  0904 11/24/23  0450   GLUCOSE mg/dL 130* 110* 124* 115* 107* 94  --  107*   SODIUM mmol/L 140 138 139 141 139 138  --  139   POTASSIUM mmol/L 4.4 4.2 4.5 4.1 4.2 4.3  --  4.2   CO2 mmol/L 26.0 26.0 26.0 25.0 23.0 24.0  --  26.0   CHLORIDE mmol/L 106 103 105 105 106 105  --  105   ANION GAP mmol/L 8.0 9.0 8.0 11.0 10.0 9.0  --  8.0   CREATININE mg/dL 0.92 0.90 1.09 1.06 0.98 0.91   < > 1.00   BUN mg/dL 26* 16 16 33* 34* 22  --  24*   BUN / CREAT RATIO  28.3* 17.8 14.7 31.1* 34.7* 24.2  --  24.0   CALCIUM mg/dL 8.6 8.9 8.8 8.4* 8.6 8.8  --  9.0   ALK PHOS U/L 112 112  --  112 110 108  --  88   TOTAL PROTEIN g/dL 6.4 6.5  --  6.4 6.7 6.5  --  5.9*   ALT (SGPT) U/L 25 26  --  30 32 31  --  30   AST (SGOT) U/L 21 24  --  20 22 23  --  20   BILIRUBIN mg/dL 1.1 1.6*  --  0.9 1.0 1.3*  --  0.8   ALBUMIN g/dL 3.8 3.8  --  3.8 4.0 4.1  --  3.7   GLOBULIN gm/dL 2.6 2.7  --  2.6 2.7 2.4  --  2.2    < > = values in this interval not displayed.       No results for input(s): \"MSPIKE\", \"KAPPALAMB\", \"IGLFLC\", \"URICACID\", \"FREEKAPPAL\", \"CEA\", \"LDH\", " "\"REFLABREPO\" in the last [id number] hours.    Lab Results - Last 18 Months   Lab Units 12/10/24  1100 09/17/24  0224 08/28/24  1107 05/30/24  1148 03/03/24  0216 02/15/24  1434 02/01/24  1048 12/29/23  1203 11/24/23  0450   IRON mcg/dL 60 31* 59  --   --   --  62  --   --    TIBC mcg/dL 308 212* 298  --   --   --  289*  --   --    IRON SATURATION %  --  15  --   --   --   --   --   --   --    IRON SATURATION (TSAT) % 19*  --  20  --   --   --  21  --   --    FERRITIN ng/mL 250.40  --  292.70  --   --   --  354.30  --   --    TSH uIU/mL  --   --  4.290* 1.970 5.850* 2.390  --  2.530 0.137*   FOLATE ng/mL  --  18.0  --   --   --   --   --   --   --        Delgado Desir reports a pain score of 0.        ASSESSMENT:  1.  Adenocarcinoma the lung, right lower lobe  AJCC stage:1A2 (pT1b, cN0, cM0)  Treatment status: Post right lower lobe wedge resection.  Pulmonary function test in adequate for lobectomy and node sampling.  2.  Performance status of 1.  3.  Normocytic anemia from chronic disease, post radiation/ADT and iron deficiency. Post oral iron 6/29/2023.  Oral iron 8/1/2023 through 2/1/2024. resumed 12/12/2024 through present.   4.  Adenocarcinoma of the prostate, [place] score 7, 4+3, grade group 3.  AJCC stage: IIIA (T2b, N0, M0)  Treatment status: Neoadjuvant hormone therapy with radiation 01/03/2022.  Followed by Dr. Phan and Dr. Lujan.   5.  Elevated bilirubin from cholelithiasis.  Negative CT of the abdomen pelvis.  On observation.          PLAN:  1.   Re: PET 9/10/2024.   Solid right upper lobe nodule inferiorly in the right upper lobe  again shows no significant increased tracer uptake. Nodularity may have increased slightly from 1/30/2023. Continued follow-up with diagnostic CT chest in 6 months is recommended.  No PET evidence for metastatic disease to the abdomen, pelvis, or skeleton.  Prostatic hypertrophy with focus of low-grade tracer uptake inferiorly at the prostate apex. .  2.   Re: " Patient underwent right total knee replacement on 9/16/2024.  ER visit 9/20/2024 due to a fall.  CT head showed no intracranial hemorrhage.  Microangiopathic ischemic changes..  Note from Cyn Scott, NICOLASA and APRN on 11/11/2024.  Seen for dizziness and history of falling.  3.   Re: Heme status.  WBC 6.89, hemoglobin 12.8, hematocrit 40, MCV 91.1 and platelet 170.  4.   Re: CMP.  GFR latest 87.8 and bilirubin latest 1.1.  Positive for cholelithiasis and fatty liver.  5.   Re: Ferritin latest 250.4, iron latest 60 and iron saturation latest 19%.   6.   Re: CT chest, abdomen and pelvis on 2/21/2025. Stable exam. Previous right lower lobe wedge resection without evidence of local recurrence. In addition the small pulmonary nodules as well as confluent area of scarring within the inferior right upper lobe is also stable. No acute abnormality of the abdomen or pelvis to suggest a neoplastic  process or metastatic disease. A single 3 mm calculus in the gallbladder. No finding to suggest cholecystitis. Stable renal cysts.  7.  Patient will be seen every 6 months with CT scan for the first 3 years then annually thereafter.  8.  Continue care per primary care physician and the other specialists.  9.  Plan of care discussed with patient and spouse.  Understanding expressed.  Patient agreeable to proceed  10.  Stable for observation, lung adenocarcinoma.    11.  Advance Care Planning   ACP discussion was declined by the patient. Patient does not have an advance directive, information provided.   12.  Order CBC with differential, ferritin and iron panel in 3 months.  13.  eRx ferrous sulfate 325 mg p.o. daily #60 with 2 refills if needed.  Stop and call if intolerant. Observe for nausea, vomiting or abdominal pain.   14.  Return to office in 6 months with preoffice CT chest, abdomen, pelvis, CBC with differential, ferritin, iron profile, and CMP.                 I have reviewed the assessment  and plan and verified the accuracy of it. No changes to assessment and plan since the information was documented. Milan Bullard MD 02/26/25          I spent 31 total minutes, face-to-face, caring for Delgado today. Greater than 50% of this time involved counseling and/or coordination of care as documented within this note.      (Jean Marie Solis MD)  (Nick Flores MD)  (Ephraim Lujan MD)  (Pablo Nixon MD)  (Jake Phan MD)  Abner Connolly MD

## 2025-02-19 ENCOUNTER — TELEPHONE (OUTPATIENT)
Age: 75
End: 2025-02-19
Payer: MEDICARE

## 2025-02-21 ENCOUNTER — HOSPITAL ENCOUNTER (OUTPATIENT)
Dept: CT IMAGING | Facility: HOSPITAL | Age: 75
Discharge: HOME | End: 2025-02-21
Payer: MEDICARE

## 2025-02-21 ENCOUNTER — APPOINTMENT (OUTPATIENT)
Dept: CT IMAGING | Facility: HOSPITAL | Age: 75
End: 2025-02-21
Payer: MEDICARE

## 2025-02-21 DIAGNOSIS — D50.8 IRON DEFICIENCY ANEMIA SECONDARY TO INADEQUATE DIETARY IRON INTAKE: ICD-10-CM

## 2025-02-21 DIAGNOSIS — C34.31 MALIGNANT NEOPLASM OF LOWER LOBE OF RIGHT LUNG: ICD-10-CM

## 2025-02-21 PROCEDURE — 71260 CT THORAX DX C+: CPT

## 2025-02-21 PROCEDURE — 74177 CT ABD & PELVIS W/CONTRAST: CPT

## 2025-02-21 PROCEDURE — 25510000001 IOPAMIDOL 61 % SOLUTION: Performed by: INTERNAL MEDICINE

## 2025-02-21 RX ORDER — IOPAMIDOL 612 MG/ML
100 INJECTION, SOLUTION INTRAVASCULAR
Status: COMPLETED | OUTPATIENT
Start: 2025-02-21 | End: 2025-02-21

## 2025-02-21 RX ADMIN — IOPAMIDOL 100 ML: 612 INJECTION, SOLUTION INTRAVENOUS at 10:12

## 2025-02-24 NOTE — PROGRESS NOTES
[facility]  Radiation Oncology Clinic   Ephraim Lewis MD, FACR  Zak Lai APRN  _______________________________________________  [facility]  Department of Radiation Oncology  [address] [place] [id number]  Office: [phone]  Fax: [phone]    DATE: 02/26/2025  PATIENT: Delgado Desir  1950                         MEDICAL RECORD #: [id number]    1. History of prostate cancer    2. History of lung cancer    3. S/P partial lobectomy of lung    4. History of radiation therapy    5. Former smoker                                              REASON FOR VISIT:    Chief Complaint   Patient presents with    Prostate Cancer       REASON FOR FOLLOW UP VISIT  Delgado Desir is a very pleasant 74 y.o. patient that has completed radiation therapy and returns today for routine follow up exam.     History of Present Illness:  Diagnosed in July 2020 with Adenocarcinoma of the prostate, pre-PSA 21.02, [place] (4+3) 7, 5/12 cores positive. PNI identified. Follows Dr. Phan, recommended ADT and XRT. He completed 7000 cGy in 28 fractions to the prostate on 01/10/2022.    In October 2020, Mr. Desir was diagnosed with Stage 1A2 of the RLL of the lung, being managed by Dr. Flores and Dr. Bullard.      07/06/2020 - PSA: 21    07/15/2020 - Appointment with :  Patient is not satisfied with results with Uroxatrol, I will place him on Rapaflo.   It has been explained to patient that with the extremely high PSA I would highly suggest prostate biopsy. Patient is agreeable to this.   The risk of infection, bleeding, nausea, vomiting, or even syncope or presyncope was discussed with patient.   He has been instructed to begin antibiotic today before procedure and instill enema the day of procedure.   Ciprofloxacin has been sent to his pharmacy.    07/30/2020 - Prostate Biopsy per :  Prostate, right lateral base, needle biopsy:  Prostatic  adenocarcinoma, Preston score 7 (4+3)  Tumor encompasses 20% of the needle core biopsy  Grade group 3  Prostate, right lateral mid, needle biopsy:  Prostatic adenocarcinoma, Independence's 7 (4+3)  Tumor encompasses 20% of the needle core biopsy  Grade group 3  Prostate, right lateral apex, needle biopsy:  Prostatic adenocarcinoma, Preston score 7 (3+4)  Tumor encompasses 30% of the needle core biopsy  Grade group 2  Pattern 4 is approximately 20% of the tumor  Prostate, right base, needle biopsy:  Prostatic adenocarcinoma, Preston's 7 (4+3)  Tumor encompasses 10% of the needle core biopsy  Grade group 3  Prostate, right mid, needle biopsy:  Prostatic adenocarcinoma, Preston's 7 (4+3)  Tumor encompasses 55% of the needle core biopsy  Grade group 3  Prostate, right apex, needle biopsy:  High-grade prostatic intraepithelial neoplasia  Prostate, left lateral base, needle biopsy:  Benign prostatic glands and stroma  Prostate, left lateral mid, needle biopsy:  Benign prostatic glands and stroma  Prostate, left lateral apex, needle biopsy:  Benign prostatic glands and stroma  Prostate, left base, needle biopsy:  Benign prostatic glands and stroma  Prostate, left mid, needle biopsy:  Benign prostatic glands and stroma  Prostate, left apex, needle biopsy:  Benign prostatic glands and stroma    08/14/2020 - CT Abdomen/Pelvis with and without contrast:  No acute abnormality of the abdomen or pelvis.  A moderate splenomegaly.  An enlarged prostate.  A stable bilateral renal cysts.  The lobulated noncalcified nodule in the right lower lobe represent a neoplastic process. Further evaluation with positron emission tomography scan may be obtained.  The previously seen fractures of the left sacral ala is not visualized in this study. If clinically significant, further evaluation with radionuclide bone scan may be obtained    08/14/2020 - Bone Scan:  No evidence of metastatic bone disease.  Degenerative uptake in the spine, right knee  and bilateral ankles/feet. Suspect prior left knee arthroplasty. Correlate clinically.    08/21/2020 - Appointment with :  PSA on 7/6/2020 was 21 ng/ML  His prostate volume was 107 grams. PSAD 0.20  He had a prostate biopsy consisting of a total of 12 cores with 5 positive cores.  TRUS Findings consisted of no abnormal ultrasound findings  He has right sided disease with a Meridian score of 4+3 equal sign 7 grade group 2 intermediate unfavorable.  Location of the the Cancer: 5 out of 6 cores on the right the only one that was negative was the right apex and has had high-grade PIN. 0 out of 6 cores on the left   His clinical TNM stage was: [id number]  His pathological TNM stage was: [id number]  The patient has a staging CT and bone scan.  Recommendations:  All the time was spent face-to-face with the patient and his wife for a total time of 65 minutes. Time was spent discussing his pathology, risk ratification. Management options.   He asked many questions and these were answered to the best my ability to his satisfaction.  After a long discussion I told patient that I felt he was not a candidate active surveillance.   That he should consider surgery as a most aggressive approach particular given the high PSA is Meridian score the extent of disease and the fact that he has a really large prostate and some obstructive symptoms but this might be the best choice however his major concern is his quality of life and not cancer control and therefore he is leaning more toward external beam radiation therapy given the large size of his prostate I suggested that he consider 2 years of neoadjuvant hormonal therapy with XRT.   He wants to see the radiation oncologist for consultation.   He is not completely made up his mind.   We will wait to hear back from him after he sees radiation oncology    09/02/2020 - Consult with  (Covering for ):  We discussed the goals and plans of care with him and his family,  answered all questions and he verbalizes understanding.  Separately however, I discussed with him and his wife that after reviewing his imaging studies and discussing the pulmonary findings with the reading radiologist, Dr. Charlene Grider, it was determined that the right lower lobe pulmonary nodule has had an interval enlargement currently measures 2.8 x 1.7 cm on his CT scan of the abdomen and pelvis performed on 08/14/2020 in comparison to CT scan of the chest in November 2019 when it measured 1.7 x 1.2 cm (an addendum to the report has been made).  He understands that this finding requires further work-up for the possibility of metastatic prostate cancer versus a primary lung cancer (more likely).  He understands that work-up will likely lead to possibility of invasive procedures such as thoracic biopsies.  This new information will necessitate holding off on a definitive determination of prostate cancer treatment until work-up is completed.  Possible clinical scenarios include benign pulmonary etiologies, synchronous prostate and pulmonary malignancies, and less likely prostate cancer with pulmonary metastasis.  In the meantime, we agreed that starting androgen deprivation therapy would be a reasonable decision as we move forward with additional work-up and he will be seeing Dr. Phan for that.  Following this discussion and in consideration of the diagnostic data/evaluation of the patient, I am recommending that he undergo a PET scan with anticipated additional referrals for possible biopsying depending on PET scan results.  Patient will return for follow-up regarding PET scan results or additional coordination of care will be arranged through telephone conversations as needed.    09/18/2020 - PET Scan:  Hypermetabolic right lower lobe pulmonary nodule compatible with malignant neoplasm.  No scintigraphic evidence of distal metastases.    09/24/2020 - Telephone encounter with :  I spoke with this  patient this morning regarding the results of his PET scan.  The right lower lobe nodule is suspicious for malignancy.  I will discuss this case with Dr. Nick Flores and he may need this lesion removed for diagnosis and definitive treatment.    09/24/2020 - Documentation per :  I spoke with Dr. Steven Nunez this morning as well as placed a call for Dr. Phan regarding the management of this patient.  At this point I believe he needs to start ADT for his prostate carcinoma and perhaps some intervention for his urinary obstructive symptoms.  Radiation will make these symptoms worse with the swelling from the radiation and he may need some proactive intervention to relieve his obstructive symptoms.  He also needs the right lower lobe lung nodule addressed and I have sent a message to Dr. Nick Flores.  We could consider a CT-guided biopsy, however if negative I think he would still need this removed that is clinically quite suspicious.    09/24/2020 - Documentation per :  I spoke with Dr. Nick Flores and he is reviewed the films.  At this time we will schedule a CT-guided biopsy as well as PFTs.  It would be very unusual to have a pulmonary metastases from prostate cancer in the absence of disseminated bone metastases but not outside the realm of possibility.  Therefore we will proceed with core needle biopsies of the lung for tissue diagnosis, and he may require definitive surgical resection.  We will also request PFTs to be completed.    09/30/2020 - Telephone encounter with :  I spoke with this patient today to update him on plans for treatment.  I have discussed his case with Dr. Phan, Dr. Steven Nunez, and Dr. Nick Flores,  With Dr. Phan he will start him on ADT therapy and discuss surgery versus some type of intervention procedure to relieve his urinary outlet obstruction from enlarged prostate.  We are cart quite concerned that with radiation he will have total obstruction due to his  prostate being over 100 g.  Also, with his lung nodule that is hypermetabolic on PET scan he needs a CT-guided biopsy and pulmonary function test.  He will also see Dr. Nick Flores and referral has been made.  Patient verbalized understanding and will await contact from the office of Dr. Flores, Dr. Phan, pulmonary function test lab and CT scanning guided biopsy of the lung nodule.    10/16/2020 - Lung, right lower lobe, fine-needle core biopsies and touch preparation:  Fragments of skeletal muscle.  No histologic or cytologic evidence of malignancy.    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  No visceral pleural invasion.  Margin of resection free of tumor.  Pleural tissue, biopsies:  Fibrosis and chronic inflammation.  No evidence of malignancy.  AJCC pathologic stage:  pT1b Nx    Synoptic Checklist   LUNG  8th Edition - Protocol posted: 2/26/2020  LUNG: RESECTION - All Specimens  SPECIMEN   Procedure  Wedge resection    Specimen Laterality  Right    TUMOR   Tumor Site  Lower lobe of lung    Histologic Type  Invasive mucinous adenocarcinoma    Histologic Grade  G1: Well differentiated    Spread Through Air Spaces (MYLENE)  Not identified    Tumor Size     Total Tumor Size (size of entire tumor)  Greatest Dimension (Centimeters): 2.0 cm   Tumor Focality  Single focus    Visceral Pleura Invasion  Not identified    Direct Invasion of Adjacent Structures  No adjacent structures present    Treatment Effect  No known presurgical therapy    Lymphovascular Invasion  Not identified    MARGINS   Margins  All margins are uninvolved by tumor    Margins Examined  Parenchymal    Distance of Invasive Carcinoma from Closest Margin (Centimeters)  0.1 cm   Closest Margin  Parenchymal    LYMPH NODES   Regional Lymph Nodes  No lymph nodes submitted or found    PATHOLOGIC STAGE  CLASSIFICATION (pTNM, AJCC 8th Edition)   Primary Tumor (pT)  pT1b    Regional Lymph Nodes (pN)  pNX    ADDITIONAL FINDINGS   Additional Findings  Inflammation (type): Chronic    .        02/15/2021 - THORACOSCOPY WITH DAVINCI ROBOT WITH RIGHT WEDGE RESECTION, MEDIASTINAL LYMPH NODE DISSECTION-RIGHT per :  Lung, right lower lobe, wedge excision:  Residual tumor is not present.  The surgical margins are free of tumor.  2 benign lymph nodes are present (intraparenchymal).  Left node level 7, excision: 1 benign lymph node.  Lymph node, level 8, excision: 1 benign lymph node.    04/05/2021 - Appointment with :  Patient referred to oncology for surveillance.    04/15/2021 - Consult with :  Return to office in 6 months with preoffice CBC with differential and CMP.   Next CT 11/2021.   Refer to PCP for obesity.     04/29/2021 - CT Chest with contrast:  Postsurgical changes in the right lower lobe from prior wedge resection. There has been interval decrease in the size of a right pleural effusion. Adjacent consolidation has also decreased but persists.  Stable mildly enlarged right hilar lymph node.  Interval increase in the size of a right middle lobe nodule, possibly a reactive intrafissural lymph node or metastatic nodule. A new nodule in the left lower lobe may be an area of inflammation or atelectasis but should also be followed. Short interval CT is recommended in 3 months.  Cardiomegaly. Findings suggestive of pulmonary arterial hypertension.    04/29/2021 - CT abdomen/Pelvis with contrast:  No evidence of metastatic disease in the abdomen or pelvis.   Atherosclerotic disease.  Right renal cyst. Probable left renal cysts.  Prostatomegaly.    04/29/2021 - CT Head with and without contrast:  Mild cerebral and cerebellar volume loss with chronic microvascular disease but no evidence of acute intracranial process.  There is no abnormal enhancement.    07/30/2021 - CT Chest with  contrast:  Postsurgical change in the right lung base is stable compared to April 29, 2021  Previous noted left lower lobe nodule has resolved.    08/11/2021 - PSA: 21.02    08/16/2021 - Appointment with :  Prostate cancer:  Patient has now been cleared to proceed with treatment for prostate cancer. He choses to do neoadjuvant hormonal therapy with radiation treatment.   Will getting back into see radiation oncology to get this established.   We will work on his preauthorization for Lupron will be contacted as soon as this is completed to come in for his Lupron injection. What he sees radiation oncology can come back to see me for Fiduciary marker placement.  Return for routine office f/u after seen by radiation oncology for Fiduciary marker placement.     08/23/2021 - Consult with  (Covering for ):  A definitive course of fractionated radiation therapy is recommended to the prostate,  anticipate a course of 7000 cGy over 28 treatment fractions; pelvic lymphatics will also be treated. The patient verbalizes understanding, voices no further questions and wishes to proceed with recommended therapy.   Will refer back to urologist for initiation of ADT; fiducial seed placement and Space OAR gel placement to take place approximately 10 weeks after initiation of ADT.  Patient will return for CT simulation approximately 1 week after placement of fiducial markers and spacer gel.  Continue ongoing management per primary care physician and other specialists.   Plan:  We will refer you to urology for seed and OAR gel placement, they will call you.  Start ADT asap with Dr. Phan.  Return to Dr. Lujan for CT simulation 1 week after seed/gel placement.     11/29/2021 - 01/10/2022 - Completed radiation course:  Received 7000 cGy in 28 fractions to the prostate    01/10/2022 - CT Chest with contrast:  Stable postsurgical chest stable compared to April 29, 2021 and July 30, 2021  Cholelithiasis with  tiny calculus noted in the gallbladder.    01/10/2022 - CT Abdomen/Pelvis with contrast:  No evidence of metastatic disease to the abdomen or pelvis.  Cholelithiasis, without evidence of cholecystitis.  Additional chronic findings as above.    01/14/2022 - Appointment with :  Plan of care discussed with patient.  Understanding expressed.  Patient able to proceed.  Stable for observation, cancer.  Return to office in 6 months with preoffice CT chest, abdomen, pelvis, CBC with differential and CMP.     02/25/2022 - Appointment with :  PSA today   Follow up in one year     02/25/2022 - PSA: 0.119  06/21/2022 - PSA: 0.03    01/19/2023 - Appointment with :  Plan of care discussed with patient.  Understanding expressed.  Patient able to proceed.  Stable for observation, cancer.  Await PET.  eRx ferrous sulfate 325 mg p.o. twice daily #60 with 2 refills.  Stop and call if intolerant.  Monitor for nausea, vomiting, diarrhea, constipation or abdominal pain.  CBC with differential, ferritin and iron panel in 3 months.  Stool for occult blood x3.  PET scan for worsening left hilar node, left upper lobe nodule and larger right lower lobe nodule.  Return to office in 6 months with preoffice CT chest, abdomen, pelvis, CBC with differential, ferritin, iron profile, and CMP.     01/19/2023 - PET scan:  No abnormal hypermetabolic activity to suggest recurrent or metastatic disease.  There is only low level metabolic activity below liver background level associated with the nodule in the inferolateral RIGHT upper lobe for which concern was raised on prior chest CT. Also of note, this nodule is unchanged over multiple prior studies.    01/20/2023 - PSA: <0.01    01/23/2023 - Appointment with :  His PSA remains very low. He says he is due a injection February. His last injection would be this summer.  Return in about 6 months (around 7/23/2023) for PSA prior to next visit.    02/24/2023 - Appointment  with :  Follow up in 1 year     07/24/2023 - PSA: <0.01   01/23/2024 - PSA: <0.01     01/26/2024 - Appointment with :  His PSA remains undetectable. Continue to monitor closely.  Return in about 6 months (around 7/26/2024) for PSA prior to vext visit.    02/01/2024 - Appointment with :  Continue care per primary care physician and other specialists.  Plan of care discussed with patient.  Understanding expressed.  He is agreeable to proceed.  Stable for observation, lung cancer.    CBC with differential, ferritin and iron panel in 3 months.  Stop oral iron.    Next CT chest abdomen pelvis 11/2024.  Return to office in 6 months with preoffice CBC with differential, ferritin, iron profile, and CMP.     02/23/2024 - Appointment with :  Follow up in one year    07/25/2024 - PSA: <0.01    07/29/2024 - Appointment with :  Follow up in 6 months with PSA.    08/28/2024 - Appointment with :  PLAN:   Re: Patient admitted 3/2/2024 for chest pain.  He was discharged 3/5/2024.  He was found to be in atrial fibrillation with rapid ventricular response.   Re: Patient underwent pulsed field ablation of the pulmonary veins in combination with CTI RF ablation on 5/7/2024.  Follow-up with cardiology 6/20/2024.  Doing well post ablation.  EKG stable and off flecainide.   Re: Heme status.  WBC 6.04, hemoglobin 12.9, hematocrit 40.3, MCV 90.8 and platelet 149.   Re: CMP.  GFR 90.2 and bilirubin 1.6 from 0.9 from 1 from 1.3, observe.  Positive for cholelithiasis and fatty liver.   Re: Ferritin 292.7, iron 59 and iron saturation 20%.    Re: CT chest 7/22/2024. Mild increase in maximum diameter of a nodular band of probable scar tissue in the inferior aspect of the right upper lobe laterally near the major fissure. This measures approximately 12 x 15 mm, and is somewhat linear in shape. This may represent progressive scarring and radiation fibrosis.  Given the slight change, consider repeat chest CT or PET/CT in  3 months. Otherwise stable postoperative fibrosis in the right lower lobe with evidence of previous wedge resection. There are also a few stable pulmonary nodules within the right lung.  He will be seen every 6 months with CT scan for the first 3 years then annually thereafter.  Continue care per primary care physician and other specialists.  Plan of care discussed with patient.  Understanding expressed.  The patient agreed to proceed.  Stable for observation, adenocarcinoma the lung.    CBC with differential, ferritin and iron panel in 3 months.  Schedule PET next week . Mild increase in maximum diameter of a nodular band of probable scar  tissue in the inferior aspect of the right upper lobe.  Return to office in 6 months with preoffice CT chest, abdomen, pelvis, CBC with differential, ferritin, iron profile, and CMP.   For right knee replacement 9/16/2024.      09/09/2024 - PET Scan:  Solid right upper lobe nodule inferiorly in the right upper lobe again shows no significant increased tracer uptake. Nodularity may have increased slightly from 1/30/2023. Continued follow-up with diagnostic CT chest in 6 months is recommended.  No PET evidence for metastatic disease to the abdomen, pelvis, or skeleton.  Prostatic hypertrophy with focus of low-grade tracer uptake inferiorly at the prostate apex. Correlate with PSA recommended.     01/23/2025 - PSA: 0.02    01/31/2025 - Appointment with :  Follow up in 6 months with PSA    02/21/2025 - CT Abdomen/Pelvis with contrast:  No acute abnormality of the abdomen or pelvis to suggest a neoplastic   process or metastatic disease.   A single 3 mm calculus in the gallbladder. No finding to suggest cholecystitis.   Stable renal cysts.   The chest is separately reviewed and reported.     02/21/2025 - CT Chest with contrast:  Stable exam. Previous right lower lobe wedge resection without evidence of local  recurrence.   In addition the small pulmonary nodules as well as confluent area of scarring within the inferior right upper lobe is also stable.       History obtained from  PATIENT and CHART    PAST MEDICAL HISTORY  Past Medical History:   Diagnosis Date    A-fib     Arthritis     Asthma 12/1/22    Dizzy spells     states on rare occsaion has    Failed intubation of airway 11/10/2020    Hypertension     Lung cancer     Nosebleed 12/1/22    Taking  eliquis    PAF (paroxysmal atrial fibrillation)     Prostate cancer 2020    Thyroid disorder       PAST SURGICAL HISTORY  Past Surgical History:   Procedure Laterality Date    CARDIAC ELECTROPHYSIOLOGY PROCEDURE N/A 5/6/2024    Procedure: Ablation atrial fibrillation;  Surgeon: Master Johnson MD;  Location:  [facility]AS[facility];  Service: Cardiovascular;  Laterality: N/A;    CARPAL TUNNEL RELEASE Right     LOBECTOMY Right 2/15/2021    Procedure: THORACOSCOPY WITH DAVINCI ROBOT WITH RIGHT WEDGE RESECTION, MEDIASTINAL LYMPH NODE DISSECTION-RIGHT;  Surgeon: Nick Flores MD;  Location:  [facility] OR;  Service: DaVinci;  Laterality: Right;    LUNG BIOPSY      PROSTATE BIOPSY      REPLACEMENT TOTAL KNEE Left     SHOULDER SURGERY Bilateral     THORACOSCOPY Right 10/30/2020    Procedure: MEDIASTINOSCOPY, RIGHT THORACOSCOPY POSSIBLE WEDGE RESECTION WITH DAVINCI ROBOT;  Surgeon: Nick Flores MD;  Location:  [facility] OR;  Service: DaVinci;  Laterality: Right;    THYROIDECTOMY N/A 12/11/2020    Procedure: Total thyroidectomy;  Surgeon: Jean Marie Solis MD;  Location:  [facility] OR;  Service: ENT;  Laterality: N/A;      FAMILY HISTORY  family history includes COPD in his father; Cancer in his mother; Hypertension in his mother.    SOCIAL HISTORY  Social History     Tobacco Use    Smoking status: Former     Current packs/day: 0.00     Average packs/day: 1 pack/day for 33.0 years (33.0 ttl pk-yrs)     Types: Cigarettes     Start date: 1/1/1969     Quit date: 1/1/1996     Years  since quittin.1    Smokeless tobacco: Never   Vaping Use    Vaping status: Never Used   Substance Use Topics    Alcohol use: Yes     Alcohol/week: 2.0 standard drinks of alcohol     Types: 2 Shots of liquor per week     Comment: x2 weekly    Drug use: Never     ALLERGIES  Patient has no known allergies.     MEDICATIONS    Current Outpatient Medications:     acetaminophen (TYLENOL) 500 MG tablet, Take 1 tablet by mouth Every 6 (Six) Hours As Needed for Mild Pain., Disp: , Rfl:     alfuzosin (UROXATRAL) 10 MG 24 hr tablet, Take 1 tablet by mouth Daily., Disp: , Rfl:     apixaban (ELIQUIS) 5 MG tablet tablet, Take 1 tablet by mouth 2 (Two) Times a Day., Disp: , Rfl:     ferrous sulfate 325 (65 FE) MG tablet, Take 1 tablet by mouth Daily., Disp: 60 tablet, Rfl: 2    Ibuprofen 200 MG capsule, Take 2 capsules by mouth Daily., Disp: , Rfl:     levocetirizine (XYZAL) 5 MG tablet, Take 1 tablet by mouth Every Night., Disp: , Rfl:     metoprolol tartrate (LOPRESSOR) 25 MG tablet, TAKE 2 TABLETS BY MOUTH 2 (TWO) TIMES A DAY., Disp: 180 tablet, Rfl: 3    predniSONE (DELTASONE) 5 MG tablet, Take 1 tablet by mouth 2 (Two) Times a Day., Disp: 10 tablet, Rfl: 0    Synthroid 137 MCG tablet, TAKE 1 TABLET BY MOUTH ONCE DAILY, Disp: 30 tablet, Rfl: 3    therapeutic multivitamin-minerals (THERAGRAN-M) tablet, Take 2 tablets by mouth Daily., Disp: , Rfl:     Triamcinolone Acetonide (NASACORT) 55 MCG/ACT nasal inhaler, 2 sprays Daily., Disp: , Rfl:     Current outpatient and discharge medications have been reconciled for the patient.  Reviewed by: ANDRES Lauren    The following portions of the patient's history were reviewed and updated as appropriate: allergies, current medications, past family history, past medical history, past social history, past surgical history and problem list.    REVIEW OF SYSTEMS  Review of Systems   Constitutional:  Positive for fatigue. Negative for activity change.   HENT: Negative.      Respiratory:  Positive for shortness of breath.    Cardiovascular: Negative.    Gastrointestinal:  Negative for blood in stool.   Genitourinary:  Positive for frequency and urgency.   Musculoskeletal: Negative.    Skin: Negative.    Neurological:  Positive for light-headedness.   Hematological: Negative.    Psychiatric/Behavioral: Negative.     All other systems reviewed and are negative.    ELADIO Questionnaire (Sexual Health Inventory for Men)  Over the past 6 months…    1) How do you rate your confidence that you could get and keep an erection? 3 - Moderate   2)  When you had erections with sexual stimulation, how often were your erections hard enough for penetration (entering your partner)? 0 - No sexual activity   3)  During sexual intercourse, how often were you able to maintain your erection after you had penetrated (entered) your partner? 0 - Did not attempt intercourse   4) During sexual intercourse, how difficult was it to maintain your erection to completion of intercourse? 0 - Did not attempt intercourse   5) When you attempted sexual intercourse, how often was it satisfactory for you? 0 - Did not attempt intercourse   Total score:  3            1-7 Severe ED    8-11 Moderate ED    12-16 Mild to Moderate ED    17-21 Mild ED    22-25 No Signs of ED     IPSS Questionnaire (AUA-7):  Over the past month…    1)  How often have you had a sensation of not emptying your bladder completely after you finish urinating?  3 - About half the time   2)  How often have you had to urinate again less than two hours after you finished urinating? 2 - Less than half the time   3)  How often have you found you stopped and started again several times when you urinated?  0 - Not at all   4) How difficult have you found it to postpone urination?  2 - Less than half the time   5) How often have you had a weak urinary stream?  1 - Less than 1 time in 5   6) How often have you had to push or strain to begin urination?  0 - Not at  "all   7) How many times did you most typically get up to urinate from the time you went to bed until the time you got up in the morning?  2 - 2 times   Total score:  0-7 mildly symptomatic                                               Total = 10    8-19 moderately symptomatic    20-35 severely symptomatic     PHYSICAL EXAM  VITAL SIGNS:   Vitals:    02/26/25 1251   BP: 144/50   Weight: 126 kg (278 lb)   Height: 185.4 cm (73\")   PainSc: 0-No pain       Physical Exam  Vitals reviewed.   Constitutional:       Appearance: Normal appearance.   HENT:      Head: Normocephalic.      Nose: Nose normal.   Eyes:      Pupils: Pupils are equal, round, and reactive to light.   Cardiovascular:      Rate and Rhythm: Normal rate and regular rhythm.      Pulses: Normal pulses.      Heart sounds: Normal heart sounds.   Pulmonary:      Effort: Pulmonary effort is normal. No respiratory distress.      Breath sounds: Normal breath sounds. No wheezing.   Abdominal:      General: Bowel sounds are normal.      Palpations: There is no mass.   Musculoskeletal:         General: Normal range of motion.      Cervical back: Normal range of motion and neck supple. No tenderness.   Lymphadenopathy:      Cervical: No cervical adenopathy.   Skin:     General: Skin is warm and dry.      Capillary Refill: Capillary refill takes less than 2 seconds.   Neurological:      General: No focal deficit present.      Mental Status: He is alert and oriented to person, place, and time.      Motor: No weakness.   Psychiatric:         Mood and Affect: Mood normal.         Behavior: Behavior normal.       Performance Status: ECOG (0) Fully active, able to carry on all predisease performance without restriction    Clinical Quality Measures  - Pain Documented by Standardized Tool, FPS Delgado Desir reports a pain score of 0.  Given his pain assessment as noted, treatment options were discussed and the following options were decided upon as a follow-up plan to " address the patient's pain: continuation of current treatment plan for pain.  Pain Medications               acetaminophen (TYLENOL) 500 MG tablet Take 1 tablet by mouth Every 6 (Six) Hours As Needed for Mild Pain.    Ibuprofen 200 MG capsule Take 2 capsules by mouth Daily.    predniSONE (DELTASONE) 5 MG tablet Take 1 tablet by mouth 2 (Two) Times a Day.          - Body Mass Index Screening and Follow-Up Plan  Class 2 Severe Obesity (BMI >=35 and <=39.9). Defer to PCP.     - Tobacco Use: Screening and Cessation Intervention  Social History    Tobacco Use      Smoking status: Former        Packs/day: 0.00        Years: 1 pack/day for 33.0 years (33.0 ttl pk-yrs)        Types: Cigarettes        Start date: 1969        Quit date: 1996        Years since quittin.1      Smokeless tobacco: Never    - Advanced Care Planning Advance Care Planning   ACP discussion was held with the patient during this visit. Patient does not have an advance directive, information provided.    - PHQ-2 Depression Screening:  Little interest or pleasure in doing things? Not at all   Feeling down, depressed, or hopeless? Not at all   PHQ-2 Total Score 0     ASSESSMENT AND PLAN  1. History of prostate cancer    2. History of lung cancer    3. S/P partial lobectomy of lung    4. History of radiation therapy    5. Former smoker      No orders of the defined types were placed in this encounter.    RECOMMENDATIONS: Delgado Desir is status post completion of radiation therapy to the prostate, returns to our clinic today for routine follow up exam. Diagnosed in 2020 with Adenocarcinoma of the prostate, pre-PSA 21.02, Clayton (4+3) 7, 5/12 cores positive. PNI identified. Follows Dr. Phan, recommended ADT and XRT. He completed 7000 cGy in 28 fractions to the prostate on 01/10/2022.    In 2020, Mr. Desir was diagnosed with Stage 1A2 of the RLL of the lung, being managed by Dr. Flores and Dr. Bullard.      On exam, he is  currently without symptoms or evidence for recurrent or metastatic disease. Most recent PSA remains very low at 0.02. He will continue follow-up/surveillance as discussed in 1 year or sooner if needed and will continue to see the other health care providers as per their scheduling.    Patient Instructions   1) return in 1 year     Return in about 1 year (around 2/26/2026).     Time Spent: I spent 44 minutes caring for Delgado on this date of service. This time includes time spent by me in the following activities: preparing for the visit, reviewing tests, obtaining and/or reviewing a separately obtained history, performing a medically appropriate examination and/or evaluation, counseling and educating the patient/family/caregiver, ordering medications, tests, or procedures, referring and communicating with other health care professionals, documenting information in the medical record, independently interpreting results and communicating that information with the patient/family/caregiver, and care coordination.   Zak Lai, APRN  02/26/2025

## 2025-02-25 ENCOUNTER — HOSPITAL ENCOUNTER (OUTPATIENT)
Dept: RADIATION ONCOLOGY | Facility: HOSPITAL | Age: 75
Setting detail: RADIATION/ONCOLOGY SERIES
End: 2025-02-25
Payer: MEDICARE

## 2025-02-26 ENCOUNTER — OFFICE VISIT (OUTPATIENT)
Age: 75
End: 2025-02-26
Payer: MEDICARE

## 2025-02-26 ENCOUNTER — LAB (OUTPATIENT)
Dept: LAB | Facility: HOSPITAL | Age: 75
End: 2025-02-26
Payer: MEDICARE

## 2025-02-26 ENCOUNTER — OFFICE VISIT (OUTPATIENT)
Dept: ONCOLOGY | Facility: CLINIC | Age: 75
End: 2025-02-26
Payer: MEDICARE

## 2025-02-26 ENCOUNTER — TELEPHONE (OUTPATIENT)
Dept: ONCOLOGY | Facility: CLINIC | Age: 75
End: 2025-02-26

## 2025-02-26 VITALS
WEIGHT: 278 LBS | DIASTOLIC BLOOD PRESSURE: 50 MMHG | BODY MASS INDEX: 36.84 KG/M2 | SYSTOLIC BLOOD PRESSURE: 144 MMHG | HEIGHT: 73 IN

## 2025-02-26 VITALS
DIASTOLIC BLOOD PRESSURE: 60 MMHG | TEMPERATURE: 96.9 F | RESPIRATION RATE: 18 BRPM | HEIGHT: 73 IN | WEIGHT: 278 LBS | HEART RATE: 58 BPM | OXYGEN SATURATION: 94 % | SYSTOLIC BLOOD PRESSURE: 138 MMHG | BODY MASS INDEX: 36.84 KG/M2

## 2025-02-26 DIAGNOSIS — Z85.46 HISTORY OF PROSTATE CANCER: Primary | ICD-10-CM

## 2025-02-26 DIAGNOSIS — D50.8 IRON DEFICIENCY ANEMIA SECONDARY TO INADEQUATE DIETARY IRON INTAKE: ICD-10-CM

## 2025-02-26 DIAGNOSIS — C34.31 MALIGNANT NEOPLASM OF LOWER LOBE OF RIGHT LUNG: Primary | ICD-10-CM

## 2025-02-26 DIAGNOSIS — Z92.3 HISTORY OF RADIATION THERAPY: ICD-10-CM

## 2025-02-26 DIAGNOSIS — Z90.2 S/P PARTIAL LOBECTOMY OF LUNG: ICD-10-CM

## 2025-02-26 DIAGNOSIS — Z87.891 FORMER SMOKER: ICD-10-CM

## 2025-02-26 DIAGNOSIS — Z85.118 HISTORY OF LUNG CANCER: ICD-10-CM

## 2025-02-26 LAB
ALBUMIN SERPL-MCNC: 4 G/DL (ref 3.5–5.2)
ALBUMIN/GLOB SERPL: 1.5 G/DL
ALP SERPL-CCNC: 109 U/L (ref 39–117)
ALT SERPL W P-5'-P-CCNC: 24 U/L (ref 1–41)
ANION GAP SERPL CALCULATED.3IONS-SCNC: 10 MMOL/L (ref 5–15)
AST SERPL-CCNC: 22 U/L (ref 1–40)
BASOPHILS # BLD AUTO: 0.03 10*3/MM3 (ref 0–0.2)
BASOPHILS NFR BLD AUTO: 0.4 % (ref 0–1.5)
BILIRUB SERPL-MCNC: 1.1 MG/DL (ref 0–1.2)
BUN SERPL-MCNC: 20 MG/DL (ref 8–23)
BUN/CREAT SERPL: 23.5 (ref 7–25)
CALCIUM SPEC-SCNC: 8.8 MG/DL (ref 8.6–10.5)
CHLORIDE SERPL-SCNC: 106 MMOL/L (ref 98–107)
CO2 SERPL-SCNC: 22 MMOL/L (ref 22–29)
CREAT SERPL-MCNC: 0.85 MG/DL (ref 0.76–1.27)
DEPRECATED RDW RBC AUTO: 48.8 FL (ref 37–54)
EGFRCR SERPLBLD CKD-EPI 2021: 91.8 ML/MIN/1.73
EOSINOPHIL # BLD AUTO: 0.07 10*3/MM3 (ref 0–0.4)
EOSINOPHIL NFR BLD AUTO: 1 % (ref 0.3–6.2)
ERYTHROCYTE [DISTWIDTH] IN BLOOD BY AUTOMATED COUNT: 14.6 % (ref 12.3–15.4)
FERRITIN SERPL-MCNC: 213.9 NG/ML (ref 30–400)
GLOBULIN UR ELPH-MCNC: 2.7 GM/DL
GLUCOSE SERPL-MCNC: 129 MG/DL (ref 65–99)
HCT VFR BLD AUTO: 40 % (ref 37.5–51)
HGB BLD-MCNC: 12.8 G/DL (ref 13–17.7)
HOLD SPECIMEN: NORMAL
IMM GRANULOCYTES # BLD AUTO: 0.04 10*3/MM3 (ref 0–0.05)
IMM GRANULOCYTES NFR BLD AUTO: 0.6 % (ref 0–0.5)
IRON 24H UR-MRATE: 73 MCG/DL (ref 59–158)
IRON SATN MFR SERPL: 24 % (ref 20–50)
LYMPHOCYTES # BLD AUTO: 0.61 10*3/MM3 (ref 0.7–3.1)
LYMPHOCYTES NFR BLD AUTO: 8.9 % (ref 19.6–45.3)
MCH RBC QN AUTO: 29.2 PG (ref 26.6–33)
MCHC RBC AUTO-ENTMCNC: 32 G/DL (ref 31.5–35.7)
MCV RBC AUTO: 91.1 FL (ref 79–97)
MONOCYTES # BLD AUTO: 0.44 10*3/MM3 (ref 0.1–0.9)
MONOCYTES NFR BLD AUTO: 6.4 % (ref 5–12)
NEUTROPHILS NFR BLD AUTO: 5.7 10*3/MM3 (ref 1.7–7)
NEUTROPHILS NFR BLD AUTO: 82.7 % (ref 42.7–76)
NRBC BLD AUTO-RTO: 0 /100 WBC (ref 0–0.2)
PLATELET # BLD AUTO: 170 10*3/MM3 (ref 140–450)
PMV BLD AUTO: 11.2 FL (ref 6–12)
POTASSIUM SERPL-SCNC: 4.4 MMOL/L (ref 3.5–5.2)
PROT SERPL-MCNC: 6.7 G/DL (ref 6–8.5)
RBC # BLD AUTO: 4.39 10*6/MM3 (ref 4.14–5.8)
SODIUM SERPL-SCNC: 138 MMOL/L (ref 136–145)
TIBC SERPL-MCNC: 310 MCG/DL (ref 298–536)
TRANSFERRIN SERPL-MCNC: 208 MG/DL (ref 200–360)
WBC NRBC COR # BLD AUTO: 6.89 10*3/MM3 (ref 3.4–10.8)

## 2025-02-26 PROCEDURE — 83540 ASSAY OF IRON: CPT

## 2025-02-26 PROCEDURE — 80053 COMPREHEN METABOLIC PANEL: CPT

## 2025-02-26 PROCEDURE — 36415 COLL VENOUS BLD VENIPUNCTURE: CPT

## 2025-02-26 PROCEDURE — 84466 ASSAY OF TRANSFERRIN: CPT

## 2025-02-26 PROCEDURE — 82728 ASSAY OF FERRITIN: CPT

## 2025-02-26 PROCEDURE — 85025 COMPLETE CBC W/AUTO DIFF WBC: CPT

## 2025-02-26 PROCEDURE — G0463 HOSPITAL OUTPT CLINIC VISIT: HCPCS | Performed by: RADIOLOGY

## 2025-02-26 NOTE — TELEPHONE ENCOUNTER
Left message that he could stop the oral iron tabs.  Asked him to call with any questions or concerns.

## 2025-03-04 ENCOUNTER — NURSE TRIAGE (OUTPATIENT)
Dept: CALL CENTER | Facility: HOSPITAL | Age: 75
End: 2025-03-04
Payer: MEDICARE

## 2025-03-05 NOTE — TELEPHONE ENCOUNTER
Reason for Disposition   [1] DOUBLE DOSE (an extra dose or lesser amount) of prescription drug AND [2] any symptoms (e.g., dizziness, nausea, pain, sleepiness)    Additional Information   Negative: [1] Intentional drug overdose AND [2] suicidal thoughts or ideas   Negative: Drug overdose and triager unable to answer question   Negative: Caller requesting a renewal or refill of a medicine patient is currently taking   Negative: Caller requesting information unrelated to medicine   Negative: Caller requesting information about COVID-19 Vaccine   Negative: Caller requesting information about Emergency Contraception   Negative: Caller requesting information about Combined Birth Control Pills   Negative: Caller requesting information about Progestin Birth Control Pills   Negative: Caller requesting information about Post-Op pain or medicines   Negative: Caller requesting a prescription antibiotic (such as Penicillin) for Strep throat and has a positive culture result   Negative: Caller requesting a prescription anti-viral med (such as Tamiflu) and has influenza (flu) symptoms   Negative: Immunization reaction suspected   Negative: Rash while taking a medicine or within 3 days of stopping it   Negative: [1] Asthma and [2] having symptoms of asthma (cough, wheezing, etc.)   Negative: [1] Symptom of illness (e.g., headache, abdominal pain, earache, vomiting) AND [2] more than mild   Negative: Breastfeeding questions about mother's medicines and diet   Negative: MORE THAN A DOUBLE DOSE of a prescription or over-the-counter (OTC) drug   Negative: [1] DOUBLE DOSE (an extra dose or lesser amount) of over-the-counter (OTC) drug AND [2] any symptoms (e.g., dizziness, nausea, pain, sleepiness)   Negative: Took another person's prescription drug   Negative: [1] DOUBLE DOSE (an extra dose or lesser amount) of prescription drug AND [2] NO symptoms  (Exception: A double dose of antibiotics.)   Negative: Diabetes drug error or overdose  (e.g., took wrong type of insulin or took extra dose)   Negative: [1] Prescription not at pharmacy AND [2] was prescribed by PCP recently (Exception: Triager has access to EMR and prescription is recorded there. Go to Home Care and confirm for pharmacy.)   Negative: [1] Pharmacy calling with prescription question AND [2] triager unable to answer question   Negative: [1] Caller has URGENT medicine question about med that PCP or specialist prescribed AND [2] triager unable to answer question   Negative: Medicine patch causing local rash or itching   Negative: [1] Caller has medicine question about med NOT prescribed by PCP AND [2] triager unable to answer question (e.g., compatibility with other med, storage)   Negative: Prescription request for new medicine (not a refill)   Negative: [1] Caller has NON-URGENT medicine question about med that PCP prescribed AND [2] triager unable to answer question   Negative: Caller wants to use a complementary or alternative medicine   Negative: [1] Prescription prescribed recently is not at pharmacy AND [2] triager has access to patient's EMR AND [3] prescription is recorded in the EMR   Negative: [1] DOUBLE DOSE (an extra dose or lesser amount) of over-the-counter (OTC) drug AND [2] NO symptoms   Negative: [1] DOUBLE DOSE (an extra dose or lesser amount) of antibiotic drug AND [2] NO symptoms   Negative: Caller has medicine question only, adult not sick, AND triager answers question   Negative: Caller has medicine question, adult has minor symptoms, caller declines triage, AND triager answers question   Negative: Caller requesting information about medicine during pregnancy; adult is not ill AND triager answers question   Negative: Caller requesting information about medicine use with breastfeeding; neither adult nor infant is ill, triager answers question   Negative: Pharmacy calling with prescription question and triager answers question    Answer Assessment - Initial Assessment  "Questions  1. NAME of MEDICINE: \"What medicine(s) are you calling about?\"      Metoprolol   2. QUESTION: \"What is your question?\" (e.g., double dose of medicine, side effect)     Unsure if took extra pill   3. PRESCRIBER: \"Who prescribed the medicine?\" Reason: if prescribed by specialist, call should be referred to that group.    Dr. Johnson  4. SYMPTOMS: \"Do you have any symptoms?\" If Yes, ask: \"What symptoms are you having?\"  \"How bad are the symptoms (e.g., mild, moderate, severe)      No symptoms   5. PREGNANCY:  \"Is there any chance that you are pregnant?\" \"When was your last menstrual period?\"      NA    Protocols used: Medication Question Call-ADULT-    "

## 2025-03-05 NOTE — TELEPHONE ENCOUNTER
States was taking medication Metoprolol and not sure if took extra pill , states laid the pills on the table, then took pill but wasn't sure if already had taken it, BP now is 129/54  States has dizziness but unsure if coming from pill as have dizziness at times  Triaged per protocol, connected with poison control

## 2025-03-12 ENCOUNTER — TRANSCRIBE ORDERS (OUTPATIENT)
Dept: ADMINISTRATIVE | Facility: HOSPITAL | Age: 75
End: 2025-03-12
Payer: MEDICARE

## 2025-03-12 DIAGNOSIS — I10 ESSENTIAL (PRIMARY) HYPERTENSION: Primary | ICD-10-CM

## 2025-03-12 DIAGNOSIS — E55.9 VITAMIN D DEFICIENCY, UNSPECIFIED: ICD-10-CM

## 2025-03-12 DIAGNOSIS — E03.9 HYPOTHYROIDISM, UNSPECIFIED TYPE: ICD-10-CM

## 2025-03-12 DIAGNOSIS — R73.9 HYPERGLYCEMIA, UNSPECIFIED: ICD-10-CM

## 2025-03-12 DIAGNOSIS — E53.8 DEFICIENCY OF OTHER SPECIFIED B GROUP VITAMINS: ICD-10-CM

## 2025-03-12 DIAGNOSIS — N40.0 BENIGN PROSTATIC HYPERPLASIA WITHOUT LOWER URINARY TRACT SYMPTOMS: ICD-10-CM

## 2025-03-12 DIAGNOSIS — E78.5 HYPERLIPIDEMIA, UNSPECIFIED HYPERLIPIDEMIA TYPE: ICD-10-CM

## 2025-03-12 DIAGNOSIS — E61.1 IRON DEFICIENCY: ICD-10-CM

## 2025-04-01 ENCOUNTER — OFFICE VISIT (OUTPATIENT)
Dept: NEUROLOGY | Age: 75
End: 2025-04-01
Payer: MEDICARE

## 2025-04-01 ENCOUNTER — RESULTS FOLLOW-UP (OUTPATIENT)
Dept: NEUROLOGY | Age: 75
End: 2025-04-01

## 2025-04-01 VITALS
OXYGEN SATURATION: 99 % | SYSTOLIC BLOOD PRESSURE: 119 MMHG | HEIGHT: 73 IN | BODY MASS INDEX: 36.45 KG/M2 | DIASTOLIC BLOOD PRESSURE: 65 MMHG | WEIGHT: 275 LBS | HEART RATE: 61 BPM

## 2025-04-01 DIAGNOSIS — R42 DIZZINESS: Primary | ICD-10-CM

## 2025-04-01 DIAGNOSIS — R41.3 MEMORY LOSS: ICD-10-CM

## 2025-04-01 DIAGNOSIS — R53.83 OTHER FATIGUE: ICD-10-CM

## 2025-04-01 DIAGNOSIS — E55.9 VITAMIN D DEFICIENCY: ICD-10-CM

## 2025-04-01 LAB
25(OH)D3 SERPL-MCNC: 23 NG/ML
ALBUMIN SERPL-MCNC: 4 G/DL (ref 3.5–5.2)
ALP SERPL-CCNC: 112 U/L (ref 40–129)
ALT SERPL-CCNC: 31 U/L (ref 10–50)
ANION GAP SERPL CALCULATED.3IONS-SCNC: 8 MMOL/L (ref 8–16)
AST SERPL-CCNC: 24 U/L (ref 10–50)
BASOPHILS # BLD: 0 K/UL (ref 0–0.2)
BASOPHILS NFR BLD: 0.5 % (ref 0–1)
BILIRUB SERPL-MCNC: 1.2 MG/DL (ref 0.2–1.2)
BUN SERPL-MCNC: 18 MG/DL (ref 8–23)
CALCIUM SERPL-MCNC: 9.3 MG/DL (ref 8.8–10.2)
CHLORIDE SERPL-SCNC: 104 MMOL/L (ref 98–107)
CO2 SERPL-SCNC: 27 MMOL/L (ref 22–29)
CREAT SERPL-MCNC: 1 MG/DL (ref 0.7–1.2)
CRP SERPL-MCNC: 8.9 MG/L (ref 0–5)
EOSINOPHIL # BLD: 0.1 K/UL (ref 0–0.6)
EOSINOPHIL NFR BLD: 1.1 % (ref 0–5)
ERYTHROCYTE [DISTWIDTH] IN BLOOD BY AUTOMATED COUNT: 14.1 % (ref 11.5–14.5)
ERYTHROCYTE [SEDIMENTATION RATE] IN BLOOD BY WESTERGREN METHOD: 10 MM/HR (ref 0–15)
GLUCOSE SERPL-MCNC: 100 MG/DL (ref 70–99)
HCT VFR BLD AUTO: 40.7 % (ref 42–52)
HGB BLD-MCNC: 13.2 G/DL (ref 14–18)
HIV-1 P24 AG: NORMAL
HIV1+2 AB SERPLBLD QL IA.RAPID: NORMAL
IMM GRANULOCYTES # BLD: 0 K/UL
LYMPHOCYTES # BLD: 0.7 K/UL (ref 1.1–4.5)
LYMPHOCYTES NFR BLD: 10.2 % (ref 20–40)
MCH RBC QN AUTO: 29.8 PG (ref 27–31)
MCHC RBC AUTO-ENTMCNC: 32.4 G/DL (ref 33–37)
MCV RBC AUTO: 91.9 FL (ref 80–94)
MONOCYTES # BLD: 0.5 K/UL (ref 0–0.9)
MONOCYTES NFR BLD: 7.7 % (ref 0–10)
NEUTROPHILS # BLD: 5.1 K/UL (ref 1.5–7.5)
NEUTS SEG NFR BLD: 80.3 % (ref 50–65)
PLATELET # BLD AUTO: 157 K/UL (ref 130–400)
PMV BLD AUTO: 11.2 FL (ref 9.4–12.4)
POTASSIUM SERPL-SCNC: 4.2 MMOL/L (ref 3.5–5.1)
PROT SERPL-MCNC: 6.5 G/DL (ref 6.4–8.3)
RBC # BLD AUTO: 4.43 M/UL (ref 4.7–6.1)
SODIUM SERPL-SCNC: 139 MMOL/L (ref 136–145)
TSH SERPL DL<=0.005 MIU/L-ACNC: 2.48 UIU/ML (ref 0.27–4.2)
VIT B12 SERPL-MCNC: 610 PG/ML (ref 232–1245)
WBC # BLD AUTO: 6.4 K/UL (ref 4.8–10.8)

## 2025-04-01 PROCEDURE — 1160F RVW MEDS BY RX/DR IN RCRD: CPT | Performed by: NURSE PRACTITIONER

## 2025-04-01 PROCEDURE — 1123F ACP DISCUSS/DSCN MKR DOCD: CPT | Performed by: NURSE PRACTITIONER

## 2025-04-01 PROCEDURE — 99214 OFFICE O/P EST MOD 30 MIN: CPT | Performed by: NURSE PRACTITIONER

## 2025-04-01 PROCEDURE — 1159F MED LIST DOCD IN RCRD: CPT | Performed by: NURSE PRACTITIONER

## 2025-04-01 RX ORDER — ERGOCALCIFEROL 1.25 MG/1
50000 CAPSULE, LIQUID FILLED ORAL WEEKLY
Qty: 8 CAPSULE | Refills: 0 | Status: SHIPPED | OUTPATIENT
Start: 2025-04-01

## 2025-04-01 NOTE — PROGRESS NOTES
[facility] Office Note      Patient:   Alfred Krishna  MR#:    [id number]  Account Number:                         YOB: 1950  Date of Evaluation:  4/1/2025  Time of Note:                          1:36 PM  Primary/Referring Physician:  Claudia Gil APRN - CNP   Consulting Physician:  Vy Kaiser DNP, APRN    FOLLOW UP    Chief Complaint   Patient presents with    Follow-up    balance issue     C/o balance issue he reports he is dizzy currently. Dizziness is worse with movement      HISTORY OF PRESENT ILLNESS    Alfred Krishna is a 74 y.o. year old male here for for follow up of abnormal CT head, falls, dizziness. States dizziness has gotten worse recently. Dizziness is a sudden onset, other times more positional in nature, other times notes when ambulating. Feels like he loses his balance, he has been pushed backwards/forward. Notes some postural instability. Denies nausea with dizziness. He does note dizziness daily, brief in nature. Using a cane to walk, related to knee pain. No recent falls. Reports being scheduled [facility] brain on Monday at [facility].  He previously deferred further workup with me.  Noting some memory concerns recently as well. Notes some short term memory difficulty. Noting some difficulty with name/word recall. Notes some repetition of questions. Noting comprehension difficulty. Memory changes are not interfering with ADLs. He is taking medications but some difficulty, occasional missed doses. Not using pill organizer. Has difficulty with driving at times, thinking through steps on how to get places. No urinary incontinence. No tremor. Has noted gait changes, primarily with his dizziness. He has atrial fibrillation, on Eliquis. No carotid artery stenosis history. Not a diabetic. History of lobectomy, has noted shortness of breath since that time. No stroke history. No family history of memory loss, dementia.     Past Medical History:   Diagnosis Date    Atrial

## 2025-04-03 LAB
AL BETA40 PLAS-MCNC: 216.24 PG/ML
AL BETA42 PLAS-MCNC: 25.54 PG/ML
BETA-AMYLOID 42/40 RATIO: 0.12
RPR SER QL: NORMAL

## 2025-04-04 LAB
ARSENIC BLD-MCNC: <10 UG/L
CADMIUM BLD-MCNC: <1 UG/L
LEAD BLD-MCNC: <2 UG/DL
MERCURY BLD-MCNC: <2.5 UG/L

## 2025-04-06 LAB — VIT B1 BLD-MCNC: 149 NMOL/L (ref 70–180)

## 2025-04-07 LAB
APOE ALLELE E2+E3+E4 BLD/T: NORMAL
SPECIMEN SOURCE: NORMAL

## 2025-05-01 ENCOUNTER — TELEPHONE (OUTPATIENT)
Dept: UROLOGY | Age: 75
End: 2025-05-01

## 2025-05-01 NOTE — TELEPHONE ENCOUNTER
Patient contacted office stating that for the past few days have been bad for him. He reports that he is unable to hold urine and having frequency/urgency. He stated that he is on alfuzosin but doesn't feel like this is improving his symptoms. He also stated that he occasionally has pain (1-2 seconds) when urinating but this does not happen all the time. He is wondering if there is an alternate medication he could try since he does not see Dr. Delcid until July.     Please advise and contact patient to discuss.

## 2025-05-07 ENCOUNTER — OFFICE VISIT (OUTPATIENT)
Dept: UROLOGY | Age: 75
End: 2025-05-07
Payer: MEDICARE

## 2025-05-07 VITALS — BODY MASS INDEX: 36.71 KG/M2 | HEIGHT: 73 IN | WEIGHT: 277 LBS | TEMPERATURE: 98.4 F

## 2025-05-07 DIAGNOSIS — C61 PROSTATE CANCER (HCC): ICD-10-CM

## 2025-05-07 DIAGNOSIS — N13.8 BPH WITH URINARY OBSTRUCTION: Primary | ICD-10-CM

## 2025-05-07 DIAGNOSIS — N40.1 BPH WITH URINARY OBSTRUCTION: Primary | ICD-10-CM

## 2025-05-07 DIAGNOSIS — R35.1 NOCTURIA: ICD-10-CM

## 2025-05-07 LAB
APPEARANCE FLUID: CLEAR
BILIRUBIN, POC: NORMAL
BLOOD URINE, POC: NORMAL
CLARITY, POC: CLEAR
COLOR, POC: YELLOW
GLUCOSE URINE, POC: NORMAL MG/DL
KETONES, POC: NORMAL MG/DL
LEUKOCYTE EST, POC: NORMAL
NITRITE, POC: NORMAL
PH, POC: 5.5
PROTEIN, POC: NORMAL MG/DL
SPECIFIC GRAVITY, POC: 1.01
UROBILINOGEN, POC: 2 MG/DL

## 2025-05-07 PROCEDURE — 1123F ACP DISCUSS/DSCN MKR DOCD: CPT | Performed by: NURSE PRACTITIONER

## 2025-05-07 PROCEDURE — 1159F MED LIST DOCD IN RCRD: CPT | Performed by: NURSE PRACTITIONER

## 2025-05-07 PROCEDURE — 51798 US URINE CAPACITY MEASURE: CPT | Performed by: NURSE PRACTITIONER

## 2025-05-07 PROCEDURE — 81002 URINALYSIS NONAUTO W/O SCOPE: CPT | Performed by: NURSE PRACTITIONER

## 2025-05-07 PROCEDURE — 99214 OFFICE O/P EST MOD 30 MIN: CPT | Performed by: NURSE PRACTITIONER

## 2025-05-07 PROCEDURE — 1160F RVW MEDS BY RX/DR IN RCRD: CPT | Performed by: NURSE PRACTITIONER

## 2025-05-07 RX ORDER — DONEPEZIL HYDROCHLORIDE 5 MG/1
5 TABLET, FILM COATED ORAL NIGHTLY
COMMUNITY

## 2025-05-07 RX ORDER — TAMSULOSIN HYDROCHLORIDE 0.4 MG/1
0.4 CAPSULE ORAL NIGHTLY
Qty: 30 CAPSULE | Refills: 0 | Status: SHIPPED | OUTPATIENT
Start: 2025-05-07 | End: 2025-05-14 | Stop reason: SDUPTHER

## 2025-05-07 NOTE — PROGRESS NOTES
ASSESSMENT/PLAN  1. BPH with urinary obstruction  Worsening lower urinary tract symptoms.  Switch alpha-blocker therapy from alfuzosin to Flomax to see if this is any more helpful.  Will plan to follow-up in about a week to determine effectiveness.  - POCT Urinalysis no Micro  - VISHAL,POST-VOID RES,US,NON-IMAGING  - tamsulosin (FLOMAX) 0.4 MG capsule; Take 1 capsule by mouth at bedtime  Dispense: 30 capsule; Refill: 0    2. Nocturia  Switch from alfuzosin to Flomax.  Follow-up in 1 week.  - POCT Urinalysis no Micro  - VISHAL,POST-VOID RES,US,NON-IMAGING    3. Prostate cancer (HCC)  Continue to monitor PSA and follow-up with Dr. Delcid as scheduled.  - POCT Urinalysis no Micro  - VISHAL,POST-VOID RES,US,NON-IMAGING      Orders Placed This Encounter   Procedures    VISHAL,POST-VOID RES,US,NON-IMAGING     172ml    POCT Urinalysis no Micro        Return in about 1 week (around 5/14/2025).    An electronic signature was used to authenticate this note.    ALIYA REID - CNP    All information inputted into the note by the MA to include chief complaint, past medical history, past surgical history, medications, allergies, social and family history and review of systems has been reviewed and updated as needed by me.    EMR Dragon/transcription disclaimer: Much of this document is electronic transcription/translation of spoken language to printed text. The electronic translation of spoken language may be erroneous or, at times, nonsensical words or phrases may be inadvertently transcribed. Although I have reviewed the document for such errors, some may still exist.

## 2025-05-13 ASSESSMENT — ENCOUNTER SYMPTOMS
BACK PAIN: 0
VOMITING: 0
ABDOMINAL PAIN: 0
ABDOMINAL DISTENTION: 0
NAUSEA: 0

## 2025-05-14 ENCOUNTER — OFFICE VISIT (OUTPATIENT)
Dept: UROLOGY | Age: 75
End: 2025-05-14
Payer: MEDICARE

## 2025-05-14 VITALS — BODY MASS INDEX: 36.31 KG/M2 | WEIGHT: 274 LBS | HEIGHT: 73 IN | TEMPERATURE: 97.9 F

## 2025-05-14 DIAGNOSIS — N13.8 BPH WITH URINARY OBSTRUCTION: Primary | ICD-10-CM

## 2025-05-14 DIAGNOSIS — R35.1 NOCTURIA: ICD-10-CM

## 2025-05-14 DIAGNOSIS — C61 PROSTATE CANCER (HCC): ICD-10-CM

## 2025-05-14 DIAGNOSIS — N40.1 BPH WITH URINARY OBSTRUCTION: Primary | ICD-10-CM

## 2025-05-14 LAB
APPEARANCE FLUID: CLEAR
BILIRUBIN, POC: NORMAL
BLOOD URINE, POC: NORMAL
CLARITY, POC: CLEAR
COLOR, POC: YELLOW
GLUCOSE URINE, POC: NORMAL MG/DL
KETONES, POC: NORMAL MG/DL
LEUKOCYTE EST, POC: NORMAL
NITRITE, POC: NORMAL
PH, POC: 6
PROTEIN, POC: NORMAL MG/DL
SPECIFIC GRAVITY, POC: 1
UROBILINOGEN, POC: 4 MG/DL

## 2025-05-14 PROCEDURE — 1159F MED LIST DOCD IN RCRD: CPT | Performed by: NURSE PRACTITIONER

## 2025-05-14 PROCEDURE — 81002 URINALYSIS NONAUTO W/O SCOPE: CPT | Performed by: NURSE PRACTITIONER

## 2025-05-14 PROCEDURE — 99214 OFFICE O/P EST MOD 30 MIN: CPT | Performed by: NURSE PRACTITIONER

## 2025-05-14 PROCEDURE — 51798 US URINE CAPACITY MEASURE: CPT | Performed by: NURSE PRACTITIONER

## 2025-05-14 PROCEDURE — 1123F ACP DISCUSS/DSCN MKR DOCD: CPT | Performed by: NURSE PRACTITIONER

## 2025-05-14 PROCEDURE — 1160F RVW MEDS BY RX/DR IN RCRD: CPT | Performed by: NURSE PRACTITIONER

## 2025-05-14 RX ORDER — FESOTERODINE FUMARATE 8 MG/1
8 TABLET, FILM COATED, EXTENDED RELEASE ORAL DAILY
Qty: 90 TABLET | Refills: 1 | Status: SHIPPED | OUTPATIENT
Start: 2025-05-14

## 2025-05-14 RX ORDER — TAMSULOSIN HYDROCHLORIDE 0.4 MG/1
0.4 CAPSULE ORAL NIGHTLY
Qty: 90 CAPSULE | Refills: 3 | Status: SHIPPED | OUTPATIENT
Start: 2025-05-14

## 2025-05-14 ASSESSMENT — ENCOUNTER SYMPTOMS
BACK PAIN: 0
ABDOMINAL DISTENTION: 0
ABDOMINAL PAIN: 0
NAUSEA: 0
VOMITING: 0

## 2025-05-14 NOTE — PROGRESS NOTES
Alfred Krishna is a 74 y.o., male, Established patient who presents today   Chief Complaint   Patient presents with    Follow-up     I am here for a 1 week follow up        Patient presents with complaints of worsening lower urinary tract symptoms.  He states over the last few weeks, he has been having difficulty with increased frequency, urgency, and nocturia.  He denies any hematuria or dysuria.  He had been maintained on alfuzosin for several years.  At his last visit, we switched him over to Flomax to see if this may help any better.  He does believe he is able to empty his bladder more adequately and does have less double voiding on this medication.  He reports his most bothersome symptoms are irritative in nature.       He is typically followed by Dr. Delcid for prostate cancer.  He was last seen in the office on 1/31/2025 with the following history and assessment copied over.     Prostate Cancer  Patient is here today for prostate cancer which was first diagnosed in July 2020  His prostate cancer can be characterized as clinical stage T1c Negra 4+3 = 7 grade group 3 hormone sensitive status post XRT with undetectable PSA  His last several PSA values are as follows:  Lab Results   Component Value Date    PSA 0.02 01/23/2025    PSA <0.01 07/25/2024    PSA <0.01 01/23/2024    PSA <0.01 07/24/2023    PSA <0.01 01/20/2023    PSA 0.03 06/21/2022    PSA 21.02 (H) 08/11/2021       Previous treatment of prostate cancer: Neoadjuvant hormonal therapy beginning September 23, 2001 through July 27, 2023.  External beam radiation therapy completed 9/10/2022  Lower urinary tract symptoms: frequency and nocturia, 2 times per night his AUA subscores today is 7 quality-of-life score is 2 mostly satisfied.  He currently is on alfuzosin.     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 Prostate cancer (HCC)  His PSA remains undetectable continue

## 2025-08-14 DIAGNOSIS — C61 PROSTATE CANCER (HCC): ICD-10-CM

## 2025-08-14 LAB — PSA SERPL-MCNC: 0.03 NG/ML (ref 0–4)

## 2025-08-18 ENCOUNTER — OFFICE VISIT (OUTPATIENT)
Dept: UROLOGY | Age: 75
End: 2025-08-18
Payer: MEDICARE

## 2025-08-18 VITALS — TEMPERATURE: 97.2 F | HEIGHT: 73 IN | WEIGHT: 271 LBS | BODY MASS INDEX: 35.92 KG/M2

## 2025-08-18 DIAGNOSIS — N13.8 BPH WITH URINARY OBSTRUCTION: Primary | ICD-10-CM

## 2025-08-18 DIAGNOSIS — C61 PROSTATE CANCER (HCC): ICD-10-CM

## 2025-08-18 DIAGNOSIS — N52.35 ERECTILE DYSFUNCTION FOLLOWING RADIATION THERAPY: ICD-10-CM

## 2025-08-18 DIAGNOSIS — N40.1 BPH WITH URINARY OBSTRUCTION: Primary | ICD-10-CM

## 2025-08-18 DIAGNOSIS — R35.1 NOCTURIA: ICD-10-CM

## 2025-08-18 LAB
APPEARANCE FLUID: CLEAR
BILIRUBIN, POC: NORMAL
BLOOD URINE, POC: NORMAL
CLARITY, POC: CLEAR
COLOR, POC: YELLOW
GLUCOSE URINE, POC: NORMAL MG/DL
KETONES, POC: NORMAL MG/DL
LEUKOCYTE EST, POC: NORMAL
NITRITE, POC: NORMAL
PH, POC: 6
PROTEIN, POC: NORMAL MG/DL
SPECIFIC GRAVITY, POC: 1.01
UROBILINOGEN, POC: 1 MG/DL

## 2025-08-18 PROCEDURE — 99214 OFFICE O/P EST MOD 30 MIN: CPT | Performed by: UROLOGY

## 2025-08-18 PROCEDURE — 1123F ACP DISCUSS/DSCN MKR DOCD: CPT | Performed by: UROLOGY

## 2025-08-18 PROCEDURE — 81002 URINALYSIS NONAUTO W/O SCOPE: CPT | Performed by: UROLOGY

## 2025-08-18 PROCEDURE — 51798 US URINE CAPACITY MEASURE: CPT | Performed by: UROLOGY

## 2025-08-18 PROCEDURE — 1159F MED LIST DOCD IN RCRD: CPT | Performed by: UROLOGY

## 2025-08-18 RX ORDER — FESOTERODINE FUMARATE 8 MG/1
8 TABLET, FILM COATED, EXTENDED RELEASE ORAL DAILY
Qty: 90 TABLET | Refills: 1 | Status: SHIPPED | OUTPATIENT
Start: 2025-08-18

## 2025-08-18 RX ORDER — SILDENAFIL 50 MG/1
50 TABLET, FILM COATED ORAL DAILY PRN
Qty: 10 TABLET | Refills: 11 | Status: SHIPPED | OUTPATIENT
Start: 2025-08-18

## 2025-08-18 RX ORDER — TAMSULOSIN HYDROCHLORIDE 0.4 MG/1
0.4 CAPSULE ORAL NIGHTLY
Qty: 90 CAPSULE | Refills: 3 | Status: SHIPPED | OUTPATIENT
Start: 2025-08-18

## 2025-09-02 ENCOUNTER — OFFICE VISIT (OUTPATIENT)
Age: 75
End: 2025-09-02

## 2025-09-02 VITALS — BODY MASS INDEX: 35.92 KG/M2 | HEIGHT: 73 IN | WEIGHT: 271 LBS

## 2025-09-02 DIAGNOSIS — Z96.651 STATUS POST RIGHT KNEE REPLACEMENT: Primary | ICD-10-CM

## 2025-09-02 RX ORDER — RIMEGEPANT SULFATE 75 MG/75MG
TABLET, ORALLY DISINTEGRATING ORAL
COMMUNITY
Start: 2025-08-28

## (undated) DEVICE — SUREFORM 45: Brand: SUREFORM

## (undated) DEVICE — SPONGE,LAP,12"X12",XR,ST,5/PK,40PK/CS: Brand: MEDLINE

## (undated) DEVICE — BLADELESS OBTURATOR: Brand: WECK VISTA

## (undated) DEVICE — APPL CHLORAPREP HI/LITE 26ML ORNG

## (undated) DEVICE — 24 FR STRAIGHT – SOFT PVC CATHETER: Brand: PVC THORACIC CATHETERS

## (undated) DEVICE — TROC ANCHORPORT BLADELES W/GRIP 12X120MM

## (undated) DEVICE — SEAL

## (undated) DEVICE — SUT SILK 3/0 SUTUPAK TIES 24IN SA74H

## (undated) DEVICE — DRSNG TELFA PAD NONADH STR 1S 3X8IN

## (undated) DEVICE — DRSNG SURESITE WNDW 2.38X2.75

## (undated) DEVICE — INTEGRATED CASSETTE TUBING, DO NOT USE IF PACKAGE IS DAMAGED: Brand: CROSSFLOW

## (undated) DEVICE — TOTAL TRAY, 16FR 10ML SIL FOLEY, URN: Brand: MEDLINE

## (undated) DEVICE — CLTH CLENS READYCLEANSE PERI CARE PK/5

## (undated) DEVICE — ANTIBACTERIAL UNDYED BRAIDED (POLYGLACTIN 910), SYNTHETIC ABSORBABLE SUTURE: Brand: COATED VICRYL

## (undated) DEVICE — MONOPOLAR METZENBAUM SCISSOR, MINI BLADE TIP, DISPOSABLE: Brand: MONOPOLAR METZENBAUM SCISSOR, MINI BLADE TIP, DISPOSABLE

## (undated) DEVICE — KT CLN CLEANOR SCPE

## (undated) DEVICE — GLV SURG BIOGEL M LTX PF 7 1/2

## (undated) DEVICE — ST TBG AIRSEAL FLTR TRI LUM

## (undated) DEVICE — 3M™ STERI-DRAPE™ INSTRUMENT POUCH 1018: Brand: STERI-DRAPE™

## (undated) DEVICE — ABDOMINAL PAD: Brand: DERMACEA

## (undated) DEVICE — SUT SILK 4/0 SUTUPAK TIES 24IN SA73H

## (undated) DEVICE — NEEDLE, QUINCKE 22GX3.5": Brand: MEDLINE INDUSTRIES, INC.

## (undated) DEVICE — DISPOSABLE GRASPER CARTRIDGE: Brand: DIRECT DRIVE REPOSABLE GRASPERS

## (undated) DEVICE — CONTAINER,SPECIMEN,OR STERILE,4OZ: Brand: MEDLINE

## (undated) DEVICE — Device

## (undated) DEVICE — GLOVE SURG SZ 8 CRM LTX FREE POLYISOPRENE POLYMER BEAD ANTI

## (undated) DEVICE — 3M™ STERI-STRIP™ REINFORCED ADHESIVE SKIN CLOSURES, R1546, 1/4 IN X 4 IN (6 MM X 100 MM), 10 STRIPS/ENVELOPE: Brand: 3M™ STERI-STRIP™

## (undated) DEVICE — DRSNG SURESITE123 6X8IN

## (undated) DEVICE — 60 ML SYRINGE LUER-LOCK TIP: Brand: MONOJECT

## (undated) DEVICE — SUT MNCRYL 5/0 P3 18IN UD MCP493G

## (undated) DEVICE — LP VESL MAXI 2.5X1MM RED 2PK

## (undated) DEVICE — SUT PROLN 4/0 RB1 D/A 36IN 8557H

## (undated) DEVICE — KT NDL GUIDE STRL 18GA

## (undated) DEVICE — SUT MNCRYL 4/0 PS2 27IN UD MCP426H

## (undated) DEVICE — HARMONIC FOCUS SHEARS 9CM LENGTH + ADAPTIVE TISSUE TECHNOLOGY FOR USE WITH BLUE HAND PIECE ONLY: Brand: HARMONIC FOCUS

## (undated) DEVICE — SKIN AFFIX SURG ADHESIVE 72/CS 0.55ML: Brand: MEDLINE

## (undated) DEVICE — TIP COVER ACCESSORY

## (undated) DEVICE — ELECTRD NDL EZ CLN MOD 2.75IN

## (undated) DEVICE — SUT PROLN 5/0 RB1 D/A 36IN 8556H

## (undated) DEVICE — UTILITY MARKER W/MED LABELS: Brand: MEDLINE

## (undated) DEVICE — PAD,EYE,1-5/8X2 5/8,STERILE,LF,1/PK: Brand: MEDLINE

## (undated) DEVICE — SLV CBL IVL 5X96IN

## (undated) DEVICE — PK ENT HD AND NK 30

## (undated) DEVICE — OASIS DRAIN, SINGLE, INLINE & ATS COMPATIBLE: Brand: OASIS

## (undated) DEVICE — PULSED FIELD ABLATION CATHETER: Brand: FARAWAVE™

## (undated) DEVICE — NEPTUNE E-SEP SMOKE EVACUATION PENCIL, COATED, 70MM BLADE, ROCKER SWITCH: Brand: NEPTUNE E-SEP

## (undated) DEVICE — Device: Brand: SOUNDSTAR

## (undated) DEVICE — GLV SURG BIOGEL LTX PF 6 1/2

## (undated) DEVICE — GLOVE SURG SZ 85 CRM LTX FREE POLYISOPRENE POLYMER BEAD ANTI

## (undated) DEVICE — CATH IV JELCO 18GA 10 1 3/4 IN

## (undated) DEVICE — SPNG GZ STRL 2S 4X4 12PLY

## (undated) DEVICE — Z INACTIVE USE 2660664 SOLUTION IRRIG 3000ML 0.9% SOD CHL USP UROMATIC PLAS CONT

## (undated) DEVICE — STAPLER 60: Brand: SUREFORM

## (undated) DEVICE — CANNULA ARTHSCP L7CM DIA7MM TRNSLUC THRD FLX W/ NO SQUIRT

## (undated) DEVICE — TOWEL,OR,DSP,ST,BLUE,DLX,10/PK,8PK/CS: Brand: MEDLINE

## (undated) DEVICE — Device: Brand: THERMOCOOL SMARTTOUCH SF

## (undated) DEVICE — DAVINCI: Brand: MEDLINE INDUSTRIES, INC.

## (undated) DEVICE — [AGGRESSIVE PLUS CUTTER, ARTHROSCOPIC SHAVER BLADE,  DO NOT RESTERILIZE,  DO NOT USE IF PACKAGE IS DAMAGED,  KEEP DRY,  KEEP AWAY FROM SUNLIGHT]: Brand: FORMULA

## (undated) DEVICE — GLV SURG BIOGEL LTX PF 7 1/2

## (undated) DEVICE — 30977 SEE SHARP - ENHANCED INTRAOPERATIVE LAPAROSCOPE CLEANING & DEFOGGING: Brand: 30977 SEE SHARP - ENHANCED INTRAOPERATIVE LAPAROSCOPE CLEANING & DEFOGGING

## (undated) DEVICE — FAN SPRAY KIT: Brand: PULSAVAC®

## (undated) DEVICE — DRAPE,UTILITY,TAPE,15X26,STERILE: Brand: MEDLINE

## (undated) DEVICE — RETR STAY HK ELAS SHRP 5MM 50PK

## (undated) DEVICE — Device: Brand: REFERENCE PATCH CARTO 3

## (undated) DEVICE — T-MAX DISPOSABLE FACE MASK 8 PER BOX

## (undated) DEVICE — SUT MNCRYL 4/0 P3 18IN UD MCP494G

## (undated) DEVICE — PERCLOSE™ PROSTYLE™ SUTURE-MEDIATED CLOSURE AND REPAIR SYSTEM: Brand: PERCLOSE™ PROSTYLE™

## (undated) DEVICE — PROBE 8225101 5PK STD PRASS FL TIP ROHS

## (undated) DEVICE — SUTURE FIBERTAPE FIBERWIRE SZ 2-0 30IN NONABSORB BLU AR72377

## (undated) DEVICE — TOWEL,OR,DSP,ST,BLUE,STD,4/PK,20PK/CS: Brand: MEDLINE

## (undated) DEVICE — SUREFIT, DUAL DISPERSIVE ELECTRODE, CONTACT QUALITY MONITOR: Brand: SUREFIT

## (undated) DEVICE — SUTURE MCRYL SZ 3 0 L18IN ABSRB UD PS 2 3 8 CIR REV CUT NDL MCP497G

## (undated) DEVICE — SYS CLS VASC/VENI VASCADE MVP 6TO12F

## (undated) DEVICE — DRSNG BRDR MEPILEXLITE SLFADHR SIL 2X5

## (undated) DEVICE — DISPOSABLE BIPOLAR CABLE 12FT. (3.6M): Brand: KIRWAN

## (undated) DEVICE — INTRO SHEATH FASTCATH TRNSEP SL1 .032IN 8F 63CM

## (undated) DEVICE — STERILE POLYISOPRENE POWDER-FREE SURGICAL GLOVES: Brand: PROTEXIS

## (undated) DEVICE — INSTRUMENT KIT ORTHOPEDIC KNEE NAVITRACK

## (undated) DEVICE — PAD, DEFIB, ADULT, RADIOTRANS, PHYSIO: Brand: MEDLINE

## (undated) DEVICE — GLV SURG DERMASSURE GRN LF PF 8.0

## (undated) DEVICE — SUTURE VICRYL + SZ 2-0 L36IN ABSRB UD L36MM CT-1 1/2 CIR VCP945H

## (undated) DEVICE — TP APPL SPRY EXT PROGEL 11IN

## (undated) DEVICE — ELECTRD BLD EZ CLN MOD 2.5IN

## (undated) DEVICE — 3M™ TEGADERM™ TRANSPARENT FILM DRESSING FRAME STYLE, 1626W, 4 IN X 4-3/4 IN (10 CM X 12 CM), 50/CT 4CT/CASE: Brand: 3M™ TEGADERM™

## (undated) DEVICE — PK TURNOVER RM ADV

## (undated) DEVICE — PENCL ES MEGADINE EZ/CLEAN BUTN W/HOLSTR 10FT

## (undated) DEVICE — 28 FR RIGHT ANGLE – SOFT PVC CATHETER: Brand: PVC THORACIC CATHETERS

## (undated) DEVICE — SUT VIC 0 UR6 27IN VCP603H

## (undated) DEVICE — TP UMB COTN 1/8X36 U12T

## (undated) DEVICE — SYS COL WAST NAMIC IV SGL/LN FML/FIT W/VNT/SPK/HD 72IN

## (undated) DEVICE — BANDAGE COMPR W6INXL10YD ST M E WHITE/BEIGE

## (undated) DEVICE — ADHS LIQ MASTISOL 2/3ML

## (undated) DEVICE — ADHESIVE SKIN CLOSURE WND 8.661X1.5 IN 22 CM LIQUIBAND SECUR

## (undated) DEVICE — BOUGIE, INTRODUCER, 15FRX70CM, COUD: Brand: MEDLINE

## (undated) DEVICE — 3M™ IOBAN™ 2 ANTIMICROBIAL INCISE DRAPE 6650EZ: Brand: IOBAN™ 2

## (undated) DEVICE — STERILE LATEX POWDER FREE SURGICAL GLOVES WITH HYDROGEL COATING: Brand: PROTEXIS

## (undated) DEVICE — 20 FR STRAIGHT – SOFT PVC CATHETER: Brand: PVC THORACIC CATHETERS

## (undated) DEVICE — ARM DRAPE

## (undated) DEVICE — GLOVE SURG SZ 85 L12IN FNGR THK79MIL GRN LTX FREE

## (undated) DEVICE — PRESSURE MONITORING SET: Brand: TRUWAVE

## (undated) DEVICE — SI AVANTI+ 7F STD W/GW  NO OBT: Brand: AVANTI

## (undated) DEVICE — SUTURE VICRYL + SZ 1 L18IN ABSRB UD L36MM CT-1 1/2 CIR VCP841D

## (undated) DEVICE — SPNG LAP CIGARETTE KITTNER 5MM STRL PK/5

## (undated) DEVICE — GLOVE SURG SZ 75 CRM LTX FREE POLYISOPRENE POLYMER BEAD ANTI

## (undated) DEVICE — PIN FIX STERILE L80MM DIA3.2MM FLUT CAS

## (undated) DEVICE — ENDOPOUCH RETRIEVER SPECIMEN RETRIEVAL BAGS: Brand: ENDOPOUCH RETRIEVER

## (undated) DEVICE — VAGINAL PREP TRAY: Brand: MEDLINE INDUSTRIES, INC.

## (undated) DEVICE — DRAPE ROSA RBTC UNT 20 DROP

## (undated) DEVICE — SPNG DISSCT CHRRY 3/8IN STRL PK/5

## (undated) DEVICE — PAD MINOR UNIVERSAL: Brand: MEDLINE INDUSTRIES, INC.

## (undated) DEVICE — NDL TRNSEP BRK XS LNG 18G 98CM A/

## (undated) DEVICE — EMG TUBE 8229707 NIM TRIVANTAGE 7.0MM ID: Brand: NIM TRIVANTAGE™

## (undated) DEVICE — NEEDLE SUT PASS FOR ROT CUF LABRAL REP SUREFIRE SCORPION

## (undated) DEVICE — SUT SILK 2/0 SUTUPAK TIES 24IN SA75H

## (undated) DEVICE — CHLORAPREP 26ML ORANGE

## (undated) DEVICE — ARM BOARD PAD: Brand: DEVON

## (undated) DEVICE — ELECTRODE ELECSURG L 10.2 CM PTFE COAT MONOPOLAR BLADE NSTK

## (undated) DEVICE — CANNULA SEAL

## (undated) DEVICE — SUT SILK 2/0 FS BLK 18IN 685G

## (undated) DEVICE — STEERABLE SHEATH CLEAR: Brand: FARADRIVE™

## (undated) DEVICE — UNDERGLOVE SURG SZ 8 FNGR THK0.21MIL GRN LTX BEAD CUF

## (undated) DEVICE — SHEET,DRAPE,53X77,STERILE: Brand: MEDLINE

## (undated) DEVICE — SOLIDIFIER LIQUI LOC PLUS 2000CC

## (undated) DEVICE — CATHETER,URETHRAL,REDRUBBER,STRL,18FR: Brand: MEDLINE

## (undated) DEVICE — TUBE ET 7.5MM NSL ORAL BASIC CUF INTMED MURPHY EYE RADPQ

## (undated) DEVICE — SI AVANTI+ 8F STD W/GW  NO OBT: Brand: AVANTI

## (undated) DEVICE — PIN FIX STERILE L150MM DIA3.2MM FLUT

## (undated) DEVICE — BANDAGE,GAUZE,CONFORMING,4"X75",STRL,LF: Brand: MEDLINE

## (undated) DEVICE — PK CATH CARD 30 CA/4

## (undated) DEVICE — SURGICAL PROCEDURE PACK KNEE TOT DBD CDS LOURDES HOSP LF

## (undated) DEVICE — SUT SILK 3/0 SH CR8 18IN C013D

## (undated) DEVICE — 90-S MAX, SUCTION PROBE, NON-BENDABLE, MAX CUT LEVEL 11: Brand: SERFAS ENERGY

## (undated) DEVICE — SPNG DISSCT SECTO KTTNER PK/5

## (undated) DEVICE — TBG PRESS/MONITR FIX M/F LL A/ 48IN STRL

## (undated) DEVICE — Device: Brand: WEBSTER CS

## (undated) DEVICE — REDUCER: Brand: ENDOWRIST

## (undated) DEVICE — NDL HYPO SFTY 22G 11/2IN

## (undated) DEVICE — 3M™ STERI-STRIP™ REINFORCED ADHESIVE SKIN CLOSURES, R1547, 1/2 IN X 4 IN (12 MM X 100 MM), 6 STRIPS/ENVELOPE: Brand: 3M™ STERI-STRIP™

## (undated) DEVICE — SUTURE FIBERLOOP SZ 2-0 L20IN NONABSORBABLE BLU STR NDL AR7234

## (undated) DEVICE — TOWEL,OR,DSP,ST,BLUE,DLX,4/PK,20PK/CS: Brand: MEDLINE

## (undated) DEVICE — SUT VIC 3/0 RB1 27IN UD VCP215H

## (undated) DEVICE — SOLUTION IRRIG 3000ML 0.9% SOD CHL USP UROMATIC PLAS CONT

## (undated) DEVICE — CABL BIPOL W/ALLGTR CLIP/SM 12FT

## (undated) DEVICE — Device: Brand: SMARTABLATE

## (undated) DEVICE — Device: Brand: PENTARAY NAV

## (undated) DEVICE — SOL IRR NACL 0.9PCT BT 1000ML

## (undated) DEVICE — SUTURE VICRYL + 3-0 L27IN ABSRB UD PS-2 L19MM 3/8 CIR PRIM VCP427H

## (undated) DEVICE — CATHETER CONNECTION CABLE: Brand: FARASTAR™

## (undated) DEVICE — [AGGRESSIVE 6-FLUTE BARREL BUR, ARTHROSCOPIC SHAVER BLADE,  DO NOT RESTERILIZE,  DO NOT USE IF PACKAGE IS DAMAGED,  KEEP DRY,  KEEP AWAY FROM SUNLIGHT]: Brand: FORMULA

## (undated) DEVICE — DUAL CUT SAGITTAL BLADE

## (undated) DEVICE — SHOULDER CDS

## (undated) DEVICE — GLV SURG BIOGEL M LTX PF 8

## (undated) DEVICE — DRAPE,SHOULDER,BEACH CHAIR,STERILE: Brand: MEDLINE

## (undated) DEVICE — CURAVIEW LED LARYNGOSCOPE BLADE & HANDLE,DISPOSABLE,MAC 4: Brand: CURAPLEX

## (undated) DEVICE — SHOULDER STABILIZATION KIT,                                    DISPOSABLE 12 PER BOX

## (undated) DEVICE — ZIMMER® STERILE DISPOSABLE TOURNIQUET CUFF WITH PLC, DUAL PORT, SINGLE BLADDER, 34 IN. (86 CM)

## (undated) DEVICE — SI AVANTI+ 10F STD W/GW: Brand: AVANTI

## (undated) DEVICE — OPTIFOAM GENTLE SA, POSTOP, 4X12: Brand: MEDLINE

## (undated) DEVICE — ADHS SKIN PREMIERPRO EXOFIN TOPICAL HI/VISC .5ML

## (undated) DEVICE — RESERVOIR,SUCTION,100CC,SILICONE: Brand: MEDLINE

## (undated) DEVICE — SUTURE TIGERTAPE TIGERWIRE SZ 2-0 L30IN NONABSORBABLE AR72377T

## (undated) DEVICE — THREE QUARTER SHEET: Brand: CONVERTORS